# Patient Record
Sex: FEMALE | Race: WHITE | NOT HISPANIC OR LATINO | URBAN - METROPOLITAN AREA
[De-identification: names, ages, dates, MRNs, and addresses within clinical notes are randomized per-mention and may not be internally consistent; named-entity substitution may affect disease eponyms.]

---

## 2017-10-03 ENCOUNTER — OUTPATIENT (OUTPATIENT)
Dept: OUTPATIENT SERVICES | Facility: HOSPITAL | Age: 77
LOS: 1 days | End: 2017-10-03
Payer: COMMERCIAL

## 2017-10-03 DIAGNOSIS — Z01.818 ENCOUNTER FOR OTHER PREPROCEDURAL EXAMINATION: ICD-10-CM

## 2017-10-03 PROCEDURE — A9502: CPT

## 2017-10-03 PROCEDURE — 93017 CV STRESS TEST TRACING ONLY: CPT

## 2017-10-03 PROCEDURE — 78452 HT MUSCLE IMAGE SPECT MULT: CPT

## 2018-04-09 ENCOUNTER — APPOINTMENT (OUTPATIENT)
Dept: ORTHOPEDIC SURGERY | Facility: CLINIC | Age: 78
End: 2018-04-09
Payer: MEDICARE

## 2018-04-09 VITALS
HEART RATE: 82 BPM | WEIGHT: 158 LBS | HEIGHT: 59 IN | DIASTOLIC BLOOD PRESSURE: 82 MMHG | SYSTOLIC BLOOD PRESSURE: 154 MMHG | BODY MASS INDEX: 31.85 KG/M2

## 2018-04-09 DIAGNOSIS — Z86.39 PERSONAL HISTORY OF OTHER ENDOCRINE, NUTRITIONAL AND METABOLIC DISEASE: ICD-10-CM

## 2018-04-09 DIAGNOSIS — Z86.79 PERSONAL HISTORY OF OTHER DISEASES OF THE CIRCULATORY SYSTEM: ICD-10-CM

## 2018-04-09 DIAGNOSIS — Z87.891 PERSONAL HISTORY OF NICOTINE DEPENDENCE: ICD-10-CM

## 2018-04-09 PROCEDURE — 99204 OFFICE O/P NEW MOD 45 MIN: CPT

## 2018-04-09 PROCEDURE — 73564 X-RAY EXAM KNEE 4 OR MORE: CPT | Mod: RT

## 2018-05-29 ENCOUNTER — OUTPATIENT (OUTPATIENT)
Dept: OUTPATIENT SERVICES | Facility: HOSPITAL | Age: 78
LOS: 1 days | End: 2018-05-29
Payer: COMMERCIAL

## 2018-05-29 VITALS
TEMPERATURE: 98 F | OXYGEN SATURATION: 96 % | SYSTOLIC BLOOD PRESSURE: 162 MMHG | HEART RATE: 72 BPM | DIASTOLIC BLOOD PRESSURE: 86 MMHG | RESPIRATION RATE: 18 BRPM | WEIGHT: 158.07 LBS | HEIGHT: 58 IN

## 2018-05-29 DIAGNOSIS — Z98.41 CATARACT EXTRACTION STATUS, RIGHT EYE: Chronic | ICD-10-CM

## 2018-05-29 DIAGNOSIS — Z96.652 PRESENCE OF LEFT ARTIFICIAL KNEE JOINT: Chronic | ICD-10-CM

## 2018-05-29 DIAGNOSIS — Z98.890 OTHER SPECIFIED POSTPROCEDURAL STATES: Chronic | ICD-10-CM

## 2018-05-29 DIAGNOSIS — M17.11 UNILATERAL PRIMARY OSTEOARTHRITIS, RIGHT KNEE: ICD-10-CM

## 2018-05-29 DIAGNOSIS — Z01.818 ENCOUNTER FOR OTHER PREPROCEDURAL EXAMINATION: ICD-10-CM

## 2018-05-29 LAB
ANION GAP SERPL CALC-SCNC: 8 MMOL/L — SIGNIFICANT CHANGE UP (ref 5–17)
APTT BLD: 39.5 SEC — HIGH (ref 27.5–37.4)
BUN SERPL-MCNC: 21 MG/DL — HIGH (ref 7–18)
CALCIUM SERPL-MCNC: 9.3 MG/DL — SIGNIFICANT CHANGE UP (ref 8.4–10.5)
CHLORIDE SERPL-SCNC: 106 MMOL/L — SIGNIFICANT CHANGE UP (ref 96–108)
CO2 SERPL-SCNC: 25 MMOL/L — SIGNIFICANT CHANGE UP (ref 22–31)
CREAT SERPL-MCNC: 1.32 MG/DL — HIGH (ref 0.5–1.3)
GLUCOSE SERPL-MCNC: 96 MG/DL — SIGNIFICANT CHANGE UP (ref 70–99)
HCT VFR BLD CALC: 46.9 % — HIGH (ref 34.5–45)
HGB BLD-MCNC: 15.6 G/DL — HIGH (ref 11.5–15.5)
INR BLD: 0.93 RATIO — SIGNIFICANT CHANGE UP (ref 0.88–1.16)
MCHC RBC-ENTMCNC: 29.3 PG — SIGNIFICANT CHANGE UP (ref 27–34)
MCHC RBC-ENTMCNC: 33.3 GM/DL — SIGNIFICANT CHANGE UP (ref 32–36)
MCV RBC AUTO: 88.1 FL — SIGNIFICANT CHANGE UP (ref 80–100)
PLATELET # BLD AUTO: 204 K/UL — SIGNIFICANT CHANGE UP (ref 150–400)
POTASSIUM SERPL-MCNC: 3.6 MMOL/L — SIGNIFICANT CHANGE UP (ref 3.5–5.3)
POTASSIUM SERPL-SCNC: 3.6 MMOL/L — SIGNIFICANT CHANGE UP (ref 3.5–5.3)
PROTHROM AB SERPL-ACNC: 10.1 SEC — SIGNIFICANT CHANGE UP (ref 9.8–12.7)
RBC # BLD: 5.33 M/UL — HIGH (ref 3.8–5.2)
RBC # FLD: 12.8 % — SIGNIFICANT CHANGE UP (ref 10.3–14.5)
SODIUM SERPL-SCNC: 139 MMOL/L — SIGNIFICANT CHANGE UP (ref 135–145)
WBC # BLD: 6.5 K/UL — SIGNIFICANT CHANGE UP (ref 3.8–10.5)
WBC # FLD AUTO: 6.5 K/UL — SIGNIFICANT CHANGE UP (ref 3.8–10.5)

## 2018-05-29 PROCEDURE — 71046 X-RAY EXAM CHEST 2 VIEWS: CPT | Mod: 26

## 2018-05-29 RX ORDER — SODIUM CHLORIDE 9 MG/ML
3 INJECTION INTRAMUSCULAR; INTRAVENOUS; SUBCUTANEOUS EVERY 8 HOURS
Qty: 0 | Refills: 0 | Status: DISCONTINUED | OUTPATIENT
Start: 2018-06-14 | End: 2018-06-18

## 2018-05-29 RX ORDER — TRAMADOL HYDROCHLORIDE 50 MG/1
50 TABLET ORAL ONCE
Qty: 0 | Refills: 0 | Status: DISCONTINUED | OUTPATIENT
Start: 2018-06-14 | End: 2018-06-14

## 2018-05-29 RX ORDER — GABAPENTIN 400 MG/1
100 CAPSULE ORAL ONCE
Qty: 0 | Refills: 0 | Status: COMPLETED | OUTPATIENT
Start: 2018-06-14 | End: 2018-06-14

## 2018-05-29 NOTE — H&P PST ADULT - NEGATIVE GENERAL GENITOURINARY SYMPTOMS
no flank pain R/no urine discoloration/no hematuria/no flank pain L/no renal colic/no gas in urine/no bladder infections

## 2018-05-29 NOTE — H&P PST ADULT - NSANTHOSAYNRD_GEN_A_CORE
No. SHON screening performed.  STOP BANG Legend: 0-2 = LOW Risk; 3-4 = INTERMEDIATE Risk; 5-8 = HIGH Risk

## 2018-05-29 NOTE — H&P PST ADULT - PMH
Breast lump    Cataract    CKD (chronic kidney disease)    Diabetes mellitus    Gout    Hyperlipidemia    Hypertension    Osteoarthritis    Osteoporosis    Seasonal allergies    Vitamin D deficiency

## 2018-05-29 NOTE — H&P PST ADULT - PSH
H/O bilateral cataract extraction  2012  and 2014  History of breast surgery  Excision of left breast lump - benign  1991  History of knee replacement, total, left  2009

## 2018-05-29 NOTE — H&P PST ADULT - HISTORY OF PRESENT ILLNESS
77year old female with pmhx of gout, hypertension, hypercholesterolemia, T2DM, CKD, Vitamin D deficiency, osteoporosis, seasonal allergies and osteoarthritis presents today with c/o right knee pain x years. Patient is here today for presurgical testing for scheduled Right Total Knee replacement on 6/14/18

## 2018-05-30 LAB
MRSA PCR RESULT.: SIGNIFICANT CHANGE UP
S AUREUS DNA NOSE QL NAA+PROBE: SIGNIFICANT CHANGE UP

## 2018-06-08 ENCOUNTER — APPOINTMENT (OUTPATIENT)
Dept: ORTHOPEDIC SURGERY | Facility: CLINIC | Age: 78
End: 2018-06-08
Payer: MEDICARE

## 2018-06-08 PROCEDURE — 99213 OFFICE O/P EST LOW 20 MIN: CPT

## 2018-06-13 ENCOUNTER — TRANSCRIPTION ENCOUNTER (OUTPATIENT)
Age: 78
End: 2018-06-13

## 2018-06-14 ENCOUNTER — INPATIENT (INPATIENT)
Facility: HOSPITAL | Age: 78
LOS: 3 days | Discharge: NURSING, REHAB OR LONG-TERM CARE | DRG: 470 | End: 2018-06-18
Attending: ORTHOPAEDIC SURGERY | Admitting: ORTHOPAEDIC SURGERY
Payer: COMMERCIAL

## 2018-06-14 ENCOUNTER — APPOINTMENT (OUTPATIENT)
Dept: ORTHOPEDIC SURGERY | Facility: HOSPITAL | Age: 78
End: 2018-06-14

## 2018-06-14 VITALS
RESPIRATION RATE: 16 BRPM | SYSTOLIC BLOOD PRESSURE: 124 MMHG | DIASTOLIC BLOOD PRESSURE: 60 MMHG | OXYGEN SATURATION: 98 % | HEART RATE: 64 BPM | TEMPERATURE: 98 F

## 2018-06-14 DIAGNOSIS — Z96.652 PRESENCE OF LEFT ARTIFICIAL KNEE JOINT: Chronic | ICD-10-CM

## 2018-06-14 DIAGNOSIS — Z98.890 OTHER SPECIFIED POSTPROCEDURAL STATES: Chronic | ICD-10-CM

## 2018-06-14 DIAGNOSIS — M17.11 UNILATERAL PRIMARY OSTEOARTHRITIS, RIGHT KNEE: ICD-10-CM

## 2018-06-14 DIAGNOSIS — Z98.41 CATARACT EXTRACTION STATUS, RIGHT EYE: Chronic | ICD-10-CM

## 2018-06-14 LAB
ANION GAP SERPL CALC-SCNC: 9 MMOL/L — SIGNIFICANT CHANGE UP (ref 5–17)
BUN SERPL-MCNC: 13 MG/DL — SIGNIFICANT CHANGE UP (ref 7–18)
CALCIUM SERPL-MCNC: 7.5 MG/DL — LOW (ref 8.4–10.5)
CHLORIDE SERPL-SCNC: 114 MMOL/L — HIGH (ref 96–108)
CO2 SERPL-SCNC: 20 MMOL/L — LOW (ref 22–31)
CREAT SERPL-MCNC: 1.11 MG/DL — SIGNIFICANT CHANGE UP (ref 0.5–1.3)
GLUCOSE SERPL-MCNC: 82 MG/DL — SIGNIFICANT CHANGE UP (ref 70–99)
HCT VFR BLD CALC: 35.4 % — SIGNIFICANT CHANGE UP (ref 34.5–45)
HGB BLD-MCNC: 11.8 G/DL — SIGNIFICANT CHANGE UP (ref 11.5–15.5)
MCHC RBC-ENTMCNC: 29.8 PG — SIGNIFICANT CHANGE UP (ref 27–34)
MCHC RBC-ENTMCNC: 33.5 GM/DL — SIGNIFICANT CHANGE UP (ref 32–36)
MCV RBC AUTO: 88.9 FL — SIGNIFICANT CHANGE UP (ref 80–100)
PLATELET # BLD AUTO: 170 K/UL — SIGNIFICANT CHANGE UP (ref 150–400)
POTASSIUM SERPL-MCNC: 3.4 MMOL/L — LOW (ref 3.5–5.3)
POTASSIUM SERPL-SCNC: 3.4 MMOL/L — LOW (ref 3.5–5.3)
RBC # BLD: 3.98 M/UL — SIGNIFICANT CHANGE UP (ref 3.8–5.2)
RBC # FLD: 12.7 % — SIGNIFICANT CHANGE UP (ref 10.3–14.5)
SODIUM SERPL-SCNC: 143 MMOL/L — SIGNIFICANT CHANGE UP (ref 135–145)
WBC # BLD: 5.7 K/UL — SIGNIFICANT CHANGE UP (ref 3.8–10.5)
WBC # FLD AUTO: 5.7 K/UL — SIGNIFICANT CHANGE UP (ref 3.8–10.5)

## 2018-06-14 PROCEDURE — 86850 RBC ANTIBODY SCREEN: CPT

## 2018-06-14 PROCEDURE — 93005 ELECTROCARDIOGRAM TRACING: CPT

## 2018-06-14 PROCEDURE — 80048 BASIC METABOLIC PNL TOTAL CA: CPT

## 2018-06-14 PROCEDURE — 71046 X-RAY EXAM CHEST 2 VIEWS: CPT

## 2018-06-14 PROCEDURE — 87640 STAPH A DNA AMP PROBE: CPT

## 2018-06-14 PROCEDURE — 85730 THROMBOPLASTIN TIME PARTIAL: CPT

## 2018-06-14 PROCEDURE — G0463: CPT

## 2018-06-14 PROCEDURE — 85610 PROTHROMBIN TIME: CPT

## 2018-06-14 PROCEDURE — 73562 X-RAY EXAM OF KNEE 3: CPT | Mod: 26,RT

## 2018-06-14 PROCEDURE — 87641 MR-STAPH DNA AMP PROBE: CPT

## 2018-06-14 PROCEDURE — 86900 BLOOD TYPING SEROLOGIC ABO: CPT

## 2018-06-14 PROCEDURE — 82962 GLUCOSE BLOOD TEST: CPT

## 2018-06-14 PROCEDURE — 27447 TOTAL KNEE ARTHROPLASTY: CPT | Mod: RT

## 2018-06-14 PROCEDURE — 86901 BLOOD TYPING SEROLOGIC RH(D): CPT

## 2018-06-14 PROCEDURE — 85027 COMPLETE CBC AUTOMATED: CPT

## 2018-06-14 RX ORDER — FUROSEMIDE 40 MG
20 TABLET ORAL DAILY
Qty: 0 | Refills: 0 | Status: DISCONTINUED | OUTPATIENT
Start: 2018-06-14 | End: 2018-06-18

## 2018-06-14 RX ORDER — FAMOTIDINE 10 MG/ML
20 INJECTION INTRAVENOUS EVERY 12 HOURS
Qty: 0 | Refills: 0 | Status: DISCONTINUED | OUTPATIENT
Start: 2018-06-14 | End: 2018-06-18

## 2018-06-14 RX ORDER — OXYCODONE HYDROCHLORIDE 5 MG/1
5 TABLET ORAL EVERY 4 HOURS
Qty: 0 | Refills: 0 | Status: DISCONTINUED | OUTPATIENT
Start: 2018-06-14 | End: 2018-06-18

## 2018-06-14 RX ORDER — FOLIC ACID 0.8 MG
1 TABLET ORAL DAILY
Qty: 0 | Refills: 0 | Status: DISCONTINUED | OUTPATIENT
Start: 2018-06-14 | End: 2018-06-18

## 2018-06-14 RX ORDER — FERROUS SULFATE 325(65) MG
325 TABLET ORAL
Qty: 0 | Refills: 0 | Status: DISCONTINUED | OUTPATIENT
Start: 2018-06-14 | End: 2018-06-18

## 2018-06-14 RX ORDER — BUPIVACAINE 13.3 MG/ML
INJECTION, SUSPENSION, LIPOSOMAL INFILTRATION
Qty: 0 | Refills: 0 | Status: DISCONTINUED | OUTPATIENT
Start: 2018-06-14 | End: 2018-06-14

## 2018-06-14 RX ORDER — ACETAMINOPHEN 500 MG
1000 TABLET ORAL ONCE
Qty: 0 | Refills: 0 | Status: COMPLETED | OUTPATIENT
Start: 2018-06-14 | End: 2018-06-14

## 2018-06-14 RX ORDER — ACETAMINOPHEN 500 MG
650 TABLET ORAL EVERY 6 HOURS
Qty: 0 | Refills: 0 | Status: DISCONTINUED | OUTPATIENT
Start: 2018-06-14 | End: 2018-06-18

## 2018-06-14 RX ORDER — TRAMADOL HYDROCHLORIDE 50 MG/1
50 TABLET ORAL EVERY 8 HOURS
Qty: 0 | Refills: 0 | Status: DISCONTINUED | OUTPATIENT
Start: 2018-06-14 | End: 2018-06-18

## 2018-06-14 RX ORDER — SODIUM CHLORIDE 9 MG/ML
1000 INJECTION, SOLUTION INTRAVENOUS
Qty: 0 | Refills: 0 | Status: DISCONTINUED | OUTPATIENT
Start: 2018-06-15 | End: 2018-06-18

## 2018-06-14 RX ORDER — ISOSORBIDE MONONITRATE 60 MG/1
30 TABLET, EXTENDED RELEASE ORAL DAILY
Qty: 0 | Refills: 0 | Status: DISCONTINUED | OUTPATIENT
Start: 2018-06-14 | End: 2018-06-18

## 2018-06-14 RX ORDER — ACETAMINOPHEN 500 MG
1000 TABLET ORAL ONCE
Qty: 0 | Refills: 0 | Status: COMPLETED | OUTPATIENT
Start: 2018-06-15 | End: 2018-06-15

## 2018-06-14 RX ORDER — DEXTROSE 50 % IN WATER 50 %
12.5 SYRINGE (ML) INTRAVENOUS ONCE
Qty: 0 | Refills: 0 | Status: DISCONTINUED | OUTPATIENT
Start: 2018-06-15 | End: 2018-06-18

## 2018-06-14 RX ORDER — HYDROMORPHONE HYDROCHLORIDE 2 MG/ML
0.5 INJECTION INTRAMUSCULAR; INTRAVENOUS; SUBCUTANEOUS
Qty: 0 | Refills: 0 | Status: DISCONTINUED | OUTPATIENT
Start: 2018-06-14 | End: 2018-06-14

## 2018-06-14 RX ORDER — ASCORBIC ACID 60 MG
500 TABLET,CHEWABLE ORAL
Qty: 0 | Refills: 0 | Status: DISCONTINUED | OUTPATIENT
Start: 2018-06-14 | End: 2018-06-18

## 2018-06-14 RX ORDER — DEXTROSE 50 % IN WATER 50 %
15 SYRINGE (ML) INTRAVENOUS ONCE
Qty: 0 | Refills: 0 | Status: DISCONTINUED | OUTPATIENT
Start: 2018-06-15 | End: 2018-06-18

## 2018-06-14 RX ORDER — CEFAZOLIN SODIUM 1 G
1000 VIAL (EA) INJECTION EVERY 8 HOURS
Qty: 0 | Refills: 0 | Status: COMPLETED | OUTPATIENT
Start: 2018-06-14 | End: 2018-06-15

## 2018-06-14 RX ORDER — POTASSIUM CHLORIDE 20 MEQ
40 PACKET (EA) ORAL ONCE
Qty: 0 | Refills: 0 | Status: COMPLETED | OUTPATIENT
Start: 2018-06-14 | End: 2018-06-14

## 2018-06-14 RX ORDER — ENOXAPARIN SODIUM 100 MG/ML
30 INJECTION SUBCUTANEOUS EVERY 12 HOURS
Qty: 0 | Refills: 0 | Status: DISCONTINUED | OUTPATIENT
Start: 2018-06-15 | End: 2018-06-18

## 2018-06-14 RX ORDER — ASPIRIN/CALCIUM CARB/MAGNESIUM 324 MG
81 TABLET ORAL DAILY
Qty: 0 | Refills: 0 | Status: DISCONTINUED | OUTPATIENT
Start: 2018-06-15 | End: 2018-06-18

## 2018-06-14 RX ORDER — BUPIVACAINE 13.3 MG/ML
20 INJECTION, SUSPENSION, LIPOSOMAL INFILTRATION ONCE
Qty: 0 | Refills: 0 | Status: DISCONTINUED | OUTPATIENT
Start: 2018-06-14 | End: 2018-06-14

## 2018-06-14 RX ORDER — AMLODIPINE BESYLATE 2.5 MG/1
10 TABLET ORAL DAILY
Qty: 0 | Refills: 0 | Status: DISCONTINUED | OUTPATIENT
Start: 2018-06-14 | End: 2018-06-18

## 2018-06-14 RX ORDER — DOCUSATE SODIUM 100 MG
100 CAPSULE ORAL THREE TIMES A DAY
Qty: 0 | Refills: 0 | Status: DISCONTINUED | OUTPATIENT
Start: 2018-06-14 | End: 2018-06-18

## 2018-06-14 RX ORDER — SIMVASTATIN 20 MG/1
20 TABLET, FILM COATED ORAL AT BEDTIME
Qty: 0 | Refills: 0 | Status: DISCONTINUED | OUTPATIENT
Start: 2018-06-14 | End: 2018-06-18

## 2018-06-14 RX ORDER — HYDRALAZINE HCL 50 MG
100 TABLET ORAL
Qty: 0 | Refills: 0 | Status: DISCONTINUED | OUTPATIENT
Start: 2018-06-14 | End: 2018-06-18

## 2018-06-14 RX ORDER — DEXTROSE 50 % IN WATER 50 %
25 SYRINGE (ML) INTRAVENOUS ONCE
Qty: 0 | Refills: 0 | Status: DISCONTINUED | OUTPATIENT
Start: 2018-06-15 | End: 2018-06-18

## 2018-06-14 RX ORDER — HYDROMORPHONE HYDROCHLORIDE 2 MG/ML
0.5 INJECTION INTRAMUSCULAR; INTRAVENOUS; SUBCUTANEOUS EVERY 4 HOURS
Qty: 0 | Refills: 0 | Status: DISCONTINUED | OUTPATIENT
Start: 2018-06-14 | End: 2018-06-18

## 2018-06-14 RX ORDER — BUPIVACAINE 13.3 MG/ML
10 INJECTION, SUSPENSION, LIPOSOMAL INFILTRATION ONCE
Qty: 0 | Refills: 0 | Status: DISCONTINUED | OUTPATIENT
Start: 2018-06-14 | End: 2018-06-14

## 2018-06-14 RX ORDER — KETOTIFEN FUMARATE 0.34 MG/ML
1 SOLUTION OPHTHALMIC THREE TIMES A DAY
Qty: 0 | Refills: 0 | Status: DISCONTINUED | OUTPATIENT
Start: 2018-06-14 | End: 2018-06-18

## 2018-06-14 RX ORDER — SODIUM CHLORIDE 9 MG/ML
1000 INJECTION INTRAMUSCULAR; INTRAVENOUS; SUBCUTANEOUS
Qty: 0 | Refills: 0 | Status: DISCONTINUED | OUTPATIENT
Start: 2018-06-14 | End: 2018-06-18

## 2018-06-14 RX ORDER — CARVEDILOL PHOSPHATE 80 MG/1
25 CAPSULE, EXTENDED RELEASE ORAL EVERY 12 HOURS
Qty: 0 | Refills: 0 | Status: DISCONTINUED | OUTPATIENT
Start: 2018-06-14 | End: 2018-06-18

## 2018-06-14 RX ORDER — INSULIN LISPRO 100/ML
VIAL (ML) SUBCUTANEOUS
Qty: 0 | Refills: 0 | Status: DISCONTINUED | OUTPATIENT
Start: 2018-06-14 | End: 2018-06-18

## 2018-06-14 RX ORDER — GABAPENTIN 400 MG/1
100 CAPSULE ORAL DAILY
Qty: 0 | Refills: 0 | Status: DISCONTINUED | OUTPATIENT
Start: 2018-06-14 | End: 2018-06-18

## 2018-06-14 RX ORDER — LORATADINE 10 MG/1
10 TABLET ORAL DAILY
Qty: 0 | Refills: 0 | Status: DISCONTINUED | OUTPATIENT
Start: 2018-06-14 | End: 2018-06-18

## 2018-06-14 RX ORDER — GLUCAGON INJECTION, SOLUTION 0.5 MG/.1ML
1 INJECTION, SOLUTION SUBCUTANEOUS ONCE
Qty: 0 | Refills: 0 | Status: DISCONTINUED | OUTPATIENT
Start: 2018-06-15 | End: 2018-06-18

## 2018-06-14 RX ORDER — ONDANSETRON 8 MG/1
4 TABLET, FILM COATED ORAL EVERY 6 HOURS
Qty: 0 | Refills: 0 | Status: DISCONTINUED | OUTPATIENT
Start: 2018-06-14 | End: 2018-06-18

## 2018-06-14 RX ADMIN — KETOTIFEN FUMARATE 1 DROP(S): 0.34 SOLUTION OPHTHALMIC at 21:28

## 2018-06-14 RX ADMIN — Medication 400 MILLIGRAM(S): at 14:05

## 2018-06-14 RX ADMIN — TRAMADOL HYDROCHLORIDE 50 MILLIGRAM(S): 50 TABLET ORAL at 21:28

## 2018-06-14 RX ADMIN — Medication 1000 MILLIGRAM(S): at 14:51

## 2018-06-14 RX ADMIN — SODIUM CHLORIDE 3 MILLILITER(S): 9 INJECTION INTRAMUSCULAR; INTRAVENOUS; SUBCUTANEOUS at 21:20

## 2018-06-14 RX ADMIN — Medication 100 MILLIGRAM(S): at 17:49

## 2018-06-14 RX ADMIN — GABAPENTIN 100 MILLIGRAM(S): 400 CAPSULE ORAL at 08:25

## 2018-06-14 RX ADMIN — TRAMADOL HYDROCHLORIDE 50 MILLIGRAM(S): 50 TABLET ORAL at 08:25

## 2018-06-14 RX ADMIN — Medication 100 MILLIGRAM(S): at 21:28

## 2018-06-14 RX ADMIN — Medication 500 MILLIGRAM(S): at 17:49

## 2018-06-14 RX ADMIN — Medication 100 MILLIGRAM(S): at 17:48

## 2018-06-14 RX ADMIN — TRAMADOL HYDROCHLORIDE 50 MILLIGRAM(S): 50 TABLET ORAL at 22:25

## 2018-06-14 RX ADMIN — FAMOTIDINE 20 MILLIGRAM(S): 10 INJECTION INTRAVENOUS at 18:30

## 2018-06-14 RX ADMIN — CARVEDILOL PHOSPHATE 25 MILLIGRAM(S): 80 CAPSULE, EXTENDED RELEASE ORAL at 17:49

## 2018-06-14 RX ADMIN — SIMVASTATIN 20 MILLIGRAM(S): 20 TABLET, FILM COATED ORAL at 21:28

## 2018-06-14 RX ADMIN — SODIUM CHLORIDE 3 MILLILITER(S): 9 INJECTION INTRAMUSCULAR; INTRAVENOUS; SUBCUTANEOUS at 08:22

## 2018-06-14 RX ADMIN — Medication 20 MILLIGRAM(S): at 17:48

## 2018-06-14 RX ADMIN — Medication 325 MILLIGRAM(S): at 17:48

## 2018-06-14 RX ADMIN — Medication 40 MILLIEQUIVALENT(S): at 16:30

## 2018-06-14 NOTE — PROGRESS NOTE ADULT - SUBJECTIVE AND OBJECTIVE BOX
patient seen postop. standing w RW w family.  no pain. decreased sensation from block    flex/ext toes and ankle on right and left  strong palp DP pulse    cpm ordered discussed that pt has 3 flights of stairs at home and pt will eval for appropriate discharge.

## 2018-06-14 NOTE — CHART NOTE - NSCHARTNOTEFT_GEN_A_CORE
Orthopedics:    CPM Instructions as per Dr Rah Ceja:  - Flexion settings: 0-90 degrees for the first 3 days  - Frequency:  4x/daily. 20min on and 20min off.

## 2018-06-14 NOTE — PHYSICAL THERAPY INITIAL EVALUATION ADULT - DISCHARGE DISPOSITION, PT EVAL
NIKKIE; will update recommendation pending significant improvement in gait and stair negotiation/rehabilitation facility

## 2018-06-14 NOTE — PHYSICAL THERAPY INITIAL EVALUATION ADULT - MANUAL MUSCLE TESTING RESULTS, REHAB EVAL
no strength deficits were identified/MMT on both upper and left lower extremities are grossly graded 5/5; right lower extremity grossly graded 4-/5

## 2018-06-14 NOTE — PHYSICAL THERAPY INITIAL EVALUATION ADULT - LIVES WITH, PROFILE
in a 3rd floor apartment with 2 flights of stairs with 15 steps each and 5 steps to get to first landing; no HHA services/alone

## 2018-06-14 NOTE — PHYSICAL THERAPY INITIAL EVALUATION ADULT - PATIENT/FAMILY/SIGNIFICANT OTHER GOALS STATEMENT, PT EVAL
return home; ambulate, transfer, and perform ADLs independently and pain-free with or without an assistive device

## 2018-06-14 NOTE — PHYSICAL THERAPY INITIAL EVALUATION ADULT - GENERAL OBSERVATIONS, REHAB EVAL
awake, alert, NAD; IV access on right forearm; +wound dressing and ACE wrap on right knee; +indwelling urethral catheter

## 2018-06-15 ENCOUNTER — RESULT REVIEW (OUTPATIENT)
Age: 78
End: 2018-06-15

## 2018-06-15 DIAGNOSIS — M25.561 PAIN IN RIGHT KNEE: ICD-10-CM

## 2018-06-15 DIAGNOSIS — E11.9 TYPE 2 DIABETES MELLITUS WITHOUT COMPLICATIONS: ICD-10-CM

## 2018-06-15 DIAGNOSIS — Z96.651 PRESENCE OF RIGHT ARTIFICIAL KNEE JOINT: ICD-10-CM

## 2018-06-15 LAB
ANION GAP SERPL CALC-SCNC: 11 MMOL/L — SIGNIFICANT CHANGE UP (ref 5–17)
BUN SERPL-MCNC: 14 MG/DL — SIGNIFICANT CHANGE UP (ref 7–18)
CALCIUM SERPL-MCNC: 7.2 MG/DL — LOW (ref 8.4–10.5)
CHLORIDE SERPL-SCNC: 110 MMOL/L — HIGH (ref 96–108)
CO2 SERPL-SCNC: 20 MMOL/L — LOW (ref 22–31)
CREAT SERPL-MCNC: 1.16 MG/DL — SIGNIFICANT CHANGE UP (ref 0.5–1.3)
GLUCOSE SERPL-MCNC: 233 MG/DL — HIGH (ref 70–99)
HCT VFR BLD CALC: 34.1 % — LOW (ref 34.5–45)
HGB BLD-MCNC: 11.2 G/DL — LOW (ref 11.5–15.5)
MCHC RBC-ENTMCNC: 29.6 PG — SIGNIFICANT CHANGE UP (ref 27–34)
MCHC RBC-ENTMCNC: 33 GM/DL — SIGNIFICANT CHANGE UP (ref 32–36)
MCV RBC AUTO: 89.8 FL — SIGNIFICANT CHANGE UP (ref 80–100)
PLATELET # BLD AUTO: 149 K/UL — LOW (ref 150–400)
POTASSIUM SERPL-MCNC: 3.8 MMOL/L — SIGNIFICANT CHANGE UP (ref 3.5–5.3)
POTASSIUM SERPL-SCNC: 3.8 MMOL/L — SIGNIFICANT CHANGE UP (ref 3.5–5.3)
RBC # BLD: 3.79 M/UL — LOW (ref 3.8–5.2)
RBC # FLD: 13 % — SIGNIFICANT CHANGE UP (ref 10.3–14.5)
SODIUM SERPL-SCNC: 141 MMOL/L — SIGNIFICANT CHANGE UP (ref 135–145)
WBC # BLD: 7 K/UL — SIGNIFICANT CHANGE UP (ref 3.8–10.5)
WBC # FLD AUTO: 7 K/UL — SIGNIFICANT CHANGE UP (ref 3.8–10.5)

## 2018-06-15 PROCEDURE — 88305 TISSUE EXAM BY PATHOLOGIST: CPT | Mod: 26

## 2018-06-15 PROCEDURE — 88311 DECALCIFY TISSUE: CPT | Mod: 26

## 2018-06-15 RX ORDER — INSULIN LISPRO 100/ML
12 VIAL (ML) SUBCUTANEOUS
Qty: 0 | Refills: 0 | Status: DISCONTINUED | OUTPATIENT
Start: 2018-06-15 | End: 2018-06-18

## 2018-06-15 RX ORDER — INSULIN GLARGINE 100 [IU]/ML
100 INJECTION, SOLUTION SUBCUTANEOUS AT BEDTIME
Qty: 0 | Refills: 0 | Status: DISCONTINUED | OUTPATIENT
Start: 2018-06-15 | End: 2018-06-15

## 2018-06-15 RX ORDER — INSULIN GLARGINE 100 [IU]/ML
25 INJECTION, SOLUTION SUBCUTANEOUS AT BEDTIME
Qty: 0 | Refills: 0 | Status: DISCONTINUED | OUTPATIENT
Start: 2018-06-15 | End: 2018-06-18

## 2018-06-15 RX ADMIN — GABAPENTIN 100 MILLIGRAM(S): 400 CAPSULE ORAL at 11:44

## 2018-06-15 RX ADMIN — Medication 325 MILLIGRAM(S): at 11:44

## 2018-06-15 RX ADMIN — FAMOTIDINE 20 MILLIGRAM(S): 10 INJECTION INTRAVENOUS at 17:43

## 2018-06-15 RX ADMIN — Medication 325 MILLIGRAM(S): at 07:58

## 2018-06-15 RX ADMIN — ISOSORBIDE MONONITRATE 30 MILLIGRAM(S): 60 TABLET, EXTENDED RELEASE ORAL at 11:44

## 2018-06-15 RX ADMIN — Medication 400 MILLIGRAM(S): at 14:47

## 2018-06-15 RX ADMIN — Medication 500 MILLIGRAM(S): at 17:42

## 2018-06-15 RX ADMIN — Medication 1 MILLIGRAM(S): at 11:44

## 2018-06-15 RX ADMIN — Medication 400 MILLIGRAM(S): at 05:39

## 2018-06-15 RX ADMIN — Medication 12 UNIT(S): at 11:43

## 2018-06-15 RX ADMIN — CARVEDILOL PHOSPHATE 25 MILLIGRAM(S): 80 CAPSULE, EXTENDED RELEASE ORAL at 05:39

## 2018-06-15 RX ADMIN — ENOXAPARIN SODIUM 30 MILLIGRAM(S): 100 INJECTION SUBCUTANEOUS at 14:58

## 2018-06-15 RX ADMIN — LORATADINE 10 MILLIGRAM(S): 10 TABLET ORAL at 11:44

## 2018-06-15 RX ADMIN — Medication 100 MILLIGRAM(S): at 05:39

## 2018-06-15 RX ADMIN — SIMVASTATIN 20 MILLIGRAM(S): 20 TABLET, FILM COATED ORAL at 21:36

## 2018-06-15 RX ADMIN — AMLODIPINE BESYLATE 10 MILLIGRAM(S): 2.5 TABLET ORAL at 05:39

## 2018-06-15 RX ADMIN — TRAMADOL HYDROCHLORIDE 50 MILLIGRAM(S): 50 TABLET ORAL at 14:52

## 2018-06-15 RX ADMIN — SODIUM CHLORIDE 3 MILLILITER(S): 9 INJECTION INTRAMUSCULAR; INTRAVENOUS; SUBCUTANEOUS at 05:58

## 2018-06-15 RX ADMIN — Medication 1 TABLET(S): at 11:44

## 2018-06-15 RX ADMIN — Medication 4: at 07:58

## 2018-06-15 RX ADMIN — OXYCODONE HYDROCHLORIDE 5 MILLIGRAM(S): 5 TABLET ORAL at 07:58

## 2018-06-15 RX ADMIN — KETOTIFEN FUMARATE 1 DROP(S): 0.34 SOLUTION OPHTHALMIC at 05:39

## 2018-06-15 RX ADMIN — Medication 1000 MILLIGRAM(S): at 15:02

## 2018-06-15 RX ADMIN — Medication 100 MILLIGRAM(S): at 02:47

## 2018-06-15 RX ADMIN — Medication 20 MILLIGRAM(S): at 05:39

## 2018-06-15 RX ADMIN — Medication 2: at 16:51

## 2018-06-15 RX ADMIN — KETOTIFEN FUMARATE 1 DROP(S): 0.34 SOLUTION OPHTHALMIC at 14:53

## 2018-06-15 RX ADMIN — SODIUM CHLORIDE 3 MILLILITER(S): 9 INJECTION INTRAMUSCULAR; INTRAVENOUS; SUBCUTANEOUS at 17:16

## 2018-06-15 RX ADMIN — Medication 81 MILLIGRAM(S): at 14:50

## 2018-06-15 RX ADMIN — Medication 325 MILLIGRAM(S): at 16:53

## 2018-06-15 RX ADMIN — TRAMADOL HYDROCHLORIDE 50 MILLIGRAM(S): 50 TABLET ORAL at 15:45

## 2018-06-15 RX ADMIN — Medication 100 MILLIGRAM(S): at 17:43

## 2018-06-15 RX ADMIN — TRAMADOL HYDROCHLORIDE 50 MILLIGRAM(S): 50 TABLET ORAL at 22:22

## 2018-06-15 RX ADMIN — Medication 1000 MILLIGRAM(S): at 05:58

## 2018-06-15 RX ADMIN — SODIUM CHLORIDE 3 MILLILITER(S): 9 INJECTION INTRAMUSCULAR; INTRAVENOUS; SUBCUTANEOUS at 21:30

## 2018-06-15 RX ADMIN — TRAMADOL HYDROCHLORIDE 50 MILLIGRAM(S): 50 TABLET ORAL at 21:35

## 2018-06-15 RX ADMIN — KETOTIFEN FUMARATE 1 DROP(S): 0.34 SOLUTION OPHTHALMIC at 21:35

## 2018-06-15 RX ADMIN — Medication 6: at 11:43

## 2018-06-15 RX ADMIN — OXYCODONE HYDROCHLORIDE 5 MILLIGRAM(S): 5 TABLET ORAL at 08:45

## 2018-06-15 RX ADMIN — Medication 100 MILLIGRAM(S): at 14:49

## 2018-06-15 RX ADMIN — Medication 500 MILLIGRAM(S): at 05:39

## 2018-06-15 RX ADMIN — INSULIN GLARGINE 25 UNIT(S): 100 INJECTION, SOLUTION SUBCUTANEOUS at 21:35

## 2018-06-15 RX ADMIN — Medication 100 MILLIGRAM(S): at 21:39

## 2018-06-15 RX ADMIN — ENOXAPARIN SODIUM 30 MILLIGRAM(S): 100 INJECTION SUBCUTANEOUS at 21:34

## 2018-06-15 RX ADMIN — Medication 12 UNIT(S): at 16:51

## 2018-06-15 RX ADMIN — TRAMADOL HYDROCHLORIDE 50 MILLIGRAM(S): 50 TABLET ORAL at 05:58

## 2018-06-15 RX ADMIN — TRAMADOL HYDROCHLORIDE 50 MILLIGRAM(S): 50 TABLET ORAL at 05:39

## 2018-06-15 RX ADMIN — CARVEDILOL PHOSPHATE 25 MILLIGRAM(S): 80 CAPSULE, EXTENDED RELEASE ORAL at 17:43

## 2018-06-15 RX ADMIN — ENOXAPARIN SODIUM 30 MILLIGRAM(S): 100 INJECTION SUBCUTANEOUS at 02:48

## 2018-06-15 RX ADMIN — FAMOTIDINE 20 MILLIGRAM(S): 10 INJECTION INTRAVENOUS at 05:39

## 2018-06-15 NOTE — CONSULT NOTE ADULT - PROBLEM SELECTOR RECOMMENDATION 9
too tightly controlled based on hx  will add lantus 25 units qhs  cont humalog 12 ac tid  fsg ac and hs  aim fsg 140- <200

## 2018-06-15 NOTE — CONSULT NOTE ADULT - PROBLEM SELECTOR RECOMMENDATION 9
- iv acetaminophen atc  - no nsaids due to allergy  - tramadol 50mg po q 8   - oxy po prn  - stool softeners  - oob

## 2018-06-15 NOTE — PROGRESS NOTE ADULT - SUBJECTIVE AND OBJECTIVE BOX
Ortho Note POD# 1  77yFemale    Diagnosis:  S/p R Total Knee Replacement POD# 1    Patient is seen and evaluated at bedside; offers no acute complaints. Pain is mild; well controlled.  Awaiting PT for ambulation.    Vital Signs Last 24 Hrs  T(C): 37.2 (15 Doug 2018 06:00), Max: 37.2 (15 Doug 2018 06:00)  T(F): 98.9 (15 Doug 2018 06:00), Max: 98.9 (15 Doug 2018 06:00)  HR: 76 (15 Doug 2018 06:00) (64 - 76)  BP: 128/62 (15 Doug 2018 06:00) (108/60 - 131/62)  BP(mean): 79 (14 Jun 2018 18:03) (72 - 85)  RR: 16 (15 Doug 2018 06:00) (14 - 20)  SpO2: 95% (15 Doug 2018 06:00) (95% - 100%)  I&O's Detail    14 Jun 2018 07:01  -  15 Doug 2018 07:00  --------------------------------------------------------  IN:    0.9% NaCl: 2450 mL    Solution: 100 mL  Total IN: 2550 mL    OUT:    Estimated Blood Loss: 150 mL    Indwelling Catheter - Urethral: 1650 mL  Total OUT: 1800 mL    Total NET: 750 mL          Physical Exam:    General: AAOx3, in NAD, resting comfortably in bed.    R knee:  In nl. alignment. Dressing is C/D/I.  Calves are soft, non-tender. 2+pulses. NVI.                          11.2   7.0   )-----------( 149      ( 15 Doug 2018 06:46 )             34.1     06-15    141  |  110<H>  |  14  ----------------------------<  233<H>  3.8   |  20<L>  |  1.16    Ca    7.2<L>      15 Doug 2018 06:46        Impression:  77yFemale S/p R total knee replacement POD# 1  Plan:  -  Continue pain management  -  DVT prophylaxis with Lovenox  -  Daily Physical Therapy:  WBAT on RLE with walker  -  Discharge planning: Home Vs. Rehab pending Physical therapy eval.  -  Continue Antibiotics x 24hrs post-op.  -  Discontinue Reed catheter today  -  Encouraged use of incentive spirometer  -  Case d/w

## 2018-06-15 NOTE — BRIEF OPERATIVE NOTE - PROCEDURE
<<-----Click on this checkbox to enter Procedure TKA (total knee arthroplasty)  06/15/2018  Right knee  Active  SPILLAI

## 2018-06-15 NOTE — PROGRESS NOTE ADULT - ATTENDING COMMENTS
patient seen and examined in am. patient w pain in posterior knee w knee flexion on CPM. otherwise doing well    denies sob cp fever chills calf pain or swelling    LLE  dressing intact  sensation intact distally to light touch  warm toes brisk cap refill palp DP  flex/ext toes ankle    POD1 sp L TKA  WBAT PT OT LLE  CPM  dvt ppx  abx x24 hr  void check  nv check  fu labs  pain control  is 10x/hr

## 2018-06-15 NOTE — CONSULT NOTE ADULT - SUBJECTIVE AND OBJECTIVE BOX
ANA ROSA ECHEVERRIA  77y  Female      Patient is a 77y old  Female who presents with a chief complaint of "Right knee replacement" (14 Jun 2018 07:56).  Pt is s.p right knee replacement, pod#1.  + complaints of right knee on exertion.  No nausea or vomiting.  No chest pain or sob.  Pt ambulating with aid of walker.          PAST MEDICAL/SURGICAL HISTORY  PAST MEDICAL & SURGICAL HISTORY:  Diabetes mellitus  CKD (chronic kidney disease)  Seasonal allergies  Vitamin D deficiency  Osteoarthritis  Osteoporosis  Breast lump  Cataract  Gout  Hyperlipidemia  Hypertension  History of knee replacement, total, left: 2009  History of breast surgery: Excision of left breast lump - benign  1991  H/O bilateral cataract extraction: 2012  and 2014      REVIEW OF SYSTEMS:  CONSTITUTIONAL: No fever, weight loss, or fatigue  EYES: No eye pain, visual disturbances, or discharge  RESPIRATORY: No cough, wheezing, chills or hemoptysis; No shortness of breath  CARDIOVASCULAR: No chest pain, palpitations, dizziness, or leg swelling  GASTROINTESTINAL: No abdominal or epigastric pain. No nausea, vomiting, or hematemesis; No diarrhea or constipation. No melena or hematochezia.  GENITOURINARY: No dysuria, frequency, hematuria, or incontinence  NEUROLOGICAL: No headaches, memory loss, loss of strength, numbness, or tremors  SKIN: No itching, burning, rashes, or lesions   LYMPH NODES: No enlarged glands  ENDOCRINE: No heat or cold intolerance; No hair loss  MUSCULOSKELETAL: + right knee pain      T(C): 37.2 (06-15-18 @ 06:00), Max: 37.2 (06-15-18 @ 06:00)  HR: 76 (06-15-18 @ 06:00) (64 - 76)  BP: 128/62 (06-15-18 @ 06:00) (108/60 - 131/62)  RR: 16 (06-15-18 @ 06:00) (14 - 20)  SpO2: 95% (06-15-18 @ 06:00) (95% - 100%)  Wt(kg): --Vital Signs Last 24 Hrs  T(C): 37.2 (15 Doug 2018 06:00), Max: 37.2 (15 Doug 2018 06:00)  T(F): 98.9 (15 Doug 2018 06:00), Max: 98.9 (15 Doug 2018 06:00)  HR: 76 (15 Doug 2018 06:00) (64 - 76)  BP: 128/62 (15 Doug 2018 06:00) (108/60 - 131/62)  BP(mean): 79 (14 Jun 2018 18:03) (72 - 85)  RR: 16 (15 Doug 2018 06:00) (14 - 20)  SpO2: 95% (15 Doug 2018 06:00) (95% - 100%)    PHYSICAL EXAM:  GENERAL: NAD, well-groomed, well-developed  HEAD:  Atraumatic, Normocephalic  EYES: EOMI, PERRLA, conjunctiva and sclera clear  ENMT: No tonsillar erythema, exudates, or enlargement; Moist mucous membranes, Good dentition, No lesions  NECK: Supple, No JVD, Normal thyroid  NERVOUS SYSTEM:  Alert & Oriented X3, Good concentration; Motor Strength 5/5 B/L upper and lower extremities; DTRs 2+ intact and symmetric  CHEST/LUNG: Clear to percussion bilaterally; No rales, rhonchi, wheezing, or rubs  HEART: Regular rate and rhythm; No murmurs, rubs, or gallops  ABDOMEN: Soft, Nontender, Nondistended; Bowel sounds present  EXTREMITIES:  2+ Peripheral Pulses, No clubbing, cyanosis, or edema  LYMPH: No lymphadenopathy noted  SKIN: No rashes or lesions  MUSCULOSKELETAL: + right knee tenderness, decreased rom      Consultant(s) Notes Reviewed:  [x ] YES  [ ] NO  Care Discussed with Consultants/Other Providers [ x] YES  [ ] NO    LABS:  CBC   06-15-18 @ 06:46  Hematcorit 34.1  Hemoglobin 11.2  Mean Cell Hemoglobin 29.6  Platelet Count-Automated 149  RBC Count 3.79  Red Cell Distrib Width 13.0  Wbc Count 7.0  06-14-18 @ 13:55  Hematcorit 35.4  Hemoglobin 11.8  Mean Cell Hemoglobin 29.8  Platelet Count-Automated 170  RBC Count 3.98  Red Cell Distrib Width 12.7  Wbc Count 5.7      BMP  06-15-18 @ 06:46  Anion Gap. Serum 11  Blood Urea Nitrogen,Serm 14  Calcium, Total Serum 7.2  Carbon Dioxide, Serum 20  Chloride, Serum 110  Creatinine, Serum 1.16  eGFR in  52  eGFR in Non Afican American 45  Gloucose, serum 233  Potassium, Serum 3.8  Sodium, Serum 141              06-14-18 @ 13:55  Anion Gap. Serum 9  Blood Urea Nitrogen,Serm 13  Calcium, Total Serum 7.5  Carbon Dioxide, Serum 20  Chloride, Serum 114  Creatinine, Serum 1.11  eGFR in  55  eGFR in Non Afican American 47  Gloucose, serum 82  Potassium, Serum 3.4  Sodium, Serum 143                  CMP  06-15-18 @ 06:46  Lisa Aminotransferase(ALT/SGPT)--  Albumin, Serum --  Alkaline Phosphatase, Serum --  Anion Gap, Serum 11  Aspartate Aminotransferase (AST/SGOT)--  Bilirubin Total, Serum --  Blood Urea Nitrogen, Serum 14  Calcium,Total Serum 7.2  Carbon Dioxide, Serum 20  Chloride, Serum 110  Creatinine, Serum 1.16  eGFR if  52  eGFR if Non African American 45  Glucose, Serum 233  Potassium, Serum 3.8  Protein Total, Serum --  Sodium, Serum 141                      06-14-18 @ 13:55  Lisa Aminotransferase(ALT/SGPT)--  Albumin, Serum --  Alkaline Phosphatase, Serum --  Anion Gap, Serum 9  Aspartate Aminotransferase (AST/SGOT)--  Bilirubin Total, Serum --  Blood Urea Nitrogen, Serum 13  Calcium,Total Serum 7.5  Carbon Dioxide, Serum 20  Chloride, Serum 114  Creatinine, Serum 1.11  eGFR if  55  eGFR if Non African American 47  Glucose, Serum 82  Potassium, Serum 3.4  Protein Total, Serum --  Sodium, Serum 143                          PT/INR      Amylase/Lipase            RADIOLOGY & ADDITIONAL TESTS:    Imaging Personally Reviewed:  [ ] YES  [ ] NO

## 2018-06-15 NOTE — CONSULT NOTE ADULT - SUBJECTIVE AND OBJECTIVE BOX
Patient is a 77y old  Female who presents with a chief complaint of "Right knee replacement" (14 Jun 2018 07:56)      HPI:  77year old female with pmhx of gout, hypertension, hypercholesterolemia, T2DM, CKD, Vitamin D deficiency, osteoporosis, seasonal allergies and osteoarthritis presents today with c/o right knee pain x years. Patient is here today for presurgical testing for scheduled Right Total Knee replacement on 6/14/18 (29 May 2018 11:46) found to have un cont dm. Pt admits to taking tresiba 100 units qhs with am hypoos      PAST MEDICAL & SURGICAL HISTORY:  Diabetes mellitus  CKD (chronic kidney disease)  Seasonal allergies  Vitamin D deficiency  Osteoarthritis  Osteoporosis  Breast lump  Cataract  Gout  Hyperlipidemia  Hypertension  History of knee replacement, total, left: 2009  History of breast surgery: Excision of left breast lump - benign  1991  H/O bilateral cataract extraction: 2012  and 2014         MEDICATIONS  (STANDING):  acetaminophen  IVPB. 1000 milliGRAM(s) IV Intermittent once  amLODIPine   Tablet 10 milliGRAM(s) Oral daily  ascorbic acid 500 milliGRAM(s) Oral two times a day  aspirin  chewable 81 milliGRAM(s) Oral daily  carvedilol 25 milliGRAM(s) Oral every 12 hours  dextrose 5%. 1000 milliLiter(s) (50 mL/Hr) IV Continuous <Continuous>  dextrose 50% Injectable 12.5 Gram(s) IV Push once  dextrose 50% Injectable 25 Gram(s) IV Push once  dextrose 50% Injectable 25 Gram(s) IV Push once  docusate sodium 100 milliGRAM(s) Oral three times a day  enoxaparin Injectable 30 milliGRAM(s) SubCutaneous every 12 hours  famotidine    Tablet 20 milliGRAM(s) Oral every 12 hours  ferrous    sulfate 325 milliGRAM(s) Oral three times a day with meals  folic acid 1 milliGRAM(s) Oral daily  furosemide    Tablet 20 milliGRAM(s) Oral daily  gabapentin 100 milliGRAM(s) Oral daily  hydrALAZINE 100 milliGRAM(s) Oral two times a day  insulin lispro (HumaLOG) corrective regimen sliding scale   SubCutaneous three times a day before meals  insulin lispro Injectable (HumaLOG) 12 Unit(s) SubCutaneous three times a day before meals  isosorbide   mononitrate ER Tablet (IMDUR) 30 milliGRAM(s) Oral daily  ketotifen 0.025% Ophthalmic Solution 1 Drop(s) Both EYES three times a day  loratadine 10 milliGRAM(s) Oral daily  multivitamin 1 Tablet(s) Oral daily  simvastatin 20 milliGRAM(s) Oral at bedtime  sodium chloride 0.9% lock flush 3 milliLiter(s) IV Push every 8 hours  sodium chloride 0.9%. 1000 milliLiter(s) (80 mL/Hr) IV Continuous <Continuous>  traMADol 50 milliGRAM(s) Oral every 8 hours    MEDICATIONS  (PRN):  acetaminophen   Tablet 650 milliGRAM(s) Oral every 6 hours PRN For Temp over 38.3 C (100.94 F)  aluminum hydroxide/magnesium hydroxide/simethicone Suspension 30 milliLiter(s) Oral four times a day PRN Indigestion  dextrose 40% Gel 15 Gram(s) Oral once PRN Blood Glucose LESS THAN 70 milliGRAM(s)/deciliter  glucagon  Injectable 1 milliGRAM(s) IntraMuscular once PRN Glucose LESS THAN 70 milligrams/deciliter  HYDROmorphone  Injectable 0.5 milliGRAM(s) IV Push every 4 hours PRN Severe Pain  ondansetron Injectable 4 milliGRAM(s) IV Push every 6 hours PRN Nausea and/or Vomiting  oxyCODONE    IR 5 milliGRAM(s) Oral every 4 hours PRN Mild Pain      FAMILY HISTORY:  Family history of cancer (Mother)      SOCIAL HISTORY:      REVIEW OF SYSTEMS:  CONSTITUTIONAL: No fever, weight loss, or fatigue  EYES: No eye pain, visual disturbances, or discharge  ENT:  No difficulty hearing, tinnitus, vertigo; No sinus or throat pain  NECK: No pain or stiffness  RESPIRATORY: No cough, wheezing, chills or hemoptysis; No Shortness of Breath  CARDIOVASCULAR: No chest pain, palpitations, passing out, dizziness, or leg swelling  GASTROINTESTINAL: No abdominal or epigastric pain. No nausea, vomiting, or hematemesis; No diarrhea or constipation. No melena or hematochezia.  GENITOURINARY: No dysuria, frequency, hematuria, or incontinence  NEUROLOGICAL: No headaches, memory loss, loss of strength, numbness, or tremors  SKIN: No itching, burning, rashes, or lesions   LYMPH Nodes: No enlarged glands  ENDOCRINE: No heat or cold intolerance; No hair loss  MUSCULOSKELETAL: No joint pain or swelling; No muscle, back, or extremity pain  PSYCHIATRIC: No depression, anxiety, mood swings, or difficulty sleeping  HEME/LYMPH: No easy bruising, or bleeding gums  ALLERGY AND IMMUNOLOGIC: No hives or eczema	        Vital Signs Last 24 Hrs  T(C): 36.8 (15 Doug 2018 13:55), Max: 37.2 (15 Doug 2018 06:00)  T(F): 98.3 (15 Doug 2018 13:55), Max: 98.9 (15 Doug 2018 06:00)  HR: 75 (15 Doug 2018 17:46) (70 - 76)  BP: 131/62 (15 Doug 2018 17:46) (112/48 - 131/63)  BP(mean): --  RR: 18 (15 Doug 2018 17:46) (16 - 18)  SpO2: 96% (15 Doug 2018 17:46) (95% - 100%)      Constitutional:    HEENT: nad    Neck:  No JVD, bruits or thyromegaly    Respiratory:  Clear without rales or rhonchi    Cardiovascular:  RR without murmur, rub or gallop.    Gastrointestinal: Soft without hepatosplenomegaly.    Extremities: without cyanosis, clubbing or edema.    Neurological:  Oriented   x  3    . No gross sensory or motor defects.        LABS:                        11.2   7.0   )-----------( 149      ( 15 Doug 2018 06:46 )             34.1     06-15    141  |  110<H>  |  14  ----------------------------<  233<H>  3.8   |  20<L>  |  1.16    Ca    7.2<L>      15 Doug 2018 06:46              CAPILLARY BLOOD GLUCOSE      POCT Blood Glucose.: 197 mg/dL (15 Doug 2018 16:34)  POCT Blood Glucose.: 270 mg/dL (15 Doug 2018 11:38)  POCT Blood Glucose.: 232 mg/dL (15 Doug 2018 07:39)  POCT Blood Glucose.: 224 mg/dL (14 Jun 2018 21:30)      RADIOLOGY & ADDITIONAL STUDIES:

## 2018-06-16 DIAGNOSIS — I10 ESSENTIAL (PRIMARY) HYPERTENSION: ICD-10-CM

## 2018-06-16 DIAGNOSIS — R06.00 DYSPNEA, UNSPECIFIED: ICD-10-CM

## 2018-06-16 LAB
ANION GAP SERPL CALC-SCNC: 9 MMOL/L — SIGNIFICANT CHANGE UP (ref 5–17)
BASE EXCESS BLDA CALC-SCNC: -1.3 MMOL/L — SIGNIFICANT CHANGE UP (ref -2–2)
BLOOD GAS COMMENTS ARTERIAL: SIGNIFICANT CHANGE UP
BUN SERPL-MCNC: 17 MG/DL — SIGNIFICANT CHANGE UP (ref 7–18)
CALCIUM SERPL-MCNC: 7.6 MG/DL — LOW (ref 8.4–10.5)
CHLORIDE SERPL-SCNC: 106 MMOL/L — SIGNIFICANT CHANGE UP (ref 96–108)
CO2 SERPL-SCNC: 21 MMOL/L — LOW (ref 22–31)
CREAT SERPL-MCNC: 1.13 MG/DL — SIGNIFICANT CHANGE UP (ref 0.5–1.3)
GLUCOSE SERPL-MCNC: 156 MG/DL — HIGH (ref 70–99)
HBA1C BLD-MCNC: 6.2 % — HIGH (ref 4–5.6)
HCO3 BLDA-SCNC: 22 MMOL/L — LOW (ref 23–27)
HCT VFR BLD CALC: 32.9 % — LOW (ref 34.5–45)
HGB BLD-MCNC: 11.1 G/DL — LOW (ref 11.5–15.5)
HOROWITZ INDEX BLDA+IHG-RTO: 28 — SIGNIFICANT CHANGE UP
MCHC RBC-ENTMCNC: 29.9 PG — SIGNIFICANT CHANGE UP (ref 27–34)
MCHC RBC-ENTMCNC: 33.7 GM/DL — SIGNIFICANT CHANGE UP (ref 32–36)
MCV RBC AUTO: 88.7 FL — SIGNIFICANT CHANGE UP (ref 80–100)
PCO2 BLDA: 32 MMHG — SIGNIFICANT CHANGE UP (ref 32–46)
PH BLDA: 7.45 — SIGNIFICANT CHANGE UP (ref 7.35–7.45)
PLATELET # BLD AUTO: 162 K/UL — SIGNIFICANT CHANGE UP (ref 150–400)
PO2 BLDA: 100 MMHG — SIGNIFICANT CHANGE UP (ref 74–108)
POTASSIUM SERPL-MCNC: 3.5 MMOL/L — SIGNIFICANT CHANGE UP (ref 3.5–5.3)
POTASSIUM SERPL-SCNC: 3.5 MMOL/L — SIGNIFICANT CHANGE UP (ref 3.5–5.3)
RBC # BLD: 3.71 M/UL — LOW (ref 3.8–5.2)
RBC # FLD: 13 % — SIGNIFICANT CHANGE UP (ref 10.3–14.5)
SAO2 % BLDA: 98 % — HIGH (ref 92–96)
SODIUM SERPL-SCNC: 136 MMOL/L — SIGNIFICANT CHANGE UP (ref 135–145)
WBC # BLD: 7.8 K/UL — SIGNIFICANT CHANGE UP (ref 3.8–10.5)
WBC # FLD AUTO: 7.8 K/UL — SIGNIFICANT CHANGE UP (ref 3.8–10.5)

## 2018-06-16 PROCEDURE — 99223 1ST HOSP IP/OBS HIGH 75: CPT

## 2018-06-16 PROCEDURE — 71275 CT ANGIOGRAPHY CHEST: CPT | Mod: 26

## 2018-06-16 RX ORDER — TIOTROPIUM BROMIDE 18 UG/1
1 CAPSULE ORAL; RESPIRATORY (INHALATION) DAILY
Qty: 0 | Refills: 0 | Status: DISCONTINUED | OUTPATIENT
Start: 2018-06-16 | End: 2018-06-18

## 2018-06-16 RX ORDER — ALBUTEROL 90 UG/1
1 AEROSOL, METERED ORAL EVERY 4 HOURS
Qty: 0 | Refills: 0 | Status: DISCONTINUED | OUTPATIENT
Start: 2018-06-16 | End: 2018-06-18

## 2018-06-16 RX ORDER — IPRATROPIUM/ALBUTEROL SULFATE 18-103MCG
3 AEROSOL WITH ADAPTER (GRAM) INHALATION EVERY 6 HOURS
Qty: 0 | Refills: 0 | Status: DISCONTINUED | OUTPATIENT
Start: 2018-06-16 | End: 2018-06-18

## 2018-06-16 RX ADMIN — Medication 20 MILLIGRAM(S): at 06:16

## 2018-06-16 RX ADMIN — LORATADINE 10 MILLIGRAM(S): 10 TABLET ORAL at 11:40

## 2018-06-16 RX ADMIN — Medication 100 MILLIGRAM(S): at 06:16

## 2018-06-16 RX ADMIN — FAMOTIDINE 20 MILLIGRAM(S): 10 INJECTION INTRAVENOUS at 06:16

## 2018-06-16 RX ADMIN — Medication 3 MILLILITER(S): at 15:16

## 2018-06-16 RX ADMIN — Medication 12 UNIT(S): at 17:13

## 2018-06-16 RX ADMIN — SODIUM CHLORIDE 3 MILLILITER(S): 9 INJECTION INTRAMUSCULAR; INTRAVENOUS; SUBCUTANEOUS at 14:06

## 2018-06-16 RX ADMIN — SODIUM CHLORIDE 3 MILLILITER(S): 9 INJECTION INTRAMUSCULAR; INTRAVENOUS; SUBCUTANEOUS at 22:22

## 2018-06-16 RX ADMIN — Medication 1 MILLIGRAM(S): at 11:40

## 2018-06-16 RX ADMIN — Medication 1 TABLET(S): at 11:41

## 2018-06-16 RX ADMIN — KETOTIFEN FUMARATE 1 DROP(S): 0.34 SOLUTION OPHTHALMIC at 06:16

## 2018-06-16 RX ADMIN — TRAMADOL HYDROCHLORIDE 50 MILLIGRAM(S): 50 TABLET ORAL at 06:17

## 2018-06-16 RX ADMIN — AMLODIPINE BESYLATE 10 MILLIGRAM(S): 2.5 TABLET ORAL at 06:16

## 2018-06-16 RX ADMIN — ISOSORBIDE MONONITRATE 30 MILLIGRAM(S): 60 TABLET, EXTENDED RELEASE ORAL at 11:40

## 2018-06-16 RX ADMIN — KETOTIFEN FUMARATE 1 DROP(S): 0.34 SOLUTION OPHTHALMIC at 22:32

## 2018-06-16 RX ADMIN — Medication 2: at 07:50

## 2018-06-16 RX ADMIN — FAMOTIDINE 20 MILLIGRAM(S): 10 INJECTION INTRAVENOUS at 17:15

## 2018-06-16 RX ADMIN — Medication 3 MILLILITER(S): at 20:09

## 2018-06-16 RX ADMIN — Medication 12 UNIT(S): at 11:39

## 2018-06-16 RX ADMIN — Medication 325 MILLIGRAM(S): at 11:40

## 2018-06-16 RX ADMIN — Medication 500 MILLIGRAM(S): at 06:16

## 2018-06-16 RX ADMIN — Medication 12 UNIT(S): at 07:51

## 2018-06-16 RX ADMIN — Medication 500 MILLIGRAM(S): at 17:15

## 2018-06-16 RX ADMIN — TRAMADOL HYDROCHLORIDE 50 MILLIGRAM(S): 50 TABLET ORAL at 22:32

## 2018-06-16 RX ADMIN — Medication 325 MILLIGRAM(S): at 07:51

## 2018-06-16 RX ADMIN — CARVEDILOL PHOSPHATE 25 MILLIGRAM(S): 80 CAPSULE, EXTENDED RELEASE ORAL at 06:16

## 2018-06-16 RX ADMIN — Medication 100 MILLIGRAM(S): at 14:06

## 2018-06-16 RX ADMIN — Medication 100 MILLIGRAM(S): at 17:14

## 2018-06-16 RX ADMIN — Medication 325 MILLIGRAM(S): at 17:14

## 2018-06-16 RX ADMIN — OXYCODONE HYDROCHLORIDE 5 MILLIGRAM(S): 5 TABLET ORAL at 08:45

## 2018-06-16 RX ADMIN — SIMVASTATIN 20 MILLIGRAM(S): 20 TABLET, FILM COATED ORAL at 22:32

## 2018-06-16 RX ADMIN — Medication 100 MILLIGRAM(S): at 22:32

## 2018-06-16 RX ADMIN — TRAMADOL HYDROCHLORIDE 50 MILLIGRAM(S): 50 TABLET ORAL at 06:40

## 2018-06-16 RX ADMIN — OXYCODONE HYDROCHLORIDE 5 MILLIGRAM(S): 5 TABLET ORAL at 07:54

## 2018-06-16 RX ADMIN — CARVEDILOL PHOSPHATE 25 MILLIGRAM(S): 80 CAPSULE, EXTENDED RELEASE ORAL at 17:14

## 2018-06-16 RX ADMIN — GABAPENTIN 100 MILLIGRAM(S): 400 CAPSULE ORAL at 11:40

## 2018-06-16 RX ADMIN — SODIUM CHLORIDE 3 MILLILITER(S): 9 INJECTION INTRAMUSCULAR; INTRAVENOUS; SUBCUTANEOUS at 05:51

## 2018-06-16 RX ADMIN — TRAMADOL HYDROCHLORIDE 50 MILLIGRAM(S): 50 TABLET ORAL at 23:27

## 2018-06-16 RX ADMIN — Medication 81 MILLIGRAM(S): at 11:40

## 2018-06-16 RX ADMIN — ENOXAPARIN SODIUM 30 MILLIGRAM(S): 100 INJECTION SUBCUTANEOUS at 14:06

## 2018-06-16 RX ADMIN — KETOTIFEN FUMARATE 1 DROP(S): 0.34 SOLUTION OPHTHALMIC at 14:06

## 2018-06-16 NOTE — CONSULT NOTE ADULT - SUBJECTIVE AND OBJECTIVE BOX
PULMONARY CONSULT NOTE      ANA ROSA ECHEVERRIA  [id number]    Patient is a 77y old  Female who presents with a chief complaint of "Right knee replacement" (14 Jun 2018 07:56)    History of present Illness:  Reason for Admission: Right knee replacement  History of Present Illness :  77year old female with pmhx of gout, hypertension, hypercholesterolemia, T2DM, CKD, Vitamin D deficiency, osteoporosis, seasonal allergies and osteoarthritis presents today with c/o right knee pain x years. S/P Right Total Knee replacement on 6/14/18.    HISTORY OF PRESENT ILLNESS: As above , awake , alert lying in bed  with SOB    MEDICATIONS  (STANDING):  amLODIPine   Tablet 10 milliGRAM(s) Oral daily  ascorbic acid 500 milliGRAM(s) Oral two times a day  aspirin  chewable 81 milliGRAM(s) Oral daily  carvedilol 25 milliGRAM(s) Oral every 12 hours  dextrose 5%. 1000 milliLiter(s) (50 mL/Hr) IV Continuous <Continuous>  dextrose 50% Injectable 12.5 Gram(s) IV Push once  dextrose 50% Injectable 25 Gram(s) IV Push once  dextrose 50% Injectable 25 Gram(s) IV Push once  docusate sodium 100 milliGRAM(s) Oral three times a day  enoxaparin Injectable 30 milliGRAM(s) SubCutaneous every 12 hours  famotidine    Tablet 20 milliGRAM(s) Oral every 12 hours  ferrous    sulfate 325 milliGRAM(s) Oral three times a day with meals  folic acid 1 milliGRAM(s) Oral daily  furosemide    Tablet 20 milliGRAM(s) Oral daily  gabapentin 100 milliGRAM(s) Oral daily  hydrALAZINE 100 milliGRAM(s) Oral two times a day  insulin glargine Injectable (LANTUS) 25 Unit(s) SubCutaneous at bedtime  insulin lispro (HumaLOG) corrective regimen sliding scale   SubCutaneous three times a day before meals  insulin lispro Injectable (HumaLOG) 12 Unit(s) SubCutaneous three times a day before meals  isosorbide   mononitrate ER Tablet (IMDUR) 30 milliGRAM(s) Oral daily  ketotifen 0.025% Ophthalmic Solution 1 Drop(s) Both EYES three times a day  loratadine 10 milliGRAM(s) Oral daily  multivitamin 1 Tablet(s) Oral daily  simvastatin 20 milliGRAM(s) Oral at bedtime  sodium chloride 0.9% lock flush 3 milliLiter(s) IV Push every 8 hours  sodium chloride 0.9%. 1000 milliLiter(s) (80 mL/Hr) IV Continuous <Continuous>  traMADol 50 milliGRAM(s) Oral every 8 hours      MEDICATIONS  (PRN):  acetaminophen   Tablet 650 milliGRAM(s) Oral every 6 hours PRN For Temp over 38.3 C (100.94 F)  aluminum hydroxide/magnesium hydroxide/simethicone Suspension 30 milliLiter(s) Oral four times a day PRN Indigestion  dextrose 40% Gel 15 Gram(s) Oral once PRN Blood Glucose LESS THAN 70 milliGRAM(s)/deciliter  glucagon  Injectable 1 milliGRAM(s) IntraMuscular once PRN Glucose LESS THAN 70 milligrams/deciliter  HYDROmorphone  Injectable 0.5 milliGRAM(s) IV Push every 4 hours PRN Severe Pain  ondansetron Injectable 4 milliGRAM(s) IV Push every 6 hours PRN Nausea and/or Vomiting  oxyCODONE    IR 5 milliGRAM(s) Oral every 4 hours PRN Mild Pain      Allergies    aspirin (Unknown)  ibuprofen (Unknown)  Mushrooms (Anaphylaxis)  shellfish (Anaphylaxis)    Intolerances        PAST MEDICAL & SURGICAL HISTORY:  Diabetes mellitus  CKD (chronic kidney disease)  Seasonal allergies  Vitamin D deficiency  Osteoarthritis  Osteoporosis  Breast lump  Cataract  Gout  Hyperlipidemia  Hypertension  History of knee replacement, total, left: 2009  History of breast surgery: Excision of left breast lump - benign  1991  H/O bilateral cataract extraction: 2012  and 2014      FAMILY HISTORY:  Family history of cancer (Mother)      SOCIAL HISTORY  Smoking History:     REVIEW OF SYSTEMS:    CONSTITUTIONAL:  No fevers, chills, sweats    HEENT:  Eyes:  No diplopia or blurred vision. ENT:  No earache, sore throat or runny nose.    CARDIOVASCULAR:  No pressure, squeezing, tightness, or heaviness about the chest; no palpitations.    RESPIRATORY:  Per HPI    GASTROINTESTINAL:  No abdominal pain, nausea, vomiting or diarrhea.    GENITOURINARY:  No dysuria, frequency or urgency.    NEUROLOGIC:  No paresthesias, fasciculations, seizures or weakness.    PSYCHIATRIC:  No disorder of thought or mood.    Vital Signs Last 24 Hrs  T(C): 37.1 (16 Jun 2018 10:49), Max: 37.1 (16 Jun 2018 10:49)  T(F): 98.8 (16 Jun 2018 10:49), Max: 98.8 (16 Jun 2018 10:49)  HR: 75 (16 Jun 2018 10:49) (70 - 82)  BP: 123/60 (16 Jun 2018 10:49) (123/60 - 140/63)  BP(mean): --  RR: 20 (16 Jun 2018 10:49) (16 - 20)  SpO2: 98% (16 Jun 2018 10:49) (95% - 98%)  I&O's Detail      PHYSICAL EXAMINATION:    GENERAL: The patient is a well-developed, well-nourished _____in no apparent distress.     HEENT: Head is normocephalic and atraumatic. Extraocular muscles are intact. Mucous membranes are moist.     NECK: Supple.     LUNGS: Clear to auscultation without wheezing, rales, or rhonchi. Respirations unlabored    HEART: Regular rate and rhythm without murmur.    ABDOMEN: Soft, nontender, and nondistended.  No hepatosplenomegaly is noted.    EXTREMITIES: Without any cyanosis, clubbing, rash, lesions or edema.    NEUROLOGIC: Grossly intact.      LABS:                        11.1   7.8   )-----------( 162      ( 16 Jun 2018 06:39 )             32.9     06-16    136  |  106  |  17  ----------------------------<  156<H>  3.5   |  21<L>  |  1.13    Ca    7.6<L>      16 Jun 2018 06:39                          MICROBIOLOGY:    RADIOLOGY & ADDITIONAL STUDIES:    CXR:    Ct scan chest:    ekg;    echo: PULMONARY CONSULT NOTE      ANA ROSA ECHEVERRIA  MRN-[id number]    Patient is a 77y old  Female who presents with a chief complaint of "Right knee replacement" (14 Jun 2018 07:56)      Reason for Admission: Right knee replacement  History of Present Illness :  77year old female with pmhx of gout, hypertension, hypercholesterolemia, T2DM, CKD, Vitamin D deficiency, osteoporosis, seasonal allergies and osteoarthritis presents today with c/o right knee pain x years. S/P Right Total Knee replacement on 6/14/18.    HISTORY OF PRESENT ILLNESS: As above , awake , alert, lying in bed  with SOB after walking with a walker with the help of physical therapist    MEDICATIONS  (STANDING):  amLODIPine   Tablet 10 milliGRAM(s) Oral daily  ascorbic acid 500 milliGRAM(s) Oral two times a day  aspirin  chewable 81 milliGRAM(s) Oral daily  carvedilol 25 milliGRAM(s) Oral every 12 hours  dextrose 5%. 1000 milliLiter(s) (50 mL/Hr) IV Continuous <Continuous>  dextrose 50% Injectable 12.5 Gram(s) IV Push once  dextrose 50% Injectable 25 Gram(s) IV Push once  dextrose 50% Injectable 25 Gram(s) IV Push once  docusate sodium 100 milliGRAM(s) Oral three times a day  enoxaparin Injectable 30 milliGRAM(s) SubCutaneous every 12 hours  famotidine    Tablet 20 milliGRAM(s) Oral every 12 hours  ferrous    sulfate 325 milliGRAM(s) Oral three times a day with meals  folic acid 1 milliGRAM(s) Oral daily  furosemide    Tablet 20 milliGRAM(s) Oral daily  gabapentin 100 milliGRAM(s) Oral daily  hydrALAZINE 100 milliGRAM(s) Oral two times a day  insulin glargine Injectable (LANTUS) 25 Unit(s) SubCutaneous at bedtime  insulin lispro (HumaLOG) corrective regimen sliding scale   SubCutaneous three times a day before meals  insulin lispro Injectable (HumaLOG) 12 Unit(s) SubCutaneous three times a day before meals  isosorbide   mononitrate ER Tablet (IMDUR) 30 milliGRAM(s) Oral daily  ketotifen 0.025% Ophthalmic Solution 1 Drop(s) Both EYES three times a day  loratadine 10 milliGRAM(s) Oral daily  multivitamin 1 Tablet(s) Oral daily  simvastatin 20 milliGRAM(s) Oral at bedtime  sodium chloride 0.9% lock flush 3 milliLiter(s) IV Push every 8 hours  sodium chloride 0.9%. 1000 milliLiter(s) (80 mL/Hr) IV Continuous <Continuous>  traMADol 50 milliGRAM(s) Oral every 8 hours      MEDICATIONS  (PRN):  acetaminophen   Tablet 650 milliGRAM(s) Oral every 6 hours PRN For Temp over 38.3 C (100.94 F)  aluminum hydroxide/magnesium hydroxide/simethicone Suspension 30 milliLiter(s) Oral four times a day PRN Indigestion  dextrose 40% Gel 15 Gram(s) Oral once PRN Blood Glucose LESS THAN 70 milliGRAM(s)/deciliter  glucagon  Injectable 1 milliGRAM(s) IntraMuscular once PRN Glucose LESS THAN 70 milligrams/deciliter  HYDROmorphone  Injectable 0.5 milliGRAM(s) IV Push every 4 hours PRN Severe Pain  ondansetron Injectable 4 milliGRAM(s) IV Push every 6 hours PRN Nausea and/or Vomiting  oxyCODONE    IR 5 milliGRAM(s) Oral every 4 hours PRN Mild Pain      Allergies    aspirin (Unknown)  ibuprofen (Unknown)  Mushrooms (Anaphylaxis)  shellfish (Anaphylaxis)    Intolerances        PAST MEDICAL & SURGICAL HISTORY:  Diabetes mellitus  CKD (chronic kidney disease)  Seasonal allergies  Vitamin D deficiency  Osteoarthritis  Osteoporosis  Breast lump  Cataract  Gout  Hyperlipidemia  Hypertension  History of knee replacement, total, left: 2009  History of breast surgery: Excision of left breast lump - benign  1991  H/O bilateral cataract extraction: 2012  and 2014      FAMILY HISTORY:  Family history of cancer (Mother)      SOCIAL HISTORY  Smoking History:     REVIEW OF SYSTEMS:    CONSTITUTIONAL:  No fevers, chills, sweats    HEENT:  Eyes:  No diplopia or blurred vision. ENT:  No earache, sore throat or runny nose.    CARDIOVASCULAR:  No pressure, squeezing, tightness, or heaviness about the chest; no palpitations.    RESPIRATORY:  Per HPI    GASTROINTESTINAL:  No abdominal pain, nausea, vomiting or diarrhea.    GENITOURINARY:  No dysuria, frequency or urgency.    NEUROLOGIC:  No paresthesias, fasciculations, seizures or weakness.    PSYCHIATRIC:  No disorder of thought or mood.    Vital Signs Last 24 Hrs  T(C): 37.1 (16 Jun 2018 10:49), Max: 37.1 (16 Jun 2018 10:49)  T(F): 98.8 (16 Jun 2018 10:49), Max: 98.8 (16 Jun 2018 10:49)  HR: 75 (16 Jun 2018 10:49) (70 - 82)  BP: 123/60 (16 Jun 2018 10:49) (123/60 - 140/63)  BP(mean): --  RR: 20 (16 Jun 2018 10:49) (16 - 20)  SpO2: 98% (16 Jun 2018 10:49) (95% - 98%)  I&O's Detail      PHYSICAL EXAMINATION:    GENERAL: The patient is a well-developed, well-nourished _____in no apparent distress.     HEENT: Head is normocephalic and atraumatic. Extraocular muscles are intact. Mucous membranes are moist.     NECK: Supple.     LUNGS: Clear to auscultation without wheezing, rales, or rhonchi. Respirations unlabored    HEART: Regular rate and rhythm without murmur.    ABDOMEN: Soft, nontender, and nondistended.  No hepatosplenomegaly is noted.    EXTREMITIES: Right knee dressing clean    NEUROLOGIC: Grossly intact.      LABS:                        11.1   7.8   )-----------( 162      ( 16 Jun 2018 06:39 )             32.9     06-16    136  |  106  |  17  ----------------------------<  156<H>  3.5   |  21<L>  |  1.13    Ca    7.6<L>      16 Jun 2018 06:39                          MICROBIOLOGY:    RADIOLOGY & ADDITIONAL STUDIES:    CXR:    Ct scan chest:    ekg;    echo:

## 2018-06-16 NOTE — CONSULT NOTE ADULT - ASSESSMENT
77year old female with pmhx of gout, hypertension, hypercholesterolemia, T2DM, CKD, Vitamin D deficiency, osteoporosis, seasonal allergies and osteoarthritis presents today with c/o right knee pain x years. S/P Right Total Knee replacement on 6/14/18.
77year old female with pmhx of gout, hypertension, hypercholesterolemia, T2DM, CKD, Vitamin D deficiency, osteoporosis, seasonal allergies and osteoarthritis presents today with c/o right knee pain x years.  Pt admits to taking tresiba 100 units qhs with am hypos and novolg 12 -16 ac tid

## 2018-06-16 NOTE — PROGRESS NOTE ADULT - SUBJECTIVE AND OBJECTIVE BOX
NP Note discussed with  Primary Attending    Patient is a 77y old  Female who presents with a chief complaint of "Right knee replacement" (14 Jun 2018 07:56).  Pt s/p right knee replacement and complaining of right knee pain.  Pain worsens with exertion.  No chest pain or n/v. + complaints of sob.  Pt has no history of chf.  Echo done - nl lv function.        INTERVAL HPI/OVERNIGHT EVENTS: no new complaints    MEDICATIONS  (STANDING):  amLODIPine   Tablet 10 milliGRAM(s) Oral daily  ascorbic acid 500 milliGRAM(s) Oral two times a day  aspirin  chewable 81 milliGRAM(s) Oral daily  carvedilol 25 milliGRAM(s) Oral every 12 hours  dextrose 5%. 1000 milliLiter(s) (50 mL/Hr) IV Continuous <Continuous>  dextrose 50% Injectable 12.5 Gram(s) IV Push once  dextrose 50% Injectable 25 Gram(s) IV Push once  dextrose 50% Injectable 25 Gram(s) IV Push once  docusate sodium 100 milliGRAM(s) Oral three times a day  enoxaparin Injectable 30 milliGRAM(s) SubCutaneous every 12 hours  famotidine    Tablet 20 milliGRAM(s) Oral every 12 hours  ferrous    sulfate 325 milliGRAM(s) Oral three times a day with meals  folic acid 1 milliGRAM(s) Oral daily  furosemide    Tablet 20 milliGRAM(s) Oral daily  gabapentin 100 milliGRAM(s) Oral daily  hydrALAZINE 100 milliGRAM(s) Oral two times a day  insulin glargine Injectable (LANTUS) 25 Unit(s) SubCutaneous at bedtime  insulin lispro (HumaLOG) corrective regimen sliding scale   SubCutaneous three times a day before meals  insulin lispro Injectable (HumaLOG) 12 Unit(s) SubCutaneous three times a day before meals  isosorbide   mononitrate ER Tablet (IMDUR) 30 milliGRAM(s) Oral daily  ketotifen 0.025% Ophthalmic Solution 1 Drop(s) Both EYES three times a day  loratadine 10 milliGRAM(s) Oral daily  multivitamin 1 Tablet(s) Oral daily  simvastatin 20 milliGRAM(s) Oral at bedtime  sodium chloride 0.9% lock flush 3 milliLiter(s) IV Push every 8 hours  sodium chloride 0.9%. 1000 milliLiter(s) (80 mL/Hr) IV Continuous <Continuous>  traMADol 50 milliGRAM(s) Oral every 8 hours    MEDICATIONS  (PRN):  acetaminophen   Tablet 650 milliGRAM(s) Oral every 6 hours PRN For Temp over 38.3 C (100.94 F)  aluminum hydroxide/magnesium hydroxide/simethicone Suspension 30 milliLiter(s) Oral four times a day PRN Indigestion  dextrose 40% Gel 15 Gram(s) Oral once PRN Blood Glucose LESS THAN 70 milliGRAM(s)/deciliter  glucagon  Injectable 1 milliGRAM(s) IntraMuscular once PRN Glucose LESS THAN 70 milligrams/deciliter  HYDROmorphone  Injectable 0.5 milliGRAM(s) IV Push every 4 hours PRN Severe Pain  ondansetron Injectable 4 milliGRAM(s) IV Push every 6 hours PRN Nausea and/or Vomiting  oxyCODONE    IR 5 milliGRAM(s) Oral every 4 hours PRN Mild Pain      __________________________________________________  REVIEW OF SYSTEMS:    CONSTITUTIONAL: No fever,   EYES: no acute visual disturbances  NECK: No pain or stiffness  RESPIRATORY: +sob  CARDIOVASCULAR: No chest pain, no palpitations  GASTROINTESTINAL: No pain. No nausea or vomiting; No diarrhea   NEUROLOGICAL: No headache or numbness, no tremors  MUSCULOSKELETAL: +right knee pain  GENITOURINARY: no dysuria, no frequency, no hesitancy  PSYCHIATRY: no depression , no anxiety  ALL OTHER  ROS negative        Vital Signs Last 24 Hrs  T(C): 36.8 (16 Jun 2018 06:00), Max: 36.8 (15 Doug 2018 13:55)  T(F): 98.2 (16 Jun 2018 06:00), Max: 98.3 (15 Doug 2018 13:55)  HR: 82 (16 Jun 2018 06:00) (70 - 82)  BP: 140/63 (16 Jun 2018 06:00) (130/60 - 140/63)  BP(mean): --  RR: 18 (16 Jun 2018 06:00) (16 - 18)  SpO2: 97% (16 Jun 2018 06:00) (95% - 97%)    ________________________________________________  PHYSICAL EXAM:  GENERAL: NAD  HEENT: Normocephalic;  conjunctivae and sclerae clear; moist mucous membranes;   NECK : supple  CHEST/LUNG: Clear to auscultation bilaterally with good air entry   HEART: S1 S2  regular; no murmurs, gallops or rubs  ABDOMEN: Soft, Nontender, Nondistended; Bowel sounds present  EXTREMITIES: no cyanosis; no edema; no calf tenderness  SKIN: warm and dry; no rash  NERVOUS SYSTEM:  Awake and alert; Oriented  to place, person and time ; no new deficits  MUSCULOSKELETAL: + right knee tenderness, decreased rom  _________________________________________________  LABS:                        11.1   7.8   )-----------( 162      ( 16 Jun 2018 06:39 )             32.9     06-16    136  |  106  |  17  ----------------------------<  156<H>  3.5   |  21<L>  |  1.13    Ca    7.6<L>      16 Jun 2018 06:39          CAPILLARY BLOOD GLUCOSE      POCT Blood Glucose.: 171 mg/dL (16 Jun 2018 07:29)  POCT Blood Glucose.: 167 mg/dL (15 Doug 2018 21:24)  POCT Blood Glucose.: 197 mg/dL (15 Doug 2018 16:34)  POCT Blood Glucose.: 270 mg/dL (15 Doug 2018 11:38)        RADIOLOGY & ADDITIONAL TESTS:    Imaging Personally Reviewed:  YES/NO    Consultant(s) Notes Reviewed:   YES/ No    Care Discussed with Consultants :     Plan of care was discussed with patient and /or primary care giver; all questions and concerns were addressed and care was aligned with patient's wishes. NP Note discussed with  Primary Attending    Patient is a 77y old  Female who presents with a chief complaint of "Right knee replacement" (14 Jun 2018 07:56).  Pt s/p right knee replacement and complaining of right knee pain.  Pain worsens with exertion.  No chest pain or n/v. + complaints of sob.  Pt has no history of chf.  Echo done - nl lv function.        INTERVAL HPI/OVERNIGHT EVENTS: no new complaints    MEDICATIONS  (STANDING):  amLODIPine   Tablet 10 milliGRAM(s) Oral daily  ascorbic acid 500 milliGRAM(s) Oral two times a day  aspirin  chewable 81 milliGRAM(s) Oral daily  carvedilol 25 milliGRAM(s) Oral every 12 hours  dextrose 5%. 1000 milliLiter(s) (50 mL/Hr) IV Continuous <Continuous>  dextrose 50% Injectable 12.5 Gram(s) IV Push once  dextrose 50% Injectable 25 Gram(s) IV Push once  dextrose 50% Injectable 25 Gram(s) IV Push once  docusate sodium 100 milliGRAM(s) Oral three times a day  enoxaparin Injectable 30 milliGRAM(s) SubCutaneous every 12 hours  famotidine    Tablet 20 milliGRAM(s) Oral every 12 hours  ferrous    sulfate 325 milliGRAM(s) Oral three times a day with meals  folic acid 1 milliGRAM(s) Oral daily  furosemide    Tablet 20 milliGRAM(s) Oral daily  gabapentin 100 milliGRAM(s) Oral daily  hydrALAZINE 100 milliGRAM(s) Oral two times a day  insulin glargine Injectable (LANTUS) 25 Unit(s) SubCutaneous at bedtime  insulin lispro (HumaLOG) corrective regimen sliding scale   SubCutaneous three times a day before meals  insulin lispro Injectable (HumaLOG) 12 Unit(s) SubCutaneous three times a day before meals  isosorbide   mononitrate ER Tablet (IMDUR) 30 milliGRAM(s) Oral daily  ketotifen 0.025% Ophthalmic Solution 1 Drop(s) Both EYES three times a day  loratadine 10 milliGRAM(s) Oral daily  multivitamin 1 Tablet(s) Oral daily  simvastatin 20 milliGRAM(s) Oral at bedtime  sodium chloride 0.9% lock flush 3 milliLiter(s) IV Push every 8 hours  sodium chloride 0.9%. 1000 milliLiter(s) (80 mL/Hr) IV Continuous <Continuous>  traMADol 50 milliGRAM(s) Oral every 8 hours    MEDICATIONS  (PRN):  acetaminophen   Tablet 650 milliGRAM(s) Oral every 6 hours PRN For Temp over 38.3 C (100.94 F)  aluminum hydroxide/magnesium hydroxide/simethicone Suspension 30 milliLiter(s) Oral four times a day PRN Indigestion  dextrose 40% Gel 15 Gram(s) Oral once PRN Blood Glucose LESS THAN 70 milliGRAM(s)/deciliter  glucagon  Injectable 1 milliGRAM(s) IntraMuscular once PRN Glucose LESS THAN 70 milligrams/deciliter  HYDROmorphone  Injectable 0.5 milliGRAM(s) IV Push every 4 hours PRN Severe Pain  ondansetron Injectable 4 milliGRAM(s) IV Push every 6 hours PRN Nausea and/or Vomiting  oxyCODONE    IR 5 milliGRAM(s) Oral every 4 hours PRN Mild Pain      __________________________________________________  REVIEW OF SYSTEMS:    CONSTITUTIONAL: No fever,   EYES: no acute visual disturbances  NECK: No pain or stiffness  RESPIRATORY: +sob  CARDIOVASCULAR: No chest pain, no palpitations  GASTROINTESTINAL: No pain. No nausea or vomiting; No diarrhea   NEUROLOGICAL: No headache or numbness, no tremors  MUSCULOSKELETAL: +right knee pain  GENITOURINARY: no dysuria, no frequency, no hesitancy  PSYCHIATRY: no depression , no anxiety  ALL OTHER  ROS negative        Vital Signs Last 24 Hrs  T(C): 36.8 (16 Jun 2018 06:00), Max: 36.8 (15 Doug 2018 13:55)  T(F): 98.2 (16 Jun 2018 06:00), Max: 98.3 (15 Doug 2018 13:55)  HR: 82 (16 Jun 2018 06:00) (70 - 82)  BP: 140/63 (16 Jun 2018 06:00) (130/60 - 140/63)  BP(mean): --  RR: 18 (16 Jun 2018 06:00) (16 - 18)  SpO2: 97% (16 Jun 2018 06:00) (95% - 97%)    ________________________________________________  PHYSICAL EXAM:  GENERAL: NAD  HEENT: Normocephalic;  conjunctivae and sclerae clear; moist mucous membranes;   NECK : supple  CHEST/LUNG: crackles bases   HEART: S1 S2  regular; no murmurs, gallops or rubs  ABDOMEN: Soft, Nontender, Nondistended; Bowel sounds present  EXTREMITIES: no cyanosis; no edema; no calf tenderness  SKIN: warm and dry; no rash  NERVOUS SYSTEM:  Awake and alert; Oriented  to place, person and time ; no new deficits  MUSCULOSKELETAL: + right knee tenderness, decreased rom  _________________________________________________  LABS:                        11.1   7.8   )-----------( 162      ( 16 Jun 2018 06:39 )             32.9     06-16    136  |  106  |  17  ----------------------------<  156<H>  3.5   |  21<L>  |  1.13    Ca    7.6<L>      16 Jun 2018 06:39          CAPILLARY BLOOD GLUCOSE      POCT Blood Glucose.: 171 mg/dL (16 Jun 2018 07:29)  POCT Blood Glucose.: 167 mg/dL (15 Doug 2018 21:24)  POCT Blood Glucose.: 197 mg/dL (15 Doug 2018 16:34)  POCT Blood Glucose.: 270 mg/dL (15 Doug 2018 11:38)        RADIOLOGY & ADDITIONAL TESTS:    Imaging Personally Reviewed:  YES/NO    Consultant(s) Notes Reviewed:   YES/ No    Care Discussed with Consultants :     Plan of care was discussed with patient and /or primary care giver; all questions and concerns were addressed and care was aligned with patient's wishes.

## 2018-06-16 NOTE — PROGRESS NOTE ADULT - SUBJECTIVE AND OBJECTIVE BOX
Interval Events:  pt in nad  eating well    Allergies    aspirin (Unknown)  ibuprofen (Unknown)  Mushrooms (Anaphylaxis)  shellfish (Anaphylaxis)    Intolerances      Endocrine/Metabolic Medications:  dextrose 40% Gel 15 Gram(s) Oral once PRN  dextrose 50% Injectable 12.5 Gram(s) IV Push once  dextrose 50% Injectable 25 Gram(s) IV Push once  dextrose 50% Injectable 25 Gram(s) IV Push once  glucagon  Injectable 1 milliGRAM(s) IntraMuscular once PRN  insulin glargine Injectable (LANTUS) 25 Unit(s) SubCutaneous at bedtime  insulin lispro (HumaLOG) corrective regimen sliding scale   SubCutaneous three times a day before meals  insulin lispro Injectable (HumaLOG) 12 Unit(s) SubCutaneous three times a day before meals  simvastatin 20 milliGRAM(s) Oral at bedtime      Vital Signs Last 24 Hrs  T(C): 37 (16 Jun 2018 13:36), Max: 37.1 (16 Jun 2018 10:49)  T(F): 98.6 (16 Jun 2018 13:36), Max: 98.8 (16 Jun 2018 10:49)  HR: 77 (16 Jun 2018 13:36) (73 - 82)  BP: 125/58 (16 Jun 2018 13:36) (123/60 - 140/63)  BP(mean): --  RR: 18 (16 Jun 2018 13:36) (17 - 20)  SpO2: 97% (16 Jun 2018 13:36) (95% - 98%)      PHYSICAL EXAM  All physical exam findings normal, except those marked:  General:	Alert, active, cooperative, NAD, well hydrated  .		[] Abnormal:  Neck		Normal: supple, no cervical adenopathy, no palpable thyroid  .		[] Abnormal:  Cardiovascular	Normal: regular rate, normal S1, S2, no murmurs  .		[] Abnormal:  Respiratory	Normal: no chest wall deformity, normal respiratory pattern, CTA B/L  .		[] Abnormal:  Abdominal	Normal: soft, ND, NT, bowel sounds present, no masses, no organomegaly  .		[] Abnormal:  		Normal normal genitalia, testes descended, circumcised/uncircumcised  .		Yamilex stage:			Breast yamilex:  .		Menstrual history:  .		[] Abnormal:  Extremities	Normal: FROM x4  .		[] Abnormal:  Skin		Normal: intact and not indurated, no rash, no acanthosis nigricans  .		[] Abnormal:  Neurologic	Normal: grossly intact  .		[] Abnormal:    LABS                        11.1   7.8   )-----------( 162      ( 16 Jun 2018 06:39 )             32.9                               136    |  106    |  17                  Calcium: 7.6   / iCa: x      (06-16 @ 06:39)    ----------------------------<  156       Magnesium: x                                3.5     |  21     |  1.13             Phosphorous: x          CAPILLARY BLOOD GLUCOSE      POCT Blood Glucose.: 138 mg/dL (16 Jun 2018 11:25)  POCT Blood Glucose.: 171 mg/dL (16 Jun 2018 07:29)  POCT Blood Glucose.: 167 mg/dL (15 Doug 2018 21:24)  POCT Blood Glucose.: 197 mg/dL (15 Doug 2018 16:34)        Assesment/plan    Dm- good control  cont lantus 25 and humalog 12 ac tid  fsg ac and hs  a1c 6.2%

## 2018-06-16 NOTE — PROGRESS NOTE ADULT - ATTENDING COMMENTS
co of sob after evening.  seen by medicine and pulmonology  recommended CT PE is pending. DVT/PE vs COPD/bronchospasm    POD 2 sp R TKA at risk for DVT/PE on lovenox DVT ppx and scd's and early ambulation    patient reports some SOB and wheezing since last evening. reports more w ambulation    Vital Signs Last 24 Hrs  T(C): 37 (16 Jun 2018 13:36), Max: 37.1 (16 Jun 2018 10:49)  T(F): 98.6 (16 Jun 2018 13:36), Max: 98.8 (16 Jun 2018 10:49)  HR: 85 (16 Jun 2018 17:19) (73 - 85)  BP: 145/65 (16 Jun 2018 17:19) (123/60 - 145/65)  BP(mean): --  RR: 18 (16 Jun 2018 17:19) (17 - 20)  SpO2: 99% (16 Jun 2018 17:19) (95% - 99%)  o2 95% RA at rest    RLE  dress intact  normal warmth  motor sensory intact palp dp  no calf tenderness bilaterally    AP POD 2 R TKA    ro DVT/PE CT pe protocol pending  DW pulm possible start solumedrol    order blood gas    on o2 now    WBAt RLE  PT/OT  setup for rehab placement co of sob after evening.  seen by medicine and pulmonology  recommended CT PE is pending. DVT/PE vs COPD/bronchospasm    POD 2 sp R TKA at risk for DVT/PE on lovenox DVT ppx and scd's and early ambulation    patient reports some SOB and wheezing since last evening. reports more w ambulation    otherwise doing well pain controlled.    Vital Signs Last 24 Hrs  T(C): 37 (16 Jun 2018 13:36), Max: 37.1 (16 Jun 2018 10:49)  T(F): 98.6 (16 Jun 2018 13:36), Max: 98.8 (16 Jun 2018 10:49)  HR: 85 (16 Jun 2018 17:19) (73 - 85)  BP: 145/65 (16 Jun 2018 17:19) (123/60 - 145/65)  BP(mean): --  RR: 18 (16 Jun 2018 17:19) (17 - 20)  SpO2: 99% (16 Jun 2018 17:19) (95% - 99%)  o2 95% RA at rest    RLE  dress intact  normal warmth  motor sensory intact palp dp  no calf tenderness bilaterally    AP POD 2 R TKA    ro DVT/PE CT pe protocol pending  DW pulm possible start solumedrol    order blood gas    on o2 now    WBAt RLE  PT/OT  setup for rehab placement

## 2018-06-16 NOTE — CONSULT NOTE ADULT - PROBLEM SELECTOR RECOMMENDATION 2
Bronchodilators   Duo Nep  Follow  CXR Bronchodilators   Duo Neb  Follow  CXR  oxygen supp  Ct angio to rule out PE (doubtful but pt is post op with sob)  Pfts as OP ( Has hx of smoking pack and half a day for several years)

## 2018-06-16 NOTE — PROGRESS NOTE ADULT - SUBJECTIVE AND OBJECTIVE BOX
77yFemale    Diagnosis:  S/p R Total Knee Replacement POD#2    Patient was seen and evaluated at bedside.    Pain is well controlled. Pt states episode of SOB while walking with PT. At rest she states she is okay and does not have trouble breathing, only when walking. Patient states she has never had this similar episode before.  Denies CP, paresthesias, N/V/D, palpitations.     Vital Signs Last 24 Hrs  T(C): 36.8 (16 Jun 2018 06:00), Max: 36.8 (15 Doug 2018 13:55)  T(F): 98.2 (16 Jun 2018 06:00), Max: 98.3 (15 Doug 2018 13:55)  HR: 82 (16 Jun 2018 06:00) (70 - 82)  BP: 140/63 (16 Jun 2018 06:00) (130/60 - 140/63)  BP(mean): --  RR: 18 (16 Jun 2018 06:00) (16 - 18)  SpO2: 97% (16 Jun 2018 06:00) (95% - 97%)  I&O's Detail      Physical Exam:    General: AAOx3, NAD, resting comfortably in bed.    R KNEE:  Dressing is C/D/I. Skin is pink and warm. Staples intact. No erythema. SILT.  Wound with no drainage, healing well.   Lower extremity:  No calf tenderness, calves are soft. 2+pulses. NVI. 5/5 Strength of EHL/TA/gastrocnemius B/L.  Good capillary refill.                           11.1   7.8   )-----------( 162      ( 16 Jun 2018 06:39 )             32.9     06-16    136  |  106  |  17  ----------------------------<  156<H>  3.5   |  21<L>  |  1.13    Ca    7.6<L>      16 Jun 2018 06:39        Impression:  77yFemale S/p R Total Knee Replacement POD#2  Plan:  -  Pain management  -  Dvt prophylaxis with Lovenox  -  Daily Physical Theapy:  WBAT  of right lower extremity  -  Continue with heel bump when laying in bed  -  Discharge planning: Home Vs. Rehab pending Physical therapy eval.  -  Dressing changed  -  Medical consult called for SOB 77yFemale    Diagnosis:  S/p R Total Knee Replacement POD#2    Patient was seen and evaluated at bedside.    Pain is well controlled. Pt states episode of SOB while walking with PT. At rest she states she is okay and does not have trouble breathing, only when walking.   Denies CP, paresthesias, N/V/D, palpitations.     Vital Signs Last 24 Hrs  T(C): 36.8 (16 Jun 2018 06:00), Max: 36.8 (15 Doug 2018 13:55)  T(F): 98.2 (16 Jun 2018 06:00), Max: 98.3 (15 Doug 2018 13:55)  HR: 82 (16 Jun 2018 06:00) (70 - 82)  BP: 140/63 (16 Jun 2018 06:00) (130/60 - 140/63)  BP(mean): --  RR: 18 (16 Jun 2018 06:00) (16 - 18)  SpO2: 97% (16 Jun 2018 06:00) (95% - 97%)  I&O's Detail      Physical Exam:    General: AAOx3, NAD, resting comfortably in bed.    R KNEE:  Dressing is C/D/I. Skin is pink and warm. Staples intact. No erythema. SILT.  Wound with no drainage, healing well.   Lower extremity:  No calf tenderness, calves are soft. 2+pulses. NVI. 5/5 Strength of EHL/TA/gastrocnemius B/L.  Good capillary refill.                           11.1   7.8   )-----------( 162      ( 16 Jun 2018 06:39 )             32.9     06-16    136  |  106  |  17  ----------------------------<  156<H>  3.5   |  21<L>  |  1.13    Ca    7.6<L>      16 Jun 2018 06:39        Impression:  77yFemale S/p R Total Knee Replacement POD#2  Plan:  -  Pain management  -  Dvt prophylaxis with Lovenox  -  Daily Physical Theapy:  WBAT  of right lower extremity  -  Continue with heel bump when laying in bed  -  Discharge planning: Home Vs. Rehab pending Physical therapy eval.  -  Dressing changed  -  Medical consult called for SOB

## 2018-06-16 NOTE — CONSULT NOTE ADULT - PROBLEM SELECTOR RECOMMENDATION 9
S/P Right Total Knee replacement  Cont meds.  Pain management S/P Right Total Knee replacement  Cont meds.  Pain management  incentive spirometry  DVT PPX  Ortho follow up

## 2018-06-17 DIAGNOSIS — J98.11 ATELECTASIS: ICD-10-CM

## 2018-06-17 LAB
ANION GAP SERPL CALC-SCNC: 8 MMOL/L — SIGNIFICANT CHANGE UP (ref 5–17)
BUN SERPL-MCNC: 16 MG/DL — SIGNIFICANT CHANGE UP (ref 7–18)
CALCIUM SERPL-MCNC: 8.1 MG/DL — LOW (ref 8.4–10.5)
CHLORIDE SERPL-SCNC: 108 MMOL/L — SIGNIFICANT CHANGE UP (ref 96–108)
CO2 SERPL-SCNC: 24 MMOL/L — SIGNIFICANT CHANGE UP (ref 22–31)
CREAT SERPL-MCNC: 1.05 MG/DL — SIGNIFICANT CHANGE UP (ref 0.5–1.3)
GLUCOSE SERPL-MCNC: 130 MG/DL — HIGH (ref 70–99)
HCT VFR BLD CALC: 30 % — LOW (ref 34.5–45)
HGB BLD-MCNC: 10 G/DL — LOW (ref 11.5–15.5)
MCHC RBC-ENTMCNC: 29.7 PG — SIGNIFICANT CHANGE UP (ref 27–34)
MCHC RBC-ENTMCNC: 33.4 GM/DL — SIGNIFICANT CHANGE UP (ref 32–36)
MCV RBC AUTO: 89 FL — SIGNIFICANT CHANGE UP (ref 80–100)
PLATELET # BLD AUTO: 154 K/UL — SIGNIFICANT CHANGE UP (ref 150–400)
POTASSIUM SERPL-MCNC: 3.7 MMOL/L — SIGNIFICANT CHANGE UP (ref 3.5–5.3)
POTASSIUM SERPL-SCNC: 3.7 MMOL/L — SIGNIFICANT CHANGE UP (ref 3.5–5.3)
RBC # BLD: 3.37 M/UL — LOW (ref 3.8–5.2)
RBC # FLD: 13.1 % — SIGNIFICANT CHANGE UP (ref 10.3–14.5)
SODIUM SERPL-SCNC: 140 MMOL/L — SIGNIFICANT CHANGE UP (ref 135–145)
WBC # BLD: 6.4 K/UL — SIGNIFICANT CHANGE UP (ref 3.8–10.5)
WBC # FLD AUTO: 6.4 K/UL — SIGNIFICANT CHANGE UP (ref 3.8–10.5)

## 2018-06-17 PROCEDURE — 99233 SBSQ HOSP IP/OBS HIGH 50: CPT

## 2018-06-17 RX ADMIN — Medication 81 MILLIGRAM(S): at 11:52

## 2018-06-17 RX ADMIN — Medication 100 MILLIGRAM(S): at 05:35

## 2018-06-17 RX ADMIN — ISOSORBIDE MONONITRATE 30 MILLIGRAM(S): 60 TABLET, EXTENDED RELEASE ORAL at 11:52

## 2018-06-17 RX ADMIN — Medication 325 MILLIGRAM(S): at 17:10

## 2018-06-17 RX ADMIN — TRAMADOL HYDROCHLORIDE 50 MILLIGRAM(S): 50 TABLET ORAL at 13:29

## 2018-06-17 RX ADMIN — TRAMADOL HYDROCHLORIDE 50 MILLIGRAM(S): 50 TABLET ORAL at 06:32

## 2018-06-17 RX ADMIN — SODIUM CHLORIDE 3 MILLILITER(S): 9 INJECTION INTRAMUSCULAR; INTRAVENOUS; SUBCUTANEOUS at 16:58

## 2018-06-17 RX ADMIN — SODIUM CHLORIDE 3 MILLILITER(S): 9 INJECTION INTRAMUSCULAR; INTRAVENOUS; SUBCUTANEOUS at 22:48

## 2018-06-17 RX ADMIN — Medication 3 MILLILITER(S): at 08:29

## 2018-06-17 RX ADMIN — Medication 2: at 11:50

## 2018-06-17 RX ADMIN — Medication 1 TABLET(S): at 11:51

## 2018-06-17 RX ADMIN — Medication 100 MILLIGRAM(S): at 13:29

## 2018-06-17 RX ADMIN — Medication 325 MILLIGRAM(S): at 08:10

## 2018-06-17 RX ADMIN — Medication 12 UNIT(S): at 08:09

## 2018-06-17 RX ADMIN — SIMVASTATIN 20 MILLIGRAM(S): 20 TABLET, FILM COATED ORAL at 22:47

## 2018-06-17 RX ADMIN — FAMOTIDINE 20 MILLIGRAM(S): 10 INJECTION INTRAVENOUS at 17:11

## 2018-06-17 RX ADMIN — Medication 100 MILLIGRAM(S): at 22:47

## 2018-06-17 RX ADMIN — Medication 500 MILLIGRAM(S): at 05:35

## 2018-06-17 RX ADMIN — Medication 12 UNIT(S): at 11:51

## 2018-06-17 RX ADMIN — Medication 325 MILLIGRAM(S): at 11:51

## 2018-06-17 RX ADMIN — TRAMADOL HYDROCHLORIDE 50 MILLIGRAM(S): 50 TABLET ORAL at 23:33

## 2018-06-17 RX ADMIN — Medication 100 MILLIGRAM(S): at 17:11

## 2018-06-17 RX ADMIN — Medication 3 MILLILITER(S): at 14:16

## 2018-06-17 RX ADMIN — ENOXAPARIN SODIUM 30 MILLIGRAM(S): 100 INJECTION SUBCUTANEOUS at 01:07

## 2018-06-17 RX ADMIN — CARVEDILOL PHOSPHATE 25 MILLIGRAM(S): 80 CAPSULE, EXTENDED RELEASE ORAL at 17:11

## 2018-06-17 RX ADMIN — Medication 500 MILLIGRAM(S): at 17:11

## 2018-06-17 RX ADMIN — AMLODIPINE BESYLATE 10 MILLIGRAM(S): 2.5 TABLET ORAL at 05:35

## 2018-06-17 RX ADMIN — KETOTIFEN FUMARATE 1 DROP(S): 0.34 SOLUTION OPHTHALMIC at 22:47

## 2018-06-17 RX ADMIN — ENOXAPARIN SODIUM 30 MILLIGRAM(S): 100 INJECTION SUBCUTANEOUS at 13:30

## 2018-06-17 RX ADMIN — Medication 20 MILLIGRAM(S): at 05:35

## 2018-06-17 RX ADMIN — Medication 12 UNIT(S): at 17:09

## 2018-06-17 RX ADMIN — Medication 1 MILLIGRAM(S): at 11:52

## 2018-06-17 RX ADMIN — Medication 3 MILLILITER(S): at 20:06

## 2018-06-17 RX ADMIN — LORATADINE 10 MILLIGRAM(S): 10 TABLET ORAL at 11:52

## 2018-06-17 RX ADMIN — TRAMADOL HYDROCHLORIDE 50 MILLIGRAM(S): 50 TABLET ORAL at 05:35

## 2018-06-17 RX ADMIN — Medication 2: at 08:08

## 2018-06-17 RX ADMIN — SODIUM CHLORIDE 3 MILLILITER(S): 9 INJECTION INTRAMUSCULAR; INTRAVENOUS; SUBCUTANEOUS at 05:34

## 2018-06-17 RX ADMIN — TRAMADOL HYDROCHLORIDE 50 MILLIGRAM(S): 50 TABLET ORAL at 22:47

## 2018-06-17 RX ADMIN — FAMOTIDINE 20 MILLIGRAM(S): 10 INJECTION INTRAVENOUS at 05:35

## 2018-06-17 RX ADMIN — GABAPENTIN 100 MILLIGRAM(S): 400 CAPSULE ORAL at 11:52

## 2018-06-17 RX ADMIN — CARVEDILOL PHOSPHATE 25 MILLIGRAM(S): 80 CAPSULE, EXTENDED RELEASE ORAL at 05:35

## 2018-06-17 RX ADMIN — KETOTIFEN FUMARATE 1 DROP(S): 0.34 SOLUTION OPHTHALMIC at 05:35

## 2018-06-17 NOTE — PROGRESS NOTE ADULT - PROBLEM SELECTOR PLAN 2
Bronchodilators   Duo Neb  Follow  CXR  oxygen supp  Ct angio to rule out PE (doubtful but pt is post op with sob)  Pfts as OP ( Has hx of smoking pack and half a day for several years). Bronchodilators   Duo Neb  Follow  CXR  oxygen supp  Ct angio showed no PE  Pfts as OP ( Has hx of smoking pack and half a day for several years).

## 2018-06-17 NOTE — PROGRESS NOTE ADULT - ATTENDING COMMENTS
pt seen and examined  improve sob on nasal cannula still  denies calf pain or swelling  seen by pulmonologist. no PE on CT PE protocol.    Vital Signs Last 24 Hrs  T(C): 37.1 (17 Jun 2018 13:41), Max: 37.5 (17 Jun 2018 05:10)  T(F): 98.7 (17 Jun 2018 13:41), Max: 99.5 (17 Jun 2018 05:10)  HR: 79 (17 Jun 2018 13:41) (79 - 85)  BP: 138/60 (17 Jun 2018 13:41) (137/64 - 157/68)  BP(mean): --  RR: 16 (17 Jun 2018 13:41) (16 - 18)  SpO2: 94% (17 Jun 2018 13:41) (94% - 99%)    RLE  dressing clean dry  warm toes brisk cap refill palp DP  flex/ext toes ankle  mild swelling and normal warmth knee    AP  POD 3 R TKA  ice to knee   pt ot  rehab placement dispo planning  pain management  is 10x/hr  dvt ppx  wbat RLE

## 2018-06-17 NOTE — PROGRESS NOTE ADULT - SUBJECTIVE AND OBJECTIVE BOX
Ortho Note POD# 3  77yFemale    Diagnosis:  S/p Right Total Knee Replacement POD# 3    Patient is seen and evaluated at bedside; offers no acute complaints. Pain is mild; well controlled.  Has been OOB with PT; ambulation with walker  SOB is improved.    Vital Signs Last 24 Hrs  T(C): 37.5 (17 Jun 2018 05:10), Max: 37.5 (17 Jun 2018 05:10)  T(F): 99.5 (17 Jun 2018 05:10), Max: 99.5 (17 Jun 2018 05:10)  HR: 85 (17 Jun 2018 05:10) (75 - 85)  BP: 157/68 (17 Jun 2018 05:10) (123/60 - 157/68)  BP(mean): --  RR: 18 (17 Jun 2018 05:10) (18 - 20)  SpO2: 96% (17 Jun 2018 05:10) (96% - 99%)    Physical Exam:    General: AAOx3, in NAD, resting in bed.    Right knee:  In nl. alignment. Wound C/D/I; healing well.  Staples intact. Calves are soft, non-tender. 2+pulses. NVI.                          10.0   6.4   )-----------( 154      ( 17 Jun 2018 06:01 )             30.0     06-17    140  |  108  |  16  ----------------------------<  130<H>  3.7   |  24  |  1.05    Ca    8.1<L>      17 Jun 2018 06:01    CT Angio is negative for PE done on 6/16/18 22:54      Impression:  77yFemale S/p Right total knee replacement POD# 3  PE ruled out  probable Pulmonary Edema most likely due to COPD, pulmonary consult and follow up appreciated.  Plan:  -  Continue pain management  -  DVT prophylaxis with Lovenox  -  Resume Daily Physical Therapy:  WBAT on RLE with walker  -  Discharge planning today  -  Dressing changed  -  Encouraged use of incentive spirometer  -  D/c Lovenox and d/c staples in 11 days  -  Case d/w Dr.Jake Ceja and Dr. Hernandez (pul)

## 2018-06-17 NOTE — PROGRESS NOTE ADULT - SUBJECTIVE AND OBJECTIVE BOX
Patient is a 77y old  Female who presents with a chief complaint of "Right knee replacement" (14 Jun 2018 07:56)       Awake , alert, lying in bed  with SOB after walking with a walker with the help of physical therapist.    INTERVAL HPI/OVERNIGHT EVENTS:      VITAL SIGNS:  T(F): 99.5 (06-17-18 @ 05:10)  HR: 85 (06-17-18 @ 05:10)  BP: 157/68 (06-17-18 @ 05:10)  RR: 18 (06-17-18 @ 05:10)  SpO2: 96% (06-17-18 @ 05:10)  Wt(kg): --  I&O's Detail          REVIEW OF SYSTEMS:    CONSTITUTIONAL:  No fevers, chills, sweats    HEENT:  Eyes:  No diplopia or blurred vision. ENT:  No earache, sore throat or runny nose.    CARDIOVASCULAR:  No pressure, squeezing, tightness, or heaviness about the chest; no palpitations.    RESPIRATORY:  Per HPI    GASTROINTESTINAL:  No abdominal pain, nausea, vomiting or diarrhea.    GENITOURINARY:  No dysuria, frequency or urgency.    NEUROLOGIC:  No paresthesias, fasciculations, seizures or weakness.    PSYCHIATRIC:  No disorder of thought or mood.      PHYSICAL EXAM:    Constitutional: Well developed and nourished  Eyes:Perrla  ENMT: normal  Neck:supple  Respiratory: CTA b/l  Cardiovascular: S1 S2 regular  Gastrointestinal: Soft, Non tender  Extremities: No edema  Vascular:normal  Neurological:Awake, alert,Ox3  Musculoskeletal:Normal      MEDICATIONS  (STANDING):  ALBUTerol    90 MICROgram(s) HFA Inhaler 1 Puff(s) Inhalation every 4 hours  ALBUTerol/ipratropium for Nebulization 3 milliLiter(s) Nebulizer every 6 hours  amLODIPine   Tablet 10 milliGRAM(s) Oral daily  ascorbic acid 500 milliGRAM(s) Oral two times a day  aspirin  chewable 81 milliGRAM(s) Oral daily  carvedilol 25 milliGRAM(s) Oral every 12 hours  dextrose 5%. 1000 milliLiter(s) (50 mL/Hr) IV Continuous <Continuous>  dextrose 50% Injectable 12.5 Gram(s) IV Push once  dextrose 50% Injectable 25 Gram(s) IV Push once  dextrose 50% Injectable 25 Gram(s) IV Push once  docusate sodium 100 milliGRAM(s) Oral three times a day  enoxaparin Injectable 30 milliGRAM(s) SubCutaneous every 12 hours  famotidine    Tablet 20 milliGRAM(s) Oral every 12 hours  ferrous    sulfate 325 milliGRAM(s) Oral three times a day with meals  folic acid 1 milliGRAM(s) Oral daily  furosemide    Tablet 20 milliGRAM(s) Oral daily  gabapentin 100 milliGRAM(s) Oral daily  hydrALAZINE 100 milliGRAM(s) Oral two times a day  insulin glargine Injectable (LANTUS) 25 Unit(s) SubCutaneous at bedtime  insulin lispro (HumaLOG) corrective regimen sliding scale   SubCutaneous three times a day before meals  insulin lispro Injectable (HumaLOG) 12 Unit(s) SubCutaneous three times a day before meals  isosorbide   mononitrate ER Tablet (IMDUR) 30 milliGRAM(s) Oral daily  ketotifen 0.025% Ophthalmic Solution 1 Drop(s) Both EYES three times a day  loratadine 10 milliGRAM(s) Oral daily  multivitamin 1 Tablet(s) Oral daily  simvastatin 20 milliGRAM(s) Oral at bedtime  sodium chloride 0.9% lock flush 3 milliLiter(s) IV Push every 8 hours  sodium chloride 0.9%. 1000 milliLiter(s) (80 mL/Hr) IV Continuous <Continuous>  tiotropium 18 MICROgram(s) Capsule 1 Capsule(s) Inhalation daily  traMADol 50 milliGRAM(s) Oral every 8 hours    MEDICATIONS  (PRN):  acetaminophen   Tablet 650 milliGRAM(s) Oral every 6 hours PRN For Temp over 38.3 C (100.94 F)  aluminum hydroxide/magnesium hydroxide/simethicone Suspension 30 milliLiter(s) Oral four times a day PRN Indigestion  dextrose 40% Gel 15 Gram(s) Oral once PRN Blood Glucose LESS THAN 70 milliGRAM(s)/deciliter  glucagon  Injectable 1 milliGRAM(s) IntraMuscular once PRN Glucose LESS THAN 70 milligrams/deciliter  HYDROmorphone  Injectable 0.5 milliGRAM(s) IV Push every 4 hours PRN Severe Pain  ondansetron Injectable 4 milliGRAM(s) IV Push every 6 hours PRN Nausea and/or Vomiting  oxyCODONE    IR 5 milliGRAM(s) Oral every 4 hours PRN Mild Pain      Allergies    aspirin (Unknown)  ibuprofen (Unknown)  Mushrooms (Anaphylaxis)  shellfish (Anaphylaxis)    Intolerances        LABS:                        10.0   6.4   )-----------( 154      ( 17 Jun 2018 06:01 )             30.0     06-17    140  |  108  |  16  ----------------------------<  130<H>  3.7   |  24  |  1.05    Ca    8.1<L>      17 Jun 2018 06:01          ABG - ( 16 Jun 2018 17:44 )  pH, Arterial: 7.45  pH, Blood: x     /  pCO2: 32    /  pO2: 100   / HCO3: 22    / Base Excess: -1.3  /  SaO2: 98                    CAPILLARY BLOOD GLUCOSE      POCT Blood Glucose.: 167 mg/dL (17 Jun 2018 11:11)  POCT Blood Glucose.: 162 mg/dL (17 Jun 2018 07:54)  POCT Blood Glucose.: 90 mg/dL (16 Jun 2018 22:30)  POCT Blood Glucose.: 102 mg/dL (16 Jun 2018 16:35)        RADIOLOGY & ADDITIONAL TESTS:    CXR:    Ct scan chest:  < from: CT Angio Chest w/ IV Cont (06.16.18 @ 22:54) >  FINDINGS:    CHEST:     LUNGS AND LARGE AIRWAYS: Patent central airways.  Mosaic groundglass   attenuation, nonspecific. Basilar atelectasis.  PLEURA: Trace bilateral pleural effusions.      IMPRESSION:     Negative for pulmonary embolism.    < end of copied text >    ekg;    echo: Patient is a 77y old  Female who presents with a chief complaint of "Right knee replacement" (14 Jun 2018 07:56)       Awake , alert, lying in bed  with Dodd.  Sob improved. Ct chest neg for PE. No cough or wheezing. No chest pain  INTERVAL HPI/OVERNIGHT EVENTS:      VITAL SIGNS:  T(F): 99.5 (06-17-18 @ 05:10)  HR: 85 (06-17-18 @ 05:10)  BP: 157/68 (06-17-18 @ 05:10)  RR: 18 (06-17-18 @ 05:10)  SpO2: 96% (06-17-18 @ 05:10)  Wt(kg): --  I&O's Detail          REVIEW OF SYSTEMS:    CONSTITUTIONAL:  No fevers, chills, sweats    HEENT:  Eyes:  No diplopia or blurred vision. ENT:  No earache, sore throat or runny nose.    CARDIOVASCULAR:  No pressure, squeezing, tightness, or heaviness about the chest; no palpitations.    RESPIRATORY:  Per HPI    GASTROINTESTINAL:  No abdominal pain, nausea, vomiting or diarrhea.    GENITOURINARY:  No dysuria, frequency or urgency.    NEUROLOGIC:  No paresthesias, fasciculations, seizures or weakness.    PSYCHIATRIC:  No disorder of thought or mood.      PHYSICAL EXAM:    Constitutional: Well developed and nourished  Eyes:Perrla  ENMT: normal  Neck:supple  Respiratory: CTA b/l  Cardiovascular: S1 S2 regular  Gastrointestinal: Soft, Non tender  Extremities: No edema  Vascular:normal  Neurological:Awake, alert,Ox3  Musculoskeletal:Normal      MEDICATIONS  (STANDING):  ALBUTerol    90 MICROgram(s) HFA Inhaler 1 Puff(s) Inhalation every 4 hours  ALBUTerol/ipratropium for Nebulization 3 milliLiter(s) Nebulizer every 6 hours  amLODIPine   Tablet 10 milliGRAM(s) Oral daily  ascorbic acid 500 milliGRAM(s) Oral two times a day  aspirin  chewable 81 milliGRAM(s) Oral daily  carvedilol 25 milliGRAM(s) Oral every 12 hours  dextrose 5%. 1000 milliLiter(s) (50 mL/Hr) IV Continuous <Continuous>  dextrose 50% Injectable 12.5 Gram(s) IV Push once  dextrose 50% Injectable 25 Gram(s) IV Push once  dextrose 50% Injectable 25 Gram(s) IV Push once  docusate sodium 100 milliGRAM(s) Oral three times a day  enoxaparin Injectable 30 milliGRAM(s) SubCutaneous every 12 hours  famotidine    Tablet 20 milliGRAM(s) Oral every 12 hours  ferrous    sulfate 325 milliGRAM(s) Oral three times a day with meals  folic acid 1 milliGRAM(s) Oral daily  furosemide    Tablet 20 milliGRAM(s) Oral daily  gabapentin 100 milliGRAM(s) Oral daily  hydrALAZINE 100 milliGRAM(s) Oral two times a day  insulin glargine Injectable (LANTUS) 25 Unit(s) SubCutaneous at bedtime  insulin lispro (HumaLOG) corrective regimen sliding scale   SubCutaneous three times a day before meals  insulin lispro Injectable (HumaLOG) 12 Unit(s) SubCutaneous three times a day before meals  isosorbide   mononitrate ER Tablet (IMDUR) 30 milliGRAM(s) Oral daily  ketotifen 0.025% Ophthalmic Solution 1 Drop(s) Both EYES three times a day  loratadine 10 milliGRAM(s) Oral daily  multivitamin 1 Tablet(s) Oral daily  simvastatin 20 milliGRAM(s) Oral at bedtime  sodium chloride 0.9% lock flush 3 milliLiter(s) IV Push every 8 hours  sodium chloride 0.9%. 1000 milliLiter(s) (80 mL/Hr) IV Continuous <Continuous>  tiotropium 18 MICROgram(s) Capsule 1 Capsule(s) Inhalation daily  traMADol 50 milliGRAM(s) Oral every 8 hours    MEDICATIONS  (PRN):  acetaminophen   Tablet 650 milliGRAM(s) Oral every 6 hours PRN For Temp over 38.3 C (100.94 F)  aluminum hydroxide/magnesium hydroxide/simethicone Suspension 30 milliLiter(s) Oral four times a day PRN Indigestion  dextrose 40% Gel 15 Gram(s) Oral once PRN Blood Glucose LESS THAN 70 milliGRAM(s)/deciliter  glucagon  Injectable 1 milliGRAM(s) IntraMuscular once PRN Glucose LESS THAN 70 milligrams/deciliter  HYDROmorphone  Injectable 0.5 milliGRAM(s) IV Push every 4 hours PRN Severe Pain  ondansetron Injectable 4 milliGRAM(s) IV Push every 6 hours PRN Nausea and/or Vomiting  oxyCODONE    IR 5 milliGRAM(s) Oral every 4 hours PRN Mild Pain      Allergies    aspirin (Unknown)  ibuprofen (Unknown)  Mushrooms (Anaphylaxis)  shellfish (Anaphylaxis)    Intolerances        LABS:                        10.0   6.4   )-----------( 154      ( 17 Jun 2018 06:01 )             30.0     06-17    140  |  108  |  16  ----------------------------<  130<H>  3.7   |  24  |  1.05    Ca    8.1<L>      17 Jun 2018 06:01          ABG - ( 16 Jun 2018 17:44 )  pH, Arterial: 7.45  pH, Blood: x     /  pCO2: 32    /  pO2: 100   / HCO3: 22    / Base Excess: -1.3  /  SaO2: 98                    CAPILLARY BLOOD GLUCOSE      POCT Blood Glucose.: 167 mg/dL (17 Jun 2018 11:11)  POCT Blood Glucose.: 162 mg/dL (17 Jun 2018 07:54)  POCT Blood Glucose.: 90 mg/dL (16 Jun 2018 22:30)  POCT Blood Glucose.: 102 mg/dL (16 Jun 2018 16:35)        RADIOLOGY & ADDITIONAL TESTS:    CXR:    Ct scan chest:  < from: CT Angio Chest w/ IV Cont (06.16.18 @ 22:54) >  FINDINGS:    CHEST:     LUNGS AND LARGE AIRWAYS: Patent central airways.  Mosaic groundglass   attenuation, nonspecific. Basilar atelectasis.  PLEURA: Trace bilateral pleural effusions.      IMPRESSION:     Negative for pulmonary embolism.    < end of copied text >    ekg;    echo:

## 2018-06-18 ENCOUNTER — TRANSCRIPTION ENCOUNTER (OUTPATIENT)
Age: 78
End: 2018-06-18

## 2018-06-18 VITALS
DIASTOLIC BLOOD PRESSURE: 63 MMHG | TEMPERATURE: 99 F | SYSTOLIC BLOOD PRESSURE: 145 MMHG | RESPIRATION RATE: 16 BRPM | HEART RATE: 83 BPM | OXYGEN SATURATION: 97 %

## 2018-06-18 PROCEDURE — 80048 BASIC METABOLIC PNL TOTAL CA: CPT

## 2018-06-18 PROCEDURE — C1776: CPT

## 2018-06-18 PROCEDURE — 94640 AIRWAY INHALATION TREATMENT: CPT

## 2018-06-18 PROCEDURE — 88311 DECALCIFY TISSUE: CPT

## 2018-06-18 PROCEDURE — 71275 CT ANGIOGRAPHY CHEST: CPT

## 2018-06-18 PROCEDURE — 73562 X-RAY EXAM OF KNEE 3: CPT

## 2018-06-18 PROCEDURE — 86901 BLOOD TYPING SEROLOGIC RH(D): CPT

## 2018-06-18 PROCEDURE — 97110 THERAPEUTIC EXERCISES: CPT

## 2018-06-18 PROCEDURE — 97162 PT EVAL MOD COMPLEX 30 MIN: CPT

## 2018-06-18 PROCEDURE — 82803 BLOOD GASES ANY COMBINATION: CPT

## 2018-06-18 PROCEDURE — 85027 COMPLETE CBC AUTOMATED: CPT

## 2018-06-18 PROCEDURE — 88305 TISSUE EXAM BY PATHOLOGIST: CPT

## 2018-06-18 PROCEDURE — 97116 GAIT TRAINING THERAPY: CPT

## 2018-06-18 PROCEDURE — 86900 BLOOD TYPING SEROLOGIC ABO: CPT

## 2018-06-18 PROCEDURE — 82962 GLUCOSE BLOOD TEST: CPT

## 2018-06-18 PROCEDURE — 86850 RBC ANTIBODY SCREEN: CPT

## 2018-06-18 PROCEDURE — 83036 HEMOGLOBIN GLYCOSYLATED A1C: CPT

## 2018-06-18 PROCEDURE — C1713: CPT

## 2018-06-18 PROCEDURE — 97530 THERAPEUTIC ACTIVITIES: CPT

## 2018-06-18 RX ORDER — FOLIC ACID 0.8 MG
1 TABLET ORAL
Qty: 0 | Refills: 0 | DISCHARGE
Start: 2018-06-18

## 2018-06-18 RX ORDER — ASCORBIC ACID 60 MG
1 TABLET,CHEWABLE ORAL
Qty: 0 | Refills: 0 | DISCHARGE
Start: 2018-06-18

## 2018-06-18 RX ORDER — GABAPENTIN 400 MG/1
1 CAPSULE ORAL
Qty: 0 | Refills: 0 | DISCHARGE
Start: 2018-06-18

## 2018-06-18 RX ORDER — ENOXAPARIN SODIUM 100 MG/ML
30 INJECTION SUBCUTANEOUS
Qty: 0 | Refills: 0 | DISCHARGE
Start: 2018-06-18

## 2018-06-18 RX ADMIN — KETOTIFEN FUMARATE 1 DROP(S): 0.34 SOLUTION OPHTHALMIC at 14:41

## 2018-06-18 RX ADMIN — Medication 100 MILLIGRAM(S): at 05:50

## 2018-06-18 RX ADMIN — Medication 12 UNIT(S): at 08:08

## 2018-06-18 RX ADMIN — Medication 1 MILLIGRAM(S): at 11:48

## 2018-06-18 RX ADMIN — Medication 6: at 11:46

## 2018-06-18 RX ADMIN — Medication 20 MILLIGRAM(S): at 05:50

## 2018-06-18 RX ADMIN — AMLODIPINE BESYLATE 10 MILLIGRAM(S): 2.5 TABLET ORAL at 05:51

## 2018-06-18 RX ADMIN — ENOXAPARIN SODIUM 30 MILLIGRAM(S): 100 INJECTION SUBCUTANEOUS at 14:41

## 2018-06-18 RX ADMIN — LORATADINE 10 MILLIGRAM(S): 10 TABLET ORAL at 11:49

## 2018-06-18 RX ADMIN — SODIUM CHLORIDE 3 MILLILITER(S): 9 INJECTION INTRAMUSCULAR; INTRAVENOUS; SUBCUTANEOUS at 05:57

## 2018-06-18 RX ADMIN — Medication 325 MILLIGRAM(S): at 11:49

## 2018-06-18 RX ADMIN — Medication 100 MILLIGRAM(S): at 05:51

## 2018-06-18 RX ADMIN — TRAMADOL HYDROCHLORIDE 50 MILLIGRAM(S): 50 TABLET ORAL at 05:53

## 2018-06-18 RX ADMIN — Medication 100 MILLIGRAM(S): at 14:41

## 2018-06-18 RX ADMIN — FAMOTIDINE 20 MILLIGRAM(S): 10 INJECTION INTRAVENOUS at 05:50

## 2018-06-18 RX ADMIN — Medication 325 MILLIGRAM(S): at 08:09

## 2018-06-18 RX ADMIN — ENOXAPARIN SODIUM 30 MILLIGRAM(S): 100 INJECTION SUBCUTANEOUS at 01:59

## 2018-06-18 RX ADMIN — TRAMADOL HYDROCHLORIDE 50 MILLIGRAM(S): 50 TABLET ORAL at 06:46

## 2018-06-18 RX ADMIN — Medication 1 TABLET(S): at 11:49

## 2018-06-18 RX ADMIN — ISOSORBIDE MONONITRATE 30 MILLIGRAM(S): 60 TABLET, EXTENDED RELEASE ORAL at 11:50

## 2018-06-18 RX ADMIN — Medication 500 MILLIGRAM(S): at 05:50

## 2018-06-18 RX ADMIN — CARVEDILOL PHOSPHATE 25 MILLIGRAM(S): 80 CAPSULE, EXTENDED RELEASE ORAL at 05:51

## 2018-06-18 RX ADMIN — Medication 3 MILLILITER(S): at 14:24

## 2018-06-18 RX ADMIN — Medication 81 MILLIGRAM(S): at 11:48

## 2018-06-18 RX ADMIN — KETOTIFEN FUMARATE 1 DROP(S): 0.34 SOLUTION OPHTHALMIC at 05:51

## 2018-06-18 RX ADMIN — SODIUM CHLORIDE 3 MILLILITER(S): 9 INJECTION INTRAMUSCULAR; INTRAVENOUS; SUBCUTANEOUS at 14:42

## 2018-06-18 RX ADMIN — Medication 3 MILLILITER(S): at 02:57

## 2018-06-18 RX ADMIN — GABAPENTIN 100 MILLIGRAM(S): 400 CAPSULE ORAL at 11:49

## 2018-06-18 RX ADMIN — Medication 12 UNIT(S): at 11:46

## 2018-06-18 RX ADMIN — TRAMADOL HYDROCHLORIDE 50 MILLIGRAM(S): 50 TABLET ORAL at 14:39

## 2018-06-18 RX ADMIN — Medication 3 MILLILITER(S): at 09:16

## 2018-06-18 NOTE — PROGRESS NOTE ADULT - SUBJECTIVE AND OBJECTIVE BOX
77yFemale    Diagnosis:  S/p R Total Knee Replacement POD#4    Patient was seen and evaluated at bedside. Patient with no acute complaints.   Pain is well controlled.  Denies CP,  paresthesias, N/V/D, palpitations. Pt states SOB has improved over the weekend and has got better when she walks with PT.    Vital Signs Last 24 Hrs  T(C): 37.1 (18 Jun 2018 06:15), Max: 37.5 (17 Jun 2018 21:23)  T(F): 98.7 (18 Jun 2018 06:15), Max: 99.5 (17 Jun 2018 21:23)  HR: 78 (18 Jun 2018 06:15) (71 - 86)  BP: 155/69 (18 Jun 2018 06:15) (138/60 - 155/69)  BP(mean): --  RR: 16 (18 Jun 2018 06:15) (16 - 18)  SpO2: 98% (18 Jun 2018 06:15) (94% - 98%)  I&O's Detail      Physical Exam:    General: AAOx3, NAD, resting comfortably in bed.    R KNEE:  Dressing is C/D/I. Skin is pink and warm. Staples intact. No erythema. SILT.  Wound with no drainage, healing well.   Lower extremity:  No calf tenderness, calves are soft. 2+pulses. NVI. 5/5 Strength of EHL/TA/gastrocnemius B/L.  Good capillary refill.                           10.0   6.4   )-----------( 154      ( 17 Jun 2018 06:01 )             30.0     06-17    140  |  108  |  16  ----------------------------<  130<H>  3.7   |  24  |  1.05    Ca    8.1<L>      17 Jun 2018 06:01        Impression:  77yFemale S/p R Total Knee Replacement POD#4  Plan:  -  Pain management  -  Dvt prophylaxis with lovenox  -  Daily Physical Theapy:  WBAT of right lower extremity  -  Continue with heel bump when laying in bed  -  Discharge planning: Home Vs. Rehab pending Physical therapy eval.  -  Dressing changed

## 2018-06-18 NOTE — DISCHARGE NOTE ADULT - MEDICATION SUMMARY - MEDICATIONS TO TAKE
I will START or STAY ON the medications listed below when I get home from the hospital:    Ultram 50 mg oral tablet  -- 1 tab(s) by mouth every 4 hours, As Needed  -- Indication: For Pain    aspirin 81 mg oral tablet  -- 1 tab(s) by mouth once a day  -- Indication: For As before    Imdur 30 mg oral tablet, extended release  -- 1 tab(s) by mouth once a day (in the morning)  -- Indication: For As before    enoxaparin  -- 30 milligram(s) subcutaneous every 12 hours  -- Indication: For Dvt prophylaxis    gabapentin 100 mg oral capsule  -- 1 cap(s) by mouth once a day for 7 days  -- Indication: For Pain    NovoLOG FlexPen 100 units/mL injectable solution  -- 12 unit(s) injectable in AM and 16units injectable in PM   -- Indication: For As before    Tresiba FlexTouch 100 units/mL subcutaneous solution  -- 100 unit(s) subcutaneous once a day  -- Indication: For As before    allopurinol 100 mg oral tablet  -- 1 tab(s) by mouth once a day  -- Indication: For As before    ZyrTEC 10 mg oral tablet  -- 1 tab(s) by mouth once a day  -- Indication: For As before    ezetimibe-simvastatin 10 mg-20 mg oral tablet  -- 1 tab(s) by mouth once a day  -- Indication: For As before    carvedilol 25 mg oral tablet  -- 1 tab(s) by mouth 2 times a day  -- Indication: For As before    Prolia 60 mg/mL subcutaneous solution  -- Every 6months   -- Indication: For As before    amLODIPine 10 mg oral tablet  -- Indication: For As before    furosemide 20 mg oral tablet  -- 1 tab(s) by mouth once a day  -- Indication: For As before    ferrous sulfate 325 mg (65 mg elemental iron) oral tablet  -- 1 tab(s) by mouth 2 times a day  -- Indication: For Anemia    Fish Oil 1200 mg oral capsule  -- 1 cap(s) by mouth 3 times a day  -- Indication: For As before    olopatadine 0.7% ophthalmic solution  -- 1 drop(s) to each affected eye 3 times a day  -- Indication: For As before    hydrALAZINE 100 mg oral tablet  -- 1 tab(s) by mouth 2 times a day  -- Indication: For As before    Nephplex Rx oral tablet  -- 1 tab(s) by mouth once a day  -- Indication: For As before    Vitamin D3 1000 intl units oral capsule  -- 1 cap(s) by mouth 2 times a day  -- Indication: For As before    ascorbic acid 500 mg oral tablet  -- 1 tab(s) by mouth 2 times a day  -- Indication: For Supplement    folic acid 1 mg oral tablet  -- 1 tab(s) by mouth once a day  -- Indication: For Supplement

## 2018-06-18 NOTE — DISCHARGE NOTE ADULT - PLAN OF CARE
Right total knee replacement Pain Management- See Attached Medication Reconciliation    **StretchStrap Stretching exercises for Total knee replacement***  Weight Bearing Status:  WBAT of RLE  Equipment needs: Commode, Walker  Incision Site: REMOVE STAPLES on 6/29/18  REMOVE DRESSING PRIOR TO STAPLE REMOVAL  STAPLES TO BE REMOVED BY NURSE  NURSE TO APPLY STERI-STRIPS TO THE WOUND AFTER STAPLE REMOVAL   Dvt prophylaxis: D/C LOVENOX on 6/29/18  PT/Occupational Therapy are Activities of Daily Living as appropriate  Follow up with Orthopedic Surgeon after STAPLES ARE REMOVED at: [phone] Continue home meds Continue nebulizer Follow up with PMD

## 2018-06-18 NOTE — PROGRESS NOTE ADULT - SUBJECTIVE AND OBJECTIVE BOX
Interval Events:  pt in nad    Allergies    aspirin (Unknown)  ibuprofen (Unknown)  Mushrooms (Anaphylaxis)  shellfish (Anaphylaxis)    Intolerances      Endocrine/Metabolic Medications:  dextrose 40% Gel 15 Gram(s) Oral once PRN  dextrose 50% Injectable 12.5 Gram(s) IV Push once  dextrose 50% Injectable 25 Gram(s) IV Push once  dextrose 50% Injectable 25 Gram(s) IV Push once  glucagon  Injectable 1 milliGRAM(s) IntraMuscular once PRN  insulin glargine Injectable (LANTUS) 25 Unit(s) SubCutaneous at bedtime  insulin lispro (HumaLOG) corrective regimen sliding scale   SubCutaneous three times a day before meals  insulin lispro Injectable (HumaLOG) 12 Unit(s) SubCutaneous three times a day before meals  simvastatin 20 milliGRAM(s) Oral at bedtime      Vital Signs Last 24 Hrs  T(C): 37.1 (18 Jun 2018 06:15), Max: 37.5 (17 Jun 2018 21:23)  T(F): 98.7 (18 Jun 2018 06:15), Max: 99.5 (17 Jun 2018 21:23)  HR: 78 (18 Jun 2018 06:15) (71 - 86)  BP: 155/69 (18 Jun 2018 06:15) (138/60 - 155/69)  BP(mean): --  RR: 16 (18 Jun 2018 06:15) (16 - 18)  SpO2: 98% (18 Jun 2018 06:15) (94% - 98%)      PHYSICAL EXAM  All physical exam findings normal, except those marked:  General:	Alert, active, cooperative, NAD, well hydrated  .		[] Abnormal:  Neck		Normal: supple, no cervical adenopathy, no palpable thyroid  .		[] Abnormal:  Cardiovascular	Normal: regular rate, normal S1, S2, no murmurs  .		[] Abnormal:  Respiratory	Normal: no chest wall deformity, normal respiratory pattern, CTA B/L  .		[] Abnormal:  Abdominal	Normal: soft, ND, NT, bowel sounds present, no masses, no organomegaly  .		[] Abnormal:  		Normal normal genitalia, testes descended, circumcised/uncircumcised  .		Yamilex stage:			Breast yamilex:  .		Menstrual history:  .		[] Abnormal:  Extremities	Normal: FROM x4  .		[] Abnormal:  Skin		Normal: intact and not indurated, no rash, no acanthosis nigricans  .		[] Abnormal:  Neurologic	Normal: grossly intact  .		[] Abnormal:    LABS        CAPILLARY BLOOD GLUCOSE      POCT Blood Glucose.: 150 mg/dL (18 Jun 2018 07:44)  POCT Blood Glucose.: 82 mg/dL (17 Jun 2018 21:36)  POCT Blood Glucose.: 121 mg/dL (17 Jun 2018 16:38)  POCT Blood Glucose.: 167 mg/dL (17 Jun 2018 11:11)        Assesment/plan    dm- good control  cont lantus 25   decrease humalog to 10 ac tid  fsg ac and hs

## 2018-06-18 NOTE — DISCHARGE NOTE ADULT - CARE PLAN
Principal Discharge DX:	Primary osteoarthritis of right knee  Goal:	Right total knee replacement  Assessment and plan of treatment:	Pain Management- See Attached Medication Reconciliation    **StretchStrap Stretching exercises for Total knee replacement***  Weight Bearing Status:  WBAT of RLE  Equipment needs: Commode, Walker  Incision Site: REMOVE STAPLES on 6/29/18  REMOVE DRESSING PRIOR TO STAPLE REMOVAL  STAPLES TO BE REMOVED BY NURSE  NURSE TO APPLY STERI-STRIPS TO THE WOUND AFTER STAPLE REMOVAL   Dvt prophylaxis: D/C LOVENOX on 6/29/18  PT/Occupational Therapy are Activities of Daily Living as appropriate  Follow up with Orthopedic Surgeon after STAPLES ARE REMOVED at: [phone]  Secondary Diagnosis:	Diabetes mellitus  Goal:	Continue home meds  Secondary Diagnosis:	Hypertension  Goal:	Continue home meds  Secondary Diagnosis:	Hyperlipidemia  Goal:	Continue home meds  Secondary Diagnosis:	Dyspnea, unspecified type  Goal:	Continue nebulizer  Assessment and plan of treatment:	Follow up with PMD

## 2018-06-18 NOTE — DISCHARGE NOTE ADULT - PATIENT PORTAL LINK FT
You can access the [facility] Portal, offered by [facility], by registering with the following website: [email or web address]

## 2018-06-18 NOTE — PROGRESS NOTE ADULT - PROBLEM SELECTOR PLAN 1
S/P Right Total Knee replacement  Cont meds.  Pain management  incentive spirometry  DVT PPX  Ortho follow up.
S/P Right Total Knee replacement  Cont meds.  Pain management  incentive spirometry  DVT PPX  Ortho follow up.
- tramadol 50mg po q 8  - no nsaids due to allergy  - iv acetaminophen  - gabapentin 100mg po daily  - stool softeners  - oob  - sob - pt on lasix po.  medical consults called.  vitals wnl.

## 2018-06-18 NOTE — PROGRESS NOTE ADULT - PROBLEM SELECTOR PLAN 2
Bronchodilators   Duo Neb  Follow  CXR  oxygen supp  Ct angio showed no PE  Pfts as OP ( Has hx of smoking pack and half a day for several years).

## 2018-06-18 NOTE — PROGRESS NOTE ADULT - SUBJECTIVE AND OBJECTIVE BOX
Patient is a 77y old  Female who presents with a chief complaint of "Right knee replacement" (18 Jun 2018 08:22)      Awake , alert, lying in bed  with Dodd. Sob improved. Ct chest neg for PE. No cough or wheezing. No chest pain      INTERVAL HPI/OVERNIGHT EVENTS:      VITAL SIGNS:  T(F): 98.7 (06-18-18 @ 06:15)  HR: 78 (06-18-18 @ 06:15)  BP: 155/69 (06-18-18 @ 06:15)  RR: 16 (06-18-18 @ 06:15)  SpO2: 98% (06-18-18 @ 06:15)  Wt(kg): --  I&O's Detail          REVIEW OF SYSTEMS:    CONSTITUTIONAL:  No fevers, chills, sweats    HEENT:  Eyes:  No diplopia or blurred vision. ENT:  No earache, sore throat or runny nose.    CARDIOVASCULAR:  No pressure, squeezing, tightness, or heaviness about the chest; no palpitations.    RESPIRATORY:  Per HPI    GASTROINTESTINAL:  No abdominal pain, nausea, vomiting or diarrhea.    GENITOURINARY:  No dysuria, frequency or urgency.    NEUROLOGIC:  No paresthesias, fasciculations, seizures or weakness.    PSYCHIATRIC:  No disorder of thought or mood.      PHYSICAL EXAM:    Constitutional: Well developed and nourished  Eyes:Perrla  ENMT: normal  Neck:supple  Respiratory: CTA b/l  Cardiovascular: S1 S2 regular  Gastrointestinal: Soft, Non tender  Extremities: No edema  Vascular:normal  Neurological:Awake, alert,Ox3  Musculoskeletal:Normal      MEDICATIONS  (STANDING):  ALBUTerol    90 MICROgram(s) HFA Inhaler 1 Puff(s) Inhalation every 4 hours  ALBUTerol/ipratropium for Nebulization 3 milliLiter(s) Nebulizer every 6 hours  amLODIPine   Tablet 10 milliGRAM(s) Oral daily  ascorbic acid 500 milliGRAM(s) Oral two times a day  aspirin  chewable 81 milliGRAM(s) Oral daily  carvedilol 25 milliGRAM(s) Oral every 12 hours  dextrose 5%. 1000 milliLiter(s) (50 mL/Hr) IV Continuous <Continuous>  dextrose 50% Injectable 12.5 Gram(s) IV Push once  dextrose 50% Injectable 25 Gram(s) IV Push once  dextrose 50% Injectable 25 Gram(s) IV Push once  docusate sodium 100 milliGRAM(s) Oral three times a day  enoxaparin Injectable 30 milliGRAM(s) SubCutaneous every 12 hours  famotidine    Tablet 20 milliGRAM(s) Oral every 12 hours  ferrous    sulfate 325 milliGRAM(s) Oral three times a day with meals  folic acid 1 milliGRAM(s) Oral daily  furosemide    Tablet 20 milliGRAM(s) Oral daily  gabapentin 100 milliGRAM(s) Oral daily  hydrALAZINE 100 milliGRAM(s) Oral two times a day  insulin glargine Injectable (LANTUS) 25 Unit(s) SubCutaneous at bedtime  insulin lispro (HumaLOG) corrective regimen sliding scale   SubCutaneous three times a day before meals  insulin lispro Injectable (HumaLOG) 12 Unit(s) SubCutaneous three times a day before meals  isosorbide   mononitrate ER Tablet (IMDUR) 30 milliGRAM(s) Oral daily  ketotifen 0.025% Ophthalmic Solution 1 Drop(s) Both EYES three times a day  loratadine 10 milliGRAM(s) Oral daily  multivitamin 1 Tablet(s) Oral daily  simvastatin 20 milliGRAM(s) Oral at bedtime  sodium chloride 0.9% lock flush 3 milliLiter(s) IV Push every 8 hours  sodium chloride 0.9%. 1000 milliLiter(s) (80 mL/Hr) IV Continuous <Continuous>  tiotropium 18 MICROgram(s) Capsule 1 Capsule(s) Inhalation daily  traMADol 50 milliGRAM(s) Oral every 8 hours    MEDICATIONS  (PRN):  acetaminophen   Tablet 650 milliGRAM(s) Oral every 6 hours PRN For Temp over 38.3 C (100.94 F)  aluminum hydroxide/magnesium hydroxide/simethicone Suspension 30 milliLiter(s) Oral four times a day PRN Indigestion  dextrose 40% Gel 15 Gram(s) Oral once PRN Blood Glucose LESS THAN 70 milliGRAM(s)/deciliter  glucagon  Injectable 1 milliGRAM(s) IntraMuscular once PRN Glucose LESS THAN 70 milligrams/deciliter  HYDROmorphone  Injectable 0.5 milliGRAM(s) IV Push every 4 hours PRN Severe Pain  ondansetron Injectable 4 milliGRAM(s) IV Push every 6 hours PRN Nausea and/or Vomiting  oxyCODONE    IR 5 milliGRAM(s) Oral every 4 hours PRN Mild Pain      Allergies    aspirin (Unknown)  ibuprofen (Unknown)  Mushrooms (Anaphylaxis)  shellfish (Anaphylaxis)    Intolerances        LABS:                        10.0   6.4   )-----------( 154      ( 17 Jun 2018 06:01 )             30.0     06-17    140  |  108  |  16  ----------------------------<  130<H>  3.7   |  24  |  1.05    Ca    8.1<L>      17 Jun 2018 06:01          ABG - ( 16 Jun 2018 17:44 )  pH, Arterial: 7.45  pH, Blood: x     /  pCO2: 32    /  pO2: 100   / HCO3: 22    / Base Excess: -1.3  /  SaO2: 98                    CAPILLARY BLOOD GLUCOSE      POCT Blood Glucose.: 150 mg/dL (18 Jun 2018 07:44)  POCT Blood Glucose.: 82 mg/dL (17 Jun 2018 21:36)  POCT Blood Glucose.: 121 mg/dL (17 Jun 2018 16:38)  POCT Blood Glucose.: 167 mg/dL (17 Jun 2018 11:11)        RADIOLOGY & ADDITIONAL TESTS:    CXR:    Ct scan chest:  < from: CT Angio Chest w/ IV Cont (06.16.18 @ 22:54) >  FINDINGS:    CHEST:     LUNGS AND LARGE AIRWAYS: Patent central airways.  Mosaic groundglass   attenuation, nonspecific. Basilar atelectasis.  PLEURA: Trace bilateral pleural effusions.  VESSELS: Atherosclerotic changes of thoracic aorta. No thoracic aortic   aneurysm or dissection. Multiple images are degraded by respiratory   motion. Excluding regions with motion artifact, there are no pulmonary   arterial filling defects identified.  HEART: Enlarged. Aortic valve calcifications. No pericardial effusion.  MEDIASTINUM AND ELDA: No lymphadenopathy.  CHEST WALL AND LOWER NECK: Within normal limits.  VISUALIZED UPPER ABDOMEN: Nonspecific perinephric stranding.  BONES: Spinal degenerative changes. Probable chronically dislocated right   clavicular head.    IMPRESSION:     Negative for pulmonary embolism.    < end of copied text >    ekg;    echo: Patient is a 77y old  Female who presents with a chief complaint of "Right knee replacement" (18 Jun 2018 08:22)      Awake , alert, lying in bed  with Dodd. Sob improved. Ct chest neg for PE. No cough or wheezing. No chest pain. Clinically improved.      INTERVAL HPI/OVERNIGHT EVENTS:      VITAL SIGNS:  T(F): 98.7 (06-18-18 @ 06:15)  HR: 78 (06-18-18 @ 06:15)  BP: 155/69 (06-18-18 @ 06:15)  RR: 16 (06-18-18 @ 06:15)  SpO2: 98% (06-18-18 @ 06:15)  Wt(kg): --  I&O's Detail          REVIEW OF SYSTEMS:    CONSTITUTIONAL:  No fevers, chills, sweats    HEENT:  Eyes:  No diplopia or blurred vision. ENT:  No earache, sore throat or runny nose.    CARDIOVASCULAR:  No pressure, squeezing, tightness, or heaviness about the chest; no palpitations.    RESPIRATORY:  Per HPI    GASTROINTESTINAL:  No abdominal pain, nausea, vomiting or diarrhea.    GENITOURINARY:  No dysuria, frequency or urgency.    NEUROLOGIC:  No paresthesias, fasciculations, seizures or weakness.    PSYCHIATRIC:  No disorder of thought or mood.      PHYSICAL EXAM:    Constitutional: Well developed and nourished  Eyes:Perrla  ENMT: normal  Neck:supple  Respiratory: CTA b/l  Cardiovascular: S1 S2 regular  Gastrointestinal: Soft, Non tender  Extremities: No edema  Vascular:normal  Neurological:Awake, alert,Ox3  Musculoskeletal:Normal      MEDICATIONS  (STANDING):  ALBUTerol    90 MICROgram(s) HFA Inhaler 1 Puff(s) Inhalation every 4 hours  ALBUTerol/ipratropium for Nebulization 3 milliLiter(s) Nebulizer every 6 hours  amLODIPine   Tablet 10 milliGRAM(s) Oral daily  ascorbic acid 500 milliGRAM(s) Oral two times a day  aspirin  chewable 81 milliGRAM(s) Oral daily  carvedilol 25 milliGRAM(s) Oral every 12 hours  dextrose 5%. 1000 milliLiter(s) (50 mL/Hr) IV Continuous <Continuous>  dextrose 50% Injectable 12.5 Gram(s) IV Push once  dextrose 50% Injectable 25 Gram(s) IV Push once  dextrose 50% Injectable 25 Gram(s) IV Push once  docusate sodium 100 milliGRAM(s) Oral three times a day  enoxaparin Injectable 30 milliGRAM(s) SubCutaneous every 12 hours  famotidine    Tablet 20 milliGRAM(s) Oral every 12 hours  ferrous    sulfate 325 milliGRAM(s) Oral three times a day with meals  folic acid 1 milliGRAM(s) Oral daily  furosemide    Tablet 20 milliGRAM(s) Oral daily  gabapentin 100 milliGRAM(s) Oral daily  hydrALAZINE 100 milliGRAM(s) Oral two times a day  insulin glargine Injectable (LANTUS) 25 Unit(s) SubCutaneous at bedtime  insulin lispro (HumaLOG) corrective regimen sliding scale   SubCutaneous three times a day before meals  insulin lispro Injectable (HumaLOG) 12 Unit(s) SubCutaneous three times a day before meals  isosorbide   mononitrate ER Tablet (IMDUR) 30 milliGRAM(s) Oral daily  ketotifen 0.025% Ophthalmic Solution 1 Drop(s) Both EYES three times a day  loratadine 10 milliGRAM(s) Oral daily  multivitamin 1 Tablet(s) Oral daily  simvastatin 20 milliGRAM(s) Oral at bedtime  sodium chloride 0.9% lock flush 3 milliLiter(s) IV Push every 8 hours  sodium chloride 0.9%. 1000 milliLiter(s) (80 mL/Hr) IV Continuous <Continuous>  tiotropium 18 MICROgram(s) Capsule 1 Capsule(s) Inhalation daily  traMADol 50 milliGRAM(s) Oral every 8 hours    MEDICATIONS  (PRN):  acetaminophen   Tablet 650 milliGRAM(s) Oral every 6 hours PRN For Temp over 38.3 C (100.94 F)  aluminum hydroxide/magnesium hydroxide/simethicone Suspension 30 milliLiter(s) Oral four times a day PRN Indigestion  dextrose 40% Gel 15 Gram(s) Oral once PRN Blood Glucose LESS THAN 70 milliGRAM(s)/deciliter  glucagon  Injectable 1 milliGRAM(s) IntraMuscular once PRN Glucose LESS THAN 70 milligrams/deciliter  HYDROmorphone  Injectable 0.5 milliGRAM(s) IV Push every 4 hours PRN Severe Pain  ondansetron Injectable 4 milliGRAM(s) IV Push every 6 hours PRN Nausea and/or Vomiting  oxyCODONE    IR 5 milliGRAM(s) Oral every 4 hours PRN Mild Pain      Allergies    aspirin (Unknown)  ibuprofen (Unknown)  Mushrooms (Anaphylaxis)  shellfish (Anaphylaxis)    Intolerances        LABS:                        10.0   6.4   )-----------( 154      ( 17 Jun 2018 06:01 )             30.0     06-17    140  |  108  |  16  ----------------------------<  130<H>  3.7   |  24  |  1.05    Ca    8.1<L>      17 Jun 2018 06:01          ABG - ( 16 Jun 2018 17:44 )  pH, Arterial: 7.45  pH, Blood: x     /  pCO2: 32    /  pO2: 100   / HCO3: 22    / Base Excess: -1.3  /  SaO2: 98                    CAPILLARY BLOOD GLUCOSE      POCT Blood Glucose.: 150 mg/dL (18 Jun 2018 07:44)  POCT Blood Glucose.: 82 mg/dL (17 Jun 2018 21:36)  POCT Blood Glucose.: 121 mg/dL (17 Jun 2018 16:38)  POCT Blood Glucose.: 167 mg/dL (17 Jun 2018 11:11)        RADIOLOGY & ADDITIONAL TESTS:    CXR:    Ct scan chest:  < from: CT Angio Chest w/ IV Cont (06.16.18 @ 22:54) >  FINDINGS:    CHEST:     LUNGS AND LARGE AIRWAYS: Patent central airways.  Mosaic groundglass   attenuation, nonspecific. Basilar atelectasis.  PLEURA: Trace bilateral pleural effusions.  VESSELS: Atherosclerotic changes of thoracic aorta. No thoracic aortic   aneurysm or dissection. Multiple images are degraded by respiratory   motion. Excluding regions with motion artifact, there are no pulmonary   arterial filling defects identified.  HEART: Enlarged. Aortic valve calcifications. No pericardial effusion.  MEDIASTINUM AND ELDA: No lymphadenopathy.  CHEST WALL AND LOWER NECK: Within normal limits.  VISUALIZED UPPER ABDOMEN: Nonspecific perinephric stranding.  BONES: Spinal degenerative changes. Probable chronically dislocated right   clavicular head.    IMPRESSION:     Negative for pulmonary embolism.    < end of copied text >    ekg;    echo:

## 2018-06-18 NOTE — DISCHARGE NOTE ADULT - CARE PROVIDER_API CALL
Rah Ceja), Surgery  Orthopedic  [address] [facility]  [address] [place] [address]  Phone: [phone]  Fax: [phone]

## 2018-06-29 ENCOUNTER — APPOINTMENT (OUTPATIENT)
Dept: ORTHOPEDIC SURGERY | Facility: CLINIC | Age: 78
End: 2018-06-29
Payer: MEDICARE

## 2018-06-29 PROCEDURE — 99024 POSTOP FOLLOW-UP VISIT: CPT

## 2018-07-13 ENCOUNTER — APPOINTMENT (OUTPATIENT)
Dept: ORTHOPEDIC SURGERY | Facility: CLINIC | Age: 78
End: 2018-07-13
Payer: MEDICARE

## 2018-07-13 PROCEDURE — 99024 POSTOP FOLLOW-UP VISIT: CPT

## 2018-07-26 ENCOUNTER — RX RENEWAL (OUTPATIENT)
Age: 78
End: 2018-07-26

## 2018-08-10 ENCOUNTER — APPOINTMENT (OUTPATIENT)
Dept: ORTHOPEDIC SURGERY | Facility: CLINIC | Age: 78
End: 2018-08-10
Payer: MEDICARE

## 2018-08-10 DIAGNOSIS — R21 RASH AND OTHER NONSPECIFIC SKIN ERUPTION: ICD-10-CM

## 2018-08-10 PROBLEM — M10.9 GOUT, UNSPECIFIED: Chronic | Status: ACTIVE | Noted: 2018-05-29

## 2018-08-10 PROBLEM — E55.9 VITAMIN D DEFICIENCY, UNSPECIFIED: Chronic | Status: ACTIVE | Noted: 2018-05-29

## 2018-08-10 PROBLEM — H26.9 UNSPECIFIED CATARACT: Chronic | Status: ACTIVE | Noted: 2018-05-29

## 2018-08-10 PROCEDURE — 99024 POSTOP FOLLOW-UP VISIT: CPT

## 2018-08-14 ENCOUNTER — APPOINTMENT (OUTPATIENT)
Dept: DERMATOLOGY | Facility: CLINIC | Age: 78
End: 2018-08-14
Payer: MEDICARE

## 2018-08-14 VITALS
WEIGHT: 138 LBS | HEIGHT: 59 IN | SYSTOLIC BLOOD PRESSURE: 123 MMHG | DIASTOLIC BLOOD PRESSURE: 73 MMHG | BODY MASS INDEX: 27.82 KG/M2 | HEART RATE: 65 BPM

## 2018-08-14 DIAGNOSIS — Z87.39 PERSONAL HISTORY OF OTHER DISEASES OF THE MUSCULOSKELETAL SYSTEM AND CONNECTIVE TISSUE: ICD-10-CM

## 2018-08-14 DIAGNOSIS — Z87.448 PERSONAL HISTORY OF OTHER DISEASES OF URINARY SYSTEM: ICD-10-CM

## 2018-08-14 DIAGNOSIS — L23.9 ALLERGIC CONTACT DERMATITIS, UNSPECIFIED CAUSE: ICD-10-CM

## 2018-08-14 PROCEDURE — 99203 OFFICE O/P NEW LOW 30 MIN: CPT

## 2018-08-14 RX ORDER — OLOPATADINE HCL 1 MG/ML
0.1 SOLUTION/ DROPS OPHTHALMIC
Qty: 5 | Refills: 0 | Status: ACTIVE | COMMUNITY
Start: 2018-01-08

## 2018-08-14 RX ORDER — PEN NEEDLE, DIABETIC 31 GX3/16"
31G X 5 MM NEEDLE, DISPOSABLE MISCELLANEOUS
Qty: 450 | Refills: 0 | Status: ACTIVE | COMMUNITY
Start: 2018-07-09

## 2018-08-14 RX ORDER — CARVEDILOL 25 MG/1
25 TABLET, FILM COATED ORAL
Qty: 180 | Refills: 0 | Status: ACTIVE | COMMUNITY
Start: 2017-11-08

## 2018-08-14 RX ORDER — ASCORBIC ACID 500 MG
500 TABLET ORAL
Qty: 60 | Refills: 0 | Status: ACTIVE | COMMUNITY
Start: 2018-07-09

## 2018-08-14 RX ORDER — EZETIMIBE AND SIMVASTATIN 10; 20 MG/1; MG/1
10-20 TABLET ORAL
Qty: 90 | Refills: 0 | Status: ACTIVE | COMMUNITY
Start: 2018-07-09

## 2018-08-14 RX ORDER — CHLORHEXIDINE GLUCONATE 4 %
325 (65 FE) LIQUID (ML) TOPICAL
Qty: 60 | Refills: 0 | Status: ACTIVE | COMMUNITY
Start: 2018-07-09

## 2018-08-14 RX ORDER — DENOSUMAB 60 MG/ML
60 INJECTION SUBCUTANEOUS
Qty: 1 | Refills: 0 | Status: ACTIVE | COMMUNITY
Start: 2018-05-02

## 2018-08-14 RX ORDER — ISOSORBIDE MONONITRATE 30 MG/1
30 TABLET, EXTENDED RELEASE ORAL
Qty: 90 | Refills: 0 | Status: ACTIVE | COMMUNITY
Start: 2018-06-04

## 2018-08-14 RX ORDER — PEN NEEDLE, DIABETIC 29 G X1/2"
31G X 8 MM NEEDLE, DISPOSABLE MISCELLANEOUS
Qty: 360 | Refills: 0 | Status: ACTIVE | COMMUNITY
Start: 2017-08-02

## 2018-08-14 RX ORDER — HYDRALAZINE HYDROCHLORIDE 100 MG/1
100 TABLET ORAL
Qty: 180 | Refills: 0 | Status: ACTIVE | COMMUNITY
Start: 2018-07-09

## 2018-08-14 RX ORDER — INSULIN ASPART 100 [IU]/ML
100 INJECTION, SOLUTION INTRAVENOUS; SUBCUTANEOUS
Qty: 15 | Refills: 0 | Status: ACTIVE | COMMUNITY
Start: 2018-07-09

## 2018-08-14 RX ORDER — FOLIC ACID 1 MG/1
1 TABLET ORAL
Qty: 30 | Refills: 0 | Status: ACTIVE | COMMUNITY
Start: 2018-07-09

## 2018-08-14 RX ORDER — OLOPATADINE HYDROCHLORIDE 2 MG/ML
0.2 SOLUTION OPHTHALMIC
Qty: 7 | Refills: 0 | Status: ACTIVE | COMMUNITY
Start: 2018-07-09

## 2018-08-14 RX ORDER — PEN NEEDLE, DIABETIC 32GX 5/32"
70 NEEDLE, DISPOSABLE MISCELLANEOUS
Qty: 450 | Refills: 0 | Status: ACTIVE | COMMUNITY
Start: 2018-07-09

## 2018-08-14 RX ORDER — BETAMETHASONE DIPROPIONATE 0.5 MG/G
0.05 OINTMENT TOPICAL
Qty: 1 | Refills: 1 | Status: ACTIVE | COMMUNITY
Start: 2018-08-14 | End: 1900-01-01

## 2018-08-14 RX ORDER — MULTI VITAMIN/MINERAL SUPPLEMENT WITH ASCORBIC ACID, NIACIN, PYRIDOXINE, PANTOTHENIC ACID, FOLIC ACID, RIBOFLAVIN, THIAMIN, BIOTIN, COBALAMIN AND ZINC. 60; 20; 12.5; 10; 10; 1.7; 1.5; 1; .3; .006 MG/1; MG/1; MG/1; MG/1; MG/1; MG/1; MG/1; MG/1; MG/1; MG/1
TABLET, COATED ORAL
Qty: 90 | Refills: 0 | Status: ACTIVE | COMMUNITY
Start: 2018-04-18

## 2018-09-14 ENCOUNTER — APPOINTMENT (OUTPATIENT)
Dept: ORTHOPEDIC SURGERY | Facility: CLINIC | Age: 78
End: 2018-09-14
Payer: MEDICARE

## 2018-09-14 DIAGNOSIS — Z96.651 PRESENCE OF RIGHT ARTIFICIAL KNEE JOINT: ICD-10-CM

## 2018-09-14 PROCEDURE — 99212 OFFICE O/P EST SF 10 MIN: CPT

## 2019-03-15 ENCOUNTER — APPOINTMENT (OUTPATIENT)
Dept: ORTHOPEDIC SURGERY | Facility: CLINIC | Age: 79
End: 2019-03-15
Payer: MEDICARE

## 2019-03-15 DIAGNOSIS — M17.11 UNILATERAL PRIMARY OSTEOARTHRITIS, RIGHT KNEE: ICD-10-CM

## 2019-03-15 PROCEDURE — 99212 OFFICE O/P EST SF 10 MIN: CPT

## 2019-03-15 NOTE — PHYSICAL EXAM
[de-identified] : Physical Examination\par General: well nourished, in no acute distress, alert and oriented to person, place and time\par Psychiatric: normal mood and affect, no abnormal movements or speech patterns\par Eyes: vision intact without glasses\par Throat: no thyromegaly\par Lymph: no enlarged nodes, no lymphedema in extremity\par Respiratory: no wheezing, no shortness of breath with ambulation\par Cardiac: no cardiac leg swelling, 2+ peripheral pulses\par Neurology: normal gross sensation in extremities to light touch\par Abdomen: soft, non-tender, tympanic, no masses\par \par Musculoskeletal Examination\par Ambulation	no antalgic gait, - assistive devices\par \par Knee			Right			Left\par General\par      Swelling/Deformity	normal			normal	\par      Skin			anterior incision			anterior incision\par      Erythema		-			-\par      Standing Alignment	neutral			neutral\par      Effusion		none			none\par Range of Motion\par      Hip			full painless ROM		full painless ROM\par      Knee Flexion		120			100\par      Knee Extension	0-5			0\par Patella\par      J Sign		-			-\par      Quad Medial/Lateral	1/1 1/1\par      Apprehension		-			-\par      Michele's		+			\par      Grind Sign		+			\par      Crepitus		+			\par Palpation\par      Medial Joint Line	-			-\par      Medial Fem Condyle	-			-\par      Lateral Joint Line	-			-\par      Quad Tendon		-			-\par      Patella Tendon	-			-\par      Medial Patella		-			-\par      Lateral Patella 	-			-\par      Posterior Knee	-			-\par Ligamentous\par      Varus @ 0° / 30°	-/-			-/-\par      Valgus @ 0° / 30°	-/-			-/-	\par Strength Examination/Atrophy\par      Hip Flexors 		5+			5+\par      Quadriceps		5+			5+\par      Hamstring		5+			5+\par      Tibialis Anterior	5+			5+\par      Achilles/Soleus	5+			5+\par Sensation\par      Deep Peroneal	normal			normal\par      Superficial Peroneal 	normal			normal\par      Sural  		normal			normal\par      Posterior Tibial 	normal			normal\par      Saphneous 		normal			normal\par Pulses\par      DP			2+			2+\par  [de-identified] : none\par

## 2019-03-15 NOTE — DISCUSSION/SUMMARY
[de-identified] : 9 months status post right knee total arthroplasty June 14, 2018\par \par Weightbearing as tolerated right lower extremity\par \par activities as tolerated\par \par Followup yearly

## 2019-03-15 NOTE — HISTORY OF PRESENT ILLNESS
[de-identified] : CC right knee\par \par Consult Dr Lisa Jones\par \par HPI 78yF\par \par Status post right total knee arthroplasty June 14, 2018\par \par Patient is doing well. feeling good with minimal pain during activity. improving ability to do activities.\par She is happy with her current range of motion which is ahead of her other knee his range of motion.\par \par Review of Systems is positive for the above musculoskeletal symptoms and is otherwise non-contributory for general, constitutional, psychiatric, neurologic, HEENT, cardiac, respiratory, gastrointestinal, reproductive, and dermatologic complaints.

## 2019-09-21 ENCOUNTER — INPATIENT (INPATIENT)
Facility: HOSPITAL | Age: 79
LOS: 15 days | Discharge: HOME HEALTH | DRG: 356 | End: 2019-10-07
Attending: STUDENT IN AN ORGANIZED HEALTH CARE EDUCATION/TRAINING PROGRAM | Admitting: STUDENT IN AN ORGANIZED HEALTH CARE EDUCATION/TRAINING PROGRAM
Payer: COMMERCIAL

## 2019-09-21 VITALS
RESPIRATION RATE: 20 BRPM | TEMPERATURE: 97 F | OXYGEN SATURATION: 96 % | HEART RATE: 58 BPM | DIASTOLIC BLOOD PRESSURE: 58 MMHG | WEIGHT: 139.99 LBS | HEIGHT: 59 IN | SYSTOLIC BLOOD PRESSURE: 98 MMHG

## 2019-09-21 DIAGNOSIS — K92.2 GASTROINTESTINAL HEMORRHAGE, UNSPECIFIED: ICD-10-CM

## 2019-09-21 DIAGNOSIS — I10 ESSENTIAL (PRIMARY) HYPERTENSION: ICD-10-CM

## 2019-09-21 DIAGNOSIS — Z96.652 PRESENCE OF LEFT ARTIFICIAL KNEE JOINT: Chronic | ICD-10-CM

## 2019-09-21 DIAGNOSIS — Z98.890 OTHER SPECIFIED POSTPROCEDURAL STATES: Chronic | ICD-10-CM

## 2019-09-21 DIAGNOSIS — E11.9 TYPE 2 DIABETES MELLITUS WITHOUT COMPLICATIONS: ICD-10-CM

## 2019-09-21 DIAGNOSIS — N17.9 ACUTE KIDNEY FAILURE, UNSPECIFIED: ICD-10-CM

## 2019-09-21 DIAGNOSIS — R06.02 SHORTNESS OF BREATH: ICD-10-CM

## 2019-09-21 DIAGNOSIS — Z29.9 ENCOUNTER FOR PROPHYLACTIC MEASURES, UNSPECIFIED: ICD-10-CM

## 2019-09-21 DIAGNOSIS — Z98.41 CATARACT EXTRACTION STATUS, RIGHT EYE: Chronic | ICD-10-CM

## 2019-09-21 LAB
ALBUMIN SERPL ELPH-MCNC: 3.6 G/DL — SIGNIFICANT CHANGE UP (ref 3.5–5)
ALP SERPL-CCNC: 70 U/L — SIGNIFICANT CHANGE UP (ref 40–120)
ALT FLD-CCNC: 19 U/L DA — SIGNIFICANT CHANGE UP (ref 10–60)
ANION GAP SERPL CALC-SCNC: 17 MMOL/L — SIGNIFICANT CHANGE UP (ref 5–17)
ANION GAP SERPL CALC-SCNC: 18 MMOL/L — HIGH (ref 5–17)
APPEARANCE UR: ABNORMAL
APTT BLD: 40.2 SEC — HIGH (ref 27.5–36.3)
AST SERPL-CCNC: 13 U/L — SIGNIFICANT CHANGE UP (ref 10–40)
BASE EXCESS BLDV CALC-SCNC: -14.2 MMOL/L — LOW (ref -2–2)
BILIRUB SERPL-MCNC: 0.4 MG/DL — SIGNIFICANT CHANGE UP (ref 0.2–1.2)
BILIRUB UR-MCNC: NEGATIVE — SIGNIFICANT CHANGE UP
BLOOD GAS COMMENTS, VENOUS: SIGNIFICANT CHANGE UP
BUN SERPL-MCNC: 137 MG/DL — HIGH (ref 7–18)
BUN SERPL-MCNC: 143 MG/DL — HIGH (ref 7–18)
CALCIUM SERPL-MCNC: 8.3 MG/DL — LOW (ref 8.4–10.5)
CALCIUM SERPL-MCNC: 8.3 MG/DL — LOW (ref 8.4–10.5)
CHLORIDE SERPL-SCNC: 102 MMOL/L — SIGNIFICANT CHANGE UP (ref 96–108)
CHLORIDE SERPL-SCNC: 103 MMOL/L — SIGNIFICANT CHANGE UP (ref 96–108)
CO2 SERPL-SCNC: 10 MMOL/L — CRITICAL LOW (ref 22–31)
CO2 SERPL-SCNC: 12 MMOL/L — LOW (ref 22–31)
COLOR SPEC: YELLOW — SIGNIFICANT CHANGE UP
CREAT ?TM UR-MCNC: 211 MG/DL — SIGNIFICANT CHANGE UP
CREAT SERPL-MCNC: 11.3 MG/DL — HIGH (ref 0.5–1.3)
CREAT SERPL-MCNC: 11.5 MG/DL — HIGH (ref 0.5–1.3)
DIFF PNL FLD: ABNORMAL
GLUCOSE SERPL-MCNC: 181 MG/DL — HIGH (ref 70–99)
GLUCOSE SERPL-MCNC: 200 MG/DL — HIGH (ref 70–99)
GLUCOSE UR QL: NEGATIVE — SIGNIFICANT CHANGE UP
HCO3 BLDV-SCNC: 11 MMOL/L — LOW (ref 21–29)
HCT VFR BLD CALC: 20.2 % — CRITICAL LOW (ref 34.5–45)
HGB BLD-MCNC: 6.7 G/DL — CRITICAL LOW (ref 11.5–15.5)
HOROWITZ INDEX BLDV+IHG-RTO: 21 — SIGNIFICANT CHANGE UP
INR BLD: 1.03 RATIO — SIGNIFICANT CHANGE UP (ref 0.88–1.16)
KETONES UR-MCNC: NEGATIVE — SIGNIFICANT CHANGE UP
LEUKOCYTE ESTERASE UR-ACNC: ABNORMAL
MCHC RBC-ENTMCNC: 30.3 PG — SIGNIFICANT CHANGE UP (ref 27–34)
MCHC RBC-ENTMCNC: 33.2 GM/DL — SIGNIFICANT CHANGE UP (ref 32–36)
MCV RBC AUTO: 91.4 FL — SIGNIFICANT CHANGE UP (ref 80–100)
NITRITE UR-MCNC: NEGATIVE — SIGNIFICANT CHANGE UP
NRBC # BLD: 0 /100 WBCS — SIGNIFICANT CHANGE UP (ref 0–0)
OB PNL STL: NEGATIVE — SIGNIFICANT CHANGE UP
OSMOLALITY UR: 353 MOS/KG — SIGNIFICANT CHANGE UP (ref 50–1200)
PCO2 BLDV: 25 MMHG — LOW (ref 35–50)
PH BLDV: 7.28 — LOW (ref 7.35–7.45)
PH UR: 5 — SIGNIFICANT CHANGE UP (ref 5–8)
PLATELET # BLD AUTO: 148 K/UL — LOW (ref 150–400)
PO2 BLDV: 62 MMHG — HIGH (ref 25–45)
POTASSIUM SERPL-MCNC: 5.3 MMOL/L — SIGNIFICANT CHANGE UP (ref 3.5–5.3)
POTASSIUM SERPL-MCNC: 5.3 MMOL/L — SIGNIFICANT CHANGE UP (ref 3.5–5.3)
POTASSIUM SERPL-SCNC: 5.3 MMOL/L — SIGNIFICANT CHANGE UP (ref 3.5–5.3)
POTASSIUM SERPL-SCNC: 5.3 MMOL/L — SIGNIFICANT CHANGE UP (ref 3.5–5.3)
PROT ?TM UR-MCNC: 178 MG/DL — HIGH (ref 0–12)
PROT SERPL-MCNC: 6.5 G/DL — SIGNIFICANT CHANGE UP (ref 6–8.3)
PROT UR-MCNC: 100
PROTHROM AB SERPL-ACNC: 11.5 SEC — SIGNIFICANT CHANGE UP (ref 10–12.9)
RBC # BLD: 2.21 M/UL — LOW (ref 3.8–5.2)
RBC # FLD: 16.4 % — HIGH (ref 10.3–14.5)
SAO2 % BLDV: 89 % — HIGH (ref 67–88)
SODIUM SERPL-SCNC: 131 MMOL/L — LOW (ref 135–145)
SODIUM SERPL-SCNC: 131 MMOL/L — LOW (ref 135–145)
SODIUM UR-SCNC: 10 MMOL/L — LOW (ref 40–220)
SP GR SPEC: 1.02 — SIGNIFICANT CHANGE UP (ref 1.01–1.02)
TROPONIN I SERPL-MCNC: <0.015 NG/ML — SIGNIFICANT CHANGE UP (ref 0–0.04)
UROBILINOGEN FLD QL: NEGATIVE — SIGNIFICANT CHANGE UP
WBC # BLD: 5.46 K/UL — SIGNIFICANT CHANGE UP (ref 3.8–10.5)
WBC # FLD AUTO: 5.46 K/UL — SIGNIFICANT CHANGE UP (ref 3.8–10.5)

## 2019-09-21 PROCEDURE — 71045 X-RAY EXAM CHEST 1 VIEW: CPT | Mod: 26

## 2019-09-21 PROCEDURE — 99291 CRITICAL CARE FIRST HOUR: CPT

## 2019-09-21 PROCEDURE — 99285 EMERGENCY DEPT VISIT HI MDM: CPT

## 2019-09-21 RX ORDER — TRAMADOL HYDROCHLORIDE 50 MG/1
1 TABLET ORAL
Qty: 0 | Refills: 0 | DISCHARGE

## 2019-09-21 RX ORDER — DENOSUMAB 60 MG/ML
0 INJECTION SUBCUTANEOUS
Qty: 0 | Refills: 0 | DISCHARGE

## 2019-09-21 RX ORDER — PANTOPRAZOLE SODIUM 20 MG/1
8 TABLET, DELAYED RELEASE ORAL
Qty: 80 | Refills: 0 | Status: DISCONTINUED | OUTPATIENT
Start: 2019-09-21 | End: 2019-09-23

## 2019-09-21 RX ORDER — ISOSORBIDE MONONITRATE 60 MG/1
1 TABLET, EXTENDED RELEASE ORAL
Qty: 0 | Refills: 0 | DISCHARGE

## 2019-09-21 RX ORDER — FUROSEMIDE 40 MG
1 TABLET ORAL
Qty: 0 | Refills: 0 | DISCHARGE

## 2019-09-21 RX ORDER — INSULIN LISPRO 100/ML
VIAL (ML) SUBCUTANEOUS EVERY 6 HOURS
Refills: 0 | Status: DISCONTINUED | OUTPATIENT
Start: 2019-09-21 | End: 2019-09-23

## 2019-09-21 RX ORDER — INSULIN GLARGINE 100 [IU]/ML
8 INJECTION, SOLUTION SUBCUTANEOUS AT BEDTIME
Refills: 0 | Status: DISCONTINUED | OUTPATIENT
Start: 2019-09-21 | End: 2019-09-22

## 2019-09-21 RX ORDER — INSULIN ASPART 100 [IU]/ML
12 INJECTION, SOLUTION SUBCUTANEOUS
Qty: 0 | Refills: 0 | DISCHARGE

## 2019-09-21 RX ORDER — AMLODIPINE BESYLATE 2.5 MG/1
0 TABLET ORAL
Qty: 0 | Refills: 0 | DISCHARGE

## 2019-09-21 RX ORDER — HYDRALAZINE HCL 50 MG
1 TABLET ORAL
Qty: 0 | Refills: 0 | DISCHARGE

## 2019-09-21 RX ORDER — CARVEDILOL PHOSPHATE 80 MG/1
1 CAPSULE, EXTENDED RELEASE ORAL
Qty: 0 | Refills: 0 | DISCHARGE

## 2019-09-21 RX ORDER — PANTOPRAZOLE SODIUM 20 MG/1
40 TABLET, DELAYED RELEASE ORAL EVERY 12 HOURS
Refills: 0 | Status: DISCONTINUED | OUTPATIENT
Start: 2019-09-21 | End: 2019-09-21

## 2019-09-21 RX ORDER — EZETIMIBE AND SIMVASTATIN 10; 80 MG/1; MG/1
1 TABLET, FILM COATED ORAL
Qty: 0 | Refills: 0 | DISCHARGE

## 2019-09-21 RX ADMIN — INSULIN GLARGINE 8 UNIT(S): 100 INJECTION, SOLUTION SUBCUTANEOUS at 22:33

## 2019-09-21 RX ADMIN — Medication 1: at 23:48

## 2019-09-21 RX ADMIN — PANTOPRAZOLE SODIUM 40 MILLIGRAM(S): 20 TABLET, DELAYED RELEASE ORAL at 19:02

## 2019-09-21 NOTE — H&P ADULT - NSHPPHYSICALEXAM_GEN_ALL_CORE
PHYSICAL EXAM:  GENERAL: dyspneic on talking, in mild distress   HEENT: Normocephalic;  conjunctivae and sclerae clear; moist mucous membranes;   NECK : supple  CHEST/LUNG: Clear to auscultation bilaterally with good air entry   HEART: S1 S2  regular; no murmurs, gallops or rubs  ABDOMEN: Soft, mild tenderness on lower abdomen +  EXTREMITIES: no cyanosis; no edema; no calf tenderness  SKIN: warm and dry; no rash  NERVOUS SYSTEM:  Awake and alert; Oriented  to place, person and time ; no new deficits

## 2019-09-21 NOTE — H&P ADULT - PROBLEM SELECTOR PLAN 1
Reported melena, with hb of 6.7 ( hb in 2018 labs is 10)  Likely acute GI bleed   Would transfuse 1 unit PRBC now  Get anemia panel   Start protonix 40 iv bid  keep NPO   GI consult  Hold all Anticoagulation, patient on asa at home, never had any ischemic heart event Reported melena, with hb of 6.7 ( hb in 2018 labs is 10), likely from PUD vs Esophagitis   Never had EGD/ Colonoscopy, not on Nsaids at home   Likely acute GI bleed   Would transfuse 1 unit PRBC now  Get anemia panel   Start protonix 40 iv bid  keep NPO   GI consult  Hold all Anticoagulation, patient on asa at home, never had any ischemic heart event

## 2019-09-21 NOTE — H&P ADULT - ASSESSMENT
77 female with PMH of HTn, HLD, DM, Gout, CKD stage 4( creatinine  year ago, 1.05), comes in with abdominal pain, diarrhea.  Will admit to ICU for Acute Renal Failure, GI bleed. 78 female with PMH of HTn, HLD, DM, Gout, CKD stage 4( creatinine  year ago, 1.05), comes in with abdominal pain, diarrhea.  Will admit to ICU for Acute Renal Failure, GI bleed.

## 2019-09-21 NOTE — ED ADULT NURSE REASSESSMENT NOTE - NS ED NURSE REASSESS COMMENT FT1
1st PRBCs started at 2015 with no adverse reactions noted. All safety precautions in place. Vital signs stable.

## 2019-09-21 NOTE — ED PROVIDER NOTE - CARE PLAN
Principal Discharge DX:	GIB (gastrointestinal bleeding)  Secondary Diagnosis:	Anemia  Secondary Diagnosis:	CONCHIS (acute kidney injury)  Secondary Diagnosis:	Hypotension  Secondary Diagnosis:	CHF (congestive heart failure)  Secondary Diagnosis:	Edema

## 2019-09-21 NOTE — H&P ADULT - ATTENDING COMMENTS
Patient is an 77 y/o female with PMH of HTN, DM and CKD admitted via ED with chief complaint of SOB and abdominal pain. She states that shes had diarrhea stools that are black. The patient's Hct is 20 and her serum creatinine is 11mg/dl. Her creatinine on her last admission to [facility] was 1.0 mg/dl during her total knee arthroplasty. The patient's CXR shows bilateral increased interstitial markings. Given her need for PRBC transfusion and baseline of SOB it was decided to admit her to ICU for monitoring during transfusion of units 2 and possibly 3. Dx- upper GI bleed, prerenal azotemia. Will start PPI infusion and consult GI for upper endoscopy, r/o peptic ulcer disease, gastritis ( may be taking nsaids for arthritis ), esophagitis and dieulafoy's lesion.  Reed's catheter placed with out much urine evacuated. Will scan bladder and send urine electrolytes to potentially calculate fractional excretion of sodium, renal consult. I reviewed all laboratory and roentgenographic data and any previous medical record from [facility] that existed. I also discussed the case with the ED attending or referring physician.  Critical care time 45 minutes.

## 2019-09-21 NOTE — H&P ADULT - NSHPLABSRESULTS_GEN_ALL_CORE
6.7    5.46  )-----------( 148      ( 21 Sep 2019 16:58 )             20.2                             6.7    5.46  )-----------( 148      ( 21 Sep 2019 16:58 )             20.2           131<L>  |  103  |  137<H>  ----------------------------<  200<H>  5.3   |  10<LL>  |  11.50<H>    Ca    8.3<L>      21 Sep 2019 16:58    TPro  6.5  /  Alb  3.6  /  TBili  0.4  /  DBili  x   /  AST  13  /  ALT  19  /  AlkPhos  70                Urinalysis Basic - ( 21 Sep 2019 18:59 )    Color: Yellow / Appearance: Slightly Turbid / S.020 / pH: x  Gluc: x / Ketone: Negative  / Bili: Negative / Urobili: Negative   Blood: x / Protein: 100 / Nitrite: Negative   Leuk Esterase: Trace / RBC: x / WBC x   Sq Epi: x / Non Sq Epi: x / Bacteria: x      Lactate Trend      CARDIAC MARKERS ( 21 Sep 2019 16:58 )  <0.015 ng/mL / x     / x     / x     / x            CAPILLARY BLOOD GLUCOSE

## 2019-09-21 NOTE — CONSULT NOTE ADULT - SUBJECTIVE AND OBJECTIVE BOX
Patient is a 78y old  Female who presents with a chief complaint of abdominal pain, diarrhea (21 Sep 2019 19:07)    78 year old female with PMH of HTN, HLD, DM, Gout, CKD stage 4( creatinine  year ago, 1.05), comes in with abdominal pain, diarrhea. Patient reports having dark stools and diarrhea for 1 week. Patient also reports lower abdominal pain. Denies hematemesis. Reports mild nausea, no vomiting, no fever. Has bilateral leg swelling and exertional sob.     REVIEW OF SYSTEMS  Constitutional:   No fever, no fatigue, no pallor, no night sweats, no weight loss.  HEENT:   No eye pain, no vision changes, no icterus, no mouth ulcers.  Respiratory:   No shortness of breath, no cough, no respiratory distress.   Cardiovascular:   No chest pain, no palpitations.   Gastrointestinal: No abdominal pain, no nausea, no vomiting , no diahrrea, no constipation, no hematochezia,no melena.  Skin:   No rashes, no jaundice, no eczema.   Musculoskeletal:   No joint pain, no swelling, no myalgia.   Neurologic:   No headache, no seizure, no weakness.   Genitourinary:   No dysuria, no decreased urine output.  Psychiatric:  No depression, no anxiety,   Endocrine:   No thyroid disease, no diabetes.  Heme/Lymphatic:   No anemia, no blood transfusions, no lymph node enlargement, no bleeding, no bruising.  ___________________________________________________________________________________________  Allergies    aspirin (Unknown)  ibuprofen (Unknown)  Mushrooms (Anaphylaxis)  shellfish (Anaphylaxis)    Intolerances      MEDICATIONS  (STANDING):  insulin glargine Injectable (LANTUS) 8 Unit(s) SubCutaneous at bedtime  insulin lispro (HumaLOG) corrective regimen sliding scale   SubCutaneous every 6 hours  pantoprazole Infusion 8 mG/Hr (10 mL/Hr) IV Continuous <Continuous>    MEDICATIONS  (PRN):      PAST MEDICAL & SURGICAL HISTORY:  Diabetes mellitus  CKD (chronic kidney disease)  Seasonal allergies  Vitamin D deficiency  Osteoarthritis  Osteoporosis  Breast lump  Cataract  Gout  Hyperlipidemia  Hypertension  History of knee replacement, total, left: 2009  History of breast surgery: Excision of left breast lump - benign  1991  H/O bilateral cataract extraction: 2012  and 2014    FAMILY HISTORY:  Family history of cancer    Social History: No hsitory of : Tobacco use, IVDA, EToH  ______________________________________________________________________________________    PHYSICAL EXAM    Daily Height in cm: 149.86 (21 Sep 2019 15:50)    Daily   BMI: 28.3 (09-21 @ 15:50)  Change in Weight:  Vital Signs Last 24 Hrs  T(C): 35.2 (21 Sep 2019 21:00), Max: 36.4 (21 Sep 2019 19:55)  T(F): 95.4 (21 Sep 2019 21:00), Max: 97.6 (21 Sep 2019 20:30)  HR: 56 (21 Sep 2019 21:45) (55 - 58)  BP: 114/49 (21 Sep 2019 21:30) (98/58 - 114/49)  BP(mean): 66 (21 Sep 2019 21:30) (66 - 88)  RR: 18 (21 Sep 2019 21:45) (17 - 23)  SpO2: 100% (21 Sep 2019 21:45) (96% - 100%)    General:  Well developed, well nourished, alert and active, no pallor, NAD.  HEENT:    Normal appearance of conjunctiva, ears, nose, lips, oropharynx, and oral mucosa, anicteric.  Neck:  No masses, no asymmetry.  Lymph Nodes:  No lymphadenopathy.   Cardiovascular:  RRR normal S1/S2, no murmur.  Respiratory:  CTA B/L, normal respiratory effort.   Abdominal:   soft, no masses or tenderness, normoactive BS, NT/ND, no HSM.  Extremities:   No clubbing or cyanosis, normal capillary refill, no edema.   Skin:   No rash, jaundice, lesions, eczema.   Musculoskeletal:  No joint swelling, erythema or tenderness.   Neuro: No focal deficits.   Other:   _______________________________________________________________________________________________  Lab Results:                          6.7    5.46  )-----------( 148      ( 21 Sep 2019 16:58 )             20.2     09-21    131<L>  |  102  |  143<H>  ----------------------------<  181<H>  5.3   |  12<L>  |  11.30<H>    Ca    8.3<L>      21 Sep 2019 22:33    TPro  6.5  /  Alb  3.6  /  TBili  0.4  /  DBili  x   /  AST  13  /  ALT  19  /  AlkPhos  70  09-21    LIVER FUNCTIONS - ( 21 Sep 2019 16:58 )  Alb: 3.6 g/dL / Pro: 6.5 g/dL / ALK PHOS: 70 U/L / ALT: 19 U/L DA / AST: 13 U/L / GGT: x           PT/INR - ( 21 Sep 2019 22:33 )   PT: 11.5 sec;   INR: 1.03 ratio         PTT - ( 21 Sep 2019 22:33 )  PTT:40.2 sec    CARDIAC MARKERS ( 21 Sep 2019 16:58 )  <0.015 ng/mL / x     / x     / x     / x          Stool Results:          RADIOLOGY RESULTS:  < from: CT Angio Chest w/ IV Cont (06.16.18 @ 22:54) >    EXAM:  CT ANGIO CHEST (W)AW IC                            PROCEDURE DATE:  06/16/2018          INTERPRETATION:  CLINICAL INFORMATION: Shortness of breath    COMPARISON: None    PROCEDURE:   CT Angiography of the Chest.  57 ml of Omnipaque 350 was injected intravenously. 43 ml were discarded.  Sagittal and coronal reformats were performed as well as 3D (MIP)   reconstructions.      FINDINGS:    CHEST:     LUNGS AND LARGE AIRWAYS: Patent central airways.  Mosaic groundglass   attenuation, nonspecific. Basilar atelectasis.  PLEURA: Trace bilateral pleural effusions.  VESSELS: Atherosclerotic changes of thoracic aorta. No thoracic aortic   aneurysm or dissection. Multiple images are degraded by respiratory   motion. Excluding regions with motion artifact, there are no pulmonary   arterial filling defects identified.  HEART: Enlarged. Aortic valve calcifications. No pericardial effusion.  MEDIASTINUM AND ELDA: No lymphadenopathy.  CHEST WALL AND LOWER NECK: Within normal limits.  VISUALIZED UPPER ABDOMEN: Nonspecific perinephric stranding.  BONES: Spinal degenerative changes. Probable chronically dislocated right   clavicular head.    IMPRESSION:     Negative for pulmonary embolism.                ALFREDO MACIEL M.D., ATTENDING RADIOLOGIST  This document has been electronically signed. Jun 17 2018  9:42AM                < end of copied text >    SURGICAL PATHOLOGY:

## 2019-09-21 NOTE — ED PROVIDER NOTE - CRITICAL CARE PROVIDED
consult w/ pt's family directly relating to pts condition/direct patient care (not related to procedure)/documentation/interpretation of diagnostic studies/consultation with other physicians/additional history taking

## 2019-09-21 NOTE — ED PROVIDER NOTE - CHPI ED SYMPTOMS NEG
no vomiting/no syncope/no back pain/no fever/no diaphoresis/no chest pain/no cough/no dizziness/no nausea/no chills

## 2019-09-21 NOTE — ED PROVIDER NOTE - OBJECTIVE STATEMENT
sob for one week with associated lower abd pain and diarrhea sob for one week with associated lower abd pain and diarrhea  stool has been dark (black)  daughter today noted pallor and sob  pt also noted she has edema of both lower ext   has been having nocturnal dyspnea and orthopnea  no hs of CHF

## 2019-09-21 NOTE — H&P ADULT - PROBLEM SELECTOR PLAN 4
sob on exertion, even on talking mildly   CXR slightly congested   Supplemental O2 prn   Will obtain Pro-bnp  Would consider lasix after transfusion if needed   Monitor closely for now

## 2019-09-21 NOTE — ED ADULT NURSE NOTE - NSIMPLEMENTINTERV_GEN_ALL_ED
Implemented All Fall with Harm Risk Interventions:  [place] to call system. Call bell, personal items and telephone within reach. Instruct patient to call for assistance. Room bathroom lighting operational. Non-slip footwear when patient is off stretcher. Physically safe environment: no spills, clutter or unnecessary equipment. Stretcher in lowest position, wheels locked, appropriate side rails in place. Provide visual cue, wrist band, yellow gown, etc. Monitor gait and stability. Monitor for mental status changes and reorient to person, place, and time. Review medications for side effects contributing to fall risk. Reinforce activity limits and safety measures with patient and family. Provide visual clues: red socks.

## 2019-09-21 NOTE — H&P ADULT - PROBLEM SELECTOR PLAN 6
IMPROVE VTE Individual Risk Assessment          RISK                                                          Points  [  ] Previous VTE                                                3  [  ] Thrombophilia                                             2  [  ] Lower limb paralysis                                   2        (unable to hold up >15 seconds)    [  ] Current Cancer                                             2         (within 6 months)  [ x ] Immobilization > 24 hrs                              1  [  x] ICU/CCU stay > 24 hours                             1  [  ] Age > 60                                                         1    IMPROVE VTE Score: 2   Hold ac given gi bleed

## 2019-09-21 NOTE — H&P ADULT - PROBLEM SELECTOR PLAN 2
CONCHIS with elevated bun/creatinine (137/11.5)  Could be pre-renal, dry mucous membranes and GI bleed   Could be post obstructive as well, will insert dickson  Would obtain urine lytes   Blood transfusion should improve the renal function   Not uremic currently   No urgent need for HD  Nephro consult

## 2019-09-21 NOTE — H&P ADULT - NSICDXPASTSURGICALHX_GEN_ALL_CORE_FT
PAST SURGICAL HISTORY:  H/O bilateral cataract extraction 2012  and 2014    History of breast surgery Excision of left breast lump - benign  1991    History of knee replacement, total, left 2009

## 2019-09-21 NOTE — H&P ADULT - PROBLEM/PLAN-1
Verified Results  (1) URINALYSIS (will reflex a microscopy if leukocytes, occult blood, protein or nitrites are not within normal limits) [id number] 12:00PM Breeze     Test Name Result Flag Reference   COLOR Yellow     CLARITY Clear     SPECIFIC GRAVITY UA 1 010  1 003-1 030   PH UA 5 5  4 5-8 0   LEUKOCYTE ESTERASE UA  A Negative   Elevated glucose may cause decreased leukocyte values   See urine microscopic for [facility] result/   NITRITE UA Negative  Negative   PROTEIN UA Negative mg/dl  Negative   GLUCOSE UA >=1000 (1%) mg/dl A Negative   KETONES UA Negative mg/dl  Negative   UROBILINOGEN UA 0 2 E U /dl  0 2, 1 0 E U /dl   BILIRUBIN UA Negative  Negative   BLOOD UA Negative  Negative, Trace-Intact     (1) URINALYSIS (will reflex a microscopy if leukocytes, occult blood, protein or nitrites are not within normal limits) [id number] 12:00PM Breeze     Test Name Result Flag Reference   BACTERIA Occasional /hpf  None Seen, Occasional   EPITHELIAL CELLS Occasional /hpf  None Seen, Occasional   RBC UA None Seen /hpf  None Seen, 0-5   WBC UA 0-1 /hpf A None Seen, 0-5, 5-55, 5-65 DISPLAY PLAN FREE TEXT

## 2019-09-21 NOTE — H&P ADULT - NSICDXPASTMEDICALHX_GEN_ALL_CORE_FT
PAST MEDICAL HISTORY:  Breast lump     Cataract     CKD (chronic kidney disease)     Diabetes mellitus     Gout     Hyperlipidemia     Hypertension     Osteoarthritis     Osteoporosis     Seasonal allergies     Vitamin D deficiency

## 2019-09-21 NOTE — ED PROVIDER NOTE - CLINICAL SUMMARY MEDICAL DECISION MAKING FREE TEXT BOX
pt in CONCHIS, CHF GIB with low H/H with symptoms of SOB and weakness,   pale, mild resp. distress and low bp   concern that if we fluid recuss and give blood pt will  have worsening CHF and resp difficulties   pt will require at least one unit of blood most likely two therefore the safest course is to admit to ICU for critical observation of VS, fluid and respiratory status.  ICU at bedside and accepts pt   will get CT a/p and start blood trx

## 2019-09-21 NOTE — H&P ADULT - HISTORY OF PRESENT ILLNESS
77 female with PMH of HTn, HLD, DM, Gout, CKD stage 4( creatinine  year ago, 1.05), comes in with abdominal pain, diarrhea. Patient reports having dark stools and diarrhea for 1 week. Patient also reports lower abdominal pain. Denies hematemesis. Reports mild nausea, no vomiting, no fever. Has bilateral leg swelling and exertional sob.     In Ed, BP: 98/ 58 mm hg, HR: 58, Temp: 97.2 F  Cbc shows hb of 6.7/20   Bmp shows na of 131, bicarb 10, elevated bun/creatinine 137/11   Troponin 0.015   EKG shows no ischemic changes 78 female with PMH of HTn, HLD, DM, Gout, CKD stage 4( creatinine  year ago, 1.05), comes in with abdominal pain, diarrhea. Patient reports having dark stools and diarrhea for 1 week. Patient also reports lower abdominal pain. Denies hematemesis. Reports mild nausea, no vomiting, no fever. Has bilateral leg swelling and exertional sob.     In Ed, BP: 98/ 58 mm hg, HR: 58, Temp: 97.2 F  Cbc shows hb of 6.7/20   Bmp shows na of 131, bicarb 10, elevated bun/creatinine 137/11   Troponin 0.015   EKG shows no ischemic changes

## 2019-09-22 DIAGNOSIS — D64.9 ANEMIA, UNSPECIFIED: ICD-10-CM

## 2019-09-22 DIAGNOSIS — E87.2 ACIDOSIS: ICD-10-CM

## 2019-09-22 LAB
ALBUMIN SERPL ELPH-MCNC: 3.3 G/DL — LOW (ref 3.5–5)
ALP SERPL-CCNC: 67 U/L — SIGNIFICANT CHANGE UP (ref 40–120)
ALT FLD-CCNC: 16 U/L DA — SIGNIFICANT CHANGE UP (ref 10–60)
ANION GAP SERPL CALC-SCNC: 17 MMOL/L — SIGNIFICANT CHANGE UP (ref 5–17)
ANION GAP SERPL CALC-SCNC: 17 MMOL/L — SIGNIFICANT CHANGE UP (ref 5–17)
AST SERPL-CCNC: 10 U/L — SIGNIFICANT CHANGE UP (ref 10–40)
BASOPHILS # BLD AUTO: 0.01 K/UL — SIGNIFICANT CHANGE UP (ref 0–0.2)
BASOPHILS NFR BLD AUTO: 0.2 % — SIGNIFICANT CHANGE UP (ref 0–2)
BILIRUB SERPL-MCNC: 0.5 MG/DL — SIGNIFICANT CHANGE UP (ref 0.2–1.2)
BUN SERPL-MCNC: 141 MG/DL — HIGH (ref 7–18)
BUN SERPL-MCNC: 141 MG/DL — HIGH (ref 7–18)
CALCIUM SERPL-MCNC: 8 MG/DL — LOW (ref 8.4–10.5)
CALCIUM SERPL-MCNC: 8.1 MG/DL — LOW (ref 8.4–10.5)
CHLORIDE SERPL-SCNC: 104 MMOL/L — SIGNIFICANT CHANGE UP (ref 96–108)
CHLORIDE SERPL-SCNC: 104 MMOL/L — SIGNIFICANT CHANGE UP (ref 96–108)
CHOLEST SERPL-MCNC: 77 MG/DL — SIGNIFICANT CHANGE UP (ref 10–199)
CO2 SERPL-SCNC: 11 MMOL/L — LOW (ref 22–31)
CO2 SERPL-SCNC: 11 MMOL/L — LOW (ref 22–31)
CREAT SERPL-MCNC: 11.7 MG/DL — HIGH (ref 0.5–1.3)
CREAT SERPL-MCNC: 11.7 MG/DL — HIGH (ref 0.5–1.3)
EOSINOPHIL # BLD AUTO: 0.1 K/UL — SIGNIFICANT CHANGE UP (ref 0–0.5)
EOSINOPHIL NFR BLD AUTO: 1.8 % — SIGNIFICANT CHANGE UP (ref 0–6)
GLUCOSE SERPL-MCNC: 116 MG/DL — HIGH (ref 70–99)
GLUCOSE SERPL-MCNC: 126 MG/DL — HIGH (ref 70–99)
HBA1C BLD-MCNC: 5.6 % — SIGNIFICANT CHANGE UP (ref 4–5.6)
HCT VFR BLD CALC: 26.7 % — LOW (ref 34.5–45)
HCT VFR BLD CALC: 26.9 % — LOW (ref 34.5–45)
HDLC SERPL-MCNC: 32 MG/DL — LOW
HGB BLD-MCNC: 9.1 G/DL — LOW (ref 11.5–15.5)
HGB BLD-MCNC: 9.2 G/DL — LOW (ref 11.5–15.5)
IMM GRANULOCYTES NFR BLD AUTO: 1.1 % — SIGNIFICANT CHANGE UP (ref 0–1.5)
LIPID PNL WITH DIRECT LDL SERPL: 20 MG/DL — SIGNIFICANT CHANGE UP
LYMPHOCYTES # BLD AUTO: 0.63 K/UL — LOW (ref 1–3.3)
LYMPHOCYTES # BLD AUTO: 11.3 % — LOW (ref 13–44)
MAGNESIUM SERPL-MCNC: 2.8 MG/DL — HIGH (ref 1.6–2.6)
MCHC RBC-ENTMCNC: 30 PG — SIGNIFICANT CHANGE UP (ref 27–34)
MCHC RBC-ENTMCNC: 30.4 PG — SIGNIFICANT CHANGE UP (ref 27–34)
MCHC RBC-ENTMCNC: 34.1 GM/DL — SIGNIFICANT CHANGE UP (ref 32–36)
MCHC RBC-ENTMCNC: 34.2 GM/DL — SIGNIFICANT CHANGE UP (ref 32–36)
MCV RBC AUTO: 88.1 FL — SIGNIFICANT CHANGE UP (ref 80–100)
MCV RBC AUTO: 88.8 FL — SIGNIFICANT CHANGE UP (ref 80–100)
MONOCYTES # BLD AUTO: 0.34 K/UL — SIGNIFICANT CHANGE UP (ref 0–0.9)
MONOCYTES NFR BLD AUTO: 6.1 % — SIGNIFICANT CHANGE UP (ref 2–14)
NEUTROPHILS # BLD AUTO: 4.42 K/UL — SIGNIFICANT CHANGE UP (ref 1.8–7.4)
NEUTROPHILS NFR BLD AUTO: 79.5 % — HIGH (ref 43–77)
NRBC # BLD: 0 /100 WBCS — SIGNIFICANT CHANGE UP (ref 0–0)
NRBC # BLD: 0 /100 WBCS — SIGNIFICANT CHANGE UP (ref 0–0)
PHOSPHATE SERPL-MCNC: 7.7 MG/DL — HIGH (ref 2.5–4.5)
PLATELET # BLD AUTO: 139 K/UL — LOW (ref 150–400)
PLATELET # BLD AUTO: 155 K/UL — SIGNIFICANT CHANGE UP (ref 150–400)
POTASSIUM SERPL-MCNC: 5.2 MMOL/L — SIGNIFICANT CHANGE UP (ref 3.5–5.3)
POTASSIUM SERPL-MCNC: 5.5 MMOL/L — HIGH (ref 3.5–5.3)
POTASSIUM SERPL-SCNC: 5.2 MMOL/L — SIGNIFICANT CHANGE UP (ref 3.5–5.3)
POTASSIUM SERPL-SCNC: 5.5 MMOL/L — HIGH (ref 3.5–5.3)
PROT SERPL-MCNC: 6.3 G/DL — SIGNIFICANT CHANGE UP (ref 6–8.3)
RBC # BLD: 3.03 M/UL — LOW (ref 3.8–5.2)
RBC # BLD: 3.03 M/UL — LOW (ref 3.8–5.2)
RBC # FLD: 15.1 % — HIGH (ref 10.3–14.5)
RBC # FLD: 15.9 % — HIGH (ref 10.3–14.5)
SODIUM SERPL-SCNC: 132 MMOL/L — LOW (ref 135–145)
SODIUM SERPL-SCNC: 132 MMOL/L — LOW (ref 135–145)
TOTAL CHOLESTEROL/HDL RATIO MEASUREMENT: 2.4 RATIO — LOW (ref 3.3–7.1)
TRIGL SERPL-MCNC: 124 MG/DL — SIGNIFICANT CHANGE UP (ref 10–149)
TSH SERPL-MCNC: 1.75 UU/ML — SIGNIFICANT CHANGE UP (ref 0.34–4.82)
WBC # BLD: 5.56 K/UL — SIGNIFICANT CHANGE UP (ref 3.8–10.5)
WBC # BLD: 5.97 K/UL — SIGNIFICANT CHANGE UP (ref 3.8–10.5)
WBC # FLD AUTO: 5.56 K/UL — SIGNIFICANT CHANGE UP (ref 3.8–10.5)
WBC # FLD AUTO: 5.97 K/UL — SIGNIFICANT CHANGE UP (ref 3.8–10.5)

## 2019-09-22 RX ORDER — SODIUM BICARBONATE 1 MEQ/ML
100 SYRINGE (ML) INTRAVENOUS ONCE
Refills: 0 | Status: COMPLETED | OUTPATIENT
Start: 2019-09-22 | End: 2019-09-22

## 2019-09-22 RX ORDER — IPRATROPIUM/ALBUTEROL SULFATE 18-103MCG
3 AEROSOL WITH ADAPTER (GRAM) INHALATION ONCE
Refills: 0 | Status: COMPLETED | OUTPATIENT
Start: 2019-09-22 | End: 2019-09-22

## 2019-09-22 RX ORDER — INSULIN HUMAN 100 [IU]/ML
5 INJECTION, SOLUTION SUBCUTANEOUS ONCE
Refills: 0 | Status: COMPLETED | OUTPATIENT
Start: 2019-09-22 | End: 2019-09-22

## 2019-09-22 RX ORDER — HEPARIN SODIUM 5000 [USP'U]/ML
10000 INJECTION INTRAVENOUS; SUBCUTANEOUS ONCE
Refills: 0 | Status: COMPLETED | OUTPATIENT
Start: 2019-09-22 | End: 2019-09-22

## 2019-09-22 RX ORDER — CHLORHEXIDINE GLUCONATE 213 G/1000ML
1 SOLUTION TOPICAL DAILY
Refills: 0 | Status: DISCONTINUED | OUTPATIENT
Start: 2019-09-22 | End: 2019-10-07

## 2019-09-22 RX ORDER — SODIUM BICARBONATE 1 MEQ/ML
0.09 SYRINGE (ML) INTRAVENOUS
Qty: 75 | Refills: 0 | Status: DISCONTINUED | OUTPATIENT
Start: 2019-09-22 | End: 2019-09-22

## 2019-09-22 RX ORDER — SODIUM CHLORIDE 9 MG/ML
1000 INJECTION, SOLUTION INTRAVENOUS
Refills: 0 | Status: DISCONTINUED | OUTPATIENT
Start: 2019-09-22 | End: 2019-09-22

## 2019-09-22 RX ORDER — DEXTROSE 50 % IN WATER 50 %
50 SYRINGE (ML) INTRAVENOUS ONCE
Refills: 0 | Status: COMPLETED | OUTPATIENT
Start: 2019-09-22 | End: 2019-09-22

## 2019-09-22 RX ORDER — SODIUM CHLORIDE 9 MG/ML
1000 INJECTION, SOLUTION INTRAVENOUS
Refills: 0 | Status: DISCONTINUED | OUTPATIENT
Start: 2019-09-22 | End: 2019-09-23

## 2019-09-22 RX ORDER — FUROSEMIDE 40 MG
200 TABLET ORAL ONCE
Refills: 0 | Status: COMPLETED | OUTPATIENT
Start: 2019-09-22 | End: 2019-09-22

## 2019-09-22 RX ORDER — INSULIN GLARGINE 100 [IU]/ML
8 INJECTION, SOLUTION SUBCUTANEOUS AT BEDTIME
Refills: 0 | Status: DISCONTINUED | OUTPATIENT
Start: 2019-09-23 | End: 2019-09-23

## 2019-09-22 RX ORDER — CALCIUM GLUCONATE 100 MG/ML
1 VIAL (ML) INTRAVENOUS ONCE
Refills: 0 | Status: COMPLETED | OUTPATIENT
Start: 2019-09-22 | End: 2019-09-22

## 2019-09-22 RX ADMIN — SODIUM CHLORIDE 75 MILLILITER(S): 9 INJECTION, SOLUTION INTRAVENOUS at 16:17

## 2019-09-22 RX ADMIN — CHLORHEXIDINE GLUCONATE 1 APPLICATION(S): 213 SOLUTION TOPICAL at 11:26

## 2019-09-22 RX ADMIN — Medication 140 MILLIGRAM(S): at 19:46

## 2019-09-22 RX ADMIN — Medication 200 GRAM(S): at 18:04

## 2019-09-22 RX ADMIN — HEPARIN SODIUM 10000 UNIT(S): 5000 INJECTION INTRAVENOUS; SUBCUTANEOUS at 19:54

## 2019-09-22 RX ADMIN — Medication 100 MILLIEQUIVALENT(S): at 18:05

## 2019-09-22 RX ADMIN — Medication 1: at 18:04

## 2019-09-22 RX ADMIN — Medication 3 MILLILITER(S): at 01:14

## 2019-09-22 RX ADMIN — PANTOPRAZOLE SODIUM 10 MG/HR: 20 TABLET, DELAYED RELEASE ORAL at 11:26

## 2019-09-22 RX ADMIN — SODIUM CHLORIDE 75 MILLILITER(S): 9 INJECTION, SOLUTION INTRAVENOUS at 10:16

## 2019-09-22 RX ADMIN — PANTOPRAZOLE SODIUM 10 MG/HR: 20 TABLET, DELAYED RELEASE ORAL at 01:15

## 2019-09-22 RX ADMIN — Medication 50 MILLILITER(S): at 18:04

## 2019-09-22 RX ADMIN — Medication 3 MILLILITER(S): at 17:54

## 2019-09-22 RX ADMIN — Medication 75 MEQ/KG/HR: at 11:01

## 2019-09-22 RX ADMIN — INSULIN HUMAN 5 UNIT(S): 100 INJECTION, SOLUTION SUBCUTANEOUS at 19:46

## 2019-09-22 NOTE — PROGRESS NOTE ADULT - ASSESSMENT
78 year old female with complaints of dysphagia, and black stools. Found to be in renal failure.     Plan:  Transfuse to a goal of > 8   IV PPI   clear liquid diet   NPO post Mn  Monitor CBC Q 8 hours   ICU level care   Large bore IV access   Plan for EGD Monday    Renal failure   Workup as per ICU team   Monitor electrolytes   Nephrology consult.   Advanced care planning was discussed with patient and family.  Advanced care planning forms were reviewed and discussed.  Risks, benefits and alternatives of gastroenterologic procedures were discussed in detail and all questions were answered.

## 2019-09-22 NOTE — PROGRESS NOTE ADULT - PROBLEM SELECTOR PLAN 2
CONCHIS with elevated bun/creatinine (137/11.5)  Could be pre-renal, dry mucous membranes and GI bleed   Could be post obstructive as well, will insert dickson  Would obtain urine lytes   Blood transfusion should improve the renal function   Not uremic currently   No urgent need for HD  Nephro Dr Taveras

## 2019-09-22 NOTE — CONSULT NOTE ADULT - SUBJECTIVE AND OBJECTIVE BOX
[facility] Consult Note Nephrology - CONSULTATION NOTE - [phone] - Dr Etienne / Dr Garber / Dr Massey / Dr Burt / Dr Trujillo / Dr Alatorre / Dr Mi / Dr Pandey    Patient is a 78y Female with DM, HTN, CKD a/w diarrhea, anemia, and severe renal failure. Pt was c/o SOB and diarrhea over past week. Poor PO intake. Loose stool every time she ate or drank anything, first brown colored, then noted to be dark. Pts daughter came by to drop off groceries, and was concerned about her mothers dyspnea with minimal exertion and pallor. Hg 6.7, and Cr 11 on admission, with bicarb 10. She was admitted to ICU for further management. She has been transfused 2 units prbc, and bicarb gtt started this morning. Dickson placed. This morning pt reports feeling better overall.   Denies abd pain, fever/chills, or urinary complaints.  Of note, she was followed by nephrologist 2-2.5 years ago for CKD, but lost to followup as his practice was moved. She reports she was at CKD IV at that time. She follows with PMD regularly.     PAST MEDICAL & SURGICAL HISTORY:  Diabetes mellitus  CKD (chronic kidney disease)  Seasonal allergies  Vitamin D deficiency  Osteoarthritis  Osteoporosis  Breast lump  Cataract  Gout  Hyperlipidemia  Hypertension  History of knee replacement, total, left:   History of breast surgery: Excision of left breast lump - benign    H/O bilateral cataract extraction:   and     Allergies:  aspirin (Unknown)  ibuprofen (Unknown)  Mushrooms (Anaphylaxis)  shellfish (Anaphylaxis)    Home Medications Reviewed  Hospital Medications:   MEDICATIONS  (STANDING):  ALBUTerol/ipratropium for Nebulization. 3 milliLiter(s) Nebulizer once  chlorhexidine 2% Cloths 1 Application(s) Topical daily  dextrose 5% 1000 milliLiter(s) (75 mL/Hr) IV Continuous <Continuous>  insulin glargine Injectable (LANTUS) 8 Unit(s) SubCutaneous at bedtime  insulin lispro (HumaLOG) corrective regimen sliding scale   SubCutaneous every 6 hours  pantoprazole Infusion 8 mG/Hr (10 mL/Hr) IV Continuous <Continuous>    SOCIAL HISTORY:  Denies ETOh,Smoking,   FAMILY HISTORY:  Family history of cancer    REVIEW OF SYSTEMS:  CONSTITUTIONAL: +weakness, No fevers or chills  EYES/ENT: No visual changes;  No vertigo or throat pain   NECK: No pain or stiffness  RESPIRATORY: No cough, wheezing, hemoptysis; No shortness of breath  CARDIOVASCULAR: No chest pain or palpitations.  GASTROINTESTINAL: No abdominal or epigastric pain. No nausea, vomiting, or hematemesis; +diarrhea.   GENITOURINARY: No dysuria, frequency, foamy urine, urinary urgency, incontinence or hematuria  NEUROLOGICAL: No numbness or weakness  SKIN: No itching, burning, rashes, or lesions   VASCULAR: No bilateral lower extremity edema.   All other review of systems is negative unless indicated above.    VITALS:  T(F): 96.8 (19 @ 11:00), Max: 97.6 (19 @ 20:30)  HR: 61 (19 @ 12:00)  BP: 108/43 (19 @ 12:00)  RR: 23 (19 @ 12:00)  SpO2: 99% (19 @ 12:00)  Wt(kg): --     @ 07:01  -   @ 07:00  --------------------------------------------------------  IN: 705 mL / OUT: 105 mL / NET: 600 mL     @ 07:01  -   @ 13:25  --------------------------------------------------------  IN: 285 mL / OUT: 45 mL / NET: 240 mL      Height (cm): 149.86 ( @ 15:50)  Weight (kg): 63.5 ( @ 15:50)  BMI (kg/m2): 28.3 ( @ 15:50)  BSA (m2): 1.58 ( @ 15:50)  PHYSICAL EXAM:  Constitutional: NAD  HEENT: anicteric sclera, oropharynx clear, MMM  Neck: No JVD  Respiratory: CTAB, no wheezes, rales or rhonchi  Cardiovascular: S1, S2, RRR  Gastrointestinal: BS+, soft, NT/ND  Extremities: No cyanosis or clubbing. No peripheral edema  Neurological: A/O x 3, no focal deficits  Psychiatric: Normal mood, normal affect  : No CVA tenderness. +dickson.   Skin: No rashes    LABS:      132<L>  |  104  |  141<H>  ----------------------------<  116<H>  5.2   |  11<L>  |  11.70<H>    Ca    8.1<L>      22 Sep 2019 06:31  Phos  7.7       Mg     2.8         TPro  6.3  /  Alb  3.3<L>  /  TBili  0.5  /  DBili      /  AST  10  /  ALT  16  /  AlkPhos  67      Creatinine Trend: 11.70 <--, 11.30 <--, 11.50 <--                        9.1    5.97  )-----------( 139      ( 22 Sep 2019 11:59 )             26.7     Urine Studies:  Urinalysis Basic - ( 21 Sep 2019 18:59 )    Color: Yellow / Appearance: Slightly Turbid / S.020 / pH:   Gluc:  / Ketone: Negative  / Bili: Negative / Urobili: Negative   Blood:  / Protein: 100 / Nitrite: Negative   Leuk Esterase: Trace / RBC: >50 /HPF / WBC 6-10 /HPF   Sq Epi:  / Non Sq Epi: Few /HPF / Bacteria: Few /HPF      Sodium, Random Urine: 10 mmol/L ( @ 18:59)  Osmolality, Random Urine: 353 mos/kg ( @ 18:59)  Creatinine, Random Urine: 211 mg/dL ( @ 18:59)  Chloride, Random Urine: 14 mmol/L ( @ 18:59)

## 2019-09-22 NOTE — PROGRESS NOTE ADULT - SUBJECTIVE AND OBJECTIVE BOX
INTERVAL HPI/OVERNIGHT EVENTS: Pt received 2 units of Blood. Bladder scan was done and it showed 50 ml of urine. Pt has minimal urine output. She has SOB and Duoneb is given.     PRESSORS: [ ] YES [ ] NO  WHICH:    Antimicrobial:    Cardiovascular:    Pulmonary:    Hematalogic:    Other:  insulin glargine Injectable (LANTUS) 8 Unit(s) SubCutaneous at bedtime  insulin lispro (HumaLOG) corrective regimen sliding scale   SubCutaneous every 6 hours  pantoprazole Infusion 8 mG/Hr IV Continuous <Continuous>    insulin glargine Injectable (LANTUS) 8 Unit(s) SubCutaneous at bedtime  insulin lispro (HumaLOG) corrective regimen sliding scale   SubCutaneous every 6 hours  pantoprazole Infusion 8 mG/Hr IV Continuous <Continuous>    Drug Dosing Weight  Height (cm): 149.86 (21 Sep 2019 15:50)  Weight (kg): 63.5 (21 Sep 2019 15:50)  BMI (kg/m2): 28.3 (21 Sep 2019 15:50)  BSA (m2): 1.58 (21 Sep 2019 15:50)    CENTRAL LINE: [ ] YES [ x] NO  LOCATION:   DATE INSERTED:  REMOVE: [ ] YES [ ] NO  EXPLAIN:    MONIQUE: [x ] YES [ ] NO    DATE INSERTED:  REMOVE:  [ ] YES [ ] NO  EXPLAIN:    A-LINE:  [ ] YES [x ] NO  LOCATION:   DATE INSERTED:  REMOVE:  [ ] YES [ ] NO  EXPLAIN:        ICU Vital Signs Last 24 Hrs  T(C): 35.8 (22 Sep 2019 00:00), Max: 36.4 (21 Sep 2019 19:55)  T(F): 96.4 (22 Sep 2019 00:00), Max: 97.6 (21 Sep 2019 20:30)  HR: 58 (22 Sep 2019 00:00) (55 - 58)  BP: 115/50 (22 Sep 2019 00:00) (98/58 - 115/50)  BP(mean): 67 (22 Sep 2019 00:00) (65 - 88)  ABP: --  ABP(mean): --  RR: 21 (22 Sep 2019 00:00) (15 - 25)  SpO2: 99% (22 Sep 2019 00:00) (96% - 100%)    PHYSICAL EXAM:  	GENERAL: dyspneic on talking, in mild distress   	HEENT: Normocephalic;  conjunctivae and sclerae clear; moist mucous membranes;   	NECK : supple  	CHEST/LUNG: Clear to auscultation bilaterally with good air entry   	HEART: S1 S2  regular; no murmurs, gallops or rubs  	ABDOMEN: Soft, mild tenderness on lower abdomen +  	EXTREMITIES: no cyanosis; no edema; no calf tenderness  	SKIN: warm and dry; no rash  NERVOUS SYSTEM:  Awake and alert; Oriented  to place, person and time ; no new deficits      LABS:  CBC Full  -  ( 21 Sep 2019 16:58 )  WBC Count : 5.46 K/uL  RBC Count : 2.21 M/uL  Hemoglobin : 6.7 g/dL  Hematocrit : 20.2 %  Platelet Count - Automated : 148 K/uL  Mean Cell Volume : 91.4 fl  Mean Cell Hemoglobin : 30.3 pg  Mean Cell Hemoglobin Concentration : 33.2 gm/dL  Auto Neutrophil # : x  Auto Lymphocyte # : x  Auto Monocyte # : x  Auto Eosinophil # : x  Auto Basophil # : x  Auto Neutrophil % : x  Auto Lymphocyte % : x  Auto Monocyte % : x  Auto Eosinophil % : x  Auto Basophil % : x        131<L>  |  102  |  143<H>  ----------------------------<  181<H>  5.3   |  12<L>  |  11.30<H>    Ca    8.3<L>      21 Sep 2019 22:33    TPro  6.5  /  Alb  3.6  /  TBili  0.4  /  DBili  x   /  AST  13  /  ALT  19  /  AlkPhos  70  09-    PT/INR - ( 21 Sep 2019 22:33 )   PT: 11.5 sec;   INR: 1.03 ratio         PTT - ( 21 Sep 2019 22:33 )  PTT:40.2 sec  Urinalysis Basic - ( 21 Sep 2019 18:59 )    Color: Yellow / Appearance: Slightly Turbid / S.020 / pH: x  Gluc: x / Ketone: Negative  / Bili: Negative / Urobili: Negative   Blood: x / Protein: 100 / Nitrite: Negative   Leuk Esterase: Trace / RBC: >50 /HPF / WBC 6-10 /HPF   Sq Epi: x / Non Sq Epi: Few /HPF / Bacteria: Few /HPF          RADIOLOGY & ADDITIONAL STUDIES REVIEWED:  ***    [ ]GOALS OF CARE DISCUSSION WITH PATIENT/FAMILY/PROXY:    CRITICAL CARE TIME SPENT: 35 minutes INTERVAL HPI/OVERNIGHT EVENTS: Pt received 2 units of Blood. Bladder scan was done and it showed 50 ml of urine. Pt has minimal urine output. She has SOB and Duoneb is given.     PRESSORS: [ ] YES [ ] NO  WHICH:      Antimicrobial:    Cardiovascular:    Pulmonary:    Hematalogic:    Other:  insulin glargine Injectable (LANTUS) 8 Unit(s) SubCutaneous at bedtime  insulin lispro (HumaLOG) corrective regimen sliding scale   SubCutaneous every 6 hours  pantoprazole Infusion 8 mG/Hr IV Continuous <Continuous>    insulin glargine Injectable (LANTUS) 8 Unit(s) SubCutaneous at bedtime  insulin lispro (HumaLOG) corrective regimen sliding scale   SubCutaneous every 6 hours  pantoprazole Infusion 8 mG/Hr IV Continuous <Continuous>    Drug Dosing Weight  Height (cm): 149.86 (21 Sep 2019 15:50)  Weight (kg): 63.5 (21 Sep 2019 15:50)  BMI (kg/m2): 28.3 (21 Sep 2019 15:50)  BSA (m2): 1.58 (21 Sep 2019 15:50)    CENTRAL LINE: [ ] YES [ x] NO  LOCATION:   DATE INSERTED:  REMOVE: [ ] YES [ ] NO  EXPLAIN:    MONIQUE: [x ] YES [ ] NO    DATE INSERTED:  REMOVE:  [ ] YES [ ] NO  EXPLAIN:    A-LINE:  [ ] YES [x ] NO  LOCATION:   DATE INSERTED:  REMOVE:  [ ] YES [ ] NO  EXPLAIN:        ICU Vital Signs Last 24 Hrs  T(C): 35.8 (22 Sep 2019 00:00), Max: 36.4 (21 Sep 2019 19:55)  T(F): 96.4 (22 Sep 2019 00:00), Max: 97.6 (21 Sep 2019 20:30)  HR: 58 (22 Sep 2019 00:00) (55 - 58)  BP: 115/50 (22 Sep 2019 00:00) (98/58 - 115/50)  BP(mean): 67 (22 Sep 2019 00:00) (65 - 88)  ABP: --  ABP(mean): --  RR: 21 (22 Sep 2019 00:00) (15 - 25)  SpO2: 99% (22 Sep 2019 00:00) (96% - 100%)    PHYSICAL EXAM:  	GENERAL: dyspneic on talking, in mild distress   	HEENT: Normocephalic;  conjunctivae and sclerae clear; moist mucous membranes;   	NECK : supple  	CHEST/LUNG: Clear to auscultation bilaterally with good air entry   	HEART: S1 S2  regular; no murmurs, gallops or rubs  	ABDOMEN: Soft, mild tenderness on lower abdomen +  	EXTREMITIES: no cyanosis; no edema; no calf tenderness  	SKIN: warm and dry; no rash  NERVOUS SYSTEM:  Awake and alert; Oriented  to place, person and time ; no new deficits      LABS:  CBC Full  -  ( 21 Sep 2019 16:58 )  WBC Count : 5.46 K/uL  RBC Count : 2.21 M/uL  Hemoglobin : 6.7 g/dL  Hematocrit : 20.2 %  Platelet Count - Automated : 148 K/uL  Mean Cell Volume : 91.4 fl  Mean Cell Hemoglobin : 30.3 pg  Mean Cell Hemoglobin Concentration : 33.2 gm/dL  Auto Neutrophil # : x  Auto Lymphocyte # : x  Auto Monocyte # : x  Auto Eosinophil # : x  Auto Basophil # : x  Auto Neutrophil % : x  Auto Lymphocyte % : x  Auto Monocyte % : x  Auto Eosinophil % : x  Auto Basophil % : x        131<L>  |  102  |  143<H>  ----------------------------<  181<H>  5.3   |  12<L>  |  11.30<H>    Ca    8.3<L>      21 Sep 2019 22:33    TPro  6.5  /  Alb  3.6  /  TBili  0.4  /  DBili  x   /  AST  13  /  ALT  19  /  AlkPhos  70  09-    PT/INR - ( 21 Sep 2019 22:33 )   PT: 11.5 sec;   INR: 1.03 ratio         PTT - ( 21 Sep 2019 22:33 )  PTT:40.2 sec  Urinalysis Basic - ( 21 Sep 2019 18:59 )    Color: Yellow / Appearance: Slightly Turbid / S.020 / pH: x  Gluc: x / Ketone: Negative  / Bili: Negative / Urobili: Negative   Blood: x / Protein: 100 / Nitrite: Negative   Leuk Esterase: Trace / RBC: >50 /HPF / WBC 6-10 /HPF   Sq Epi: x / Non Sq Epi: Few /HPF / Bacteria: Few /HPF          RADIOLOGY & ADDITIONAL STUDIES REVIEWED:  ***    [ ]GOALS OF CARE DISCUSSION WITH PATIENT/FAMILY/PROXY:    CRITICAL CARE TIME SPENT: 35 minutes

## 2019-09-22 NOTE — PROGRESS NOTE ADULT - ASSESSMENT
78 female with PMH of HTn, HLD, DM, Gout, CKD stage 4( creatinine  year ago, 1.05), comes in with abdominal pain, diarrhea.  Will admit to ICU for Acute Renal Failure, GI bleed.

## 2019-09-22 NOTE — PROGRESS NOTE ADULT - PROBLEM SELECTOR PLAN 1
Reported melena, with hb of 6.7 ( hb in 2018 labs is 10), likely from PUD vs Esophagitis   Never had EGD/ Colonoscopy, not on Nsaids at home   Likely acute GI bleed   Would transfuse 1 unit PRBC now  Get anemia panel   Start protonix 40 iv bid  keep NPO   GI consult Dr Valadez  Hold all Anticoagulation, patient on asa at home, never had any ischemic heart event  f/u EGD on monday

## 2019-09-22 NOTE — PROGRESS NOTE ADULT - PROBLEM SELECTOR PLAN 5
On clonidine, metoprolol   Hold all bp meds currently as bp is borderline   Would restart as BP improves

## 2019-09-23 ENCOUNTER — RESULT REVIEW (OUTPATIENT)
Age: 79
End: 2019-09-23

## 2019-09-23 DIAGNOSIS — N18.6 END STAGE RENAL DISEASE: ICD-10-CM

## 2019-09-23 LAB
ALBUMIN SERPL ELPH-MCNC: 2.9 G/DL — LOW (ref 3.5–5)
ALP SERPL-CCNC: 62 U/L — SIGNIFICANT CHANGE UP (ref 40–120)
ALT FLD-CCNC: 14 U/L DA — SIGNIFICANT CHANGE UP (ref 10–60)
ANION GAP SERPL CALC-SCNC: 13 MMOL/L — SIGNIFICANT CHANGE UP (ref 5–17)
ANION GAP SERPL CALC-SCNC: 13 MMOL/L — SIGNIFICANT CHANGE UP (ref 5–17)
AST SERPL-CCNC: 11 U/L — SIGNIFICANT CHANGE UP (ref 10–40)
BASOPHILS # BLD AUTO: 0 K/UL — SIGNIFICANT CHANGE UP (ref 0–0.2)
BASOPHILS # BLD AUTO: 0.01 K/UL — SIGNIFICANT CHANGE UP (ref 0–0.2)
BASOPHILS NFR BLD AUTO: 0 % — SIGNIFICANT CHANGE UP (ref 0–2)
BASOPHILS NFR BLD AUTO: 0.2 % — SIGNIFICANT CHANGE UP (ref 0–2)
BILIRUB SERPL-MCNC: 0.7 MG/DL — SIGNIFICANT CHANGE UP (ref 0.2–1.2)
BUN SERPL-MCNC: 85 MG/DL — HIGH (ref 7–18)
BUN SERPL-MCNC: 89 MG/DL — HIGH (ref 7–18)
CALCIUM SERPL-MCNC: 7.7 MG/DL — LOW (ref 8.4–10.5)
CALCIUM SERPL-MCNC: 7.9 MG/DL — LOW (ref 8.4–10.5)
CHLORIDE SERPL-SCNC: 97 MMOL/L — SIGNIFICANT CHANGE UP (ref 96–108)
CHLORIDE SERPL-SCNC: 98 MMOL/L — SIGNIFICANT CHANGE UP (ref 96–108)
CO2 SERPL-SCNC: 25 MMOL/L — SIGNIFICANT CHANGE UP (ref 22–31)
CO2 SERPL-SCNC: 25 MMOL/L — SIGNIFICANT CHANGE UP (ref 22–31)
CREAT SERPL-MCNC: 7.42 MG/DL — HIGH (ref 0.5–1.3)
CREAT SERPL-MCNC: 8.04 MG/DL — HIGH (ref 0.5–1.3)
EOSINOPHIL # BLD AUTO: 0.07 K/UL — SIGNIFICANT CHANGE UP (ref 0–0.5)
EOSINOPHIL # BLD AUTO: 0.12 K/UL — SIGNIFICANT CHANGE UP (ref 0–0.5)
EOSINOPHIL NFR BLD AUTO: 1.5 % — SIGNIFICANT CHANGE UP (ref 0–6)
EOSINOPHIL NFR BLD AUTO: 2.1 % — SIGNIFICANT CHANGE UP (ref 0–6)
GLUCOSE SERPL-MCNC: 137 MG/DL — HIGH (ref 70–99)
GLUCOSE SERPL-MCNC: 160 MG/DL — HIGH (ref 70–99)
HAV IGM SER-ACNC: SIGNIFICANT CHANGE UP
HBV CORE IGM SER-ACNC: SIGNIFICANT CHANGE UP
HBV SURFACE AB SER-ACNC: SIGNIFICANT CHANGE UP
HBV SURFACE AG SER-ACNC: SIGNIFICANT CHANGE UP
HBV SURFACE AG SER-ACNC: SIGNIFICANT CHANGE UP
HCT VFR BLD CALC: 22.8 % — LOW (ref 34.5–45)
HCT VFR BLD CALC: 23.2 % — LOW (ref 34.5–45)
HCV AB S/CO SERPL IA: 0.12 S/CO — SIGNIFICANT CHANGE UP (ref 0–0.99)
HCV AB SERPL-IMP: SIGNIFICANT CHANGE UP
HGB BLD-MCNC: 8.1 G/DL — LOW (ref 11.5–15.5)
HGB BLD-MCNC: 8.2 G/DL — LOW (ref 11.5–15.5)
IMM GRANULOCYTES NFR BLD AUTO: 0.9 % — SIGNIFICANT CHANGE UP (ref 0–1.5)
IMM GRANULOCYTES NFR BLD AUTO: 1 % — SIGNIFICANT CHANGE UP (ref 0–1.5)
LYMPHOCYTES # BLD AUTO: 0.45 K/UL — LOW (ref 1–3.3)
LYMPHOCYTES # BLD AUTO: 0.49 K/UL — LOW (ref 1–3.3)
LYMPHOCYTES # BLD AUTO: 8.7 % — LOW (ref 13–44)
LYMPHOCYTES # BLD AUTO: 9.4 % — LOW (ref 13–44)
MAGNESIUM SERPL-MCNC: 2.2 MG/DL — SIGNIFICANT CHANGE UP (ref 1.6–2.6)
MAGNESIUM SERPL-MCNC: 2.2 MG/DL — SIGNIFICANT CHANGE UP (ref 1.6–2.6)
MCHC RBC-ENTMCNC: 30.1 PG — SIGNIFICANT CHANGE UP (ref 27–34)
MCHC RBC-ENTMCNC: 30.4 PG — SIGNIFICANT CHANGE UP (ref 27–34)
MCHC RBC-ENTMCNC: 35.3 GM/DL — SIGNIFICANT CHANGE UP (ref 32–36)
MCHC RBC-ENTMCNC: 35.5 GM/DL — SIGNIFICANT CHANGE UP (ref 32–36)
MCV RBC AUTO: 84.8 FL — SIGNIFICANT CHANGE UP (ref 80–100)
MCV RBC AUTO: 85.9 FL — SIGNIFICANT CHANGE UP (ref 80–100)
MONOCYTES # BLD AUTO: 0.24 K/UL — SIGNIFICANT CHANGE UP (ref 0–0.9)
MONOCYTES # BLD AUTO: 0.3 K/UL — SIGNIFICANT CHANGE UP (ref 0–0.9)
MONOCYTES NFR BLD AUTO: 5 % — SIGNIFICANT CHANGE UP (ref 2–14)
MONOCYTES NFR BLD AUTO: 5.3 % — SIGNIFICANT CHANGE UP (ref 2–14)
MRSA PCR RESULT.: SIGNIFICANT CHANGE UP
NEUTROPHILS # BLD AUTO: 3.99 K/UL — SIGNIFICANT CHANGE UP (ref 1.8–7.4)
NEUTROPHILS # BLD AUTO: 4.67 K/UL — SIGNIFICANT CHANGE UP (ref 1.8–7.4)
NEUTROPHILS NFR BLD AUTO: 82.8 % — HIGH (ref 43–77)
NEUTROPHILS NFR BLD AUTO: 83.1 % — HIGH (ref 43–77)
NRBC # BLD: 0 /100 WBCS — SIGNIFICANT CHANGE UP (ref 0–0)
NRBC # BLD: 0 /100 WBCS — SIGNIFICANT CHANGE UP (ref 0–0)
PHOSPHATE SERPL-MCNC: 5.2 MG/DL — HIGH (ref 2.5–4.5)
PHOSPHATE SERPL-MCNC: 5.7 MG/DL — HIGH (ref 2.5–4.5)
PLATELET # BLD AUTO: 139 K/UL — LOW (ref 150–400)
PLATELET # BLD AUTO: 146 K/UL — LOW (ref 150–400)
POTASSIUM SERPL-MCNC: 3.5 MMOL/L — SIGNIFICANT CHANGE UP (ref 3.5–5.3)
POTASSIUM SERPL-MCNC: 3.7 MMOL/L — SIGNIFICANT CHANGE UP (ref 3.5–5.3)
POTASSIUM SERPL-SCNC: 3.5 MMOL/L — SIGNIFICANT CHANGE UP (ref 3.5–5.3)
POTASSIUM SERPL-SCNC: 3.7 MMOL/L — SIGNIFICANT CHANGE UP (ref 3.5–5.3)
PROT SERPL-MCNC: 5.7 G/DL — LOW (ref 6–8.3)
RBC # BLD: 2.69 M/UL — LOW (ref 3.8–5.2)
RBC # BLD: 2.7 M/UL — LOW (ref 3.8–5.2)
RBC # FLD: 15.4 % — HIGH (ref 10.3–14.5)
RBC # FLD: 15.8 % — HIGH (ref 10.3–14.5)
S AUREUS DNA NOSE QL NAA+PROBE: DETECTED
SODIUM SERPL-SCNC: 135 MMOL/L — SIGNIFICANT CHANGE UP (ref 135–145)
SODIUM SERPL-SCNC: 136 MMOL/L — SIGNIFICANT CHANGE UP (ref 135–145)
WBC # BLD: 4.8 K/UL — SIGNIFICANT CHANGE UP (ref 3.8–10.5)
WBC # BLD: 5.64 K/UL — SIGNIFICANT CHANGE UP (ref 3.8–10.5)
WBC # FLD AUTO: 4.8 K/UL — SIGNIFICANT CHANGE UP (ref 3.8–10.5)
WBC # FLD AUTO: 5.64 K/UL — SIGNIFICANT CHANGE UP (ref 3.8–10.5)

## 2019-09-23 PROCEDURE — 88305 TISSUE EXAM BY PATHOLOGIST: CPT | Mod: 26

## 2019-09-23 PROCEDURE — 88312 SPECIAL STAINS GROUP 1: CPT | Mod: 26

## 2019-09-23 RX ORDER — PANTOPRAZOLE SODIUM 20 MG/1
40 TABLET, DELAYED RELEASE ORAL
Refills: 0 | Status: DISCONTINUED | OUTPATIENT
Start: 2019-09-23 | End: 2019-10-07

## 2019-09-23 RX ORDER — PANTOPRAZOLE SODIUM 20 MG/1
40 TABLET, DELAYED RELEASE ORAL EVERY 12 HOURS
Refills: 0 | Status: DISCONTINUED | OUTPATIENT
Start: 2019-09-23 | End: 2019-09-23

## 2019-09-23 RX ORDER — ONDANSETRON 8 MG/1
4 TABLET, FILM COATED ORAL ONCE
Refills: 0 | Status: COMPLETED | OUTPATIENT
Start: 2019-09-23 | End: 2019-09-23

## 2019-09-23 RX ORDER — INSULIN LISPRO 100/ML
VIAL (ML) SUBCUTANEOUS
Refills: 0 | Status: DISCONTINUED | OUTPATIENT
Start: 2019-09-23 | End: 2019-10-07

## 2019-09-23 RX ADMIN — CHLORHEXIDINE GLUCONATE 1 APPLICATION(S): 213 SOLUTION TOPICAL at 12:15

## 2019-09-23 RX ADMIN — PANTOPRAZOLE SODIUM 40 MILLIGRAM(S): 20 TABLET, DELAYED RELEASE ORAL at 12:15

## 2019-09-23 RX ADMIN — Medication 1: at 01:44

## 2019-09-23 RX ADMIN — ONDANSETRON 4 MILLIGRAM(S): 8 TABLET, FILM COATED ORAL at 16:15

## 2019-09-23 RX ADMIN — PANTOPRAZOLE SODIUM 40 MILLIGRAM(S): 20 TABLET, DELAYED RELEASE ORAL at 17:13

## 2019-09-23 RX ADMIN — Medication 1: at 12:18

## 2019-09-23 RX ADMIN — Medication 1: at 17:12

## 2019-09-23 NOTE — PROGRESS NOTE ADULT - ASSESSMENT
78 female with PMH of HTn, HLD, DM, Gout, CKD stage 4( creatinine  year ago, 1.05), comes in with abdominal pain, diarrhea.  Will admit to ICU for Acute Renal Failure, GI bleed. 78 female with PMH of HTn, HLD, DM, Gout, CKD stage 4( creatinine  year ago, 1.05), comes in with abdominal pain, diarrhea.  Will admit to ICU for Acute Renal Failure, GI bleed.     NEURO;    -No issues; alert and oriented x3      Cardio;    -history of HTN, not currently on home meds     -continue to monitor bp; currently stable      Pulm;    - patient was on oxygen but will take patient off O2     - assess patient off of O2    GI;    -patient is suspected to have GI bleed due to low hemoglobin    -patient is planned to have EGD done today; currently NPO    -f/u results and GI Dr. Wade    - protonix drip changed to protonix 40mg IV BID     NEPHRO;    - patient had urgent dialysis session done yesterday 9/22    - Creatinine level currently 8.04     - f/u nephro    Heme: Hgb noted to be low at 6.7 on admission  -s/p 2 units of PRBCs, hgb went to 8.1  -current Hgb is 8.2  -f/u EGD results   -f/u GI Dr. Wade     ID: no issues    Endo: on sliding scale insulin  - continue to monitor sugars    Skin: no issues      ppx: protonix for GI 78 female with PMH of HTn, HLD, DM, Gout, CKD stage 4( creatinine  year ago, 1.05), comes in with abdominal pain, diarrhea.  Will admit to ICU for Acute Renal Failure, GI bleed.     NEURO;    -No issues; alert and oriented x3      Cardio;    -history of HTN, not currently on home meds     -continue to monitor bp; currently stable      Pulm;    - patient was on oxygen but will take patient off O2     - assess patient off of O2    GI;    -patient is suspected to have GI bleed due to low hemoglobin    -patient is planned to have EGD done today; currently NPO    -f/u results and GI Dr. Wade    - protonix drip changed to protonix 40mg IV BID     NEPHRO;    - patient had urgent dialysis session done yesterday 9/22    - Creatinine level currently 8.04     - sodium level is currently 25, d/c sodium bicarbonate    - f/u nephro    Heme: Hgb noted to be low at 6.7 on admission  -s/p 2 units of PRBCs, hgb went to 8.1  -current Hgb is 8.2  -f/u EGD results   -f/u GI Dr. Wade     ID: no issues    Endo: on sliding scale insulin  -hold lantus while patient is NPO  - continue to monitor sugars    Skin: no issues      ppx: protonix for GI

## 2019-09-23 NOTE — PROGRESS NOTE ADULT - SUBJECTIVE AND OBJECTIVE BOX
[facility] : Progress Note :: [phone], (office [phone]),   Dr Pandey / Dr Mi / Dr Massey / Dr Etienne / Dr Cassandra DA SILVA / Dr Burt / Dr Garber / Dr Naeem lam  _____________________________________________________________________________________________    s/p emergent HD overnight. Feels much better.    aspirin (Unknown)  ibuprofen (Unknown)  Mushrooms (Anaphylaxis)  shellfish (Anaphylaxis)    Hospital Medications:   MEDICATIONS  (STANDING):  chlorhexidine 2% Cloths 1 Application(s) Topical daily  insulin lispro (HumaLOG) corrective regimen sliding scale   SubCutaneous every 6 hours  pantoprazole  Injectable 40 milliGRAM(s) IV Push every 12 hours        VITALS:  T(F): 98.5 (19 @ 12:00), Max: 98.5 (19 @ 12:00)  HR: 71 (19 @ 13:00)  BP: 103/55 (19 @ 13:00)  RR: 24 (19 @ 13:00)  SpO2: 100% (19 @ 13:00)  Wt(kg): --     @ 07:  -   @ 07:00  --------------------------------------------------------  IN: 2265 mL / OUT: 575 mL / NET: 1690 mL     @ 07:01  -   @ 14:37  --------------------------------------------------------  IN: 255 mL / OUT: 175 mL / NET: 80 mL        PHYSICAL EXAM:  Constitutional: JVD+  HEENT: anicteric sclera, oropharynx clear,  Neck: No JVD  Respiratory: CTAB, no wheezes, rales or rhonchi  Cardiovascular: S1, S2, RRR  Gastrointestinal: BS+, soft, NT/ND  Extremities:  peripheral edema++  Neurological: A/O x 3, no focal deficits  Vascular Access: LT [facility]    LABS:      136  |  98  |  85<H>  ----------------------------<  137<H>  3.7   |  25  |  8.04<H>    Ca    7.9<L>      23 Sep 2019 09:10  Phos  5.7       Mg     2.2         TPro  5.7<L>  /  Alb  2.9<L>  /  TBili  0.7  /  DBili      /  AST  11  /  ALT  14  /  AlkPhos  62      Creatinine Trend: 8.04 <--, 7.42 <--, 11.70 <--, 11.70 <--, 11.30 <--, 11.50 <--                        8.2    5.64  )-----------( 146      ( 23 Sep 2019 09:10 )             23.2     Urine Studies:  Urinalysis Basic - ( 21 Sep 2019 18:59 )    Color: Yellow / Appearance: Slightly Turbid / S.020 / pH:   Gluc:  / Ketone: Negative  / Bili: Negative / Urobili: Negative   Blood:  / Protein: 100 / Nitrite: Negative   Leuk Esterase: Trace / RBC: >50 /HPF / WBC 6-10 /HPF   Sq Epi:  / Non Sq Epi: Few /HPF / Bacteria: Few /HPF      Sodium, Random Urine: 10 mmol/L ( @ 18:59)  Osmolality, Random Urine: 353 mos/kg ( @ 18:59)  Creatinine, Random Urine: 211 mg/dL ( @ 18:59)  Chloride, Random Urine: 14 mmol/L ( @ 18:59)    RADIOLOGY & ADDITIONAL STUDIES:

## 2019-09-23 NOTE — PROGRESS NOTE ADULT - SUBJECTIVE AND OBJECTIVE BOX
INTERVAL HPI/OVERNIGHT EVENTS: Patient was seen and examined at bed side. Patient had urgent dialysis session done last night due to creatinine level of     PRESSORS: [ ] YES [ ] NO  WHICH:    ANTIBIOTICS:                  DATE STARTED:  ANTIBIOTICS:                  DATE STARTED:  ANTIBIOTICS:                  DATE STARTED:    Antimicrobial:    Cardiovascular:    Pulmonary:    Hematalogic:    Other:  chlorhexidine 2% Cloths 1 Application(s) Topical daily  insulin lispro (HumaLOG) corrective regimen sliding scale   SubCutaneous every 6 hours  pantoprazole  Injectable 40 milliGRAM(s) IV Push every 12 hours    chlorhexidine 2% Cloths 1 Application(s) Topical daily  insulin lispro (HumaLOG) corrective regimen sliding scale   SubCutaneous every 6 hours  pantoprazole  Injectable 40 milliGRAM(s) IV Push every 12 hours    Drug Dosing Weight  Height (cm): 149.86 (21 Sep 2019 15:50)  Weight (kg): 63.5 (21 Sep 2019 15:50)  BMI (kg/m2): 28.3 (21 Sep 2019 15:50)  BSA (m2): 1.58 (21 Sep 2019 15:50)    CENTRAL LINE: [ ] YES [ ] NO  LOCATION:   DATE INSERTED:  REMOVE: [ ] YES [ ] NO  EXPLAIN:    MONIQUE: [ ] YES [ ] NO    DATE INSERTED:  REMOVE:  [ ] YES [ ] NO  EXPLAIN:    A-LINE:  [ ] YES [ ] NO  LOCATION:   DATE INSERTED:  REMOVE:  [ ] YES [ ] NO  EXPLAIN:        ICU Vital Signs Last 24 Hrs  T(C): 36.9 (23 Sep 2019 12:00), Max: 36.9 (23 Sep 2019 12:00)  T(F): 98.5 (23 Sep 2019 12:00), Max: 98.5 (23 Sep 2019 12:00)  HR: 76 (23 Sep 2019 12:00) (59 - 80)  BP: 130/66 (23 Sep 2019 12:00) (101/51 - 138/60)  BP(mean): 77 (23 Sep 2019 12:00) (55 - 97)  ABP: --  ABP(mean): --  RR: 20 (23 Sep 2019 12:00) (15 - 29)  SpO2: 96% (23 Sep 2019 12:00) (94% - 100%)             @ 07:01  -   @ 07:00  --------------------------------------------------------  IN: 2265 mL / OUT: 575 mL / NET: 1690 mL            PHYSICAL EXAM:    GENERAL:NAD, well-groomed, well-developed  HEAD:  Atraumatic, Normocephalic  EYES: EOMI, PERRLA, conjunctiva and sclera clear  ENMT: No tonsillar erythema, exudates, or enlargement; Moist mucous membranes, Good dentition, No lesions  NECK: Supple, normal appearance, No JVD; Normal thyroid; Trachea midline  NERVOUS SYSTEM: Alert & Oriented X3, Good concentration; Motor Strength 5/5 B/L upper and lower extremities; DTRs 2+ intact and symmetric  CHEST/LUNG: No chest deformity;Normal percussion bilaterally; No rales, rhonchi, wheezing   HEART: Regular rate and rhythm; No murmurs, rubs, or gallops  ABDOMEN: Soft, Nontender, Nondistended; Bowel sounds present  EXTREMITIES: 2+ Peripheral Pulses, No clubbing, cyanosis, or edema  LYMPH: No lymphadenopathy noted  SKIN:No rashes or lesions; Good capillary refill      LABS:  CBC Full  -  ( 23 Sep 2019 09:10 )  WBC Count : 5.64 K/uL  RBC Count : 2.70 M/uL  Hemoglobin : 8.2 g/dL  Hematocrit : 23.2 %  Platelet Count - Automated : 146 K/uL  Mean Cell Volume : 85.9 fl  Mean Cell Hemoglobin : 30.4 pg  Mean Cell Hemoglobin Concentration : 35.3 gm/dL  Auto Neutrophil # : 4.67 K/uL  Auto Lymphocyte # : 0.49 K/uL  Auto Monocyte # : 0.30 K/uL  Auto Eosinophil # : 0.12 K/uL  Auto Basophil # : 0.01 K/uL  Auto Neutrophil % : 82.8 %  Auto Lymphocyte % : 8.7 %  Auto Monocyte % : 5.3 %  Auto Eosinophil % : 2.1 %  Auto Basophil % : 0.2 %        136  |  98  |  85<H>  ----------------------------<  137<H>  3.7   |  25  |  8.04<H>    Ca    7.9<L>      23 Sep 2019 09:10  Phos  5.7       Mg     2.2         TPro  5.7<L>  /  Alb  2.9<L>  /  TBili  0.7  /  DBili  x   /  AST  11  /  ALT  14  /  AlkPhos  62      PT/INR - ( 21 Sep 2019 22:33 )   PT: 11.5 sec;   INR: 1.03 ratio         PTT - ( 21 Sep 2019 22:33 )  PTT:40.2 sec  Urinalysis Basic - ( 21 Sep 2019 18:59 )    Color: Yellow / Appearance: Slightly Turbid / S.020 / pH: x  Gluc: x / Ketone: Negative  / Bili: Negative / Urobili: Negative   Blood: x / Protein: 100 / Nitrite: Negative   Leuk Esterase: Trace / RBC: >50 /HPF / WBC 6-10 /HPF   Sq Epi: x / Non Sq Epi: Few /HPF / Bacteria: Few /HPF          RADIOLOGY & ADDITIONAL STUDIES REVIEWED:  ***    GOALS OF CARE DISCUSSION WITH PATIENT/FAMILY/PROXY: full code/DNR/DNI    CRITICAL CARE TIME SPENT: 35 minutes INTERVAL HPI/OVERNIGHT EVENTS: Patient was seen and examined at bed side. Patient had urgent dialysis session done last night due to creatinine level of     PRESSORS: No    Antimicrobial: None    Cardiovascular: None    Pulmonary: None     Hematalogic: None    Other:  chlorhexidine 2% Cloths 1 Application(s) Topical daily  insulin lispro (HumaLOG) corrective regimen sliding scale   SubCutaneous every 6 hours  pantoprazole  Injectable 40 milliGRAM(s) IV Push every 12 hours    chlorhexidine 2% Cloths 1 Application(s) Topical daily  insulin lispro (HumaLOG) corrective regimen sliding scale   SubCutaneous every 6 hours  pantoprazole  Injectable 40 milliGRAM(s) IV Push every 12 hours    Drug Dosing Weight  Height (cm): 149.86 (21 Sep 2019 15:50)  Weight (kg): 63.5 (21 Sep 2019 15:50)  BMI (kg/m2): 28.3 (21 Sep 2019 15:50)  BSA (m2): 1.58 (21 Sep 2019 15:50)    CENTRAL LINE: Yes  LOCATION: Left femoral SC   DATE INSERTED:   REMOVE: No; for HD sessions    MONIQUE: Yes    DATE INSERTED:   REMOVE: No     A-LINE: No      ICU Vital Signs Last 24 Hrs  T(C): 36.9 (23 Sep 2019 12:00), Max: 36.9 (23 Sep 2019 12:00)  T(F): 98.5 (23 Sep 2019 12:00), Max: 98.5 (23 Sep 2019 12:00)  HR: 76 (23 Sep 2019 12:00) (59 - 80)  BP: 130/66 (23 Sep 2019 12:00) (101/51 - 138/60)  BP(mean): 77 (23 Sep 2019 12:00) (55 - 97)  RR: 20 (23 Sep 2019 12:00) (15 - 29)  SpO2: 96% (23 Sep 2019 12:00) (94% - 100%)         @ 07:01  -   @ 07:00  --------------------------------------------------------  IN: 2265 mL / OUT: 575 mL / NET: 1690 mL        PHYSICAL EXAM:    GENERAL: Patient seen resting in bed and in no apparent distress  HEAD: Atraumatic, Normocephalic  EYES: PERRLA, conjunctiva and sclera clear  ENMT: No tonsillar erythema, exudates, or enlargement  NECK: Supple, normal appearance  NERVOUS SYSTEM: Alert & Oriented X3, Good concentration  CHEST/LUNG: No chest deformity; breath sounds clear to auscultation bilaterally; No wheezing   HEART: Regular rate and rhythm; No murmurs  ABDOMEN: Soft, Nontender, Nondistended; Bowel sounds present  EXTREMITIES: No cyanosis or edema; left femoral SC noted  LYMPH: No lymphadenopathy noted  SKIN: No rashes or lesions      LABS:  CBC Full  -  ( 23 Sep 2019 09:10 )  WBC Count : 5.64 K/uL  RBC Count : 2.70 M/uL  Hemoglobin : 8.2 g/dL  Hematocrit : 23.2 %  Platelet Count - Automated : 146 K/uL  Mean Cell Volume : 85.9 fl  Mean Cell Hemoglobin : 30.4 pg  Mean Cell Hemoglobin Concentration : 35.3 gm/dL  Auto Neutrophil # : 4.67 K/uL  Auto Lymphocyte # : 0.49 K/uL  Auto Monocyte # : 0.30 K/uL  Auto Eosinophil # : 0.12 K/uL  Auto Basophil # : 0.01 K/uL  Auto Neutrophil % : 82.8 %  Auto Lymphocyte % : 8.7 %  Auto Monocyte % : 5.3 %  Auto Eosinophil % : 2.1 %  Auto Basophil % : 0.2 %        136  |  98  |  85<H>  ----------------------------<  137<H>  3.7   |  25  |  8.04<H>    Ca    7.9<L>      23 Sep 2019 09:10  Phos  5.7       Mg     2.2         TPro  5.7<L>  /  Alb  2.9<L>  /  TBili  0.7  /  DBili  x   /  AST  11  /  ALT  14  /  AlkPhos  62      PT/INR - ( 21 Sep 2019 22:33 )   PT: 11.5 sec;   INR: 1.03 ratio         PTT - ( 21 Sep 2019 22:33 )  PTT:40.2 sec  Urinalysis Basic - ( 21 Sep 2019 18:59 )    Color: Yellow / Appearance: Slightly Turbid / S.020 / pH: x  Gluc: x / Ketone: Negative  / Bili: Negative / Urobili: Negative   Blood: x / Protein: 100 / Nitrite: Negative   Leuk Esterase: Trace / RBC: >50 /HPF / WBC 6-10 /HPF   Sq Epi: x / Non Sq Epi: Few /HPF / Bacteria: Few /HPF      RADIOLOGY & ADDITIONAL STUDIES REVIEWED:  No new imaging     GOALS OF CARE DISCUSSION WITH PATIENT/FAMILY/PROXY: full code    CRITICAL CARE TIME SPENT: 35 minutes INTERVAL HPI/OVERNIGHT EVENTS: Patient was seen and examined at bed side. Patient had urgent dialysis session done last night due to creatinine level of 11.5. Creatinine level went down to 7 post dialysis. Patient scheduled to have EGD done today due to suspected GI bleed from low hemoglobin.     PRESSORS: No    Antimicrobial: None    Cardiovascular: None    Pulmonary: None     Hematalogic: None    Other:  chlorhexidine 2% Cloths 1 Application(s) Topical daily  insulin lispro (HumaLOG) corrective regimen sliding scale   SubCutaneous every 6 hours  pantoprazole  Injectable 40 milliGRAM(s) IV Push every 12 hours    chlorhexidine 2% Cloths 1 Application(s) Topical daily   insulin lispro (HumaLOG) corrective regimen sliding scale   SubCutaneous every 6 hours  pantoprazole  Injectable 40 milliGRAM(s) IV Push every 12 hours    Drug Dosing Weight  Height (cm): 149.86 (21 Sep 2019 15:50)  Weight (kg): 63.5 (21 Sep 2019 15:50)  BMI (kg/m2): 28.3 (21 Sep 2019 15:50)  BSA (m2): 1.58 (21 Sep 2019 15:50)    CENTRAL LINE: Yes  LOCATION: Left femoral SC   DATE INSERTED:   REMOVE: No; for HD sessions    MONIQUE: Yes    DATE INSERTED:   REMOVE: No     A-LINE: No      ICU Vital Signs Last 24 Hrs  T(C): 36.9 (23 Sep 2019 12:00), Max: 36.9 (23 Sep 2019 12:00)  T(F): 98.5 (23 Sep 2019 12:00), Max: 98.5 (23 Sep 2019 12:00)  HR: 76 (23 Sep 2019 12:00) (59 - 80)  BP: 130/66 (23 Sep 2019 12:00) (101/51 - 138/60)  BP(mean): 77 (23 Sep 2019 12:00) (55 - 97)  RR: 20 (23 Sep 2019 12:00) (15 - 29)  SpO2: 96% (23 Sep 2019 12:00) (94% - 100%)         @ 07:01  -   @ 07:00  --------------------------------------------------------  IN: 2265 mL / OUT: 575 mL / NET: 1690 mL        PHYSICAL EXAM:    GENERAL: Patient seen resting in bed and in no apparent distress  HEAD: Atraumatic, Normocephalic  EYES: PERRLA, conjunctiva and sclera clear  ENMT: No tonsillar erythema, exudates, or enlargement  NECK: Supple, normal appearance  NERVOUS SYSTEM: Alert & Oriented X3, Good concentration  CHEST/LUNG: No chest deformity; breath sounds clear to auscultation bilaterally; No wheezing   HEART: Regular rate and rhythm; No murmurs  ABDOMEN: Soft, Nontender, Nondistended; Bowel sounds present  EXTREMITIES: No cyanosis or edema; left femoral SC noted  LYMPH: No lymphadenopathy noted  SKIN: No rashes or lesions      LABS:  CBC Full  -  ( 23 Sep 2019 09:10 )  WBC Count : 5.64 K/uL  RBC Count : 2.70 M/uL  Hemoglobin : 8.2 g/dL  Hematocrit : 23.2 %  Platelet Count - Automated : 146 K/uL  Mean Cell Volume : 85.9 fl  Mean Cell Hemoglobin : 30.4 pg  Mean Cell Hemoglobin Concentration : 35.3 gm/dL  Auto Neutrophil # : 4.67 K/uL  Auto Lymphocyte # : 0.49 K/uL  Auto Monocyte # : 0.30 K/uL  Auto Eosinophil # : 0.12 K/uL  Auto Basophil # : 0.01 K/uL  Auto Neutrophil % : 82.8 %  Auto Lymphocyte % : 8.7 %  Auto Monocyte % : 5.3 %  Auto Eosinophil % : 2.1 %  Auto Basophil % : 0.2 %        136  |  98  |  85<H>  ----------------------------<  137<H>  3.7   |  25  |  8.04<H>    Ca    7.9<L>      23 Sep 2019 09:10  Phos  5.7       Mg     2.2         TPro  5.7<L>  /  Alb  2.9<L>  /  TBili  0.7  /  DBili  x   /  AST  11  /  ALT  14  /  AlkPhos  62      PT/INR - ( 21 Sep 2019 22:33 )   PT: 11.5 sec;   INR: 1.03 ratio         PTT - ( 21 Sep 2019 22:33 )  PTT:40.2 sec  Urinalysis Basic - ( 21 Sep 2019 18:59 )    Color: Yellow / Appearance: Slightly Turbid / S.020 / pH: x  Gluc: x / Ketone: Negative  / Bili: Negative / Urobili: Negative   Blood: x / Protein: 100 / Nitrite: Negative   Leuk Esterase: Trace / RBC: >50 /HPF / WBC 6-10 /HPF   Sq Epi: x / Non Sq Epi: Few /HPF / Bacteria: Few /HPF      RADIOLOGY & ADDITIONAL STUDIES REVIEWED:  No new imaging     GOALS OF CARE DISCUSSION WITH PATIENT/FAMILY/PROXY: full code    CRITICAL CARE TIME SPENT: 35 minutes

## 2019-09-23 NOTE — PROGRESS NOTE ADULT - PROBLEM SELECTOR PLAN 1
patient has underlying CKD. baseline SCR was 1.3 about a year ago. Sh had not seen nephrology in two years.  feels much better after emergent HD.  electrolyte dyscarsias better after HD   has ATN from diarrhoea  may recover renal function and get off HD   if remains dialysis dependent, would need permacath and placement to chronic HD at [facility]  please obtain a renal sonogram  cont IV lasix

## 2019-09-23 NOTE — PHYSICAL THERAPY INITIAL EVALUATION ADULT - MODALITIES TREATMENT COMMENTS
+bilateral air entry; mild wheezing noted on left lung during expiration; fine crackles noted on bilateral lung bases

## 2019-09-23 NOTE — PROGRESS NOTE ADULT - ASSESSMENT
78y.o. Female with CONCHIS s/p L jes catheter placement    -HD per renal  -Please advise if pt needs permanent access for HD

## 2019-09-23 NOTE — PROGRESS NOTE ADULT - SUBJECTIVE AND OBJECTIVE BOX
Vascular Surgery    Subjective:  Pt resting comfortably. No acute complaints  Completed HD last night.    T(C): 36.6 (09-23-19 @ 08:00), Max: 36.7 (09-22-19 @ 21:30)  HR: 78 (09-23-19 @ 08:00) (59 - 80)  BP: 126/89 (09-23-19 @ 08:00) (101/51 - 138/60)  RR: 22 (09-23-19 @ 08:00) (15 - 29)  SpO2: 100% (09-23-19 @ 08:00) (95% - 100%)    Physical:  Gen: A&O x3.  Abd: L groin clean & dry. No fluctuance, swelling, or erythema. Shiley site clean.

## 2019-09-23 NOTE — CHART NOTE - NSCHARTNOTEFT_GEN_A_CORE
Esophagogastroduodenoscopy Report  Indication: Gi bleed. Anemia   Referring MD: LO       Instrument:  #[id number]  Anesthesia: MAC  Consent:  Informed consent was obtained from the patient after providing any opportunity for questions  Procedure: The gastroscope was gently passed through the incisoral orifice into the oral cavity and under direct visualization the esophagus was intubated. The endoscope was passed down the esophagus, through the stomach and into proximal jejunum. Color, texture, mucosa and anatomy of the esophagus, stomach, and duodenum were carefully examined with the scope. The patient tolerated the procedure well. After completion of the examination, the patient was transferred to the recovery room.   Preparation: NPO   Findings:   Oropharynx	Normal  Esophagus	Normal.  EG-junction	Normal appearing z-line at 37 cm from the incisors.  Cardia	Normal.  Body	Normal   Antrum	Multiple superficial ulcerations (all clean based). Nodular gastric mucosa. Biopsy taken.   Pylorus	Normal.  Duodenal Bulb	Normal.  2nd portion	Normal   3rd portion	Normal.  Date and time:9/23/2019 1:04:44 PM  EBL:0  Impression: 1- Erosive gastritis 2- No active bleeding     Plan: 1-Reusme diet 2- PPI daily 3- Follow up pathology 4- Avoid NSAIDs                         Procedure Start Time: 12:51pm  Procedure End Time:    12:55 pm am                                  Attending:       Meliton Wade M.D.   Date and Time: 9/23/2019 1:04:44 PM

## 2019-09-24 DIAGNOSIS — R53.81 OTHER MALAISE: ICD-10-CM

## 2019-09-24 DIAGNOSIS — Z51.5 ENCOUNTER FOR PALLIATIVE CARE: ICD-10-CM

## 2019-09-24 LAB
ANION GAP SERPL CALC-SCNC: 12 MMOL/L — SIGNIFICANT CHANGE UP (ref 5–17)
BUN SERPL-MCNC: 92 MG/DL — HIGH (ref 7–18)
CALCIUM SERPL-MCNC: 7.7 MG/DL — LOW (ref 8.4–10.5)
CHLORIDE SERPL-SCNC: 96 MMOL/L — SIGNIFICANT CHANGE UP (ref 96–108)
CO2 SERPL-SCNC: 26 MMOL/L — SIGNIFICANT CHANGE UP (ref 22–31)
CREAT SERPL-MCNC: 8.74 MG/DL — HIGH (ref 0.5–1.3)
GAMMA INTERFERON BACKGROUND BLD IA-ACNC: 0.01 IU/ML — SIGNIFICANT CHANGE UP
GLUCOSE SERPL-MCNC: 103 MG/DL — HIGH (ref 70–99)
HCT VFR BLD CALC: 22 % — LOW (ref 34.5–45)
HGB BLD-MCNC: 7.6 G/DL — LOW (ref 11.5–15.5)
M TB IFN-G BLD-IMP: ABNORMAL
M TB IFN-G CD4+ BCKGRND COR BLD-ACNC: 0 IU/ML — SIGNIFICANT CHANGE UP
M TB IFN-G CD4+CD8+ BCKGRND COR BLD-ACNC: 0 IU/ML — SIGNIFICANT CHANGE UP
MAGNESIUM SERPL-MCNC: 2.1 MG/DL — SIGNIFICANT CHANGE UP (ref 1.6–2.6)
MCHC RBC-ENTMCNC: 30.4 PG — SIGNIFICANT CHANGE UP (ref 27–34)
MCHC RBC-ENTMCNC: 34.5 GM/DL — SIGNIFICANT CHANGE UP (ref 32–36)
MCV RBC AUTO: 88 FL — SIGNIFICANT CHANGE UP (ref 80–100)
NRBC # BLD: 0 /100 WBCS — SIGNIFICANT CHANGE UP (ref 0–0)
PHOSPHATE SERPL-MCNC: 6.7 MG/DL — HIGH (ref 2.5–4.5)
PLATELET # BLD AUTO: 136 K/UL — LOW (ref 150–400)
POTASSIUM SERPL-MCNC: 3.9 MMOL/L — SIGNIFICANT CHANGE UP (ref 3.5–5.3)
POTASSIUM SERPL-SCNC: 3.9 MMOL/L — SIGNIFICANT CHANGE UP (ref 3.5–5.3)
QUANT TB PLUS MITOGEN MINUS NIL: 0.18 IU/ML — SIGNIFICANT CHANGE UP
RBC # BLD: 2.5 M/UL — LOW (ref 3.8–5.2)
RBC # FLD: 15.9 % — HIGH (ref 10.3–14.5)
SODIUM SERPL-SCNC: 134 MMOL/L — LOW (ref 135–145)
WBC # BLD: 5.34 K/UL — SIGNIFICANT CHANGE UP (ref 3.8–10.5)
WBC # FLD AUTO: 5.34 K/UL — SIGNIFICANT CHANGE UP (ref 3.8–10.5)

## 2019-09-24 PROCEDURE — 99223 1ST HOSP IP/OBS HIGH 75: CPT

## 2019-09-24 PROCEDURE — 99497 ADVNCD CARE PLAN 30 MIN: CPT | Mod: 25

## 2019-09-24 RX ORDER — MUPIROCIN 20 MG/G
1 OINTMENT TOPICAL EVERY 12 HOURS
Refills: 0 | Status: COMPLETED | OUTPATIENT
Start: 2019-09-24 | End: 2019-09-29

## 2019-09-24 RX ADMIN — PANTOPRAZOLE SODIUM 40 MILLIGRAM(S): 20 TABLET, DELAYED RELEASE ORAL at 07:03

## 2019-09-24 RX ADMIN — CHLORHEXIDINE GLUCONATE 1 APPLICATION(S): 213 SOLUTION TOPICAL at 11:10

## 2019-09-24 RX ADMIN — MUPIROCIN 1 APPLICATION(S): 20 OINTMENT TOPICAL at 17:10

## 2019-09-24 RX ADMIN — Medication 1: at 11:10

## 2019-09-24 NOTE — PROGRESS NOTE ADULT - SUBJECTIVE AND OBJECTIVE BOX
INTERVAL HPI/OVERNIGHT EVENTS: Patient was seen and examined at bed side. Patient states she is feeling well today. Patient to undergo dialysis session today. Patient's Hgb noted to be 7.6 today down from 8.2. PRBCs ordered and to be given during dialysis. Patient is medically and clinically stable to be downgraded to medical floor.      PRESSORS: No    Antimicrobial: None    Cardiovascular: None    Pulmonary: None    Hematalogic: None    Other:  chlorhexidine 2% Cloths 1 Application(s) Topical daily  insulin lispro (HumaLOG) corrective regimen sliding scale   SubCutaneous three times a day before meals  mupirocin 2% Nasal 1 Application(s) Both Nostrils every 12 hours  pantoprazole    Tablet 40 milliGRAM(s) Oral before breakfast    chlorhexidine 2% Cloths 1 Application(s) Topical daily  insulin lispro (HumaLOG) corrective regimen sliding scale   SubCutaneous three times a day before meals  mupirocin 2% Nasal 1 Application(s) Both Nostrils every 12 hours  pantoprazole    Tablet 40 milliGRAM(s) Oral before breakfast    Drug Dosing Weight  Height (cm): 149.86 (21 Sep 2019 15:50)  Weight (kg): 63.5 (21 Sep 2019 15:50)  BMI (kg/m2): 28.3 (21 Sep 2019 15:50)  BSA (m2): 1.58 (21 Sep 2019 15:50)    CENTRAL LINE: Yes LOCATION: Left femoral SC  DATE INSERTED: 9/22  REMOVE: No  EXPLAIN: For HD    MONIQUE: Yes     DATE INSERTED: 9/21  REMOVE: Yes     A-LINE: No      ICU Vital Signs Last 24 Hrs  T(C): 35.9 (24 Sep 2019 07:30), Max: 36.9 (23 Sep 2019 12:00)  T(F): 96.7 (24 Sep 2019 07:30), Max: 98.5 (23 Sep 2019 12:00)  HR: 70 (24 Sep 2019 10:00) (64 - 76)  BP: 113/53 (24 Sep 2019 10:00) (100/75 - 138/73)  BP(mean): 66 (24 Sep 2019 10:00) (53 - 91)  RR: 16 (24 Sep 2019 10:00) (10 - 26)  SpO2: 95% (24 Sep 2019 10:00) (92% - 100%)        09-23 @ 07:01  -  09-24 @ 07:00  --------------------------------------------------------  IN: 255 mL / OUT: 615 mL / NET: -360 mL        PHYSICAL EXAM:    GENERAL: Patient seen resting in bed and is in no acute distress  HEAD: Atraumatic, Normocephalic  EYES: PERRLA, conjunctiva and sclera clear  ENMT: No tonsillar erythema, exudates, or enlargement  NECK: Supple, normal appearance  NERVOUS SYSTEM: Alert & Oriented X3, Good concentration  CHEST/LUNG: No chest deformity; Clear lungs sounds auscultated bilaterally; No wheezing   HEART: Regular rate and rhythm; No murmurs  ABDOMEN: Soft, Nontender, Nondistended; Bowel sounds present; no signs of melena or bleeding  EXTREMITIES: No cyanosis, or edema  LYMPH: No lymphadenopathy noted  SKIN: No rashes or lesions      LABS:  CBC Full  -  ( 24 Sep 2019 05:49 )  WBC Count : 5.34 K/uL  RBC Count : 2.50 M/uL  Hemoglobin : 7.6 g/dL  Hematocrit : 22.0 %  Platelet Count - Automated : 136 K/uL  Mean Cell Volume : 88.0 fl  Mean Cell Hemoglobin : 30.4 pg  Mean Cell Hemoglobin Concentration : 34.5 gm/dL  Auto Neutrophil # : x  Auto Lymphocyte # : x  Auto Monocyte # : x  Auto Eosinophil # : x  Auto Basophil # : x  Auto Neutrophil % : x  Auto Lymphocyte % : x  Auto Monocyte % : x  Auto Eosinophil % : x  Auto Basophil % : x    09-24    134<L>  |  96  |  92<H>  ----------------------------<  103<H>  3.9   |  26  |  8.74<H>    Ca    7.7<L>      24 Sep 2019 05:49  Phos  6.7     09-24  Mg     2.1     09-24    TPro  5.7<L>  /  Alb  2.9<L>  /  TBili  0.7  /  DBili  x   /  AST  11  /  ALT  14  /  AlkPhos  62  09-23      RADIOLOGY & ADDITIONAL STUDIES REVIEWED: No new imaging     GOALS OF CARE DISCUSSION WITH PATIENT/FAMILY/PROXY: full code    CRITICAL CARE TIME SPENT: 35 minutes

## 2019-09-24 NOTE — PROGRESS NOTE ADULT - PROBLEM SELECTOR PLAN 1
patient has underlying CKD. baseline SCR was 1.3 about a year ago. She had not seen nephrology in two years.  feels much better after emergent HD.  has ATN from diarrhoea  HD today with UF as tolerated.  may recover renal function and get off HD   if remains dialysis dependent, would need permacath and placement to chronic HD  please obtain a renal sonogram  no objection to transfuse PRBC on HD

## 2019-09-24 NOTE — PROGRESS NOTE ADULT - ASSESSMENT
77 y/o female with CONCHIS s/p L jes catheter placement    - HD as per renal  - continue care as per ICU

## 2019-09-24 NOTE — CHART NOTE - NSCHARTNOTEFT_GEN_A_CORE
Upon Nutritional Assessment by the Registered Dietitian your patient was determined to meet criteria / has evidence of the following diagnosis/diagnoses:          [x ]  Mild Protein Calorie Malnutrition        [ ]  Moderate Protein Calorie Malnutrition        [ ] Severe Protein Calorie Malnutrition        [ ] Unspecified Protein Calorie Malnutrition        [ ] Underweight / BMI <19        [ ] Morbid Obesity / BMI > 40      Findings as based on:  •  Comprehensive nutrition assessment and consultation  •  Calorie counts (nutrient intake analysis)  •  Food acceptance and intake status from observations by staff  •  Follow up  •  Patient education  •  Intervention secondary to interdisciplinary rounds  •   concerns      Treatment:    The following diet has been recommended:  Pt's diet advanced/ adjusted for texture.    PROVIDER Section:     By signing this assessment you are acknowledging and agree with the diagnosis/diagnoses assigned by the Registered Dietitian    Comments:

## 2019-09-24 NOTE — PROGRESS NOTE ADULT - SUBJECTIVE AND OBJECTIVE BOX
INTERVAL HPI/OVERNIGHT EVENTS:  Pt resting comfortably with no complaints.    MEDICATIONS  (STANDING):  chlorhexidine 2% Cloths 1 Application(s) Topical daily  insulin lispro (HumaLOG) corrective regimen sliding scale   SubCutaneous three times a day before meals  pantoprazole    Tablet 40 milliGRAM(s) Oral before breakfast      Vital Signs Last 24 Hrs  T(C): 35.9 (24 Sep 2019 07:30), Max: 36.9 (23 Sep 2019 12:00)  T(F): 96.7 (24 Sep 2019 07:30), Max: 98.5 (23 Sep 2019 12:00)  HR: 68 (24 Sep 2019 08:00) (66 - 78)  BP: 119/51 (24 Sep 2019 08:00) (103/55 - 138/73)  BP(mean): 56 (24 Sep 2019 08:00) (53 - 91)  RR: 10 (24 Sep 2019 08:00) (10 - 27)  SpO2: 100% (24 Sep 2019 08:00) (92% - 100%)    Physical:  General: A&Ox3. NAD.  Skin: left shiley in place, no signs of infection, no erythema or induration, R groin area clean, dressed with 4x4 gauze    I&O's Detail    23 Sep 2019 07:01  -  24 Sep 2019 07:00  --------------------------------------------------------  IN:    dextrose 5%: 225 mL    pantoprazole Infusion: 30 mL  Total IN: 255 mL    OUT:    Indwelling Catheter - Urethral: 615 mL  Total OUT: 615 mL    Total NET: -360 mL      LABS:                        7.6    5.34  )-----------( 136      ( 24 Sep 2019 05:49 )             22.0             09-24    134<L>  |  96  |  92<H>  ----------------------------<  103<H>  3.9   |  26  |  8.74<H>    Ca    7.7<L>      24 Sep 2019 05:49  Phos  6.7     09-24  Mg     2.1     09-24    TPro  5.7<L>  /  Alb  2.9<L>  /  TBili  0.7  /  DBili  x   /  AST  11  /  ALT  14  /  AlkPhos  62  09-23

## 2019-09-24 NOTE — CONSULT NOTE ADULT - PROBLEM SELECTOR RECOMMENDATION 2
Seems to be mixed gap and non gap met acidosis from combined clinical scenario of renal failure with diarrhea.   Check ABG to assess serum pH, and serum lactate.   Would change to bicarb gtt. D5W with 150 meq bicarb, at 75 cc/hr.  Trend BMP q8 hours.
GI following, EGD noted, superficial antral ulcerations, gastritis, no active bleeding. on ppi.

## 2019-09-24 NOTE — PROGRESS NOTE ADULT - SUBJECTIVE AND OBJECTIVE BOX
[facility] : Progress Note :: [phone], (office [phone]),   Dr Pandey / Dr Mi / Dr Massey / Dr Etienne / Dr Cassandra DA SILVA / Dr Burt / Dr Garber / Dr Naeem lam  _____________________________________________________________________________________________   no events overnight    aspirin (Unknown)  ibuprofen (Unknown)  Mushrooms (Anaphylaxis)  shellfish (Anaphylaxis)    Hospital Medications:   MEDICATIONS  (STANDING):  chlorhexidine 2% Cloths 1 Application(s) Topical daily  insulin lispro (HumaLOG) corrective regimen sliding scale   SubCutaneous three times a day before meals  mupirocin 2% Nasal 1 Application(s) Both Nostrils every 12 hours  pantoprazole    Tablet 40 milliGRAM(s) Oral before breakfast        VITALS:  T(F): 96.7 (19 @ 07:30), Max: 98.5 (19 @ 12:00)  HR: 70 (19 @ 10:00)  BP: 113/53 (19 @ 10:00)  RR: 16 (19 @ 10:00)  SpO2: 95% (19 @ 10:00)  Wt(kg): --     @ 07:  -   @ 07:00  --------------------------------------------------------  IN: 255 mL / OUT: 615 mL / NET: -360 mL     @ 07:01  -   @ 11:47  --------------------------------------------------------  IN: 120 mL / OUT: 35 mL / NET: 85 mL        PHYSICAL EXAM:  Constitutional: NAD  HEENT: anicteric sclera, oropharynx clear.  Neck: No JVD  Respiratory: CTAB, no wheezes, rales or rhonchi  Cardiovascular: S1, S2, RRR  Gastrointestinal: BS+, soft, NT/ND  Extremities:  No peripheral edema  Neurological: A/O x 3, no focal deficits  [facility]    LABS:      134<L>  |  96  |  92<H>  ----------------------------<  103<H>  3.9   |  26  |  8.74<H>    Ca    7.7<L>      24 Sep 2019 05:49  Phos  6.7       Mg     2.1         TPro  5.7<L>  /  Alb  2.9<L>  /  TBili  0.7  /  DBili      /  AST  11  /  ALT  14  /  AlkPhos  62      Creatinine Trend: 8.74 <--, 8.04 <--, 7.42 <--, 11.70 <--, 11.70 <--, 11.30 <--, 11.50 <--                        7.6    5.34  )-----------( 136      ( 24 Sep 2019 05:49 )             22.0     Urine Studies:  Urinalysis Basic - ( 21 Sep 2019 18:59 )    Color: Yellow / Appearance: Slightly Turbid / S.020 / pH:   Gluc:  / Ketone: Negative  / Bili: Negative / Urobili: Negative   Blood:  / Protein: 100 / Nitrite: Negative   Leuk Esterase: Trace / RBC: >50 /HPF / WBC 6-10 /HPF   Sq Epi:  / Non Sq Epi: Few /HPF / Bacteria: Few /HPF      Sodium, Random Urine: 10 mmol/L ( @ 18:59)  Osmolality, Random Urine: 353 mos/kg ( @ 18:59)  Creatinine, Random Urine: 211 mg/dL ( @ 18:59)  Chloride, Random Urine: 14 mmol/L ( @ 18:59)    RADIOLOGY & ADDITIONAL STUDIES:

## 2019-09-24 NOTE — DIETITIAN INITIAL EVALUATION ADULT. - SIGNS/SYMPTOMS
Reported poor po intake PTA, and was NPO x2 days in-house Reported poor po intake x1.5 wks PTA, and was NPO x2 days in-house

## 2019-09-24 NOTE — DIETITIAN INITIAL EVALUATION ADULT. - PERTINENT MEDS FT
MEDICATIONS  (STANDING):  chlorhexidine 2% Cloths 1 Application(s) Topical daily  insulin lispro (HumaLOG) corrective regimen sliding scale   SubCutaneous three times a day before meals  mupirocin 2% Nasal 1 Application(s) Both Nostrils every 12 hours  pantoprazole    Tablet 40 milliGRAM(s) Oral before breakfast    MEDICATIONS  (PRN):

## 2019-09-24 NOTE — CONSULT NOTE ADULT - PROBLEM SELECTOR RECOMMENDATION 9
Markedly elevated cr, in setting of known CKD.   Unclear baseline renal function.   CONCHIS may be prerenal v ATN v CKD progression.   Agree with IVF resuscitation with bicarb gtt.   UA reviewed. Check renal US.   Agree with dickson placement, strict I/O.  K acceptable, and hemodynamically stable.  Not overtly volume overloaded, and not in respiratory distress.   Mentating as well.   Will likely need dialysis if no significant improvement in acidosis and serum cr level.  Discussed with pt and her daughter, and theyre in agreement with HD initiation, when the time comes.  Avoid contrast, ace/arb, nsaid.
acute on CKD, 2/2 poor po intake, diarrhea, req emergent HD due to pulm edema, acidosis, renal following. May require longterm HD.

## 2019-09-24 NOTE — CHART NOTE - NSCHARTNOTEFT_GEN_A_CORE
Patient is an 78 year old female, with PMH of HTN, HLD, DM, Gout, CKD stage 4 (creatinine a year ago, 1.05), who comes in with abdominal pain, diarrhea. Patient reports having dark stools and diarrhea for 1 week. Patient also reports lower abdominal pain. Denies hematemesis. Reports mild nausea, no vomiting, no fever. Has bilateral leg swelling and exertional sob.     In ED: BP: 98/58 mm Hg, HR: 58, Temp: 97.2 F  CBC shows H/H of 6.7/20   BMP shows Na of 131, bicarb 10, elevated bun/creatinine 137/11   Troponin 0.015   EKG shows no ischemic changes    Admitted to ICU for acute renal failure and suspected GI bleed.     ICU course:  Patient underwent urgent dialysis on 9/22. Creatinine went down to 7. Patient had EGD done on 9/23. EGD showed erosive gastritis with superficial ulcers. Patient was started on clear diet and advanced to mechanical soft. Patient's Hgb on 9/24 noted to be 7.6 down from 8.2. Patient underwent dialysis on 9/24 and received another unit of PRBCs during dialysis.     Patient is medically and clinically stable to be downgraded to medicine floor.     Things to follow: Patient is an 78 year old female, with PMH of HTN, HLD, DM, Gout, CKD stage 4 (creatinine a year ago, 1.05), who comes in with abdominal pain, diarrhea. Patient reports having dark stools and diarrhea for 1 week. Patient also reports lower abdominal pain. Denies hematemesis. Reports mild nausea, no vomiting, no fever. Has bilateral leg swelling and exertional sob.     In ED: BP: 98/58 mm Hg, HR: 58, Temp: 97.2 F  CBC shows H/H of 6.7/20   BMP shows Na of 131, bicarb 10, elevated bun/creatinine 137/11   Troponin 0.015   EKG shows no ischemic changes    Admitted to ICU for acute renal failure and suspected GI bleed.     ICU course:  Patient underwent urgent dialysis on 9/22. Creatinine went down to 7. Patient had EGD done on 9/23. EGD showed erosive gastritis with superficial ulcers. Patient was started on clear diet and advanced to mechanical soft. Patient's Hgb on 9/24 noted to be 7.6 down from 8.2. Patient underwent dialysis on 9/24 and received another unit of PRBCs during dialysis.     Patient is medically and clinically stable to be downgraded to medicine floor.     Things to follow:  -Monitor CBC     Resident------informed. Patient is an 78 year old female, with PMH of HTN, HLD, DM, Gout, CKD stage 4 (creatinine a year ago, 1.05), who comes in with abdominal pain, diarrhea. Patient reports having dark stools and diarrhea for 1 week. Patient also reports lower abdominal pain. Denies hematemesis. Reports mild nausea, no vomiting, no fever. Has bilateral leg swelling and exertional sob.     In ED: BP: 98/58 mm Hg, HR: 58, Temp: 97.2 F  CBC shows H/H of 6.7/20   BMP shows Na of 131, bicarb 10, elevated bun/creatinine 137/11   Troponin 0.015   EKG shows no ischemic changes    Admitted to ICU for acute renal failure and suspected GI bleed.     ICU course:  Patient underwent urgent dialysis on 9/22. Creatinine went down to 7. Patient had EGD done on 9/23. EGD showed erosive gastritis with superficial ulcers. Patient was started on clear diet and advanced to mechanical soft. Patient's Hgb on 9/24 noted to be 7.6 down from 8.2. Patient underwent dialysis on 9/24 and received another unit of PRBCs during dialysis.     Patient is medically and clinically stable to be downgraded to medicine floor.     Things to follow:  -Monitor CBC     Resident ----------- on the floor was notified. Attending ------was informed. Patient is medically and clinically stable to be downgraded to the floor as per ICU attending. Patient is an 78 year old female, with PMH of HTN, HLD, DM, Gout, CKD stage 4 (creatinine a year ago, 1.05), who comes in with abdominal pain, diarrhea. Patient reports having dark stools and diarrhea for 1 week. Patient also reports lower abdominal pain. Denies hematemesis. Reports mild nausea, no vomiting, no fever. Has bilateral leg swelling and exertional sob.     In ED: BP: 98/58 mm Hg, HR: 58, Temp: 97.2 F  CBC shows H/H of 6.7/20   BMP shows Na of 131, bicarb 10, elevated bun/creatinine 137/11   Troponin 0.015   EKG shows no ischemic changes    Admitted to ICU for acute renal failure and suspected GI bleed.     ICU course:  Patient underwent urgent dialysis on 9/22. Creatinine went down to 7. Patient had EGD done on 9/23. EGD showed erosive gastritis with superficial ulcers. Patient was started on clear diet and advanced to mechanical soft. Patient's Hgb on 9/24 noted to be 7.6 down from 8.2. Patient underwent dialysis on 9/24 and received another unit of PRBCs during dialysis.     Patient is medically and clinically stable to be downgraded to medicine floor.     Things to follow:  -Monitor CBC   -Follow up nephrology regarding HD sessions    Resident Dr. Alex on call resident informed about downgrade. Attending ------was informed. Patient is medically and clinically stable to be downgraded to the floor as per ICU attending. Patient is an 78 year old female, with PMH of HTN, HLD, DM, Gout, CKD stage 4 (creatinine a year ago, 1.05), who comes in with abdominal pain, diarrhea. Patient reports having dark stools and diarrhea for 1 week. Patient also reports lower abdominal pain. Denies hematemesis. Reports mild nausea, no vomiting, no fever. Has bilateral leg swelling and exertional sob.     In ED: BP: 98/58 mm Hg, HR: 58, Temp: 97.2 F  CBC shows H/H of 6.7/20   BMP shows Na of 131, bicarb 10, elevated bun/creatinine 137/11   Troponin 0.015   EKG shows no ischemic changes    Admitted to ICU for acute renal failure and suspected GI bleed.     ICU course:  Patient underwent urgent dialysis on 9/22. Creatinine went down to 7. Patient had EGD done on 9/23. EGD showed erosive gastritis with superficial ulcers. Patient was started on clear diet and advanced to mechanical soft. Patient's Hgb on 9/24 noted to be 7.6 down from 8.2. Patient underwent dialysis on 9/24 and received another unit of PRBCs during dialysis.     Patient is medically and clinically stable to be downgraded to medicine floor.     Things to follow:  -Monitor CBC   -Follow up nephrology regarding HD sessions    Resident Dr. Alex on call resident informed about downgrade. Patient is medically and clinically stable to be downgraded to the floor as per ICU attending.

## 2019-09-24 NOTE — CONSULT NOTE ADULT - PROBLEM SELECTOR RECOMMENDATION 3
ambulates independently at baseline, currently feels more debilitated. PT félix noted, recomm for home PT. Assist w ADls prn. She acknowledges she will likely need more help at home as she lives alone.
Severe anemia, neg FOBT.  Maybe CKD related anemia.   Check iron stores, with ferritin. Can consider adding CULLEN if iron stores adequate.   s/p 2 units prbc already. GI consult appreciated.

## 2019-09-24 NOTE — CONSULT NOTE ADULT - SUBJECTIVE AND OBJECTIVE BOX
HPI:  78 female with PMH of HTn, HLD, DM, Gout, CKD stage 4( creatinine  year ago, 1.05), comes in with abdominal pain, diarrhea. Patient reports having dark stools and diarrhea for 1 week. Patient also reports lower abdominal pain. Denies hematemesis. Reports mild nausea, no vomiting, no fever. Has bilateral leg swelling and exertional sob.     In Ed, BP: 98/ 58 mm hg, HR: 58, Temp: 97.2 F  Cbc shows hb of 6.7/20   Bmp shows na of 131, bicarb 10, elevated bun/creatinine 137/11   Troponin 0.015   EKG shows no ischemic changes (21 Sep 2019 19:07)    Interval history: patient seen and evaluated in ICU. reports appetite improving. Denies pain. Full code on file. Daughter at bedside.       PAST MEDICAL & SURGICAL HISTORY:  Diabetes mellitus  CKD (chronic kidney disease)  Seasonal allergies  Vitamin D deficiency  Osteoarthritis  Osteoporosis  Breast lump  Cataract  Gout  Hyperlipidemia  Hypertension  History of knee replacement, total, left: 2009  History of breast surgery: Excision of left breast lump - benign  1991  H/O bilateral cataract extraction: 2012  and 2014      SOCIAL HISTORY:  has 4 children, estranged from 1 daughter  Admitted from:  home          Surrogate/HCP/Guardian:   Janet Bear         Phone#: [phone]    FAMILY HISTORY:  Family history of cancer    Baseline ADLs (prior to admission): alert, ambulatory    Allergies    aspirin (Unknown)  ibuprofen (Unknown)  Mushrooms (Anaphylaxis)  shellfish (Anaphylaxis)    Intolerances      Present Symptoms:   Dyspnea: denies  Nausea/Vomiting: denies  Anxiety: denies  Depressed denies  Fatigue: +  Loss of appetite: improving  Pain:     denies      Review of Systems:  All others negative      MEDICATIONS  (STANDING):  chlorhexidine 2% Cloths 1 Application(s) Topical daily  insulin lispro (HumaLOG) corrective regimen sliding scale   SubCutaneous three times a day before meals  mupirocin 2% Nasal 1 Application(s) Both Nostrils every 12 hours  pantoprazole    Tablet 40 milliGRAM(s) Oral before breakfast    MEDICATIONS  (PRN):      PHYSICAL EXAM:    Vital Signs Last 24 Hrs  T(C): 37 (24 Sep 2019 15:00), Max: 37 (24 Sep 2019 15:00)  T(F): 98.6 (24 Sep 2019 15:00), Max: 98.6 (24 Sep 2019 15:00)  HR: 72 (24 Sep 2019 15:00) (64 - 75)  BP: 120/60 (24 Sep 2019 15:00) (70/35 - 138/73)  BP(mean): 74 (24 Sep 2019 15:00) (37 - 91)  RR: 17 (24 Sep 2019 15:00) (10 - 26)  SpO2: 96% (24 Sep 2019 15:00) (92% - 100%)    Karnofsky Performance Score/Palliative Performance Status Version2:  60   %     GENERAL: alert, frail, NAD  HEENT: Atraumatic, oropharynx clear, neck supple  CHEST/LUNG: unlabored  HEART: Regular rate and rhythm    ABDOMEN: Soft, Nontender, Nondistended   MUSCULOSKELETAL:  No  edema   NERVOUS SYSTEM:  Alert & Oriented X3,  follows commands  SKIN: No rashes or lesions noted  Oral intake: fair       LABS:                        7.6    5.34  )-----------( 136      ( 24 Sep 2019 05:49 )             22.0     09-24    134<L>  |  96  |  92<H>  ----------------------------<  103<H>  3.9   |  26  |  8.74<H>    Ca    7.7<L>      24 Sep 2019 05:49  Phos  6.7     09-24  Mg     2.1     09-24    TPro  5.7<L>  /  Alb  2.9<L>  /  TBili  0.7  /  DBili  x   /  AST  11  /  ALT  14  /  AlkPhos  62  09-23        RADIOLOGY & ADDITIONAL STUDIES:    ADVANCE DIRECTIVES:

## 2019-09-24 NOTE — DIETITIAN INITIAL EVALUATION ADULT. - DIET TYPE
Pt followed regular diet at home PTA renal replacement pts:no protein restr,no conc K & phos, low sodium/mechanical soft/DASH/TLC (sodium and cholesterol restricted diet)/consistent carbohydrate (evening snack)

## 2019-09-24 NOTE — PROGRESS NOTE ADULT - ASSESSMENT
78 female with PMH of HTn, HLD, DM, Gout, CKD stage 4( creatinine  year ago, 1.05), comes in with abdominal pain, diarrhea.  Will admit to ICU for Acute Renal Failure, GI bleed.     Patient is medically and clinically stable to be downgraded to medicine floor    NEURO;    -No issues; alert and oriented x3      Cardio;    -history of HTN, not currently on home meds     -continue to monitor bp; currently stable      Pulm;    - no issues     GI;    -patient had EGD done yesterday showing erosive gastritis with superficial ulcers    -tolerated clear diet overnight; advancing diet to mechanical soft     - protonix 40mg PO daily      NEPHRO;    - patient had urgent dialysis session done 9/22    - Creatinine level currently 8.74    -next session of HD today 9/24     - f/u nephro    -d/c yessi, f/u TOV    -f/u  renal as per nephro    Heme: Hgb noted to be low at 6.7 on admission  -s/p 2 units of PRBCs, hgb went to 8.1  -Hgb on 9/23 was 8.2; currently 7.6 on 9/24  -no signs of melena or bleeding  -1 unit PRBC to be given during HD session today  -monitor CBC     ID: no issues    Endo: on sliding scale insulin  -hold lantus 8 units while sugars are still low and patient being started on diet   - continue to monitor sugars and resume lantus when needed    Skin: no issues      ppx: protonix for GI

## 2019-09-24 NOTE — DIETITIAN INITIAL EVALUATION ADULT. - FACTORS AFF FOOD INTAKE
Rastafari/ethnic/cultural/personal food preferences/difficulty swallowing/Diarrhea and abdominal pain x1 week PTA, SOB, dyspnea, GI bleeding/other (specify) difficulty chewing/Oriental orthodox/ethnic/cultural/personal food preferences/other (specify)/Diarrhea and abdominal pain x1 week PTA, SOB, dyspnea, GI bleeding

## 2019-09-24 NOTE — DIETITIAN INITIAL EVALUATION ADULT. - PERTINENT LABORATORY DATA
BUN = 92  Creatinine, serum = 8.74  Glucose, serum = 103  Calcium, total serum = 7.7  eGFR = 4  Phosphorus = 6.7  HbA1C = 5.6

## 2019-09-24 NOTE — PROGRESS NOTE ADULT - ASSESSMENT
Doing well   Advance diet as tolerated   PPI daily   Follow up path results   On HD as per medical team   Advanced care planning was discussed with patient and family.  Advanced care planning forms were reviewed and discussed.  Risks, benefits and alternatives of gastroenterologic procedures were discussed in detail and all questions were answered.

## 2019-09-24 NOTE — DIETITIAN INITIAL EVALUATION ADULT. - NS FNS WEIGHT USED FOR CALC
dosing/Ht = 4'11"   Dosing wt = 140 lbs   UBW (per pt) = 144 lbs Ht = 4'11"   Dosing wt = 140 lbs   IBW = 98 lbs + 10% = 107.8 lbs   UBW (per pt) = 144 lbs/ideal

## 2019-09-24 NOTE — PROGRESS NOTE ADULT - ASSESSMENT
78y Female with DM, HTN, CKD a/w diarrhea, anemia, and severe renal failure. required emergent HD overnight for severe metabolic acidosis and pulmonary edema. She is non-oliguric

## 2019-09-24 NOTE — DIETITIAN INITIAL EVALUATION ADULT. - OTHER INFO
Visited pt, awake, alert, oriented; lives at home alone. Reports po intake at 50% (mostly soup) for 1.5 weeks PTA; did not follow a diabetic diet but rarely ate processed foods and cooked most of her own meals; UBW per pt 144 lbs, wt on 9/21/19 was 139.9 lbs, possible unintentional wt loss PTA? Complains that certain foods stick in her throat/esophagus. Food preferences obtained and sent to dietary. Newly placed on HD; 2nd time pending later today. Visited pt, awake, alert, oriented; lives at home alone. Reports po intake at 50% (mostly soup) for 1.5 weeks PTA; did not follow a diabetic diet but rarely ate processed foods and cooked most of her own meals; UBW per pt 144 lbs, wt on 9/21/19 was 139.9 lbs, possible unintentional wt loss PTA? Complains that certain foods stick in her throat/esophagus when chewing solid food (chicken); reports not wanting to use her dentures; food preferences obtained and sent to dietary. Newly placed on HD; 2nd time pending later today.

## 2019-09-24 NOTE — PROGRESS NOTE ADULT - SUBJECTIVE AND OBJECTIVE BOX
Subjective:   Doing well     Objective:    MEDICATIONS  (STANDING):  chlorhexidine 2% Cloths 1 Application(s) Topical daily  insulin lispro (HumaLOG) corrective regimen sliding scale   SubCutaneous three times a day before meals  mupirocin 2% Nasal 1 Application(s) Both Nostrils every 12 hours  pantoprazole    Tablet 40 milliGRAM(s) Oral before breakfast    MEDICATIONS  (PRN):              Vital Signs Last 24 Hrs  T(C): 36.7 (24 Sep 2019 19:29), Max: 37 (24 Sep 2019 15:00)  T(F): 98.1 (24 Sep 2019 19:29), Max: 98.6 (24 Sep 2019 15:00)  HR: 78 (24 Sep 2019 19:29) (64 - 78)  BP: 125/64 (24 Sep 2019 19:29) (70/35 - 141/61)  BP(mean): 83 (24 Sep 2019 18:00) (37 - 91)  RR: 17 (24 Sep 2019 19:29) (10 - 26)  SpO2: 99% (24 Sep 2019 19:29) (92% - 100%)      General:  Well developed, well nourished, alert and active, no pallor, NAD.  HEENT:    Normal appearance of conjunctiva, ears, nose, lips, oropharynx, and oral mucosa, anicteric.  Neck:  No masses, no asymmetry.  Lymph Nodes:  No lymphadenopathy.   Cardiovascular:  RRR normal S1/S2, no murmur.  Respiratory:  CTA B/L, normal respiratory effort.   Abdominal:   soft, no masses or tenderness, normoactive BS, NT/ND, no HSM.  Extremities:   No clubbing or cyanosis, normal capillary refill, no edema.   Skin:   No rash, jaundice, lesions, eczema.   Musculoskeletal:  No joint swelling, erythema or tenderness.   Neuro: No focal deficits.   Other:       LABS:                        7.6    5.34  )-----------( 136      ( 24 Sep 2019 05:49 )             22.0     09-24    134<L>  |  96  |  92<H>  ----------------------------<  103<H>  3.9   |  26  |  8.74<H>    Ca    7.7<L>      24 Sep 2019 05:49  Phos  6.7     09-24  Mg     2.1     09-24    TPro  5.7<L>  /  Alb  2.9<L>  /  TBili  0.7  /  DBili  x   /  AST  11  /  ALT  14  /  AlkPhos  62  09-23          RADIOLOGY & ADDITIONAL TESTS:

## 2019-09-24 NOTE — CONSULT NOTE ADULT - PROBLEM SELECTOR RECOMMENDATION 4
Met with patient and daughter Janet at bedside regarding current condition, overall goals of care. She expressed that she is very independent however has been worried about her daughter's health (recently dx'd with uterine CA) and therefore was not eating well and subsequently developed diarrhea. she acknowledges that now that she is likely going to need longterm dialysis she will need more help at home. She has good support from her family. She wishes to appoint her daughter Janet as HCP as she is the most involved in her care and lives locally. Form filled and placed in chart. Regarding code status, she expressed that she does not want to be kept alive on any machines and prefers to allow for natural death, does not want CPR or intubation. ANA drafted per pt wishes for DNR/DNI. Support provided.     Total advance care planning discussion time: 25 min

## 2019-09-25 DIAGNOSIS — N17.0 ACUTE KIDNEY FAILURE WITH TUBULAR NECROSIS: ICD-10-CM

## 2019-09-25 DIAGNOSIS — K29.60 OTHER GASTRITIS WITHOUT BLEEDING: ICD-10-CM

## 2019-09-25 DIAGNOSIS — Z29.9 ENCOUNTER FOR PROPHYLACTIC MEASURES, UNSPECIFIED: ICD-10-CM

## 2019-09-25 LAB
ANION GAP SERPL CALC-SCNC: 10 MMOL/L — SIGNIFICANT CHANGE UP (ref 5–17)
BASOPHILS # BLD AUTO: 0.02 K/UL — SIGNIFICANT CHANGE UP (ref 0–0.2)
BASOPHILS NFR BLD AUTO: 0.4 % — SIGNIFICANT CHANGE UP (ref 0–2)
BUN SERPL-MCNC: 43 MG/DL — HIGH (ref 7–18)
CALCIUM SERPL-MCNC: 8.2 MG/DL — LOW (ref 8.4–10.5)
CHLORIDE SERPL-SCNC: 98 MMOL/L — SIGNIFICANT CHANGE UP (ref 96–108)
CO2 SERPL-SCNC: 29 MMOL/L — SIGNIFICANT CHANGE UP (ref 22–31)
CREAT SERPL-MCNC: 5.36 MG/DL — HIGH (ref 0.5–1.3)
EOSINOPHIL # BLD AUTO: 0.22 K/UL — SIGNIFICANT CHANGE UP (ref 0–0.5)
EOSINOPHIL NFR BLD AUTO: 4.4 % — SIGNIFICANT CHANGE UP (ref 0–6)
GLUCOSE BLDC GLUCOMTR-MCNC: 114 MG/DL — HIGH (ref 70–99)
GLUCOSE BLDC GLUCOMTR-MCNC: 115 MG/DL — HIGH (ref 70–99)
GLUCOSE BLDC GLUCOMTR-MCNC: 125 MG/DL — HIGH (ref 70–99)
GLUCOSE SERPL-MCNC: 98 MG/DL — SIGNIFICANT CHANGE UP (ref 70–99)
HCT VFR BLD CALC: 25.5 % — LOW (ref 34.5–45)
HGB BLD-MCNC: 8.8 G/DL — LOW (ref 11.5–15.5)
IMM GRANULOCYTES NFR BLD AUTO: 1 % — SIGNIFICANT CHANGE UP (ref 0–1.5)
LYMPHOCYTES # BLD AUTO: 0.63 K/UL — LOW (ref 1–3.3)
LYMPHOCYTES # BLD AUTO: 12.6 % — LOW (ref 13–44)
MAGNESIUM SERPL-MCNC: 2.1 MG/DL — SIGNIFICANT CHANGE UP (ref 1.6–2.6)
MCHC RBC-ENTMCNC: 30.2 PG — SIGNIFICANT CHANGE UP (ref 27–34)
MCHC RBC-ENTMCNC: 34.5 GM/DL — SIGNIFICANT CHANGE UP (ref 32–36)
MCV RBC AUTO: 87.6 FL — SIGNIFICANT CHANGE UP (ref 80–100)
MONOCYTES # BLD AUTO: 0.41 K/UL — SIGNIFICANT CHANGE UP (ref 0–0.9)
MONOCYTES NFR BLD AUTO: 8.2 % — SIGNIFICANT CHANGE UP (ref 2–14)
NEUTROPHILS # BLD AUTO: 3.66 K/UL — SIGNIFICANT CHANGE UP (ref 1.8–7.4)
NEUTROPHILS NFR BLD AUTO: 73.4 % — SIGNIFICANT CHANGE UP (ref 43–77)
NRBC # BLD: 0 /100 WBCS — SIGNIFICANT CHANGE UP (ref 0–0)
PHOSPHATE SERPL-MCNC: 4.2 MG/DL — SIGNIFICANT CHANGE UP (ref 2.5–4.5)
PLATELET # BLD AUTO: 131 K/UL — LOW (ref 150–400)
POTASSIUM SERPL-MCNC: 3.5 MMOL/L — SIGNIFICANT CHANGE UP (ref 3.5–5.3)
POTASSIUM SERPL-SCNC: 3.5 MMOL/L — SIGNIFICANT CHANGE UP (ref 3.5–5.3)
RBC # BLD: 2.91 M/UL — LOW (ref 3.8–5.2)
RBC # FLD: 16.1 % — HIGH (ref 10.3–14.5)
SODIUM SERPL-SCNC: 137 MMOL/L — SIGNIFICANT CHANGE UP (ref 135–145)
WBC # BLD: 4.99 K/UL — SIGNIFICANT CHANGE UP (ref 3.8–10.5)
WBC # FLD AUTO: 4.99 K/UL — SIGNIFICANT CHANGE UP (ref 3.8–10.5)

## 2019-09-25 PROCEDURE — 99233 SBSQ HOSP IP/OBS HIGH 50: CPT | Mod: GC

## 2019-09-25 PROCEDURE — 76775 US EXAM ABDO BACK WALL LIM: CPT | Mod: 26

## 2019-09-25 RX ORDER — TUBERCULIN PURIFIED PROTEIN DERIVATIVE 5 [IU]/.1ML
5 INJECTION, SOLUTION INTRADERMAL ONCE
Refills: 0 | Status: DISCONTINUED | OUTPATIENT
Start: 2019-09-25 | End: 2019-09-27

## 2019-09-25 RX ADMIN — CHLORHEXIDINE GLUCONATE 1 APPLICATION(S): 213 SOLUTION TOPICAL at 11:58

## 2019-09-25 RX ADMIN — PANTOPRAZOLE SODIUM 40 MILLIGRAM(S): 20 TABLET, DELAYED RELEASE ORAL at 06:08

## 2019-09-25 RX ADMIN — MUPIROCIN 1 APPLICATION(S): 20 OINTMENT TOPICAL at 18:29

## 2019-09-25 RX ADMIN — MUPIROCIN 1 APPLICATION(S): 20 OINTMENT TOPICAL at 06:08

## 2019-09-25 NOTE — PROGRESS NOTE ADULT - PROBLEM SELECTOR PLAN 2
Pt presented with diarrhea, dark stools and abdominal pain  -EGD 9/23 showed erosive gastritis with superficial ulcers  -c/w mechanical soft diet, pt tolerating well    - protonix 40mg PO daily   -f/u pathology results from EGD

## 2019-09-25 NOTE — PROGRESS NOTE ADULT - ASSESSMENT
78 female with PMH of HTn, HLD, DM, Gout, CKD stage 4( creatinine  year ago, 1.05), comes in with abdominal pain, diarrhea.  Admitted for acute Renal Failure, GI bleed, downgraded from ICU  after urgent HD on 9/22 and 9/24 for acute pulmonary edema and severe metabolic acidosis.

## 2019-09-25 NOTE — PROGRESS NOTE ADULT - SUBJECTIVE AND OBJECTIVE BOX
PGY 1 Note discussed with supervising resident and primary attending    Patient is a 78y old  Female who presents with a chief complaint of abdominal pain, diarrhea (25 Sep 2019 15:34)      INTERVAL HPI/OVERNIGHT EVENTS: Patient seen and examined at the bedside.     MEDICATIONS  (STANDING):  chlorhexidine 2% Cloths 1 Application(s) Topical daily  insulin lispro (HumaLOG) corrective regimen sliding scale   SubCutaneous three times a day before meals  mupirocin 2% Nasal 1 Application(s) Both Nostrils every 12 hours  pantoprazole    Tablet 40 milliGRAM(s) Oral before breakfast  PPD  5 Tuberculin Unit(s) Injectable 5 Unit(s) IntraDermal once    MEDICATIONS  (PRN):      __________________________________________________  REVIEW OF SYSTEMS:    CONSTITUTIONAL: No fever,   EYES: no acute visual disturbances  NECK: No pain or stiffness  RESPIRATORY: No cough; No shortness of breath  CARDIOVASCULAR: No chest pain, no palpitations  GASTROINTESTINAL: No pain. No nausea or vomiting; No diarrhea   NEUROLOGICAL: No headache or numbness, no tremors  MUSCULOSKELETAL: No joint pain, no muscle pain  GENITOURINARY: no dysuria, no frequency, no hesitancy  PSYCHIATRY: no depression , no anxiety  ALL OTHER  ROS negative        Vital Signs Last 24 Hrs  T(C): 37.6 (25 Sep 2019 08:20), Max: 37.8 (24 Sep 2019 23:53)  T(F): 99.7 (25 Sep 2019 08:20), Max: 100 (24 Sep 2019 23:53)  HR: 70 (25 Sep 2019 15:35) (70 - 78)  BP: 138/49 (25 Sep 2019 08:20) (125/64 - 141/61)  BP(mean): 83 (24 Sep 2019 18:00) (80 - 83)  RR: 18 (25 Sep 2019 08:20) (13 - 23)  SpO2: 98% (25 Sep 2019 15:35) (97% - 100%)    ________________________________________________  PHYSICAL EXAM:  GENERAL: NAD  HEENT: Normocephalic;  conjunctivae and sclerae clear; moist mucous membranes;   NECK : supple  CHEST/LUNG: Clear to auscultation bilaterally with good air entry   HEART: S1 S2  regular; no murmurs, gallops or rubs  ABDOMEN: Soft, Nontender, Nondistended; Bowel sounds present  EXTREMITIES: no cyanosis; no edema; no calf tenderness  SKIN: warm and dry; no rash  NERVOUS SYSTEM:  Awake and alert; Oriented  to place, person and time ; no new deficits    _________________________________________________  LABS:                        8.8    4.99  )-----------( 131      ( 25 Sep 2019 08:38 )             25.5     09-25    137  |  98  |  43<H>  ----------------------------<  98  3.5   |  29  |  5.36<H>    Ca    8.2<L>      25 Sep 2019 09:04  Phos  4.2     09-25  Mg     2.1     09-25    TPro  5.5<L>  /  Alb  2.8<L>  /  TBili  0.9  /  DBili  0.3<H>  /  AST  22  /  ALT  15  /  AlkPhos  60  09-25        CAPILLARY BLOOD GLUCOSE      POCT Blood Glucose.: 114 mg/dL (25 Sep 2019 11:48)  POCT Blood Glucose.: 105 mg/dL (25 Sep 2019 08:34)  POCT Blood Glucose.: 137 mg/dL (24 Sep 2019 21:20)  POCT Blood Glucose.: 93 mg/dL (24 Sep 2019 16:48)        RADIOLOGY & ADDITIONAL TESTS:    Imaging Personally Reviewed:  YES/NO    Consultant(s) Notes Reviewed:   YES/ No    Care Discussed with Consultants :     Plan of care was discussed with patient and /or primary care giver; all questions and concerns were addressed and care was aligned with patient's wishes.

## 2019-09-25 NOTE — PROGRESS NOTE ADULT - SUBJECTIVE AND OBJECTIVE BOX
[facility] : Progress Note :: [phone], (office [phone]),   Dr Pandey / Dr Mi / Dr Massey / Dr Etienne / Dr Cassandra DA SILVA / Dr Burt / Dr Garber / Dr Naeem lam  _____________________________________________________________________________________________    brisk urine output. denies SOB.    aspirin (Unknown)  ibuprofen (Unknown)  Mushrooms (Anaphylaxis)  shellfish (Anaphylaxis)    Hospital Medications:   MEDICATIONS  (STANDING):  chlorhexidine 2% Cloths 1 Application(s) Topical daily  insulin lispro (HumaLOG) corrective regimen sliding scale   SubCutaneous three times a day before meals  mupirocin 2% Nasal 1 Application(s) Both Nostrils every 12 hours  pantoprazole    Tablet 40 milliGRAM(s) Oral before breakfast  PPD  5 Tuberculin Unit(s) Injectable 5 Unit(s) IntraDermal once        VITALS:  T(F): 98.7 (19 @ 16:23), Max: 100 (19 @ 23:53)  HR: 78 (19 @ 16:23)  BP: 152/61 (19 @ 16:23)  RR: 18 (19 @ 16:23)  SpO2: 95% (19 @ 16:23)  Wt(kg): --     @ 07:01  -   @ 07:00  --------------------------------------------------------  IN: 120 mL / OUT: 126 mL / NET: -6 mL        PHYSICAL EXAM:  Constitutional: NAD  HEENT: anicteric sclera, oropharynx clear, MMM  Neck: No JVD  Respiratory: CTAB, no wheezes, rales or rhonchi  Cardiovascular: S1, S2, RRR  Gastrointestinal: BS+, soft, NT/ND  Extremities: peripheral edema+  Neurological: A/O x 3, no focal deficits  : No CVA tenderness. No dickson.   Vascular Access: [facility]    LABS:      137  |  98  |  43<H>  ----------------------------<  98  3.5   |  29  |  5.36<H>    Ca    8.2<L>      25 Sep 2019 09:04  Phos  4.2       Mg     2.1         TPro  5.5<L>  /  Alb  2.8<L>  /  TBili  0.9  /  DBili  0.3<H>  /  AST  22  /  ALT  15  /  AlkPhos  60      Creatinine Trend: 5.36 <--, 8.74 <--, 8.04 <--, 7.42 <--, 11.70 <--, 11.70 <--, 11.30 <--, 11.50 <--                        8.8    4.99  )-----------( 131      ( 25 Sep 2019 08:38 )             25.5     Urine Studies:  Urinalysis Basic - ( 21 Sep 2019 18:59 )    Color: Yellow / Appearance: Slightly Turbid / S.020 / pH:   Gluc:  / Ketone: Negative  / Bili: Negative / Urobili: Negative   Blood:  / Protein: 100 / Nitrite: Negative   Leuk Esterase: Trace / RBC: >50 /HPF / WBC 6-10 /HPF   Sq Epi:  / Non Sq Epi: Few /HPF / Bacteria: Few /HPF      Sodium, Random Urine: 10 mmol/L ( @ 18:59)  Osmolality, Random Urine: 353 mos/kg ( @ 18:59)  Creatinine, Random Urine: 211 mg/dL ( @ 18:59)  Chloride, Random Urine: 14 mmol/L ( @ 18:59)    RADIOLOGY & ADDITIONAL STUDIES:

## 2019-09-25 NOTE — PROGRESS NOTE ADULT - ASSESSMENT
78 year old female with complaints of dysphagia, and black stools. Found to be in renal failure.     Plan:  Doing well.   hemoglobin remains stable advance diet       Renal failure   Workup as per ICU team   Monitor electrolytes   Nephrology consult.   Advanced care planning was discussed with patient and family.  Advanced care planning forms were reviewed and discussed.  Risks, benefits and alternatives of gastroenterologic procedures were discussed in detail and all questions were answered.

## 2019-09-25 NOTE — PROGRESS NOTE ADULT - SUBJECTIVE AND OBJECTIVE BOX
PGY 1 Note discussed with supervising resident and primary attending    Patient is a 78y old  Female who presents with a chief complaint of abdominal pain, diarrhea (25 Sep 2019 17:01)      INTERVAL HPI/OVERNIGHT EVENTS: Patient seen and examined at the bedside. Pt denies any bleeding or diarrhea now. No SOB or pedal swelling at this time.     MEDICATIONS  (STANDING):  chlorhexidine 2% Cloths 1 Application(s) Topical daily  insulin lispro (HumaLOG) corrective regimen sliding scale   SubCutaneous three times a day before meals  mupirocin 2% Nasal 1 Application(s) Both Nostrils every 12 hours  pantoprazole    Tablet 40 milliGRAM(s) Oral before breakfast  PPD  5 Tuberculin Unit(s) Injectable 5 Unit(s) IntraDermal once    MEDICATIONS  (PRN):      __________________________________________________  REVIEW OF SYSTEMS:    CONSTITUTIONAL: No fever,   EYES: no acute visual disturbances  NECK: No pain or stiffness  RESPIRATORY: No cough; No shortness of breath  CARDIOVASCULAR: No chest pain, no palpitations  GASTROINTESTINAL: abdominal pain resolved. No nausea or vomiting; no blood in stools now  NEUROLOGICAL: No headache or numbness, no tremors  MUSCULOSKELETAL: No joint pain, no muscle pain  GENITOURINARY: no dysuria, no frequency, no hesitancy  PSYCHIATRY: no depression , no anxiety  ALL OTHER  ROS negative        Vital Signs Last 24 Hrs  T(C): 37.1 (25 Sep 2019 16:23), Max: 37.8 (24 Sep 2019 23:53)  T(F): 98.7 (25 Sep 2019 16:23), Max: 100 (24 Sep 2019 23:53)  HR: 78 (25 Sep 2019 16:23) (70 - 78)  BP: 152/61 (25 Sep 2019 16:23) (125/64 - 152/61)  BP(mean): 83 (24 Sep 2019 18:00) (83 - 83)  RR: 18 (25 Sep 2019 16:23) (13 - 18)  SpO2: 95% (25 Sep 2019 16:23) (95% - 100%)    ________________________________________________  PHYSICAL EXAM:  GENERAL: NAD, Shiley catheter in groin (placed in ED as per pt)  HEENT: Normocephalic;  conjunctivae and sclerae clear; moist mucous membranes;   NECK : supple  CHEST/LUNG: Clear to auscultation bilaterally with good air entry   HEART: S1 S2  regular; no murmurs, gallops or rubs  ABDOMEN: Soft, Nontender, Nondistended; Bowel sounds present  EXTREMITIES: no cyanosis; no edema; no calf tenderness  SKIN: warm and dry; no rash  NERVOUS SYSTEM:  Awake and alert; Oriented  to place, person and time ; no new deficits    _________________________________________________  LABS:                        8.8    4.99  )-----------( 131      ( 25 Sep 2019 08:38 )             25.5     09-25    137  |  98  |  43<H>  ----------------------------<  98  3.5   |  29  |  5.36<H>    Ca    8.2<L>      25 Sep 2019 09:04  Phos  4.2     09-25  Mg     2.1     09-25    TPro  5.5<L>  /  Alb  2.8<L>  /  TBili  0.9  /  DBili  0.3<H>  /  AST  22  /  ALT  15  /  AlkPhos  60  09-25        CAPILLARY BLOOD GLUCOSE      POCT Blood Glucose.: 125 mg/dL (25 Sep 2019 16:48)  POCT Blood Glucose.: 114 mg/dL (25 Sep 2019 11:48)  POCT Blood Glucose.: 105 mg/dL (25 Sep 2019 08:34)  POCT Blood Glucose.: 137 mg/dL (24 Sep 2019 21:20)        RADIOLOGY & ADDITIONAL TESTS:    < from: US Renal (09.25.19 @ 16:22) >    INTERPRETATION:  Renal ultrasound    Indication: Elevated creatinine.    Renal ultrasound is performed without a previous examination for   comparison. The right kidney measures 10.7 cm in length and the left   kidney measures 10.6 cm in length. The renal sizes are within normal   limits bilaterally. No evidence for hydronephrosis, cyst, solid mass or   calculus in either kidney. Both kidneys are echogenic for which clinical   correlation with intrinsic medical renal disease is recommended.    The IVC and aorta appear grossly unremarkable.    Impression: No hydronephrosis.    Both kidneys are hyperechoic for which clinical correlation with   intrinsic medical renal disease is recommended.      < end of copied text >      Imaging Personally Reviewed:  YES/NO    Consultant(s) Notes Reviewed:   YES/ No    Care Discussed with Consultants :     Plan of care was discussed with patient and /or primary care giver; all questions and concerns were addressed and care was aligned with patient's wishes.

## 2019-09-25 NOTE — PROGRESS NOTE ADULT - PROBLEM SELECTOR PLAN 2
from [facility] and diarrhoea  improved after 2 sessions of  HD.  downgraded to the floors. improved UOP.  re-evaluated renal function in AM and decide if will require ongoing HD

## 2019-09-25 NOTE — PROGRESS NOTE ADULT - ASSESSMENT
78 female with PMH of HTn, HLD, DM, Gout, CKD stage 4( creatinine  year ago, 1.05), comes in with abdominal pain, diarrhea.

## 2019-09-25 NOTE — PROGRESS NOTE ADULT - PROBLEM SELECTOR PLAN 1
baseline SCR 1.3  Pt was admitted to ICU with HD dependent [facility] from [facility] for Diarrhoea and vomiting  patient had 2 sessions of urgent dialysis 9/22 and 9/24    - Creatinine level currently 5.36    -next session of HD tomorrow    -According to nephro Dr Pandey, will f/u BMP in am and decide according to pt's clinical condition if she needs lifetime dialysis or not    -US renal shows no hydronephrosis    -If pt needs HD long term, she will need IR guided permacath     -Spoke to IR today, plan to do it on Fri and NPO after midnight tomorrow

## 2019-09-25 NOTE — PROGRESS NOTE ADULT - PROBLEM SELECTOR PLAN 5
n sliding scale insulin  -hold lantus 8 units while sugars are still low and patient being started on diet   - continue to monitor sugars and resume lantus when needed  -c/w HSS  -Diabetic diet  Hba1c 5.6

## 2019-09-25 NOTE — CHART NOTE - NSCHARTNOTEFT_GEN_A_CORE
KRISTA Ty went to the patient's bedside to introduce herself and her role.  Patient was receptive, however her grandchildren and dinner had just arrived.  Patient appeared eager to spend time with her family.  KRISTA Ty and patient agreed this  will return tomorrow.

## 2019-09-26 ENCOUNTER — TRANSCRIPTION ENCOUNTER (OUTPATIENT)
Age: 79
End: 2019-09-26

## 2019-09-26 LAB
ALBUMIN SERPL ELPH-MCNC: 2.9 G/DL — LOW (ref 3.5–5)
ALP SERPL-CCNC: 60 U/L — SIGNIFICANT CHANGE UP (ref 40–120)
ALT FLD-CCNC: 17 U/L DA — SIGNIFICANT CHANGE UP (ref 10–60)
ANION GAP SERPL CALC-SCNC: 9 MMOL/L — SIGNIFICANT CHANGE UP (ref 5–17)
AST SERPL-CCNC: 24 U/L — SIGNIFICANT CHANGE UP (ref 10–40)
BILIRUB SERPL-MCNC: 0.9 MG/DL — SIGNIFICANT CHANGE UP (ref 0.2–1.2)
BUN SERPL-MCNC: 59 MG/DL — HIGH (ref 7–18)
CALCIUM SERPL-MCNC: 8 MG/DL — LOW (ref 8.4–10.5)
CHLORIDE SERPL-SCNC: 97 MMOL/L — SIGNIFICANT CHANGE UP (ref 96–108)
CO2 SERPL-SCNC: 29 MMOL/L — SIGNIFICANT CHANGE UP (ref 22–31)
CREAT SERPL-MCNC: 6.81 MG/DL — HIGH (ref 0.5–1.3)
GLUCOSE BLDC GLUCOMTR-MCNC: 101 MG/DL — HIGH (ref 70–99)
GLUCOSE BLDC GLUCOMTR-MCNC: 101 MG/DL — HIGH (ref 70–99)
GLUCOSE BLDC GLUCOMTR-MCNC: 137 MG/DL — HIGH (ref 70–99)
GLUCOSE BLDC GLUCOMTR-MCNC: 174 MG/DL — HIGH (ref 70–99)
GLUCOSE SERPL-MCNC: 98 MG/DL — SIGNIFICANT CHANGE UP (ref 70–99)
HCT VFR BLD CALC: 25.5 % — LOW (ref 34.5–45)
HGB BLD-MCNC: 8.6 G/DL — LOW (ref 11.5–15.5)
MAGNESIUM SERPL-MCNC: 2.3 MG/DL — SIGNIFICANT CHANGE UP (ref 1.6–2.6)
MCHC RBC-ENTMCNC: 29.8 PG — SIGNIFICANT CHANGE UP (ref 27–34)
MCHC RBC-ENTMCNC: 33.7 GM/DL — SIGNIFICANT CHANGE UP (ref 32–36)
MCV RBC AUTO: 88.2 FL — SIGNIFICANT CHANGE UP (ref 80–100)
NRBC # BLD: 0 /100 WBCS — SIGNIFICANT CHANGE UP (ref 0–0)
PHOSPHATE SERPL-MCNC: 4.7 MG/DL — HIGH (ref 2.5–4.5)
PLATELET # BLD AUTO: 132 K/UL — LOW (ref 150–400)
POTASSIUM SERPL-MCNC: 3.5 MMOL/L — SIGNIFICANT CHANGE UP (ref 3.5–5.3)
POTASSIUM SERPL-SCNC: 3.5 MMOL/L — SIGNIFICANT CHANGE UP (ref 3.5–5.3)
PROT SERPL-MCNC: 5.6 G/DL — LOW (ref 6–8.3)
RBC # BLD: 2.89 M/UL — LOW (ref 3.8–5.2)
RBC # FLD: 16.5 % — HIGH (ref 10.3–14.5)
SODIUM SERPL-SCNC: 135 MMOL/L — SIGNIFICANT CHANGE UP (ref 135–145)
WBC # BLD: 5.85 K/UL — SIGNIFICANT CHANGE UP (ref 3.8–10.5)
WBC # FLD AUTO: 5.85 K/UL — SIGNIFICANT CHANGE UP (ref 3.8–10.5)

## 2019-09-26 PROCEDURE — 99233 SBSQ HOSP IP/OBS HIGH 50: CPT | Mod: GC

## 2019-09-26 RX ORDER — POLYETHYLENE GLYCOL 3350 17 G/17G
17 POWDER, FOR SOLUTION ORAL DAILY
Refills: 0 | Status: DISCONTINUED | OUTPATIENT
Start: 2019-09-26 | End: 2019-09-27

## 2019-09-26 RX ADMIN — Medication 1: at 12:17

## 2019-09-26 RX ADMIN — PANTOPRAZOLE SODIUM 40 MILLIGRAM(S): 20 TABLET, DELAYED RELEASE ORAL at 06:24

## 2019-09-26 RX ADMIN — POLYETHYLENE GLYCOL 3350 17 GRAM(S): 17 POWDER, FOR SOLUTION ORAL at 12:18

## 2019-09-26 RX ADMIN — MUPIROCIN 1 APPLICATION(S): 20 OINTMENT TOPICAL at 06:24

## 2019-09-26 RX ADMIN — CHLORHEXIDINE GLUCONATE 1 APPLICATION(S): 213 SOLUTION TOPICAL at 12:18

## 2019-09-26 RX ADMIN — MUPIROCIN 1 APPLICATION(S): 20 OINTMENT TOPICAL at 18:49

## 2019-09-26 NOTE — PROGRESS NOTE ADULT - PROBLEM SELECTOR PLAN 2
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 Negative for h-pylori

## 2019-09-26 NOTE — PROGRESS NOTE ADULT - PROBLEM SELECTOR PLAN 5
on sliding scale insulin  -hold lantus 8 units while sugars are still low and patient being started on diet   - continue to monitor sugars and resume lantus when needed  -c/w HSS  -Diabetic diet  Hba1c 5.6

## 2019-09-26 NOTE — PROGRESS NOTE ADULT - SUBJECTIVE AND OBJECTIVE BOX
[facility] : Progress Note :: [phone], (office [phone]),   Dr Pandey / Dr Mi / Dr Massey / Dr Etienne / Dr Cassandra DA SILVA / Dr Burt / Dr Garber / Dr Naeem lam  _____________________________________________________________________________________________    seen on HD    aspirin (Unknown)  ibuprofen (Unknown)  Mushrooms (Anaphylaxis)  shellfish (Anaphylaxis)    Hospital Medications:   MEDICATIONS  (STANDING):  chlorhexidine 2% Cloths 1 Application(s) Topical daily  insulin lispro (HumaLOG) corrective regimen sliding scale   SubCutaneous three times a day before meals  mupirocin 2% Nasal 1 Application(s) Both Nostrils every 12 hours  pantoprazole    Tablet 40 milliGRAM(s) Oral before breakfast  polyethylene glycol 3350 17 Gram(s) Oral daily  PPD  5 Tuberculin Unit(s) Injectable 5 Unit(s) IntraDermal once        VITALS:  T(F): 98.3 (19 @ 13:15), Max: 99 (19 @ 00:00)  HR: 75 (19 @ 13:15)  BP: 149/75 (19 @ 13:15)  RR: 17 (19 @ 13:15)  SpO2: 94% (19 @ 09:09)  Wt(kg): --      PHYSICAL EXAM:  Constitutional: NAD  HEENT: anicteric sclera, oropharynx clear.  Neck: No JVD  Respiratory: CTAB, no wheezes, rales or rhonchi  Cardiovascular: S1, S2, RRR  Gastrointestinal: BS+, soft, NT/ND  Extremities:  No peripheral edema  Neurological: A/O x 3, no focal deficits  : No CVA tenderness. No dickson.   Vascular Access: [facility]    LABS:      135  |  97  |  59<H>  ----------------------------<  98  3.5   |  29  |  6.81<H>    Ca    8.0<L>      26 Sep 2019 07:26  Phos  4.7       Mg     2.3         TPro  5.6<L>  /  Alb  2.9<L>  /  TBili  0.9  /  DBili      /  AST  24  /  ALT  17  /  AlkPhos  60      Creatinine Trend: 6.81 <--, 5.36 <--, 8.74 <--, 8.04 <--, 7.42 <--, 11.70 <--, 11.70 <--, 11.30 <--, 11.50 <--                        8.6    5.85  )-----------( 132      ( 26 Sep 2019 07:26 )             25.5     Urine Studies:  Urinalysis Basic - ( 21 Sep 2019 18:59 )    Color: Yellow / Appearance: Slightly Turbid / S.020 / pH:   Gluc:  / Ketone: Negative  / Bili: Negative / Urobili: Negative   Blood:  / Protein: 100 / Nitrite: Negative   Leuk Esterase: Trace / RBC: >50 /HPF / WBC 6-10 /HPF   Sq Epi:  / Non Sq Epi: Few /HPF / Bacteria: Few /HPF      Sodium, Random Urine: 10 mmol/L ( @ 18:59)  Osmolality, Random Urine: 353 mos/kg ( @ 18:59)  Creatinine, Random Urine: 211 mg/dL ( @ 18:59)  Chloride, Random Urine: 14 mmol/L ( @ 18:59)    RADIOLOGY & ADDITIONAL STUDIES:

## 2019-09-26 NOTE — DISCHARGE NOTE PROVIDER - NSDCCPCAREPLAN_GEN_ALL_CORE_FT
PRINCIPAL DISCHARGE DIAGNOSIS  Diagnosis: GIB (gastrointestinal bleeding)  Assessment and Plan of Treatment: You presented with diarrhea, dark stools and abdominal pain. You had an endoscopy on 23 that showed erosive gastritis with superficial ulcers. You were kept on mechanical soft diet and your diet advanced. You were given  protonix 40mg PO daily. Biopsy from EGD showed chronic inactive gastritis and was negative for h-pylori. Please continue with your protonix as instructed and follow up with your primary care doctor within a week. Please also follow with a GI doctor.      SECONDARY DISCHARGE DIAGNOSES  Diagnosis: End-stage renal disease (ESRD)  Assessment and Plan of Treatment: HD dependent CONCHIS from [facility] for Diarrhea and vomiting  patient had HD on 9/22, 9/24 and 9/26     - Creatinine level currently 6.31>4.19    - Pt needs long term dialysis as per nephro, Dr Mi on board    - IR contacted for perm cath placement today  - Scheduled for HD Tuesday  - will remove shiley once Perma cath placed       Diagnosis: Anemia  Assessment and Plan of Treatment:     Diagnosis: CHF (congestive heart failure)  Assessment and Plan of Treatment:     Diagnosis: Hypotension  Assessment and Plan of Treatment:     Diagnosis: CONCHIS (acute kidney injury)  Assessment and Plan of Treatment: PRINCIPAL DISCHARGE DIAGNOSIS  Diagnosis: GIB (gastrointestinal bleeding)  Assessment and Plan of Treatment: You presented with diarrhea, dark stools and abdominal pain. You had an endoscopy on 23 that showed erosive gastritis with superficial ulcers. You were kept on mechanical soft diet and your diet advanced. You were given  protonix 40mg PO daily. Biopsy from EGD showed chronic inactive gastritis and was negative for h-pylori. Please continue with your protonix as instructed and follow up with your primary care doctor within a week. Please also follow with a GI doctor.      SECONDARY DISCHARGE DIAGNOSES  Diagnosis: End-stage renal disease (ESRD)  Assessment and Plan of Treatment: You presented with hemodialysis dependent acute kidney injury with diarrhea and vomiting. You were monitored in Intensive care unit and given two run of dialysis. On floor a Perma catheter was placed and you were started on dialysis after your creatinine stayed elevated. Arrangements have been made for you to get outpatient hemodialysis.   Continue your dialysis as intructed by your Nephrologist. Dialysis will be reinstated upon your discharge.  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 You were given 4 units of PRBCs along with epogen and venofer. Please follow with your PCP calvin a week for followup labs.    Diagnosis: Sacral ulcer  Assessment and Plan of Treatment: You had incision and drainage of your sacral abscess by surgery. Please continue with your antibiotics Flagyl and Doxycycline. PRINCIPAL DISCHARGE DIAGNOSIS  Diagnosis: GIB (gastrointestinal bleeding)  Assessment and Plan of Treatment: You presented with diarrhea, dark stools and abdominal pain. You had an endoscopy on 23 that showed erosive gastritis with superficial ulcers. You were kept on mechanical soft diet and your diet advanced. You were given  protonix 40mg PO daily. Biopsy from EGD showed chronic inactive gastritis and was negative for h-pylori. Please continue with your protonix as instructed and follow up with your primary care doctor within a week. Please also follow with a GI doctor.      SECONDARY DISCHARGE DIAGNOSES  Diagnosis: End-stage renal disease (ESRD)  Assessment and Plan of Treatment: You presented with hemodialysis dependent acute kidney injury with diarrhea and vomiting. You were monitored in Intensive care unit and given two run of dialysis. On floor a Perma catheter was placed and you were started on dialysis after your creatinine stayed elevated. Arrangements have been made for you to get outpatient hemodialysis.   Continue your dialysis as intructed by your Nephrologist. Dialysis will be reinstated upon your discharge.  Please mantain a diet adequate for you condition  Continue dialysis on : ___ ___ and ___       Diagnosis: Sacral ulcer  Assessment and Plan of Treatment: You had incision and drainage of your sacral abscess by surgery. Please continue with your antibiotics Flagyl and ciprofloxacin for ----.   Please follow up with your primary care doctor within a week.    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 You were given multiple units of PRBCs along with epogen and venofer. Please follow with your PCP within a week for followup labs.    Diagnosis: HTN (hypertension)  Assessment and Plan of Treatment: Continue with blood pressure medication. Maintain a healthy diet that consist of low sugar, low fat, low sodium diet. Exercise frequently if possible.  Follow up with primary care physician in one week after discharge. PRINCIPAL DISCHARGE DIAGNOSIS  Diagnosis: GIB (gastrointestinal bleeding)  Assessment and Plan of Treatment: You presented with diarrhea, dark stools and abdominal pain. You had an endoscopy on 23 that showed erosive gastritis with superficial ulcers. You were kept on mechanical soft diet and your diet advanced. You were given  protonix 40mg PO daily. Biopsy from EGD showed chronic inactive gastritis and was negative for h-pylori. Please continue with your protonix as instructed and follow up with your primary care doctor within a week. Please also follow with a GI doctor.      SECONDARY DISCHARGE DIAGNOSES  Diagnosis: HTN (hypertension)  Assessment and Plan of Treatment: Continue with blood pressure medication. Maintain a healthy diet that consist of low sugar, low fat, low sodium diet. Exercise frequently if possible.  Follow up with primary care physician in one week after discharge.    Diagnosis: Sacral ulcer  Assessment and Plan of Treatment: You had incision and drainage of your sacral abscess by surgery. Please continue with your antibiotics Flagyl and ciprofloxacin for 3 more days.   Please follow up with your primary care doctor within a week.    Diagnosis: End-stage renal disease (ESRD)  Assessment and Plan of Treatment: You presented with hemodialysis dependent acute kidney injury with diarrhea and vomiting. You were monitored in Intensive care unit and given two run of dialysis. On floor a Perma catheter was placed and you were started on dialysis after your creatinine stayed elevated. Arrangements have been made for you to get outpatient hemodialysis.   Continue your dialysis as intructed by your Nephrologist. Dialysis will be reinstated upon your discharge.  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 You were given multiple units of PRBCs along with epogen and venofer. Please follow with your PCP within a week for followup labs. PRINCIPAL DISCHARGE DIAGNOSIS  Diagnosis: GIB (gastrointestinal bleeding)  Assessment and Plan of Treatment: You presented with diarrhea, dark stools and abdominal pain. You had an endoscopy on 23 that showed erosive gastritis with superficial ulcers. You were kept on mechanical soft diet and your diet advanced. You were given  protonix 40mg PO daily. Biopsy from EGD showed chronic inactive gastritis and was negative for h-pylori. Please continue with your protonix as instructed and follow up with your primary care doctor within a week. Please also follow with a GI doctor.      SECONDARY DISCHARGE DIAGNOSES  Diagnosis: HTN (hypertension)  Assessment and Plan of Treatment: Continue with blood pressure medication. Maintain a healthy diet that consist of low sugar, low fat, low sodium diet. Exercise frequently if possible.  Follow up with primary care physician in one week after discharge.    Diagnosis: Sacral ulcer  Assessment and Plan of Treatment: You had incision and drainage of your sacral abscess by surgery. Please continue with your antibiotics Flagyl and ciprofloxacin for 3 more days.   Please follow up with your primary care doctor within a week.    Diagnosis: End-stage renal disease (ESRD)  Assessment and Plan of Treatment: You presented with hemodialysis dependent acute kidney injury with diarrhea and vomiting. You were monitored in Intensive care unit and given two run of dialysis. On floor a Perma catheter was placed and you were started on dialysis after your creatinine stayed elevated. Arrangements have been made for you to get outpatient hemodialysis.   Continue your dialysis as intructed by your Nephrologist. Dialysis will be reinstated upon your discharge.  Please mantain a diet adequate for you condition  Continue dialysis on Tuesday , Thursday and Saturday       Diagnosis: Diabetes  Assessment and Plan of Treatment: HbA1C was found to be 5.6 on admission.  You may not need insulin at this point . Please check your finger stick and if it is above 200 start your insulin. You must maintain a healthy diet that consist of low sugar, low fat, low sodium diet. Exercise frequently if possible. Consider repeating your Hemoglobin A1c within 3 months after discharge to monitor your average blood glucose control. Follow up with primary care physician in one week after discharge for medication adjustment.       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 You were given multiple units of PRBCs along with epogen and venofer. Please follow with your PCP within a week for followup labs.

## 2019-09-26 NOTE — PROGRESS NOTE ADULT - PROBLEM SELECTOR PLAN 7
Anticoagulation held for GI bleed  will resume once Hb stable and clear from GI c/w SCDs  Anticoagulation held for GI bleed

## 2019-09-26 NOTE — PROGRESS NOTE ADULT - PROBLEM SELECTOR PLAN 1
baseline SCR 1.3. Admitted to ICU with HD dependent CONCHIS from [facility] for Diarrhoea and vomitting  SCR increased yesterday but slope of increase plateauing  dialysed today  RPT BMP daily to determine further HD needs  avoid NSAID's and IV contrast media

## 2019-09-26 NOTE — GOALS OF CARE CONVERSATION - ADVANCED CARE PLANNING - WHAT MATTERS MOST
Patient expressed hope to return to her prior level of functioning in order to assist her daughter who was recently diagnosed with cancer.

## 2019-09-26 NOTE — PROGRESS NOTE ADULT - SUBJECTIVE AND OBJECTIVE BOX
Subjective:   No complaints     Objective:    MEDICATIONS  (STANDING):  chlorhexidine 2% Cloths 1 Application(s) Topical daily  insulin lispro (HumaLOG) corrective regimen sliding scale   SubCutaneous three times a day before meals  mupirocin 2% Nasal 1 Application(s) Both Nostrils every 12 hours  pantoprazole    Tablet 40 milliGRAM(s) Oral before breakfast  polyethylene glycol 3350 17 Gram(s) Oral daily  PPD  5 Tuberculin Unit(s) Injectable 5 Unit(s) IntraDermal once    MEDICATIONS  (PRN):              Vital Signs Last 24 Hrs  T(C): 36.8 (26 Sep 2019 13:15), Max: 37.2 (26 Sep 2019 00:00)  T(F): 98.3 (26 Sep 2019 13:15), Max: 99 (26 Sep 2019 00:00)  HR: 75 (26 Sep 2019 13:15) (70 - 78)  BP: 149/75 (26 Sep 2019 13:15) (149/75 - 152/61)  BP(mean): --  RR: 17 (26 Sep 2019 13:15) (15 - 18)  SpO2: 94% (26 Sep 2019 09:09) (94% - 100%)      General:  Well developed, well nourished, alert and active, no pallor, NAD.  HEENT:    Normal appearance of conjunctiva, ears, nose, lips, oropharynx, and oral mucosa, anicteric.  Neck:  No masses, no asymmetry.  Lymph Nodes:  No lymphadenopathy.   Cardiovascular:  RRR normal S1/S2, no murmur.  Respiratory:  CTA B/L, normal respiratory effort.   Abdominal:   soft, no masses or tenderness, normoactive BS, NT/ND, no HSM.  Extremities:   No clubbing or cyanosis, normal capillary refill, no edema.   Skin:   No rash, jaundice, lesions, eczema.   Musculoskeletal:  No joint swelling, erythema or tenderness.   Neuro: No focal deficits.   Other:       LABS:                        8.6    5.85  )-----------( 132      ( 26 Sep 2019 07:26 )             25.5     09-26    135  |  97  |  59<H>  ----------------------------<  98  3.5   |  29  |  6.81<H>    Ca    8.0<L>      26 Sep 2019 07:26  Phos  4.7     09-26  Mg     2.3     09-26    TPro  5.6<L>  /  Alb  2.9<L>  /  TBili  0.9  /  DBili  x   /  AST  24  /  ALT  17  /  AlkPhos  60  09-26          RADIOLOGY & ADDITIONAL TESTS:

## 2019-09-26 NOTE — DISCHARGE NOTE PROVIDER - HOSPITAL COURSE
78 female with PMH of HTn, HLD, DM, Gout, CKD stage 4( creatinine  year ago, 1.05), comes in with abdominal pain, diarrhea. Patient reports having dark stools and diarrhea for 1 week. Patient also reports lower abdominal pain. Denies hematemesis. Reports mild nausea, no vomiting, no fever. Has bilateral leg swelling and exertional sob.         In Ed, BP: 98/ 58 mm hg, HR: 58, Temp: 97.2 F    Cbc shows hb of 6.7/20     Bmp shows na of 131, bicarb 10, elevated bun/creatinine 137/11     Troponin 0.015     EKG shows no ischemic changes         dmitted to ICU for acute renal failure and suspected GI bleed.         ICU course:    Patient underwent urgent dialysis on 9/22. Creatinine went down to 7. Patient had EGD done on 9/23. EGD showed erosive gastritis with superficial ulcers. Patient was started on clear diet and advanced to mechanical soft. Patient's Hgb on 9/24 noted to be 7.6 down from 8.2. Patient underwent dialysis on 9/24 and received another unit of PRBCs during dialysis.         Patient is medically and clinically stable to be downgraded to medicine floor. 78 female with PMH of HTn, HLD, DM, Gout, CKD stage 4( creatinine  year ago, 1.05), comes in with abdominal pain, diarrhea. Patient reports having dark stools and diarrhea for 1 week. Patient also reports lower abdominal pain. Denies hematemesis. Reports mild nausea, no vomiting, no fever. Has bilateral leg swelling and exertional sob.         In Ed, BP: 98/ 58 mm hg, HR: 58, Temp: 97.2 F    Cbc shows hb of 6.7/20     Bmp shows na of 131, bicarb 10, elevated bun/creatinine 137/11     Troponin 0.015     EKG shows no ischemic changes         Admitted to ICU for acute renal failure and suspected GI bleed.         ICU course:    Patient underwent urgent dialysis on 9/22. Creatinine went down to 7. Patient had EGD done on 9/23. EGD showed erosive gastritis with superficial ulcers. Patient was started on clear diet and advanced to mechanical soft. Patient's Hgb on 9/24 noted to be 7.6 down from 8.2. Patient underwent dialysis on 9/24 and received another unit of PRBCs during dialysis.         Patient is medically and clinically stable to be downgraded to medicine floor.     On floor, patient's creatinine function was monitored for long term dialysis and she was deemed to be ESRD. She got dialysis on 9/22, 9/24 and 9/26, Pt will need long term dialysis placement. She had a sacral abscess for which she got I&D with surgery. MRI spine was done which was negative for osteomyelitis.        Patient is stable for discharge per attending and is advised to follow up with PCP as outpatient    Please refer to patient's complete medical chart with documents for a full hospital course, for this is only a brief summary. 78 female with PMH of HTn, HLD, DM, Gout, CKD stage 4( creatinine  year ago, 1.05), comes in with abdominal pain, diarrhea. Patient reports having dark stools and diarrhea for 1 week. Patient also reports lower abdominal pain. Denies hematemesis. Reports mild nausea, no vomiting, no fever. Has bilateral leg swelling and exertional sob.         In Ed, BP: 98/ 58 mm hg, HR: 58, Temp: 97.2 F    Cbc shows hb of 6.7/20     Bmp shows na of 131, bicarb 10, elevated bun/creatinine 137/11     Troponin 0.015     EKG shows no ischemic changes         Admitted to ICU for acute renal failure and suspected GI bleed.         ICU course:    Patient underwent urgent dialysis on 9/22. Creatinine went down to 7. Patient had EGD done on 9/23. EGD showed erosive gastritis with superficial ulcers. Patient was started on clear diet and advanced to mechanical soft. Patient's Hgb on 9/24 noted to be 7.6 down from 8.2. Patient underwent dialysis on 9/24 and received another unit of PRBCs during dialysis.         Patient was medically and clinically stable to be downgraded to medicine floor.     On floor, patient's creatinine function was monitored for long term dialysis and she was deemed to be ESRD. She got dialysis on 9/22, 9/24 and 9/26, Pt will need long term dialysis placement. She had a sacral abscess for which she got I&D with surgery. MRI spine was done which was negative for osteomyelitis.        Patient is stable for discharge per attending and is advised to follow up with PCP as outpatient    Please refer to patient's complete medical chart with documents for a full hospital course, for this is only a brief summary.

## 2019-09-26 NOTE — PROGRESS NOTE ADULT - PROBLEM SELECTOR PLAN 1
baseline SCR 1.3  Pt was admitted to ICU with HD dependent CONCHIS from [facility] for Diarrhoea and vomiting  patient had 2 sessions of urgent dialysis 9/22 and 9/24    - Creatinine level currently 6.81    -HD today    -According to nephro Dr Pandey, will f/u today and reassess based on clinical condition, likely will not need long-term dialysis    -US renal shows no hydronephrosis    -Permacath held for now, will f/u with nephro, IR informed

## 2019-09-26 NOTE — GOALS OF CARE CONVERSATION - ADVANCED CARE PLANNING - CONVERSATION DETAILS
Pc Sw went to the patient's bedside to introduce herself and her role as well as provide emotional support.  Patient was easily engaged and expressed hope to not need HD, return home and return to her prior level of functioning.  Patient reported she resides in an apt. independently in [facility].  The patient's HCP/daughter is her support as well as her grandchildren.  Patient has four children, however stated she is closest with two of the four.  Patient's daughter/HCP Janet resides in [place] as well.  Patient verbalized concern regarding her daughters' health as her daughter received a call from her physician stating she needs to undergo radiation therapy for newly diagnosed uterine cancer. PC Sw validated the patient's feelings and recognized her concern for her daughters' well being.  Patient referenced needing to be more active to care for her daughter and recover to the extent that her daughter will not be worrying about the patient.  PC  suggested the patient make realistic goals dependent upon what her body can tolerate.  Patient agreed and stated she values the support she receives from her children and grandchildren and relies upon her [employer] farzana as a coping mechanism.  Patient denied concrete needs at this time and expressed appreciation of this sw's support.  Patient is aware of  the ongoing availability of social work during this admission.    MOLST was completed by Palliative attending .

## 2019-09-26 NOTE — PROGRESS NOTE ADULT - SUBJECTIVE AND OBJECTIVE BOX
PGY 1 Note discussed with supervising resident and primary attending    Patient is a 78y old  Female who presents with a chief complaint of abdominal pain, diarrhea (25 Sep 2019 17:20)      INTERVAL HPI/OVERNIGHT EVENTS: Patient seen and examined at the bedside.     MEDICATIONS  (STANDING):  chlorhexidine 2% Cloths 1 Application(s) Topical daily  insulin lispro (HumaLOG) corrective regimen sliding scale   SubCutaneous three times a day before meals  mupirocin 2% Nasal 1 Application(s) Both Nostrils every 12 hours  pantoprazole    Tablet 40 milliGRAM(s) Oral before breakfast  PPD  5 Tuberculin Unit(s) Injectable 5 Unit(s) IntraDermal once    MEDICATIONS  (PRN):      __________________________________________________  REVIEW OF SYSTEMS:    CONSTITUTIONAL: No fever,   EYES: no acute visual disturbances  NECK: No pain or stiffness  RESPIRATORY: No cough; No shortness of breath  CARDIOVASCULAR: No chest pain, no palpitations  GASTROINTESTINAL: No pain. No nausea or vomiting; No diarrhea   NEUROLOGICAL: No headache or numbness, no tremors  MUSCULOSKELETAL: No joint pain, no muscle pain  GENITOURINARY: no dysuria, no frequency, no hesitancy  PSYCHIATRY: no depression , no anxiety  ALL OTHER  ROS negative        Vital Signs Last 24 Hrs  T(C): 36.8 (26 Sep 2019 09:09), Max: 37.2 (26 Sep 2019 00:00)  T(F): 98.2 (26 Sep 2019 09:09), Max: 99 (26 Sep 2019 00:00)  HR: 72 (26 Sep 2019 09:09) (70 - 78)  BP: 150/53 (26 Sep 2019 09:09) (150/53 - 152/61)  BP(mean): --  RR: 17 (26 Sep 2019 09:09) (15 - 18)  SpO2: 94% (26 Sep 2019 09:09) (94% - 100%)    ________________________________________________  PHYSICAL EXAM:  GENERAL: NAD  HEENT: Normocephalic;  conjunctivae and sclerae clear; moist mucous membranes;   NECK : supple  CHEST/LUNG: Clear to auscultation bilaterally with good air entry   HEART: S1 S2  regular; no murmurs, gallops or rubs  ABDOMEN: Soft, Nontender, Nondistended; Bowel sounds present  EXTREMITIES: no cyanosis; no edema; no calf tenderness  SKIN: warm and dry; no rash  NERVOUS SYSTEM:  Awake and alert; Oriented  to place, person and time ; no new deficits    _________________________________________________  LABS:                        8.6    5.85  )-----------( 132      ( 26 Sep 2019 07:26 )             25.5     09-26    135  |  97  |  59<H>  ----------------------------<  98  3.5   |  29  |  6.81<H>    Ca    8.0<L>      26 Sep 2019 07:26  Phos  4.7     09-26  Mg     2.3     09-26    TPro  5.6<L>  /  Alb  2.9<L>  /  TBili  0.9  /  DBili  x   /  AST  24  /  ALT  17  /  AlkPhos  60  09-26        CAPILLARY BLOOD GLUCOSE      POCT Blood Glucose.: 101 mg/dL (26 Sep 2019 08:34)  POCT Blood Glucose.: 115 mg/dL (25 Sep 2019 21:39)  POCT Blood Glucose.: 125 mg/dL (25 Sep 2019 16:48)  POCT Blood Glucose.: 114 mg/dL (25 Sep 2019 11:48)        RADIOLOGY & ADDITIONAL TESTS:    Imaging Personally Reviewed:  YES/NO    Consultant(s) Notes Reviewed:   YES/ No    Care Discussed with Consultants :     Plan of care was discussed with patient and /or primary care giver; all questions and concerns were addressed and care was aligned with patient's wishes. PGY 1 Note discussed with supervising resident and primary attending    Patient is a 78y old  Female who presents with a chief complaint of abdominal pain, diarrhea (25 Sep 2019 17:20)      INTERVAL HPI/OVERNIGHT EVENTS:   Patient seen and examined at the bedside. Pt says she has not had a BM since Monday. Reports no pain or swelling.    MEDICATIONS  (STANDING):  chlorhexidine 2% Cloths 1 Application(s) Topical daily  insulin lispro (HumaLOG) corrective regimen sliding scale   SubCutaneous three times a day before meals  mupirocin 2% Nasal 1 Application(s) Both Nostrils every 12 hours  pantoprazole    Tablet 40 milliGRAM(s) Oral before breakfast  PPD  5 Tuberculin Unit(s) Injectable 5 Unit(s) IntraDermal once    MEDICATIONS  (PRN):      __________________________________________________  REVIEW OF SYSTEMS:    CONSTITUTIONAL: No fever,   EYES: no acute visual disturbances  NECK: No pain or stiffness  RESPIRATORY: No cough; No shortness of breath  CARDIOVASCULAR: No chest pain, no palpitations  GASTROINTESTINAL: abdominal pain resolved. No nausea or vomiting; no blood in stools now  NEUROLOGICAL: No headache or numbness, no tremors  MUSCULOSKELETAL: No joint pain, no muscle pain  GENITOURINARY: no dysuria, no frequency, no hesitancy  PSYCHIATRY: no depression , no anxiety  ALL OTHER  ROS negative        Vital Signs Last 24 Hrs  T(C): 36.8 (26 Sep 2019 09:09), Max: 37.2 (26 Sep 2019 00:00)  T(F): 98.2 (26 Sep 2019 09:09), Max: 99 (26 Sep 2019 00:00)  HR: 72 (26 Sep 2019 09:09) (70 - 78)  BP: 150/53 (26 Sep 2019 09:09) (150/53 - 152/61)  BP(mean): --  RR: 17 (26 Sep 2019 09:09) (15 - 18)  SpO2: 94% (26 Sep 2019 09:09) (94% - 100%)    ________________________________________________  PHYSICAL EXAM:  GENERAL: NAD, femoral catheter (placed in ED as per pt)  HEENT: Normocephalic;  conjunctivae and sclerae clear; moist mucous membranes;   NECK : supple  CHEST/LUNG: Clear to auscultation bilaterally with good air entry   HEART: S1 S2  regular; no murmurs, gallops or rubs  ABDOMEN: Soft, Nontender, Nondistended; Bowel sounds present  EXTREMITIES: no cyanosis; no edema; no calf tenderness  SKIN: warm and dry; no rash  NERVOUS SYSTEM:  Awake and alert; Oriented  to place, person and time ; no new deficits    _________________________________________________  LABS:                        8.6    5.85  )-----------( 132      ( 26 Sep 2019 07:26 )             25.5     09-26    135  |  97  |  59<H>  ----------------------------<  98  3.5   |  29  |  6.81<H>    Ca    8.0<L>      26 Sep 2019 07:26  Phos  4.7     09-26  Mg     2.3     09-26    TPro  5.6<L>  /  Alb  2.9<L>  /  TBili  0.9  /  DBili  x   /  AST  24  /  ALT  17  /  AlkPhos  60  09-26        CAPILLARY BLOOD GLUCOSE      POCT Blood Glucose.: 101 mg/dL (26 Sep 2019 08:34)  POCT Blood Glucose.: 115 mg/dL (25 Sep 2019 21:39)  POCT Blood Glucose.: 125 mg/dL (25 Sep 2019 16:48)  POCT Blood Glucose.: 114 mg/dL (25 Sep 2019 11:48)        RADIOLOGY & ADDITIONAL TESTS:    Imaging Personally Reviewed:  YES/NO    Consultant(s) Notes Reviewed:   YES/ No    Care Discussed with Consultants :     Plan of care was discussed with patient and /or primary care giver; all questions and concerns were addressed and care was aligned with patient's wishes.

## 2019-09-26 NOTE — GOALS OF CARE CONVERSATION - ADVANCED CARE PLANNING - TREATMENT GUIDELINE COMMENT
Patient's MOLST reflects DNR/DNI, however patient is awaiting a decision regarding the need for HD.  Patient stated she would pursue HD, but is hoping it will not be necessary.

## 2019-09-27 DIAGNOSIS — R91.8 OTHER NONSPECIFIC ABNORMAL FINDING OF LUNG FIELD: ICD-10-CM

## 2019-09-27 LAB
ALBUMIN SERPL ELPH-MCNC: 2.7 G/DL — LOW (ref 3.5–5)
ALP SERPL-CCNC: 67 U/L — SIGNIFICANT CHANGE UP (ref 40–120)
ALT FLD-CCNC: 20 U/L DA — SIGNIFICANT CHANGE UP (ref 10–60)
ANION GAP SERPL CALC-SCNC: 7 MMOL/L — SIGNIFICANT CHANGE UP (ref 5–17)
AST SERPL-CCNC: 33 U/L — SIGNIFICANT CHANGE UP (ref 10–40)
BILIRUB SERPL-MCNC: 1 MG/DL — SIGNIFICANT CHANGE UP (ref 0.2–1.2)
BUN SERPL-MCNC: 27 MG/DL — HIGH (ref 7–18)
CALCIUM SERPL-MCNC: 8.3 MG/DL — LOW (ref 8.4–10.5)
CHLORIDE SERPL-SCNC: 101 MMOL/L — SIGNIFICANT CHANGE UP (ref 96–108)
CO2 SERPL-SCNC: 30 MMOL/L — SIGNIFICANT CHANGE UP (ref 22–31)
CREAT SERPL-MCNC: 4.16 MG/DL — HIGH (ref 0.5–1.3)
GLUCOSE BLDC GLUCOMTR-MCNC: 124 MG/DL — HIGH (ref 70–99)
GLUCOSE BLDC GLUCOMTR-MCNC: 126 MG/DL — HIGH (ref 70–99)
GLUCOSE BLDC GLUCOMTR-MCNC: 134 MG/DL — HIGH (ref 70–99)
GLUCOSE BLDC GLUCOMTR-MCNC: 147 MG/DL — HIGH (ref 70–99)
GLUCOSE SERPL-MCNC: 123 MG/DL — HIGH (ref 70–99)
HCT VFR BLD CALC: 25.2 % — LOW (ref 34.5–45)
HGB BLD-MCNC: 8.3 G/DL — LOW (ref 11.5–15.5)
MAGNESIUM SERPL-MCNC: 2.1 MG/DL — SIGNIFICANT CHANGE UP (ref 1.6–2.6)
MCHC RBC-ENTMCNC: 29.4 PG — SIGNIFICANT CHANGE UP (ref 27–34)
MCHC RBC-ENTMCNC: 32.9 GM/DL — SIGNIFICANT CHANGE UP (ref 32–36)
MCV RBC AUTO: 89.4 FL — SIGNIFICANT CHANGE UP (ref 80–100)
NRBC # BLD: 0 /100 WBCS — SIGNIFICANT CHANGE UP (ref 0–0)
PHOSPHATE SERPL-MCNC: 2.4 MG/DL — LOW (ref 2.5–4.5)
PLATELET # BLD AUTO: 112 K/UL — LOW (ref 150–400)
POTASSIUM SERPL-MCNC: 3.8 MMOL/L — SIGNIFICANT CHANGE UP (ref 3.5–5.3)
POTASSIUM SERPL-SCNC: 3.8 MMOL/L — SIGNIFICANT CHANGE UP (ref 3.5–5.3)
PROT SERPL-MCNC: 5.5 G/DL — LOW (ref 6–8.3)
RBC # BLD: 2.82 M/UL — LOW (ref 3.8–5.2)
RBC # FLD: 16.5 % — HIGH (ref 10.3–14.5)
SODIUM SERPL-SCNC: 138 MMOL/L — SIGNIFICANT CHANGE UP (ref 135–145)
WBC # BLD: 5.77 K/UL — SIGNIFICANT CHANGE UP (ref 3.8–10.5)
WBC # FLD AUTO: 5.77 K/UL — SIGNIFICANT CHANGE UP (ref 3.8–10.5)

## 2019-09-27 PROCEDURE — 71045 X-RAY EXAM CHEST 1 VIEW: CPT | Mod: 26

## 2019-09-27 PROCEDURE — 99233 SBSQ HOSP IP/OBS HIGH 50: CPT | Mod: GC

## 2019-09-27 RX ORDER — POTASSIUM PHOSPHATE, MONOBASIC POTASSIUM PHOSPHATE, DIBASIC 236; 224 MG/ML; MG/ML
15 INJECTION, SOLUTION INTRAVENOUS ONCE
Refills: 0 | Status: DISCONTINUED | OUTPATIENT
Start: 2019-09-27 | End: 2019-09-27

## 2019-09-27 RX ORDER — IPRATROPIUM/ALBUTEROL SULFATE 18-103MCG
3 AEROSOL WITH ADAPTER (GRAM) INHALATION EVERY 6 HOURS
Refills: 0 | Status: DISCONTINUED | OUTPATIENT
Start: 2019-09-27 | End: 2019-10-07

## 2019-09-27 RX ADMIN — PANTOPRAZOLE SODIUM 40 MILLIGRAM(S): 20 TABLET, DELAYED RELEASE ORAL at 06:04

## 2019-09-27 RX ADMIN — MUPIROCIN 1 APPLICATION(S): 20 OINTMENT TOPICAL at 06:01

## 2019-09-27 RX ADMIN — MUPIROCIN 1 APPLICATION(S): 20 OINTMENT TOPICAL at 17:11

## 2019-09-27 NOTE — PROGRESS NOTE ADULT - SUBJECTIVE AND OBJECTIVE BOX
PGY 1 Note discussed with supervising resident and primary attending    Patient is a 78y old  Female who presents with a chief complaint of abdominal pain, diarrhea (27 Sep 2019 12:51)      INTERVAL HPI/OVERNIGHT EVENTS: Patient seen and examined at the bedside.     MEDICATIONS  (STANDING):  chlorhexidine 2% Cloths 1 Application(s) Topical daily  insulin lispro (HumaLOG) corrective regimen sliding scale   SubCutaneous three times a day before meals  mupirocin 2% Nasal 1 Application(s) Both Nostrils every 12 hours  pantoprazole    Tablet 40 milliGRAM(s) Oral before breakfast  polyethylene glycol 3350 17 Gram(s) Oral daily  potassium phosphate IVPB 15 milliMole(s) IV Intermittent once  PPD  5 Tuberculin Unit(s) Injectable 5 Unit(s) IntraDermal once    MEDICATIONS  (PRN):      __________________________________________________  REVIEW OF SYSTEMS:    CONSTITUTIONAL: No fever,   EYES: no acute visual disturbances  NECK: No pain or stiffness  RESPIRATORY: No cough; No shortness of breath  CARDIOVASCULAR: No chest pain, no palpitations  GASTROINTESTINAL: No pain. No nausea or vomiting; No diarrhea   NEUROLOGICAL: No headache or numbness, no tremors  MUSCULOSKELETAL: No joint pain, no muscle pain  GENITOURINARY: no dysuria, no frequency, no hesitancy  PSYCHIATRY: no depression , no anxiety  ALL OTHER  ROS negative        Vital Signs Last 24 Hrs  T(C): 37.1 (27 Sep 2019 07:14), Max: 37.6 (27 Sep 2019 00:02)  T(F): 98.8 (27 Sep 2019 07:14), Max: 99.6 (27 Sep 2019 00:02)  HR: 74 (27 Sep 2019 07:14) (74 - 78)  BP: 143/66 (27 Sep 2019 07:14) (143/66 - 174/85)  BP(mean): --  RR: 17 (27 Sep 2019 07:14) (16 - 18)  SpO2: 95% (27 Sep 2019 07:14) (88% - 95%)    ________________________________________________  PHYSICAL EXAM:  GENERAL: NAD  HEENT: Normocephalic;  conjunctivae and sclerae clear; moist mucous membranes;   NECK : supple  CHEST/LUNG: Clear to auscultation bilaterally with good air entry   HEART: S1 S2  regular; no murmurs, gallops or rubs  ABDOMEN: Soft, Nontender, Nondistended; Bowel sounds present  EXTREMITIES: no cyanosis; no edema; no calf tenderness  SKIN: warm and dry; no rash  NERVOUS SYSTEM:  Awake and alert; Oriented  to place, person and time ; no new deficits    _________________________________________________  LABS:                        8.3    5.77  )-----------( 112      ( 27 Sep 2019 05:50 )             25.2     09-27    138  |  101  |  27<H>  ----------------------------<  123<H>  3.8   |  30  |  4.16<H>    Ca    8.3<L>      27 Sep 2019 05:50  Phos  2.4     09-27  Mg     2.1     09-27    TPro  5.5<L>  /  Alb  2.7<L>  /  TBili  1.0  /  DBili  x   /  AST  33  /  ALT  20  /  AlkPhos  67  09-27        CAPILLARY BLOOD GLUCOSE      POCT Blood Glucose.: 147 mg/dL (27 Sep 2019 11:28)  POCT Blood Glucose.: 126 mg/dL (27 Sep 2019 07:58)  POCT Blood Glucose.: 137 mg/dL (26 Sep 2019 21:45)  POCT Blood Glucose.: 101 mg/dL (26 Sep 2019 16:29)        RADIOLOGY & ADDITIONAL TESTS:    Imaging Personally Reviewed:  YES/NO    Consultant(s) Notes Reviewed:   YES/ No    Care Discussed with Consultants :     Plan of care was discussed with patient and /or primary care giver; all questions and concerns were addressed and care was aligned with patient's wishes. PGY 1 Note discussed with supervising resident and primary attending    Patient is a 78y old  Female who presents with a chief complaint of abdominal pain, diarrhea (27 Sep 2019 12:51)      INTERVAL HPI/OVERNIGHT EVENTS: Patient seen and examined at the bedside. Pt says she has wheeze in morning. No other complaints.    MEDICATIONS  (STANDING):  chlorhexidine 2% Cloths 1 Application(s) Topical daily  insulin lispro (HumaLOG) corrective regimen sliding scale   SubCutaneous three times a day before meals  mupirocin 2% Nasal 1 Application(s) Both Nostrils every 12 hours  pantoprazole    Tablet 40 milliGRAM(s) Oral before breakfast  polyethylene glycol 3350 17 Gram(s) Oral daily  potassium phosphate IVPB 15 milliMole(s) IV Intermittent once  PPD  5 Tuberculin Unit(s) Injectable 5 Unit(s) IntraDermal once    MEDICATIONS  (PRN):      __________________________________________________  REVIEW OF SYSTEMS:  CONSTITUTIONAL: No fever,   EYES: no acute visual disturbances  NECK: No pain or stiffness  RESPIRATORY: No cough; No shortness of breath  CARDIOVASCULAR: No chest pain, no palpitations  GASTROINTESTINAL: abdominal pain resolved. No nausea or vomiting; no blood in stools now  NEUROLOGICAL: No headache or numbness, no tremors  MUSCULOSKELETAL: No joint pain, no muscle pain  GENITOURINARY: no dysuria, no frequency, no hesitancy  PSYCHIATRY: no depression , no anxiety      Vital Signs Last 24 Hrs  T(C): 37.1 (27 Sep 2019 07:14), Max: 37.6 (27 Sep 2019 00:02)  T(F): 98.8 (27 Sep 2019 07:14), Max: 99.6 (27 Sep 2019 00:02)  HR: 74 (27 Sep 2019 07:14) (74 - 78)  BP: 143/66 (27 Sep 2019 07:14) (143/66 - 174/85)  BP(mean): --  RR: 17 (27 Sep 2019 07:14) (16 - 18)  SpO2: 95% (27 Sep 2019 07:14) (88% - 95%)    ________________________________________________  PHYSICAL EXAM:  GENERAL: NAD, femoral catheter (placed in ED as per pt)  HEENT: Normocephalic;  conjunctivae and sclerae clear; moist mucous membranes;   NECK : supple  CHEST/LUNG: Clear to auscultation bilaterally with good air entry. crackles b/l at bases  HEART: S1 S2  regular; no murmurs, gallops or rubs  ABDOMEN: Soft, Nontender, Nondistended; Bowel sounds present  EXTREMITIES: no cyanosis; no edema; no calf tenderness  SKIN: warm and dry; no rash  NERVOUS SYSTEM:  Awake and alert; Oriented  to place, person and time ; no new deficits    _________________________________________________  LABS:                        8.3    5.77  )-----------( 112      ( 27 Sep 2019 05:50 )             25.2     09-27    138  |  101  |  27<H>  ----------------------------<  123<H>  3.8   |  30  |  4.16<H>    Ca    8.3<L>      27 Sep 2019 05:50  Phos  2.4     09-27  Mg     2.1     09-27    TPro  5.5<L>  /  Alb  2.7<L>  /  TBili  1.0  /  DBili  x   /  AST  33  /  ALT  20  /  AlkPhos  67  09-27        CAPILLARY BLOOD GLUCOSE      POCT Blood Glucose.: 147 mg/dL (27 Sep 2019 11:28)  POCT Blood Glucose.: 126 mg/dL (27 Sep 2019 07:58)  POCT Blood Glucose.: 137 mg/dL (26 Sep 2019 21:45)  POCT Blood Glucose.: 101 mg/dL (26 Sep 2019 16:29)        RADIOLOGY & ADDITIONAL TESTS:    < from: Xray Chest 1 View- PORTABLE-Routine (09.27.19 @ 14:21) >  EXAM:  XR CHEST PORTABLE ROUTINE 1V                            PROCEDURE DATE:  09/27/2019          INTERPRETATION:  CHEST AP PORTABLE:    History: f/u wheezing.     Date and time of exam: 9/27/2019 1:34 PM.    Technique: A single AP view of the chest was obtained.    Comparison exam: 9/21/2019.    Findings:  Persistent cardiomegaly. No hilar or mediastinal abnormality. There is   now an infiltrate in the left upper lobe which is a new finding since the   prior study. No definite evidence of pleural effusion. Pulmonary vascular   congestion, unchanged..    Impression:  Development of left upper lobe pulmonary infiltrate since 9/21/2019,   otherwise stable.      < end of copied text >      Imaging Personally Reviewed:  YES/NO    Consultant(s) Notes Reviewed:   YES/ No    Care Discussed with Consultants :     Plan of care was discussed with patient and /or primary care giver; all questions and concerns were addressed and care was aligned with patient's wishes.

## 2019-09-27 NOTE — PROGRESS NOTE ADULT - PROBLEM SELECTOR PLAN 2
pt c/o wheezing in morning  Repeat CXR on 9/27 shows KHOA infiltrate  Started on duoneb for now  will monitor

## 2019-09-27 NOTE — PROGRESS NOTE ADULT - ASSESSMENT
78 female with PMH of HTn, HLD, DM, Gout, CKD stage 4( creatinine  year ago, 1.05), comes in with abdominal pain, diarrhea.  Admitted for acute Renal Failure, GI bleed, downgraded from ICU  after urgent HD on 9/22, 9/24 and 9/26 for acute pulmonary edema and severe metabolic acidosis.

## 2019-09-27 NOTE — PROGRESS NOTE ADULT - SUBJECTIVE AND OBJECTIVE BOX
Patient is a 78y old  Female who presents with a chief complaint of abdominal pain, diarrhea (21 Sep 2019 19:07)    Subjective:  Doing well no complaints     Objective:      aspirin (Unknown)  ibuprofen (Unknown)  Mushrooms (Anaphylaxis)  shellfish (Anaphylaxis)    Intolerances      MEDICATIONS  (STANDING):  insulin glargine Injectable (LANTUS) 8 Unit(s) SubCutaneous at bedtime  insulin lispro (HumaLOG) corrective regimen sliding scale   SubCutaneous every 6 hours  pantoprazole Infusion 8 mG/Hr (10 mL/Hr) IV Continuous <Continuous>    MEDICATIONS  (PRN):      PAST MEDICAL & SURGICAL HISTORY:  Diabetes mellitus  CKD (chronic kidney disease)  Seasonal allergies  Vitamin D deficiency  Osteoarthritis  Osteoporosis  Breast lump  Cataract  Gout  Hyperlipidemia  Hypertension  History of knee replacement, total, left: 2009  History of breast surgery: Excision of left breast lump - benign  1991  H/O bilateral cataract extraction: 2012  and 2014    FAMILY HISTORY:  Family history of cancer    Social History: No hsitory of : Tobacco use, IVDA, EToH  ______________________________________________________________________________________    PHYSICAL EXAM    Daily Height in cm: 149.86 (21 Sep 2019 15:50)    Daily   BMI: 28.3 (09-21 @ 15:50)  Change in Weight:  Vital Signs Last 24 Hrs  T(C): 35.2 (21 Sep 2019 21:00), Max: 36.4 (21 Sep 2019 19:55)  T(F): 95.4 (21 Sep 2019 21:00), Max: 97.6 (21 Sep 2019 20:30)  HR: 56 (21 Sep 2019 21:45) (55 - 58)  BP: 114/49 (21 Sep 2019 21:30) (98/58 - 114/49)  BP(mean): 66 (21 Sep 2019 21:30) (66 - 88)  RR: 18 (21 Sep 2019 21:45) (17 - 23)  SpO2: 100% (21 Sep 2019 21:45) (96% - 100%)    General:  Well developed, well nourished, alert and active, no pallor, NAD.  HEENT:    Normal appearance of conjunctiva, ears, nose, lips, oropharynx, and oral mucosa, anicteric.  Neck:  No masses, no asymmetry.  Lymph Nodes:  No lymphadenopathy.   Cardiovascular:  RRR normal S1/S2, no murmur.  Respiratory:  CTA B/L, normal respiratory effort.   Abdominal:   soft, no masses or tenderness, normoactive BS, NT/ND, no HSM.  Extremities:   No clubbing or cyanosis, normal capillary refill, no edema.   Skin:   No rash, jaundice, lesions, eczema.   Musculoskeletal:  No joint swelling, erythema or tenderness.   Neuro: No focal deficits.   Other:   _______________________________________________________________________________________________  Lab Results:                          6.7    5.46  )-----------( 148      ( 21 Sep 2019 16:58 )             20.2     09-21    131<L>  |  102  |  143<H>  ----------------------------<  181<H>  5.3   |  12<L>  |  11.30<H>    Ca    8.3<L>      21 Sep 2019 22:33    TPro  6.5  /  Alb  3.6  /  TBili  0.4  /  DBili  x   /  AST  13  /  ALT  19  /  AlkPhos  70  09-21    LIVER FUNCTIONS - ( 21 Sep 2019 16:58 )  Alb: 3.6 g/dL / Pro: 6.5 g/dL / ALK PHOS: 70 U/L / ALT: 19 U/L DA / AST: 13 U/L / GGT: x           PT/INR - ( 21 Sep 2019 22:33 )   PT: 11.5 sec;   INR: 1.03 ratio         PTT - ( 21 Sep 2019 22:33 )  PTT:40.2 sec    CARDIAC MARKERS ( 21 Sep 2019 16:58 )  <0.015 ng/mL / x     / x     / x     / x          Stool Results:          RADIOLOGY RESULTS:  < from: CT Angio Chest w/ IV Cont (06.16.18 @ 22:54) >    EXAM:  CT ANGIO CHEST (W)AW IC                            PROCEDURE DATE:  06/16/2018          INTERPRETATION:  CLINICAL INFORMATION: Shortness of breath    COMPARISON: None    PROCEDURE:   CT Angiography of the Chest.  57 ml of Omnipaque 350 was injected intravenously. 43 ml were discarded.  Sagittal and coronal reformats were performed as well as 3D (MIP)   reconstructions.      FINDINGS:    CHEST:     LUNGS AND LARGE AIRWAYS: Patent central airways.  Mosaic groundglass   attenuation, nonspecific. Basilar atelectasis.  PLEURA: Trace bilateral pleural effusions.  VESSELS: Atherosclerotic changes of thoracic aorta. No thoracic aortic   aneurysm or dissection. Multiple images are degraded by respiratory   motion. Excluding regions with motion artifact, there are no pulmonary   arterial filling defects identified.  HEART: Enlarged. Aortic valve calcifications. No pericardial effusion.  MEDIASTINUM AND ELDA: No lymphadenopathy.  CHEST WALL AND LOWER NECK: Within normal limits.  VISUALIZED UPPER ABDOMEN: Nonspecific perinephric stranding.  BONES: Spinal degenerative changes. Probable chronically dislocated right   clavicular head.    IMPRESSION:     Negative for pulmonary embolism.                ALFREDO MACIEL M.D., ATTENDING RADIOLOGIST  This document has been electronically signed. Jun 17 2018  9:42AM                < end of copied text >    SURGICAL PATHOLOGY:

## 2019-09-27 NOTE — PROGRESS NOTE ADULT - PROBLEM SELECTOR PLAN 1
baseline SCR 1.3  Pt was admitted to ICU with HD dependent [facility] from [facility] for Diarrhoea and vomiting  patient had HD on 9/22, 9/24 and 9/26     - Creatinine level currently 4.6 (improving)     -monitor until monday with daily BMP    -According to nephro Dr Pandey, will f/u today and reassess based on clinical condition, likely will not need long-term dialysis    -US renal shows no hydronephrosis    -Permacath held for now, will f/u with nephro

## 2019-09-27 NOTE — PROGRESS NOTE ADULT - ASSESSMENT
78 year old female with complaints of dysphagia, and black stools. Found to be in renal failure.     Plan:  Doing well.   hemoglobin remains stable   Discharge planning       Renal failure   Workup as per ICU team   Monitor electrolytes   Advanced care planning was discussed with patient and family.  Advanced care planning forms were reviewed and discussed.  Risks, benefits and alternatives of gastroenterologic procedures were discussed in detail and all questions were answered.

## 2019-09-28 LAB
ALBUMIN SERPL ELPH-MCNC: 2.8 G/DL — LOW (ref 3.5–5)
ALP SERPL-CCNC: 65 U/L — SIGNIFICANT CHANGE UP (ref 40–120)
ALT FLD-CCNC: 18 U/L DA — SIGNIFICANT CHANGE UP (ref 10–60)
ANION GAP SERPL CALC-SCNC: 7 MMOL/L — SIGNIFICANT CHANGE UP (ref 5–17)
AST SERPL-CCNC: 26 U/L — SIGNIFICANT CHANGE UP (ref 10–40)
BILIRUB SERPL-MCNC: 1.2 MG/DL — SIGNIFICANT CHANGE UP (ref 0.2–1.2)
BUN SERPL-MCNC: 43 MG/DL — HIGH (ref 7–18)
CALCIUM SERPL-MCNC: 8.3 MG/DL — LOW (ref 8.4–10.5)
CHLORIDE SERPL-SCNC: 102 MMOL/L — SIGNIFICANT CHANGE UP (ref 96–108)
CO2 SERPL-SCNC: 30 MMOL/L — SIGNIFICANT CHANGE UP (ref 22–31)
CREAT SERPL-MCNC: 5.81 MG/DL — HIGH (ref 0.5–1.3)
GLUCOSE BLDC GLUCOMTR-MCNC: 101 MG/DL — HIGH (ref 70–99)
GLUCOSE BLDC GLUCOMTR-MCNC: 119 MG/DL — HIGH (ref 70–99)
GLUCOSE BLDC GLUCOMTR-MCNC: 137 MG/DL — HIGH (ref 70–99)
GLUCOSE BLDC GLUCOMTR-MCNC: 143 MG/DL — HIGH (ref 70–99)
GLUCOSE SERPL-MCNC: 118 MG/DL — HIGH (ref 70–99)
HCT VFR BLD CALC: 23.7 % — LOW (ref 34.5–45)
HGB BLD-MCNC: 8 G/DL — LOW (ref 11.5–15.5)
MAGNESIUM SERPL-MCNC: 2.2 MG/DL — SIGNIFICANT CHANGE UP (ref 1.6–2.6)
MCHC RBC-ENTMCNC: 30.5 PG — SIGNIFICANT CHANGE UP (ref 27–34)
MCHC RBC-ENTMCNC: 33.8 GM/DL — SIGNIFICANT CHANGE UP (ref 32–36)
MCV RBC AUTO: 90.5 FL — SIGNIFICANT CHANGE UP (ref 80–100)
NRBC # BLD: 0 /100 WBCS — SIGNIFICANT CHANGE UP (ref 0–0)
PHOSPHATE SERPL-MCNC: 3.1 MG/DL — SIGNIFICANT CHANGE UP (ref 2.5–4.5)
PLATELET # BLD AUTO: 110 K/UL — LOW (ref 150–400)
POTASSIUM SERPL-MCNC: 3.7 MMOL/L — SIGNIFICANT CHANGE UP (ref 3.5–5.3)
POTASSIUM SERPL-SCNC: 3.7 MMOL/L — SIGNIFICANT CHANGE UP (ref 3.5–5.3)
PROT SERPL-MCNC: 5.3 G/DL — LOW (ref 6–8.3)
RBC # BLD: 2.62 M/UL — LOW (ref 3.8–5.2)
RBC # FLD: 16.4 % — HIGH (ref 10.3–14.5)
SODIUM SERPL-SCNC: 139 MMOL/L — SIGNIFICANT CHANGE UP (ref 135–145)
WBC # BLD: 5.6 K/UL — SIGNIFICANT CHANGE UP (ref 3.8–10.5)
WBC # FLD AUTO: 5.6 K/UL — SIGNIFICANT CHANGE UP (ref 3.8–10.5)

## 2019-09-28 PROCEDURE — 99233 SBSQ HOSP IP/OBS HIGH 50: CPT | Mod: GC

## 2019-09-28 RX ORDER — ERYTHROPOIETIN 10000 [IU]/ML
10000 INJECTION, SOLUTION INTRAVENOUS; SUBCUTANEOUS
Refills: 0 | Status: DISCONTINUED | OUTPATIENT
Start: 2019-09-28 | End: 2019-10-07

## 2019-09-28 RX ADMIN — ERYTHROPOIETIN 10000 UNIT(S): 10000 INJECTION, SOLUTION INTRAVENOUS; SUBCUTANEOUS at 17:42

## 2019-09-28 RX ADMIN — CHLORHEXIDINE GLUCONATE 1 APPLICATION(S): 213 SOLUTION TOPICAL at 11:30

## 2019-09-28 RX ADMIN — Medication 3 MILLILITER(S): at 11:34

## 2019-09-28 RX ADMIN — MUPIROCIN 1 APPLICATION(S): 20 OINTMENT TOPICAL at 18:17

## 2019-09-28 RX ADMIN — MUPIROCIN 1 APPLICATION(S): 20 OINTMENT TOPICAL at 06:28

## 2019-09-28 RX ADMIN — PANTOPRAZOLE SODIUM 40 MILLIGRAM(S): 20 TABLET, DELAYED RELEASE ORAL at 06:28

## 2019-09-28 NOTE — PROGRESS NOTE ADULT - SUBJECTIVE AND OBJECTIVE BOX
PGY 1 Note discussed with supervising resident and primary attending    Patient is a 78y old  Female who presents with a chief complaint of abdominal pain, diarrhea (27 Sep 2019 13:26)      INTERVAL HPI/OVERNIGHT EVENTS: Patient seen and examined at the bedside.     MEDICATIONS  (STANDING):  chlorhexidine 2% Cloths 1 Application(s) Topical daily  insulin lispro (HumaLOG) corrective regimen sliding scale   SubCutaneous three times a day before meals  mupirocin 2% Nasal 1 Application(s) Both Nostrils every 12 hours  pantoprazole    Tablet 40 milliGRAM(s) Oral before breakfast    MEDICATIONS  (PRN):  ALBUTerol/ipratropium for Nebulization 3 milliLiter(s) Nebulizer every 6 hours PRN Wheezing      __________________________________________________  REVIEW OF SYSTEMS:    CONSTITUTIONAL: No fever,   EYES: no acute visual disturbances  NECK: No pain or stiffness  RESPIRATORY: No cough; No shortness of breath  CARDIOVASCULAR: No chest pain, no palpitations  GASTROINTESTINAL: No pain. No nausea or vomiting; No diarrhea   NEUROLOGICAL: No headache or numbness, no tremors  MUSCULOSKELETAL: No joint pain, no muscle pain  GENITOURINARY: no dysuria, no frequency, no hesitancy  PSYCHIATRY: no depression , no anxiety  ALL OTHER  ROS negative        Vital Signs Last 24 Hrs  T(C): 37.1 (28 Sep 2019 07:53), Max: 37.2 (27 Sep 2019 23:54)  T(F): 98.8 (28 Sep 2019 07:53), Max: 98.9 (27 Sep 2019 23:54)  HR: 76 (28 Sep 2019 07:53) (76 - 80)  BP: 171/69 (28 Sep 2019 07:53) (151/64 - 171/69)  BP(mean): --  RR: 18 (28 Sep 2019 07:53) (18 - 18)  SpO2: 100% (28 Sep 2019 07:53) (92% - 100%)    ________________________________________________  PHYSICAL EXAM:  GENERAL: NAD  HEENT: Normocephalic;  conjunctivae and sclerae clear; moist mucous membranes;   NECK : supple  CHEST/LUNG: Clear to auscultation bilaterally with good air entry   HEART: S1 S2  regular; no murmurs, gallops or rubs  ABDOMEN: Soft, Nontender, Nondistended; Bowel sounds present  EXTREMITIES: no cyanosis; no edema; no calf tenderness  SKIN: warm and dry; no rash  NERVOUS SYSTEM:  Awake and alert; Oriented  to place, person and time ; no new deficits    _________________________________________________  LABS:                        8.0    5.60  )-----------( 110      ( 28 Sep 2019 06:09 )             23.7     09-28    139  |  102  |  43<H>  ----------------------------<  118<H>  3.7   |  30  |  5.81<H>    Ca    8.3<L>      28 Sep 2019 06:09  Phos  3.1     09-28  Mg     2.2     09-28    TPro  5.3<L>  /  Alb  2.8<L>  /  TBili  1.2  /  DBili  x   /  AST  26  /  ALT  18  /  AlkPhos  65  09-28        CAPILLARY BLOOD GLUCOSE      POCT Blood Glucose.: 119 mg/dL (28 Sep 2019 08:15)  POCT Blood Glucose.: 134 mg/dL (27 Sep 2019 21:16)  POCT Blood Glucose.: 124 mg/dL (27 Sep 2019 17:02)  POCT Blood Glucose.: 147 mg/dL (27 Sep 2019 11:28)        RADIOLOGY & ADDITIONAL TESTS:    Imaging Personally Reviewed:  YES/NO    Consultant(s) Notes Reviewed:   YES/ No    Care Discussed with Consultants :     Plan of care was discussed with patient and /or primary care giver; all questions and concerns were addressed and care was aligned with patient's wishes. PGY 1 Note discussed with supervising resident and primary attending    Patient is a 78y old  Female who presents with a chief complaint of abdominal pain, diarrhea (27 Sep 2019 13:26)      INTERVAL HPI/OVERNIGHT EVENTS: Patient seen and examined at the bedside. Pt denies any wheezing at this time, has not found the need to use Duoneb so far.     MEDICATIONS  (STANDING):  chlorhexidine 2% Cloths 1 Application(s) Topical daily  insulin lispro (HumaLOG) corrective regimen sliding scale   SubCutaneous three times a day before meals  mupirocin 2% Nasal 1 Application(s) Both Nostrils every 12 hours  pantoprazole    Tablet 40 milliGRAM(s) Oral before breakfast    MEDICATIONS  (PRN):  ALBUTerol/ipratropium for Nebulization 3 milliLiter(s) Nebulizer every 6 hours PRN Wheezing      __________________________________________________  REVIEW OF SYSTEMS:    CONSTITUTIONAL: No fever,   EYES: no acute visual disturbances  NECK: No pain or stiffness  RESPIRATORY: No cough; No shortness of breath  CARDIOVASCULAR: No chest pain, no palpitations  GASTROINTESTINAL: abdominal pain resolved. No nausea or vomiting; no blood in stools now  NEUROLOGICAL: No headache or numbness, no tremors  MUSCULOSKELETAL: No joint pain, no muscle pain  GENITOURINARY: no dysuria, no frequency, no hesitancy  PSYCHIATRY: no depression , no anxiety      Vital Signs Last 24 Hrs  T(C): 37.1 (28 Sep 2019 07:53), Max: 37.2 (27 Sep 2019 23:54)  T(F): 98.8 (28 Sep 2019 07:53), Max: 98.9 (27 Sep 2019 23:54)  HR: 76 (28 Sep 2019 07:53) (76 - 80)  BP: 171/69 (28 Sep 2019 07:53) (151/64 - 171/69)  BP(mean): --  RR: 18 (28 Sep 2019 07:53) (18 - 18)  SpO2: 100% (28 Sep 2019 07:53) (92% - 100%)    ________________________________________________  PHYSICAL EXAM:  GENERAL: NAD, femoral catheter (placed in ED as per pt)  HEENT: Normocephalic;  conjunctivae and sclerae clear; moist mucous membranes;   NECK : supple  CHEST/LUNG: Clear to auscultation bilaterally with good air entry. crackles b/l at bases  HEART: S1 S2  regular; no murmurs, gallops or rubs  ABDOMEN: Soft, Nontender, Nondistended; Bowel sounds present  EXTREMITIES: no cyanosis; no edema; no calf tenderness  SKIN: warm and dry; no rash  NERVOUS SYSTEM:  Awake and alert; Oriented  to place, person and time ; no new deficits    _________________________________________________  LABS:                        8.0    5.60  )-----------( 110      ( 28 Sep 2019 06:09 )             23.7     09-28    139  |  102  |  43<H>  ----------------------------<  118<H>  3.7   |  30  |  5.81<H>    Ca    8.3<L>      28 Sep 2019 06:09  Phos  3.1     09-28  Mg     2.2     09-28    TPro  5.3<L>  /  Alb  2.8<L>  /  TBili  1.2  /  DBili  x   /  AST  26  /  ALT  18  /  AlkPhos  65  09-28        CAPILLARY BLOOD GLUCOSE      POCT Blood Glucose.: 119 mg/dL (28 Sep 2019 08:15)  POCT Blood Glucose.: 134 mg/dL (27 Sep 2019 21:16)  POCT Blood Glucose.: 124 mg/dL (27 Sep 2019 17:02)  POCT Blood Glucose.: 147 mg/dL (27 Sep 2019 11:28)        RADIOLOGY & ADDITIONAL TESTS:    Imaging Personally Reviewed:  YES/NO    Consultant(s) Notes Reviewed:   YES/ No    Care Discussed with Consultants :     Plan of care was discussed with patient and /or primary care giver; all questions and concerns were addressed and care was aligned with patient's wishes.

## 2019-09-28 NOTE — PROGRESS NOTE ADULT - ASSESSMENT
A/P  PT  WITH  ACUTE ON  CHR  RENAL  FAILURE  SHE  USED  TO  FOLLOW  UP  WITH  ME IN  OFF  UP UNTIL 2017,  HER  CR  WAS 2.2 IN  2017  NOW  IS  ESRD  HAS VOL  EXCESS,   HAS  LITTLE URINE OUTPUT  WILL NEED  LONG  TERM HD  EXPLAINED TO  THE PT  AND HER  DAUGHTER  WITH HER  BOTH  OF  THEM  UNDERSTAND   WILL NEED PC  PLACED EARLY  NEXT WEEK AND  AVF  CREATION  AS  WELL    HER  HB  IS  LOW, ,  START EPOGEN  CHECK  FE  STORES,  WILL  PROBABLY  NEED IV  FE  AS  WELL    CHECK  PTH  LEVELS  PO4  IS BETTER    BP  IS  HIGH TODAY, IS NOT ON  ANY  BP  MEDS  FOLLOW BP  AS  HER  VOL  STATUS IMPROVES

## 2019-09-28 NOTE — PROGRESS NOTE ADULT - PROBLEM SELECTOR PLAN 1
baseline SCR 1.3  Pt was admitted to ICU with HD dependent CONCHIS from [facility] for Diarrhea and vomiting  patient had HD on 9/22, 9/24 and 9/26     - Creatinine level currently 5.8    -monitor until monday with daily BMP    -According to nephro Dr Pandey, will f/u today and reassess based on clinical condition, likely will not need long-term dialysis    -US renal shows no hydronephrosis    -Permacath held for now, will f/u with nephro baseline SCR 1.3  Pt was admitted to ICU with HD dependent CONCHIS from [facility] for Diarrhea and vomiting  patient had HD on 9/22, 9/24 and 9/26     - Creatinine level currently 5.8    -monitor until monday with daily BMP    -According to nephro Dr Pandey, will f/u today and reassess based on clinical condition, likely will  need long-term dialysis    -US renal shows no hydronephrosis

## 2019-09-28 NOTE — PROGRESS NOTE ADULT - SUBJECTIVE AND OBJECTIVE BOX
Patient is a 78y Female with  ACUTE  ON  CHR  RENAL  FAILURE, REQUIRING  HD  HAS  BEEN  DIALYZED  3 TIMES  SO  FAR  HAS  LITTLE URINE OUTPUT  HER  SOB  HAS IMPROVED  FEELS OK AT T HE  TIME OF  EXAM  IS TALKING  WITH HER  DAUGHTER      aspirin (Unknown)  ibuprofen (Unknown)  Mushrooms (Anaphylaxis)  shellfish (Anaphylaxis)    Hospital Medications:   MEDICATIONS  (STANDING):  chlorhexidine 2% Cloths 1 Application(s) Topical daily  insulin lispro (HumaLOG) corrective regimen sliding scale   SubCutaneous three times a day before meals  mupirocin 2% Nasal 1 Application(s) Both Nostrils every 12 hours  pantoprazole    Tablet 40 milliGRAM(s) Oral before breakfast    REVIEW OF SYSTEMS:  HAS NO   FEVER  CHILLS  SOB  IS BETTER  AND  CAN  WALK  LONGER  NO  COUGH     NO CH  PAIN  / PALPITATIONS   APPETITE IS   GOOD, NO  N/V  OR  ABD  PAIN  VOIDING   LITTLE  HAS LEG  SWELLING      VITALS:  T(F): 98.8 (19 @ 07:53), Max: 98.9 (19 @ 23:54)  HR: 76 (19 @ 07:53)  BP: 171/69 (19 @ 07:53)  RR: 18 (19 @ 07:53)  SpO2: 100% (19 @ 07:53)  Wt(kg): --      PHYSICAL EXAM:  Constitutional: NAD  HEENT: CONJ  PALE  Neck: No JVD  Respiratory: HAS  DECREASED  BR  SOUNDS AT  ABSES  POST   Cardiovascular: S1, S2, RRR  Gastrointestinal: BS+, soft, NT/ND  Extremities:  peripheral edema 2+  Neurological: A/O x 3,   No dickson.     Vascular Access: L  IJ  FEMORAL  SHILEY    LABS:      139  |  102  |  43<H>  ----------------------------<  118<H>  3.7   |  30  |  5.81<H>    Ca    8.3<L>      28 Sep 2019 06:09  Phos  3.1       Mg     2.2         TPro  5.3<L>  /  Alb  2.8<L>  /  TBili  1.2  /  DBili      /  AST  26  /  ALT  18  /  AlkPhos  65      Creatinine Trend: 5.81 <--, 4.16 <--, 6.81 <--, 5.36 <--, 8.74 <--, 8.04 <--, 7.42 <--, 11.70 <--, 11.70 <--, 11.30 <--, 11.50 <--                        8.0    5.60  )-----------( 110      ( 28 Sep 2019 06:09 )             23.7     Urine Studies:  Urinalysis Basic - ( 21 Sep 2019 18:59 )    Color: Yellow / Appearance: Slightly Turbid / S.020 / pH:   Gluc:  / Ketone: Negative  / Bili: Negative / Urobili: Negative   Blood:  / Protein: 100 / Nitrite: Negative   Leuk Esterase: Trace / RBC: >50 /HPF / WBC 6-10 /HPF   Sq Epi:  / Non Sq Epi: Few /HPF / Bacteria: Few /HPF      Sodium, Random Urine: 10 mmol/L ( @ 18:59)  Osmolality, Random Urine: 353 mos/kg ( @ 18:59)  Creatinine, Random Urine: 211 mg/dL ( @ 18:59)  Chloride, Random Urine: 14 mmol/L ( @ 18:59)    RADIOLOGY & ADDITIONAL STUDIES:

## 2019-09-28 NOTE — PROGRESS NOTE ADULT - PROBLEM SELECTOR PLAN 2
pt c/o wheezing yesterday  Repeat CXR on 9/27 shows KHOA infiltrate  Started on duoneb for now  pt not wheezing right now, becomes more SOB on walking to bathroom  will monitor

## 2019-09-29 LAB
ALBUMIN SERPL ELPH-MCNC: 2.6 G/DL — LOW (ref 3.5–5)
ALP SERPL-CCNC: 68 U/L — SIGNIFICANT CHANGE UP (ref 40–120)
ALT FLD-CCNC: 19 U/L DA — SIGNIFICANT CHANGE UP (ref 10–60)
ANION GAP SERPL CALC-SCNC: 10 MMOL/L — SIGNIFICANT CHANGE UP (ref 5–17)
AST SERPL-CCNC: 22 U/L — SIGNIFICANT CHANGE UP (ref 10–40)
BILIRUB SERPL-MCNC: 1.3 MG/DL — HIGH (ref 0.2–1.2)
BUN SERPL-MCNC: 29 MG/DL — HIGH (ref 7–18)
CALCIUM SERPL-MCNC: 8.5 MG/DL — SIGNIFICANT CHANGE UP (ref 8.4–10.5)
CHLORIDE SERPL-SCNC: 99 MMOL/L — SIGNIFICANT CHANGE UP (ref 96–108)
CO2 SERPL-SCNC: 29 MMOL/L — SIGNIFICANT CHANGE UP (ref 22–31)
CREAT SERPL-MCNC: 4.19 MG/DL — HIGH (ref 0.5–1.3)
GLUCOSE BLDC GLUCOMTR-MCNC: 126 MG/DL — HIGH (ref 70–99)
GLUCOSE BLDC GLUCOMTR-MCNC: 134 MG/DL — HIGH (ref 70–99)
GLUCOSE BLDC GLUCOMTR-MCNC: 140 MG/DL — HIGH (ref 70–99)
GLUCOSE BLDC GLUCOMTR-MCNC: 145 MG/DL — HIGH (ref 70–99)
GLUCOSE SERPL-MCNC: 116 MG/DL — HIGH (ref 70–99)
HCT VFR BLD CALC: 24.2 % — LOW (ref 34.5–45)
HGB BLD-MCNC: 8.1 G/DL — LOW (ref 11.5–15.5)
MAGNESIUM SERPL-MCNC: 2 MG/DL — SIGNIFICANT CHANGE UP (ref 1.6–2.6)
MCHC RBC-ENTMCNC: 30.1 PG — SIGNIFICANT CHANGE UP (ref 27–34)
MCHC RBC-ENTMCNC: 33.5 GM/DL — SIGNIFICANT CHANGE UP (ref 32–36)
MCV RBC AUTO: 90 FL — SIGNIFICANT CHANGE UP (ref 80–100)
NRBC # BLD: 0 /100 WBCS — SIGNIFICANT CHANGE UP (ref 0–0)
PHOSPHATE SERPL-MCNC: 2.6 MG/DL — SIGNIFICANT CHANGE UP (ref 2.5–4.5)
PLATELET # BLD AUTO: 103 K/UL — LOW (ref 150–400)
POTASSIUM SERPL-MCNC: 3.2 MMOL/L — LOW (ref 3.5–5.3)
POTASSIUM SERPL-SCNC: 3.2 MMOL/L — LOW (ref 3.5–5.3)
PROT SERPL-MCNC: 5.3 G/DL — LOW (ref 6–8.3)
RBC # BLD: 2.69 M/UL — LOW (ref 3.8–5.2)
RBC # FLD: 16 % — HIGH (ref 10.3–14.5)
SODIUM SERPL-SCNC: 138 MMOL/L — SIGNIFICANT CHANGE UP (ref 135–145)
WBC # BLD: 5.66 K/UL — SIGNIFICANT CHANGE UP (ref 3.8–10.5)
WBC # FLD AUTO: 5.66 K/UL — SIGNIFICANT CHANGE UP (ref 3.8–10.5)

## 2019-09-29 PROCEDURE — 99233 SBSQ HOSP IP/OBS HIGH 50: CPT | Mod: GC

## 2019-09-29 RX ORDER — BACITRACIN ZINC 500 UNIT/G
1 OINTMENT IN PACKET (EA) TOPICAL THREE TIMES A DAY
Refills: 0 | Status: DISCONTINUED | OUTPATIENT
Start: 2019-09-29 | End: 2019-10-04

## 2019-09-29 RX ADMIN — MUPIROCIN 1 APPLICATION(S): 20 OINTMENT TOPICAL at 06:06

## 2019-09-29 RX ADMIN — MUPIROCIN 1 APPLICATION(S): 20 OINTMENT TOPICAL at 17:45

## 2019-09-29 RX ADMIN — Medication 1 APPLICATION(S): at 23:00

## 2019-09-29 RX ADMIN — PANTOPRAZOLE SODIUM 40 MILLIGRAM(S): 20 TABLET, DELAYED RELEASE ORAL at 06:06

## 2019-09-29 RX ADMIN — CHLORHEXIDINE GLUCONATE 1 APPLICATION(S): 213 SOLUTION TOPICAL at 11:49

## 2019-09-29 NOTE — PROGRESS NOTE ADULT - PROBLEM SELECTOR PLAN 1
baseline SCR 1.3  Pt was admitted to ICU with HD dependent CONCHIS from [facility] for Diarrhea and vomiting  patient had HD on 9/22, 9/24 and 9/26     - Creatinine level currently 5.8>4.19    -monitor until monday with daily BMP    - Dr Mi on board  - Pending Perma cath placement   - Scheduled for HD Tuesday  - Please remove shiyaakov once Perma cath placed   - BP controlled today, will start Amlodipine 5mg if needed  - Iron profile ordered

## 2019-09-29 NOTE — PROGRESS NOTE ADULT - ASSESSMENT
A/P  PT  WITH  CKD  NOW  ESRD,  DIALYZED  YESTERDAY ,  FOR  REGULAR  HD  ON  TUES  WILL NEED  PC  PLACED   HAS L  FEMORAL  SHILEY  ,  SHOULD  BE  REMOVED  BY  MON  OR  TUES   ON  EPOGEN WITH  HD  BP  IS  BETTER  AS HER  VOL  STATUS IMPROVES  IF  BP  GOES  ABOVE 160   SYS,  ADD AMLODIPINE 5MG PO QD  CHECK  FE STORES  WILL NEED CREATION  OF  AVF  AS  WELL  OUTPATIENT  HD  ARRANGEMENTS -  [place]  HD  EXPLAINED TO THE PT

## 2019-09-29 NOTE — PROGRESS NOTE ADULT - SUBJECTIVE AND OBJECTIVE BOX
Patient is a 78y old  Female who presents with a chief complaint of abdominal pain, diarrhea (29 Sep 2019 13:18)    Today:  Patient was seen and examined at bedside   Complains of pain in buttock area  Exam shows skin break at gluteal cleft, no exudates, mild surrounding erythema     Vitals stable     REVIEW OF SYSTEMS: denies fever, chills, SOB, palpitations, chest pain, abdominal pain, nausea, vomiting diarrhea, constipation, dizziness    PHYSICAL EXAM:  GENERAL: NAD, well-groomed, well-developed  NERVOUS SYSTEM:  Alert & Oriented X3, Good concentration;   CHEST/LUNG: Clear to percussion bilaterally;   HEART: Regular rate and rhythm; No murmurs, rubs, or gallops  ABDOMEN: Soft, Nontender, Nondistended; Bowel sounds present  Extremities: 1+ peripheral edema   Left femoral Shiley     LABS:                        8.1    5.66  )-----------( 103      ( 29 Sep 2019 07:30 )             24.2     09-29    138  |  99  |  29<H>  ----------------------------<  116<H>  3.2<L>   |  29  |  4.19<H>    Ca    8.5      29 Sep 2019 07:30  Phos  2.6     09-29  Mg     2.0     09-29    TPro  5.3<L>  /  Alb  2.6<L>  /  TBili  1.3<H>  /  DBili  x   /  AST  22  /  ALT  19  /  AlkPhos  68  09-29

## 2019-09-29 NOTE — PROGRESS NOTE ADULT - SUBJECTIVE AND OBJECTIVE BOX
Patient is a 78y Female with  CKD  NOW  ESRD  HAD HER  HD  YESTERDAY  FEELS OK  TODAY   HER  BREATHING  IS MUCH  BETTER  AND  LEG  SWELLING IMPROVED  APPETITE  HAS  IMPROVED    aspirin (Unknown)  ibuprofen (Unknown)  Mushrooms (Anaphylaxis)  shellfish (Anaphylaxis)    Hospital Medications:   MEDICATIONS  (STANDING):  chlorhexidine 2% Cloths 1 Application(s) Topical daily  epoetin lara Injectable 32155 Unit(s) IV Push <User Schedule>  insulin lispro (HumaLOG) corrective regimen sliding scale   SubCutaneous three times a day before meals  mupirocin 2% Nasal 1 Application(s) Both Nostrils every 12 hours  pantoprazole    Tablet 40 milliGRAM(s) Oral before breakfast    REVIEW OF SYSTEMS:  FEELS  BETTER  HAS NO   FEVER  CHILLS   NO   SOB  OR  COUGH   NO CH  PAIN  / PALPITATIONS   APPETITE I S BETTER, NO  N/V  OR  ABD  PAIN  VOIDING  LITTLE   LEG  SWELLING I S STILL THERE  THOUGH  BETTER      VITALS:  T(F): 99 (09-29-19 @ 07:47), Max: 99.2 (09-29-19 @ 00:21)  HR: 80 (09-29-19 @ 07:47)  BP: 157/63 (09-29-19 @ 07:47)  RR: 18 (09-29-19 @ 07:47)  SpO2: 98% (09-29-19 @ 07:47)  Wt(kg): --    09-28 @ 07:01  -  09-29 @ 07:00  --------------------------------------------------------  IN: 0 mL / OUT: 2 mL / NET: -2 mL        PHYSICAL EXAM:  Constitutional: NAD  HEENT:CONJ  PALE  Neck: No JVD  Respiratory: CTAB, no wheezes, rales or rhonchi  Cardiovascular: S1, S2, RRR  Gastrointestinal: BS+, soft, NT/ND  Extremities:  peripheral edema 1 +  Neurological: A/O x 3,   :  No dickson.   Vascular Access: L  FEMORAL  EVELYN  IN PLACE    LABS:  09-29    138  |  99  |  29<H>  ----------------------------<  116<H>  3.2<L>   |  29  |  4.19<H>    Ca    8.5      29 Sep 2019 07:30  Phos  2.6     09-29  Mg     2.0     09-29    TPro  5.3<L>  /  Alb  2.6<L>  /  TBili  1.3<H>  /  DBili      /  AST  22  /  ALT  19  /  AlkPhos  68  09-29    Creatinine Trend: 4.19 <--, 5.81 <--, 4.16 <--, 6.81 <--, 5.36 <--, 8.74 <--, 8.04 <--, 7.42 <--, 11.70 <--                        8.1    5.66  )-----------( 103      ( 29 Sep 2019 07:30 )             24.2     Urine Studies:      RADIOLOGY & ADDITIONAL STUDIES:

## 2019-09-29 NOTE — PROGRESS NOTE ADULT - PROBLEM SELECTOR PLAN 5
history of HTN, not currently on home meds  -continue to monitor bp; currently stable  _ start Amlodipine if needed SBP>160 mm Hg

## 2019-09-30 DIAGNOSIS — L98.429 NON-PRESSURE CHRONIC ULCER OF BACK WITH UNSPECIFIED SEVERITY: ICD-10-CM

## 2019-09-30 LAB
ALBUMIN SERPL ELPH-MCNC: 2.7 G/DL — LOW (ref 3.5–5)
ALP SERPL-CCNC: 73 U/L — SIGNIFICANT CHANGE UP (ref 40–120)
ALT FLD-CCNC: 21 U/L DA — SIGNIFICANT CHANGE UP (ref 10–60)
ANION GAP SERPL CALC-SCNC: 10 MMOL/L — SIGNIFICANT CHANGE UP (ref 5–17)
AST SERPL-CCNC: 26 U/L — SIGNIFICANT CHANGE UP (ref 10–40)
BILIRUB SERPL-MCNC: 1.2 MG/DL — SIGNIFICANT CHANGE UP (ref 0.2–1.2)
BUN SERPL-MCNC: 49 MG/DL — HIGH (ref 7–18)
CALCIUM SERPL-MCNC: 8.5 MG/DL — SIGNIFICANT CHANGE UP (ref 8.4–10.5)
CHLORIDE SERPL-SCNC: 97 MMOL/L — SIGNIFICANT CHANGE UP (ref 96–108)
CO2 SERPL-SCNC: 29 MMOL/L — SIGNIFICANT CHANGE UP (ref 22–31)
CREAT SERPL-MCNC: 6.31 MG/DL — HIGH (ref 0.5–1.3)
FERRITIN SERPL-MCNC: 681 NG/ML — HIGH (ref 15–150)
GLUCOSE BLDC GLUCOMTR-MCNC: 117 MG/DL — HIGH (ref 70–99)
GLUCOSE BLDC GLUCOMTR-MCNC: 148 MG/DL — HIGH (ref 70–99)
GLUCOSE BLDC GLUCOMTR-MCNC: 169 MG/DL — HIGH (ref 70–99)
GLUCOSE SERPL-MCNC: 126 MG/DL — HIGH (ref 70–99)
HCT VFR BLD CALC: 23.7 % — LOW (ref 34.5–45)
HGB BLD-MCNC: 7.9 G/DL — LOW (ref 11.5–15.5)
IRON SATN MFR SERPL: 16 % — SIGNIFICANT CHANGE UP (ref 15–50)
IRON SATN MFR SERPL: 24 UG/DL — LOW (ref 40–150)
MAGNESIUM SERPL-MCNC: 2 MG/DL — SIGNIFICANT CHANGE UP (ref 1.6–2.6)
MCHC RBC-ENTMCNC: 29.8 PG — SIGNIFICANT CHANGE UP (ref 27–34)
MCHC RBC-ENTMCNC: 33.3 GM/DL — SIGNIFICANT CHANGE UP (ref 32–36)
MCV RBC AUTO: 89.4 FL — SIGNIFICANT CHANGE UP (ref 80–100)
NRBC # BLD: 0 /100 WBCS — SIGNIFICANT CHANGE UP (ref 0–0)
PHOSPHATE SERPL-MCNC: 2.9 MG/DL — SIGNIFICANT CHANGE UP (ref 2.5–4.5)
PLATELET # BLD AUTO: 132 K/UL — LOW (ref 150–400)
POTASSIUM SERPL-MCNC: 3.5 MMOL/L — SIGNIFICANT CHANGE UP (ref 3.5–5.3)
POTASSIUM SERPL-SCNC: 3.5 MMOL/L — SIGNIFICANT CHANGE UP (ref 3.5–5.3)
PROT SERPL-MCNC: 5.5 G/DL — LOW (ref 6–8.3)
RBC # BLD: 2.65 M/UL — LOW (ref 3.8–5.2)
RBC # FLD: 15.2 % — HIGH (ref 10.3–14.5)
SODIUM SERPL-SCNC: 136 MMOL/L — SIGNIFICANT CHANGE UP (ref 135–145)
TIBC SERPL-MCNC: 147 UG/DL — LOW (ref 250–450)
UIBC SERPL-MCNC: 123 UG/DL — SIGNIFICANT CHANGE UP (ref 110–370)
WBC # BLD: 6.68 K/UL — SIGNIFICANT CHANGE UP (ref 3.8–10.5)
WBC # FLD AUTO: 6.68 K/UL — SIGNIFICANT CHANGE UP (ref 3.8–10.5)

## 2019-09-30 PROCEDURE — 76000 FLUOROSCOPY <1 HR PHYS/QHP: CPT | Mod: 26,59

## 2019-09-30 PROCEDURE — 76937 US GUIDE VASCULAR ACCESS: CPT | Mod: 26

## 2019-09-30 PROCEDURE — 36558 INSERT TUNNELED CV CATH: CPT | Mod: RT

## 2019-09-30 PROCEDURE — 99233 SBSQ HOSP IP/OBS HIGH 50: CPT | Mod: GC

## 2019-09-30 PROCEDURE — 77001 FLUOROGUIDE FOR VEIN DEVICE: CPT | Mod: 26

## 2019-09-30 RX ORDER — AMLODIPINE BESYLATE 2.5 MG/1
5 TABLET ORAL DAILY
Refills: 0 | Status: DISCONTINUED | OUTPATIENT
Start: 2019-09-30 | End: 2019-10-06

## 2019-09-30 RX ORDER — CHLORHEXIDINE GLUCONATE 213 G/1000ML
1 SOLUTION TOPICAL DAILY
Refills: 0 | Status: DISCONTINUED | OUTPATIENT
Start: 2019-10-01 | End: 2019-10-07

## 2019-09-30 RX ORDER — NYSTATIN CREAM 100000 [USP'U]/G
1 CREAM TOPICAL
Refills: 0 | Status: DISCONTINUED | OUTPATIENT
Start: 2019-09-30 | End: 2019-10-07

## 2019-09-30 RX ORDER — OXYCODONE AND ACETAMINOPHEN 5; 325 MG/1; MG/1
1 TABLET ORAL ONCE
Refills: 0 | Status: DISCONTINUED | OUTPATIENT
Start: 2019-09-30 | End: 2019-09-30

## 2019-09-30 RX ORDER — IRON SUCROSE 20 MG/ML
200 INJECTION, SOLUTION INTRAVENOUS EVERY 24 HOURS
Refills: 0 | Status: COMPLETED | OUTPATIENT
Start: 2019-09-30 | End: 2019-10-02

## 2019-09-30 RX ORDER — ONDANSETRON 8 MG/1
4 TABLET, FILM COATED ORAL EVERY 4 HOURS
Refills: 0 | Status: DISCONTINUED | OUTPATIENT
Start: 2019-09-30 | End: 2019-10-06

## 2019-09-30 RX ADMIN — AMLODIPINE BESYLATE 5 MILLIGRAM(S): 2.5 TABLET ORAL at 17:32

## 2019-09-30 RX ADMIN — OXYCODONE AND ACETAMINOPHEN 1 TABLET(S): 5; 325 TABLET ORAL at 05:57

## 2019-09-30 RX ADMIN — Medication 1 APPLICATION(S): at 05:42

## 2019-09-30 RX ADMIN — NYSTATIN CREAM 1 APPLICATION(S): 100000 CREAM TOPICAL at 17:37

## 2019-09-30 RX ADMIN — Medication 1 APPLICATION(S): at 21:31

## 2019-09-30 RX ADMIN — PANTOPRAZOLE SODIUM 40 MILLIGRAM(S): 20 TABLET, DELAYED RELEASE ORAL at 05:41

## 2019-09-30 RX ADMIN — IRON SUCROSE 110 MILLIGRAM(S): 20 INJECTION, SOLUTION INTRAVENOUS at 17:32

## 2019-09-30 RX ADMIN — ONDANSETRON 4 MILLIGRAM(S): 8 TABLET, FILM COATED ORAL at 09:12

## 2019-09-30 RX ADMIN — NYSTATIN CREAM 1 APPLICATION(S): 100000 CREAM TOPICAL at 05:41

## 2019-09-30 RX ADMIN — CHLORHEXIDINE GLUCONATE 1 APPLICATION(S): 213 SOLUTION TOPICAL at 14:00

## 2019-09-30 RX ADMIN — Medication 1: at 17:32

## 2019-09-30 NOTE — ADVANCED PRACTICE NURSE CONSULT - RECOMMEDATIONS
-Clean the Gluteal Fold wound with normal saline and apply skin prep to surrounding skin  -Apply Collagenase ointment to the slough areas of the wound bed, pack with saline moistened gauze and cover with a Foam dressing Daily PRN  -Encourage the patient to reposition Q 2hrs using wedges or pillows

## 2019-09-30 NOTE — PROGRESS NOTE ADULT - ASSESSMENT
78 female with PMH of HTn, HLD, DM, Gout, CKD stage 4( creatinine  year ago, 1.05), comes in with abdominal pain, diarrhea.  Admitted for acute Renal Failure, GI bleed, downgraded from ICU  after urgent HD on 9/22, 9/24 and 9/26 for acute pulmonary edema and severe metabolic acidosis.      Perm cath placement today, Hemodialysis tomorrow.

## 2019-09-30 NOTE — CHART NOTE - NSCHARTNOTEFT_GEN_A_CORE
Pt for planned PC placement today by IR, for continuation of hemodialysis.  Can remove groin shiley catheter once PC is placed and successfully used for HD.  Please inform vascular surgery team with any changes to the treatment plan.

## 2019-09-30 NOTE — PROGRESS NOTE ADULT - SUBJECTIVE AND OBJECTIVE BOX
PGY 1 Note discussed with supervising resident and primary attending    Patient is a 78y old  Female who presents with a chief complaint of abdominal pain, diarrhea (29 Sep 2019 14:53)      INTERVAL HPI/OVERNIGHT EVENTS: Patient seen and examined at the bedside. Pt had one episode of vomiting this morning with no blood or food. Pt says she felt very nauseous this morning    MEDICATIONS  (STANDING):  BACItracin   Ointment 1 Application(s) Topical three times a day  chlorhexidine 2% Cloths 1 Application(s) Topical daily  epoetin lara Injectable 25942 Unit(s) IV Push <User Schedule>  insulin lispro (HumaLOG) corrective regimen sliding scale   SubCutaneous three times a day before meals  nystatin Cream 1 Application(s) Topical two times a day  pantoprazole    Tablet 40 milliGRAM(s) Oral before breakfast    MEDICATIONS  (PRN):  ALBUTerol/ipratropium for Nebulization 3 milliLiter(s) Nebulizer every 6 hours PRN Wheezing  ondansetron Injectable 4 milliGRAM(s) IV Push every 4 hours PRN Nausea and/or Vomiting      __________________________________________________  REVIEW OF SYSTEMS:    CONSTITUTIONAL: No fever,   EYES: no acute visual disturbances  NECK: No pain or stiffness  RESPIRATORY: No cough; No shortness of breath  CARDIOVASCULAR: No chest pain, no palpitations  GASTROINTESTINAL: No pain. No nausea or vomiting; No diarrhea   NEUROLOGICAL: No headache or numbness, no tremors  MUSCULOSKELETAL: No joint pain, no muscle pain  GENITOURINARY: no dysuria, no frequency, no hesitancy  PSYCHIATRY: no depression , no anxiety  ALL OTHER  ROS negative        Vital Signs Last 24 Hrs  T(C): 37.1 (30 Sep 2019 08:02), Max: 37.5 (30 Sep 2019 00:12)  T(F): 98.8 (30 Sep 2019 08:02), Max: 99.5 (30 Sep 2019 00:12)  HR: 73 (30 Sep 2019 08:02) (73 - 79)  BP: 148/61 (30 Sep 2019 08:02) (148/61 - 150/57)  BP(mean): --  RR: 18 (30 Sep 2019 08:02) (18 - 18)  SpO2: 96% (30 Sep 2019 08:02) (95% - 98%)    ________________________________________________  PHYSICAL EXAM:  GENERAL: NAD  HEENT: Normocephalic;  conjunctivae and sclerae clear; moist mucous membranes;   NECK : supple  CHEST/LUNG: Clear to auscultation bilaterally with good air entry   HEART: S1 S2  regular; no murmurs, gallops or rubs  ABDOMEN: Soft, Nontender, Nondistended; Bowel sounds present  EXTREMITIES: no cyanosis; no edema; no calf tenderness  SKIN: warm and dry; no rash  NERVOUS SYSTEM:  Awake and alert; Oriented  to place, person and time ; no new deficits    _________________________________________________  LABS:                        7.9    6.68  )-----------( 132      ( 30 Sep 2019 07:15 )             23.7     09-30    136  |  97  |  49<H>  ----------------------------<  126<H>  3.5   |  29  |  6.31<H>    Ca    8.5      30 Sep 2019 07:15  Phos  2.9     09-30  Mg     2.0     09-30    TPro  5.5<L>  /  Alb  2.7<L>  /  TBili  1.2  /  DBili  x   /  AST  26  /  ALT  21  /  AlkPhos  73  09-30        CAPILLARY BLOOD GLUCOSE      POCT Blood Glucose.: 148 mg/dL (30 Sep 2019 08:19)  POCT Blood Glucose.: 145 mg/dL (29 Sep 2019 21:36)  POCT Blood Glucose.: 140 mg/dL (29 Sep 2019 16:31)  POCT Blood Glucose.: 134 mg/dL (29 Sep 2019 11:17)        RADIOLOGY & ADDITIONAL TESTS:    Imaging Personally Reviewed:  YES/NO    Consultant(s) Notes Reviewed:   YES/ No    Care Discussed with Consultants :     Plan of care was discussed with patient and /or primary care giver; all questions and concerns were addressed and care was aligned with patient's wishes. PGY 1 Note discussed with supervising resident and primary attending    Patient is a 78y old  Female who presents with a chief complaint of abdominal pain, diarrhea (29 Sep 2019 14:53)      INTERVAL HPI/OVERNIGHT EVENTS:   Patient seen and examined at the bedside.   -Pt had one episode of vomiting this morning with no blood or food. Pt says she felt very nauseous this morning.  -Says her wheezing is better.  -Complains of swelling of feet.    MEDICATIONS  (STANDING):  BACItracin   Ointment 1 Application(s) Topical three times a day  chlorhexidine 2% Cloths 1 Application(s) Topical daily  epoetin lara Injectable 71732 Unit(s) IV Push <User Schedule>  insulin lispro (HumaLOG) corrective regimen sliding scale   SubCutaneous three times a day before meals  nystatin Cream 1 Application(s) Topical two times a day  pantoprazole    Tablet 40 milliGRAM(s) Oral before breakfast    MEDICATIONS  (PRN):  ALBUTerol/ipratropium for Nebulization 3 milliLiter(s) Nebulizer every 6 hours PRN Wheezing  ondansetron Injectable 4 milliGRAM(s) IV Push every 4 hours PRN Nausea and/or Vomiting      __________________________________________________  REVIEW OF SYSTEMS:    CONSTITUTIONAL: No fever,   EYES: no acute visual disturbances  NECK: No pain or stiffness  RESPIRATORY: No cough; No shortness of breath  CARDIOVASCULAR: No chest pain, no palpitations  GASTROINTESTINAL: No pain. Nausea and vomiting; No diarrhea   NEUROLOGICAL: No headache or numbness, no tremors  MUSCULOSKELETAL: No joint pain, no muscle pain  GENITOURINARY: no dysuria, no frequency, no hesitancy  PSYCHIATRY: no depression , no anxiety  ALL OTHER  ROS negative        Vital Signs Last 24 Hrs  T(C): 37.1 (30 Sep 2019 08:02), Max: 37.5 (30 Sep 2019 00:12)  T(F): 98.8 (30 Sep 2019 08:02), Max: 99.5 (30 Sep 2019 00:12)  HR: 73 (30 Sep 2019 08:02) (73 - 79)  BP: 148/61 (30 Sep 2019 08:02) (148/61 - 150/57)  BP(mean): --  RR: 18 (30 Sep 2019 08:02) (18 - 18)  SpO2: 96% (30 Sep 2019 08:02) (95% - 98%)    ________________________________________________  PHYSICAL EXAM:  GENERAL: NAD, femoral catheter (placed in ED as per pt)  HEENT: Normocephalic;  conjunctivae and sclerae clear; moist mucous membranes;   NECK : supple  CHEST/LUNG: Clear to auscultation bilaterally with good air entry. crackles b/l at bases  HEART: S1 S2  regular; no murmurs, gallops or rubs  ABDOMEN: Soft, Nontender, Nondistended; Bowel sounds present  EXTREMITIES: no cyanosis; no edema; no calf tenderness  SKIN: warm and dry; no rash  NERVOUS SYSTEM:  Awake and alert; Oriented  to place, person and time ; no new deficits    _________________________________________________  LABS:                        7.9    6.68  )-----------( 132      ( 30 Sep 2019 07:15 )             23.7     09-30    136  |  97  |  49<H>  ----------------------------<  126<H>  3.5   |  29  |  6.31<H>    Ca    8.5      30 Sep 2019 07:15  Phos  2.9     09-30  Mg     2.0     09-30    TPro  5.5<L>  /  Alb  2.7<L>  /  TBili  1.2  /  DBili  x   /  AST  26  /  ALT  21  /  AlkPhos  73  09-30        CAPILLARY BLOOD GLUCOSE      POCT Blood Glucose.: 148 mg/dL (30 Sep 2019 08:19)  POCT Blood Glucose.: 145 mg/dL (29 Sep 2019 21:36)  POCT Blood Glucose.: 140 mg/dL (29 Sep 2019 16:31)  POCT Blood Glucose.: 134 mg/dL (29 Sep 2019 11:17)        RADIOLOGY & ADDITIONAL TESTS:    < from: Xray Chest 1 View- PORTABLE-Routine (09.27.19 @ 14:21) >  EXAM:  XR CHEST PORTABLE ROUTINE 1V                            PROCEDURE DATE:  09/27/2019          INTERPRETATION:  CHEST AP PORTABLE:    History: f/u wheezing.     Date and time of exam: 9/27/2019 1:34 PM.    Technique: A single AP view of the chest was obtained.    Comparison exam: 9/21/2019.    Findings:  Persistent cardiomegaly. No hilar or mediastinal abnormality. There is   now an infiltrate in the left upper lobe which is a new finding since the   prior study. No definite evidence of pleural effusion. Pulmonary vascular   congestion, unchanged..    Impression:  Development of left upper lobe pulmonary infiltrate since 9/21/2019,   otherwise stable.            < end of copied text >    Imaging Personally Reviewed:  YES/NO    Consultant(s) Notes Reviewed:   YES/ No    Care Discussed with Consultants :     Plan of care was discussed with patient and /or primary care giver; all questions and concerns were addressed and care was aligned with patient's wishes.

## 2019-09-30 NOTE — ADVANCED PRACTICE NURSE CONSULT - ASSESSMENT
This is a 78yr old female patient admitted for GI Hemorrhage, presenting with a Gluteal Fold Abscess (3cm x 2cm x 0.1cm) with slough (100%), scant drainage, periwound erythema & induration

## 2019-09-30 NOTE — PROGRESS NOTE ADULT - SUBJECTIVE AND OBJECTIVE BOX
pt seen and examined; Perma-Cath placed today for dialysis. Dialysis scheduled tomorrow. Appears to have a sacral ulcer which requires debridement. It is tender, but no pus expressible. Procedure explained to patient and her daughter, risks, benefits alternatives discussed, all questions answered, agrees to proceed. DNR/DNI order to be suspended for the duraiton of the debridement procedure.

## 2019-09-30 NOTE — PROGRESS NOTE ADULT - ASSESSMENT
78y Female with DM, HTN, CKD a/w diarrhea, anemia, and severe renal failure. required emergent HD. remains HD	 dependent with no evidence of renal recovery.  deemed ESRD

## 2019-09-30 NOTE — CONSULT NOTE ADULT - SUBJECTIVE AND OBJECTIVE BOX
This is a 78y Female with a PMHx of         Allergies: aspirin (Unknown)  ibuprofen (Unknown)  Mushrooms (Anaphylaxis)  shellfish (Anaphylaxis)      Vitals: T(F): 97.2 (09-30-19 @ 16:45), Max: 99.5 (09-30-19 @ 00:12)  HR: 71 (09-30-19 @ 16:45) (71 - 79)  BP: 134/69 (09-30-19 @ 16:45) (134/69 - 150/57)  RR: 17 (09-30-19 @ 16:45) (17 - 18)  SpO2: 100% (09-30-19 @ 16:45) (95% - 100%)      Labs:                         7.9    6.68  )-----------( 132      ( 30 Sep 2019 07:15 )             23.7   09-30    136  |  97  |  49<H>  ----------------------------<  126<H>  3.5   |  29  |  6.31<H>    Ca    8.5      30 Sep 2019 07:15  Phos  2.9     09-30  Mg     2.0     09-30    TPro  5.5<L>  /  Alb  2.7<L>  /  TBili  1.2  /  DBili  x   /  AST  26  /  ALT  21  /  AlkPhos  73  09-30      Physical Exam:  General: AAO x 3, No acute distress  Skin: [id number] sacral decubitus at sacral area on right side of midline with dark slough, no necrotic tissue, no purulent drainage but + surrounding erythema with induration and tenderness to palpation.   Resp: Nonlabored respirations, good inspiratory effort on room air HPI:  78 female with PMH of HTn, HLD, DM, Gout, CKD stage 4( creatinine  year ago, 1.05), comes in with abdominal pain, diarrhea. Patient reports having dark stools and diarrhea for 1 week. Patient also reports lower abdominal pain. Denies hematemesis. Reports mild nausea, no vomiting, no fever. Has bilateral leg swelling and exertional sob.     General surgery consulted for sacral ulcer. Pt states shes never had this before but states she developed pain in the sacral area last Tuesday and was told she has this wound. No f/c, n/v, chest pain, sob at this time.       Allergies: aspirin (Unknown)  ibuprofen (Unknown)  Mushrooms (Anaphylaxis)  shellfish (Anaphylaxis)      Vitals: T(F): 97.2 (09-30-19 @ 16:45), Max: 99.5 (09-30-19 @ 00:12)  HR: 71 (09-30-19 @ 16:45) (71 - 79)  BP: 134/69 (09-30-19 @ 16:45) (134/69 - 150/57)  RR: 17 (09-30-19 @ 16:45) (17 - 18)  SpO2: 100% (09-30-19 @ 16:45) (95% - 100%)      Labs:                         7.9    6.68  )-----------( 132      ( 30 Sep 2019 07:15 )             23.7   09-30    136  |  97  |  49<H>  ----------------------------<  126<H>  3.5   |  29  |  6.31<H>    Ca    8.5      30 Sep 2019 07:15  Phos  2.9     09-30  Mg     2.0     09-30    TPro  5.5<L>  /  Alb  2.7<L>  /  TBili  1.2  /  DBili  x   /  AST  26  /  ALT  21  /  AlkPhos  73  09-30      Physical Exam:  General: AAO x 3, No acute distress  Skin: [id number] sacral decubitus at sacral area on right side of midline with dark slough, no necrotic tissue, no purulent drainage but + surrounding erythema with induration and tenderness to palpation.   Resp: Nonlabored respirations, good inspiratory effort on room air

## 2019-09-30 NOTE — PROGRESS NOTE ADULT - PROBLEM SELECTOR PLAN 1
deemed ESRD. s/p permacath. can D/C deangelo andre. HD today.  will need AV access placement   out[place] HD [place] at [place] [facility] [place]

## 2019-09-30 NOTE — PROGRESS NOTE ADULT - PROBLEM SELECTOR PLAN 8
c/w SCDs  Anticoagulation held for GI bleed Pt is DNR/DNI  Daughter daughter Janet is HCP  Palliative on board

## 2019-09-30 NOTE — PROGRESS NOTE ADULT - PROBLEM SELECTOR PLAN 7
Pt is DNR/DNI  Daughter daughter Janet is HCP  Palliative on board Pt has a sacral ulcer, concern for abscess  Wound care  Surgery consulted

## 2019-09-30 NOTE — PROGRESS NOTE ADULT - SUBJECTIVE AND OBJECTIVE BOX
[facility] : Progress Note :: [phone], (office [phone]),   Dr Pandey / Dr Mi / Dr Massey / Dr Etienne / Dr Cassandra DA SILVA / Dr Burt / Dr Garber / Dr Naeem lam  _____________________________________________________________________________________________  Deemed with ESRD  s/p RT IJ permacath    aspirin (Unknown)  ibuprofen (Unknown)  Mushrooms (Anaphylaxis)  shellfish (Anaphylaxis)    Hospital Medications:   MEDICATIONS  (STANDING):  amLODIPine   Tablet 5 milliGRAM(s) Oral daily  BACItracin   Ointment 1 Application(s) Topical three times a day  chlorhexidine 2% Cloths 1 Application(s) Topical daily  epoetin lara Injectable 42122 Unit(s) IV Push <User Schedule>  insulin lispro (HumaLOG) corrective regimen sliding scale   SubCutaneous three times a day before meals  iron sucrose IVPB 200 milliGRAM(s) IV Intermittent every 24 hours  nystatin Cream 1 Application(s) Topical two times a day  pantoprazole    Tablet 40 milliGRAM(s) Oral before breakfast      VITALS:  T(F): 97.2 (09-30-19 @ 16:45), Max: 99.5 (09-30-19 @ 00:12)  HR: 71 (09-30-19 @ 16:45)  BP: 134/69 (09-30-19 @ 16:45)  RR: 17 (09-30-19 @ 16:45)  SpO2: 100% (09-30-19 @ 16:45)  Wt(kg): --    09-29 @ 07:01  -  09-30 @ 07:00  --------------------------------------------------------  IN: 0 mL / OUT: 3 mL / NET: -3 mL      Height (cm): 152.4 (09-30 @ 10:54)  Weight (kg): 63.5 (09-30 @ 10:54)  BMI (kg/m2): 27.3 (09-30 @ 10:54)  BSA (m2): 1.6 (09-30 @ 10:54)    PHYSICAL EXAM:  Constitutional: NAD  HEENT: anicteric sclera, oropharynx clear.  Neck: No JVD  Respiratory: CTAB, no wheezes, rales or rhonchi  Cardiovascular: S1, S2, RRR  Gastrointestinal: BS+, soft, NT/ND  Extremities:  No peripheral edema  Neurological: A/O x 3, no focal deficits  Psychiatric: Normal mood, normal affect  : No CVA tenderness. No dickson.   Skin: No rashes  Vascular Access: RT IJ permacath. left groin shiley    LABS:  09-30    136  |  97  |  49<H>  ----------------------------<  126<H>  3.5   |  29  |  6.31<H>    Ca    8.5      30 Sep 2019 07:15  Phos  2.9     09-30  Mg     2.0     09-30    TPro  5.5<L>  /  Alb  2.7<L>  /  TBili  1.2  /  DBili      /  AST  26  /  ALT  21  /  AlkPhos  73  09-30    Creatinine Trend: 6.31 <--, 4.19 <--, 5.81 <--, 4.16 <--, 6.81 <--, 5.36 <--, 8.74 <--                        7.9    6.68  )-----------( 132      ( 30 Sep 2019 07:15 )             23.7     Urine Studies:      RADIOLOGY & ADDITIONAL STUDIES:

## 2019-09-30 NOTE — CHART NOTE - NSCHARTNOTEFT_GEN_A_CORE
EVENT: Perineal rash  - Pt has perineal rash    OBJECTIVE:  Vital Signs Last 24 Hrs  T(C): 37.5 (30 Sep 2019 00:12), Max: 37.5 (30 Sep 2019 00:12)  T(F): 99.5 (30 Sep 2019 00:12), Max: 99.5 (30 Sep 2019 00:12)  HR: 79 (30 Sep 2019 00:12) (79 - 80)  BP: 150/57 (30 Sep 2019 00:12) (149/70 - 157/63)  BP(mean): --  RR: 18 (30 Sep 2019 00:12) (18 - 18)  SpO2: 95% (30 Sep 2019 00:12) (95% - 98%)    FOCUSED PHYSICAL EXAM:  Neuro: awake, alert, oriented x 3. No neuro deficit  Cardiovascular: Pulses +2 B/L in lower and upper extremities, HR regular, BP stable, No edema.  Respiratory: Respirations regular, unlabored, breath sounds clear B/L.   GI: Abdomen soft, non-tender, positive bowel sounds.  : no bladder distention noted. No complaints at this time.  Skin: Dry, intact, no bruising, no diaphoresis.    LABS:                        8.1    5.66  )-----------( 103      ( 29 Sep 2019 07:30 )             24.2     09-29    138  |  99  |  29<H>  ----------------------------<  116<H>  3.2<L>   |  29  |  4.19<H>    Ca    8.5      29 Sep 2019 07:30  Phos  2.6     09-29  Mg     2.0     09-29    TPro  5.3<L>  /  Alb  2.6<L>  /  TBili  1.3<H>  /  DBili  x   /  AST  22  /  ALT  19  /  AlkPhos  68  09-29      EKG:   IMGAGING:    ASSESSMENT:  HPI:  78 female with PMH of HTn, HLD, DM, Gout, CKD stage 4( creatinine  year ago, 1.05), comes in with abdominal pain, diarrhea. Patient reports having dark stools and diarrhea for 1 week. Patient also reports lower abdominal pain. Denies hematemesis. Reports mild nausea, no vomiting, no fever. Has bilateral leg swelling and exertional sob.     In Ed, BP: 98/ 58 mm hg, HR: 58, Temp: 97.2 F  Cbc shows hb of 6.7/20   Bmp shows na of 131, bicarb 10, elevated bun/creatinine 137/11   Troponin 0.015   EKG shows no ischemic changes (21 Sep 2019 19:07)      PLAN: Nystatin ointment ordered

## 2019-09-30 NOTE — PROGRESS NOTE ADULT - PROBLEM SELECTOR PLAN 1
baseline SCR 1.3  Pt was admitted to ICU with HD dependent CONCHIS from [facility] for Diarrhea and vomiting  patient had HD on 9/22, 9/24 and 9/26     - Creatinine level currently 6.31>4.19    - Pt needs long term dialysis as per nephro, Dr Mi on board    - IR contacted for perm cath placement today  - Scheduled for HD Tuesday  - will remove shiley once Perma cath placed   - BP controlled today, will start Amlodipine 5mg if needed

## 2019-10-01 DIAGNOSIS — N18.6 END STAGE RENAL DISEASE: ICD-10-CM

## 2019-10-01 LAB
ALBUMIN SERPL ELPH-MCNC: 2.9 G/DL — LOW (ref 3.5–5)
ALP SERPL-CCNC: 80 U/L — SIGNIFICANT CHANGE UP (ref 40–120)
ALT FLD-CCNC: 22 U/L DA — SIGNIFICANT CHANGE UP (ref 10–60)
ANION GAP SERPL CALC-SCNC: 9 MMOL/L — SIGNIFICANT CHANGE UP (ref 5–17)
APTT BLD: 44.1 SEC — HIGH (ref 27.5–36.3)
AST SERPL-CCNC: 27 U/L — SIGNIFICANT CHANGE UP (ref 10–40)
BASOPHILS # BLD AUTO: 0.02 K/UL — SIGNIFICANT CHANGE UP (ref 0–0.2)
BASOPHILS NFR BLD AUTO: 0.3 % — SIGNIFICANT CHANGE UP (ref 0–2)
BILIRUB SERPL-MCNC: 1.1 MG/DL — SIGNIFICANT CHANGE UP (ref 0.2–1.2)
BLD GP AB SCN SERPL QL: SIGNIFICANT CHANGE UP
BUN SERPL-MCNC: 72 MG/DL — HIGH (ref 7–18)
CALCIUM SERPL-MCNC: 8.8 MG/DL — SIGNIFICANT CHANGE UP (ref 8.4–10.5)
CHLORIDE SERPL-SCNC: 97 MMOL/L — SIGNIFICANT CHANGE UP (ref 96–108)
CO2 SERPL-SCNC: 28 MMOL/L — SIGNIFICANT CHANGE UP (ref 22–31)
CREAT SERPL-MCNC: 7.84 MG/DL — HIGH (ref 0.5–1.3)
EOSINOPHIL # BLD AUTO: 0.29 K/UL — SIGNIFICANT CHANGE UP (ref 0–0.5)
EOSINOPHIL NFR BLD AUTO: 4.2 % — SIGNIFICANT CHANGE UP (ref 0–6)
GLUCOSE BLDC GLUCOMTR-MCNC: 143 MG/DL — HIGH (ref 70–99)
GLUCOSE BLDC GLUCOMTR-MCNC: 190 MG/DL — HIGH (ref 70–99)
GLUCOSE BLDC GLUCOMTR-MCNC: 275 MG/DL — HIGH (ref 70–99)
GLUCOSE SERPL-MCNC: 115 MG/DL — HIGH (ref 70–99)
HCT VFR BLD CALC: 23 % — LOW (ref 34.5–45)
HGB BLD-MCNC: 7.6 G/DL — LOW (ref 11.5–15.5)
IMM GRANULOCYTES NFR BLD AUTO: 0.7 % — SIGNIFICANT CHANGE UP (ref 0–1.5)
INR BLD: 1.04 RATIO — SIGNIFICANT CHANGE UP (ref 0.88–1.16)
LYMPHOCYTES # BLD AUTO: 0.7 K/UL — LOW (ref 1–3.3)
LYMPHOCYTES # BLD AUTO: 10.1 % — LOW (ref 13–44)
MAGNESIUM SERPL-MCNC: 2.1 MG/DL — SIGNIFICANT CHANGE UP (ref 1.6–2.6)
MCHC RBC-ENTMCNC: 29.3 PG — SIGNIFICANT CHANGE UP (ref 27–34)
MCHC RBC-ENTMCNC: 33 GM/DL — SIGNIFICANT CHANGE UP (ref 32–36)
MCV RBC AUTO: 88.8 FL — SIGNIFICANT CHANGE UP (ref 80–100)
MONOCYTES # BLD AUTO: 0.6 K/UL — SIGNIFICANT CHANGE UP (ref 0–0.9)
MONOCYTES NFR BLD AUTO: 8.7 % — SIGNIFICANT CHANGE UP (ref 2–14)
MRSA PCR RESULT.: SIGNIFICANT CHANGE UP
NEUTROPHILS # BLD AUTO: 5.24 K/UL — SIGNIFICANT CHANGE UP (ref 1.8–7.4)
NEUTROPHILS NFR BLD AUTO: 76 % — SIGNIFICANT CHANGE UP (ref 43–77)
NRBC # BLD: 0 /100 WBCS — SIGNIFICANT CHANGE UP (ref 0–0)
PHOSPHATE SERPL-MCNC: 3.2 MG/DL — SIGNIFICANT CHANGE UP (ref 2.5–4.5)
PLATELET # BLD AUTO: 176 K/UL — SIGNIFICANT CHANGE UP (ref 150–400)
POTASSIUM SERPL-MCNC: 3.7 MMOL/L — SIGNIFICANT CHANGE UP (ref 3.5–5.3)
POTASSIUM SERPL-SCNC: 3.7 MMOL/L — SIGNIFICANT CHANGE UP (ref 3.5–5.3)
PROT SERPL-MCNC: 5.9 G/DL — LOW (ref 6–8.3)
PROTHROM AB SERPL-ACNC: 11.6 SEC — SIGNIFICANT CHANGE UP (ref 10–12.9)
RBC # BLD: 2.59 M/UL — LOW (ref 3.8–5.2)
RBC # FLD: 15.3 % — HIGH (ref 10.3–14.5)
S AUREUS DNA NOSE QL NAA+PROBE: DETECTED
SODIUM SERPL-SCNC: 134 MMOL/L — LOW (ref 135–145)
WBC # BLD: 6.9 K/UL — SIGNIFICANT CHANGE UP (ref 3.8–10.5)
WBC # FLD AUTO: 6.9 K/UL — SIGNIFICANT CHANGE UP (ref 3.8–10.5)

## 2019-10-01 PROCEDURE — 99233 SBSQ HOSP IP/OBS HIGH 50: CPT | Mod: GC

## 2019-10-01 RX ORDER — SENNA PLUS 8.6 MG/1
2 TABLET ORAL AT BEDTIME
Refills: 0 | Status: DISCONTINUED | OUTPATIENT
Start: 2019-10-01 | End: 2019-10-07

## 2019-10-01 RX ORDER — DOCUSATE SODIUM 100 MG
100 CAPSULE ORAL DAILY
Refills: 0 | Status: DISCONTINUED | OUTPATIENT
Start: 2019-10-01 | End: 2019-10-07

## 2019-10-01 RX ORDER — POLYETHYLENE GLYCOL 3350 17 G/17G
17 POWDER, FOR SOLUTION ORAL
Refills: 0 | Status: DISCONTINUED | OUTPATIENT
Start: 2019-10-01 | End: 2019-10-07

## 2019-10-01 RX ADMIN — IRON SUCROSE 110 MILLIGRAM(S): 20 INJECTION, SOLUTION INTRAVENOUS at 18:58

## 2019-10-01 RX ADMIN — Medication 1 APPLICATION(S): at 21:50

## 2019-10-01 RX ADMIN — Medication 1 APPLICATION(S): at 05:48

## 2019-10-01 RX ADMIN — Medication 3: at 12:09

## 2019-10-01 RX ADMIN — NYSTATIN CREAM 1 APPLICATION(S): 100000 CREAM TOPICAL at 05:47

## 2019-10-01 RX ADMIN — NYSTATIN CREAM 1 APPLICATION(S): 100000 CREAM TOPICAL at 18:58

## 2019-10-01 RX ADMIN — POLYETHYLENE GLYCOL 3350 17 GRAM(S): 17 POWDER, FOR SOLUTION ORAL at 18:58

## 2019-10-01 RX ADMIN — PANTOPRAZOLE SODIUM 40 MILLIGRAM(S): 20 TABLET, DELAYED RELEASE ORAL at 05:47

## 2019-10-01 RX ADMIN — AMLODIPINE BESYLATE 5 MILLIGRAM(S): 2.5 TABLET ORAL at 05:47

## 2019-10-01 RX ADMIN — ERYTHROPOIETIN 10000 UNIT(S): 10000 INJECTION, SOLUTION INTRAVENOUS; SUBCUTANEOUS at 15:43

## 2019-10-01 RX ADMIN — SENNA PLUS 2 TABLET(S): 8.6 TABLET ORAL at 21:50

## 2019-10-01 RX ADMIN — Medication 1 APPLICATION(S): at 13:49

## 2019-10-01 RX ADMIN — CHLORHEXIDINE GLUCONATE 1 APPLICATION(S): 213 SOLUTION TOPICAL at 12:09

## 2019-10-01 NOTE — PROGRESS NOTE ADULT - PROBLEM SELECTOR PLAN 1
baseline SCR 1.3  Pt was admitted to ICU with HD dependent CONCHIS from [facility] for Diarrhea and vomiting  patient had HD on 9/22, 9/24 and 9/26     - Creatinine level yesterday 6.31>4.19    - Pt needs long term dialysis as per nephDr Shmuel robert on board    - s/p perm cath yesterday  - HEMODIALYSIS TODAY  - will remove shiley after today's dialysis to make sure catheter functioning okay  - outpt HD placement at [place] [facility].

## 2019-10-01 NOTE — PROGRESS NOTE ADULT - PROBLEM SELECTOR PLAN 2
Pt has a sacral ulcer, concern for abscess  Surgery consulted  plan for debridement on Thursday now  Wound care

## 2019-10-01 NOTE — PROGRESS NOTE ADULT - SUBJECTIVE AND OBJECTIVE BOX
[facility] : Progress Note :: [phone], (office [phone]),   Dr Pandey / Dr iM / Dr Massey / Dr Etienne / Dr Cassandra DA SILVA / Dr Burt / Dr Garber / Dr Naeem lam  _____________________________________________________________________________________________    s/p PC placement yesterday    aspirin (Unknown)  ibuprofen (Unknown)  Mushrooms (Anaphylaxis)  shellfish (Anaphylaxis)    Hospital Medications:   MEDICATIONS  (STANDING):  amLODIPine   Tablet 5 milliGRAM(s) Oral daily  BACItracin   Ointment 1 Application(s) Topical three times a day  chlorhexidine 2% Cloths 1 Application(s) Topical daily  chlorhexidine 2% Cloths 1 Application(s) Topical daily  epoetin lara Injectable 15373 Unit(s) IV Push <User Schedule>  insulin lispro (HumaLOG) corrective regimen sliding scale   SubCutaneous three times a day before meals  iron sucrose IVPB 200 milliGRAM(s) IV Intermittent every 24 hours  nystatin Cream 1 Application(s) Topical two times a day  pantoprazole    Tablet 40 milliGRAM(s) Oral before breakfast        VITALS:  T(F): 98.4 (10-01-19 @ 07:20), Max: 98.4 (10-01-19 @ 07:20)  HR: 79 (10-01-19 @ 07:20)  BP: 147/53 (10-01-19 @ 07:20)  RR: 17 (10-01-19 @ 07:20)  SpO2: 98% (10-01-19 @ 07:20)  Wt(kg): --      PHYSICAL EXAM:  Constitutional: NAD  HEENT: anicteric sclera, oropharynx clear.  Neck: No JVD  Respiratory: CTAB, no wheezes, rales or rhonchi  Cardiovascular: S1, S2, RRR  Gastrointestinal: BS+, soft, NT/ND  Extremities: No peripheral edema  Neurological: A/O x 3, no focal deficits  : No CVA tenderness. No dickson.     LABS:  09-30    136  |  97  |  49<H>  ----------------------------<  126<H>  3.5   |  29  |  6.31<H>    Ca    8.5      30 Sep 2019 07:15  Phos  2.9     09-30  Mg     2.0     09-30    TPro  5.5<L>  /  Alb  2.7<L>  /  TBili  1.2  /  DBili      /  AST  26  /  ALT  21  /  AlkPhos  73  09-30    Creatinine Trend: 6.31 <--, 4.19 <--, 5.81 <--, 4.16 <--, 6.81 <--, 5.36 <--                        7.9    6.68  )-----------( 132      ( 30 Sep 2019 07:15 )             23.7     Urine Studies:      RADIOLOGY & ADDITIONAL STUDIES:

## 2019-10-01 NOTE — PROGRESS NOTE ADULT - PROBLEM SELECTOR PLAN 7
on sliding scale insulin  - continue to monitor sugars and resume lantus when needed  -c/w HSS  -Diabetic diet  Hba1c 5.6

## 2019-10-01 NOTE — PROGRESS NOTE ADULT - PROBLEM SELECTOR PLAN 1
deemed ESRD. s/p permacath. can D/C deangelo andre after HD   plans for sacral decubitus ulcer.  will need AV access placement   outpt HD placement at [place] [facility] - [place], [place]

## 2019-10-01 NOTE — PROGRESS NOTE ADULT - ASSESSMENT
78 female with PMH of HTn, HLD, DM, Gout, CKD stage 4( creatinine  year ago, 1.05), comes in with abdominal pain, diarrhea.  Admitted for acute Renal Failure, GI bleed, downgraded from ICU  after urgent HD on 9/22, 9/24 and 9/26 for acute pulmonary edema and severe metabolic acidosis.      s/p Perm cath placement., Hemodialysis today, debridement of sacral abscess on Thursday.

## 2019-10-01 NOTE — PROGRESS NOTE ADULT - SUBJECTIVE AND OBJECTIVE BOX
PGY 1 Note discussed with supervising resident and primary attending    Patient is a 78y old  Female who presents with a chief complaint of abdominal pain, diarrhea (30 Sep 2019 18:36)      INTERVAL HPI/OVERNIGHT EVENTS:  -Patient seen and examined at the bedside.   -Pt complaining of pain at her ulcer site and that she has not had a bowel movement since Sunday.  -Pt got her perm catheter yesterday.  -Pt was supposed to go for debridement of sacral abscess today but it has been postponed to Thursday by Surgery as it was clashing with her dialysis today.    MEDICATIONS  (STANDING):  amLODIPine   Tablet 5 milliGRAM(s) Oral daily  BACItracin   Ointment 1 Application(s) Topical three times a day  chlorhexidine 2% Cloths 1 Application(s) Topical daily  chlorhexidine 2% Cloths 1 Application(s) Topical daily  epoetin lara Injectable 18287 Unit(s) IV Push <User Schedule>  insulin lispro (HumaLOG) corrective regimen sliding scale   SubCutaneous three times a day before meals  iron sucrose IVPB 200 milliGRAM(s) IV Intermittent every 24 hours  nystatin Cream 1 Application(s) Topical two times a day  pantoprazole    Tablet 40 milliGRAM(s) Oral before breakfast    MEDICATIONS  (PRN):  ALBUTerol/ipratropium for Nebulization 3 milliLiter(s) Nebulizer every 6 hours PRN Wheezing  ondansetron Injectable 4 milliGRAM(s) IV Push every 4 hours PRN Nausea and/or Vomiting      __________________________________________________  REVIEW OF SYSTEMS:    CONSTITUTIONAL: No fever,   EYES: no acute visual disturbances  NECK: No pain or stiffness  RESPIRATORY: No cough; No shortness of breath  CARDIOVASCULAR: No chest pain, no palpitations  GASTROINTESTINAL: No pain. No nausea or vomiting; No diarrhea   NEUROLOGICAL: No headache or numbness, no tremors  MUSCULOSKELETAL: No joint pain, no muscle pain  GENITOURINARY: no dysuria, no frequency, no hesitancy  PSYCHIATRY: no depression , no anxiety  ALL OTHER  ROS negative        Vital Signs Last 24 Hrs  T(C): 36.9 (01 Oct 2019 07:20), Max: 36.9 (01 Oct 2019 07:20)  T(F): 98.4 (01 Oct 2019 07:20), Max: 98.4 (01 Oct 2019 07:20)  HR: 79 (01 Oct 2019 07:20) (71 - 79)  BP: 147/53 (01 Oct 2019 07:20) (115/60 - 147/53)  BP(mean): --  RR: 17 (01 Oct 2019 07:20) (17 - 17)  SpO2: 98% (01 Oct 2019 07:20) (98% - 100%)    ________________________________________________  PHYSICAL EXAM:  PHYSICAL EXAM:  GENERAL: NAD, femoral catheter (placed in ED as per pt)  HEENT: Normocephalic;  conjunctivae and sclerae clear; moist mucous membranes, perma cath on right side of neck  NECK : supple  CHEST/LUNG: Clear to auscultation bilaterally with good air entry. crackles b/l at bases  HEART: S1 S2  regular; no murmurs, gallops or rubs  ABDOMEN: Soft, Nontender, Nondistended; Bowel sounds present  EXTREMITIES: no cyanosis; no edema; no calf tenderness  SKIN: warm and dry; sacral ulcer bandaged right now  NERVOUS SYSTEM:  Awake and alert; Oriented  to place, person and time ; no new deficits    _________________________________________________  LABS:                        7.9    6.68  )-----------( 132      ( 30 Sep 2019 07:15 )             23.7     09-30    136  |  97  |  49<H>  ----------------------------<  126<H>  3.5   |  29  |  6.31<H>    Ca    8.5      30 Sep 2019 07:15  Phos  2.9     09-30  Mg     2.0     09-30    TPro  5.5<L>  /  Alb  2.7<L>  /  TBili  1.2  /  DBili  x   /  AST  26  /  ALT  21  /  AlkPhos  73  09-30        CAPILLARY BLOOD GLUCOSE      POCT Blood Glucose.: 143 mg/dL (01 Oct 2019 08:11)  POCT Blood Glucose.: 117 mg/dL (30 Sep 2019 21:31)  POCT Blood Glucose.: 169 mg/dL (30 Sep 2019 17:25)        RADIOLOGY & ADDITIONAL TESTS:    Imaging Personally Reviewed:  YES/NO    Consultant(s) Notes Reviewed:   YES/ No    Care Discussed with Consultants :     Plan of care was discussed with patient and /or primary care giver; all questions and concerns were addressed and care was aligned with patient's wishes.

## 2019-10-01 NOTE — PROGRESS NOTE ADULT - PROBLEM SELECTOR PLAN 5
Hb 7.9 yesterday  -normocytic likely anemia due to renal disease  -low serum iron and TIBC  -s/p 3 units of PRBCs total  -continue with epogen  -continue with venofer  -continue to monitor CBC

## 2019-10-02 ENCOUNTER — TRANSCRIPTION ENCOUNTER (OUTPATIENT)
Age: 79
End: 2019-10-02

## 2019-10-02 LAB
ALBUMIN SERPL ELPH-MCNC: 2.6 G/DL — LOW (ref 3.5–5)
ALP SERPL-CCNC: 75 U/L — SIGNIFICANT CHANGE UP (ref 40–120)
ALT FLD-CCNC: 23 U/L DA — SIGNIFICANT CHANGE UP (ref 10–60)
ANION GAP SERPL CALC-SCNC: 7 MMOL/L — SIGNIFICANT CHANGE UP (ref 5–17)
AST SERPL-CCNC: 23 U/L — SIGNIFICANT CHANGE UP (ref 10–40)
BILIRUB SERPL-MCNC: 1.2 MG/DL — SIGNIFICANT CHANGE UP (ref 0.2–1.2)
BUN SERPL-MCNC: 32 MG/DL — HIGH (ref 7–18)
CALCIUM SERPL-MCNC: 8.4 MG/DL — SIGNIFICANT CHANGE UP (ref 8.4–10.5)
CHLORIDE SERPL-SCNC: 99 MMOL/L — SIGNIFICANT CHANGE UP (ref 96–108)
CO2 SERPL-SCNC: 30 MMOL/L — SIGNIFICANT CHANGE UP (ref 22–31)
CREAT SERPL-MCNC: 4.69 MG/DL — HIGH (ref 0.5–1.3)
GLUCOSE BLDC GLUCOMTR-MCNC: 135 MG/DL — HIGH (ref 70–99)
GLUCOSE BLDC GLUCOMTR-MCNC: 153 MG/DL — HIGH (ref 70–99)
GLUCOSE BLDC GLUCOMTR-MCNC: 153 MG/DL — HIGH (ref 70–99)
GLUCOSE BLDC GLUCOMTR-MCNC: 154 MG/DL — HIGH (ref 70–99)
GLUCOSE SERPL-MCNC: 135 MG/DL — HIGH (ref 70–99)
HCT VFR BLD CALC: 22.8 % — LOW (ref 34.5–45)
HGB BLD-MCNC: 7.4 G/DL — LOW (ref 11.5–15.5)
MAGNESIUM SERPL-MCNC: 1.8 MG/DL — SIGNIFICANT CHANGE UP (ref 1.6–2.6)
MCHC RBC-ENTMCNC: 29.4 PG — SIGNIFICANT CHANGE UP (ref 27–34)
MCHC RBC-ENTMCNC: 32.5 GM/DL — SIGNIFICANT CHANGE UP (ref 32–36)
MCV RBC AUTO: 90.5 FL — SIGNIFICANT CHANGE UP (ref 80–100)
NRBC # BLD: 0 /100 WBCS — SIGNIFICANT CHANGE UP (ref 0–0)
PHOSPHATE SERPL-MCNC: 2.3 MG/DL — LOW (ref 2.5–4.5)
PLATELET # BLD AUTO: 151 K/UL — SIGNIFICANT CHANGE UP (ref 150–400)
POTASSIUM SERPL-MCNC: 3.5 MMOL/L — SIGNIFICANT CHANGE UP (ref 3.5–5.3)
POTASSIUM SERPL-SCNC: 3.5 MMOL/L — SIGNIFICANT CHANGE UP (ref 3.5–5.3)
PROT SERPL-MCNC: 5.4 G/DL — LOW (ref 6–8.3)
RBC # BLD: 2.52 M/UL — LOW (ref 3.8–5.2)
RBC # FLD: 15.7 % — HIGH (ref 10.3–14.5)
SODIUM SERPL-SCNC: 136 MMOL/L — SIGNIFICANT CHANGE UP (ref 135–145)
WBC # BLD: 5.1 K/UL — SIGNIFICANT CHANGE UP (ref 3.8–10.5)
WBC # FLD AUTO: 5.1 K/UL — SIGNIFICANT CHANGE UP (ref 3.8–10.5)

## 2019-10-02 PROCEDURE — 99233 SBSQ HOSP IP/OBS HIGH 50: CPT | Mod: GC

## 2019-10-02 RX ORDER — MULTIVIT WITH MIN/MFOLATE/K2 340-15/3 G
1 POWDER (GRAM) ORAL ONCE
Refills: 0 | Status: COMPLETED | OUTPATIENT
Start: 2019-10-02 | End: 2019-10-02

## 2019-10-02 RX ORDER — ACETAMINOPHEN 500 MG
650 TABLET ORAL EVERY 6 HOURS
Refills: 0 | Status: DISCONTINUED | OUTPATIENT
Start: 2019-10-02 | End: 2019-10-07

## 2019-10-02 RX ADMIN — Medication 650 MILLIGRAM(S): at 10:46

## 2019-10-02 RX ADMIN — Medication 1: at 08:46

## 2019-10-02 RX ADMIN — Medication 1: at 17:37

## 2019-10-02 RX ADMIN — Medication 100 MILLIGRAM(S): at 12:42

## 2019-10-02 RX ADMIN — Medication 1 APPLICATION(S): at 21:12

## 2019-10-02 RX ADMIN — Medication 1 APPLICATION(S): at 05:18

## 2019-10-02 RX ADMIN — CHLORHEXIDINE GLUCONATE 1 APPLICATION(S): 213 SOLUTION TOPICAL at 12:42

## 2019-10-02 RX ADMIN — PANTOPRAZOLE SODIUM 40 MILLIGRAM(S): 20 TABLET, DELAYED RELEASE ORAL at 05:19

## 2019-10-02 RX ADMIN — AMLODIPINE BESYLATE 5 MILLIGRAM(S): 2.5 TABLET ORAL at 05:19

## 2019-10-02 RX ADMIN — Medication 1 BOTTLE: at 14:05

## 2019-10-02 RX ADMIN — ONDANSETRON 4 MILLIGRAM(S): 8 TABLET, FILM COATED ORAL at 16:06

## 2019-10-02 RX ADMIN — IRON SUCROSE 110 MILLIGRAM(S): 20 INJECTION, SOLUTION INTRAVENOUS at 17:37

## 2019-10-02 RX ADMIN — NYSTATIN CREAM 1 APPLICATION(S): 100000 CREAM TOPICAL at 05:19

## 2019-10-02 RX ADMIN — Medication 650 MILLIGRAM(S): at 12:39

## 2019-10-02 RX ADMIN — NYSTATIN CREAM 1 APPLICATION(S): 100000 CREAM TOPICAL at 17:38

## 2019-10-02 RX ADMIN — Medication 1 APPLICATION(S): at 14:05

## 2019-10-02 RX ADMIN — ONDANSETRON 4 MILLIGRAM(S): 8 TABLET, FILM COATED ORAL at 02:31

## 2019-10-02 NOTE — PROGRESS NOTE ADULT - SUBJECTIVE AND OBJECTIVE BOX
[facility] : Progress Note :: [phone], (office [phone]),   Dr Pandey / Dr Mi / Dr Massey / Dr Etienne / Dr Cassandra DA SILVA / Dr Burt / Dr Garber / Dr Naeem lam  _____________________________________________________________________________________________  plans for I&D  s/p removal of shiley    aspirin (Unknown)  ibuprofen (Unknown)  Mushrooms (Anaphylaxis)  shellfish (Anaphylaxis)    Hospital Medications:   MEDICATIONS  (STANDING):  amLODIPine   Tablet 5 milliGRAM(s) Oral daily  BACItracin   Ointment 1 Application(s) Topical three times a day  chlorhexidine 2% Cloths 1 Application(s) Topical daily  chlorhexidine 2% Cloths 1 Application(s) Topical daily  docusate sodium 100 milliGRAM(s) Oral daily  epoetin lara Injectable 04122 Unit(s) IV Push <User Schedule>  insulin lispro (HumaLOG) corrective regimen sliding scale   SubCutaneous three times a day before meals  iron sucrose IVPB 200 milliGRAM(s) IV Intermittent every 24 hours  magnesium citrate Oral Solution 1 Bottle Oral once  nystatin Cream 1 Application(s) Topical two times a day  pantoprazole    Tablet 40 milliGRAM(s) Oral before breakfast  polyethylene glycol 3350 17 Gram(s) Oral two times a day  senna 2 Tablet(s) Oral at bedtime      VITALS:  T(F): 99.2 (10-02-19 @ 07:50), Max: 100.1 (10-01-19 @ 23:54)  HR: 80 (10-02-19 @ 07:50)  BP: 141/55 (10-02-19 @ 07:50)  RR: 17 (10-02-19 @ 07:50)  SpO2: 97% (10-02-19 @ 07:50)  Wt(kg): --    10-01 @ 07:01  -  10-02 @ 07:00  --------------------------------------------------------  IN: 0 mL / OUT: 1 mL / NET: -1 mL        PHYSICAL EXAM:  Constitutional: NAD  HEENT: anicteric sclera, oropharynx clear.  Neck: No JVD  Respiratory: CTAB, no wheezes, rales or rhonchi  Cardiovascular: S1, S2, RRR  Gastrointestinal: BS+, soft, NT/ND  Extremities:  No peripheral edema  Neurological: A/O x 3, no focal deficits  Vascular Access: RT IJ  permacath     LABS:  10-02    136  |  99  |  32<H>  ----------------------------<  135<H>  3.5   |  30  |  4.69<H>    Ca    8.4      02 Oct 2019 07:30  Phos  2.3     10-02  Mg     1.8     10-02    TPro  5.4<L>  /  Alb  2.6<L>  /  TBili  1.2  /  DBili      /  AST  23  /  ALT  23  /  AlkPhos  75  10-02    Creatinine Trend: 4.69 <--, 7.84 <--, 6.31 <--, 4.19 <--, 5.81 <--, 4.16 <--, 6.81 <--                        7.4    5.10  )-----------( 151      ( 02 Oct 2019 07:30 )             22.8     Urine Studies:      RADIOLOGY & ADDITIONAL STUDIES:

## 2019-10-02 NOTE — PROGRESS NOTE ADULT - ASSESSMENT
78 year old female with complaints of dysphagia, and black stools. Found to be in renal failure.     Plan:  Doing well.   hemoglobin remains stable   S.p  I and D     Advanced care planning was discussed with patient and family.  Advanced care planning forms were reviewed and discussed.  Risks, benefits and alternatives of gastroenterologic procedures were discussed in detail and all questions were answered.

## 2019-10-02 NOTE — PROGRESS NOTE ADULT - SUBJECTIVE AND OBJECTIVE BOX
PGY 1 Note discussed with supervising resident and primary attending    Patient is a 78y old  Female who presents with a chief complaint of abdominal pain, diarrhea (02 Oct 2019 12:17)      INTERVAL HPI/OVERNIGHT EVENTS: Patient seen and examined at the bedside.     MEDICATIONS  (STANDING):  amLODIPine   Tablet 5 milliGRAM(s) Oral daily  BACItracin   Ointment 1 Application(s) Topical three times a day  chlorhexidine 2% Cloths 1 Application(s) Topical daily  chlorhexidine 2% Cloths 1 Application(s) Topical daily  docusate sodium 100 milliGRAM(s) Oral daily  epoetin lara Injectable 40115 Unit(s) IV Push <User Schedule>  insulin lispro (HumaLOG) corrective regimen sliding scale   SubCutaneous three times a day before meals  iron sucrose IVPB 200 milliGRAM(s) IV Intermittent every 24 hours  magnesium citrate Oral Solution 1 Bottle Oral once  nystatin Cream 1 Application(s) Topical two times a day  pantoprazole    Tablet 40 milliGRAM(s) Oral before breakfast  polyethylene glycol 3350 17 Gram(s) Oral two times a day  senna 2 Tablet(s) Oral at bedtime    MEDICATIONS  (PRN):  acetaminophen   Tablet .. 650 milliGRAM(s) Oral every 6 hours PRN Moderate Pain (4 - 6), Severe Pain (7 - 10)  ALBUTerol/ipratropium for Nebulization 3 milliLiter(s) Nebulizer every 6 hours PRN Wheezing  ondansetron Injectable 4 milliGRAM(s) IV Push every 4 hours PRN Nausea and/or Vomiting      __________________________________________________  REVIEW OF SYSTEMS:    CONSTITUTIONAL: No fever,   EYES: no acute visual disturbances  NECK: No pain or stiffness  RESPIRATORY: No cough; No shortness of breath  CARDIOVASCULAR: No chest pain, no palpitations  GASTROINTESTINAL: No pain. No nausea or vomiting; No diarrhea   NEUROLOGICAL: No headache or numbness, no tremors  MUSCULOSKELETAL: No joint pain, no muscle pain  GENITOURINARY: no dysuria, no frequency, no hesitancy  PSYCHIATRY: no depression , no anxiety  ALL OTHER  ROS negative        Vital Signs Last 24 Hrs  T(C): 37.3 (02 Oct 2019 07:50), Max: 37.8 (01 Oct 2019 23:54)  T(F): 99.2 (02 Oct 2019 07:50), Max: 100.1 (01 Oct 2019 23:54)  HR: 80 (02 Oct 2019 07:50) (77 - 87)  BP: 141/55 (02 Oct 2019 07:50) (139/65 - 168/74)  BP(mean): --  RR: 17 (02 Oct 2019 07:50) (16 - 18)  SpO2: 97% (02 Oct 2019 07:50) (96% - 100%)    ________________________________________________  PHYSICAL EXAM:  GENERAL: NAD  HEENT: Normocephalic;  conjunctivae and sclerae clear; moist mucous membranes;   NECK : supple  CHEST/LUNG: Clear to auscultation bilaterally with good air entry   HEART: S1 S2  regular; no murmurs, gallops or rubs  ABDOMEN: Soft, Nontender, Nondistended; Bowel sounds present  EXTREMITIES: no cyanosis; no edema; no calf tenderness  SKIN: warm and dry; no rash  NERVOUS SYSTEM:  Awake and alert; Oriented  to place, person and time ; no new deficits    _________________________________________________  LABS:                        7.4    5.10  )-----------( 151      ( 02 Oct 2019 07:30 )             22.8     10-02    136  |  99  |  32<H>  ----------------------------<  135<H>  3.5   |  30  |  4.69<H>    Ca    8.4      02 Oct 2019 07:30  Phos  2.3     10-02  Mg     1.8     10-02    TPro  5.4<L>  /  Alb  2.6<L>  /  TBili  1.2  /  DBili  x   /  AST  23  /  ALT  23  /  AlkPhos  75  10-02    PT/INR - ( 01 Oct 2019 14:37 )   PT: 11.6 sec;   INR: 1.04 ratio         PTT - ( 01 Oct 2019 14:37 )  PTT:44.1 sec    CAPILLARY BLOOD GLUCOSE      POCT Blood Glucose.: 153 mg/dL (02 Oct 2019 11:34)  POCT Blood Glucose.: 154 mg/dL (02 Oct 2019 08:35)  POCT Blood Glucose.: 190 mg/dL (01 Oct 2019 21:29)        RADIOLOGY & ADDITIONAL TESTS:    Imaging Personally Reviewed:  YES/NO    Consultant(s) Notes Reviewed:   YES/ No    Care Discussed with Consultants :     Plan of care was discussed with patient and /or primary care giver; all questions and concerns were addressed and care was aligned with patient's wishes. PGY 1 Note discussed with supervising resident and primary attending    Patient is a 78y old  Female who presents with a chief complaint of abdominal pain, diarrhea (02 Oct 2019 12:17)      INTERVAL HPI/OVERNIGHT EVENTS: Patient seen and examined at the bedside. Pt still complaining of constipation and abdominal discomfort. She also reports being nauseous again.    MEDICATIONS  (STANDING):  amLODIPine   Tablet 5 milliGRAM(s) Oral daily  BACItracin   Ointment 1 Application(s) Topical three times a day  chlorhexidine 2% Cloths 1 Application(s) Topical daily  chlorhexidine 2% Cloths 1 Application(s) Topical daily  docusate sodium 100 milliGRAM(s) Oral daily  epoetin lara Injectable 68710 Unit(s) IV Push <User Schedule>  insulin lispro (HumaLOG) corrective regimen sliding scale   SubCutaneous three times a day before meals  iron sucrose IVPB 200 milliGRAM(s) IV Intermittent every 24 hours  magnesium citrate Oral Solution 1 Bottle Oral once  nystatin Cream 1 Application(s) Topical two times a day  pantoprazole    Tablet 40 milliGRAM(s) Oral before breakfast  polyethylene glycol 3350 17 Gram(s) Oral two times a day  senna 2 Tablet(s) Oral at bedtime    MEDICATIONS  (PRN):  acetaminophen   Tablet .. 650 milliGRAM(s) Oral every 6 hours PRN Moderate Pain (4 - 6), Severe Pain (7 - 10)  ALBUTerol/ipratropium for Nebulization 3 milliLiter(s) Nebulizer every 6 hours PRN Wheezing  ondansetron Injectable 4 milliGRAM(s) IV Push every 4 hours PRN Nausea and/or Vomiting      __________________________________________________  REVIEW OF SYSTEMS:    CONSTITUTIONAL: No fever,   EYES: no acute visual disturbances  NECK: No pain or stiffness  RESPIRATORY: No cough; No shortness of breath  CARDIOVASCULAR: No chest pain, no palpitations  GASTROINTESTINAL: No pain. Nausea ; No diarrhea, constipation   NEUROLOGICAL: No headache or numbness, no tremors  MUSCULOSKELETAL: No joint pain, no muscle pain  GENITOURINARY: no dysuria, no frequency, no hesitancy  PSYCHIATRY: no depression , no anxiety  ALL OTHER  ROS negative        Vital Signs Last 24 Hrs  T(C): 37.3 (02 Oct 2019 07:50), Max: 37.8 (01 Oct 2019 23:54)  T(F): 99.2 (02 Oct 2019 07:50), Max: 100.1 (01 Oct 2019 23:54)  HR: 80 (02 Oct 2019 07:50) (77 - 87)  BP: 141/55 (02 Oct 2019 07:50) (139/65 - 168/74)  BP(mean): --  RR: 17 (02 Oct 2019 07:50) (16 - 18)  SpO2: 97% (02 Oct 2019 07:50) (96% - 100%)    ________________________________________________  PHYSICAL EXAM:    GENERAL: NAD  HEENT: Normocephalic;  conjunctivae and sclerae clear; moist mucous membranes, perma cath on right side of neck  NECK : supple  CHEST/LUNG: Clear to auscultation bilaterally with good air entry. crackles b/l at bases  HEART: S1 S2  regular; no murmurs, gallops or rubs  ABDOMEN: Soft, Nontender, Nondistended; Bowel sounds present  EXTREMITIES: no cyanosis; no edema; no calf tenderness  SKIN: warm and dry; sacral ulcer bandaged right now  NERVOUS SYSTEM:  Awake and alert; Oriented  to place, person and time ; no new deficits      _________________________________________________  LABS:                        7.4    5.10  )-----------( 151      ( 02 Oct 2019 07:30 )             22.8     10-02    136  |  99  |  32<H>  ----------------------------<  135<H>  3.5   |  30  |  4.69<H>    Ca    8.4      02 Oct 2019 07:30  Phos  2.3     10-02  Mg     1.8     10-02    TPro  5.4<L>  /  Alb  2.6<L>  /  TBili  1.2  /  DBili  x   /  AST  23  /  ALT  23  /  AlkPhos  75  10-02    PT/INR - ( 01 Oct 2019 14:37 )   PT: 11.6 sec;   INR: 1.04 ratio         PTT - ( 01 Oct 2019 14:37 )  PTT:44.1 sec    CAPILLARY BLOOD GLUCOSE      POCT Blood Glucose.: 153 mg/dL (02 Oct 2019 11:34)  POCT Blood Glucose.: 154 mg/dL (02 Oct 2019 08:35)  POCT Blood Glucose.: 190 mg/dL (01 Oct 2019 21:29)        RADIOLOGY & ADDITIONAL TESTS:    Imaging Personally Reviewed:  YES/NO    Consultant(s) Notes Reviewed:   YES/ No    Care Discussed with Consultants :     Plan of care was discussed with patient and /or primary care giver; all questions and concerns were addressed and care was aligned with patient's wishes. PGY 1 Note discussed with supervising resident and primary attending    Patient is a 78y old  Female who presents with a chief complaint of abdominal pain, diarrhea (02 Oct 2019 12:17)      INTERVAL HPI/OVERNIGHT EVENTS: Patient seen and examined at the bedside. Pt still complaining of constipation and abdominal discomfort. She also reports being nauseous again. Pt had a temp of 100.1 overnight.    MEDICATIONS  (STANDING):  amLODIPine   Tablet 5 milliGRAM(s) Oral daily  BACItracin   Ointment 1 Application(s) Topical three times a day  chlorhexidine 2% Cloths 1 Application(s) Topical daily  chlorhexidine 2% Cloths 1 Application(s) Topical daily  docusate sodium 100 milliGRAM(s) Oral daily  epoetin lara Injectable 75845 Unit(s) IV Push <User Schedule>  insulin lispro (HumaLOG) corrective regimen sliding scale   SubCutaneous three times a day before meals  iron sucrose IVPB 200 milliGRAM(s) IV Intermittent every 24 hours  magnesium citrate Oral Solution 1 Bottle Oral once  nystatin Cream 1 Application(s) Topical two times a day  pantoprazole    Tablet 40 milliGRAM(s) Oral before breakfast  polyethylene glycol 3350 17 Gram(s) Oral two times a day  senna 2 Tablet(s) Oral at bedtime    MEDICATIONS  (PRN):  acetaminophen   Tablet .. 650 milliGRAM(s) Oral every 6 hours PRN Moderate Pain (4 - 6), Severe Pain (7 - 10)  ALBUTerol/ipratropium for Nebulization 3 milliLiter(s) Nebulizer every 6 hours PRN Wheezing  ondansetron Injectable 4 milliGRAM(s) IV Push every 4 hours PRN Nausea and/or Vomiting      __________________________________________________  REVIEW OF SYSTEMS:    CONSTITUTIONAL: No fever,   EYES: no acute visual disturbances  NECK: No pain or stiffness  RESPIRATORY: No cough; No shortness of breath  CARDIOVASCULAR: No chest pain, no palpitations  GASTROINTESTINAL: No pain. Nausea ; No diarrhea, constipation   NEUROLOGICAL: No headache or numbness, no tremors  MUSCULOSKELETAL: No joint pain, no muscle pain  GENITOURINARY: no dysuria, no frequency, no hesitancy  PSYCHIATRY: no depression , no anxiety  ALL OTHER  ROS negative        Vital Signs Last 24 Hrs  T(C): 37.3 (02 Oct 2019 07:50), Max: 37.8 (01 Oct 2019 23:54)  T(F): 99.2 (02 Oct 2019 07:50), Max: 100.1 (01 Oct 2019 23:54)  HR: 80 (02 Oct 2019 07:50) (77 - 87)  BP: 141/55 (02 Oct 2019 07:50) (139/65 - 168/74)  BP(mean): --  RR: 17 (02 Oct 2019 07:50) (16 - 18)  SpO2: 97% (02 Oct 2019 07:50) (96% - 100%)    ________________________________________________  PHYSICAL EXAM:    GENERAL: NAD  HEENT: Normocephalic;  conjunctivae and sclerae clear; moist mucous membranes, perma cath on right side of neck  NECK : supple  CHEST/LUNG: Clear to auscultation bilaterally with good air entry. crackles b/l at bases  HEART: S1 S2  regular; no murmurs, gallops or rubs  ABDOMEN: Soft, Nontender, Nondistended; Bowel sounds present  EXTREMITIES: no cyanosis; no edema; no calf tenderness  SKIN: warm and dry; sacral ulcer bandaged right now  NERVOUS SYSTEM:  Awake and alert; Oriented  to place, person and time ; no new deficits      _________________________________________________  LABS:                        7.4    5.10  )-----------( 151      ( 02 Oct 2019 07:30 )             22.8     10-02    136  |  99  |  32<H>  ----------------------------<  135<H>  3.5   |  30  |  4.69<H>    Ca    8.4      02 Oct 2019 07:30  Phos  2.3     10-02  Mg     1.8     10-02    TPro  5.4<L>  /  Alb  2.6<L>  /  TBili  1.2  /  DBili  x   /  AST  23  /  ALT  23  /  AlkPhos  75  10-02    PT/INR - ( 01 Oct 2019 14:37 )   PT: 11.6 sec;   INR: 1.04 ratio         PTT - ( 01 Oct 2019 14:37 )  PTT:44.1 sec    CAPILLARY BLOOD GLUCOSE      POCT Blood Glucose.: 153 mg/dL (02 Oct 2019 11:34)  POCT Blood Glucose.: 154 mg/dL (02 Oct 2019 08:35)  POCT Blood Glucose.: 190 mg/dL (01 Oct 2019 21:29)        RADIOLOGY & ADDITIONAL TESTS:    Imaging Personally Reviewed:  YES/NO    Consultant(s) Notes Reviewed:   YES/ No    Care Discussed with Consultants :     Plan of care was discussed with patient and /or primary care giver; all questions and concerns were addressed and care was aligned with patient's wishes.

## 2019-10-02 NOTE — PROGRESS NOTE ADULT - SUBJECTIVE AND OBJECTIVE BOX
Pt s- new complaints  ICU Vital Signs Last 24 Hrs  T(C): 37.3 (02 Oct 2019 05:16), Max: 37.8 (01 Oct 2019 23:54)  T(F): 99.2 (02 Oct 2019 05:16), Max: 100.1 (01 Oct 2019 23:54)  HR: 85 (02 Oct 2019 05:16) (77 - 87)  BP: 147/65 (02 Oct 2019 05:16) (139/65 - 168/74)  BP(mean): --  ABP: --  ABP(mean): --  RR: 17 (02 Oct 2019 05:16) (16 - 18)  SpO2: 98% (02 Oct 2019 05:16) (96% - 100%)  Chest wall: permacath in place, no bleed.  groin shiley s- bleed or erythema or discharge    PT/INR - ( 01 Oct 2019 14:37 )   PT: 11.6 sec;   INR: 1.04 ratio         PTT - ( 01 Oct 2019 14:37 )  PTT:44.1 sec      plan hd via p.c.   plan remove shiley after successful hd via permacath

## 2019-10-02 NOTE — PROGRESS NOTE ADULT - ASSESSMENT
78 female with PMH of HTn, HLD, DM, Gout, CKD stage 4( creatinine  year ago, 1.05), comes in with abdominal pain, diarrhea.  Admitted for acute Renal Failure, GI bleed, downgraded from ICU  after urgent HD on 9/22, 9/24 and 9/26 for acute pulmonary edema and severe metabolic acidosis.      s/p Perm cath placement, shiley catheter removal.    Debridement of sacral abscess and hemodialysis tomorrow.

## 2019-10-02 NOTE — PROGRESS NOTE ADULT - SUBJECTIVE AND OBJECTIVE BOX
Surgery Pre-op Note    Preoperative Diagnosis: Sacral decubitus    Planned Procedure: Debridement of sacral decubitus      Labs:                        7.4    5.10  )-----------( 151      ( 02 Oct 2019 07:30 )             22.8       10-02    136  |  99  |  32<H>  ----------------------------<  135<H>  3.5   |  30  |  4.69<H>    Ca    8.4      02 Oct 2019 07:30  Phos  2.3     10-02  Mg     1.8     10-02    TPro  5.4<L>  /  Alb  2.6<L>  /  TBili  1.2  /  DBili  x   /  AST  23  /  ALT  23  /  AlkPhos  75  10-02      LIVER FUNCTIONS - ( 02 Oct 2019 07:30 )  Alb: 2.6 g/dL / Pro: 5.4 g/dL / ALK PHOS: 75 U/L / ALT: 23 U/L DA / AST: 23 U/L / GGT: x             PT/INR - ( 01 Oct 2019 14:37 )   PT: 11.6 sec;   INR: 1.04 ratio         PTT - ( 01 Oct 2019 14:37 )  PTT:44.1 sec    Type & Screen:  Type + Screen (10.01.19 @ 14:57)    ABO RH Interpretation: A POS      EKG:  < from: 12 Lead ECG (09.22.19 @ 18:15) >  Diagnosis Line *** Poor data quality, interpretation may be adversely affected  Normal sinus rhythm  Normal ECG      < end of copied text >      Echo:  < from: Transthoracic Echocardiogram (09.23.19 @ 08:04) >  CONCLUSIONS:  1. Normal mitral valve. Mild mitral regurgitation.  2. Calcified trileaflet aortic valve with decreased  opening. Peak transaortic valve gradient equals 21.2 mm Hg,  estimated aortic valve area equals 1.6 sqcm (by continuity  equation), consistent with mild aortic stenosis.  3. Aortic Root: 3.8 cm.    4. Moderately dilated left atrium.  LA volume index = 45  cc/m2.  5. Normal left ventricular internal dimensions and wall  thicknesses.  6. Normal Left Ventricular Systolic Function,  (EF = 55 to  60%)  7. Grade II diastolic dysfunction.  8. Normal right atrium.  9. Normal right ventricular size and systolic function  (TAPSE  2.5cm).  10. RA Pressure is 8mm Hg.  11. RV systolic pressure is moderately increased at  49 mm  Hg.  12. There is mild tricuspid regurgitation.  13. There is mild pulmonic regurgitation.  14. Normal pericardium with no pericardial effusion.    < end of copied text >    Nuclear stress Test:    < from: Nuclear Stress Test-Pharmacologic (10.03.17 @ 09:00) >  IMPRESSIONS:  * Non-diagnostic ECG with Regadenoson stress.  * The left ventricle was normal in size.  * Tracer uptake was homogeneous throughout the left  ventricle.  * Normal study; no evidence for myocardial infarction or  ischemia.  * Gated wall motion analysis was performed, and shows  normal wall motion.      < end of copied text >    CXR:  < from: Xray Chest 1 View- PORTABLE-Routine (09.27.19 @ 14:21) >  Impression:  Development of left upper lobe pulmonary infiltrate since 9/21/2019,   otherwise stable.    < end of copied text >

## 2019-10-02 NOTE — PROGRESS NOTE ADULT - PROBLEM SELECTOR PLAN 5
Hb 7.4  -normocytic likely anemia due to renal disease  -low serum iron and TIBC  -s/p 3 units of PRBCs total  -continue with epogen  -continue with venofer  -pt to get one unit of pRBC tomorrow during dialysis  -continue to monitor CBC

## 2019-10-02 NOTE — CHART NOTE - NSCHARTNOTEFT_GEN_A_CORE
Assessment:   Patient reports [ ] nausea  [ ] vomiting [ ] diarrhea [ ] constipation  [ ]chewing problems [ ] swallowing issues  [ ] other:   Pt visited. OOB to chair. Reports Good appetite. Food choices updated. Pt is On dialysis.    PO intake:   Source for PO intake [x ] Patient/family [ ] chart [ ] staff     Current Weight: Weight (kg): 63.5 (09-30 @ 10:54)  % Weight Change    Pertinent Medications: MEDICATIONS  (STANDING):  amLODIPine   Tablet 5 milliGRAM(s) Oral daily  BACItracin   Ointment 1 Application(s) Topical three times a day  chlorhexidine 2% Cloths 1 Application(s) Topical daily  chlorhexidine 2% Cloths 1 Application(s) Topical daily  docusate sodium 100 milliGRAM(s) Oral daily  epoetin lara Injectable 44133 Unit(s) IV Push <User Schedule>  insulin lispro (HumaLOG) corrective regimen sliding scale   SubCutaneous three times a day before meals  iron sucrose IVPB 200 milliGRAM(s) IV Intermittent every 24 hours  nystatin Cream 1 Application(s) Topical two times a day  pantoprazole    Tablet 40 milliGRAM(s) Oral before breakfast  polyethylene glycol 3350 17 Gram(s) Oral two times a day  senna 2 Tablet(s) Oral at bedtime    MEDICATIONS  (PRN):  acetaminophen   Tablet .. 650 milliGRAM(s) Oral every 6 hours PRN Moderate Pain (4 - 6), Severe Pain (7 - 10)  ALBUTerol/ipratropium for Nebulization 3 milliLiter(s) Nebulizer every 6 hours PRN Wheezing  ondansetron Injectable 4 milliGRAM(s) IV Push every 4 hours PRN Nausea and/or Vomiting    Pertinent Labs:  10-02 Na136 mmol/L Glu 135 mg/dL<H> K+ 3.5 mmol/L Cr  4.69 mg/dL<H> BUN 32 mg/dL<H> 10-02 Phos 2.3 mg/dL<L> 10-02 Alb 2.6 g/dL<L> 09-22 [id number] 5.6 % 09-22 Chol 77 mg/dL LDL 20 mg/dL HDL 32 mg/dL<L> Trig 124 mg/dL      Skin:     Estimated Needs:   [ ] no change since previous assessment  [ ] recalculated:       Previous Nutrition Diagnosis:   [ ] Inadequate Energy Intake [ ]Inadequate Oral Intake [ ] Excessive Energy Intake   [ ] Underweight [ ] Increased Nutrient Needs [ ] Overweight/Obesity   [ ] Altered GI Function [ ] Unintended Weight Loss [ ] Food & Nutrition Related Knowledge Deficit [x ] Malnutrition  mILD    Nutrition Diagnosis is X[ ] ongoing  [ ] resolved [ ] not applicable     New Nutrition Diagnosis: [ ] not applicable  [ ] Inadequate Energy Intake [ ]Inadequate Oral Intake [ ] Excessive Energy Intake   [ ] Underweight [ ] Increased Nutrient Needs [ ] Overweight/Obesity   [ ] Altered GI Function [ ] Unintended Weight Loss [ ] Food & Nutrition Related Knowledge Deficit [ ] Malnutrition     Related to:     As evidenced by:     Interventions:   Recommend  [ ] Change Diet To:  [ ] Nutrition Supplement  [ ] Nutrition Support  [X ] Other: CONTINUE WITH CURRENT DIET     Monitoring and Evaluation:   [ ] PO intake [ ] Tolerance to diet prescription [ ] weights [X ] follow up per protocol  [ ] other:

## 2019-10-02 NOTE — PROGRESS NOTE ADULT - PROBLEM SELECTOR PLAN 1
baseline SCR 1.3  Pt was admitted to ICU with HD dependent CONCHIS from [facility] for Diarrhea and vomiting  patient had HD on 9/22, 9/24 and 9/26     - Creatinine level 4.69    - Pt needs long term dialysis as per nephro    - s/p perm cath     - Shiley catheter removed by vascular today  - HEMODIALYSIS TOMORROW in am  - outpt HD placement at [place] dialysis unit.

## 2019-10-02 NOTE — CHART NOTE - NSCHARTNOTEFT_GEN_A_CORE
Permacath was placed yesterday for permanent HD access, successful hemodialysis yesterday. Vascular called to remove a Shiley catheter.     Patient informed of the process of Shiley catheter removal and agrees to proceed. Dressing site appears clean, dry and intact, no obvious hematoma or signs of infection. R medial thigh noted to have an old hematoma from previous attempt for shiley placement, stable, no pain or skin changes.     Patient is positioned appropriately to expose the field, adhesive removed in a cautious manner. Catheter isolated and bilateral anchoring sutures removed completely. While holding pressure on femoral vein from proximal and distal ends, catheter pulled out gently yet rapidly. Pressure held for 5 minutes. No further bleeding noted from removal site, dressing applied. Advised patient to lay flat for 2 hours, verbalizes understanding. Notified RN and communicated instructions. Wastes discarded appropriately.

## 2019-10-02 NOTE — PROGRESS NOTE ADULT - PROBLEM SELECTOR PLAN 2
Pt has a sacral abscess  Surgery consulted  plan for debridement tomorrow  Previously there was a clash btw dialysis and surgery, attending has spoken to dialysis people, pt will get dialysis tomorrow morning and will go fo I&D in pm  Continue with wound care

## 2019-10-02 NOTE — PROGRESS NOTE ADULT - PROBLEM SELECTOR PLAN 1
deemed ESRD. s/p permacath.  deangelo andre removed   plans for sacral decubitus ulcer.  outpt HD placement at [facility] [facility] - [place], [place]

## 2019-10-02 NOTE — PROGRESS NOTE ADULT - ASSESSMENT
Sacral decubitus, unstageable  1. Plan for OR in AM  2. NPO after midnight  3. AM labs including coags  4. Consent to be obtained   5. Medical clearance

## 2019-10-03 ENCOUNTER — RESULT REVIEW (OUTPATIENT)
Age: 79
End: 2019-10-03

## 2019-10-03 LAB
ALBUMIN SERPL ELPH-MCNC: 2.8 G/DL — LOW (ref 3.5–5)
ALP SERPL-CCNC: 86 U/L — SIGNIFICANT CHANGE UP (ref 40–120)
ALT FLD-CCNC: 22 U/L DA — SIGNIFICANT CHANGE UP (ref 10–60)
ANION GAP SERPL CALC-SCNC: 10 MMOL/L — SIGNIFICANT CHANGE UP (ref 5–17)
AST SERPL-CCNC: 24 U/L — SIGNIFICANT CHANGE UP (ref 10–40)
BILIRUB SERPL-MCNC: 1.1 MG/DL — SIGNIFICANT CHANGE UP (ref 0.2–1.2)
BUN SERPL-MCNC: 46 MG/DL — HIGH (ref 7–18)
CALCIUM SERPL-MCNC: 8.8 MG/DL — SIGNIFICANT CHANGE UP (ref 8.4–10.5)
CHLORIDE SERPL-SCNC: 95 MMOL/L — LOW (ref 96–108)
CO2 SERPL-SCNC: 29 MMOL/L — SIGNIFICANT CHANGE UP (ref 22–31)
CREAT SERPL-MCNC: 6.21 MG/DL — HIGH (ref 0.5–1.3)
GLUCOSE BLDC GLUCOMTR-MCNC: 106 MG/DL — HIGH (ref 70–99)
GLUCOSE BLDC GLUCOMTR-MCNC: 119 MG/DL — HIGH (ref 70–99)
GLUCOSE BLDC GLUCOMTR-MCNC: 135 MG/DL — HIGH (ref 70–99)
GLUCOSE BLDC GLUCOMTR-MCNC: 146 MG/DL — HIGH (ref 70–99)
GLUCOSE SERPL-MCNC: 135 MG/DL — HIGH (ref 70–99)
HCT VFR BLD CALC: 23.1 % — LOW (ref 34.5–45)
HGB BLD-MCNC: 7.6 G/DL — LOW (ref 11.5–15.5)
INR BLD: 1.08 RATIO — SIGNIFICANT CHANGE UP (ref 0.88–1.16)
MAGNESIUM SERPL-MCNC: 2.3 MG/DL — SIGNIFICANT CHANGE UP (ref 1.6–2.6)
MCHC RBC-ENTMCNC: 29.8 PG — SIGNIFICANT CHANGE UP (ref 27–34)
MCHC RBC-ENTMCNC: 32.9 GM/DL — SIGNIFICANT CHANGE UP (ref 32–36)
MCV RBC AUTO: 90.6 FL — SIGNIFICANT CHANGE UP (ref 80–100)
NRBC # BLD: 0 /100 WBCS — SIGNIFICANT CHANGE UP (ref 0–0)
PHOSPHATE SERPL-MCNC: 2.7 MG/DL — SIGNIFICANT CHANGE UP (ref 2.5–4.5)
PLATELET # BLD AUTO: 190 K/UL — SIGNIFICANT CHANGE UP (ref 150–400)
POTASSIUM SERPL-MCNC: 3.7 MMOL/L — SIGNIFICANT CHANGE UP (ref 3.5–5.3)
POTASSIUM SERPL-SCNC: 3.7 MMOL/L — SIGNIFICANT CHANGE UP (ref 3.5–5.3)
PROT SERPL-MCNC: 5.8 G/DL — LOW (ref 6–8.3)
PROTHROM AB SERPL-ACNC: 12 SEC — SIGNIFICANT CHANGE UP (ref 10–12.9)
RBC # BLD: 2.55 M/UL — LOW (ref 3.8–5.2)
RBC # FLD: 15.4 % — HIGH (ref 10.3–14.5)
SODIUM SERPL-SCNC: 134 MMOL/L — LOW (ref 135–145)
WBC # BLD: 5.33 K/UL — SIGNIFICANT CHANGE UP (ref 3.8–10.5)
WBC # FLD AUTO: 5.33 K/UL — SIGNIFICANT CHANGE UP (ref 3.8–10.5)

## 2019-10-03 PROCEDURE — 99233 SBSQ HOSP IP/OBS HIGH 50: CPT | Mod: GC

## 2019-10-03 PROCEDURE — 88304 TISSUE EXAM BY PATHOLOGIST: CPT | Mod: 26

## 2019-10-03 PROCEDURE — ZZZZZ: CPT

## 2019-10-03 PROCEDURE — 99222 1ST HOSP IP/OBS MODERATE 55: CPT

## 2019-10-03 PROCEDURE — 11043 DBRDMT MUSC&/FSCA 1ST 20/<: CPT

## 2019-10-03 PROCEDURE — 88305 TISSUE EXAM BY PATHOLOGIST: CPT | Mod: 26

## 2019-10-03 RX ORDER — METRONIDAZOLE 500 MG
500 TABLET ORAL EVERY 8 HOURS
Refills: 0 | Status: DISCONTINUED | OUTPATIENT
Start: 2019-10-03 | End: 2019-10-07

## 2019-10-03 RX ORDER — MUPIROCIN 20 MG/G
1 OINTMENT TOPICAL
Refills: 0 | Status: DISCONTINUED | OUTPATIENT
Start: 2019-10-03 | End: 2019-10-07

## 2019-10-03 RX ORDER — MORPHINE SULFATE 50 MG/1
2 CAPSULE, EXTENDED RELEASE ORAL
Refills: 0 | Status: DISCONTINUED | OUTPATIENT
Start: 2019-10-03 | End: 2019-10-03

## 2019-10-03 RX ORDER — SODIUM CHLORIDE 9 MG/ML
1000 INJECTION, SOLUTION INTRAVENOUS
Refills: 0 | Status: DISCONTINUED | OUTPATIENT
Start: 2019-10-03 | End: 2019-10-03

## 2019-10-03 RX ADMIN — ONDANSETRON 4 MILLIGRAM(S): 8 TABLET, FILM COATED ORAL at 02:25

## 2019-10-03 RX ADMIN — CHLORHEXIDINE GLUCONATE 1 APPLICATION(S): 213 SOLUTION TOPICAL at 11:57

## 2019-10-03 RX ADMIN — Medication 500 MILLIGRAM(S): at 21:17

## 2019-10-03 RX ADMIN — Medication 100 MILLIGRAM(S): at 21:15

## 2019-10-03 RX ADMIN — Medication 1 APPLICATION(S): at 06:35

## 2019-10-03 RX ADMIN — PANTOPRAZOLE SODIUM 40 MILLIGRAM(S): 20 TABLET, DELAYED RELEASE ORAL at 06:32

## 2019-10-03 RX ADMIN — MUPIROCIN 1 APPLICATION(S): 20 OINTMENT TOPICAL at 19:19

## 2019-10-03 RX ADMIN — ERYTHROPOIETIN 10000 UNIT(S): 10000 INJECTION, SOLUTION INTRAVENOUS; SUBCUTANEOUS at 09:47

## 2019-10-03 RX ADMIN — ONDANSETRON 4 MILLIGRAM(S): 8 TABLET, FILM COATED ORAL at 07:49

## 2019-10-03 RX ADMIN — NYSTATIN CREAM 1 APPLICATION(S): 100000 CREAM TOPICAL at 06:36

## 2019-10-03 RX ADMIN — NYSTATIN CREAM 1 APPLICATION(S): 100000 CREAM TOPICAL at 19:20

## 2019-10-03 RX ADMIN — Medication 1 APPLICATION(S): at 13:15

## 2019-10-03 RX ADMIN — ONDANSETRON 4 MILLIGRAM(S): 8 TABLET, FILM COATED ORAL at 11:57

## 2019-10-03 RX ADMIN — Medication 1 APPLICATION(S): at 21:18

## 2019-10-03 RX ADMIN — AMLODIPINE BESYLATE 5 MILLIGRAM(S): 2.5 TABLET ORAL at 06:32

## 2019-10-03 NOTE — CONSULT NOTE ADULT - ASSESSMENT
78y Female with DM, HTN, CKD a/w diarrhea, anemia, and severe renal failure.
78 year old female with complaints of dysphagia, and black stools. Found to be in renal failure.     Plan:  Transfuse to a goal of > 8   IV PPI   NPO for now   Monitor CBC Q 8 hours   ICU level care   Large bore IV access   Plan for EGD Monday tentatively     Renal failure   Workup as per ICU team   Monitor electrolytes   Nephrology consult.
78F PMHx of HTN, HLD, DM, CKD stage 4 admitted for renal failure, requiring  urgent HD and GI bleed, also found to have sacral decubitus ulcer    - Added on for OR tomorrow 10/1 for debridement of sacral ulcer  - NPO @ [facility]  - Please document medical clearance  - Preop labs including T&S and coags  - Continue pressure ulcer prevention protocol  - HD as per renal via permacath    Discussed with Dr. Cha
Patient with h/o DM, gout, HTN, HLD, CKD stage 4 admitted 9/21/19 with Dx of GI bleed and renal failure requiring HD. Pt developed sacral ulcer with foul-smelling exudate. In view of skin-soft tissue infection (SSTI) in a pt with DM and ESRD, would suggest that patient be treated with p.o. doxycycline 100mg bid and metronidazole 500mg q8h for 3-5d following surgical debridement to provide coverage for organisms that would be causing this patient's SSTI (ie, anaerobes, Staph)    Please call again if needed.

## 2019-10-03 NOTE — BRIEF OPERATIVE NOTE - NSICDXBRIEFPROCEDURE_GEN_ALL_CORE_FT
PROCEDURES:  Incision and drainage with debridement of decubitus ulcer of skin 03-Oct-2019 17:20:18  Yasmine Blackmon

## 2019-10-03 NOTE — PROGRESS NOTE ADULT - SUBJECTIVE AND OBJECTIVE BOX
PGY 1 Note discussed with supervising resident and primary attending    Patient is a 78y old  Female who presents with a chief complaint of abdominal pain, diarrhea (03 Oct 2019 16:11)      INTERVAL HPI/OVERNIGHT EVENTS: Patient seen and examined at the bedside. Complains of pain at site of abscess    MEDICATIONS  (STANDING):  amLODIPine   Tablet 5 milliGRAM(s) Oral daily  BACItracin   Ointment 1 Application(s) Topical three times a day  chlorhexidine 2% Cloths 1 Application(s) Topical daily  chlorhexidine 2% Cloths 1 Application(s) Topical daily  docusate sodium 100 milliGRAM(s) Oral daily  doxycycline hyclate Capsule 100 milliGRAM(s) Oral every 12 hours  epoetin lara Injectable 30904 Unit(s) IV Push <User Schedule>  insulin lispro (HumaLOG) corrective regimen sliding scale   SubCutaneous three times a day before meals  lactated ringers. 1000 milliLiter(s) (100 mL/Hr) IV Continuous <Continuous>  metroNIDAZOLE    Tablet 500 milliGRAM(s) Oral every 8 hours  mupirocin 2% Ointment 1 Application(s) Both Nostrils two times a day  nystatin Cream 1 Application(s) Topical two times a day  pantoprazole    Tablet 40 milliGRAM(s) Oral before breakfast  polyethylene glycol 3350 17 Gram(s) Oral two times a day  senna 2 Tablet(s) Oral at bedtime    MEDICATIONS  (PRN):  acetaminophen   Tablet .. 650 milliGRAM(s) Oral every 6 hours PRN Moderate Pain (4 - 6), Severe Pain (7 - 10)  ALBUTerol/ipratropium for Nebulization 3 milliLiter(s) Nebulizer every 6 hours PRN Wheezing  guaiFENesin    Syrup 100 milliGRAM(s) Oral every 6 hours PRN Cough  morphine  - Injectable 2 milliGRAM(s) IV Push every 10 minutes PRN Severe Pain (7 - 10)  ondansetron Injectable 4 milliGRAM(s) IV Push every 4 hours PRN Nausea and/or Vomiting      __________________________________________________  REVIEW OF SYSTEMS:    CONSTITUTIONAL: No fever,   EYES: no acute visual disturbances  NECK: No pain or stiffness  RESPIRATORY: No cough; No shortness of breath  CARDIOVASCULAR: No chest pain, no palpitations  GASTROINTESTINAL: No pain. No nausea or vomiting; No diarrhea   NEUROLOGICAL: No headache or numbness, no tremors  MUSCULOSKELETAL: No joint pain, no muscle pain  GENITOURINARY: no dysuria, no frequency, no hesitancy  PSYCHIATRY: no depression , no anxiety  ALL OTHER  ROS negative        Vital Signs Last 24 Hrs  T(C): 36.8 (03 Oct 2019 16:12), Max: 36.9 (02 Oct 2019 23:37)  T(F): 98.2 (03 Oct 2019 16:12), Max: 98.5 (02 Oct 2019 23:37)  HR: 84 (03 Oct 2019 16:12) (76 - 84)  BP: 150/56 (03 Oct 2019 16:12) (133/62 - 161/77)  BP(mean): --  RR: 18 (03 Oct 2019 16:12) (16 - 18)  SpO2: 98% (03 Oct 2019 16:12) (95% - 100%)    ________________________________________________  GENERAL: NAD  HEENT: Normocephalic;  conjunctivae and sclerae clear; moist mucous membranes, perma cath on right side of neck  NECK : supple  CHEST/LUNG: Clear to auscultation bilaterally with good air entry. crackles b/l at bases  HEART: S1 S2  regular; no murmurs, gallops or rubs  ABDOMEN: Soft, Nontender, Nondistended; Bowel sounds present  EXTREMITIES: no cyanosis; no edema; no calf tenderness  SKIN: warm and dry; sacral abscess with brownish discharge  NERVOUS SYSTEM:  Awake and alert; Oriented  to place, person and time ; no new deficits    _________________________________________________  LABS:                        7.6    5.33  )-----------( 190      ( 03 Oct 2019 08:28 )             23.1     10-03    134<L>  |  95<L>  |  46<H>  ----------------------------<  135<H>  3.7   |  29  |  6.21<H>    Ca    8.8      03 Oct 2019 08:28  Phos  2.7     10-03  Mg     2.3     10-03    TPro  5.8<L>  /  Alb  2.8<L>  /  TBili  1.1  /  DBili  x   /  AST  24  /  ALT  22  /  AlkPhos  86  10-03    PT/INR - ( 03 Oct 2019 11:32 )   PT: 12.0 sec;   INR: 1.08 ratio             CAPILLARY BLOOD GLUCOSE      POCT Blood Glucose.: 119 mg/dL (03 Oct 2019 17:22)  POCT Blood Glucose.: 106 mg/dL (03 Oct 2019 11:03)  POCT Blood Glucose.: 135 mg/dL (02 Oct 2019 21:03)        RADIOLOGY & ADDITIONAL TESTS:    Imaging Personally Reviewed:  YES/NO    Consultant(s) Notes Reviewed:   YES/ No    Care Discussed with Consultants :     Plan of care was discussed with patient and /or primary care giver; all questions and concerns were addressed and care was aligned with patient's wishes.

## 2019-10-03 NOTE — CONSULT NOTE ADULT - ATTENDING COMMENTS
Chart reviewed and patient evaluated at the bedside
Dr Wil Etienne  Nephrology  Office [phone]  Ans Serv [phone]  [facility] [phone]

## 2019-10-03 NOTE — PROGRESS NOTE ADULT - PROBLEM SELECTOR PLAN 5
Hb 7.6  -normocytic likely anemia due to renal disease  -low serum iron and TIBC  -s/p 3 units of PRBCs total  -continue with epogen  -continue with venofer  -Pt to get one unit of blood TODAY DURING DIALYSIS  -continue to monitor CBC

## 2019-10-03 NOTE — PROGRESS NOTE ADULT - SUBJECTIVE AND OBJECTIVE BOX
Surgery    Subjective:  Pt resting comfortably. No acute complaints. Sleeping well.    T(C): 36.4 (10-03-19 @ 18:47), Max: 36.9 (10-03-19 @ 17:15)  HR: 82 (10-03-19 @ 18:47) (75 - 84)  BP: 150/65 (10-03-19 @ 18:47) (139/65 - 161/77)  RR: 18 (10-03-19 @ 18:47) (16 - 20)  SpO2: 95% (10-03-19 @ 18:47) (95% - 100%)    Physical:  Gen: NAD  Back: Dressing C/D/I

## 2019-10-03 NOTE — CONSULT NOTE ADULT - SUBJECTIVE AND OBJECTIVE BOX
HPI/Course in Hospital:  78 female with PMH of HTn, HLD, DM, Gout, CKD stage 4( creatinine  year ago, 1.05) admitted 17 with lower abdominal pain, diarrhea, nausea; denied hematemesis. Patient reported having dark stools and diarrhea for 1 week. Also with bilateral leg swelling and SOB PTA.     In ED, BP: 98/ 58 mm hg, HR: 58, Temp: 97.2 F; hb of 6.7/20; Na++ of 131, bicarb 10, elevated bun/creatinine 137/11; Troponin 0.015. Patient transferred to the ICU for management of GI bleed, pulmonary edema, and renal failure. Has been on HD since .    Patient says she developed pain in area of sacrum approx 1 wk ago. with associated ulcer and foul smell that have progressed. Patient scheduled for I+D of sacral decub. Denies fever, chills. Still c/o intermittent nausea, one episode of vomiting last pm. Denies SOB, CP, abd pain or urinary tract complaints. + constipation in hospital.      PAST MEDICAL & SURGICAL HISTORY:  Diabetes mellitus  CKD (chronic kidney disease)  Seasonal allergies  Vitamin D deficiency  Osteoarthritis  Osteoporosis  Gout  Hyperlipidemia  Hypertension  History of bilat knee replacements  History of breast surgery with excision of left breast lump - benign    H/O bilateral cataract extraction:   and     ALL:  aspirin--itching  ibuprofen (Unknown)  Mushrooms (Anaphylaxis)  shellfish (Anaphylaxis)      Meds:  acetaminophen   Tablet .. 650 milliGRAM(s) Oral every 6 hours PRN  ALBUTerol/ipratropium for Nebulization 3 milliLiter(s) Nebulizer every 6 hours PRN  amLODIPine   Tablet 5 milliGRAM(s) Oral daily  BACItracin   Ointment 1 Application(s) Topical three times a day  chlorhexidine 2% Cloths 1 Application(s) Topical daily  chlorhexidine 2% Cloths 1 Application(s) Topical daily  docusate sodium 100 milliGRAM(s) Oral daily  epoetin lara Injectable 84383 Unit(s) IV Push <User Schedule>  guaiFENesin    Syrup 100 milliGRAM(s) Oral every 6 hours PRN  insulin lispro (HumaLOG) corrective regimen sliding scale   SubCutaneous three times a day before meals  mupirocin 2% Ointment 1 Application(s) Both Nostrils two times a day  nystatin Cream 1 Application(s) Topical two times a day  ondansetron Injectable 4 milliGRAM(s) IV Push every 4 hours PRN  pantoprazole    Tablet 40 milliGRAM(s) Oral before breakfast  polyethylene glycol 3350 17 Gram(s) Oral two times a day  senna 2 Tablet(s) Oral at bedtime      SOCIAL HISTORY:  Smoker:  not for 35y  ETOH use:  occasional  Occupation: retired   Lives alone but has family locally    FAMILY HISTORY:  Family history of cancer; sister  in CHF      REVIEW OF SYSTEMS    Gen: 	    Skin/Breast:  	  Ophthalmologic:  	  ENMT:	    Respiratory and Thorax:  	  Cardiovascular:	    Gastrointestinal:	    Genitourinary:	    Musculoskeletal:	    Neurological:	    Psychiatric:	    Hematology/Lymphatics:	    Endocrine:	    Allergic/Immunologic:	    VITALS:  Vital Signs Last 24 Hrs  T(C): 36.8 (03 Oct 2019 11:32), Max: 36.9 (02 Oct 2019 23:37)  T(F): 98.3 (03 Oct 2019 11:32), Max: 98.5 (02 Oct 2019 23:37)  HR: 83 (03 Oct 2019 11:32) (76 - 83)  BP: 161/77 (03 Oct 2019 11:32) (133/62 - 161/77)  BP(mean): --  RR: 18 (03 Oct 2019 11:32) (16 - 18)  SpO2: 98% (03 Oct 2019 07:31) (95% - 100%)    PHYSICAL EXAM:      Constitutional:    Eyes:    ENMT:    Neck:    Breasts:    Back:    Respiratory:    Cardiovascular:    Gastrointestinal:    Genitourinary:    Rectal:    Extremities:    Vascular:    Neurological:    Skin:    Lymph Nodes:    Musculoskeletal:    Psychiatric:        LABS/DIAGNOSTIC TESTS:                          7.6    5.33  )-----------( 190      ( 03 Oct 2019 08:28 )             23.1     WBC Count: 5.33 K/uL (10-03 @ 08:28)  WBC Count: 5.10 K/uL (10-02 @ 07:30)  WBC Count: 6.90 K/uL (10-01 @ 14:37)      10-03    134<L>  |  95<L>  |  46<H>  ----------------------------<  135<H>  3.7   |  29  |  6.21<H>    Ca    8.8      03 Oct 2019 08:28  Phos  2.7     10-  Mg     2.3     10-    TPro  5.8<L>  /  Alb  2.8<L>  /  TBili  1.1  /  DBili  x   /  AST  24  /  ALT  22  /  AlkPhos  86  10-          LIVER FUNCTIONS - ( 03 Oct 2019 08:28 )  Alb: 2.8 g/dL / Pro: 5.8 g/dL / ALK PHOS: 86 U/L / ALT: 22 U/L DA / AST: 24 U/L / GGT: x             PT/INR - ( 03 Oct 2019 11:32 )   PT: 12.0 sec;   INR: 1.08 ratio             LACTATE:    ABG -     CULTURES:       RADIOLOGY HPI/Course in Hospital:  78 female with PMH of HTN, HLD, DM, Gout, CKD stage 4( creatinine  year ago, 1.05) admitted 19 with lower abdominal pain, diarrhea, nausea; denied hematemesis. Patient reported having dark stools and diarrhea for 1 week. Also with bilateral leg swelling and SOB PTA. In ED, BP: 98/ 58 mm hg, HR: 58, Temp: 97.2 F; hb of 6.7/20; Na++ of 131, bicarb 10, elevated bun/creatinine 137/11; Troponin 0.015. Patient transferred to the ICU for management of GI bleed, pulmonary edema, and renal failure. Has been on HD since .    Patient says she developed pain in area of sacrum approx 1 wk ago with associated ulcer and foul smell that have progressed. Patient scheduled for I+D of sacral decub. Denies fever, chills. Still c/o intermittent nausea, one episode of vomiting last pm. Denies SOB, CP, abd pain or urinary tract complaints. + constipation in hospital.      PAST MEDICAL & SURGICAL HISTORY:  Diabetes mellitus  CKD (chronic kidney disease)  Seasonal allergies  Vitamin D deficiency  Osteoarthritis  Osteoporosis  Gout  Hyperlipidemia  Hypertension  History of bilat knee replacements  History of breast surgery with excision of left breast lump - benign    H/O bilateral cataract extraction:   and     ALL:  aspirin--itching  ibuprofen (Unknown)  Mushrooms (Anaphylaxis)  shellfish (Anaphylaxis)      Meds:  acetaminophen   Tablet .. 650 milliGRAM(s) Oral every 6 hours PRN  ALBUTerol/ipratropium for Nebulization 3 milliLiter(s) Nebulizer every 6 hours PRN  amLODIPine   Tablet 5 milliGRAM(s) Oral daily  BACItracin   Ointment 1 Application(s) Topical three times a day  chlorhexidine 2% Cloths 1 Application(s) Topical daily  chlorhexidine 2% Cloths 1 Application(s) Topical daily  docusate sodium 100 milliGRAM(s) Oral daily  epoetin lara Injectable 34703 Unit(s) IV Push <User Schedule>  guaiFENesin    Syrup 100 milliGRAM(s) Oral every 6 hours PRN  insulin lispro (HumaLOG) corrective regimen sliding scale   SubCutaneous three times a day before meals  mupirocin 2% Ointment 1 Application(s) Both Nostrils two times a day  nystatin Cream 1 Application(s) Topical two times a day  ondansetron Injectable 4 milliGRAM(s) IV Push every 4 hours PRN  pantoprazole    Tablet 40 milliGRAM(s) Oral before breakfast  polyethylene glycol 3350 17 Gram(s) Oral two times a day  senna 2 Tablet(s) Oral at bedtime      SOCIAL HISTORY:  Smoker:  not for 35y  ETOH use:  occasional  Occupation: retired   Lives alone but has family locally    FAMILY HISTORY:  Family history of cancer; sister  in CHF      REVIEW OF SYSTEMS    Gen: no fever, chills    Skin/Breast: h/o breast bx in past  	  Ophthalmologic: bilat cataract surgery  	  ENMT: has dentures that she does not wear	    Respiratory and Thorax: no SOB, cough  	  Cardiovascular:	no CP    Gastrointestinal:	constipation at this time    Genitourinary:	no complaints    Musculoskeletal:	 osteoarthritis    Neurological:	no Hx	    Endocrine:DM  	    VITALS:  Vital Signs Last 24 Hrs  T(C): 36.8 (03 Oct 2019 11:32), Max: 36.9 (02 Oct 2019 23:37)  T(F): 98.3 (03 Oct 2019 11:32), Max: 98.5 (02 Oct 2019 23:37)  HR: 83 (03 Oct 2019 11:32) (76 - 83)  BP: 161/77 (03 Oct 2019 11:32) (133/62 - 161/77)  BP(mean): --  RR: 18 (03 Oct 2019 11:32) (16 - 18)  SpO2: 98% (03 Oct 2019 07:31) (95% - 100%)    PHYSICAL EXAM:    GEN: WDWN w female in NAD    HEENT: NCAT; conj clear, EOMI; edentulous    Neck: healing incision site R side of neck    Breasts: scar present superior part of L breast    Back: no CVAT    CHEST: Permacath R upper chest without erythema or tenderness    Respiratory: bibasiliar rales    Cardiovascular: S1S2 reg with II/VI SEB best heard in LLSB    Gastrointestinal: soft and non-tender with active BS    Rectal: no perirectal lesions notes    Extremities: 2+ edema bilat LE from foot to patella; bilateral vertical scars over knees    Neurological: A+O x3; Cr n intact    Skin: 2cmx0.5 cm ulcer in lower sacral area with foul-smelling exudate present; + tenderness surrounding the ulcer; no surrounding fluctuance    Lymph Nodes: no ant. cervical, post. cervical or supraclav LNs appreciated      LABS/DIAGNOSTIC TESTS:                        7.6    5.33  )-----------( 190      ( 03 Oct 2019 08:28 )             23.1     WBC Count: 5.33 K/uL (10-03 @ 08:28)  WBC Count: 5.10 K/uL (10-02 @ 07:30)  WBC Count: 6.90 K/uL (10-01 @ 14:37)      10-03    134<L>  |  95<L>  |  46<H>  ----------------------------<  135<H>  3.7   |  29  |  6.21<H>    Ca    8.8      03 Oct 2019 08:28  Phos  2.7     10-03  Mg     2.3     10-03    TPro  5.8<L>  /  Alb  2.8<L>  /  TBili  1.1  /  DBili  x   /  AST  24  /  ALT  22  /  AlkPhos  86  10-03      LIVER FUNCTIONS - ( 03 Oct 2019 08:28 )  Alb: 2.8 g/dL / Pro: 5.8 g/dL / ALK PHOS: 86 U/L / ALT: 22 U/L DA / AST: 24 U/L / GGT: x            PT/INR - ( 03 Oct 2019 11:32 )   PT: 12.0 sec;   INR: 1.08 ratio

## 2019-10-03 NOTE — CONSULT NOTE ADULT - CONSULT REASON
antibiotic recommendation for SSTI
ESRD, debility, goals of care
Gi bleed
Sacral decubitus ulcer
CONCHIS, Acidosis

## 2019-10-03 NOTE — PROGRESS NOTE ADULT - SUBJECTIVE AND OBJECTIVE BOX
pt seen and examined; for OR to debride sacral ulcer; risks, benefits alternatives discussed all questions answered agrees to proceed.

## 2019-10-03 NOTE — CONSULT NOTE ADULT - REASON FOR ADMISSION
abdominal pain, diarrhea

## 2019-10-03 NOTE — PROGRESS NOTE ADULT - ASSESSMENT
78y.o. Female s/p debridement of sacral decub ulcer    -Abx  -Dressing change in AM  -f/u cx  -Pain control prn  -DVT ppx  -Diet as tolerated

## 2019-10-03 NOTE — PROGRESS NOTE ADULT - PROBLEM SELECTOR PLAN 1
baseline SCR 1.3  Pt was admitted to ICU with HD dependent CONCHIS from [facility] for Diarrhea and vomiting  patient had HD TODAY    - Pt needs long term dialysis as per nephro    - s/p perm cath     - Shiley catheter removed by vascular today  - outpt HD placement at [place] dialysis unit.

## 2019-10-03 NOTE — PROGRESS NOTE ADULT - ASSESSMENT
78 female with PMH of HTn, HLD, DM, Gout, CKD stage 4( creatinine  year ago, 1.05), comes in with abdominal pain, diarrhea.  Admitted for acute Renal Failure, GI bleed, downgraded from ICU  after urgent HD on 9/22, 9/24 and 9/26 for acute pulmonary edema and severe metabolic acidosis.      s/p Perm cath placement, shiley catheter removal.    Debridement of sacral abscess and hemodialysis today

## 2019-10-03 NOTE — PROGRESS NOTE ADULT - PROBLEM SELECTOR PLAN 2
Pt has a sacral abscess  Surgery consulted  pt has I&D today  p.o. doxycycline 100mg bid and metronidazole 500mg q8h for 3-5d following surgical debridement  Continue with wound care

## 2019-10-03 NOTE — PROGRESS NOTE ADULT - ASSESSMENT
78 year old female with complaints of dysphagia, and black stools. Found to be in renal failure.     Plan:  Doing well.   hemoglobin remains stable   Debridement tomorrow     Advanced care planning was discussed with patient and family.  Advanced care planning forms were reviewed and discussed.  Risks, benefits and alternatives of gastroenterologic procedures were discussed in detail and all questions were answered.    I was physically present for the key portions of the evaluation and management (E/M) service provided.  The patient was personally seen and examined at bedside.    Thank you for your consultation and allowing  me to participate in the care of your patients. If you have further questions please contact me at [phone].     Meliton Santos M.D.       _________________________________________________________________________________________________  [facility]  [address] [place] [id number]  Office: [phone]    Dylan Lyman PA-C  ___________________________________________________________________________________________________ 78 year old female with complaints of dysphagia, and black stools. Found to be in renal failure.     Plan:  Doing well.   hemoglobin remains stable   Debridement today    Advanced care planning was discussed with patient and family.  Advanced care planning forms were reviewed and discussed.  Risks, benefits and alternatives of gastroenterologic procedures were discussed in detail and all questions were answered.    I was physically present for the key portions of the evaluation and management (E/M) service provided.  The patient was personally seen and examined at bedside.    Thank you for your consultation and allowing  me to participate in the care of your patients. If you have further questions please contact me at [phone].     Meliton Santos M.D.       _________________________________________________________________________________________________  [facility]  [address] [place] [id number]  Office: [phone]    Dylan Lyman PA-C  ___________________________________________________________________________________________________

## 2019-10-04 LAB
ALBUMIN SERPL ELPH-MCNC: 2.7 G/DL — LOW (ref 3.5–5)
ALP SERPL-CCNC: 85 U/L — SIGNIFICANT CHANGE UP (ref 40–120)
ALT FLD-CCNC: 21 U/L DA — SIGNIFICANT CHANGE UP (ref 10–60)
ANION GAP SERPL CALC-SCNC: 8 MMOL/L — SIGNIFICANT CHANGE UP (ref 5–17)
AST SERPL-CCNC: 23 U/L — SIGNIFICANT CHANGE UP (ref 10–40)
BILIRUB SERPL-MCNC: 1.1 MG/DL — SIGNIFICANT CHANGE UP (ref 0.2–1.2)
BUN SERPL-MCNC: 29 MG/DL — HIGH (ref 7–18)
CALCIUM SERPL-MCNC: 8.9 MG/DL — SIGNIFICANT CHANGE UP (ref 8.4–10.5)
CHLORIDE SERPL-SCNC: 97 MMOL/L — SIGNIFICANT CHANGE UP (ref 96–108)
CO2 SERPL-SCNC: 28 MMOL/L — SIGNIFICANT CHANGE UP (ref 22–31)
CREAT SERPL-MCNC: 4.62 MG/DL — HIGH (ref 0.5–1.3)
GLUCOSE BLDC GLUCOMTR-MCNC: 119 MG/DL — HIGH (ref 70–99)
GLUCOSE BLDC GLUCOMTR-MCNC: 130 MG/DL — HIGH (ref 70–99)
GLUCOSE BLDC GLUCOMTR-MCNC: 130 MG/DL — HIGH (ref 70–99)
GLUCOSE BLDC GLUCOMTR-MCNC: 185 MG/DL — HIGH (ref 70–99)
GLUCOSE SERPL-MCNC: 116 MG/DL — HIGH (ref 70–99)
HCT VFR BLD CALC: 24.8 % — LOW (ref 34.5–45)
HGB BLD-MCNC: 8.1 G/DL — LOW (ref 11.5–15.5)
MAGNESIUM SERPL-MCNC: 2.5 MG/DL — SIGNIFICANT CHANGE UP (ref 1.6–2.6)
MCHC RBC-ENTMCNC: 30 PG — SIGNIFICANT CHANGE UP (ref 27–34)
MCHC RBC-ENTMCNC: 32.7 GM/DL — SIGNIFICANT CHANGE UP (ref 32–36)
MCV RBC AUTO: 91.9 FL — SIGNIFICANT CHANGE UP (ref 80–100)
NRBC # BLD: 0 /100 WBCS — SIGNIFICANT CHANGE UP (ref 0–0)
PHOSPHATE SERPL-MCNC: 2.3 MG/DL — LOW (ref 2.5–4.5)
PLATELET # BLD AUTO: 206 K/UL — SIGNIFICANT CHANGE UP (ref 150–400)
POTASSIUM SERPL-MCNC: 3.8 MMOL/L — SIGNIFICANT CHANGE UP (ref 3.5–5.3)
POTASSIUM SERPL-SCNC: 3.8 MMOL/L — SIGNIFICANT CHANGE UP (ref 3.5–5.3)
PROT SERPL-MCNC: 5.7 G/DL — LOW (ref 6–8.3)
RBC # BLD: 2.7 M/UL — LOW (ref 3.8–5.2)
RBC # FLD: 15.8 % — HIGH (ref 10.3–14.5)
SODIUM SERPL-SCNC: 133 MMOL/L — LOW (ref 135–145)
WBC # BLD: 5.34 K/UL — SIGNIFICANT CHANGE UP (ref 3.8–10.5)
WBC # FLD AUTO: 5.34 K/UL — SIGNIFICANT CHANGE UP (ref 3.8–10.5)

## 2019-10-04 PROCEDURE — 99233 SBSQ HOSP IP/OBS HIGH 50: CPT | Mod: GC

## 2019-10-04 PROCEDURE — 72148 MRI LUMBAR SPINE W/O DYE: CPT | Mod: 26

## 2019-10-04 PROCEDURE — 99232 SBSQ HOSP IP/OBS MODERATE 35: CPT

## 2019-10-04 RX ORDER — HEPARIN SODIUM 5000 [USP'U]/ML
5000 INJECTION INTRAVENOUS; SUBCUTANEOUS EVERY 12 HOURS
Refills: 0 | Status: DISCONTINUED | OUTPATIENT
Start: 2019-10-04 | End: 2019-10-07

## 2019-10-04 RX ADMIN — Medication 650 MILLIGRAM(S): at 15:40

## 2019-10-04 RX ADMIN — AMLODIPINE BESYLATE 5 MILLIGRAM(S): 2.5 TABLET ORAL at 06:33

## 2019-10-04 RX ADMIN — MUPIROCIN 1 APPLICATION(S): 20 OINTMENT TOPICAL at 17:17

## 2019-10-04 RX ADMIN — Medication 500 MILLIGRAM(S): at 14:43

## 2019-10-04 RX ADMIN — Medication 100 MILLIGRAM(S): at 06:32

## 2019-10-04 RX ADMIN — Medication 500 MILLIGRAM(S): at 21:51

## 2019-10-04 RX ADMIN — Medication 650 MILLIGRAM(S): at 21:52

## 2019-10-04 RX ADMIN — HEPARIN SODIUM 5000 UNIT(S): 5000 INJECTION INTRAVENOUS; SUBCUTANEOUS at 17:16

## 2019-10-04 RX ADMIN — PANTOPRAZOLE SODIUM 40 MILLIGRAM(S): 20 TABLET, DELAYED RELEASE ORAL at 06:32

## 2019-10-04 RX ADMIN — Medication 650 MILLIGRAM(S): at 14:43

## 2019-10-04 RX ADMIN — SENNA PLUS 2 TABLET(S): 8.6 TABLET ORAL at 21:51

## 2019-10-04 RX ADMIN — Medication 650 MILLIGRAM(S): at 22:40

## 2019-10-04 RX ADMIN — CHLORHEXIDINE GLUCONATE 1 APPLICATION(S): 213 SOLUTION TOPICAL at 11:54

## 2019-10-04 RX ADMIN — MUPIROCIN 1 APPLICATION(S): 20 OINTMENT TOPICAL at 06:32

## 2019-10-04 RX ADMIN — NYSTATIN CREAM 1 APPLICATION(S): 100000 CREAM TOPICAL at 06:34

## 2019-10-04 RX ADMIN — Medication 100 MILLIGRAM(S): at 17:17

## 2019-10-04 RX ADMIN — Medication 1: at 11:52

## 2019-10-04 RX ADMIN — NYSTATIN CREAM 1 APPLICATION(S): 100000 CREAM TOPICAL at 17:17

## 2019-10-04 RX ADMIN — Medication 500 MILLIGRAM(S): at 06:32

## 2019-10-04 RX ADMIN — ONDANSETRON 4 MILLIGRAM(S): 8 TABLET, FILM COATED ORAL at 19:01

## 2019-10-04 NOTE — PROGRESS NOTE ADULT - PROBLEM SELECTOR PLAN 5
Hb 7.6  -normocytic likely anemia due to renal disease  -low serum iron and TIBC  -s/p 3 units of PRBCs total  -continue with epogen  -continue with venofer  -Pt to get one unit of blood TODAY DURING DIALYSIS  -continue to monitor CBC Hb stable  -normocytic likely anemia due to renal disease  -low serum iron and TIBC  -s/p 3 units of PRBCs total  -continue with epogen  -continue with venofer  -continue to monitor CBC

## 2019-10-04 NOTE — PROGRESS NOTE ADULT - ASSESSMENT
78 year old female with anemia.     Problem list :  ·	Anemia   ·	Constipation   ·	Decubitus ulcer   ·	Abdominal pain   ·	    Plan:  Doing well.   hemoglobin remains stable   Debridement today  Miralax Daily + colace PRN     Advanced care planning was discussed with patient and family.  Advanced care planning forms were reviewed and discussed.  Risks, benefits and alternatives of gastroenterologic procedures were discussed in detail and all questions were answered.    I was physically present for the key portions of the evaluation and management (E/M) service provided.  The patient was personally seen and examined at bedside.    Thank you for your consultation and allowing  me to participate in the care of your patients. If you have further questions please contact me at [phone].     Meliton Santos M.D.       _________________________________________________________________________________________________  [facility]  [address] [place] [address]  Office: [phone]    Dylan Lyman PA-C  ___________________________________________________________________________________________________

## 2019-10-04 NOTE — PROGRESS NOTE ADULT - PROBLEM SELECTOR PLAN 1
baseline SCR 1.3  Pt was admitted to ICU with HD dependent [facility] from [facility] for Diarrhea and vomiting  patient had HD TODAY    - Pt needs long term dialysis as per nephro    - s/p perm cath     - Shiley catheter removed by vascular today  - outpt HD placement at [facility]. baseline SCR 1.3  Pt was admitted to ICU with HD dependent [facility] from [facility] for Diarrhea and vomiting  patient had HD yesterday    - Pt needs long term dialysis as per nephro    - s/p perm cath   - outpt HD placement at [facility].  -DC PLANNING

## 2019-10-04 NOTE — PROGRESS NOTE ADULT - PROBLEM SELECTOR PLAN 2
Pt has a sacral abscess  Surgery consulted  pt has I&D today  p.o. doxycycline 100mg bid and metronidazole 500mg q8h for 3-5d  Continue with wound care Pt had I&D for sacral abscess on 10/4  Surgery following   c/w p.o. doxycycline 100mg bid and metronidazole 500mg q8h for 5d  Continue with wound care  Pt had MRI today to rule out osteomyelitis spine

## 2019-10-04 NOTE — PROGRESS NOTE ADULT - SUBJECTIVE AND OBJECTIVE BOX
PGY 1 Note discussed with supervising resident and primary attending    Patient is a 78y old  Female who presents with a chief complaint of abdominal pain, diarrhea (04 Oct 2019 16:56)      INTERVAL HPI/OVERNIGHT EVENTS: Patient seen and examined at the bedside. Pt states no pain after surgery, only a slight twinge at times.     MEDICATIONS  (STANDING):  amLODIPine   Tablet 5 milliGRAM(s) Oral daily  chlorhexidine 2% Cloths 1 Application(s) Topical daily  chlorhexidine 2% Cloths 1 Application(s) Topical daily  docusate sodium 100 milliGRAM(s) Oral daily  doxycycline hyclate Capsule 100 milliGRAM(s) Oral every 12 hours  epoetin lara Injectable 10463 Unit(s) IV Push <User Schedule>  heparin  Injectable 5000 Unit(s) SubCutaneous every 12 hours  insulin lispro (HumaLOG) corrective regimen sliding scale   SubCutaneous three times a day before meals  metroNIDAZOLE    Tablet 500 milliGRAM(s) Oral every 8 hours  mupirocin 2% Ointment 1 Application(s) Both Nostrils two times a day  nystatin Cream 1 Application(s) Topical two times a day  pantoprazole    Tablet 40 milliGRAM(s) Oral before breakfast  polyethylene glycol 3350 17 Gram(s) Oral two times a day  senna 2 Tablet(s) Oral at bedtime    MEDICATIONS  (PRN):  acetaminophen   Tablet .. 650 milliGRAM(s) Oral every 6 hours PRN Moderate Pain (4 - 6), Severe Pain (7 - 10)  ALBUTerol/ipratropium for Nebulization 3 milliLiter(s) Nebulizer every 6 hours PRN Wheezing  guaiFENesin    Syrup 100 milliGRAM(s) Oral every 6 hours PRN Cough  ondansetron Injectable 4 milliGRAM(s) IV Push every 4 hours PRN Nausea and/or Vomiting      __________________________________________________  REVIEW OF SYSTEMS:    CONSTITUTIONAL: No fever,   EYES: no acute visual disturbances  NECK: No pain or stiffness  RESPIRATORY: No cough; No shortness of breath  CARDIOVASCULAR: No chest pain, no palpitations  GASTROINTESTINAL: No pain. No nausea or vomiting; No diarrhea   NEUROLOGICAL: No headache or numbness, no tremors  MUSCULOSKELETAL: No joint pain, no muscle pain  GENITOURINARY: no dysuria, no frequency, no hesitancy  PSYCHIATRY: no depression , no anxiety  ALL OTHER  ROS negative        Vital Signs Last 24 Hrs  T(C): 36.7 (04 Oct 2019 16:12), Max: 36.8 (03 Oct 2019 23:47)  T(F): 98 (04 Oct 2019 16:12), Max: 98.2 (03 Oct 2019 23:47)  HR: 76 (04 Oct 2019 16:12) (71 - 92)  BP: 152/56 (04 Oct 2019 16:12) (138/56 - 157/67)  BP(mean): --  RR: 18 (04 Oct 2019 16:12) (18 - 18)  SpO2: 99% (04 Oct 2019 16:12) (95% - 99%)    ________________________________________________  PHYSICAL EXAM:  GENERAL: NAD, perma cath on right side of neck  HEENT: Normocephalic;  conjunctivae and sclerae clear; moist mucous membranes;   NECK : supple  CHEST/LUNG: Clear to auscultation bilaterally with good air entry   HEART: S1 S2  regular; no murmurs, gallops or rubs  ABDOMEN: Soft, Nontender, Nondistended; Bowel sounds present  EXTREMITIES: no cyanosis; no edema; no calf tenderness  SKIN: warm and dry; no rash  NERVOUS SYSTEM:  Awake and alert; Oriented  to place, person and time ; no new deficits    _________________________________________________  LABS:                        8.1    5.34  )-----------( 206      ( 04 Oct 2019 07:12 )             24.8     10-04    133<L>  |  97  |  29<H>  ----------------------------<  116<H>  3.8   |  28  |  4.62<H>    Ca    8.9      04 Oct 2019 07:12  Phos  2.3     10-04  Mg     2.5     10-04    TPro  5.7<L>  /  Alb  2.7<L>  /  TBili  1.1  /  DBili  x   /  AST  23  /  ALT  21  /  AlkPhos  85  10-04    PT/INR - ( 03 Oct 2019 11:32 )   PT: 12.0 sec;   INR: 1.08 ratio             CAPILLARY BLOOD GLUCOSE      POCT Blood Glucose.: 119 mg/dL (04 Oct 2019 16:58)  POCT Blood Glucose.: 185 mg/dL (04 Oct 2019 11:32)  POCT Blood Glucose.: 130 mg/dL (04 Oct 2019 08:29)  POCT Blood Glucose.: 146 mg/dL (03 Oct 2019 20:59)        RADIOLOGY & ADDITIONAL TESTS:    < from: MR Lumbar Spine No Cont (10.04.19 @ 15:47) >  INTERPRETATION:  CLINICAL INDICATION: Evaluate for osteomyelitis.    TECHNIQUE: Multiplanar multisequence MRI of the lumbar spine was   performed without the administration of intravenous contrast, according   to standard protocol.    COMPARISON: None available.    FINDINGS:    ALIGNMENT: Straightening of the expected lumbar lordosis. Grade 1   anterolisthesis of L4 and L5, favored to be degenerative in nature. No   evidence of spondylolysis.    VERTEBRAE: The vertebral bodies are normal in height. There is no acute   fracture or aggressive osseous lesion. Specifically, there is no MRI   evidence to suggest osteomyelitis/discitis.    DISCS: There is multilevel mild degenerative loss of intervertebral disc   space height.    CONUS MEDULLARIS AND CAUDA EQUINA: The conus medullaris terminates at L1.   There is normal appearance of the conus medullaris.    PARAVERTEBRAL SOFT TISSUES AND VISUALIZED RETROPERITONEUM: The visualized   paravertebral musculature appear within normal limits.  There is nonspecific inflammatory stranding of the lower lumbosacral   subcutaneous soft tissues (series 6, image 26 and series 3, image 4).   Correlate clinically.    There are small renal cysts bilaterally, incompletely evaluated.    EVALUATION OF INDIVIDUAL LEVELS:   L1-2: No disc herniation, spinal canal or neuroforaminal stenosis.    L2-3: There is minimal disc bulge and degenerative changes of the   bilateral facet joints resulting in mild neuroforaminal narrowing   bilaterally. No canal stenosis.    L3-4: There is disc bulge and degenerative changes of the bilateral facet   joints resulting in moderate canal stenosis and mild-to-moderate   neuroforaminal narrowing bilaterally.    L4-5: Grade 1 anterolisthesis of L4 on L5. Disc bulge and degenerative   changes of the bilateral facet and uncovertebral joints resulting in   moderate to severe canal stenosis, likely with compression of the cauda   equina nerve roots at this level (series 5, image 22). Mild to moderate   neuroforaminal narrowing bilaterally.    L5-S1: Change changes of the bilateral facet joints resulting in mild   neuroforaminal narrowing on the right. No canal stenosis.    LIMITED EVALUATION OF UPPER SACRUM AND SACROILIAC JOINTS: Mild   degenerative changes of the sacroiliac joints.      IMPRESSION:     No noncontrast MRI evidence to suggest acute osteomyelitis/discitis in   the lumbar spine.    Degenerative changes of the lumbarspine most pronounced at L4-L5,   resulting in moderate to severe canal stenosis at this level, likely with   associated compression upon the cauda equina nerve roots.  Correlation with symptoms and clinical exam findings is advised.        Imaging Personally Reviewed:  YES/NO    Consultant(s) Notes Reviewed:   YES/ No    Care Discussed with Consultants :     Plan of care was discussed with patient and /or primary care giver; all questions and concerns were addressed and care was aligned with patient's wishes.

## 2019-10-04 NOTE — PROGRESS NOTE ADULT - SUBJECTIVE AND OBJECTIVE BOX
Subjective: Patient feeling better. Had surgical debridement of infected sacral decub 10/3/19.     Objective:    doxycycline hyclate Capsule 100 milliGRAM(s) Oral every 12 hours  metroNIDAZOLE    Tablet 500 milliGRAM(s) Oral every 8 hours      PHYSICAL EXAM:    Vital Signs Last 24 Hrs  T(C): 36.5 (04 Oct 2019 07:28), Max: 36.9 (03 Oct 2019 17:15)  T(F): 97.7 (04 Oct 2019 07:28), Max: 98.4 (03 Oct 2019 17:15)  HR: 92 (04 Oct 2019 07:28) (71 - 92)  BP: 157/67 (04 Oct 2019 07:28) (138/56 - 157/67)  BP(mean): --  RR: 18 (04 Oct 2019 07:28) (18 - 20)  SpO2: 98% (04 Oct 2019 07:28) (95% - 98%)    GEN: WDWN w female in NAD    CHEST/Respiratory: clear to auscultation; R upper chest: permacath with no erythema or tenderness    Cardiovascular: S1S2 regular with II/VI SEB best heard in LLSB    Gastrointestinal: soft and non-tender with active BS    Neurological: A+O x3    Skin: sacral area debrided with "wick" present        LABS/DIAGNOSTIC TESTS                            8.1    5.34  )-----------( 206      ( 04 Oct 2019 07:12 )             24.8       WBC Count: 5.34 K/uL (10-04 @ 07:12)  WBC Count: 5.33 K/uL (10-03 @ 08:28)  WBC Count: 5.10 K/uL (10-02 @ 07:30)  WBC Count: 6.90 K/uL (10-01 @ 14:37)      10-04    133<L>  |  97  |  29<H>  ----------------------------<  116<H>  3.8   |  28  |  4.62<H>    Ca    8.9      04 Oct 2019 07:12  Phos  2.3     10-04  Mg     2.5     10-04    TPro  5.7<L>  /  Alb  2.7<L>  /  TBili  1.1  /  DBili  x   /  AST  23  /  ALT  21  /  AlkPhos  85  10-04          PT/INR - ( 03 Oct 2019 11:32 )   PT: 12.0 sec;   INR: 1.08 ratio             LACTATE:      CULTURES          RADIOLOGY    CXR:    CT HEAD:    CT CHEST:    CT ABDOMEN:    MRI:    OTHER: Subjective: Patient feeling better. Had surgical debridement of infected sacral decub 10/3/19.     Objective:    doxycycline hyclate Capsule 100 milliGRAM(s) Oral every 12 hours  metroNIDAZOLE    Tablet 500 milliGRAM(s) Oral every 8 hours      PHYSICAL EXAM:    Vital Signs Last 24 Hrs  T(C): 36.5 (04 Oct 2019 07:28), Max: 36.9 (03 Oct 2019 17:15)  T(F): 97.7 (04 Oct 2019 07:28), Max: 98.4 (03 Oct 2019 17:15)  HR: 92 (04 Oct 2019 07:28) (71 - 92)  BP: 157/67 (04 Oct 2019 07:28) (138/56 - 157/67)  BP(mean): --  RR: 18 (04 Oct 2019 07:28) (18 - 20)  SpO2: 98% (04 Oct 2019 07:28) (95% - 98%)    GEN: WDWN w female in NAD    CHEST/Respiratory: clear to auscultation; R upper chest: permacath with no erythema or tenderness    Cardiovascular: S1S2 regular with II/VI SEB best heard in LLSB    Gastrointestinal: soft and non-tender with active BS    Neurological: A+O x3    Skin: sacral area debrided with "wick" present      LABS/DIAGNOSTIC TESTS                        8.1    5.34  )-----------( 206      ( 04 Oct 2019 07:12 )             24.8       WBC Count: 5.34 K/uL (10-04 @ 07:12)  WBC Count: 5.33 K/uL (10-03 @ 08:28)  WBC Count: 5.10 K/uL (10-02 @ 07:30)  WBC Count: 6.90 K/uL (10-01 @ 14:37)      10-04    133<L>  |  97  |  29<H>  ----------------------------<  116<H>  3.8   |  28  |  4.62<H>    Ca    8.9      04 Oct 2019 07:12  Phos  2.3     10-04  Mg     2.5     10-04    TPro  5.7<L>  /  Alb  2.7<L>  /  TBili  1.1  /  DBili  x   /  AST  23  /  ALT  21  /  AlkPhos  85  10-04          PT/INR - ( 03 Oct 2019 11:32 )   PT: 12.0 sec;   INR: 1.08 ratio

## 2019-10-04 NOTE — PROGRESS NOTE ADULT - ASSESSMENT
s/p Debridement sacral decubitus ulcer  local wound care wet to dry daily  frequent position changes

## 2019-10-04 NOTE — PROGRESS NOTE ADULT - ASSESSMENT
78 female with PMH of HTn, HLD, DM, Gout, CKD stage 4( creatinine  year ago, 1.05), comes in with abdominal pain, diarrhea.  Admitted for acute Renal Failure, GI bleed, downgraded from ICU  after urgent HD on 9/22, 9/24 and 9/26 for acute pulmonary edema and severe metabolic acidosis.      s/p Perm cath placement, shiley catheter removal.    Debridement of sacral abscess and hemodialysis yesterday  MRI spine today to r/o osteomyelitis

## 2019-10-04 NOTE — PROGRESS NOTE ADULT - SUBJECTIVE AND OBJECTIVE BOX
Pt s- complaints	  ICU Vital Signs Last 24 Hrs  T(C): 36.5 (04 Oct 2019 07:28), Max: 36.9 (03 Oct 2019 17:15)  T(F): 97.7 (04 Oct 2019 07:28), Max: 98.4 (03 Oct 2019 17:15)  HR: 92 (04 Oct 2019 07:28) (71 - 92)  BP: 157/67 (04 Oct 2019 07:28) (138/56 - 161/77)  BP(mean): --  ABP: --  ABP(mean): --  RR: 18 (04 Oct 2019 07:28) (18 - 20)  SpO2: 98% (04 Oct 2019 07:28) (95% - 98%)    Sacral wound: no purulence, no bleed   no fluctuance nt  changed packing with sterile wet to dry gauze

## 2019-10-04 NOTE — PROGRESS NOTE ADULT - ASSESSMENT
Patient with h/o DM, gout, HTN, HLD, CKD stage 4 admitted 9/21/19 with Dx of GI bleed and renal failure requiring HD. Pt developed sacral ulcer with foul-smelling exudate. In view of skin-soft tissue infection (SSTI) in a pt with DM and ESRD, patient placed on p.o. doxycycline 100mg bid and metronidazole 500mg q8h yesterday to provide coverage for organisms that would be causing this patient's SSTI (ie, anaerobes, Staph).     Asked to re-evaluate patient following conversation with surgeon (Dr. Cha) who performed the debridement. As per discussion, the infected area extended to the bone. I would recommend that patient have an MRI to evaluate for osteomyelitis, as treatment recommendations may change (bone was not debrided yesterday). Cont. above antibiotics for now.

## 2019-10-04 NOTE — PROGRESS NOTE ADULT - PROBLEM SELECTOR PLAN 1
deemed ESRD. s/p permacath.    s/p sacral decubitus ulcer debridement. awaits MRI to R/O OM  outpt HD placement

## 2019-10-05 LAB
-  AMIKACIN: SIGNIFICANT CHANGE UP
-  AMOXICILLIN/CLAVULANIC ACID: SIGNIFICANT CHANGE UP
-  AMPICILLIN/SULBACTAM: SIGNIFICANT CHANGE UP
-  AMPICILLIN: SIGNIFICANT CHANGE UP
-  AZTREONAM: SIGNIFICANT CHANGE UP
-  CEFAZOLIN: SIGNIFICANT CHANGE UP
-  CEFEPIME: SIGNIFICANT CHANGE UP
-  CEFOXITIN: SIGNIFICANT CHANGE UP
-  CEFTRIAXONE: SIGNIFICANT CHANGE UP
-  CIPROFLOXACIN: SIGNIFICANT CHANGE UP
-  ERTAPENEM: SIGNIFICANT CHANGE UP
-  GENTAMICIN: SIGNIFICANT CHANGE UP
-  LEVOFLOXACIN: SIGNIFICANT CHANGE UP
-  MEROPENEM: SIGNIFICANT CHANGE UP
-  PIPERACILLIN/TAZOBACTAM: SIGNIFICANT CHANGE UP
-  TOBRAMYCIN: SIGNIFICANT CHANGE UP
-  TRIMETHOPRIM/SULFAMETHOXAZOLE: SIGNIFICANT CHANGE UP
ALBUMIN SERPL ELPH-MCNC: 2.7 G/DL — LOW (ref 3.5–5)
ALP SERPL-CCNC: 90 U/L — SIGNIFICANT CHANGE UP (ref 40–120)
ALT FLD-CCNC: 19 U/L DA — SIGNIFICANT CHANGE UP (ref 10–60)
ANION GAP SERPL CALC-SCNC: 8 MMOL/L — SIGNIFICANT CHANGE UP (ref 5–17)
AST SERPL-CCNC: 23 U/L — SIGNIFICANT CHANGE UP (ref 10–40)
BILIRUB SERPL-MCNC: 0.9 MG/DL — SIGNIFICANT CHANGE UP (ref 0.2–1.2)
BUN SERPL-MCNC: 48 MG/DL — HIGH (ref 7–18)
CALCIUM SERPL-MCNC: 8.5 MG/DL — SIGNIFICANT CHANGE UP (ref 8.4–10.5)
CHLORIDE SERPL-SCNC: 96 MMOL/L — SIGNIFICANT CHANGE UP (ref 96–108)
CO2 SERPL-SCNC: 28 MMOL/L — SIGNIFICANT CHANGE UP (ref 22–31)
CREAT SERPL-MCNC: 6.65 MG/DL — HIGH (ref 0.5–1.3)
CULTURE RESULTS: SIGNIFICANT CHANGE UP
GLUCOSE BLDC GLUCOMTR-MCNC: 104 MG/DL — HIGH (ref 70–99)
GLUCOSE BLDC GLUCOMTR-MCNC: 119 MG/DL — HIGH (ref 70–99)
GLUCOSE BLDC GLUCOMTR-MCNC: 123 MG/DL — HIGH (ref 70–99)
GLUCOSE BLDC GLUCOMTR-MCNC: 133 MG/DL — HIGH (ref 70–99)
GLUCOSE BLDC GLUCOMTR-MCNC: 177 MG/DL — HIGH (ref 70–99)
GLUCOSE SERPL-MCNC: 168 MG/DL — HIGH (ref 70–99)
HCT VFR BLD CALC: 23 % — LOW (ref 34.5–45)
HGB BLD-MCNC: 7.6 G/DL — LOW (ref 11.5–15.5)
MAGNESIUM SERPL-MCNC: 2.1 MG/DL — SIGNIFICANT CHANGE UP (ref 1.6–2.6)
MCHC RBC-ENTMCNC: 29.8 PG — SIGNIFICANT CHANGE UP (ref 27–34)
MCHC RBC-ENTMCNC: 33 GM/DL — SIGNIFICANT CHANGE UP (ref 32–36)
MCV RBC AUTO: 90.2 FL — SIGNIFICANT CHANGE UP (ref 80–100)
METHOD TYPE: SIGNIFICANT CHANGE UP
NRBC # BLD: 0 /100 WBCS — SIGNIFICANT CHANGE UP (ref 0–0)
ORGANISM # SPEC MICROSCOPIC CNT: SIGNIFICANT CHANGE UP
ORGANISM # SPEC MICROSCOPIC CNT: SIGNIFICANT CHANGE UP
PHOSPHATE SERPL-MCNC: 2.1 MG/DL — LOW (ref 2.5–4.5)
PLATELET # BLD AUTO: 219 K/UL — SIGNIFICANT CHANGE UP (ref 150–400)
POTASSIUM SERPL-MCNC: 3.8 MMOL/L — SIGNIFICANT CHANGE UP (ref 3.5–5.3)
POTASSIUM SERPL-SCNC: 3.8 MMOL/L — SIGNIFICANT CHANGE UP (ref 3.5–5.3)
PROT SERPL-MCNC: 5.4 G/DL — LOW (ref 6–8.3)
RBC # BLD: 2.55 M/UL — LOW (ref 3.8–5.2)
RBC # FLD: 15.8 % — HIGH (ref 10.3–14.5)
SODIUM SERPL-SCNC: 132 MMOL/L — LOW (ref 135–145)
SPECIMEN SOURCE: SIGNIFICANT CHANGE UP
WBC # BLD: 4.86 K/UL — SIGNIFICANT CHANGE UP (ref 3.8–10.5)
WBC # FLD AUTO: 4.86 K/UL — SIGNIFICANT CHANGE UP (ref 3.8–10.5)

## 2019-10-05 PROCEDURE — 99232 SBSQ HOSP IP/OBS MODERATE 35: CPT

## 2019-10-05 PROCEDURE — 99233 SBSQ HOSP IP/OBS HIGH 50: CPT | Mod: GC

## 2019-10-05 RX ORDER — CIPROFLOXACIN LACTATE 400MG/40ML
250 VIAL (ML) INTRAVENOUS DAILY
Refills: 0 | Status: DISCONTINUED | OUTPATIENT
Start: 2019-10-05 | End: 2019-10-07

## 2019-10-05 RX ADMIN — Medication 650 MILLIGRAM(S): at 11:48

## 2019-10-05 RX ADMIN — Medication 100 MILLIGRAM(S): at 17:29

## 2019-10-05 RX ADMIN — NYSTATIN CREAM 1 APPLICATION(S): 100000 CREAM TOPICAL at 05:53

## 2019-10-05 RX ADMIN — POLYETHYLENE GLYCOL 3350 17 GRAM(S): 17 POWDER, FOR SOLUTION ORAL at 05:53

## 2019-10-05 RX ADMIN — AMLODIPINE BESYLATE 5 MILLIGRAM(S): 2.5 TABLET ORAL at 05:52

## 2019-10-05 RX ADMIN — Medication 100 MILLIGRAM(S): at 05:51

## 2019-10-05 RX ADMIN — ERYTHROPOIETIN 10000 UNIT(S): 10000 INJECTION, SOLUTION INTRAVENOUS; SUBCUTANEOUS at 14:59

## 2019-10-05 RX ADMIN — Medication 1: at 12:18

## 2019-10-05 RX ADMIN — Medication 100 MILLIGRAM(S): at 11:48

## 2019-10-05 RX ADMIN — Medication 650 MILLIGRAM(S): at 20:17

## 2019-10-05 RX ADMIN — Medication 500 MILLIGRAM(S): at 05:51

## 2019-10-05 RX ADMIN — Medication 650 MILLIGRAM(S): at 12:18

## 2019-10-05 RX ADMIN — HEPARIN SODIUM 5000 UNIT(S): 5000 INJECTION INTRAVENOUS; SUBCUTANEOUS at 17:29

## 2019-10-05 RX ADMIN — Medication 650 MILLIGRAM(S): at 05:00

## 2019-10-05 RX ADMIN — ONDANSETRON 4 MILLIGRAM(S): 8 TABLET, FILM COATED ORAL at 22:47

## 2019-10-05 RX ADMIN — MUPIROCIN 1 APPLICATION(S): 20 OINTMENT TOPICAL at 17:28

## 2019-10-05 RX ADMIN — Medication 500 MILLIGRAM(S): at 22:48

## 2019-10-05 RX ADMIN — CHLORHEXIDINE GLUCONATE 1 APPLICATION(S): 213 SOLUTION TOPICAL at 11:48

## 2019-10-05 RX ADMIN — HEPARIN SODIUM 5000 UNIT(S): 5000 INJECTION INTRAVENOUS; SUBCUTANEOUS at 05:52

## 2019-10-05 RX ADMIN — Medication 650 MILLIGRAM(S): at 21:33

## 2019-10-05 RX ADMIN — NYSTATIN CREAM 1 APPLICATION(S): 100000 CREAM TOPICAL at 17:32

## 2019-10-05 RX ADMIN — Medication 650 MILLIGRAM(S): at 04:12

## 2019-10-05 RX ADMIN — MUPIROCIN 1 APPLICATION(S): 20 OINTMENT TOPICAL at 05:52

## 2019-10-05 RX ADMIN — Medication 250 MILLIGRAM(S): at 21:34

## 2019-10-05 RX ADMIN — PANTOPRAZOLE SODIUM 40 MILLIGRAM(S): 20 TABLET, DELAYED RELEASE ORAL at 05:53

## 2019-10-05 NOTE — PROGRESS NOTE ADULT - SUBJECTIVE AND OBJECTIVE BOX
[facility] : Progress Note :: [phone], (office [phone]),   Dr Pandey / Dr Mi / Dr Massey / Dr Etienne / Dr Cassandra DA SILVA / Dr Burt / Dr [facility] / Dr Naeem lam  _____________________________________________________________________________________________   MRI results noted. No OM noted    aspirin (Unknown)  ibuprofen (Unknown)  Mushrooms (Anaphylaxis)  shellfish (Anaphylaxis)    Hospital Medications:   MEDICATIONS  (STANDING):  amLODIPine   Tablet 5 milliGRAM(s) Oral daily  chlorhexidine 2% Cloths 1 Application(s) Topical daily  chlorhexidine 2% Cloths 1 Application(s) Topical daily  docusate sodium 100 milliGRAM(s) Oral daily  doxycycline hyclate Capsule 100 milliGRAM(s) Oral every 12 hours  epoetin lara Injectable 38787 Unit(s) IV Push <User Schedule>  heparin  Injectable 5000 Unit(s) SubCutaneous every 12 hours  insulin lispro (HumaLOG) corrective regimen sliding scale   SubCutaneous three times a day before meals  metroNIDAZOLE    Tablet 500 milliGRAM(s) Oral every 8 hours  mupirocin 2% Ointment 1 Application(s) Both Nostrils two times a day  nystatin Cream 1 Application(s) Topical two times a day  pantoprazole    Tablet 40 milliGRAM(s) Oral before breakfast  polyethylene glycol 3350 17 Gram(s) Oral two times a day  senna 2 Tablet(s) Oral at bedtime      VITALS:  T(F): 97.4 (10-05-19 @ 13:30), Max: 98.7 (10-04-19 @ 23:52)  HR: 81 (10-05-19 @ 13:30)  BP: 152/73 (10-05-19 @ 13:30)  RR: 18 (10-05-19 @ 13:30)  SpO2: 96% (10-05-19 @ 08:22)  Wt(kg): --      PHYSICAL EXAM:  Constitutional: NAD  HEENT: anicteric sclera, oropharynx clear.  Neck: No JVD  Respiratory: CTAB, no wheezes, rales or rhonchi  Cardiovascular: S1, S2, RRR  Gastrointestinal: BS+, soft, NT/ND  Extremities: No cyanosis or clubbing. No peripheral edema  Neurological: A/O x 3, no focal deficits  Psychiatric: Normal mood, normal affect  : No CVA tenderness. No dickson.   Skin: No rashes  Vascular Access:    LABS:  10-05    132<L>  |  96  |  48<H>  ----------------------------<  168<H>  3.8   |  28  |  6.65<H>    Ca    8.5      05 Oct 2019 13:31  Phos  2.1     10-05  Mg     2.1     10-05    TPro  5.4<L>  /  Alb  2.7<L>  /  TBili  0.9  /  DBili      /  AST  23  /  ALT  19  /  AlkPhos  90  10-05    Creatinine Trend: 6.65 <--, 4.62 <--, 6.21 <--, 4.69 <--, 7.84 <--, 6.31 <--, 4.19 <--                        7.6    4.86  )-----------( 219      ( 05 Oct 2019 13:31 )             23.0     Urine Studies:      RADIOLOGY & ADDITIONAL STUDIES:

## 2019-10-05 NOTE — PROGRESS NOTE ADULT - SUBJECTIVE AND OBJECTIVE BOX
Subjective:     Objective:    doxycycline hyclate Capsule 100 milliGRAM(s) Oral every 12 hours  metroNIDAZOLE    Tablet 500 milliGRAM(s) Oral every 8 hours        PHYSICAL EXAM:    Vital Signs Last 24 Hrs  T(C): 36.6 (05 Oct 2019 08:22), Max: 37.1 (04 Oct 2019 23:52)  T(F): 97.8 (05 Oct 2019 08:22), Max: 98.7 (04 Oct 2019 23:52)  HR: 80 (05 Oct 2019 08:22) (76 - 82)  BP: 148/59 (05 Oct 2019 08:22) (140/56 - 152/56)  BP(mean): --  RR: 17 (05 Oct 2019 08:22) (15 - 18)  SpO2: 96% (05 Oct 2019 08:22) (96% - 100%)    GEN:    CHEST/Respiratory:    Cardiovascular:    Gastrointestinal:    Genitourinary:    Extremities:     Neurological:    Skin:    Lymph Nodes:        LABS/DIAGNOSTIC TESTS                            8.1    5.34  )-----------( 206      ( 04 Oct 2019 07:12 )             24.8       WBC Count: 5.34 K/uL (10-04 @ 07:12)  WBC Count: 5.33 K/uL (10-03 @ 08:28)      10-04    133<L>  |  97  |  29<H>  ----------------------------<  116<H>  3.8   |  28  |  4.62<H>    Ca    8.9      04 Oct 2019 07:12  Phos  2.3     10-04  Mg     2.5     10-04    TPro  5.7<L>  /  Alb  2.7<L>  /  TBili  1.1  /  DBili  x   /  AST  23  /  ALT  21  /  AlkPhos  85  10-04        CULTURES    Culture - Other (collected 10-04-19 @ 02:09)  Source: Skin (surgical specimen)  Preliminary Report (10-04-19 @ 23:22):    Numerous Proteus mirabilis          RADIOLOGY    MRI:  < from: MR Lumbar Spine No Cont (10.04.19 @ 15:47) >  IMPRESSION:     No noncontrast MRI evidence to suggest acute osteomyelitis/discitis in   the lumbar spine.    Degenerative changes of the lumbarspine most pronounced at L4-L5,   resulting in moderate to severe canal stenosis at this level, likely with   associated compression upon the cauda equina nerve roots.  Correlation with symptoms and clinical exam findings is advised.      LORETO TAI M.D., ATTENDING RADIOLOGIST  This document has been electronically signed. Oct  4 2019  4:45PM Subjective: patient c/o pain at the site of I+D. Otherwise, no complaints    Objective:    doxycycline hyclate Capsule 100 milliGRAM(s) Oral every 12 hours  metroNIDAZOLE    Tablet 500 milliGRAM(s) Oral every 8 hours    PHYSICAL EXAM:    Vital Signs Last 24 Hrs  T(C): 36.6 (05 Oct 2019 08:22), Max: 37.1 (04 Oct 2019 23:52)  T(F): 97.8 (05 Oct 2019 08:22), Max: 98.7 (04 Oct 2019 23:52)  HR: 80 (05 Oct 2019 08:22) (76 - 82)  BP: 148/59 (05 Oct 2019 08:22) (140/56 - 152/56)  BP(mean): --  RR: 17 (05 Oct 2019 08:22) (15 - 18)  SpO2: 96% (05 Oct 2019 08:22) (96% - 100%)    GEN: WDWN w female in NAD    CHEST/Respiratory: clear to auscultation    Cardiovascular: S1S2 reg with II/VI SEB best heard at LLSB; no gallops/rubs    Gastrointestinal: BS active; Soft and non-tender to palpation    Extremities: ++ edema LE bilat    Neurological: A+Ox3 with no focal findings    Skin: sacral area: mild tenderness around incision site with packing in place      LABS/DIAGNOSTIC TESTS                      8.1    5.34  )-----------( 206      ( 04 Oct 2019 07:12 )             24.8       WBC Count: 5.34 K/uL (10-04 @ 07:12)  WBC Count: 5.33 K/uL (10-03 @ 08:28)      10-04    133<L>  |  97  |  29<H>  ----------------------------<  116<H>  3.8   |  28  |  4.62<H>    Ca    8.9      04 Oct 2019 07:12  Phos  2.3     10-04  Mg     2.5     10-04    TPro  5.7<L>  /  Alb  2.7<L>  /  TBili  1.1  /  DBili  x   /  AST  23  /  ALT  21  /  AlkPhos  85  10-04        CULTURES    Culture - Other (collected 10-04-19 @ 02:09)  Source: Skin (surgical specimen)  Preliminary Report (10-04-19 @ 23:22):    Numerous Proteus mirabilis      RADIOLOGY    MRI:  < from: MR Lumbar Spine No Cont (10.04.19 @ 15:47) >  IMPRESSION:     No noncontrast MRI evidence to suggest acute osteomyelitis/discitis in   the lumbar spine.    Degenerative changes of the lumbarspine most pronounced at L4-L5,   resulting in moderate to severe canal stenosis at this level, likely with   associated compression upon the cauda equina nerve roots.  Correlation with symptoms and clinical exam findings is advised.      LORETO TAI M.D., ATTENDING RADIOLOGIST  This document has been electronically signed. Oct  4 2019  4:45PM

## 2019-10-05 NOTE — PROGRESS NOTE ADULT - PROBLEM SELECTOR PLAN 1
Surgical cultures growing proteus merabelus   Discussed with Dr. Villagomez - ID, agree with her to d/c doxycycline and   c/w metronidazole 500mg three times a day along with starting ciprofloxacin 250 mg po 250 mg po daily (renally adjusted)

## 2019-10-05 NOTE — PROGRESS NOTE ADULT - PROBLEM SELECTOR PLAN 5
BP borderline elevated prior to HD   c/w amlodipine 5mg po daily   Can go up to 10 mg if SBP consistently above 150

## 2019-10-05 NOTE — PROGRESS NOTE ADULT - PROBLEM SELECTOR PLAN 1
deemed ESRD. s/p permacath.    s/p sacral decubitus ulcer debridement.  MRI neg for   OM  outpt HD placement

## 2019-10-05 NOTE — PROGRESS NOTE ADULT - SUBJECTIVE AND OBJECTIVE BOX
Patient is a 78y old  Female who presents with a chief complaint of abdominal pain, diarrhea (05 Oct 2019 15:50)      INTERVAL HPI/OVERNIGHT EVENTS: seen and examined. no new complains.     MEDICATIONS  (STANDING):  amLODIPine   Tablet 5 milliGRAM(s) Oral daily  chlorhexidine 2% Cloths 1 Application(s) Topical daily  chlorhexidine 2% Cloths 1 Application(s) Topical daily  docusate sodium 100 milliGRAM(s) Oral daily  doxycycline hyclate Capsule 100 milliGRAM(s) Oral every 12 hours  epoetin lara Injectable 92208 Unit(s) IV Push <User Schedule>  heparin  Injectable 5000 Unit(s) SubCutaneous every 12 hours  insulin lispro (HumaLOG) corrective regimen sliding scale   SubCutaneous three times a day before meals  metroNIDAZOLE    Tablet 500 milliGRAM(s) Oral every 8 hours  mupirocin 2% Ointment 1 Application(s) Both Nostrils two times a day  nystatin Cream 1 Application(s) Topical two times a day  pantoprazole    Tablet 40 milliGRAM(s) Oral before breakfast  polyethylene glycol 3350 17 Gram(s) Oral two times a day  senna 2 Tablet(s) Oral at bedtime    MEDICATIONS  (PRN):  acetaminophen   Tablet .. 650 milliGRAM(s) Oral every 6 hours PRN Moderate Pain (4 - 6), Severe Pain (7 - 10)  ALBUTerol/ipratropium for Nebulization 3 milliLiter(s) Nebulizer every 6 hours PRN Wheezing  guaiFENesin    Syrup 100 milliGRAM(s) Oral every 6 hours PRN Cough  ondansetron Injectable 4 milliGRAM(s) IV Push every 4 hours PRN Nausea and/or Vomiting      Allergies    aspirin (Unknown)  ibuprofen (Unknown)  Mushrooms (Anaphylaxis)  shellfish (Anaphylaxis)    Intolerances        REVIEW OF SYSTEMS:  CONSTITUTIONAL: No fever, weight loss, or fatigue  RESPIRATORY: No cough, wheezing, chills or hemoptysis; No shortness of breath  CARDIOVASCULAR: No chest pain, palpitations, dizziness, or leg swelling  GASTROINTESTINAL: No abdominal or epigastric pain. No nausea, vomiting, or hematemesis; No diarrhea or constipation. No melena or hematochezia.  NEUROLOGICAL: No headaches, memory loss, loss of strength, numbness, or tremors  MUSCULOSKELETAL: No joint pain or swelling; No muscle, back, or extremity pain      Vital Signs Last 24 Hrs  T(C): 36.7 (05 Oct 2019 17:04), Max: 37.1 (04 Oct 2019 23:52)  T(F): 98.1 (05 Oct 2019 17:04), Max: 98.7 (04 Oct 2019 23:52)  HR: 91 (05 Oct 2019 17:04) (79 - 91)  BP: 179/73 (05 Oct 2019 17:04) (140/56 - 179/73)  BP(mean): --  RR: 16 (05 Oct 2019 17:04) (15 - 18)  SpO2: 99% (05 Oct 2019 17:04) (96% - 100%)    PHYSICAL EXAM:  GENERAL: NAD, well-groomed, well-developed  HEAD:  Atraumatic, Normocephalic  EYES: EOMI, PERRLA, conjunctiva and sclera clear  NECK: Supple, No JVD, Normal thyroid  NERVOUS SYSTEM:  Alert & Oriented X3, No gross focal deficits  CHEST/LUNG: Clear to percussion bilaterally; No rales, rhonchi, wheezing, or rubs  HEART: Regular rate and rhythm; No murmurs, rubs, or gallops  ABDOMEN: Soft, Nontender, Nondistended; Bowel sounds present  EXTREMITIES:  No clubbing, cyanosis, or edema  SKIN: No rashes or lesions    LABS:                        7.6    4.86  )-----------( 219      ( 05 Oct 2019 13:31 )             23.0     10-05    132<L>  |  96  |  48<H>  ----------------------------<  168<H>  3.8   |  28  |  6.65<H>    Ca    8.5      05 Oct 2019 13:31  Phos  2.1     10-05  Mg     2.1     10-05    TPro  5.4<L>  /  Alb  2.7<L>  /  TBili  0.9  /  DBili  x   /  AST  23  /  ALT  19  /  AlkPhos  90  10-05        CAPILLARY BLOOD GLUCOSE      POCT Blood Glucose.: 104 mg/dL (05 Oct 2019 17:01)  POCT Blood Glucose.: 177 mg/dL (05 Oct 2019 11:49)  POCT Blood Glucose.: 133 mg/dL (05 Oct 2019 08:11)  POCT Blood Glucose.: 130 mg/dL (04 Oct 2019 21:14)      RADIOLOGY & ADDITIONAL TESTS:    Imaging Personally Reviewed:  [ ] YES  [ ] NO    Consultant(s) Notes Reviewed:  [ ] YES  [ ] NO    Care Discussed with Consultants/Other Providers [ ] YES  [ ] NO    Plan of Care discussed with Housestaff [ ]YES [ ] NO

## 2019-10-05 NOTE — PROGRESS NOTE ADULT - ASSESSMENT
Patient with h/o DM, gout, HTN, HLD, CKD stage 4 admitted 9/21/19 with Dx of GI bleed and renal failure requiring HD. Pt developed sacral ulcer with foul-smelling exudate. In view of skin-soft tissue infection (SSTI) in a pt with DM and ESRD, patient placed on p.o. doxycycline 100mg bid and metronidazole 500mg q8h 10/3 to provide coverage for organisms that would be causing this patient's SSTI (ie, anaerobes, Staph). Operative specimen is growing Proteus mirabilis., which may not be susceptible to doxycycline. In view of organism resistance in the hospital environment, would suggest changing doxy to ciprofloxacin 250 mg p.o. q24h (adjusted for renal dysfn) and continuing metronidazole p.o. for an additional 3 days to treatment the skin/soft tissue infection. There is no evidence of osteomyelitis on the MRI. Patient with h/o DM, gout, HTN, HLD, CKD stage 4 admitted 9/21/19 with Dx of GI bleed and renal failure requiring HD. Pt developed sacral ulcer with foul-smelling exudate. In view of skin-soft tissue infection (SSTI) in a pt with DM and ESRD, patient placed on p.o. doxycycline 100mg bid and metronidazole 500mg q8h 10/3 to provide coverage for organisms that could be causing this patient's SSTI (ie, anaerobes, Staph). Operative specimen is growing Proteus mirabilis., which may not be susceptible to doxycycline. In view of organism resistance in the hospital environment, would suggest discontinuing doxy, adding ciprofloxacin 250 mg p.o. q24h (adjusted for renal dysfn) and continuing metronidazole p.o. for an additional 3 days to treatment the skin/soft tissue infection. There is no evidence of osteomyelitis on the MRI.    Please call again if needed.

## 2019-10-06 LAB
ALBUMIN SERPL ELPH-MCNC: 2.9 G/DL — LOW (ref 3.5–5)
ALP SERPL-CCNC: 93 U/L — SIGNIFICANT CHANGE UP (ref 40–120)
ALT FLD-CCNC: 19 U/L DA — SIGNIFICANT CHANGE UP (ref 10–60)
ANION GAP SERPL CALC-SCNC: 9 MMOL/L — SIGNIFICANT CHANGE UP (ref 5–17)
AST SERPL-CCNC: 28 U/L — SIGNIFICANT CHANGE UP (ref 10–40)
BILIRUB SERPL-MCNC: 1 MG/DL — SIGNIFICANT CHANGE UP (ref 0.2–1.2)
BUN SERPL-MCNC: 19 MG/DL — HIGH (ref 7–18)
CALCIUM SERPL-MCNC: 8.9 MG/DL — SIGNIFICANT CHANGE UP (ref 8.4–10.5)
CHLORIDE SERPL-SCNC: 100 MMOL/L — SIGNIFICANT CHANGE UP (ref 96–108)
CO2 SERPL-SCNC: 27 MMOL/L — SIGNIFICANT CHANGE UP (ref 22–31)
CREAT SERPL-MCNC: 3.94 MG/DL — HIGH (ref 0.5–1.3)
GLUCOSE BLDC GLUCOMTR-MCNC: 118 MG/DL — HIGH (ref 70–99)
GLUCOSE BLDC GLUCOMTR-MCNC: 122 MG/DL — HIGH (ref 70–99)
GLUCOSE BLDC GLUCOMTR-MCNC: 130 MG/DL — HIGH (ref 70–99)
GLUCOSE BLDC GLUCOMTR-MCNC: 148 MG/DL — HIGH (ref 70–99)
GLUCOSE SERPL-MCNC: 110 MG/DL — HIGH (ref 70–99)
HCT VFR BLD CALC: 27 % — LOW (ref 34.5–45)
HGB BLD-MCNC: 8.7 G/DL — LOW (ref 11.5–15.5)
MAGNESIUM SERPL-MCNC: 2.1 MG/DL — SIGNIFICANT CHANGE UP (ref 1.6–2.6)
MCHC RBC-ENTMCNC: 29.5 PG — SIGNIFICANT CHANGE UP (ref 27–34)
MCHC RBC-ENTMCNC: 32.2 GM/DL — SIGNIFICANT CHANGE UP (ref 32–36)
MCV RBC AUTO: 91.5 FL — SIGNIFICANT CHANGE UP (ref 80–100)
NRBC # BLD: 0 /100 WBCS — SIGNIFICANT CHANGE UP (ref 0–0)
PHOSPHATE SERPL-MCNC: 2 MG/DL — LOW (ref 2.5–4.5)
PLATELET # BLD AUTO: 269 K/UL — SIGNIFICANT CHANGE UP (ref 150–400)
POTASSIUM SERPL-MCNC: 3.9 MMOL/L — SIGNIFICANT CHANGE UP (ref 3.5–5.3)
POTASSIUM SERPL-SCNC: 3.9 MMOL/L — SIGNIFICANT CHANGE UP (ref 3.5–5.3)
PROT SERPL-MCNC: 5.9 G/DL — LOW (ref 6–8.3)
RBC # BLD: 2.95 M/UL — LOW (ref 3.8–5.2)
RBC # FLD: 16.3 % — HIGH (ref 10.3–14.5)
SODIUM SERPL-SCNC: 136 MMOL/L — SIGNIFICANT CHANGE UP (ref 135–145)
WBC # BLD: 6.03 K/UL — SIGNIFICANT CHANGE UP (ref 3.8–10.5)
WBC # FLD AUTO: 6.03 K/UL — SIGNIFICANT CHANGE UP (ref 3.8–10.5)

## 2019-10-06 PROCEDURE — 99233 SBSQ HOSP IP/OBS HIGH 50: CPT | Mod: GC

## 2019-10-06 RX ORDER — ONDANSETRON 8 MG/1
4 TABLET, FILM COATED ORAL EVERY 6 HOURS
Refills: 0 | Status: DISCONTINUED | OUTPATIENT
Start: 2019-10-06 | End: 2019-10-07

## 2019-10-06 RX ORDER — AMLODIPINE BESYLATE 2.5 MG/1
10 TABLET ORAL DAILY
Refills: 0 | Status: DISCONTINUED | OUTPATIENT
Start: 2019-10-06 | End: 2019-10-07

## 2019-10-06 RX ADMIN — AMLODIPINE BESYLATE 10 MILLIGRAM(S): 2.5 TABLET ORAL at 12:37

## 2019-10-06 RX ADMIN — AMLODIPINE BESYLATE 5 MILLIGRAM(S): 2.5 TABLET ORAL at 06:57

## 2019-10-06 RX ADMIN — MUPIROCIN 1 APPLICATION(S): 20 OINTMENT TOPICAL at 17:13

## 2019-10-06 RX ADMIN — Medication 500 MILLIGRAM(S): at 08:41

## 2019-10-06 RX ADMIN — Medication 650 MILLIGRAM(S): at 21:43

## 2019-10-06 RX ADMIN — SENNA PLUS 2 TABLET(S): 8.6 TABLET ORAL at 21:44

## 2019-10-06 RX ADMIN — Medication 250 MILLIGRAM(S): at 12:34

## 2019-10-06 RX ADMIN — CHLORHEXIDINE GLUCONATE 1 APPLICATION(S): 213 SOLUTION TOPICAL at 12:36

## 2019-10-06 RX ADMIN — ONDANSETRON 4 MILLIGRAM(S): 8 TABLET, FILM COATED ORAL at 14:36

## 2019-10-06 RX ADMIN — Medication 650 MILLIGRAM(S): at 10:54

## 2019-10-06 RX ADMIN — MUPIROCIN 1 APPLICATION(S): 20 OINTMENT TOPICAL at 05:52

## 2019-10-06 RX ADMIN — PANTOPRAZOLE SODIUM 40 MILLIGRAM(S): 20 TABLET, DELAYED RELEASE ORAL at 05:52

## 2019-10-06 RX ADMIN — Medication 100 MILLIGRAM(S): at 12:35

## 2019-10-06 RX ADMIN — Medication 500 MILLIGRAM(S): at 14:36

## 2019-10-06 RX ADMIN — Medication 650 MILLIGRAM(S): at 22:30

## 2019-10-06 RX ADMIN — NYSTATIN CREAM 1 APPLICATION(S): 100000 CREAM TOPICAL at 17:14

## 2019-10-06 RX ADMIN — Medication 650 MILLIGRAM(S): at 11:24

## 2019-10-06 RX ADMIN — Medication 500 MILLIGRAM(S): at 21:43

## 2019-10-06 RX ADMIN — HEPARIN SODIUM 5000 UNIT(S): 5000 INJECTION INTRAVENOUS; SUBCUTANEOUS at 17:13

## 2019-10-06 RX ADMIN — NYSTATIN CREAM 1 APPLICATION(S): 100000 CREAM TOPICAL at 05:53

## 2019-10-06 RX ADMIN — HEPARIN SODIUM 5000 UNIT(S): 5000 INJECTION INTRAVENOUS; SUBCUTANEOUS at 05:48

## 2019-10-06 NOTE — PROGRESS NOTE ADULT - PROBLEM SELECTOR PLAN 1
baseline SCR 1.3  Pt was admitted to ICU with HD dependent [facility] from [facility] for Diarrhea and vomiting  pt had dialysis yesterday  pending dialysis placement

## 2019-10-06 NOTE — PROGRESS NOTE ADULT - PROBLEM SELECTOR PLAN 2
Pt had I&D for sacral abscess on 10/4  Cultures from surgical site growing Proteus  MRI negative for osteomyelitis  c/w metronidazole 500mg q8h for 3 more days  doxycycline stopped   ciprofloxacin 250 mg p.o. q24h (adjusted for renal dysfn)   Continue with wound care  Dr Villagomez consulted

## 2019-10-06 NOTE — PROGRESS NOTE ADULT - SUBJECTIVE AND OBJECTIVE BOX
PGY 1 Note discussed with supervising resident and primary attending    Patient is a 78y old  Female who presents with a chief complaint of abdominal pain, diarrhea (05 Oct 2019 18:09)      INTERVAL HPI/OVERNIGHT EVENTS: Patient seen and examined at the bedside. Pt states she had mild pain at site if I&D. Acknowledges she is waiting for dialysis placement. Denies other complaints.    MEDICATIONS  (STANDING):  amLODIPine   Tablet 10 milliGRAM(s) Oral daily  chlorhexidine 2% Cloths 1 Application(s) Topical daily  chlorhexidine 2% Cloths 1 Application(s) Topical daily  ciprofloxacin     Tablet 250 milliGRAM(s) Oral daily  docusate sodium 100 milliGRAM(s) Oral daily  epoetin lara Injectable 82555 Unit(s) IV Push <User Schedule>  heparin  Injectable 5000 Unit(s) SubCutaneous every 12 hours  insulin lispro (HumaLOG) corrective regimen sliding scale   SubCutaneous three times a day before meals  metroNIDAZOLE    Tablet 500 milliGRAM(s) Oral every 8 hours  mupirocin 2% Ointment 1 Application(s) Both Nostrils two times a day  nystatin Cream 1 Application(s) Topical two times a day  pantoprazole    Tablet 40 milliGRAM(s) Oral before breakfast  polyethylene glycol 3350 17 Gram(s) Oral two times a day  senna 2 Tablet(s) Oral at bedtime    MEDICATIONS  (PRN):  acetaminophen   Tablet .. 650 milliGRAM(s) Oral every 6 hours PRN Moderate Pain (4 - 6), Severe Pain (7 - 10)  ALBUTerol/ipratropium for Nebulization 3 milliLiter(s) Nebulizer every 6 hours PRN Wheezing  guaiFENesin    Syrup 100 milliGRAM(s) Oral every 6 hours PRN Cough  ondansetron Injectable 4 milliGRAM(s) IV Push every 4 hours PRN Nausea and/or Vomiting      __________________________________________________  REVIEW OF SYSTEMS:    CONSTITUTIONAL: No fever,   EYES: no acute visual disturbances  NECK: No pain or stiffness  RESPIRATORY: No cough; No shortness of breath  CARDIOVASCULAR: No chest pain, no palpitations  GASTROINTESTINAL: No pain. No nausea or vomiting; No diarrhea   NEUROLOGICAL: No headache or numbness, no tremors  MUSCULOSKELETAL: No joint pain, no muscle pain  GENITOURINARY: no dysuria, no frequency, no hesitancy  PSYCHIATRY: no depression , no anxiety  ALL OTHER  ROS negative        Vital Signs Last 24 Hrs  T(C): 37.1 (06 Oct 2019 08:03), Max: 37.2 (05 Oct 2019 23:41)  T(F): 98.8 (06 Oct 2019 08:03), Max: 98.9 (05 Oct 2019 23:41)  HR: 82 (06 Oct 2019 08:03) (81 - 91)  BP: 148/60 (06 Oct 2019 08:03) (148/60 - 179/73)  BP(mean): --  RR: 19 (06 Oct 2019 08:03) (15 - 19)  SpO2: 97% (06 Oct 2019 08:03) (97% - 99%)    ________________________________________________  PHYSICAL EXAM:  GENERAL: NAD  HEENT: Normocephalic;  conjunctivae and sclerae clear; moist mucous membranes;   NECK : supple  CHEST/LUNG: Clear to auscultation bilaterally with good air entry   HEART: S1 S2  regular; no murmurs, gallops or rubs  ABDOMEN: Soft, Nontender, Nondistended; Bowel sounds present  EXTREMITIES: no cyanosis; no edema; no calf tenderness  SKIN: warm and dry; no rash  NERVOUS SYSTEM:  Awake and alert; Oriented  to place, person and time ; no new deficits    _________________________________________________  LABS:                        8.7    6.03  )-----------( 269      ( 06 Oct 2019 07:00 )             27.0     10-06    136  |  100  |  19<H>  ----------------------------<  110<H>  3.9   |  27  |  3.94<H>    Ca    8.9      06 Oct 2019 07:00  Phos  2.0     10-06  Mg     2.1     10-06    TPro  5.9<L>  /  Alb  2.9<L>  /  TBili  1.0  /  DBili  x   /  AST  28  /  ALT  19  /  AlkPhos  93  10-06        CAPILLARY BLOOD GLUCOSE      POCT Blood Glucose.: 118 mg/dL (06 Oct 2019 08:40)  POCT Blood Glucose.: 119 mg/dL (05 Oct 2019 22:43)  POCT Blood Glucose.: 123 mg/dL (05 Oct 2019 21:20)  POCT Blood Glucose.: 104 mg/dL (05 Oct 2019 17:01)  POCT Blood Glucose.: 177 mg/dL (05 Oct 2019 11:49)        RADIOLOGY & ADDITIONAL TESTS:    < from: MR Lumbar Spine No Cont (10.04.19 @ 15:47) >  EXAM:  MR SPINE LUMBAR                            PROCEDURE DATE:  10/04/2019          INTERPRETATION:  CLINICAL INDICATION: Evaluate for osteomyelitis.    TECHNIQUE: Multiplanar multisequence MRI of the lumbar spine was   performed without the administration of intravenous contrast, according   to standard protocol.    COMPARISON: None available.    FINDINGS:    ALIGNMENT: Straightening of the expected lumbar lordosis. Grade 1   anterolisthesis of L4 and L5, favored to be degenerative in nature. No   evidence of spondylolysis.    VERTEBRAE: The vertebral bodies are normal in height. There is no acute   fracture or aggressive osseous lesion. Specifically, there is no MRI   evidence to suggest osteomyelitis/discitis.    DISCS: There is multilevel mild degenerative loss of intervertebral disc   space height.    CONUS MEDULLARIS AND CAUDA EQUINA: The conus medullaris terminates at L1.   There is normal appearance of the conus medullaris.    PARAVERTEBRAL SOFT TISSUES AND VISUALIZED RETROPERITONEUM: The visualized   paravertebral musculature appear within normal limits.  There is nonspecific inflammatory stranding of the lower lumbosacral   subcutaneous soft tissues (series 6, image 26 and series 3, image 4).   Correlate clinically.    There are small renal cysts bilaterally, incompletely evaluated.    EVALUATION OF INDIVIDUAL LEVELS:   L1-2: No disc herniation, spinal canal or neuroforaminal stenosis.    L2-3: There is minimal disc bulge and degenerative changes of the   bilateral facet joints resulting in mild neuroforaminal narrowing   bilaterally. No canal stenosis.    L3-4: There is disc bulge and degenerative changes of the bilateral facet   joints resulting in moderate canal stenosis and mild-to-moderate   neuroforaminal narrowing bilaterally.    L4-5: Grade 1 anterolisthesis of L4 on L5. Disc bulge and degenerative   changes of the bilateral facet and uncovertebral joints resulting in   moderate to severe canal stenosis, likely with compression of the cauda   equina nerve roots at this level (series 5, image 22). Mild to moderate   neuroforaminal narrowing bilaterally.    L5-S1: Change changes of the bilateral facet joints resulting in mild   neuroforaminal narrowing on the right. No canal stenosis.    LIMITED EVALUATION OF UPPER SACRUM AND SACROILIAC JOINTS: Mild   degenerative changes of the sacroiliac joints.      IMPRESSION:     No noncontrast MRI evidence to suggest acute osteomyelitis/discitis in   the lumbar spine.    Degenerative changes of the lumbarspine most pronounced at L4-L5,   resulting in moderate to severe canal stenosis at this level, likely with   associated compression upon the cauda equina nerve roots.  Correlation with symptoms and clinical exam findings is advised.    < end of copied text >      Imaging Personally Reviewed:  YES/NO    Consultant(s) Notes Reviewed:   YES/ No    Care Discussed with Consultants :     Plan of care was discussed with patient and /or primary care giver; all questions and concerns were addressed and care was aligned with patient's wishes.

## 2019-10-06 NOTE — PROGRESS NOTE ADULT - NSHPATTENDINGPLANDISCUSS_GEN_ALL_CORE
signed out to stefanie RUBIO covering
signed out to covering surgical PA
Dr. Diaz
Dr. Parmar PGY1
Dr. Diaz
Dr. Parmar PGY1
Dr. Parmar PGY1
Dr. Valencia PGY1
Dr. Jaimes PGY1
Dr. Parmar PGY1

## 2019-10-06 NOTE — PROGRESS NOTE ADULT - ASSESSMENT
78 female with PMH of HTn, HLD, DM, Gout, CKD stage 4( creatinine  year ago, 1.05), comes in with abdominal pain, diarrhea.  Admitted for acute Renal Failure, GI bleed, downgraded from ICU  after urgent HD on 9/22, 9/24 and 9/26 for acute pulmonary edema and severe metabolic acidosis.      s/p Perm cath placement, shiley catheter removal.    Debridement of sacral abscess on 10/03  MRI spine showed NO osteomyelitis    PT IS PENDING HD PLACEMENT.

## 2019-10-06 NOTE — PROGRESS NOTE ADULT - PROBLEM SELECTOR PLAN 5
Hb stable  -normocytic likely anemia due to renal disease  -low serum iron and TIBC  -s/p 3 units of PRBCs total  -continue with epogen  -continue with venofer  -continue to monitor CBC

## 2019-10-07 ENCOUNTER — TRANSCRIPTION ENCOUNTER (OUTPATIENT)
Age: 79
End: 2019-10-07

## 2019-10-07 VITALS
HEART RATE: 87 BPM | TEMPERATURE: 98 F | OXYGEN SATURATION: 100 % | SYSTOLIC BLOOD PRESSURE: 153 MMHG | RESPIRATION RATE: 16 BRPM | DIASTOLIC BLOOD PRESSURE: 73 MMHG

## 2019-10-07 LAB
ALBUMIN SERPL ELPH-MCNC: 2.9 G/DL — LOW (ref 3.5–5)
ALP SERPL-CCNC: 96 U/L — SIGNIFICANT CHANGE UP (ref 40–120)
ALT FLD-CCNC: 17 U/L DA — SIGNIFICANT CHANGE UP (ref 10–60)
ANION GAP SERPL CALC-SCNC: 9 MMOL/L — SIGNIFICANT CHANGE UP (ref 5–17)
AST SERPL-CCNC: 26 U/L — SIGNIFICANT CHANGE UP (ref 10–40)
BILIRUB SERPL-MCNC: 0.9 MG/DL — SIGNIFICANT CHANGE UP (ref 0.2–1.2)
BUN SERPL-MCNC: 30 MG/DL — HIGH (ref 7–18)
CALCIUM SERPL-MCNC: 9 MG/DL — SIGNIFICANT CHANGE UP (ref 8.4–10.5)
CHLORIDE SERPL-SCNC: 100 MMOL/L — SIGNIFICANT CHANGE UP (ref 96–108)
CO2 SERPL-SCNC: 26 MMOL/L — SIGNIFICANT CHANGE UP (ref 22–31)
CREAT SERPL-MCNC: 5.52 MG/DL — HIGH (ref 0.5–1.3)
GLUCOSE BLDC GLUCOMTR-MCNC: 166 MG/DL — HIGH (ref 70–99)
GLUCOSE BLDC GLUCOMTR-MCNC: 214 MG/DL — HIGH (ref 70–99)
GLUCOSE SERPL-MCNC: 155 MG/DL — HIGH (ref 70–99)
HCT VFR BLD CALC: 27.5 % — LOW (ref 34.5–45)
HGB BLD-MCNC: 8.7 G/DL — LOW (ref 11.5–15.5)
MAGNESIUM SERPL-MCNC: 2.1 MG/DL — SIGNIFICANT CHANGE UP (ref 1.6–2.6)
MCHC RBC-ENTMCNC: 29 PG — SIGNIFICANT CHANGE UP (ref 27–34)
MCHC RBC-ENTMCNC: 31.6 GM/DL — LOW (ref 32–36)
MCV RBC AUTO: 91.7 FL — SIGNIFICANT CHANGE UP (ref 80–100)
NRBC # BLD: 0 /100 WBCS — SIGNIFICANT CHANGE UP (ref 0–0)
PHOSPHATE SERPL-MCNC: 2.2 MG/DL — LOW (ref 2.5–4.5)
PLATELET # BLD AUTO: 259 K/UL — SIGNIFICANT CHANGE UP (ref 150–400)
POTASSIUM SERPL-MCNC: 3.9 MMOL/L — SIGNIFICANT CHANGE UP (ref 3.5–5.3)
POTASSIUM SERPL-SCNC: 3.9 MMOL/L — SIGNIFICANT CHANGE UP (ref 3.5–5.3)
PROT SERPL-MCNC: 5.8 G/DL — LOW (ref 6–8.3)
RBC # BLD: 3 M/UL — LOW (ref 3.8–5.2)
RBC # FLD: 16.7 % — HIGH (ref 10.3–14.5)
SODIUM SERPL-SCNC: 135 MMOL/L — SIGNIFICANT CHANGE UP (ref 135–145)
WBC # BLD: 5.64 K/UL — SIGNIFICANT CHANGE UP (ref 3.8–10.5)
WBC # FLD AUTO: 5.64 K/UL — SIGNIFICANT CHANGE UP (ref 3.8–10.5)

## 2019-10-07 PROCEDURE — 99239 HOSP IP/OBS DSCHRG MGMT >30: CPT | Mod: GC

## 2019-10-07 RX ORDER — CIPROFLOXACIN LACTATE 400MG/40ML
1 VIAL (ML) INTRAVENOUS
Qty: 3 | Refills: 0
Start: 2019-10-07 | End: 2019-10-09

## 2019-10-07 RX ORDER — PANTOPRAZOLE SODIUM 20 MG/1
1 TABLET, DELAYED RELEASE ORAL
Qty: 30 | Refills: 0
Start: 2019-10-07 | End: 2019-11-05

## 2019-10-07 RX ORDER — INSULIN ASPART 100 [IU]/ML
12 INJECTION, SOLUTION SUBCUTANEOUS
Qty: 0 | Refills: 0 | DISCHARGE

## 2019-10-07 RX ORDER — INSULIN DEGLUDEC 100 U/ML
100 INJECTION, SOLUTION SUBCUTANEOUS
Qty: 0 | Refills: 0 | DISCHARGE

## 2019-10-07 RX ORDER — INSULIN ASPART 100 [IU]/ML
12 INJECTION, SOLUTION SUBCUTANEOUS
Qty: 1 | Refills: 0
Start: 2019-10-07 | End: 2019-11-05

## 2019-10-07 RX ORDER — METRONIDAZOLE 500 MG
1 TABLET ORAL
Qty: 9 | Refills: 0
Start: 2019-10-07 | End: 2019-10-09

## 2019-10-07 RX ORDER — FUROSEMIDE 40 MG
1 TABLET ORAL
Qty: 0 | Refills: 0 | DISCHARGE

## 2019-10-07 RX ORDER — CIPROFLOXACIN LACTATE 400MG/40ML
1 VIAL (ML) INTRAVENOUS
Qty: 30 | Refills: 0
Start: 2019-10-07 | End: 2019-11-05

## 2019-10-07 RX ORDER — HYDRALAZINE HCL 50 MG
1.5 TABLET ORAL
Qty: 0 | Refills: 0 | DISCHARGE

## 2019-10-07 RX ORDER — AMLODIPINE BESYLATE 2.5 MG/1
1 TABLET ORAL
Qty: 30 | Refills: 0
Start: 2019-10-07 | End: 2019-11-05

## 2019-10-07 RX ORDER — EZETIMIBE AND SIMVASTATIN 10; 80 MG/1; MG/1
1 TABLET, FILM COATED ORAL
Qty: 0 | Refills: 0 | DISCHARGE

## 2019-10-07 RX ORDER — LABETALOL HCL 100 MG
1 TABLET ORAL
Qty: 0 | Refills: 0 | DISCHARGE

## 2019-10-07 RX ORDER — ALLOPURINOL 300 MG
1 TABLET ORAL
Qty: 0 | Refills: 0 | DISCHARGE

## 2019-10-07 RX ORDER — METRONIDAZOLE 500 MG
1 TABLET ORAL
Qty: 90 | Refills: 0
Start: 2019-10-07 | End: 2019-11-05

## 2019-10-07 RX ADMIN — HEPARIN SODIUM 5000 UNIT(S): 5000 INJECTION INTRAVENOUS; SUBCUTANEOUS at 05:56

## 2019-10-07 RX ADMIN — MUPIROCIN 1 APPLICATION(S): 20 OINTMENT TOPICAL at 05:56

## 2019-10-07 RX ADMIN — Medication 650 MILLIGRAM(S): at 06:40

## 2019-10-07 RX ADMIN — ONDANSETRON 4 MILLIGRAM(S): 8 TABLET, FILM COATED ORAL at 08:49

## 2019-10-07 RX ADMIN — Medication 1: at 08:47

## 2019-10-07 RX ADMIN — Medication 500 MILLIGRAM(S): at 08:47

## 2019-10-07 RX ADMIN — Medication 250 MILLIGRAM(S): at 11:18

## 2019-10-07 RX ADMIN — PANTOPRAZOLE SODIUM 40 MILLIGRAM(S): 20 TABLET, DELAYED RELEASE ORAL at 05:58

## 2019-10-07 RX ADMIN — NYSTATIN CREAM 1 APPLICATION(S): 100000 CREAM TOPICAL at 05:57

## 2019-10-07 RX ADMIN — CHLORHEXIDINE GLUCONATE 1 APPLICATION(S): 213 SOLUTION TOPICAL at 11:18

## 2019-10-07 RX ADMIN — Medication 650 MILLIGRAM(S): at 05:55

## 2019-10-07 RX ADMIN — Medication 500 MILLIGRAM(S): at 13:44

## 2019-10-07 RX ADMIN — Medication 650 MILLIGRAM(S): at 15:41

## 2019-10-07 RX ADMIN — Medication 2: at 12:22

## 2019-10-07 RX ADMIN — AMLODIPINE BESYLATE 10 MILLIGRAM(S): 2.5 TABLET ORAL at 05:55

## 2019-10-07 NOTE — PROGRESS NOTE ADULT - PROBLEM SELECTOR PROBLEM 4
HTN (hypertension)
Anemia
Erosive gastritis
HTN (hypertension)
SOB (shortness of breath)
HTN (hypertension)
Anemia
Erosive gastritis
SOB (shortness of breath)
Anemia

## 2019-10-07 NOTE — PROGRESS NOTE ADULT - PROBLEM SELECTOR PLAN 6
IMPROVE VTE Individual Risk Assessment          RISK                                                          Points  [  ] Previous VTE                                                3  [  ] Thrombophilia                                             2  [  ] Lower limb paralysis                                   2        (unable to hold up >15 seconds)    [  ] Current Cancer                                             2         (within 6 months)  [ x ] Immobilization > 24 hrs                              1  [  x] ICU/CCU stay > 24 hours                             1  [  ] Age > 60                                                         1    IMPROVE VTE Score: 2   Hold ac given gi bleed
on sliding scale insulin  -hold lantus 8 units while sugars are still low and patient being started on diet   - continue to monitor sugars and resume lantus when needed  -c/w HSS  -Diabetic diet  Hba1c 5.6
Pt is DNR/DNI  Daughter daughter Janet is HCP  Palliative on board
on sliding scale insulin  - continue to monitor sugars and resume lantus when needed  -c/w HSS  -Diabetic diet  Hba1c 5.6
-amlodipine increased to 10mg  -continue to monitor bp; currently stable
-started on amlodipine 5mg  -continue to monitor bp; currently stable
IMPROVE VTE Individual Risk Assessment          RISK                                                          Points  [  ] Previous VTE                                                3  [  ] Thrombophilia                                             2  [  ] Lower limb paralysis                                   2        (unable to hold up >15 seconds)    [  ] Current Cancer                                             2         (within 6 months)  [ x ] Immobilization > 24 hrs                              1  [  x] ICU/CCU stay > 24 hours                             1  [  ] Age > 60                                                         1    IMPROVE VTE Score: 2   Hold ac given gi bleed
on sliding scale insulin  - continue to monitor sugars and resume lantus when needed    -Diabetic diet  Hba1c 5.6
on sliding scale insulin  - continue to monitor sugars and resume lantus when needed  -c/w HSS  -Diabetic diet  Hba1c 5.6
on sliding scale insulin  - continue to monitor sugars and resume lantus when needed  -c/w HSS  -Diabetic diet  Hba1c 5.6
Pt is DNR/DNI  Daughter daughter Janet is HCP  Palliative on board
on sliding scale insulin  -hold lantus 8 units while sugars are still low and patient being started on diet   - continue to monitor sugars and resume lantus when needed  -c/w HSS  -Diabetic diet  Hba1c 5.6
-started on amlodipine 5mg  -continue to monitor bp; currently stable

## 2019-10-07 NOTE — PROGRESS NOTE ADULT - ATTENDING COMMENTS
Seen and examined. No new complains.   Vital signs and labs reviewed   I&D planned for tomorrow. She also has HD tomorrow.   Spoke to the nurse at HD department. She is scheduled for HD in the morning and I&D in the afternoon. Also, spoke to Dr. Yanez who is agree with the plan     Vital Signs Last 24 Hrs  T(C): 37.3 (02 Oct 2019 07:50), Max: 37.8 (01 Oct 2019 23:54)  T(F): 99.2 (02 Oct 2019 07:50), Max: 100.1 (01 Oct 2019 23:54)  HR: 80 (02 Oct 2019 07:50) (78 - 87)  BP: 141/55 (02 Oct 2019 07:50) (139/65 - 168/74)  BP(mean): --  RR: 17 (02 Oct 2019 07:50) (16 - 18)  SpO2: 97% (02 Oct 2019 07:50) (96% - 100%)    1. Sacral Ulcer/abscess: I&D in AM   Keep NPO after midnight     2. CONCHIS on CKD with new HD; HD in AM   3. Normocytic anemia: On Epogen and Venofer, H/H is still low. Give 1 unit of PRBC tomorrow during HD   4. DM  5. HTN: BP controlled.   6. DVT prophylaxis.
patient seen and examined. No new complains   Had HD today.   Serum Cr. trending down     Vital signs and labs reviewed     Vital Signs Last 24 Hrs  T(C): 37.1 (27 Sep 2019 07:14), Max: 37.6 (27 Sep 2019 00:02)  T(F): 98.8 (27 Sep 2019 07:14), Max: 99.6 (27 Sep 2019 00:02)  HR: 74 (27 Sep 2019 07:14) (74 - 78)  BP: 143/66 (27 Sep 2019 07:14) (143/66 - 174/85)  BP(mean): --  RR: 17 (27 Sep 2019 07:14) (16 - 18)  SpO2: 95% (27 Sep 2019 07:14) (88% - 95%)    1. CONCHIS secondary to ATN. improving. Will monitor until Monday   2. ESRD on HD   3. Normocytic anemia secondary to combination of GI bleed and renal failure - stable, c/w Pantoprazole   4. Type 2 DM: BG controlled  5. DVT prophylaxis
Patient seen and examined at bedside. Her daughter was present during examination.   She is downgraded from ICU, was in ICU for CONCHIS with requirement for new HD.   During my examination, pt had no complains, kidney function improving s/p HD, pt also making good amount of urine.   Viral signs and labs reviewed     ROS: negative   PE: general: pleasant elderly lady, sitting on the bed and eating. NAD   Cardio: regular rate and rhythm   Pulm: clear breath sounds   GI: abdomen is soft, no tender   Extr: no edema   neuro: AOx3, no focal weakness       Vital Signs Last 24 Hrs  T(C): 37.1 (25 Sep 2019 16:23), Max: 37.8 (24 Sep 2019 23:53)  T(F): 98.7 (25 Sep 2019 16:23), Max: 100 (24 Sep 2019 23:53)  HR: 78 (25 Sep 2019 16:23) (70 - 78)  BP: 152/61 (25 Sep 2019 16:23) (125/64 - 152/61)  BP(mean): 83 (24 Sep 2019 18:00) (80 - 83)  RR: 18 (25 Sep 2019 16:23) (13 - 23)  SpO2: 95% (25 Sep 2019 16:23) (95% - 100%)    1. CONCHIS likely ATN requiring HD; kidney function improving   Making good amount of urine, not in fluid overload   Monitor urine output   Plan for HD tomorrow. Will hold off on permanent access line for now . I think, kidney function may recover     2. Normocytic anemia likely combination of chronic disease and kidney failure. Currently stable   3. Type 2 DM. BG controlled. HBA1C 5.6 likely inaccurate in the setting of anemia   4. DVT prophylaxis. compression boots
Patient seen and examined with resident, Addendum to above.    79 y/o female with PMH of HTN, DM and CKD admitted to the ICU for worsening renal function requiring urgent HD. Noted with anemia as well with history endorsing of melena. Patient was given blood transfusion with response in hemoglobin. EGD yesterday with erosive gastritis.     Assessment:  1. ESRD   2. Acute blood loss anemia  3. Gastrointestinal bleeding  4. Hypertension   5. Diabetes mellitus   6. High anion gap Metabolic acidosis     - Anion gap acidosis resolved s/p HD  - Clear liquid diet  - Will transfuse 1 unit PRBC during HD  - Monitor for signs of bleeding  - HD as per nephrology   - Monitor fingerstick glucose while NPO   - Hold BP meds as normotensive currently  - Vascular surgery service appreciated for HD catheter placement  - Transfer out of ICU today
Patient fully re-evaluated.
Chart reviewed and patient examined at the bedside.
Please refer to my note from today.
Stable for discharge
Agree with all the above   patient seen and examined. NO new complains except for constipation. Serum Cr. slightly up; 6.8.   Going for HD today.   No urine output recorded.     Vital signs and labs reviewed   Vital Signs Last 24 Hrs  T(C): 36.8 (26 Sep 2019 09:09), Max: 37.2 (26 Sep 2019 00:00)  T(F): 98.2 (26 Sep 2019 09:09), Max: 99 (26 Sep 2019 00:00)  HR: 72 (26 Sep 2019 09:09) (70 - 78)  BP: 150/53 (26 Sep 2019 09:09) (150/53 - 152/61)  BP(mean): --  RR: 17 (26 Sep 2019 09:09) (15 - 18)  SpO2: 94% (26 Sep 2019 09:09) (94% - 100%)    1. CONCHIS secondary to ATN, requiring HD   Going for HD today   Patient makes urine, monitor urine output     2. ESRD on HD     3. Normocytic anemia. H/H is stable   4. Type DM; BG controlled   5. Constipation: start Miralax   6. DVT prophylaxis; compression stockings
Agree with above   Patient seen and examined. No new complains   Perma cath placed by IR.   Vital signs and labs reviewed     Vital Signs Last 24 Hrs  T(C): 37.1 (30 Sep 2019 08:02), Max: 37.5 (30 Sep 2019 00:12)  T(F): 98.8 (30 Sep 2019 08:02), Max: 99.5 (30 Sep 2019 00:12)  HR: 73 (30 Sep 2019 08:02) (73 - 79)  BP: 148/61 (30 Sep 2019 08:02) (148/61 - 150/57)  BP(mean): --  RR: 18 (30 Sep 2019 08:02) (18 - 18)  SpO2: 96% (30 Sep 2019 08:02) (95% - 98%)    1. CONCHIS on CKD with ESRD on new HD   Perma cath placed, needs arrangements for outpatient HD     2. Concern for sacral abscess: surgery consult called, she may need I&D   3. Normocytic anemia: combination of chronic disease and kidney disease: c/w Epogen during HD along Venofer while in hospital   4. HTN: Start amlodipine 5 mg po daily   6. Erosive gastritis on Pantoprazole  7. DVT prophylaxis: compression boots
Agree with above   Patient seen and examined in HD room. She reports feeing weak. Serum Cr. today trended up.   Vital signs and labs reviewed     Vital Signs Last 24 Hrs  T(C): 36.9 (28 Sep 2019 14:45), Max: 37.2 (27 Sep 2019 23:54)  T(F): 98.5 (28 Sep 2019 14:45), Max: 98.9 (27 Sep 2019 23:54)  HR: 70 (28 Sep 2019 14:45) (70 - 80)  BP: 161/78 (28 Sep 2019 14:45) (151/64 - 171/69)  BP(mean): --  RR: 18 (28 Sep 2019 07:53) (18 - 18)  SpO2: 100% (28 Sep 2019 07:53) (92% - 100%)    1. ESRD with newly started HD. Tolerating well   Spoke to Dr. Jordan -nephrologist, who follows pt as outpatient, agree with him that pt would need long term HD   Plan for permacath on Monday    2. Normocytic anemia of kidney failure and chronic disease: started Epogen during HD and will give Venofer while in hospital    3. Uncontrolled HTN: monitor BP, if no improvement after HD, can start amlodipine 5 mg po daily   4. Type 2 DM BG controlled   5. Thrombocytopenia; HD induced   6. DVT prophylaxis ; on compression boots     Plan discussed with patient and with Dr. Jordan
Agree with above   Patient seen and examined. Daughter was present during examination   No complains, except for constipation.   Vital signs and labs reviewed     Vital Signs Last 24 Hrs  T(C): 36.9 (01 Oct 2019 07:20), Max: 36.9 (01 Oct 2019 07:20)  T(F): 98.4 (01 Oct 2019 07:20), Max: 98.4 (01 Oct 2019 07:20)  HR: 79 (01 Oct 2019 07:20) (71 - 79)  BP: 147/53 (01 Oct 2019 07:20) (115/60 - 147/53)  BP(mean): --  RR: 17 (01 Oct 2019 07:20) (17 - 17)  SpO2: 98% (01 Oct 2019 07:20) (98% - 100%)    1. CONCHIS on CKD   2. ESRD on new HD: s/p HD.   3. Sacral ulcer : I&D rescheduled for Thursday now  4. HTN: BP controlled    5. Constipation: start Milaralax   6. DVT prophylaxis
Agree with above   patient seen and examined. feeling well, sitting on the chair and reading. c/o of slight nausea. Last HD was yesterday.   vital signs and labs reviewed   Vital Signs Last 24 Hrs  T(C): 37.1 (06 Oct 2019 08:03), Max: 37.2 (05 Oct 2019 23:41)  T(F): 98.8 (06 Oct 2019 08:03), Max: 98.9 (05 Oct 2019 23:41)  HR: 82 (06 Oct 2019 08:03) (81 - 91)  BP: 148/60 (06 Oct 2019 08:03) (148/60 - 179/73)  BP(mean): --  RR: 19 (06 Oct 2019 08:03) (15 - 19)  SpO2: 97% (06 Oct 2019 08:03) (97% - 99%)    1. Infected sacral ulcer; c/w Metronidazole and Ciprofloxacin  daily wound care   2. ESRD on HD   3. Normocytic anemia - multifactorial; chronic disease and kidney disease. on Epogen during HD   4. Uncontrolled HTN: increase Amlodipine to 10 mg po daily   5. Erosive gastritis   6. Nausea likely secondary the side-effect of metronidazole. give Zofran 4 mg po q4 PRN     Medically stable for discharge.
Agree with above.  Patient was seen and examined. c/o pain at the site of sacral ulcer and large hematoma on the right LE.    Had I&D today.   Vital signs and labs reviewed   Vital Signs Last 24 Hrs  T(C): 36.4 (03 Oct 2019 18:47), Max: 36.9 (02 Oct 2019 23:37)  T(F): 97.6 (03 Oct 2019 18:47), Max: 98.5 (02 Oct 2019 23:37)  HR: 82 (03 Oct 2019 18:47) (75 - 84)  BP: 150/65 (03 Oct 2019 18:47) (133/62 - 161/77)  BP(mean): --  RR: 18 (03 Oct 2019 18:47) (16 - 20)  SpO2: 95% (03 Oct 2019 18:47) (95% - 100%)    1. Sacral ulcer : s/p I&D.   Start Metronidazole 500 mg po three times a day and Doxycycline 100 mg PO twice daily for total of 5 days     2.ESRD on HD   3. CONCHIS on CKD   4. HTN : BP controlled   5. Type 2 DM   6. Gastritis:  Discharge planning
Late chart note  Patient was seen and examined. Plan of care was discussed with patient and Dr. Parmar PGY1  Also, spoke to SW to discuss the issues with outpatient HD setup
Patient seen and examined with resident, Addendum to above.    79 y/o female with PMH of HTN, DM and CKD admitted to the ICU for worsening renal function requiring urgent HD. Noted with anemia as well with history endorsing of melena. Patient was given blood transfusion with response in hemoglobin.     Assessment:  1. ESRD   2. Acute blood loss anemia  3. Gastrointestinal bleeding  4. Hypertension   5. Diabetes mellitus   6. High anion gap Metabolic acidosis     - Anion gap acidosis resolved s/p HD  - D/c sodium bicarbonate infusion   - NPO for now for EGD today  - Trend HGB   - Monitor for signs of bleeding  - HD as per nephrology   - Monitor fingerstick glucose while NPO   - Hold BP meds as normotensive currently  - Vascular surgery service appreciated for HD catheter placement

## 2019-10-07 NOTE — PROGRESS NOTE ADULT - SUBJECTIVE AND OBJECTIVE BOX
Patient is a 78y old  Female who presents with a chief complaint of abdominal pain, diarrhea (07 Oct 2019 07:25)      INTERVAL HPI/OVERNIGHT EVENTS: seen and examined. No new complains, except for discomfort at the site of sacral ulcer.       MEDICATIONS  (STANDING):  amLODIPine   Tablet 10 milliGRAM(s) Oral daily  chlorhexidine 2% Cloths 1 Application(s) Topical daily  chlorhexidine 2% Cloths 1 Application(s) Topical daily  ciprofloxacin     Tablet 250 milliGRAM(s) Oral daily  epoetin lara Injectable 33539 Unit(s) IV Push <User Schedule>  heparin  Injectable 5000 Unit(s) SubCutaneous every 12 hours  insulin lispro (HumaLOG) corrective regimen sliding scale   SubCutaneous three times a day before meals  metroNIDAZOLE    Tablet 500 milliGRAM(s) Oral every 8 hours  mupirocin 2% Ointment 1 Application(s) Both Nostrils two times a day  nystatin Cream 1 Application(s) Topical two times a day  pantoprazole    Tablet 40 milliGRAM(s) Oral before breakfast    MEDICATIONS  (PRN):  acetaminophen   Tablet .. 650 milliGRAM(s) Oral every 6 hours PRN Moderate Pain (4 - 6), Severe Pain (7 - 10)  ALBUTerol/ipratropium for Nebulization 3 milliLiter(s) Nebulizer every 6 hours PRN Wheezing  guaiFENesin    Syrup 100 milliGRAM(s) Oral every 6 hours PRN Cough  ondansetron    Tablet 4 milliGRAM(s) Oral every 6 hours PRN Nausea and/or Vomiting      Allergies    aspirin (Unknown)  ibuprofen (Unknown)  Mushrooms (Anaphylaxis)  shellfish (Anaphylaxis)    Intolerances        REVIEW OF SYSTEMS:  CONSTITUTIONAL: No fever, weight loss, or fatigue  RESPIRATORY: No cough, wheezing, chills or hemoptysis; No shortness of breath  CARDIOVASCULAR: No chest pain, palpitations, dizziness, or leg swelling  GASTROINTESTINAL: No abdominal or epigastric pain. No nausea, vomiting, or hematemesis; No diarrhea or constipation. No melena or hematochezia.  NEUROLOGICAL: No headaches, memory loss, loss of strength, numbness, or tremors  MUSCULOSKELETAL: No joint pain or swelling; No muscle, back, or extremity pain      Vital Signs Last 24 Hrs  T(C): 36.9 (07 Oct 2019 07:48), Max: 37.2 (07 Oct 2019 00:00)  T(F): 98.4 (07 Oct 2019 07:48), Max: 99 (07 Oct 2019 00:00)  HR: 83 (07 Oct 2019 07:48) (67 - 87)  BP: 150/64 (07 Oct 2019 07:48) (140/57 - 167/61)  BP(mean): --  RR: 17 (07 Oct 2019 07:48) (15 - 17)  SpO2: 96% (07 Oct 2019 07:48) (96% - 98%)    PHYSICAL EXAM:  GENERAL: NAD, well-groomed, well-developed  HEAD:  Atraumatic, Normocephalic  EYES: EOMI, PERRLA, conjunctiva and sclera clear  NECK: Supple, No JVD, Normal thyroid  NERVOUS SYSTEM:  Alert & Oriented X3, No gross focal deficits  CHEST/LUNG: Clear to percussion bilaterally; No rales, rhonchi, wheezing, or rubs  HEART: Regular rate and rhythm; No murmurs, rubs, or gallops  ABDOMEN: Soft, Nontender, Nondistended; Bowel sounds present  EXTREMITIES:  No clubbing, cyanosis, or edema  SKIN: No rashes or lesions    LABS:                        8.7    5.64  )-----------( 259      ( 07 Oct 2019 06:28 )             27.5     10-07    135  |  100  |  30<H>  ----------------------------<  155<H>  3.9   |  26  |  5.52<H>    Ca    9.0      07 Oct 2019 06:28  Phos  2.2     10-07  Mg     2.1     10-07    TPro  5.8<L>  /  Alb  2.9<L>  /  TBili  0.9  /  DBili  x   /  AST  26  /  ALT  17  /  AlkPhos  96  10-07        CAPILLARY BLOOD GLUCOSE      POCT Blood Glucose.: 214 mg/dL (07 Oct 2019 11:31)  POCT Blood Glucose.: 166 mg/dL (07 Oct 2019 08:12)  POCT Blood Glucose.: 122 mg/dL (06 Oct 2019 21:21)  POCT Blood Glucose.: 130 mg/dL (06 Oct 2019 16:46)      RADIOLOGY & ADDITIONAL TESTS:    Imaging Personally Reviewed:  [ ] YES  [ ] NO    Consultant(s) Notes Reviewed:  [x ] YES  [ ] NO    Care Discussed with Consultants/Other Providers [ ] YES  [ ] NO    Plan of Care discussed with Housestaff [ ]YES [ ] NO

## 2019-10-07 NOTE — PROGRESS NOTE ADULT - PROBLEM SELECTOR PLAN 1
Surgical cultures growing proteus merabelus   c/w Metronidazole and Cipro for 3 more days   Daily dressing change.

## 2019-10-07 NOTE — PROGRESS NOTE ADULT - ASSESSMENT
78 year old female with anemia.     Problem list :  ·	Anemia   ·	Constipation   ·	Decubitus ulcer   ·	Abdominal pain   ·	    Plan:  Doing well.   hemoglobin remains stable   Pain is controlled   Miralax Daily + colace PRN   Discharge planning     Advanced care planning was discussed with patient and family.  Advanced care planning forms were reviewed and discussed.  Risks, benefits and alternatives of gastroenterologic procedures were discussed in detail and all questions were answered.    I was physically present for the key portions of the evaluation and management (E/M) service provided.  The patient was personally seen and examined at bedside.    Thank you for your consultation and allowing  me to participate in the care of your patients. If you have further questions please contact me at [phone].     Meliton Santos M.D.       _________________________________________________________________________________________________  [facility]  [address] [place] [address]  Office: [phone]    Dylan Lymna PA-C  ___________________________________________________________________________________________________

## 2019-10-07 NOTE — DISCHARGE NOTE NURSING/CASE MANAGEMENT/SOCIAL WORK - PATIENT PORTAL LINK FT
You can access the FollowMyHealth Patient Portal offered by [facility] by registering at the following website: [email or web address]. By joining [facility]’s FollowMyHealth portal, you will also be able to view your health information using other applications (apps) compatible with our system.

## 2019-10-07 NOTE — PROGRESS NOTE ADULT - PROBLEM SELECTOR PLAN 3
Hgb noted to be low at 6.7 on admission  -s/p 3 units of PRBCs total  -no signs of melena or bleeding now  -hemoglobin is 8.6  -continue to monitor CBC
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 Negative for h-pylori
No wheezing now  Repeat CXR on 9/27 shows KHOA infiltrate  continue with duoneb prn
On novolog, trsiba at home   would start on hss and lantus   fs q6   a1c
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 Negative for h-pylori
S/P PRBC transfusions  HGB stable.  cont epogen
S/P PRBC transfusions  HGB stable.  cont epogen
S/P PRBC transfusions  HGB stable.  cont epogen  getting venofer
S/P PRBC transfusions  HGB stable.  no indication of Procrit in CONCHIS
S/P PRBC transfusions  HGB stable.  no indication of Procrit in CONCHIS
S/P PRBC transfusions  procrit with next HD
S/P PRBC transfusions  will have one more unit transfused today   no indication of Procrit in CONCHIS
c/w pantoprazole
c/w pantoprazole
Hgb noted to be low at 6.7 on admission  -s/p 3 units of PRBCs total  -no signs of melena or bleeding now  -hemoglobin is 8.8  -continue to monitor CBC
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 Negative for h-pylori
No wheezing now  Repeat CXR on 9/27 shows KHOA infiltrate  continue with duoneb prn
On novolog, trsiba at home   would start on hss and lantus   fs q6   a1c
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 Negative for h-pylori

## 2019-10-07 NOTE — PROGRESS NOTE ADULT - REASON FOR ADMISSION
abdominal pain, diarrhea

## 2019-10-07 NOTE — PROGRESS NOTE ADULT - PROBLEM SELECTOR PROBLEM 6
Diabetes
Prophylactic measure
Palliative care encounter
Diabetes
HTN (hypertension)
Prophylactic measure
Palliative care encounter
Diabetes
HTN (hypertension)

## 2019-10-07 NOTE — PROGRESS NOTE ADULT - PROVIDER SPECIALTY LIST ADULT
Critical Care
Gastroenterology
Hospitalist
Hospitalist
Infectious Disease
Infectious Disease
Internal Medicine
Nephrology
Surgery
Vascular Surgery
Critical Care
Nephrology
Internal Medicine

## 2019-10-07 NOTE — PROGRESS NOTE ADULT - PROBLEM SELECTOR PROBLEM 3
Anemia
Erosive gastritis
Lung infiltrate
Anemia
Diabetes
Erosive gastritis
Lung infiltrate
Anemia
Erosive gastritis
Lung infiltrate
Diabetes
Erosive gastritis

## 2019-10-07 NOTE — PROGRESS NOTE ADULT - SUBJECTIVE AND OBJECTIVE BOX
Summary:   78y  Female      Subjective:       Objective:    MEDICATIONS  (STANDING):  amLODIPine   Tablet 10 milliGRAM(s) Oral daily  chlorhexidine 2% Cloths 1 Application(s) Topical daily  chlorhexidine 2% Cloths 1 Application(s) Topical daily  ciprofloxacin     Tablet 250 milliGRAM(s) Oral daily  docusate sodium 100 milliGRAM(s) Oral daily  epoetin lara Injectable 05348 Unit(s) IV Push <User Schedule>  heparin  Injectable 5000 Unit(s) SubCutaneous every 12 hours  insulin lispro (HumaLOG) corrective regimen sliding scale   SubCutaneous three times a day before meals  metroNIDAZOLE    Tablet 500 milliGRAM(s) Oral every 8 hours  mupirocin 2% Ointment 1 Application(s) Both Nostrils two times a day  nystatin Cream 1 Application(s) Topical two times a day  pantoprazole    Tablet 40 milliGRAM(s) Oral before breakfast  polyethylene glycol 3350 17 Gram(s) Oral two times a day  senna 2 Tablet(s) Oral at bedtime    MEDICATIONS  (PRN):  acetaminophen   Tablet .. 650 milliGRAM(s) Oral every 6 hours PRN Moderate Pain (4 - 6), Severe Pain (7 - 10)  ALBUTerol/ipratropium for Nebulization 3 milliLiter(s) Nebulizer every 6 hours PRN Wheezing  guaiFENesin    Syrup 100 milliGRAM(s) Oral every 6 hours PRN Cough  ondansetron    Tablet 4 milliGRAM(s) Oral every 6 hours PRN Nausea and/or Vomiting              Vital Signs Last 24 Hrs  T(C): 37.2 (07 Oct 2019 00:00), Max: 37.2 (07 Oct 2019 00:00)  T(F): 99 (07 Oct 2019 00:00), Max: 99 (07 Oct 2019 00:00)  HR: 85 (07 Oct 2019 05:00) (67 - 87)  BP: 167/61 (07 Oct 2019 05:00) (140/57 - 167/61)  BP(mean): --  RR: 15 (07 Oct 2019 00:00) (15 - 19)  SpO2: 97% (07 Oct 2019 00:00) (97% - 98%)      General:  Well developed, well nourished, alert and active, no pallor, NAD.  HEENT:    Normal appearance of conjunctiva, ears, nose, lips, oropharynx, and oral mucosa, anicteric.  Neck:  No masses, no asymmetry.  Lymph Nodes:  No lymphadenopathy.   Cardiovascular:  RRR normal S1/S2, no murmur.  Respiratory:  CTA B/L, normal respiratory effort.   Abdominal:   soft, no masses or tenderness, normoactive BS, NT/ND, no HSM.  Extremities:   No clubbing or cyanosis, normal capillary refill, no edema.   Skin:   No rash, jaundice, lesions, eczema.   Musculoskeletal:  No joint swelling, erythema or tenderness.   Neuro: No focal deficits.   Other:       LABS:                        8.7    5.64  )-----------( 259      ( 07 Oct 2019 06:28 )             27.5     10-07    135  |  100  |  30<H>  ----------------------------<  155<H>  3.9   |  26  |  5.52<H>    Ca    9.0      07 Oct 2019 06:28  Phos  2.2     10-07  Mg     2.1     10-07    TPro  5.8<L>  /  Alb  2.9<L>  /  TBili  0.9  /  DBili  x   /  AST  26  /  ALT  17  /  AlkPhos  96  10-07          RADIOLOGY & ADDITIONAL TESTS: Summary:   78y  Female      Subjective:   Resting comfortable     Objective:    MEDICATIONS  (STANDING):  amLODIPine   Tablet 10 milliGRAM(s) Oral daily  chlorhexidine 2% Cloths 1 Application(s) Topical daily  chlorhexidine 2% Cloths 1 Application(s) Topical daily  ciprofloxacin     Tablet 250 milliGRAM(s) Oral daily  docusate sodium 100 milliGRAM(s) Oral daily  epoetin lara Injectable 88655 Unit(s) IV Push <User Schedule>  heparin  Injectable 5000 Unit(s) SubCutaneous every 12 hours  insulin lispro (HumaLOG) corrective regimen sliding scale   SubCutaneous three times a day before meals  metroNIDAZOLE    Tablet 500 milliGRAM(s) Oral every 8 hours  mupirocin 2% Ointment 1 Application(s) Both Nostrils two times a day  nystatin Cream 1 Application(s) Topical two times a day  pantoprazole    Tablet 40 milliGRAM(s) Oral before breakfast  polyethylene glycol 3350 17 Gram(s) Oral two times a day  senna 2 Tablet(s) Oral at bedtime    MEDICATIONS  (PRN):  acetaminophen   Tablet .. 650 milliGRAM(s) Oral every 6 hours PRN Moderate Pain (4 - 6), Severe Pain (7 - 10)  ALBUTerol/ipratropium for Nebulization 3 milliLiter(s) Nebulizer every 6 hours PRN Wheezing  guaiFENesin    Syrup 100 milliGRAM(s) Oral every 6 hours PRN Cough  ondansetron    Tablet 4 milliGRAM(s) Oral every 6 hours PRN Nausea and/or Vomiting              Vital Signs Last 24 Hrs  T(C): 37.2 (07 Oct 2019 00:00), Max: 37.2 (07 Oct 2019 00:00)  T(F): 99 (07 Oct 2019 00:00), Max: 99 (07 Oct 2019 00:00)  HR: 85 (07 Oct 2019 05:00) (67 - 87)  BP: 167/61 (07 Oct 2019 05:00) (140/57 - 167/61)  BP(mean): --  RR: 15 (07 Oct 2019 00:00) (15 - 19)  SpO2: 97% (07 Oct 2019 00:00) (97% - 98%)      General:  Well developed, well nourished, alert and active, no pallor, NAD.  HEENT:    Normal appearance of conjunctiva, ears, nose, lips, oropharynx, and oral mucosa, anicteric.  Neck:  No masses, no asymmetry.  Lymph Nodes:  No lymphadenopathy.   Cardiovascular:  RRR normal S1/S2, no murmur.  Respiratory:  CTA B/L, normal respiratory effort.   Abdominal:   soft, no masses or tenderness, normoactive BS, NT/ND, no HSM.  Extremities:   No clubbing or cyanosis, normal capillary refill, no edema.   Skin:   No rash, jaundice, lesions, eczema.   Musculoskeletal:  No joint swelling, erythema or tenderness.   Neuro: No focal deficits.   Other:       LABS:                        8.7    5.64  )-----------( 259      ( 07 Oct 2019 06:28 )             27.5     10-07    135  |  100  |  30<H>  ----------------------------<  155<H>  3.9   |  26  |  5.52<H>    Ca    9.0      07 Oct 2019 06:28  Phos  2.2     10-07  Mg     2.1     10-07    TPro  5.8<L>  /  Alb  2.9<L>  /  TBili  0.9  /  DBili  x   /  AST  26  /  ALT  17  /  AlkPhos  96  10-07          RADIOLOGY & ADDITIONAL TESTS:

## 2019-10-07 NOTE — PROGRESS NOTE ADULT - PROBLEM SELECTOR PLAN 4
Hgb noted to be low at 6.7 on admission  -normocytic likely anemia due to renal disease  -s/p 3 units of PRBCs total  -no signs of melena or bleeding now  -hemoglobin is 8.0  -continue to monitor CBC
sob on exertion, even on talking mildly   CXR slightly congested   Supplemental O2 prn   Will obtain Pro-bnp  Would consider lasix after transfusion if needed   Monitor closely for now
history of HTN, not currently on home meds  -continue to monitor bp; currently stable
Hb trending down- 7.9 TODAY  -normocytic likely anemia due to renal disease  -low serum iron and TIBC  -s/p 3 units of PRBCs total  -no signs of melena or bleeding now  -continue to monitor CBC
Hgb noted to be low at 6.7 on admission  -normocytic likely anemia due to renal disease  -s/p 3 units of PRBCs total  -no signs of melena or bleeding now  -hemoglobin is 8.0  -continue to monitor CBC  - Iron profile ordered
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 Negative for h-pylori
controlled
multifactorial; chronic disease and kidney disease   H/H is on the lower side 7.6/23  On epogen during HD   Can give one unit of PRBC during next HD
multifactorial; chronic disease and kidney disease   H/H is on the lower side 7.6/23  On epogen during HD   Can give one unit of PRBC during next HD
sob on exertion, even on talking mildly   CXR slightly congested   Supplemental O2 prn   Will obtain Pro-bnp  Would consider lasix after transfusion if needed   Monitor closely for now
history of HTN, not currently on home meds  -continue to monitor bp; currently stable
Hgb noted to be low at 6.7 on admission  -normocytic likely anemia due to renal disease  -s/p 3 units of PRBCs total  -no signs of melena or bleeding now  -hemoglobin is 8.3  -continue to monitor CBC
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 Negative for h-pylori

## 2019-10-07 NOTE — PROGRESS NOTE ADULT - PROBLEM SELECTOR PLAN 1
deemed ESRD. s/p permacath.  On HD TTS  pending outpatient HD  placement   s/p sacral decubitus ulcer debridement.    on Epogen

## 2019-10-07 NOTE — PROGRESS NOTE ADULT - SUBJECTIVE AND OBJECTIVE BOX
[facility] : Progress Note :: [phone], (office [phone]),   Dr Pandey / Dr Mi / Dr Massey / Dr Etienne / Dr Cassandra DA SILVA / Dr Burt / Dr Garber / Dr Naeem lam  _____________________________________________________________________________________________    no events overnight    aspirin (Unknown)  ibuprofen (Unknown)  Mushrooms (Anaphylaxis)  shellfish (Anaphylaxis)    Hospital Medications:   MEDICATIONS  (STANDING):  amLODIPine   Tablet 10 milliGRAM(s) Oral daily  chlorhexidine 2% Cloths 1 Application(s) Topical daily  chlorhexidine 2% Cloths 1 Application(s) Topical daily  ciprofloxacin     Tablet 250 milliGRAM(s) Oral daily  epoetin lara Injectable 56437 Unit(s) IV Push <User Schedule>  heparin  Injectable 5000 Unit(s) SubCutaneous every 12 hours  insulin lispro (HumaLOG) corrective regimen sliding scale   SubCutaneous three times a day before meals  metroNIDAZOLE    Tablet 500 milliGRAM(s) Oral every 8 hours  mupirocin 2% Ointment 1 Application(s) Both Nostrils two times a day  nystatin Cream 1 Application(s) Topical two times a day  pantoprazole    Tablet 40 milliGRAM(s) Oral before breakfast        VITALS:  T(F): 98.4 (10-07-19 @ 07:48), Max: 99 (10-07-19 @ 00:00)  HR: 83 (10-07-19 @ 07:48)  BP: 150/64 (10-07-19 @ 07:48)  RR: 17 (10-07-19 @ 07:48)  SpO2: 96% (10-07-19 @ 07:48)  Wt(kg): --      PHYSICAL EXAM:  Constitutional: NAD  HEENT: anicteric sclera, oropharynx clear.  Neck: No JVD  Respiratory: CTAB, no wheezes, rales or rhonchi  Cardiovascular: S1, S2, RRR  Gastrointestinal: BS+, soft, NT/ND  Extremities:  No peripheral edema  Neurological: A/O x 3, no focal deficits  Vascular Access: RT IJ permacath    LABS:  10-07    135  |  100  |  30<H>  ----------------------------<  155<H>  3.9   |  26  |  5.52<H>    Ca    9.0      07 Oct 2019 06:28  Phos  2.2     10-07  Mg     2.1     10-07    TPro  5.8<L>  /  Alb  2.9<L>  /  TBili  0.9  /  DBili      /  AST  26  /  ALT  17  /  AlkPhos  96  10-07    Creatinine Trend: 5.52 <--, 3.94 <--, 6.65 <--, 4.62 <--, 6.21 <--, 4.69 <--, 7.84 <--                        8.7    5.64  )-----------( 259      ( 07 Oct 2019 06:28 )             27.5     Urine Studies:      RADIOLOGY & ADDITIONAL STUDIES:

## 2019-10-07 NOTE — PROGRESS NOTE ADULT - PROBLEM SELECTOR PROBLEM 5
HTN (hypertension)
HTN (hypertension)
Diabetes
HTN (hypertension)
Anemia
HTN (hypertension)
Diabetes
HTN (hypertension)
Anemia

## 2019-10-07 NOTE — PROGRESS NOTE ADULT - PROBLEM SELECTOR PROBLEM 1
CONCHIS (acute kidney injury)
End stage renal disease
Acute tubular necrosis
CONCHIS (acute kidney injury)
ESRD (end stage renal disease)
ESRD (end stage renal disease)
End stage renal disease
GI bleed
Sacral ulcer
Sacral ulcer
Acute tubular necrosis
Acute tubular necrosis
End stage renal disease
GI bleed
Acute tubular necrosis

## 2019-10-11 NOTE — BRIEF OPERATIVE NOTE - ESTIMATED BLOOD LOSS
Patient Name: Fátima Salgado  Caller Name: Fátima Salgdao  Name of Facility: n/a  Callback Number: [phone] (M)  Best Availability: After 2:15pm.  Can A Detailed Message Be left? yes  Fax Number: n/a  Additional Info: Patient states that she was to be contacted with test results from 10/01 and prescribed medication, but has yet to be contacted. Patient is requesting a return call, and requires a .   Did you confirm the message with the caller?: yes    Thank you,  Quirino Herron     100

## 2019-10-13 PROCEDURE — 83935 ASSAY OF URINE OSMOLALITY: CPT

## 2019-10-13 PROCEDURE — 36558 INSERT TUNNELED CV CATH: CPT

## 2019-10-13 PROCEDURE — 36415 COLL VENOUS BLD VENIPUNCTURE: CPT

## 2019-10-13 PROCEDURE — 84300 ASSAY OF URINE SODIUM: CPT

## 2019-10-13 PROCEDURE — 80061 LIPID PANEL: CPT

## 2019-10-13 PROCEDURE — 93306 TTE W/DOPPLER COMPLETE: CPT

## 2019-10-13 PROCEDURE — 93005 ELECTROCARDIOGRAM TRACING: CPT

## 2019-10-13 PROCEDURE — 71045 X-RAY EXAM CHEST 1 VIEW: CPT

## 2019-10-13 PROCEDURE — 88304 TISSUE EXAM BY PATHOLOGIST: CPT

## 2019-10-13 PROCEDURE — 72148 MRI LUMBAR SPINE W/O DYE: CPT

## 2019-10-13 PROCEDURE — 86850 RBC ANTIBODY SCREEN: CPT

## 2019-10-13 PROCEDURE — 85027 COMPLETE CBC AUTOMATED: CPT

## 2019-10-13 PROCEDURE — 86901 BLOOD TYPING SEROLOGIC RH(D): CPT

## 2019-10-13 PROCEDURE — 82728 ASSAY OF FERRITIN: CPT

## 2019-10-13 PROCEDURE — 77001 FLUOROGUIDE FOR VEIN DEVICE: CPT

## 2019-10-13 PROCEDURE — C1769: CPT

## 2019-10-13 PROCEDURE — 83735 ASSAY OF MAGNESIUM: CPT

## 2019-10-13 PROCEDURE — 87070 CULTURE OTHR SPECIMN AEROBIC: CPT

## 2019-10-13 PROCEDURE — 80074 ACUTE HEPATITIS PANEL: CPT

## 2019-10-13 PROCEDURE — 82436 ASSAY OF URINE CHLORIDE: CPT

## 2019-10-13 PROCEDURE — 86923 COMPATIBILITY TEST ELECTRIC: CPT

## 2019-10-13 PROCEDURE — 83550 IRON BINDING TEST: CPT

## 2019-10-13 PROCEDURE — 84156 ASSAY OF PROTEIN URINE: CPT

## 2019-10-13 PROCEDURE — 36430 TRANSFUSION BLD/BLD COMPNT: CPT

## 2019-10-13 PROCEDURE — 88312 SPECIAL STAINS GROUP 1: CPT

## 2019-10-13 PROCEDURE — 76775 US EXAM ABDO BACK WALL LIM: CPT

## 2019-10-13 PROCEDURE — 76937 US GUIDE VASCULAR ACCESS: CPT

## 2019-10-13 PROCEDURE — 83036 HEMOGLOBIN GLYCOSYLATED A1C: CPT

## 2019-10-13 PROCEDURE — 86706 HEP B SURFACE ANTIBODY: CPT

## 2019-10-13 PROCEDURE — 99261: CPT

## 2019-10-13 PROCEDURE — 80048 BASIC METABOLIC PNL TOTAL CA: CPT

## 2019-10-13 PROCEDURE — 87340 HEPATITIS B SURFACE AG IA: CPT

## 2019-10-13 PROCEDURE — 94640 AIRWAY INHALATION TREATMENT: CPT

## 2019-10-13 PROCEDURE — 85730 THROMBOPLASTIN TIME PARTIAL: CPT

## 2019-10-13 PROCEDURE — 86480 TB TEST CELL IMMUN MEASURE: CPT

## 2019-10-13 PROCEDURE — P9040: CPT

## 2019-10-13 PROCEDURE — 82570 ASSAY OF URINE CREATININE: CPT

## 2019-10-13 PROCEDURE — 80053 COMPREHEN METABOLIC PANEL: CPT

## 2019-10-13 PROCEDURE — 99285 EMERGENCY DEPT VISIT HI MDM: CPT | Mod: 25

## 2019-10-13 PROCEDURE — 80076 HEPATIC FUNCTION PANEL: CPT

## 2019-10-13 PROCEDURE — 82803 BLOOD GASES ANY COMBINATION: CPT

## 2019-10-13 PROCEDURE — 87640 STAPH A DNA AMP PROBE: CPT

## 2019-10-13 PROCEDURE — 81001 URINALYSIS AUTO W/SCOPE: CPT

## 2019-10-13 PROCEDURE — 84484 ASSAY OF TROPONIN QUANT: CPT

## 2019-10-13 PROCEDURE — C1750: CPT

## 2019-10-13 PROCEDURE — 83540 ASSAY OF IRON: CPT

## 2019-10-13 PROCEDURE — 82272 OCCULT BLD FECES 1-3 TESTS: CPT

## 2019-10-13 PROCEDURE — 87186 SC STD MICRODIL/AGAR DIL: CPT

## 2019-10-13 PROCEDURE — 86900 BLOOD TYPING SEROLOGIC ABO: CPT

## 2019-10-13 PROCEDURE — 85610 PROTHROMBIN TIME: CPT

## 2019-10-13 PROCEDURE — 84443 ASSAY THYROID STIM HORMONE: CPT

## 2019-10-13 PROCEDURE — 82962 GLUCOSE BLOOD TEST: CPT

## 2019-10-13 PROCEDURE — 88305 TISSUE EXAM BY PATHOLOGIST: CPT

## 2019-10-13 PROCEDURE — 76000 FLUOROSCOPY <1 HR PHYS/QHP: CPT

## 2019-10-13 PROCEDURE — 84100 ASSAY OF PHOSPHORUS: CPT

## 2020-03-16 ENCOUNTER — APPOINTMENT (OUTPATIENT)
Dept: VASCULAR SURGERY | Facility: CLINIC | Age: 80
End: 2020-03-16
Payer: MEDICARE

## 2020-03-16 VITALS
BODY MASS INDEX: 27.82 KG/M2 | HEART RATE: 78 BPM | SYSTOLIC BLOOD PRESSURE: 156 MMHG | WEIGHT: 138 LBS | DIASTOLIC BLOOD PRESSURE: 85 MMHG | HEIGHT: 59 IN | TEMPERATURE: 98.5 F

## 2020-03-16 DIAGNOSIS — Z86.2 PERSONAL HISTORY OF DISEASES OF THE BLOOD AND BLOOD-FORMING ORGANS AND CERTAIN DISORDERS INVOLVING THE IMMUNE MECHANISM: ICD-10-CM

## 2020-03-16 DIAGNOSIS — Z87.448 PERSONAL HISTORY OF OTHER DISEASES OF URINARY SYSTEM: ICD-10-CM

## 2020-03-16 DIAGNOSIS — E11.22 TYPE 2 DIABETES MELLITUS WITH DIABETIC CHRONIC KIDNEY DISEASE: ICD-10-CM

## 2020-03-16 DIAGNOSIS — Z87.39 PERSONAL HISTORY OF OTHER DISEASES OF THE MUSCULOSKELETAL SYSTEM AND CONNECTIVE TISSUE: ICD-10-CM

## 2020-03-16 DIAGNOSIS — N18.6 TYPE 2 DIABETES MELLITUS WITH DIABETIC CHRONIC KIDNEY DISEASE: ICD-10-CM

## 2020-03-16 DIAGNOSIS — Z87.09 PERSONAL HISTORY OF OTHER DISEASES OF THE RESPIRATORY SYSTEM: ICD-10-CM

## 2020-03-16 DIAGNOSIS — Z86.39 PERSONAL HISTORY OF OTHER ENDOCRINE, NUTRITIONAL AND METABOLIC DISEASE: ICD-10-CM

## 2020-03-16 DIAGNOSIS — Z86.69 PERSONAL HISTORY OF OTHER DISEASES OF THE NERVOUS SYSTEM AND SENSE ORGANS: ICD-10-CM

## 2020-03-16 DIAGNOSIS — Z99.2 TYPE 2 DIABETES MELLITUS WITH DIABETIC CHRONIC KIDNEY DISEASE: ICD-10-CM

## 2020-03-16 PROCEDURE — 99203 OFFICE O/P NEW LOW 30 MIN: CPT

## 2020-03-16 PROCEDURE — 93986 DUP-SCAN HEMO COMPL UNI STD: CPT

## 2020-03-16 NOTE — PHYSICAL EXAM
[2+] : left 2+ [Normal] : normoactive bowel sounds, soft and nontender, no hepatosplenomegaly or masses appreciated

## 2020-03-16 NOTE — HISTORY OF PRESENT ILLNESS
[FreeTextEntry1] : 79 year old with DM, on HD for 6 months\par needs permanent access\par right handed [] : right internal jugular tunneled catheter

## 2020-05-15 ENCOUNTER — TRANSCRIPTION ENCOUNTER (OUTPATIENT)
Age: 80
End: 2020-05-15

## 2020-05-19 ENCOUNTER — APPOINTMENT (OUTPATIENT)
Dept: ENDOVASCULAR SURGERY | Facility: CLINIC | Age: 80
End: 2020-05-19

## 2020-05-20 ENCOUNTER — APPOINTMENT (OUTPATIENT)
Dept: ENDOVASCULAR SURGERY | Facility: CLINIC | Age: 80
End: 2020-05-20
Payer: MEDICARE

## 2020-05-20 VITALS
RESPIRATION RATE: 20 BRPM | WEIGHT: 134 LBS | HEIGHT: 59 IN | TEMPERATURE: 97.7 F | BODY MASS INDEX: 27.01 KG/M2 | OXYGEN SATURATION: 99 % | SYSTOLIC BLOOD PRESSURE: 177 MMHG | HEART RATE: 71 BPM | DIASTOLIC BLOOD PRESSURE: 83 MMHG

## 2020-05-20 PROCEDURE — 36820 AV FUSION/FOREARM VEIN: CPT | Mod: LT

## 2020-05-20 PROCEDURE — 35321 RECHANNELING OF ARTERY: CPT | Mod: LT

## 2020-05-20 PROCEDURE — 36820 AV FUSION/FOREARM VEIN: CPT | Mod: 82,59,LT

## 2020-05-20 PROCEDURE — 35321 RECHANNELING OF ARTERY: CPT | Mod: 82,LT

## 2020-05-20 RX ORDER — FUROSEMIDE 20 MG/1
20 TABLET ORAL
Qty: 90 | Refills: 0 | Status: DISCONTINUED | COMMUNITY
Start: 2018-07-09 | End: 2020-05-20

## 2020-05-20 NOTE — PROCEDURE
[D/C IV on discharge] : D/C IV on discharge [FreeTextEntry3] : Preoperative diagnosis: Renal failure.\par \par Postoperative diagnosis: The same.\par \par Procedure: Creation of left radiocephalic AV fistula, left radial artery endarterectomy.\par \par Surgeon: Dr. Graf.\par \par Anesthesia was local with sedation.\par \par First assistant: Dr. Ramon.\par \par Date of procedure was on 5/20 2020.\par \par Estimated blood loss was minimal.\par \par Patient is an 79-year-old female with past medical history significant for renal failure requiring permanent hemodialysis access.  Patient scheduled for creation of an AV fistula.\par \par Informed consent was obtained from the patient.  Timeout was done.  Anesthesia care plan was discussed.\par \par Patient was brought into the operating room.  Left upper extremity was prepped and draped in usual sterile fashion.  Local anesthesia was given.  Skin incision was made along the course of the left cephalic vein close to the wrist.  Vein was isolated from the surrounding tissues.  Vein was noted to be adequate caliber and quality.  All the branches were ligated and transected.  The vein was transected distally.  The vein was dilated.\par \par Through the same incision left radial artery was dissected.  Artery was noted to be calcified.  Proximal distal clamps were applied after the patient was heparinized.  Arteriotomy was made.  Endarterectomy was completed.  Good endpoint was achieved.  End-to-side anastomosis was created between the cephalic vein and left radial artery using a 6-0 Prolene running suture.  After completion of the suture line the clamps were removed.  Good thrill was achieved.  Adequate hemostasis was achieved.\par \par Patient had the wound irrigated.  Closure was done using Vicryl sutures to approximate subcutaneous tissue.  Skin was closed using interrupted nylons.\par \par Patient had a good thrill over the body of the fistula.  Sterile dressing was applied.  Patient was transferred to recovery in stable condition. [FreeTextEntry1] : site check for bleeding/thrill; vital signs on admission and q15 x 2

## 2020-05-20 NOTE — HISTORY OF PRESENT ILLNESS
[FreeTextEntry5] : 6:30 yesterday [FreeTextEntry4] : today [FreeTextEntry1] : accompanied by daughter\par ambulates with a cane\par  \par dialyzes via right IJ tunneled catheter\par feels ok\par no meds today [FreeTextEntry6] : Dr Ballard

## 2020-05-20 NOTE — PAST MEDICAL HISTORY
[FreeTextEntry1] : Malignant Hyperthermia Screening Tool and Risk of Bleeding Assessment\par \par Ms. ANA ROSA ECHEVERRIA denies family history of unexpected death following Anesthesia or Exercise.\par Denies Family history of Malignant Hyperthermia, Muscle or Neuromuscular disorder and High Temperature following exercise.\par \par Ms. ANA ROSA ECHEVERRIA denies history of Muscle Spasm, Dark or Chocolate - Colored urine and Unanticipated fever immediately following anesthesia or serious exercise. \par Ms. ECHEVERRIA also denies bleeding tendencies/ Risks of Bleeding.\par

## 2020-05-21 ENCOUNTER — APPOINTMENT (OUTPATIENT)
Dept: VASCULAR SURGERY | Facility: HOSPITAL | Age: 80
End: 2020-05-21

## 2020-06-08 ENCOUNTER — APPOINTMENT (OUTPATIENT)
Dept: VASCULAR SURGERY | Facility: CLINIC | Age: 80
End: 2020-06-08
Payer: MEDICARE

## 2020-06-08 VITALS — TEMPERATURE: 98 F

## 2020-06-08 PROCEDURE — 93990 DOPPLER FLOW TESTING: CPT

## 2020-06-08 PROCEDURE — 99024 POSTOP FOLLOW-UP VISIT: CPT

## 2020-06-08 RX ORDER — CALCIUM ACETATE 667 MG/1
667 CAPSULE ORAL
Qty: 810 | Refills: 0 | Status: ACTIVE | COMMUNITY
Start: 2020-05-22

## 2020-06-08 NOTE — REASON FOR VISIT
[de-identified] : Status post left radiocephalic AV fistula.  Incision is clean.  There is a thrill.  Patient complains of numbness and cramping of the left hand especially after dialysis.  Sutures were removed.  Plan for fistulogram and maturation.  Patient may need radial artery embolization in the near future.

## 2020-06-22 DIAGNOSIS — Z01.818 ENCOUNTER FOR OTHER PREPROCEDURAL EXAMINATION: ICD-10-CM

## 2020-06-23 ENCOUNTER — APPOINTMENT (OUTPATIENT)
Dept: DISASTER EMERGENCY | Facility: CLINIC | Age: 80
End: 2020-06-23

## 2020-06-24 LAB — SARS-COV-2 N GENE NPH QL NAA+PROBE: NOT DETECTED

## 2020-06-25 ENCOUNTER — FORM ENCOUNTER (OUTPATIENT)
Age: 80
End: 2020-06-25

## 2020-06-25 NOTE — REASON FOR VISIT
[Other ___] : a [unfilled] visit for [Non-Maturing Fistula] : non-maturing fistula [Inadequate Flow within AVF] : inadequate flow within AVF

## 2020-06-26 ENCOUNTER — APPOINTMENT (OUTPATIENT)
Dept: ENDOVASCULAR SURGERY | Facility: CLINIC | Age: 80
End: 2020-06-26
Payer: MEDICARE

## 2020-06-26 VITALS
TEMPERATURE: 98.3 F | RESPIRATION RATE: 15 BRPM | OXYGEN SATURATION: 95 % | DIASTOLIC BLOOD PRESSURE: 85 MMHG | SYSTOLIC BLOOD PRESSURE: 143 MMHG | HEIGHT: 59 IN | BODY MASS INDEX: 27.01 KG/M2 | WEIGHT: 134 LBS | HEART RATE: 80 BPM

## 2020-06-26 PROCEDURE — 36902Z: CUSTOM

## 2020-06-26 RX ORDER — INSULIN DEGLUDEC INJECTION 200 U/ML
200 INJECTION, SOLUTION SUBCUTANEOUS
Qty: 27 | Refills: 0 | Status: DISCONTINUED | COMMUNITY
Start: 2018-07-20 | End: 2020-06-26

## 2020-06-26 RX ORDER — ALLOPURINOL 100 MG/1
100 TABLET ORAL
Qty: 90 | Refills: 0 | Status: DISCONTINUED | COMMUNITY
Start: 2018-07-09 | End: 2020-06-26

## 2020-06-26 RX ORDER — ASPIRIN ENTERIC COATED TABLETS 81 MG 81 MG/1
81 TABLET, DELAYED RELEASE ORAL
Qty: 90 | Refills: 0 | Status: DISCONTINUED | COMMUNITY
Start: 2018-07-09 | End: 2020-06-26

## 2020-06-26 RX ORDER — AMLODIPINE BESYLATE 10 MG/1
10 TABLET ORAL
Qty: 90 | Refills: 0 | Status: DISCONTINUED | COMMUNITY
Start: 2018-01-17 | End: 2020-06-26

## 2020-06-26 NOTE — PHYSICAL EXAM
[Thrill] : thrill
Normal vision: sees adequately in most situations; can see medication labels, newsprint

## 2020-07-14 ENCOUNTER — APPOINTMENT (OUTPATIENT)
Dept: DISASTER EMERGENCY | Facility: CLINIC | Age: 80
End: 2020-07-14

## 2020-07-15 LAB — SARS-COV-2 N GENE NPH QL NAA+PROBE: NOT DETECTED

## 2020-07-16 ENCOUNTER — FORM ENCOUNTER (OUTPATIENT)
Age: 80
End: 2020-07-16

## 2020-07-16 PROBLEM — N18.6 ESRD (END STAGE RENAL DISEASE) ON DIALYSIS: Status: ACTIVE | Noted: 2020-05-19

## 2020-07-16 PROBLEM — T82.898A INADEQUATE FLOW OF DIALYSIS ARTERIOVENOUS FISTULA: Status: ACTIVE | Noted: 2020-06-25

## 2020-07-16 NOTE — HISTORY OF PRESENT ILLNESS
[] : right internal jugular tunneled catheter [FreeTextEntry1] : Dr Graf 5/20/2020\par  [FreeTextEntry4] : today [FreeTextEntry5] : 6:30 yesterday [FreeTextEntry6] : Dr Morales

## 2020-07-16 NOTE — PROCEDURE
[D/C IV on discharge] : D/C IV on discharge [Resume diet] : resume diet [Site check for bleeding/hematoma/thrill/bruit] : Site check for bleeding/hematoma/thrill/bruit [Vital signs on admission the q 15 mins x2] : Vital signs on admission the q 15 mins x2 [FreeTextEntry3] : \par  [FreeTextEntry1] : site check for bleeding/thrill; vital signs on admission and q15 x 2

## 2020-07-16 NOTE — PAST MEDICAL HISTORY
[Increasing age ( >40 years old)] : Increasing age ( >40 years old) [No therapy indicated for cases scheduled for less than one hour] : No therapy indicated for cases scheduled for less than one hour. [FreeTextEntry1] : Malignant Hyperthermia Screening Tool and Risk of Bleeding Assessment\par Ms. ANA ROSA ECHEVERRIA denies family history of unexpected death following Anesthesia or Exercise.\par Denies Family history of Malignant Hyperthermia, Muscle or Neuromuscular disorder and High Temperature following exercise.\par \par Ms. ANA ROSA ECHEVERRIA denies history of Muscle Spasm, Dark or Chocolate - Colored urine and Unanticipated fever immediately following anesthesia or serious exercise. \par Ms. ECHEVERRIA also denies bleeding tendencies/ Risks of Bleeding.\par

## 2020-07-16 NOTE — REASON FOR VISIT
[Inadequate Flow within AVF] : inadequate flow within AVF [Other ___] : a [unfilled] visit for [Non-Maturing Fistula] : non-maturing fistula

## 2020-07-17 ENCOUNTER — APPOINTMENT (OUTPATIENT)
Dept: ENDOVASCULAR SURGERY | Facility: CLINIC | Age: 80
End: 2020-07-17
Payer: MEDICARE

## 2020-07-17 VITALS
OXYGEN SATURATION: 98 % | WEIGHT: 134 LBS | TEMPERATURE: 98.2 F | SYSTOLIC BLOOD PRESSURE: 158 MMHG | RESPIRATION RATE: 15 BRPM | HEART RATE: 83 BPM | BODY MASS INDEX: 27.01 KG/M2 | DIASTOLIC BLOOD PRESSURE: 87 MMHG | HEIGHT: 59 IN

## 2020-07-17 DIAGNOSIS — Z99.2 END STAGE RENAL DISEASE: ICD-10-CM

## 2020-07-17 DIAGNOSIS — N18.6 END STAGE RENAL DISEASE: ICD-10-CM

## 2020-07-17 DIAGNOSIS — T82.898A OTHER SPECIFIED COMPLICATION OF VASCULAR PROSTHETIC DEVICES, IMPLANTS AND GRAFTS, INITIAL ENCOUNTER: ICD-10-CM

## 2020-07-17 PROCEDURE — 36215Z: CUSTOM | Mod: 59

## 2020-07-17 PROCEDURE — 36902Z: CUSTOM

## 2020-07-17 NOTE — HISTORY OF PRESENT ILLNESS
[] : left radiocephalic fistula [FreeTextEntry1] : accompanied by daughter\par ambulates with a cane\par reported  \par dialyzes via right IJ tunneled catheter\par feels ok\par no meds today [FreeTextEntry6] : Dr Morales  [FreeTextEntry5] : 6:30 yesterday [FreeTextEntry4] : today

## 2020-07-17 NOTE — PROCEDURE
[Resume diet] : resume diet [D/C IV on discharge] : D/C IV on discharge [Site check for bleeding/hematoma/thrill/bruit] : Site check for bleeding/hematoma/thrill/bruit [Vital signs on admission the q 15 mins x2] : Vital signs on admission the q 15 mins x2 [FreeTextEntry3] : \par  [FreeTextEntry1] : site check for bleeding/thrill; vital signs on admission and q15 x 2

## 2020-09-18 ENCOUNTER — APPOINTMENT (OUTPATIENT)
Dept: ENDOVASCULAR SURGERY | Facility: CLINIC | Age: 80
End: 2020-09-18

## 2022-05-26 NOTE — PATIENT PROFILE ADULT - HOW PATIENT ADDRESSED, PROFILE
Problem: POSTPARTUM  Goal: Long Term Goal:Experiences normal postpartum course  Description: INTERVENTIONS:  - Assess and monitor vital signs and lab values. - Assess fundus and lochia. - Provide ice/sitz baths for perineum discomfort. - Monitor healing of incision/episiotomy/laceration, and assess for signs and symptoms of infection and hematoma. - Assess bladder function and monitor for bladder distention.  - Provide/instruct/assist with pericare as needed. - Provide VTE prophylaxis as needed. - Monitor bowel function.  - Encourage ambulation and provide assistance as needed. - Assess and monitor emotional status and provide social service/psych resources as needed. - Utilize standard precautions and use personal protective equipment as indicated. Ensure aseptic care of all intravenous lines and invasive tubes/drains.  - Obtain immunization and exposure to communicable diseases history. Outcome: Completed  Goal: Optimize infant feeding at the breast  Description: INTERVENTIONS:  - Initiate breast feeding within first hour after birth. - Monitor effectiveness of current breast feeding efforts. - Assess support systems available to mother/family.  - Identify cultural beliefs/practices regarding lactation, letdown techniques, maternal food preferences. - Assess mother's knowledge and previous experience with breast feeding.  - Provide information as needed about early infant feeding cues (e.g., rooting, lip smacking, sucking fingers/hand) versus late cue of crying.  - Discuss/demonstrate breast feeding aids (e.g., infant sling, nursing footstool/pillows, and breast pumps). - Encourage mother/other family members to express feelings/concerns, and actively listen. - Educate father/SO about benefits of breast feeding and how to manage common lactation challenges.   - Recommend avoidance of specific medications or substances incompatible with breast feeding.  - Assess and monitor for signs of nipple pain/trauma. - Instruct and provide assistance with proper latch. - Review techniques for milk expression (breast pumping) and storage of breast milk. Provide pumping equipment/supplies, instructions and assistance, as needed. - Encourage rooming-in and breast feeding on demand.  - Encourage skin-to-skin contact. - Provide LC support as needed. - Assess for and manage engorgement. - Provide breast feeding education handouts and information on community breast feeding support. Outcome: Completed  Goal: Establishment of adequate milk supply with medication/procedure interruptions  Description: INTERVENTIONS:  - Review techniques for milk expression (breast pumping). - Provide pumping equipment/supplies, instructions, and assistance until it is safe to breastfeed infant. Outcome: Completed  Goal: Appropriate maternal -  bonding  Description: INTERVENTIONS:  - Assess caregiver- interactions. - Assess caregiver's emotional status and coping mechanisms. - Encourage caregiver to participate in  daily care. - Assess support systems available to mother/family.  - Provide /case management support as needed. Outcome: Completed         Sat with patient and updated patient and family on plan of care. Pain medication discussed and answered all questions. Vitals are WNL. Breastfeeding on demand and breastfeeding successfully. Lochia WNL and fundus is firm. Will call RN with any questions. mrs augustin

## 2022-09-10 ENCOUNTER — EMERGENCY (EMERGENCY)
Facility: HOSPITAL | Age: 82
LOS: 1 days | Discharge: HOME | End: 2022-09-10
Attending: EMERGENCY MEDICINE
Payer: COMMERCIAL

## 2022-09-10 VITALS
TEMPERATURE: 99 F | WEIGHT: 130.07 LBS | RESPIRATION RATE: 18 BRPM | DIASTOLIC BLOOD PRESSURE: 77 MMHG | SYSTOLIC BLOOD PRESSURE: 119 MMHG | HEART RATE: 91 BPM | OXYGEN SATURATION: 97 % | HEIGHT: 60 IN

## 2022-09-10 DIAGNOSIS — Z98.41 CATARACT EXTRACTION STATUS, RIGHT EYE: Chronic | ICD-10-CM

## 2022-09-10 DIAGNOSIS — Z96.652 PRESENCE OF LEFT ARTIFICIAL KNEE JOINT: Chronic | ICD-10-CM

## 2022-09-10 DIAGNOSIS — Z98.890 OTHER SPECIFIED POSTPROCEDURAL STATES: Chronic | ICD-10-CM

## 2022-09-10 LAB
ALBUMIN SERPL ELPH-MCNC: 3.8 G/DL — SIGNIFICANT CHANGE UP (ref 3.5–5)
ALP SERPL-CCNC: 119 U/L — SIGNIFICANT CHANGE UP (ref 40–120)
ALT FLD-CCNC: 13 U/L DA — SIGNIFICANT CHANGE UP (ref 10–60)
ANION GAP SERPL CALC-SCNC: 13 MMOL/L — SIGNIFICANT CHANGE UP (ref 5–17)
APPEARANCE UR: ABNORMAL
AST SERPL-CCNC: 21 U/L — SIGNIFICANT CHANGE UP (ref 10–40)
BASOPHILS # BLD AUTO: 0.03 K/UL — SIGNIFICANT CHANGE UP (ref 0–0.2)
BASOPHILS NFR BLD AUTO: 0.5 % — SIGNIFICANT CHANGE UP (ref 0–2)
BILIRUB SERPL-MCNC: 0.6 MG/DL — SIGNIFICANT CHANGE UP (ref 0.2–1.2)
BILIRUB UR-MCNC: NEGATIVE — SIGNIFICANT CHANGE UP
BUN SERPL-MCNC: 23 MG/DL — HIGH (ref 7–18)
CALCIUM SERPL-MCNC: 9.1 MG/DL — SIGNIFICANT CHANGE UP (ref 8.4–10.5)
CHLORIDE SERPL-SCNC: 95 MMOL/L — LOW (ref 96–108)
CO2 SERPL-SCNC: 24 MMOL/L — SIGNIFICANT CHANGE UP (ref 22–31)
COLOR SPEC: ABNORMAL
CREAT SERPL-MCNC: 6.38 MG/DL — HIGH (ref 0.5–1.3)
DIFF PNL FLD: ABNORMAL
EGFR: 6 ML/MIN/1.73M2 — LOW
EOSINOPHIL # BLD AUTO: 0.25 K/UL — SIGNIFICANT CHANGE UP (ref 0–0.5)
EOSINOPHIL NFR BLD AUTO: 3.9 % — SIGNIFICANT CHANGE UP (ref 0–6)
GLUCOSE SERPL-MCNC: 160 MG/DL — HIGH (ref 70–99)
GLUCOSE UR QL: NEGATIVE — SIGNIFICANT CHANGE UP
HCT VFR BLD CALC: 32.4 % — LOW (ref 34.5–45)
HGB BLD-MCNC: 10.8 G/DL — LOW (ref 11.5–15.5)
IMM GRANULOCYTES NFR BLD AUTO: 0.3 % — SIGNIFICANT CHANGE UP (ref 0–1.5)
KETONES UR-MCNC: NEGATIVE — SIGNIFICANT CHANGE UP
LEUKOCYTE ESTERASE UR-ACNC: ABNORMAL
LYMPHOCYTES # BLD AUTO: 1.27 K/UL — SIGNIFICANT CHANGE UP (ref 1–3.3)
LYMPHOCYTES # BLD AUTO: 19.7 % — SIGNIFICANT CHANGE UP (ref 13–44)
MCHC RBC-ENTMCNC: 32.5 PG — SIGNIFICANT CHANGE UP (ref 27–34)
MCHC RBC-ENTMCNC: 33.3 GM/DL — SIGNIFICANT CHANGE UP (ref 32–36)
MCV RBC AUTO: 97.6 FL — SIGNIFICANT CHANGE UP (ref 80–100)
MONOCYTES # BLD AUTO: 0.59 K/UL — SIGNIFICANT CHANGE UP (ref 0–0.9)
MONOCYTES NFR BLD AUTO: 9.1 % — SIGNIFICANT CHANGE UP (ref 2–14)
NEUTROPHILS # BLD AUTO: 4.3 K/UL — SIGNIFICANT CHANGE UP (ref 1.8–7.4)
NEUTROPHILS NFR BLD AUTO: 66.5 % — SIGNIFICANT CHANGE UP (ref 43–77)
NITRITE UR-MCNC: NEGATIVE — SIGNIFICANT CHANGE UP
NRBC # BLD: 0 /100 WBCS — SIGNIFICANT CHANGE UP (ref 0–0)
PH UR: 8 — SIGNIFICANT CHANGE UP (ref 5–8)
PLATELET # BLD AUTO: 174 K/UL — SIGNIFICANT CHANGE UP (ref 150–400)
POTASSIUM SERPL-MCNC: 3.5 MMOL/L — SIGNIFICANT CHANGE UP (ref 3.5–5.3)
POTASSIUM SERPL-SCNC: 3.5 MMOL/L — SIGNIFICANT CHANGE UP (ref 3.5–5.3)
PROT SERPL-MCNC: 7.2 G/DL — SIGNIFICANT CHANGE UP (ref 6–8.3)
PROT UR-MCNC: 500 MG/DL
RBC # BLD: 3.32 M/UL — LOW (ref 3.8–5.2)
RBC # FLD: 14.8 % — HIGH (ref 10.3–14.5)
SODIUM SERPL-SCNC: 132 MMOL/L — LOW (ref 135–145)
SP GR SPEC: 1.01 — SIGNIFICANT CHANGE UP (ref 1.01–1.02)
UROBILINOGEN FLD QL: NEGATIVE — SIGNIFICANT CHANGE UP
WBC # BLD: 6.46 K/UL — SIGNIFICANT CHANGE UP (ref 3.8–10.5)
WBC # FLD AUTO: 6.46 K/UL — SIGNIFICANT CHANGE UP (ref 3.8–10.5)

## 2022-09-10 PROCEDURE — 74176 CT ABD & PELVIS W/O CONTRAST: CPT | Mod: 26,MA

## 2022-09-10 PROCEDURE — 99285 EMERGENCY DEPT VISIT HI MDM: CPT

## 2022-09-10 NOTE — ED PROVIDER NOTE - OBJECTIVE STATEMENT
80 y/o female with a history of diabetes, hypertension, CKD on dialysis Mon, Wed and Fri (most recently had dialysis yesterday) comes in complaining of right flank pain and hematuria x1-2 days. Patient denies any nausea, vomiting, fever and dysuria. Patient does note slightly decreased amount of urine production. Patient denies any history of kidney stones. Patient is allergic to aspirin and ibuprofen.

## 2022-09-10 NOTE — ED ADULT TRIAGE NOTE - CHIEF COMPLAINT QUOTE
c/o Rt flank pain last night , blood in the urine this morning , denies pain on urination  post dialysis yesterday

## 2022-09-10 NOTE — ED ADULT NURSE NOTE - OBJECTIVE STATEMENT
As per pt she has c/o Right flank pain last night , blood in the urine this morning , denies pain on urination. Pt denies any other medical complaint at this time.

## 2022-09-10 NOTE — ED PROVIDER NOTE - NSFOLLOWUPINSTRUCTIONS_ED_ALL_ED_FT
IMPORTANT INSTRUCTIONS FROM Dr. SAMPSON:    Please follow up with your personal medical doctor in 24-48 hours.   Bring results from today to your visit.    Antibiotic after dialysis.     If you were advised to take any medications - be sure to review the package insert.    If your symptoms change, get worse or if you have any new symptoms, come to the ER right away. We do not expect any fevers or chills for example - that should make you rush to the ER.  If you have any questions, call the ER at the phone number on this page.

## 2022-09-10 NOTE — ED PROVIDER NOTE - PATIENT PORTAL LINK FT
You can access the FollowMyHealth Patient Portal offered by [facility] by registering at the following website: [email or web address]. By joining [employer]’s FollowMyHealth portal, you will also be able to view your health information using other applications (apps) compatible with our system.

## 2022-09-10 NOTE — ED PROVIDER NOTE - PROGRESS NOTE DETAILS
feeling better and wants to go home, reviewed case and results w pt, questions answered and pt understands, advised return precautions and care plan. The abnomal lab/radiology findings were expressed to the patient. A copy of all the results from today's visit was provided to the patient to assist in the continued workup by the patient's outpatient provider. The patient is advised to follow up with the outpatient provider with these results in hand at the time advised on the discharge document.

## 2022-09-11 VITALS
TEMPERATURE: 99 F | DIASTOLIC BLOOD PRESSURE: 72 MMHG | RESPIRATION RATE: 18 BRPM | SYSTOLIC BLOOD PRESSURE: 154 MMHG | OXYGEN SATURATION: 97 % | HEART RATE: 86 BPM

## 2022-09-11 PROCEDURE — 80053 COMPREHEN METABOLIC PANEL: CPT

## 2022-09-11 PROCEDURE — 87086 URINE CULTURE/COLONY COUNT: CPT

## 2022-09-11 PROCEDURE — 81001 URINALYSIS AUTO W/SCOPE: CPT

## 2022-09-11 PROCEDURE — 96374 THER/PROPH/DIAG INJ IV PUSH: CPT

## 2022-09-11 PROCEDURE — 74176 CT ABD & PELVIS W/O CONTRAST: CPT | Mod: MA

## 2022-09-11 PROCEDURE — 36415 COLL VENOUS BLD VENIPUNCTURE: CPT

## 2022-09-11 PROCEDURE — 99284 EMERGENCY DEPT VISIT MOD MDM: CPT | Mod: 25

## 2022-09-11 PROCEDURE — 85025 COMPLETE CBC W/AUTO DIFF WBC: CPT

## 2022-09-11 RX ORDER — CEFTRIAXONE 500 MG/1
1000 INJECTION, POWDER, FOR SOLUTION INTRAMUSCULAR; INTRAVENOUS ONCE
Refills: 0 | Status: COMPLETED | OUTPATIENT
Start: 2022-09-11 | End: 2022-09-11

## 2022-09-11 RX ORDER — CEFDINIR 250 MG/5ML
1 POWDER, FOR SUSPENSION ORAL
Qty: 4 | Refills: 0
Start: 2022-09-11 | End: 2022-09-17

## 2022-09-11 RX ADMIN — CEFTRIAXONE 100 MILLIGRAM(S): 500 INJECTION, POWDER, FOR SOLUTION INTRAMUSCULAR; INTRAVENOUS at 00:40

## 2022-09-12 LAB
CULTURE RESULTS: SIGNIFICANT CHANGE UP
SPECIMEN SOURCE: SIGNIFICANT CHANGE UP

## 2022-10-27 NOTE — PHYSICAL THERAPY INITIAL EVALUATION ADULT - BALANCE DISTURBANCE, IDENTIFIED IMPAIRMENT CONTRIBUTE, REHAB EVAL
Purse String (Intermediate) Text: Given the location of the defect and the characteristics of the surrounding skin a pursestring intermediate closure was deemed most appropriate.  Undermining was performed circumfirentially around the surgical defect.  A purstring suture was then placed and tightened. Estimated Blood Loss (Cc): minimal W Plasty Text: The lesion was extirpated to the level of the fat with a #15 scalpel blade.  Given the location of the defect, shape of the defect and the proximity to free margins a W-plasty was deemed most appropriate for repair.  Using a sterile surgical marker, the appropriate transposition arms of the W-plasty were drawn incorporating the defect and placing the expected incisions within the relaxed skin tension lines where possible.    The area thus outlined was incised deep to adipose tissue with a #15 scalpel blade.  The skin margins were undermined to an appropriate distance in all directions utilizing iris scissors.  The opposing transposition arms were then transposed into place in opposite direction and anchored with interrupted buried subcutaneous sutures. Peng Advancement Flap Text: The defect edges were debeveled with a #15 scalpel blade.  Given the location of the defect, shape of the defect and the proximity to free margins a Peng advancement flap was deemed most appropriate.  Using a sterile surgical marker, an appropriate advancement flap was drawn incorporating the defect and placing the expected incisions within the relaxed skin tension lines where possible. The area thus outlined was incised deep to adipose tissue with a #15 scalpel blade.  The skin margins were undermined to an appropriate distance in all directions utilizing iris scissors. Complex Repair And A-T Advancement Flap Text: The defect edges were debeveled with a #15 scalpel blade.  The primary defect was closed partially with a complex linear closure.  Given the location of the remaining defect, shape of the defect and the proximity to free margins an A-T advancement flap was deemed most appropriate for complete closure of the defect.  Using a sterile surgical marker, an appropriate advancement flap was drawn incorporating the defect and placing the expected incisions within the relaxed skin tension lines where possible.    The area thus outlined was incised deep to adipose tissue with a #15 scalpel blade.  The skin margins were undermined to an appropriate distance in all directions utilizing iris scissors. Island Pedicle Flap-Requiring Vessel Identification Text: The defect edges were debeveled with a #15 scalpel blade.  Given the location of the defect, shape of the defect and the proximity to free margins an island pedicle advancement flap was deemed most appropriate.  Using a sterile surgical marker, an appropriate advancement flap was drawn, based on the axial vessel mentioned above, incorporating the defect, outlining the appropriate donor tissue and placing the expected incisions within the relaxed skin tension lines where possible.    The area thus outlined was incised deep to adipose tissue with a #15 scalpel blade.  The skin margins were undermined to an appropriate distance in all directions around the primary defect and laterally outward around the island pedicle utilizing iris scissors.  There was minimal undermining beneath the pedicle flap. O-Z Flap Text: The defect edges were debeveled with a #15 scalpel blade.  Given the location of the defect, shape of the defect and the proximity to free margins an O-Z flap was deemed most appropriate.  Using a sterile surgical marker, an appropriate transposition flap was drawn incorporating the defect and placing the expected incisions within the relaxed skin tension lines where possible. The area thus outlined was incised deep to adipose tissue with a #15 scalpel blade.  The skin margins were undermined to an appropriate distance in all directions utilizing iris scissors. Post-Care Instructions: I reviewed with the patient in detail post-care instructions. Patient is not to engage in any heavy lifting, exercise, or swimming for the next 14 days. Should the patient develop any fevers, chills, bleeding, severe pain patient will contact the office immediately. Did You Provide Opioid Counseling: No Show Asc Variables: Yes Suturegard Retention Suture: 2-0 Nylon Complex Repair And Transposition Flap Text: The defect edges were debeveled with a #15 scalpel blade.  The primary defect was closed partially with a complex linear closure.  Given the location of the remaining defect, shape of the defect and the proximity to free margins a transposition flap was deemed most appropriate for complete closure of the defect.  Using a sterile surgical marker, an appropriate advancement flap was drawn incorporating the defect and placing the expected incisions within the relaxed skin tension lines where possible.    The area thus outlined was incised deep to adipose tissue with a #15 scalpel blade.  The skin margins were undermined to an appropriate distance in all directions utilizing iris scissors. Mastoid Interpolation Flap Text: A decision was made to reconstruct the defect utilizing an interpolation axial flap and a staged reconstruction.  A telfa template was made of the defect.  This telfa template was then used to outline the mastoid interpolation flap.  The donor area for the pedicle flap was then injected with anesthesia.  The flap was excised through the skin and subcutaneous tissue down to the layer of the underlying musculature.  The pedicle flap was carefully excised within this deep plane to maintain its blood supply.  The edges of the donor site were undermined.   The donor site was closed in a primary fashion.  The pedicle was then rotated into position and sutured.  Once the tube was sutured into place, adequate blood supply was confirmed with blanching and refill.  The pedicle was then wrapped with xeroform gauze and dressed appropriately with a telfa and gauze bandage to ensure continued blood supply and protect the attached pedicle. Muscle Hinge Flap Text: The defect edges were debeveled with a #15 scalpel blade.  Given the size, depth and location of the defect and the proximity to free margins a muscle hinge flap was deemed most appropriate.  Using a sterile surgical marker, an appropriate hinge flap was drawn incorporating the defect. The area thus outlined was incised with a #15 scalpel blade.  The skin margins were undermined to an appropriate distance in all directions utilizing iris scissors. Intermediate Repair Preamble Text (Leave Blank If You Do Not Want): Undermining was performed with blunt dissection. Surgeon (Optional): Priya Anesthesia Volume In Cc: 6 Undermining Type: Entire Wound Billing Type: Third-Party Bill Excisional Biopsy Additional Text (Leave Blank If You Do Not Want): The margin was drawn around the clinically apparent lesion. An elliptical shape was then drawn on the skin incorporating the lesion and margins.  Incisions were then made along these lines to the appropriate tissue plane and the lesion was extirpated. Information: Selecting Yes will display possible errors in your note based on the variables you have selected. This validation is only offered as a suggestion for you. PLEASE NOTE THAT THE VALIDATION TEXT WILL BE REMOVED WHEN YOU FINALIZE YOUR NOTE. IF YOU WANT TO FAX A PRELIMINARY NOTE YOU WILL NEED TO TOGGLE THIS TO 'NO' IF YOU DO NOT WANT IT IN YOUR FAXED NOTE. Bilobed Transposition Flap Text: The defect edges were debeveled with a #15 scalpel blade.  Given the location of the defect and the proximity to free margins a bilobed transposition flap was deemed most appropriate.  Using a sterile surgical marker, an appropriate bilobe flap drawn around the defect.    The area thus outlined was incised deep to adipose tissue with a #15 scalpel blade.  The skin margins were undermined to an appropriate distance in all directions utilizing iris scissors. Number Of Hemigard Strips Per Side: 1 Excision Depth: adipose tissue Abbe Flap (Lower To Upper Lip) Text: The defect of the upper lip was assessed and measured.  Given the location and size of the defect, an Abbe flap was deemed most appropriate.  Using a sterile surgical marker, an appropriate Abbe flap was measured and drawn on the lower lip. Local anesthesia was then infiltrated. A scalpel was then used to incise the upper lip through and through the skin, vermilion, muscle and mucosa, leaving the flap pedicled on the opposite side.  The flap was then rotated and transferred to the lower lip defect.  The flap was then sutured into place with a three layer technique, closing the orbicularis oris muscle layer with subcutaneous buried sutures, followed by a mucosal layer and an epidermal layer. Advancement Flap (Double) Text: The defect edges were debeveled with a #15 scalpel blade.  Given the location of the defect and the proximity to free margins a double advancement flap was deemed most appropriate.  Using a sterile surgical marker, the appropriate advancement flaps were drawn incorporating the defect and placing the expected incisions within the relaxed skin tension lines where possible.    The area thus outlined was incised deep to adipose tissue with a #15 scalpel blade.  The skin margins were undermined to an appropriate distance in all directions utilizing iris scissors. Size Of Lesion In Cm: 0.6 Vermilion Border Text: The closure involved the vermilion border. impaired postural control/decreased strength Rhomboid Transposition Flap Text: The defect edges were debeveled with a #15 scalpel blade.  Given the location of the defect and the proximity to free margins a rhomboid transposition flap was deemed most appropriate.  Using a sterile surgical marker, an appropriate rhomboid flap was drawn incorporating the defect.    The area thus outlined was incised deep to adipose tissue with a #15 scalpel blade.  The skin margins were undermined to an appropriate distance in all directions utilizing iris scissors. Cartilage Graft Text: The defect edges were debeveled with a #15 scalpel blade.  Given the location of the defect, shape of the defect, the fact the defect involved a full thickness cartilage defect a cartilage graft was deemed most appropriate.  An appropriate donor site was identified, cleansed, and anesthetized. The cartilage graft was then harvested and transferred to the recipient site, oriented appropriately and then sutured into place.  The secondary defect was then repaired using a primary closure. Complex Repair And Modified Advancement Flap Text: The defect edges were debeveled with a #15 scalpel blade.  The primary defect was closed partially with a complex linear closure.  Given the location of the remaining defect, shape of the defect and the proximity to free margins a modified advancement flap was deemed most appropriate for complete closure of the defect.  Using a sterile surgical marker, an appropriate advancement flap was drawn incorporating the defect and placing the expected incisions within the relaxed skin tension lines where possible.    The area thus outlined was incised deep to adipose tissue with a #15 scalpel blade.  The skin margins were undermined to an appropriate distance in all directions utilizing iris scissors. Complex Repair And Rhombic Flap Text: The defect edges were debeveled with a #15 scalpel blade.  The primary defect was closed partially with a complex linear closure.  Given the location of the remaining defect, shape of the defect and the proximity to free margins a rhombic flap was deemed most appropriate for complete closure of the defect.  Using a sterile surgical marker, an appropriate advancement flap was drawn incorporating the defect and placing the expected incisions within the relaxed skin tension lines where possible.    The area thus outlined was incised deep to adipose tissue with a #15 scalpel blade.  The skin margins were undermined to an appropriate distance in all directions utilizing iris scissors. Melolabial Interpolation Flap Text: A decision was made to reconstruct the defect utilizing an interpolation axial flap and a staged reconstruction.  A telfa template was made of the defect.  This telfa template was then used to outline the melolabial interpolation flap.  The donor area for the pedicle flap was then injected with anesthesia.  The flap was excised through the skin and subcutaneous tissue down to the layer of the underlying musculature.  The pedicle flap was carefully excised within this deep plane to maintain its blood supply.  The edges of the donor site were undermined.   The donor site was closed in a primary fashion.  The pedicle was then rotated into position and sutured.  Once the tube was sutured into place, adequate blood supply was confirmed with blanching and refill.  The pedicle was then wrapped with xeroform gauze and dressed appropriately with a telfa and gauze bandage to ensure continued blood supply and protect the attached pedicle. H Plasty Text: Given the location of the defect, shape of the defect and the proximity to free margins a H-plasty was deemed most appropriate for repair.  Using a sterile surgical marker, the appropriate advancement arms of the H-plasty were drawn incorporating the defect and placing the expected incisions within the relaxed skin tension lines where possible. The area thus outlined was incised deep to adipose tissue with a #15 scalpel blade. The skin margins were undermined to an appropriate distance in all directions utilizing iris scissors.  The opposing advancement arms were then advanced into place in opposite direction and anchored with interrupted buried subcutaneous sutures. Mucosal Advancement Flap Text: Given the location of the defect, shape of the defect and the proximity to free margins a mucosal advancement flap was deemed most appropriate. Incisions were made with a 15 blade scalpel in the appropriate fashion along the cutaneous vermillion border and the mucosal lip. The remaining actinically damaged mucosal tissue was excised.  The mucosal advancement flap was then elevated to the gingival sulcus with care taken to preserve the neurovascular structures and advanced into the primary defect. Care was taken to ensure that precise realignment of the vermillion border was achieved. Skin Substitute Units (Will Override Primary Defect Units If Greater Than 0): 0 Complex Repair And Xenograft Text: The defect edges were debeveled with a #15 scalpel blade.  The primary defect was closed partially with a complex linear closure.  Given the location of the defect, shape of the defect and the proximity to free margins an tissue cultured epidermal autograft was deemed most appropriate to repair the remaining defect.  The graft was trimmed to fit the size of the remaining defect.  The graft was then placed in the primary defect, oriented appropriately, and sutured into place. Complex Repair And Dorsal Nasal Flap Text: The defect edges were debeveled with a #15 scalpel blade.  The primary defect was closed partially with a complex linear closure.  Given the location of the remaining defect, shape of the defect and the proximity to free margins a dorsal nasal flap was deemed most appropriate for complete closure of the defect.  Using a sterile surgical marker, an appropriate flap was drawn incorporating the defect and placing the expected incisions within the relaxed skin tension lines where possible.    The area thus outlined was incised deep to adipose tissue with a #15 scalpel blade.  The skin margins were undermined to an appropriate distance in all directions utilizing iris scissors. Wound Care: Mupirocin Intermediate / Complex Repair - Final Wound Length In Cm: 3.2 Slit Excision Additional Text (Leave Blank If You Do Not Want): A linear line was drawn on the skin overlying the lesion. An incision was made slowly until the lesion was visualized.  Once visualized, the lesion was removed with blunt dissection. Rhombic Flap Text: The defect edges were debeveled with a #15 scalpel blade.  Given the location of the defect and the proximity to free margins a rhombic flap was deemed most appropriate.  Using a sterile surgical marker, an appropriate rhombic flap was drawn incorporating the defect.    The area thus outlined was incised deep to adipose tissue with a #15 scalpel blade.  The skin margins were undermined to an appropriate distance in all directions utilizing iris scissors. Double Island Pedicle Flap Text: The defect edges were debeveled with a #15 scalpel blade.  Given the location of the defect, shape of the defect and the proximity to free margins a double island pedicle advancement flap was deemed most appropriate.  Using a sterile surgical marker, an appropriate advancement flap was drawn incorporating the defect, outlining the appropriate donor tissue and placing the expected incisions within the relaxed skin tension lines where possible.    The area thus outlined was incised deep to adipose tissue with a #15 scalpel blade.  The skin margins were undermined to an appropriate distance in all directions around the primary defect and laterally outward around the island pedicle utilizing iris scissors.  There was minimal undermining beneath the pedicle flap. Advancement Flap (Single) Text: The defect edges were debeveled with a #15 scalpel blade.  Given the location of the defect and the proximity to free margins a single advancement flap was deemed most appropriate.  Using a sterile surgical marker, an appropriate advancement flap was drawn incorporating the defect and placing the expected incisions within the relaxed skin tension lines where possible.    The area thus outlined was incised deep to adipose tissue with a #15 scalpel blade.  The skin margins were undermined to an appropriate distance in all directions utilizing iris scissors. O-L Flap Text: The defect edges were debeveled with a #15 scalpel blade.  Given the location of the defect, shape of the defect and the proximity to free margins an O-L flap was deemed most appropriate.  Using a sterile surgical marker, an appropriate advancement flap was drawn incorporating the defect and placing the expected incisions within the relaxed skin tension lines where possible.    The area thus outlined was incised deep to adipose tissue with a #15 scalpel blade.  The skin margins were undermined to an appropriate distance in all directions utilizing iris scissors. Burow's Graft Text: The defect edges were debeveled with a #15 scalpel blade.  Given the location of the defect, shape of the defect, the proximity to free margins and the presence of a standing cone deformity a Burow's skin graft was deemed most appropriate. The standing cone was removed and this tissue was then trimmed to the shape of the primary defect. The adipose tissue was also removed until only dermis and epidermis were left.  The skin margins of the secondary defect were undermined to an appropriate distance in all directions utilizing iris scissors.  The secondary defect was closed with interrupted buried subcutaneous sutures.  The skin edges were then re-apposed with running  sutures.  The skin graft was then placed in the primary defect and oriented appropriately. Bilobed Flap Text: The defect edges were debeveled with a #15 scalpel blade.  Given the location of the defect and the proximity to free margins a bilobe flap was deemed most appropriate.  Using a sterile surgical marker, an appropriate bilobe flap drawn around the defect.    The area thus outlined was incised deep to adipose tissue with a #15 scalpel blade.  The skin margins were undermined to an appropriate distance in all directions utilizing iris scissors. Retention Suture Bite Size: 3 mm Xenograft Text: The defect edges were debeveled with a #15 scalpel blade.  Given the location of the defect, shape of the defect and the proximity to free margins a xenograft was deemed most appropriate.  The graft was then trimmed to fit the size of the defect.  The graft was then placed in the primary defect and oriented appropriately. Suturegard Intro: Intraoperative tissue expansion was performed, utilizing the SUTUREGARD device, in order to reduce wound tension. Complex Repair And Z Plasty Text: The defect edges were debeveled with a #15 scalpel blade.  The primary defect was closed partially with a complex linear closure.  Given the location of the remaining defect, shape of the defect and the proximity to free margins a Z plasty was deemed most appropriate for complete closure of the defect.  Using a sterile surgical marker, an appropriate advancement flap was drawn incorporating the defect and placing the expected incisions within the relaxed skin tension lines where possible.    The area thus outlined was incised deep to adipose tissue with a #15 scalpel blade.  The skin margins were undermined to an appropriate distance in all directions utilizing iris scissors. Complex Repair And Rotation Flap Text: The defect edges were debeveled with a #15 scalpel blade.  The primary defect was closed partially with a complex linear closure.  Given the location of the remaining defect, shape of the defect and the proximity to free margins a rotation flap was deemed most appropriate for complete closure of the defect.  Using a sterile surgical marker, an appropriate advancement flap was drawn incorporating the defect and placing the expected incisions within the relaxed skin tension lines where possible.    The area thus outlined was incised deep to adipose tissue with a #15 scalpel blade.  The skin margins were undermined to an appropriate distance in all directions utilizing iris scissors. Interpolation Flap Text: A decision was made to reconstruct the defect utilizing an interpolation axial flap and a staged reconstruction.  A telfa template was made of the defect.  This telfa template was then used to outline the interpolation flap.  The donor area for the pedicle flap was then injected with anesthesia.  The flap was excised through the skin and subcutaneous tissue down to the layer of the underlying musculature.  The interpolation flap was carefully excised within this deep plane to maintain its blood supply.  The edges of the donor site were undermined.   The donor site was closed in a primary fashion.  The pedicle was then rotated into position and sutured.  Once the tube was sutured into place, adequate blood supply was confirmed with blanching and refill.  The pedicle was then wrapped with xeroform gauze and dressed appropriately with a telfa and gauze bandage to ensure continued blood supply and protect the attached pedicle. Path Notes (To The Dermatopathologist): Please check margins. Transposition Flap Text: The defect edges were debeveled with a #15 scalpel blade.  Given the location of the defect and the proximity to free margins a transposition flap was deemed most appropriate.  Using a sterile surgical marker, an appropriate transposition flap was drawn incorporating the defect.    The area thus outlined was incised deep to adipose tissue with a #15 scalpel blade.  The skin margins were undermined to an appropriate distance in all directions utilizing iris scissors. Deep Sutures: 4-0 Vicryl Complex Repair Preamble Text (Leave Blank If You Do Not Want): Extensive wide undermining was performed. Split-Thickness Skin Graft Text: The defect edges were debeveled with a #15 scalpel blade.  Given the location of the defect, shape of the defect and the proximity to free margins a split thickness skin graft was deemed most appropriate.  Using a sterile surgical marker, the primary defect shape was transferred to the donor site. The split thickness graft was then harvested.  The skin graft was then placed in the primary defect and oriented appropriately. Graft Donor Site Bandage (Optional-Leave Blank If You Don't Want In Note): Steri-strips and a pressure bandage were applied to the donor site. Saucerization Excision Additional Text (Leave Blank If You Do Not Want): The margin was drawn around the clinically apparent lesion.  Incisions were then made along these lines, in a tangential fashion, to the appropriate tissue plane and the lesion was extirpated. Tissue Cultured Epidermal Autograft Text: The defect edges were debeveled with a #15 scalpel blade.  Given the location of the defect, shape of the defect and the proximity to free margins a tissue cultured epidermal autograft was deemed most appropriate.  The graft was then trimmed to fit the size of the defect.  The graft was then placed in the primary defect and oriented appropriately. Adjacent Tissue Transfer Text: The defect edges were debeveled with a #15 scalpel blade.  Given the location of the defect and the proximity to free margins an adjacent tissue transfer was deemed most appropriate.  Using a sterile surgical marker, an appropriate flap was drawn incorporating the defect and placing the expected incisions within the relaxed skin tension lines where possible.    The area thus outlined was incised deep to adipose tissue with a #15 scalpel blade.  The skin margins were undermined to an appropriate distance in all directions utilizing iris scissors. Repair Type: Intermediate Banner Transposition Flap Text: The defect edges were debeveled with a #15 scalpel blade.  Given the location of the defect and the proximity to free margins a Banner transposition flap was deemed most appropriate.  Using a sterile surgical marker, an appropriate flap drawn around the defect. The area thus outlined was incised deep to adipose tissue with a #15 scalpel blade.  The skin margins were undermined to an appropriate distance in all directions utilizing iris scissors. V-Y Plasty Text: The defect edges were debeveled with a #15 scalpel blade.  Given the location of the defect, shape of the defect and the proximity to free margins an V-Y advancement flap was deemed most appropriate.  Using a sterile surgical marker, an appropriate advancement flap was drawn incorporating the defect and placing the expected incisions within the relaxed skin tension lines where possible.    The area thus outlined was incised deep to adipose tissue with a #15 scalpel blade.  The skin margins were undermined to an appropriate distance in all directions utilizing iris scissors. Modified Advancement Flap Text: The defect edges were debeveled with a #15 scalpel blade.  Given the location of the defect, shape of the defect and the proximity to free margins a modified advancement flap was deemed most appropriate.  Using a sterile surgical marker, an appropriate advancement flap was drawn incorporating the defect and placing the expected incisions within the relaxed skin tension lines where possible.    The area thus outlined was incised deep to adipose tissue with a #15 scalpel blade.  The skin margins were undermined to an appropriate distance in all directions utilizing iris scissors. Alar Island Pedicle Flap Text: The defect edges were debeveled with a #15 scalpel blade.  Given the location of the defect, shape of the defect and the proximity to the alar rim an island pedicle advancement flap was deemed most appropriate.  Using a sterile surgical marker, an appropriate advancement flap was drawn incorporating the defect, outlining the appropriate donor tissue and placing the expected incisions within the nasal ala running parallel to the alar rim. The area thus outlined was incised with a #15 scalpel blade.  The skin margins were undermined minimally to an appropriate distance in all directions around the primary defect and laterally outward around the island pedicle utilizing iris scissors.  There was minimal undermining beneath the pedicle flap. O-T Advancement Flap Text: The defect edges were debeveled with a #15 scalpel blade.  Given the location of the defect, shape of the defect and the proximity to free margins an O-T advancement flap was deemed most appropriate.  Using a sterile surgical marker, an appropriate advancement flap was drawn incorporating the defect and placing the expected incisions within the relaxed skin tension lines where possible.    The area thus outlined was incised deep to adipose tissue with a #15 scalpel blade.  The skin margins were undermined to an appropriate distance in all directions utilizing iris scissors. Complex Repair And Double Advancement Flap Text: The defect edges were debeveled with a #15 scalpel blade.  The primary defect was closed partially with a complex linear closure.  Given the location of the remaining defect, shape of the defect and the proximity to free margins a double advancement flap was deemed most appropriate for complete closure of the defect.  Using a sterile surgical marker, an appropriate advancement flap was drawn incorporating the defect and placing the expected incisions within the relaxed skin tension lines where possible.    The area thus outlined was incised deep to adipose tissue with a #15 scalpel blade.  The skin margins were undermined to an appropriate distance in all directions utilizing iris scissors. Hemigard Postcare Instructions: The HEMIGARD strips are to remain completely dry for at least 5-7 days. Complex Repair And Dermal Autograft Text: The defect edges were debeveled with a #15 scalpel blade.  The primary defect was closed partially with a complex linear closure.  Given the location of the defect, shape of the defect and the proximity to free margins an dermal autograft was deemed most appropriate to repair the remaining defect.  The graft was trimmed to fit the size of the remaining defect.  The graft was then placed in the primary defect, oriented appropriately, and sutured into place. Pre-Excision Curettage Text (Leave Blank If You Do Not Want): Prior to drawing the surgical margin the visible lesion was removed with electrodesiccation and curettage to clearly define the lesion size. Complex Repair And W Plasty Text: The defect edges were debeveled with a #15 scalpel blade.  The primary defect was closed partially with a complex linear closure.  Given the location of the remaining defect, shape of the defect and the proximity to free margins a W plasty was deemed most appropriate for complete closure of the defect.  Using a sterile surgical marker, an appropriate advancement flap was drawn incorporating the defect and placing the expected incisions within the relaxed skin tension lines where possible.    The area thus outlined was incised deep to adipose tissue with a #15 scalpel blade.  The skin margins were undermined to an appropriate distance in all directions utilizing iris scissors. Scalpel Size: 15 blade Helical Rim Text: The closure involved the helical rim. Melolabial Transposition Flap Text: The defect edges were debeveled with a #15 scalpel blade.  Given the location of the defect and the proximity to free margins a melolabial flap was deemed most appropriate.  Using a sterile surgical marker, an appropriate melolabial transposition flap was drawn incorporating the defect.    The area thus outlined was incised deep to adipose tissue with a #15 scalpel blade.  The skin margins were undermined to an appropriate distance in all directions utilizing iris scissors. Length To Time In Minutes Device Was In Place: 10 Star Wedge Flap Text: The defect edges were debeveled with a #15 scalpel blade.  Given the location of the defect, shape of the defect and the proximity to free margins a star wedge flap was deemed most appropriate.  Using a sterile surgical marker, an appropriate rotation flap was drawn incorporating the defect and placing the expected incisions within the relaxed skin tension lines where possible. The area thus outlined was incised deep to adipose tissue with a #15 scalpel blade.  The skin margins were undermined to an appropriate distance in all directions utilizing iris scissors. Skin Substitute Text: The defect edges were debeveled with a #15 scalpel blade.  Given the location of the defect, shape of the defect and the proximity to free margins a skin substitute graft was deemed most appropriate.  The graft material was trimmed to fit the size of the defect. The graft was then placed in the primary defect and oriented appropriately. No Repair - Repaired With Adjacent Surgical Defect Text (Leave Blank If You Do Not Want): After the excision the defect was repaired concurrently with another surgical defect which was in close approximation. Double O-Z Plasty Text: The defect edges were debeveled with a #15 scalpel blade.  Given the location of the defect, shape of the defect and the proximity to free margins a Double O-Z plasty (double transposition flap) was deemed most appropriate.  Using a sterile surgical marker, the appropriate transposition flaps were drawn incorporating the defect and placing the expected incisions within the relaxed skin tension lines where possible. The area thus outlined was incised deep to adipose tissue with a #15 scalpel blade.  The skin margins were undermined to an appropriate distance in all directions utilizing iris scissors.  Hemostasis was achieved with electrocautery.  The flaps were then transposed into place, one clockwise and the other counterclockwise, and anchored with interrupted buried subcutaneous sutures. Complex Repair And Single Advancement Flap Text: The defect edges were debeveled with a #15 scalpel blade.  The primary defect was closed partially with a complex linear closure.  Given the location of the remaining defect, shape of the defect and the proximity to free margins a single advancement flap was deemed most appropriate for complete closure of the defect.  Using a sterile surgical marker, an appropriate advancement flap was drawn incorporating the defect and placing the expected incisions within the relaxed skin tension lines where possible.    The area thus outlined was incised deep to adipose tissue with a #15 scalpel blade.  The skin margins were undermined to an appropriate distance in all directions utilizing iris scissors. Island Pedicle Flap With Canthal Suspension Text: The defect edges were debeveled with a #15 scalpel blade.  Given the location of the defect, shape of the defect and the proximity to free margins an island pedicle advancement flap was deemed most appropriate.  Using a sterile surgical marker, an appropriate advancement flap was drawn incorporating the defect, outlining the appropriate donor tissue and placing the expected incisions within the relaxed skin tension lines where possible. The area thus outlined was incised deep to adipose tissue with a #15 scalpel blade.  The skin margins were undermined to an appropriate distance in all directions around the primary defect and laterally outward around the island pedicle utilizing iris scissors.  There was minimal undermining beneath the pedicle flap. A suspension suture was placed in the canthal tendon to prevent tension and prevent ectropion. Debridement Text: The wound edges were debrided prior to proceeding with the closure to facilitate wound healing. A-T Advancement Flap Text: The defect edges were debeveled with a #15 scalpel blade.  Given the location of the defect, shape of the defect and the proximity to free margins an A-T advancement flap was deemed most appropriate.  Using a sterile surgical marker, an appropriate advancement flap was drawn incorporating the defect and placing the expected incisions within the relaxed skin tension lines where possible.    The area thus outlined was incised deep to adipose tissue with a #15 scalpel blade.  The skin margins were undermined to an appropriate distance in all directions utilizing iris scissors. Cheek-To-Nose Interpolation Flap Text: A decision was made to reconstruct the defect utilizing an interpolation axial flap and a staged reconstruction.  A telfa template was made of the defect.  This telfa template was then used to outline the Cheek-To-Nose Interpolation flap.  The donor area for the pedicle flap was then injected with anesthesia.  The flap was excised through the skin and subcutaneous tissue down to the layer of the underlying musculature.  The interpolation flap was carefully excised within this deep plane to maintain its blood supply.  The edges of the donor site were undermined.   The donor site was closed in a primary fashion.  The pedicle was then rotated into position and sutured.  Once the tube was sutured into place, adequate blood supply was confirmed with blanching and refill.  The pedicle was then wrapped with xeroform gauze and dressed appropriately with a telfa and gauze bandage to ensure continued blood supply and protect the attached pedicle. Mercedes Flap Text: The defect edges were debeveled with a #15 scalpel blade.  Given the location of the defect, shape of the defect and the proximity to free margins a Mercedes flap was deemed most appropriate.  Using a sterile surgical marker, an appropriate advancement flap was drawn incorporating the defect and placing the expected incisions within the relaxed skin tension lines where possible. The area thus outlined was incised deep to adipose tissue with a #15 scalpel blade.  The skin margins were undermined to an appropriate distance in all directions utilizing iris scissors. Complex Repair And Melolabial Flap Text: The defect edges were debeveled with a #15 scalpel blade.  The primary defect was closed partially with a complex linear closure.  Given the location of the remaining defect, shape of the defect and the proximity to free margins a melolabial flap was deemed most appropriate for complete closure of the defect.  Using a sterile surgical marker, an appropriate advancement flap was drawn incorporating the defect and placing the expected incisions within the relaxed skin tension lines where possible.    The area thus outlined was incised deep to adipose tissue with a #15 scalpel blade.  The skin margins were undermined to an appropriate distance in all directions utilizing iris scissors. Staged Advancement Flap Text: The defect edges were debeveled with a #15 scalpel blade.  Given the location of the defect, shape of the defect and the proximity to free margins a staged advancement flap was deemed most appropriate.  Using a sterile surgical marker, an appropriate advancement flap was drawn incorporating the defect and placing the expected incisions within the relaxed skin tension lines where possible. The area thus outlined was incised deep to adipose tissue with a #15 scalpel blade.  The skin margins were undermined to an appropriate distance in all directions utilizing iris scissors. Positioning (Leave Blank If You Do Not Want): The patient was placed in a comfortable position exposing the surgical site. Complex Repair And Epidermal Autograft Text: The defect edges were debeveled with a #15 scalpel blade.  The primary defect was closed partially with a complex linear closure.  Given the location of the defect, shape of the defect and the proximity to free margins an epidermal autograft was deemed most appropriate to repair the remaining defect.  The graft was trimmed to fit the size of the remaining defect.  The graft was then placed in the primary defect, oriented appropriately, and sutured into place. Dressing: pressure dressing with telfa Elliptical Excision Additional Text (Leave Blank If You Do Not Want): The margin was drawn around the clinically apparent lesion.  An elliptical shape was then drawn on the skin incorporating the lesion and margins.  Incisions were then made along these lines to the appropriate tissue plane and the lesion was extirpated. Excision Method: Elliptical Ear Star Wedge Flap Text: The defect edges were debeveled with a #15 blade scalpel.  Given the location of the defect and the proximity to free margins (helical rim) an ear star wedge flap was deemed most appropriate.  Using a sterile surgical marker, the appropriate flap was drawn incorporating the defect and placing the expected incisions between the helical rim and antihelix where possible.  The area thus outlined was incised through and through with a #15 scalpel blade. Orbicularis Oris Muscle Flap Text: The defect edges were debeveled with a #15 scalpel blade.  Given that the defect affected the competency of the oral sphincter an orbicularis oris muscle flap was deemed most appropriate to restore this competency and normal muscle function.  Using a sterile surgical marker, an appropriate flap was drawn incorporating the defect. The area thus outlined was incised with a #15 scalpel blade. Ftsg Text: The defect edges were debeveled with a #15 scalpel blade.  Given the location of the defect, shape of the defect and the proximity to free margins a full thickness skin graft was deemed most appropriate.  Using a sterile surgical marker, the primary defect shape was transferred to the donor site. The area thus outlined was incised deep to adipose tissue with a #15 scalpel blade.  The harvested graft was then trimmed of adipose tissue until only dermis and epidermis was left.  The skin margins of the secondary defect were undermined to an appropriate distance in all directions utilizing iris scissors.  The secondary defect was closed with interrupted buried subcutaneous sutures.  The skin edges were then re-apposed with running  sutures.  The skin graft was then placed in the primary defect and oriented appropriately. Partial Purse String (Simple) Text: Given the location of the defect and the characteristics of the surrounding skin a simple purse string closure was deemed most appropriate.  Undermining was performed circumferentially around the surgical defect.  A purse string suture was then placed and tightened. Wound tension of the circular defect prevented complete closure of the wound. Cheek Interpolation Flap Text: A decision was made to reconstruct the defect utilizing an interpolation axial flap and a staged reconstruction.  A telfa template was made of the defect.  This telfa template was then used to outline the Cheek Interpolation flap.  The donor area for the pedicle flap was then injected with anesthesia.  The flap was excised through the skin and subcutaneous tissue down to the layer of the underlying musculature.  The interpolation flap was carefully excised within this deep plane to maintain its blood supply.  The edges of the donor site were undermined.   The donor site was closed in a primary fashion.  The pedicle was then rotated into position and sutured.  Once the tube was sutured into place, adequate blood supply was confirmed with blanching and refill.  The pedicle was then wrapped with xeroform gauze and dressed appropriately with a telfa and gauze bandage to ensure continued blood supply and protect the attached pedicle. Hemigard Intro: Due to skin fragility and wound tension, it was decided to use HEMIGARD adhesive retention suture devices to permit a linear closure. The skin was cleaned and dried for a 6cm distance away from the wound. Excessive hair, if present, was removed to allow for adhesion. Complex Repair And Bilobe Flap Text: The defect edges were debeveled with a #15 scalpel blade.  The primary defect was closed partially with a complex linear closure.  Given the location of the remaining defect, shape of the defect and the proximity to free margins a bilobe flap was deemed most appropriate for complete closure of the defect.  Using a sterile surgical marker, an appropriate advancement flap was drawn incorporating the defect and placing the expected incisions within the relaxed skin tension lines where possible.    The area thus outlined was incised deep to adipose tissue with a #15 scalpel blade.  The skin margins were undermined to an appropriate distance in all directions utilizing iris scissors. O-Z Plasty Text: The defect edges were debeveled with a #15 scalpel blade.  Given the location of the defect, shape of the defect and the proximity to free margins an O-Z plasty (double transposition flap) was deemed most appropriate.  Using a sterile surgical marker, the appropriate transposition flaps were drawn incorporating the defect and placing the expected incisions within the relaxed skin tension lines where possible.    The area thus outlined was incised deep to adipose tissue with a #15 scalpel blade.  The skin margins were undermined to an appropriate distance in all directions utilizing iris scissors.  Hemostasis was achieved with electrocautery.  The flaps were then transposed into place, one clockwise and the other counterclockwise, and anchored with interrupted buried subcutaneous sutures. Advancement-Rotation Flap Text: The defect edges were debeveled with a #15 scalpel blade.  Given the location of the defect, shape of the defect and the proximity to free margins an advancement-rotation flap was deemed most appropriate.  Using a sterile surgical marker, an appropriate flap was drawn incorporating the defect and placing the expected incisions within the relaxed skin tension lines where possible. The area thus outlined was incised deep to adipose tissue with a #15 scalpel blade.  The skin margins were undermined to an appropriate distance in all directions utilizing iris scissors. Epidermal Closure: simple interrupted Where Do You Want The Question To Include Opioid Counseling Located?: Case Summary Tab Epidermal Sutures: 4-0 Nylon Complex Repair And Double M Plasty Text: The defect edges were debeveled with a #15 scalpel blade.  The primary defect was closed partially with a complex linear closure.  Given the location of the remaining defect, shape of the defect and the proximity to free margins a double M plasty was deemed most appropriate for complete closure of the defect.  Using a sterile surgical marker, an appropriate advancement flap was drawn incorporating the defect and placing the expected incisions within the relaxed skin tension lines where possible.    The area thus outlined was incised deep to adipose tissue with a #15 scalpel blade.  The skin margins were undermined to an appropriate distance in all directions utilizing iris scissors. Abbe Flap (Upper To Lower Lip) Text: The defect of the lower lip was assessed and measured.  Given the location and size of the defect, an Abbe flap was deemed most appropriate.  Using a sterile surgical marker, an appropriate Abbe flap was measured and drawn on the upper lip. Local anesthesia was then infiltrated.  A scalpel was then used to incise the upper lip through and through the skin, vermilion, muscle and mucosa, leaving the flap pedicled on the opposite side.  The flap was then rotated and transferred to the lower lip defect.  The flap was then sutured into place with a three layer technique, closing the orbicularis oris muscle layer with subcutaneous buried sutures, followed by a mucosal layer and an epidermal layer. Nasalis-Muscle-Based Myocutaneous Island Pedicle Flap Text: Using a #15 blade, an incision was made around the donor flap to the level of the nasalis muscle. Wide lateral undermining was then performed in both the subcutaneous plane above the nasalis muscle, and in a submuscular plane just above periosteum. This allowed the formation of a free nasalis muscle axial pedicle (based on the angular artery) which was still attached to the actual cutaneous flap, increasing its mobility and vascular viability. Hemostasis was obtained with pinpoint electrocoagulation. The flap was mobilized into position and the pivotal anchor points positioned and stabilized with buried interrupted sutures. Subcutaneous and dermal tissues were closed in a multilayered fashion with sutures. Tissue redundancies were excised, and the epidermal edges were apposed without significant tension and sutured with sutures. Fusiform Excision Additional Text (Leave Blank If You Do Not Want): The margin was drawn around the clinically apparent lesion.  A fusiform shape was then drawn on the skin incorporating the lesion and margins.  Incisions were then made along these lines to the appropriate tissue plane and the lesion was extirpated. Repair Performed By Another Provider Text (Leave Blank If You Do Not Want): After the tissue was excised the defect was repaired by another provider. Island Pedicle Flap Text: The defect edges were debeveled with a #15 scalpel blade.  Given the location of the defect, shape of the defect and the proximity to free margins an island pedicle advancement flap was deemed most appropriate.  Using a sterile surgical marker, an appropriate advancement flap was drawn incorporating the defect, outlining the appropriate donor tissue and placing the expected incisions within the relaxed skin tension lines where possible.    The area thus outlined was incised deep to adipose tissue with a #15 scalpel blade.  The skin margins were undermined to an appropriate distance in all directions around the primary defect and laterally outward around the island pedicle utilizing iris scissors.  There was minimal undermining beneath the pedicle flap. Crescentic Advancement Flap Text: The defect edges were debeveled with a #15 scalpel blade.  Given the location of the defect and the proximity to free margins a crescentic advancement flap was deemed most appropriate.  Using a sterile surgical marker, the appropriate advancement flap was drawn incorporating the defect and placing the expected incisions within the relaxed skin tension lines where possible.    The area thus outlined was incised deep to adipose tissue with a #15 scalpel blade.  The skin margins were undermined to an appropriate distance in all directions utilizing iris scissors. Lip Wedge Excision Repair Text: Given the location of the defect and the proximity to free margins a full thickness wedge repair was deemed most appropriate.  Using a sterile surgical marker, the appropriate repair was drawn incorporating the defect and placing the expected incisions perpendicular to the vermillion border.  The vermillion border was also meticulously outlined to ensure appropriate reapproximation during the repair.  The area thus outlined was incised through and through with a #15 scalpel blade.  The muscularis and dermis were reaproximated with deep sutures following hemostasis. Care was taken to realign the vermillion border before proceeding with the superficial closure.  Once the vermillion was realigned the superfical and mucosal closure was finished. Anesthesia Volume In Cc: 5 Bilateral Helical Rim Advancement Flap Text: The defect edges were debeveled with a #15 blade scalpel.  Given the location of the defect and the proximity to free margins (helical rim) a bilateral helical rim advancement flap was deemed most appropriate.  Using a sterile surgical marker, the appropriate advancement flaps were drawn incorporating the defect and placing the expected incisions between the helical rim and antihelix where possible.  The area thus outlined was incised through and through with a #15 scalpel blade.  With a skin hook and iris scissors, the flaps were gently and sharply undermined and freed up. Suture Removal: 14 days Dermal Autograft Text: The defect edges were debeveled with a #15 scalpel blade.  Given the location of the defect, shape of the defect and the proximity to free margins a dermal autograft was deemed most appropriate.  Using a sterile surgical marker, the primary defect shape was transferred to the donor site. The area thus outlined was incised deep to adipose tissue with a #15 scalpel blade.  The harvested graft was then trimmed of adipose and epidermal tissue until only dermis was left.  The skin graft was then placed in the primary defect and oriented appropriately. Complex Repair And M Plasty Text: The defect edges were debeveled with a #15 scalpel blade.  The primary defect was closed partially with a complex linear closure.  Given the location of the remaining defect, shape of the defect and the proximity to free margins an M plasty was deemed most appropriate for complete closure of the defect.  Using a sterile surgical marker, an appropriate advancement flap was drawn incorporating the defect and placing the expected incisions within the relaxed skin tension lines where possible.    The area thus outlined was incised deep to adipose tissue with a #15 scalpel blade.  The skin margins were undermined to an appropriate distance in all directions utilizing iris scissors. Complex Repair And O-L Flap Text: The defect edges were debeveled with a #15 scalpel blade.  The primary defect was closed partially with a complex linear closure.  Given the location of the remaining defect, shape of the defect and the proximity to free margins an O-L flap was deemed most appropriate for complete closure of the defect.  Using a sterile surgical marker, an appropriate flap was drawn incorporating the defect and placing the expected incisions within the relaxed skin tension lines where possible.    The area thus outlined was incised deep to adipose tissue with a #15 scalpel blade.  The skin margins were undermined to an appropriate distance in all directions utilizing iris scissors. Paramedian Forehead Flap Text: A decision was made to reconstruct the defect utilizing an interpolation axial flap and a staged reconstruction.  A telfa template was made of the defect.  This telfa template was then used to outline the paramedian forehead pedicle flap.  The donor area for the pedicle flap was then injected with anesthesia.  The flap was excised through the skin and subcutaneous tissue down to the layer of the underlying musculature.  The pedicle flap was carefully excised within this deep plane to maintain its blood supply.  The edges of the donor site were undermined.   The donor site was closed in a primary fashion.  The pedicle was then rotated into position and sutured.  Once the tube was sutured into place, adequate blood supply was confirmed with blanching and refill.  The pedicle was then wrapped with xeroform gauze and dressed appropriately with a telfa and gauze bandage to ensure continued blood supply and protect the attached pedicle. Zygomaticofacial Flap Text: Given the location of the defect, shape of the defect and the proximity to free margins a zygomaticofacial flap was deemed most appropriate for repair.  Using a sterile surgical marker, the appropriate flap was drawn incorporating the defect and placing the expected incisions within the relaxed skin tension lines where possible. The area thus outlined was incised deep to adipose tissue with a #15 scalpel blade with preservation of a vascular pedicle.  The skin margins were undermined to an appropriate distance in all directions utilizing iris scissors.  The flap was then placed into the defect and anchored with interrupted buried subcutaneous sutures. Rotation Flap Text: The defect edges were debeveled with a #15 scalpel blade.  Given the location of the defect, shape of the defect and the proximity to free margins a rotation flap was deemed most appropriate.  Using a sterile surgical marker, an appropriate rotation flap was drawn incorporating the defect and placing the expected incisions within the relaxed skin tension lines where possible.    The area thus outlined was incised deep to adipose tissue with a #15 scalpel blade.  The skin margins were undermined to an appropriate distance in all directions utilizing iris scissors. Date Of Previous Biopsy (Optional): 09/08/22 Retention Suture Text: Retention sutures were placed to support the closure and prevent dehiscence. Detail Level: Detailed Spiral Flap Text: The defect edges were debeveled with a #15 scalpel blade.  Given the location of the defect, shape of the defect and the proximity to free margins a spiral flap was deemed most appropriate.  Using a sterile surgical marker, an appropriate rotation flap was drawn incorporating the defect and placing the expected incisions within the relaxed skin tension lines where possible. The area thus outlined was incised deep to adipose tissue with a #15 scalpel blade.  The skin margins were undermined to an appropriate distance in all directions utilizing iris scissors. Complex Repair And Split-Thickness Skin Graft Text: The defect edges were debeveled with a #15 scalpel blade.  The primary defect was closed partially with a complex linear closure.  Given the location of the defect, shape of the defect and the proximity to free margins a split thickness skin graft was deemed most appropriate to repair the remaining defect.  The graft was trimmed to fit the size of the remaining defect.  The graft was then placed in the primary defect, oriented appropriately, and sutured into place. Hemostasis: Manual Pressure Estlander Flap (Lower To Upper Lip) Text: The defect of the lower lip was assessed and measured.  Given the location and size of the defect, an Estlander flap was deemed most appropriate.  Using a sterile surgical marker, an appropriate Estlander flap was measured and drawn on the upper lip. Local anesthesia was then infiltrated. A scalpel was then used to incise the lateral aspect of the flap, through skin, muscle and mucosa, leaving the flap pedicled medially.  The flap was then rotated and positioned to fill the lower lip defect.  The flap was then sutured into place with a three layer technique, closing the orbicularis oris muscle layer with subcutaneous buried sutures, followed by a mucosal layer and an epidermal layer. Curvilinear Excision Additional Text (Leave Blank If You Do Not Want): The margin was drawn around the clinically apparent lesion.  A curvilinear shape was then drawn on the skin incorporating the lesion and margins.  Incisions were then made along these lines to the appropriate tissue plane and the lesion was extirpated. Eye Clamp Note Details: An eye clamp was used during the procedure. Helical Rim Advancement Flap Text: The defect edges were debeveled with a #15 blade scalpel.  Given the location of the defect and the proximity to free margins (helical rim) a double helical rim advancement flap was deemed most appropriate.  Using a sterile surgical marker, the appropriate advancement flaps were drawn incorporating the defect and placing the expected incisions between the helical rim and antihelix where possible.  The area thus outlined was incised through and through with a #15 scalpel blade.  With a skin hook and iris scissors, the flaps were gently and sharply undermined and freed up. Nostril Rim Text: The closure involved the nostril rim. Anesthesia Type: 1% lidocaine with epinephrine O-T Plasty Text: The defect edges were debeveled with a #15 scalpel blade.  Given the location of the defect, shape of the defect and the proximity to free margins an O-T plasty was deemed most appropriate.  Using a sterile surgical marker, an appropriate O-T plasty was drawn incorporating the defect and placing the expected incisions within the relaxed skin tension lines where possible.    The area thus outlined was incised deep to adipose tissue with a #15 scalpel blade.  The skin margins were undermined to an appropriate distance in all directions utilizing iris scissors. Chonodrocutaneous Helical Advancement Flap Text: The defect edges were debeveled with a #15 scalpel blade.  Given the location of the defect and the proximity to free margins a chondrocutaneous helical advancement flap was deemed most appropriate.  Using a sterile surgical marker, the appropriate advancement flap was drawn incorporating the defect and placing the expected incisions within the relaxed skin tension lines where possible.    The area thus outlined was incised deep to adipose tissue with a #15 scalpel blade.  The skin margins were undermined to an appropriate distance in all directions utilizing iris scissors. Size Of Margin In Cm: 0.3 V-Y Flap Text: The defect edges were debeveled with a #15 scalpel blade.  Given the location of the defect, shape of the defect and the proximity to free margins a V-Y flap was deemed most appropriate.  Using a sterile surgical marker, an appropriate advancement flap was drawn incorporating the defect and placing the expected incisions within the relaxed skin tension lines where possible.    The area thus outlined was incised deep to adipose tissue with a #15 scalpel blade.  The skin margins were undermined to an appropriate distance in all directions utilizing iris scissors. Epidermal Autograft Text: The defect edges were debeveled with a #15 scalpel blade.  Given the location of the defect, shape of the defect and the proximity to free margins an epidermal autograft was deemed most appropriate.  Using a sterile surgical marker, the primary defect shape was transferred to the donor site. The epidermal graft was then harvested.  The skin graft was then placed in the primary defect and oriented appropriately. Dorsal Nasal Flap Text: The defect edges were debeveled with a #15 scalpel blade.  Given the location of the defect and the proximity to free margins a dorsal nasal flap was deemed most appropriate.  Using a sterile surgical marker, an appropriate dorsal nasal flap was drawn around the defect.    The area thus outlined was incised deep to adipose tissue with a #15 scalpel blade.  The skin margins were undermined to an appropriate distance in all directions utilizing iris scissors. Partial Purse String (Intermediate) Text: Given the location of the defect and the characteristics of the surrounding skin an intermediate purse string closure was deemed most appropriate.  Undermining was performed circumferentially around the surgical defect.  A purse string suture was then placed and tightened. Wound tension of the circular defect prevented complete closure of the wound. Complex Repair And Burow's Graft Text: The defect edges were debeveled with a #15 scalpel blade.  The primary defect was closed partially with a complex linear closure.  Given the location of the defect, shape of the defect, the proximity to free margins and the presence of a standing cone deformity a Burow's graft was deemed most appropriate to repair the remaining defect.  The graft was trimmed to fit the size of the remaining defect.  The graft was then placed in the primary defect, oriented appropriately, and sutured into place. Saucerization Depth: dermis and superficial adipose tissue Complex Repair And V-Y Plasty Text: The defect edges were debeveled with a #15 scalpel blade.  The primary defect was closed partially with a complex linear closure.  Given the location of the remaining defect, shape of the defect and the proximity to free margins a V-Y plasty was deemed most appropriate for complete closure of the defect.  Using a sterile surgical marker, an appropriate advancement flap was drawn incorporating the defect and placing the expected incisions within the relaxed skin tension lines where possible.    The area thus outlined was incised deep to adipose tissue with a #15 scalpel blade.  The skin margins were undermined to an appropriate distance in all directions utilizing iris scissors. Posterior Auricular Interpolation Flap Text: A decision was made to reconstruct the defect utilizing an interpolation axial flap and a staged reconstruction.  A telfa template was made of the defect.  This telfa template was then used to outline the posterior auricular interpolation flap.  The donor area for the pedicle flap was then injected with anesthesia.  The flap was excised through the skin and subcutaneous tissue down to the layer of the underlying musculature.  The pedicle flap was carefully excised within this deep plane to maintain its blood supply.  The edges of the donor site were undermined.   The donor site was closed in a primary fashion.  The pedicle was then rotated into position and sutured.  Once the tube was sutured into place, adequate blood supply was confirmed with blanching and refill.  The pedicle was then wrapped with xeroform gauze and dressed appropriately with a telfa and gauze bandage to ensure continued blood supply and protect the attached pedicle. Home Suture Removal Text: Patient was provided a home suture removal kit and will remove their sutures at home.  If they have any questions or difficulties they will call the office. Consent was obtained from the patient. The risks and benefits to therapy were discussed in detail. Specifically, the risks of infection, scarring, bleeding, prolonged wound healing, incomplete removal, allergy to anesthesia, nerve injury and recurrence were addressed. Prior to the procedure, the treatment site was clearly identified and confirmed by the patient. All components of Universal Protocol/PAUSE Rule completed. Nasal Turnover Hinge Flap Text: The defect edges were debeveled with a #15 scalpel blade.  Given the size, depth, location of the defect and the defect being full thickness a nasal turnover hinge flap was deemed most appropriate.  Using a sterile surgical marker, an appropriate hinge flap was drawn incorporating the defect. The area thus outlined was incised with a #15 scalpel blade. The flap was designed to recreate the nasal mucosal lining and the alar rim. The skin margins were undermined to an appropriate distance in all directions utilizing iris scissors. Composite Graft Text: The defect edges were debeveled with a #15 scalpel blade.  Given the location of the defect, shape of the defect, the proximity to free margins and the fact the defect was full thickness a composite graft was deemed most appropriate.  The defect was outline and then transferred to the donor site.  A full thickness graft was then excised from the donor site. The graft was then placed in the primary defect, oriented appropriately and then sutured into place.  The secondary defect was then repaired using a primary closure. Suturegard Body: The suture ends were repeatedly re-tightened and re-clamped to achieve the desired tissue expansion. Perilesional Excision Additional Text (Leave Blank If You Do Not Want): The margin was drawn around the clinically apparent lesion. Incisions were then made along these lines to the appropriate tissue plane and the lesion was extirpated. Purse String (Simple) Text: Given the location of the defect and the characteristics of the surrounding skin a purse string simple closure was deemed most appropriate.  Undermining was performed circumferentially around the surgical defect.  A purse string suture was then placed and tightened. Trilobed Flap Text: The defect edges were debeveled with a #15 scalpel blade.  Given the location of the defect and the proximity to free margins a trilobed flap was deemed most appropriate.  Using a sterile surgical marker, an appropriate trilobed flap drawn around the defect.    The area thus outlined was incised deep to adipose tissue with a #15 scalpel blade.  The skin margins were undermined to an appropriate distance in all directions utilizing iris scissors. Burow's Advancement Flap Text: The defect edges were debeveled with a #15 scalpel blade.  Given the location of the defect and the proximity to free margins a Burow's advancement flap was deemed most appropriate.  Using a sterile surgical marker, the appropriate advancement flap was drawn incorporating the defect and placing the expected incisions within the relaxed skin tension lines where possible.    The area thus outlined was incised deep to adipose tissue with a #15 scalpel blade.  The skin margins were undermined to an appropriate distance in all directions utilizing iris scissors. Z Plasty Text: The lesion was extirpated to the level of the fat with a #15 scalpel blade.  Given the location of the defect, shape of the defect and the proximity to free margins a Z-plasty was deemed most appropriate for repair.  Using a sterile surgical marker, the appropriate transposition arms of the Z-plasty were drawn incorporating the defect and placing the expected incisions within the relaxed skin tension lines where possible.    The area thus outlined was incised deep to adipose tissue with a #15 scalpel blade.  The skin margins were undermined to an appropriate distance in all directions utilizing iris scissors.  The opposing transposition arms were then transposed into place in opposite direction and anchored with interrupted buried subcutaneous sutures. Hatchet Flap Text: The defect edges were debeveled with a #15 scalpel blade.  Given the location of the defect, shape of the defect and the proximity to free margins a hatchet flap was deemed most appropriate.  Using a sterile surgical marker, an appropriate hatchet flap was drawn incorporating the defect and placing the expected incisions within the relaxed skin tension lines where possible.    The area thus outlined was incised deep to adipose tissue with a #15 scalpel blade.  The skin margins were undermined to an appropriate distance in all directions utilizing iris scissors. Keystone Flap Text: The defect edges were debeveled with a #15 scalpel blade.  Given the location of the defect, shape of the defect a keystone flap was deemed most appropriate.  Using a sterile surgical marker, an appropriate keystone flap was drawn incorporating the defect, outlining the appropriate donor tissue and placing the expected incisions within the relaxed skin tension lines where possible. The area thus outlined was incised deep to adipose tissue with a #15 scalpel blade.  The skin margins were undermined to an appropriate distance in all directions around the primary defect and laterally outward around the flap utilizing iris scissors. Double O-Z Flap Text: The defect edges were debeveled with a #15 scalpel blade.  Given the location of the defect, shape of the defect and the proximity to free margins a Double O-Z flap was deemed most appropriate.  Using a sterile surgical marker, an appropriate transposition flap was drawn incorporating the defect and placing the expected incisions within the relaxed skin tension lines where possible. The area thus outlined was incised deep to adipose tissue with a #15 scalpel blade.  The skin margins were undermined to an appropriate distance in all directions utilizing iris scissors. Complex Repair And O-T Advancement Flap Text: The defect edges were debeveled with a #15 scalpel blade.  The primary defect was closed partially with a complex linear closure.  Given the location of the remaining defect, shape of the defect and the proximity to free margins an O-T advancement flap was deemed most appropriate for complete closure of the defect.  Using a sterile surgical marker, an appropriate advancement flap was drawn incorporating the defect and placing the expected incisions within the relaxed skin tension lines where possible.    The area thus outlined was incised deep to adipose tissue with a #15 scalpel blade.  The skin margins were undermined to an appropriate distance in all directions utilizing iris scissors. Complex Repair And Skin Substitute Graft Text: The defect edges were debeveled with a #15 scalpel blade.  The primary defect was closed partially with a complex linear closure.  Given the location of the remaining defect, shape of the defect and the proximity to free margins a skin substitute graft was deemed most appropriate to repair the remaining defect.  The graft was trimmed to fit the size of the remaining defect.  The graft was then placed in the primary defect, oriented appropriately, and sutured into place. Complex Repair And Ftsg Text: The defect edges were debeveled with a #15 scalpel blade.  The primary defect was closed partially with a complex linear closure.  Given the location of the defect, shape of the defect and the proximity to free margins a full thickness skin graft was deemed most appropriate to repair the remaining defect.  The graft was trimmed to fit the size of the remaining defect.  The graft was then placed in the primary defect, oriented appropriately, and sutured into place. Previous Accession (Optional): [id number] Bi-Rhombic Flap Text: The defect edges were debeveled with a #15 scalpel blade.  Given the location of the defect and the proximity to free margins a bi-rhombic flap was deemed most appropriate.  Using a sterile surgical marker, an appropriate rhombic flap was drawn incorporating the defect. The area thus outlined was incised deep to adipose tissue with a #15 scalpel blade.  The skin margins were undermined to an appropriate distance in all directions utilizing iris scissors. Mustarde Flap Text: The defect edges were debeveled with a #15 scalpel blade.  Given the size, depth and location of the defect and the proximity to free margins a Mustarde flap was deemed most appropriate.  Using a sterile surgical marker, an appropriate flap was drawn incorporating the defect. The area thus outlined was incised with a #15 scalpel blade.  The skin margins were undermined to an appropriate distance in all directions utilizing iris scissors.

## 2022-11-04 NOTE — PATIENT PROFILE ADULT. - PSH
H/O bilateral cataract extraction  2012  and 2014  History of breast surgery  Excision of left breast lump - benign  1991  History of knee replacement, total, left  2009 Complex Repair And V-Y Plasty Text: The defect edges were debeveled with a #15 scalpel blade.  The primary defect was closed partially with a complex linear closure.  Given the location of the remaining defect, shape of the defect and the proximity to free margins a V-Y plasty was deemed most appropriate for complete closure of the defect.  Using a sterile surgical marker, an appropriate advancement flap was drawn incorporating the defect and placing the expected incisions within the relaxed skin tension lines where possible.    The area thus outlined was incised deep to adipose tissue with a #15 scalpel blade.  The skin margins were undermined to an appropriate distance in all directions utilizing iris scissors.

## 2022-12-05 NOTE — DIETITIAN INITIAL EVALUATION ADULT. - ADD RECOMMEND
none known
Mild PCM; nutrition education deferred to outpatient HD/Hx of diet noncompliance/advanced age

## 2024-03-12 ENCOUNTER — APPOINTMENT (OUTPATIENT)
Dept: GASTROENTEROLOGY | Facility: CLINIC | Age: 84
End: 2024-03-12
Payer: MEDICARE

## 2024-03-12 VITALS
HEART RATE: 98 BPM | DIASTOLIC BLOOD PRESSURE: 83 MMHG | HEIGHT: 59 IN | OXYGEN SATURATION: 99 % | WEIGHT: 107 LBS | SYSTOLIC BLOOD PRESSURE: 162 MMHG | TEMPERATURE: 98 F | BODY MASS INDEX: 21.57 KG/M2

## 2024-03-12 DIAGNOSIS — R13.10 DYSPHAGIA, UNSPECIFIED: ICD-10-CM

## 2024-03-12 DIAGNOSIS — R19.5 OTHER FECAL ABNORMALITIES: ICD-10-CM

## 2024-03-12 PROCEDURE — 99204 OFFICE O/P NEW MOD 45 MIN: CPT

## 2024-03-12 RX ORDER — PANTOPRAZOLE 40 MG/1
40 TABLET, DELAYED RELEASE ORAL
Refills: 0 | Status: ACTIVE | COMMUNITY

## 2024-03-12 RX ORDER — SODIUM SULFATE, POTASSIUM SULFATE AND MAGNESIUM SULFATE 1.6; 3.13; 17.5 G/177ML; G/177ML; G/177ML
17.5-3.13-1.6 SOLUTION ORAL
Qty: 2 | Refills: 0 | Status: ACTIVE | COMMUNITY
Start: 2024-03-12 | End: 1900-01-01

## 2024-03-12 RX ORDER — REPAGLINIDE 1 MG/1
1 TABLET ORAL
Refills: 0 | Status: ACTIVE | COMMUNITY

## 2024-03-12 RX ORDER — EZETIMIBE AND SIMVASTATIN 10; 20 MG/1; MG/1
10-20 TABLET ORAL
Refills: 0 | Status: ACTIVE | COMMUNITY

## 2024-03-12 NOTE — PHYSICAL EXAM
[Alert] : alert [Normal Voice/Communication] : normal voice/communication [Healthy Appearing] : healthy appearing [No Acute Distress] : no acute distress [Sclera] : the sclera and conjunctiva were normal [Hearing Threshold Finger Rub Not Cross] : hearing was normal [Normal Lips/Gums] : the lips and gums were normal [Oropharynx] : the oropharynx was normal [Normal Appearance] : the appearance of the neck was normal [No Neck Mass] : no neck mass was observed [No Respiratory Distress] : no respiratory distress [Respiration, Rhythm And Depth] : normal respiratory rhythm and effort [No Acc Muscle Use] : no accessory muscle use [Auscultation Breath Sounds / Voice Sounds] : lungs were clear to auscultation bilaterally [Heart Rate And Rhythm] : heart rate was normal and rhythm regular [Normal S1, S2] : normal S1 and S2 [Murmurs] : no murmurs [Abdomen Tenderness] : non-tender [Bowel Sounds] : normal bowel sounds [No Masses] : no abdominal mass palpated [Abdomen Soft] : soft [] : no hepatosplenomegaly [Oriented To Time, Place, And Person] : oriented to person, place, and time

## 2024-03-12 NOTE — ASSESSMENT
[FreeTextEntry1] : 83F with pmhx of ESRD on HD (MWF), DM2, HLD, HTN presenting for evaluation for EGD/colonoscopy. Pt referred by Dr. Landeros. Pt states has had intermittent dysphagia symptoms solids>liquids and abdominal pain after eating. Also had positive FIT test. Denies any other complaints, no n/v/d/c, melena, hematochezia, fever/chills, jaundice, dark urine, or other issues. Also notes recently found to have elevated iron, seeing Dr. Shelby today for evaluation.  - Schedule EGD/colonoscopy to evaluate symptoms and FIT. - Bowel prep instructions given. - Instructed to hold repaglinide day of and half insulin dose night prior.

## 2024-03-12 NOTE — HISTORY OF PRESENT ILLNESS
[FreeTextEntry1] : 83F with pmhx of ESRD on HD (MWF), DM2, HLD, HTN presenting for evaluation for EGD/colonoscopy. Pt referred by Dr. Landeros. Pt states has had intermittent dysphagia symptoms solids>liquids and abdominal pain after eating. Also had positive FIT test. Denies any other complaints, no n/v/d/c, melena, hematochezia, fever/chills, jaundice, dark urine, or other issues. Also notes recently found to have elevated iron, seeing Dr. Shelby today for evaluation.   PMHx: Above.  Medications: See chart. Allergies: See chart.  Surgical Hx: Knee replacements, Cataracts, Breast cyst removal.  SH: Former tobacco use, quit 20 yrs ago. Denies etoh or drug use.  FH: Mother unknown type of cancer.

## 2024-04-01 NOTE — ASU PATIENT PROFILE, ADULT - NSICDXPASTSURGICALHX_GEN_ALL_CORE_FT
PAST SURGICAL HISTORY:  H/O bilateral cataract extraction 2012  and 2014    History of breast surgery Excision of left breast lump - benign  1991    History of knee replacement, total, left 2009     PAST SURGICAL HISTORY:  Arteriovenous fistula left side limb alert    H/O bilateral cataract extraction 2012  and 2014    History of breast surgery Excision of left breast lump - benign  1991    History of knee replacement, total, left 2009

## 2024-04-01 NOTE — ASU PATIENT PROFILE, ADULT - NSICDXPASTMEDICALHX_GEN_ALL_CORE_FT
PAST MEDICAL HISTORY:  Breast lump     Cataract     CKD (chronic kidney disease)     Diabetes mellitus     Gout     Hyperlipidemia     Hypertension     Osteoarthritis     Osteoporosis     Seasonal allergies     Vitamin D deficiency      PAST MEDICAL HISTORY:  Breast lump     Cataract     CKD (chronic kidney disease)     Diabetes mellitus     ESRD on dialysis     Gout     Hyperlipidemia     Hypertension     Osteoarthritis     Osteoporosis     Seasonal allergies     Vitamin D deficiency

## 2024-04-01 NOTE — ASU PATIENT PROFILE, ADULT - FALL HARM RISK - HARM RISK INTERVENTIONS

## 2024-04-02 ENCOUNTER — TRANSCRIPTION ENCOUNTER (OUTPATIENT)
Age: 84
End: 2024-04-02

## 2024-04-02 ENCOUNTER — RESULT REVIEW (OUTPATIENT)
Age: 84
End: 2024-04-02

## 2024-04-02 ENCOUNTER — APPOINTMENT (OUTPATIENT)
Dept: GASTROENTEROLOGY | Facility: HOSPITAL | Age: 84
End: 2024-04-02

## 2024-04-02 ENCOUNTER — OUTPATIENT (OUTPATIENT)
Dept: OUTPATIENT SERVICES | Facility: HOSPITAL | Age: 84
LOS: 1 days | End: 2024-04-02
Payer: COMMERCIAL

## 2024-04-02 VITALS
HEART RATE: 87 BPM | SYSTOLIC BLOOD PRESSURE: 139 MMHG | DIASTOLIC BLOOD PRESSURE: 80 MMHG | HEIGHT: 59 IN | WEIGHT: 112.44 LBS | TEMPERATURE: 99 F | OXYGEN SATURATION: 97 % | RESPIRATION RATE: 16 BRPM

## 2024-04-02 VITALS
DIASTOLIC BLOOD PRESSURE: 80 MMHG | OXYGEN SATURATION: 100 % | HEART RATE: 73 BPM | RESPIRATION RATE: 14 BRPM | SYSTOLIC BLOOD PRESSURE: 169 MMHG

## 2024-04-02 DIAGNOSIS — I77.0 ARTERIOVENOUS FISTULA, ACQUIRED: Chronic | ICD-10-CM

## 2024-04-02 DIAGNOSIS — R19.5 OTHER FECAL ABNORMALITIES: ICD-10-CM

## 2024-04-02 DIAGNOSIS — Z98.890 OTHER SPECIFIED POSTPROCEDURAL STATES: Chronic | ICD-10-CM

## 2024-04-02 DIAGNOSIS — Z96.652 PRESENCE OF LEFT ARTIFICIAL KNEE JOINT: Chronic | ICD-10-CM

## 2024-04-02 DIAGNOSIS — Z98.41 CATARACT EXTRACTION STATUS, RIGHT EYE: Chronic | ICD-10-CM

## 2024-04-02 LAB
ANION GAP SERPL CALC-SCNC: 11 MMOL/L — SIGNIFICANT CHANGE UP (ref 5–17)
BUN SERPL-MCNC: 14 MG/DL — SIGNIFICANT CHANGE UP (ref 7–18)
CALCIUM SERPL-MCNC: 8.3 MG/DL — LOW (ref 8.4–10.5)
CHLORIDE SERPL-SCNC: 93 MMOL/L — LOW (ref 96–108)
CO2 SERPL-SCNC: 29 MMOL/L — SIGNIFICANT CHANGE UP (ref 22–31)
CREAT SERPL-MCNC: 4.3 MG/DL — HIGH (ref 0.5–1.3)
EGFR: 10 ML/MIN/1.73M2 — LOW
GLUCOSE SERPL-MCNC: 112 MG/DL — HIGH (ref 70–99)
POTASSIUM SERPL-MCNC: 3.5 MMOL/L — SIGNIFICANT CHANGE UP (ref 3.5–5.3)
POTASSIUM SERPL-SCNC: 3.5 MMOL/L — SIGNIFICANT CHANGE UP (ref 3.5–5.3)
SODIUM SERPL-SCNC: 133 MMOL/L — LOW (ref 135–145)

## 2024-04-02 PROCEDURE — 43239 EGD BIOPSY SINGLE/MULTIPLE: CPT

## 2024-04-02 PROCEDURE — 88307 TISSUE EXAM BY PATHOLOGIST: CPT | Mod: 26

## 2024-04-02 PROCEDURE — 45390 COLONOSCOPY W/RESECTION: CPT | Mod: PT

## 2024-04-02 PROCEDURE — 45390 COLONOSCOPY W/RESECTION: CPT | Mod: 59

## 2024-04-02 PROCEDURE — 88305 TISSUE EXAM BY PATHOLOGIST: CPT

## 2024-04-02 PROCEDURE — 88312 SPECIAL STAINS GROUP 1: CPT | Mod: 26

## 2024-04-02 PROCEDURE — 45385 COLONOSCOPY W/LESION REMOVAL: CPT

## 2024-04-02 PROCEDURE — 88307 TISSUE EXAM BY PATHOLOGIST: CPT

## 2024-04-02 PROCEDURE — C1889: CPT

## 2024-04-02 PROCEDURE — 88305 TISSUE EXAM BY PATHOLOGIST: CPT | Mod: 26

## 2024-04-02 PROCEDURE — 36415 COLL VENOUS BLD VENIPUNCTURE: CPT

## 2024-04-02 PROCEDURE — 88312 SPECIAL STAINS GROUP 1: CPT

## 2024-04-02 PROCEDURE — 80048 BASIC METABOLIC PNL TOTAL CA: CPT

## 2024-04-02 PROCEDURE — 45385 COLONOSCOPY W/LESION REMOVAL: CPT | Mod: PT,XS

## 2024-04-02 DEVICE — CLIP RESOLUTION 360 235CM: Type: IMPLANTABLE DEVICE | Status: FUNCTIONAL

## 2024-04-02 NOTE — ASU PREOP CHECKLIST - AS BP NONINV METHOD
-- DO NOT REPLY / DO NOT REPLY ALL --  -- Message is from [facility] [employer] ([facility]) --    General Patient Message: Patient's medication couldn't be filled because he was told that he has to have blood work. Patient says he is scheduled for 03/13/2024 already for the labs. Patient will like to know if he could get a partial refill until the 3/13/2024. Please call patient to advise.  Patient will be leaving town for 5 days on Saturday morning.    Caller Information         Type Contact Phone/Fax    02/29/2024 03:38 PM CST Phone (Incoming) Bennett Enamorado (Self) [phone] (M)     Patient's medication couldn't be filled because he was told that he has to have blood work.  Patient says he is scheduled for 03/13/2024 already for the labs.  Patient will like to know if he could get a partial refill until the 3/13/2024. Please call pa          Alternative phone number: No    Can a detailed message be left? Yes    Message Turnaround:                electronic

## 2024-04-04 LAB — SURGICAL PATHOLOGY STUDY: SIGNIFICANT CHANGE UP

## 2024-05-06 ENCOUNTER — INPATIENT (INPATIENT)
Facility: HOSPITAL | Age: 84
LOS: 9 days | Discharge: NURSING, REHAB OR LONG-TERM CARE | DRG: 293 | End: 2024-05-16
Attending: INTERNAL MEDICINE | Admitting: INTERNAL MEDICINE
Payer: COMMERCIAL

## 2024-05-06 VITALS
SYSTOLIC BLOOD PRESSURE: 153 MMHG | HEIGHT: 59 IN | HEART RATE: 110 BPM | WEIGHT: 108.69 LBS | DIASTOLIC BLOOD PRESSURE: 81 MMHG | TEMPERATURE: 98 F | RESPIRATION RATE: 26 BRPM | OXYGEN SATURATION: 92 %

## 2024-05-06 DIAGNOSIS — I77.0 ARTERIOVENOUS FISTULA, ACQUIRED: Chronic | ICD-10-CM

## 2024-05-06 DIAGNOSIS — J96.01 ACUTE RESPIRATORY FAILURE WITH HYPOXIA: ICD-10-CM

## 2024-05-06 DIAGNOSIS — E83.119 HEMOCHROMATOSIS, UNSPECIFIED: ICD-10-CM

## 2024-05-06 DIAGNOSIS — N18.6 END STAGE RENAL DISEASE: ICD-10-CM

## 2024-05-06 DIAGNOSIS — Z29.9 ENCOUNTER FOR PROPHYLACTIC MEASURES, UNSPECIFIED: ICD-10-CM

## 2024-05-06 DIAGNOSIS — Z98.41 CATARACT EXTRACTION STATUS, RIGHT EYE: Chronic | ICD-10-CM

## 2024-05-06 DIAGNOSIS — Z96.652 PRESENCE OF LEFT ARTIFICIAL KNEE JOINT: Chronic | ICD-10-CM

## 2024-05-06 DIAGNOSIS — E87.1 HYPO-OSMOLALITY AND HYPONATREMIA: ICD-10-CM

## 2024-05-06 DIAGNOSIS — Z98.890 OTHER SPECIFIED POSTPROCEDURAL STATES: Chronic | ICD-10-CM

## 2024-05-06 DIAGNOSIS — E11.9 TYPE 2 DIABETES MELLITUS WITHOUT COMPLICATIONS: ICD-10-CM

## 2024-05-06 DIAGNOSIS — I50.9 HEART FAILURE, UNSPECIFIED: ICD-10-CM

## 2024-05-06 DIAGNOSIS — J18.9 PNEUMONIA, UNSPECIFIED ORGANISM: ICD-10-CM

## 2024-05-06 LAB
ALBUMIN SERPL ELPH-MCNC: 2 G/DL — LOW (ref 3.5–5)
ALP SERPL-CCNC: 268 U/L — HIGH (ref 40–120)
ALT FLD-CCNC: 34 U/L DA — SIGNIFICANT CHANGE UP (ref 10–60)
ANION GAP SERPL CALC-SCNC: 15 MMOL/L — SIGNIFICANT CHANGE UP (ref 5–17)
AST SERPL-CCNC: 83 U/L — HIGH (ref 10–40)
BASE EXCESS BLDA CALC-SCNC: -3.5 MMOL/L — LOW (ref -2–3)
BASE EXCESS BLDV CALC-SCNC: -0.6 MMOL/L — SIGNIFICANT CHANGE UP
BASOPHILS # BLD AUTO: 0 K/UL — SIGNIFICANT CHANGE UP (ref 0–0.2)
BASOPHILS NFR BLD AUTO: 0 % — SIGNIFICANT CHANGE UP (ref 0–2)
BILIRUB DIRECT SERPL-MCNC: 0.8 MG/DL — HIGH (ref 0–0.3)
BILIRUB INDIRECT FLD-MCNC: 0.4 MG/DL — SIGNIFICANT CHANGE UP (ref 0.2–1)
BILIRUB SERPL-MCNC: 1.2 MG/DL — SIGNIFICANT CHANGE UP (ref 0.2–1.2)
BLOOD GAS COMMENTS ARTERIAL: SIGNIFICANT CHANGE UP
BUN SERPL-MCNC: 56 MG/DL — HIGH (ref 7–18)
BURR CELLS BLD QL SMEAR: PRESENT — SIGNIFICANT CHANGE UP
CALCIUM SERPL-MCNC: 7.8 MG/DL — LOW (ref 8.4–10.5)
CHLORIDE SERPL-SCNC: 87 MMOL/L — LOW (ref 96–108)
CO2 SERPL-SCNC: 24 MMOL/L — SIGNIFICANT CHANGE UP (ref 22–31)
CREAT SERPL-MCNC: 6.14 MG/DL — HIGH (ref 0.5–1.3)
EGFR: 6 ML/MIN/1.73M2 — LOW
EOSINOPHIL # BLD AUTO: 0 K/UL — SIGNIFICANT CHANGE UP (ref 0–0.5)
EOSINOPHIL NFR BLD AUTO: 0 % — SIGNIFICANT CHANGE UP (ref 0–6)
FLUAV AG NPH QL: SIGNIFICANT CHANGE UP
FLUBV AG NPH QL: SIGNIFICANT CHANGE UP
GLUCOSE BLDC GLUCOMTR-MCNC: 118 MG/DL — HIGH (ref 70–99)
GLUCOSE BLDC GLUCOMTR-MCNC: 173 MG/DL — HIGH (ref 70–99)
GLUCOSE BLDC GLUCOMTR-MCNC: 190 MG/DL — HIGH (ref 70–99)
GLUCOSE SERPL-MCNC: 46 MG/DL — CRITICAL LOW (ref 70–99)
HCO3 BLDA-SCNC: 19 MMOL/L — LOW (ref 21–28)
HCO3 BLDV-SCNC: 23 MMOL/L — SIGNIFICANT CHANGE UP (ref 22–29)
HCT VFR BLD CALC: 34.4 % — LOW (ref 34.5–45)
HGB BLD-MCNC: 11.3 G/DL — LOW (ref 11.5–15.5)
HOROWITZ INDEX BLDA+IHG-RTO: 36 — SIGNIFICANT CHANGE UP
LACTATE SERPL-SCNC: 3.5 MMOL/L — HIGH (ref 0.7–2)
LG PLATELETS BLD QL AUTO: SLIGHT — SIGNIFICANT CHANGE UP
LYMPHOCYTES # BLD AUTO: 0.16 K/UL — LOW (ref 1–3.3)
LYMPHOCYTES # BLD AUTO: 6 % — LOW (ref 13–44)
MANUAL SMEAR VERIFICATION: SIGNIFICANT CHANGE UP
MCHC RBC-ENTMCNC: 29 PG — SIGNIFICANT CHANGE UP (ref 27–34)
MCHC RBC-ENTMCNC: 32.8 GM/DL — SIGNIFICANT CHANGE UP (ref 32–36)
MCV RBC AUTO: 88.2 FL — SIGNIFICANT CHANGE UP (ref 80–100)
MONOCYTES # BLD AUTO: 0.21 K/UL — SIGNIFICANT CHANGE UP (ref 0–0.9)
MONOCYTES NFR BLD AUTO: 8 % — SIGNIFICANT CHANGE UP (ref 2–14)
NEUTROPHILS # BLD AUTO: 2.27 K/UL — SIGNIFICANT CHANGE UP (ref 1.8–7.4)
NEUTROPHILS NFR BLD AUTO: 86 % — HIGH (ref 43–77)
NRBC # BLD: 0 /100 WBCS — SIGNIFICANT CHANGE UP (ref 0–0)
NT-PROBNP SERPL-SCNC: HIGH PG/ML (ref 0–450)
PCO2 BLDA: 27 MMHG — LOW (ref 32–35)
PCO2 BLDV: 35 MMHG — LOW (ref 39–42)
PH BLDA: 7.46 — HIGH (ref 7.35–7.45)
PH BLDV: 7.43 — SIGNIFICANT CHANGE UP (ref 7.32–7.43)
PLAT MORPH BLD: NORMAL — SIGNIFICANT CHANGE UP
PLATELET # BLD AUTO: 131 K/UL — LOW (ref 150–400)
PLATELET COUNT - ESTIMATE: ABNORMAL
PO2 BLDA: 78 MMHG — LOW (ref 83–108)
PO2 BLDV: 36 MMHG — SIGNIFICANT CHANGE UP
POLYCHROMASIA BLD QL SMEAR: SLIGHT — SIGNIFICANT CHANGE UP
POTASSIUM SERPL-MCNC: 4.2 MMOL/L — SIGNIFICANT CHANGE UP (ref 3.5–5.3)
POTASSIUM SERPL-SCNC: 4.2 MMOL/L — SIGNIFICANT CHANGE UP (ref 3.5–5.3)
PROT SERPL-MCNC: 6.1 G/DL — SIGNIFICANT CHANGE UP (ref 6–8.3)
RBC # BLD: 3.9 M/UL — SIGNIFICANT CHANGE UP (ref 3.8–5.2)
RBC # FLD: 14.2 % — SIGNIFICANT CHANGE UP (ref 10.3–14.5)
RBC BLD AUTO: ABNORMAL
SAO2 % BLDA: 96 % — SIGNIFICANT CHANGE UP
SAO2 % BLDV: 58.1 % — SIGNIFICANT CHANGE UP
SARS-COV-2 RNA SPEC QL NAA+PROBE: SIGNIFICANT CHANGE UP
SODIUM SERPL-SCNC: 126 MMOL/L — LOW (ref 135–145)
TROPONIN I, HIGH SENSITIVITY RESULT: 50 NG/L — SIGNIFICANT CHANGE UP
TROPONIN I, HIGH SENSITIVITY RESULT: 75.7 NG/L — HIGH
WBC # BLD: 2.64 K/UL — LOW (ref 3.8–10.5)
WBC # FLD AUTO: 2.64 K/UL — LOW (ref 3.8–10.5)

## 2024-05-06 PROCEDURE — 99291 CRITICAL CARE FIRST HOUR: CPT

## 2024-05-06 PROCEDURE — 71045 X-RAY EXAM CHEST 1 VIEW: CPT | Mod: 26

## 2024-05-06 PROCEDURE — 99291 CRITICAL CARE FIRST HOUR: CPT | Mod: GC

## 2024-05-06 PROCEDURE — 93010 ELECTROCARDIOGRAM REPORT: CPT

## 2024-05-06 PROCEDURE — 71275 CT ANGIOGRAPHY CHEST: CPT | Mod: 26,MC

## 2024-05-06 RX ORDER — DEXTROSE 50 % IN WATER 50 %
50 SYRINGE (ML) INTRAVENOUS ONCE
Refills: 0 | Status: COMPLETED | OUTPATIENT
Start: 2024-05-06 | End: 2024-05-06

## 2024-05-06 RX ORDER — PIPERACILLIN AND TAZOBACTAM 4; .5 G/20ML; G/20ML
3.38 INJECTION, POWDER, LYOPHILIZED, FOR SOLUTION INTRAVENOUS ONCE
Refills: 0 | Status: COMPLETED | OUTPATIENT
Start: 2024-05-06 | End: 2024-05-06

## 2024-05-06 RX ORDER — ASPIRIN/CALCIUM CARB/MAGNESIUM 324 MG
1 TABLET ORAL
Qty: 0 | Refills: 0 | DISCHARGE

## 2024-05-06 RX ORDER — LANOLIN ALCOHOL/MO/W.PET/CERES
3 CREAM (GRAM) TOPICAL AT BEDTIME
Refills: 0 | Status: DISCONTINUED | OUTPATIENT
Start: 2024-05-06 | End: 2024-05-16

## 2024-05-06 RX ORDER — OMEGA-3 ACID ETHYL ESTERS 1 G
1 CAPSULE ORAL
Qty: 0 | Refills: 0 | DISCHARGE

## 2024-05-06 RX ORDER — LIPASE/PROTEASE/AMYLASE 16-48-48K
2 CAPSULE,DELAYED RELEASE (ENTERIC COATED) ORAL
Refills: 0 | Status: DISCONTINUED | OUTPATIENT
Start: 2024-05-06 | End: 2024-05-06

## 2024-05-06 RX ORDER — ALBUTEROL 90 UG/1
2.5 AEROSOL, METERED ORAL ONCE
Refills: 0 | Status: COMPLETED | OUTPATIENT
Start: 2024-05-06 | End: 2024-05-06

## 2024-05-06 RX ORDER — SODIUM CHLORIDE 9 MG/ML
1000 INJECTION INTRAMUSCULAR; INTRAVENOUS; SUBCUTANEOUS ONCE
Refills: 0 | Status: COMPLETED | OUTPATIENT
Start: 2024-05-06 | End: 2024-05-06

## 2024-05-06 RX ORDER — FUROSEMIDE 40 MG
40 TABLET ORAL ONCE
Refills: 0 | Status: COMPLETED | OUTPATIENT
Start: 2024-05-06 | End: 2024-05-06

## 2024-05-06 RX ORDER — CEFEPIME 1 G/1
1000 INJECTION, POWDER, FOR SOLUTION INTRAMUSCULAR; INTRAVENOUS ONCE
Refills: 0 | Status: COMPLETED | OUTPATIENT
Start: 2024-05-06 | End: 2024-05-06

## 2024-05-06 RX ORDER — AZITHROMYCIN 500 MG/1
500 TABLET, FILM COATED ORAL EVERY 24 HOURS
Refills: 0 | Status: DISCONTINUED | OUTPATIENT
Start: 2024-05-06 | End: 2024-05-11

## 2024-05-06 RX ORDER — CETIRIZINE HYDROCHLORIDE 10 MG/1
1 TABLET ORAL
Qty: 0 | Refills: 0 | DISCHARGE

## 2024-05-06 RX ORDER — PANTOPRAZOLE SODIUM 20 MG/1
40 TABLET, DELAYED RELEASE ORAL
Refills: 0 | Status: DISCONTINUED | OUTPATIENT
Start: 2024-05-06 | End: 2024-05-16

## 2024-05-06 RX ORDER — SIMVASTATIN 20 MG/1
20 TABLET, FILM COATED ORAL AT BEDTIME
Refills: 0 | Status: DISCONTINUED | OUTPATIENT
Start: 2024-05-06 | End: 2024-05-16

## 2024-05-06 RX ORDER — ONDANSETRON 8 MG/1
4 TABLET, FILM COATED ORAL EVERY 8 HOURS
Refills: 0 | Status: DISCONTINUED | OUTPATIENT
Start: 2024-05-06 | End: 2024-05-16

## 2024-05-06 RX ORDER — OLOPATADINE HYDROCHLORIDE 1 MG/ML
1 SOLUTION/ DROPS OPHTHALMIC
Qty: 0 | Refills: 0 | DISCHARGE

## 2024-05-06 RX ORDER — HEPARIN SODIUM 5000 [USP'U]/ML
5000 INJECTION INTRAVENOUS; SUBCUTANEOUS EVERY 12 HOURS
Refills: 0 | Status: DISCONTINUED | OUTPATIENT
Start: 2024-05-06 | End: 2024-05-16

## 2024-05-06 RX ORDER — PIPERACILLIN AND TAZOBACTAM 4; .5 G/20ML; G/20ML
3.38 INJECTION, POWDER, LYOPHILIZED, FOR SOLUTION INTRAVENOUS ONCE
Refills: 0 | Status: COMPLETED | OUTPATIENT
Start: 2024-05-07 | End: 2024-05-07

## 2024-05-06 RX ORDER — INSULIN LISPRO 100/ML
VIAL (ML) SUBCUTANEOUS AT BEDTIME
Refills: 0 | Status: DISCONTINUED | OUTPATIENT
Start: 2024-05-06 | End: 2024-05-16

## 2024-05-06 RX ORDER — FERROUS SULFATE 325(65) MG
1 TABLET ORAL
Qty: 0 | Refills: 0 | DISCHARGE

## 2024-05-06 RX ORDER — ACETAMINOPHEN 500 MG
650 TABLET ORAL EVERY 6 HOURS
Refills: 0 | Status: DISCONTINUED | OUTPATIENT
Start: 2024-05-06 | End: 2024-05-16

## 2024-05-06 RX ORDER — LIPASE/PROTEASE/AMYLASE 16-48-48K
1 CAPSULE,DELAYED RELEASE (ENTERIC COATED) ORAL
Refills: 0 | Status: DISCONTINUED | OUTPATIENT
Start: 2024-05-06 | End: 2024-05-16

## 2024-05-06 RX ORDER — CHOLECALCIFEROL (VITAMIN D3) 125 MCG
1 CAPSULE ORAL
Qty: 0 | Refills: 0 | DISCHARGE

## 2024-05-06 RX ORDER — VANCOMYCIN HCL 1 G
750 VIAL (EA) INTRAVENOUS ONCE
Refills: 0 | Status: COMPLETED | OUTPATIENT
Start: 2024-05-06 | End: 2024-05-06

## 2024-05-06 RX ORDER — INSULIN LISPRO 100/ML
VIAL (ML) SUBCUTANEOUS
Refills: 0 | Status: DISCONTINUED | OUTPATIENT
Start: 2024-05-06 | End: 2024-05-16

## 2024-05-06 RX ORDER — PIPERACILLIN AND TAZOBACTAM 4; .5 G/20ML; G/20ML
3.38 INJECTION, POWDER, LYOPHILIZED, FOR SOLUTION INTRAVENOUS EVERY 12 HOURS
Refills: 0 | Status: DISCONTINUED | OUTPATIENT
Start: 2024-05-07 | End: 2024-05-07

## 2024-05-06 RX ADMIN — Medication 125 MILLIGRAM(S): at 10:02

## 2024-05-06 RX ADMIN — Medication 50 MILLILITER(S): at 11:14

## 2024-05-06 RX ADMIN — Medication 250 MILLIGRAM(S): at 22:28

## 2024-05-06 RX ADMIN — AZITHROMYCIN 255 MILLIGRAM(S): 500 TABLET, FILM COATED ORAL at 16:04

## 2024-05-06 RX ADMIN — PIPERACILLIN AND TAZOBACTAM 200 GRAM(S): 4; .5 INJECTION, POWDER, LYOPHILIZED, FOR SOLUTION INTRAVENOUS at 22:28

## 2024-05-06 RX ADMIN — ALBUTEROL 2.5 MILLIGRAM(S): 90 AEROSOL, METERED ORAL at 10:24

## 2024-05-06 RX ADMIN — SODIUM CHLORIDE 1000 MILLILITER(S): 9 INJECTION INTRAMUSCULAR; INTRAVENOUS; SUBCUTANEOUS at 10:27

## 2024-05-06 RX ADMIN — Medication 40 MILLIGRAM(S): at 13:12

## 2024-05-06 RX ADMIN — SODIUM CHLORIDE 1000 MILLILITER(S): 9 INJECTION INTRAMUSCULAR; INTRAVENOUS; SUBCUTANEOUS at 11:27

## 2024-05-06 RX ADMIN — CEFEPIME 1000 MILLIGRAM(S): 1 INJECTION, POWDER, FOR SOLUTION INTRAMUSCULAR; INTRAVENOUS at 13:42

## 2024-05-06 RX ADMIN — CEFEPIME 100 MILLIGRAM(S): 1 INJECTION, POWDER, FOR SOLUTION INTRAMUSCULAR; INTRAVENOUS at 13:12

## 2024-05-06 NOTE — CONSULT NOTE ADULT - ATTENDING COMMENTS
82yo woman w/ PMH hemachromatosis and ESRD (M/W/F) presented with 3 days of progressive dyspnea; found with multifocal PNA and fluid overload in ED with severe sepsis; ICU asked to evaluate for NIPPV.     #AHRF  #Severe sepsis  #PNA    Seen and examined earlier in the afternoon after notification by ED attending ~1530hrs. Mild tachypnea and increased WOB noted however conversive in full sentences with stable BP and oxygenation on 2L NC. Per medicine team planned for scheduled HD imminently. Pt was assessed on both BiPAP and 2L NC during ICU evaluation and did not appear significantly different on either ventilatory modality. Can therefore trial off of BiPAP pending HD and re-assess respiratory status after fluid removal and clearance with HD. Otherwise, continue management of sepsis with broad spec ABx coverage, peripheral ID workup/cultures, trend lytes and CBC closely. Pt makes only small amount of urine at baseline but still would monitor I/O. Trend lactate to clearance after HD. Serial neurologic monitoring. Low threshold to recall ICU if change in hemodynamic or deterioration of resp status, otherwise cont to monitor on medical tele. Discussed w/ ED and medical admitting resident and CCM team. 82yo woman w/ PMH hemachromatosis and ESRD (M/W/F) presented with 3 days of progressive dyspnea; found with multifocal PNA and fluid overload in ED with severe sepsis; ICU asked to evaluate for NIPPV.     #AHRF  #Severe sepsis  #PNA    Seen and examined earlier in the afternoon after notification by ED attending ~1530hrs. Reviewed CT chest and interval ED labs and workup. MDM of high complexity.     Mild tachypnea and increased WOB noted however conversive in full sentences with stable BP and oxygenation on 2L NC. Per medicine team planned for scheduled HD imminently. Pt was assessed on both BiPAP and 2L NC during ICU evaluation and did not appear significantly different on either ventilatory modality. Can therefore trial off of BiPAP pending HD and re-assess respiratory status after fluid removal and clearance with HD. Otherwise, continue management of sepsis with broad spec ABx coverage, peripheral ID workup/cultures, trend lytes and CBC closely. Pt makes only small amount of urine at baseline but still would monitor I/O. Trend lactate to clearance after HD. Serial neurologic monitoring. Low threshold to recall ICU if change in hemodynamic or deterioration of resp status, otherwise cont to monitor on medical tele. Discussed w/ ED and medical admitting resident and CCM team.

## 2024-05-06 NOTE — ED PROVIDER NOTE - CRITICAL CARE ATTENDING CONTRIBUTION TO CARE
Upon my evaluation, this patient had a high probability of imminent or life-threatening deterioration due to acute heart failure and acute bilateral pneumonia, which required my direct attention, intervention, and personal management.    I have personally provided 60 minutes of critical care time exclusive of time spent on separately billable procedures. Time includes review of laboratory data, radiology results, discussion with consultants, and monitoring for potential decompensation. Interventions were performed as documented above.

## 2024-05-06 NOTE — H&P ADULT - HISTORY OF PRESENT ILLNESS
This is an 84 y/o F, from home (lives alone), ambulates independently, with PMHx of DM, HTN, HLD, ESRD on HD (MWF, L-AVF), and previous smoker who presents with shortness of breath. Pt states she was feelign short of breath since last week. Pt mentions having a cough that started last week as well. Pt states she has been going to all her dialysis sessions, but today felt more out of breath prompting her to come to the ED. Per family at bedside, pt has been eating less this past week and has been having diarrhea on and off. Pt  This is an 84 y/o F, from home (lives alone), ambulates independently, with PMHx of DM, HTN, HLD, ESRD on HD (MWF, L-AVF), and previous smoker who presents with shortness of breath. Pt states she was feelign short of breath since last week. Pt mentions having a cough that started last week as well. Pt states she has been going to all her dialysis sessions, but today felt more out of breath prompting her to come to the ED. Per family at bedside, pt has been eating less this past week and has been having diarrhea on and off. Pt denies sick contacts, fever, chills, N/V, constipation, numbness or tingling.

## 2024-05-06 NOTE — H&P ADULT - PROBLEM SELECTOR PLAN 1
Pt presents with SOB and cough, on 2-3LNC sat 95%  CTA: Bilateral consolidative and nodular ground glass opacities are likely infectious in etiology.  Likely AHRF 2/2 PNA  - C/w Supplemental O2, wean as tolerated  - Monitor for WOB, can place BiPAP again and re-consult ICU if needed  - C/w Hemodialysis per nephro  - C/w Azithromycin and Ceftriaxone for CAP coverage  - F/u Legionella, mycoplasma, strep PNA, Sputum Cx

## 2024-05-06 NOTE — CONSULT NOTE ADULT - SUBJECTIVE AND OBJECTIVE BOX
[facility] : Progress Note :: [phone], (office [phone]),   Dr Pandey / Dr Mi / Dr Massey / Dr Etienne / Dr Cassandra DA SILVA / Dr Burt / Dr Garber / Dr Naeem lam  _____________________________________________________________________________________________  Patient is a 83y Female whom presented to the hospital with shortness of breath and cough for a week. she has H/O HTN, DM2, previous smoker. HAs ESRD on HD MWF at [facility]. in ED XRAY and CT scan revealing bilateral ifiltrates. admitted with bilateral PNA and AHRF with increasing oxygen requirements. . Renal consulted for HD .    PAST MEDICAL & SURGICAL HISTORY:  Hypertension      Hyperlipidemia      Gout      Cataract      Breast lump      Osteoporosis      Osteoarthritis      Vitamin D deficiency      Seasonal allergies      CKD (chronic kidney disease)      Diabetes mellitus      ESRD on dialysis      H/O bilateral cataract extraction  2012  and 2014      History of breast surgery  Excision of left breast lump - benign  1991      History of knee replacement, total, left  2009      Arteriovenous fistula  left side limb alert        shellfish (Anaphylaxis)  aspirin (Unknown)  ibuprofen (Unknown)  Mushrooms (Anaphylaxis)    Home Medications Reviewed  Hospital Medications:   MEDICATIONS  (STANDING):  azithromycin  IVPB 500 milliGRAM(s) IV Intermittent every 24 hours  heparin   Injectable 5000 Unit(s) SubCutaneous every 12 hours  insulin lispro (ADMELOG) corrective regimen sliding scale   SubCutaneous three times a day before meals  insulin lispro (ADMELOG) corrective regimen sliding scale   SubCutaneous at bedtime  pancrelipase  (CREON 36,000 Lipase Units) 1 Capsule(s) Oral four times a day with meals  pantoprazole    Tablet 40 milliGRAM(s) Oral before breakfast  simvastatin 20 milliGRAM(s) Oral at bedtime    SOCIAL HISTORY:  Denies ETOh,Smoking,   FAMILY HISTORY:  Family history of cancer        VITALS:  T(F): 98 (05-06-24 @ 18:00), Max: 99.1 (05-06-24 @ 11:25)  HR: 109 (05-06-24 @ 18:00)  BP: 133/78 (05-06-24 @ 18:00)  RR: 18 (05-06-24 @ 18:00)  SpO2: 92% (05-06-24 @ 18:00)  Wt(kg): --    Height (cm): 149.9 (05-06 @ 09:00)  Weight (kg): 49.3 (05-06 @ 09:00)  BMI (kg/m2): 21.9 (05-06 @ 09:00)  BSA (m2): 1.42 (05-06 @ 09:00)  PHYSICAL EXAM:  Constitutional: NAD  HEENT: anicteric sclera, oropharynx clear.  Neck: No JVD  Respiratory: CTAB, bilateral  rales+  and  rhonchi  Cardiovascular: S1, S2, RRR  Gastrointestinal: BS+, soft, NT/ND  Extremities: No peripheral edema  Neurological: A/O x 3, no focal deficits  : No CVA tenderness. No dickson.   Skin: No rashes  Vascular Access:    LABS:  05-06    126<L>  |  87<L>  |  56<H>  ----------------------------<  46<LL>  4.2   |  24  |  6.14<H>    Ca    7.8<L>      06 May 2024 10:18    TPro  6.1  /  Alb  2.0<L>  /  TBili  1.2  /  DBili  0.8<H>  /  AST  83<H>  /  ALT  34  /  AlkPhos  268<H>  05-06    Creatinine Trend: 6.14 <--                        11.3   2.64  )-----------( 131      ( 06 May 2024 10:18 )             34.4     Urine Studies:  Urinalysis Basic - ( 06 May 2024 10:18 )    Color:  / Appearance:  / SG:  / pH:   Gluc: 46 mg/dL / Ketone:   / Bili:  / Urobili:    Blood:  / Protein:  / Nitrite:    Leuk Esterase:  / RBC:  / WBC    Sq Epi:  / Non Sq Epi:  / Bacteria:         RADIOLOGY & ADDITIONAL STUDIES:                
83-year-old female AAOx3 independent on ADLs, PMH of gout, HTN, HLD, hemochromatosis, ESRD on HD (MWF) and [place] presents to the ED for shortness of breath.      Patient reports 3 days of worsening difficulty breathing.  Had been at her phlebotomy last week, where she got home and was slightly confused about the time, and her HD day.  Following this confusion and fatigue feeling for 2 days, she went to routine HD on Friday and over the weekend became progressively more SOB.    She endorses sick contacts (a baby in the family on Abx). She also has non productive cough, water stools and subjective fevers at night. She denies n/v, chills, chest pain, palpitations, changes in urination  ( she is almost anuric)    In the ED HD stable, afebrile  and hypertensive BP 150s systolic    Saturating 92% on room air and 96% on bipap  Exam: unremarkable, AAOx3 and no edema  labs: leukopenic 2.64, hyponatremia, hypochloremia, ESRD consistent uremia, lactic acidosis and BNP 50k      PAST MEDICAL & SURGICAL HISTORY:  Hypertension      Hyperlipidemia      Gout      Cataract      Breast lump      Osteoporosis      Osteoarthritis      Vitamin D deficiency      Seasonal allergies      CKD (chronic kidney disease)      Diabetes mellitus      ESRD on dialysis      H/O bilateral cataract extraction  2012  and 2014      History of breast surgery  Excision of left breast lump - benign  1991      History of knee replacement, total, left  2009      Arteriovenous fistula  left side limb alert            FAMILY HISTORY:  Family history of cancer    :       ROS:  See HPI     Allergies    shellfish (Anaphylaxis)  aspirin (Unknown)  ibuprofen (Unknown)  Mushrooms (Anaphylaxis)    Intolerances          PHYSICAL EXAM    ICU Vital Signs Last 24 Hrs  T(C): 37.3 (06 May 2024 11:25), Max: 37.3 (06 May 2024 11:25)  T(F): 99.1 (06 May 2024 11:25), Max: 99.1 (06 May 2024 11:25)  HR: 113 (06 May 2024 11:25) (110 - 113)  BP: 147/76 (06 May 2024 11:25) (147/76 - 153/81)  BP(mean): --  ABP: --  ABP(mean): --  RR: 28 (06 May 2024 11:25) (26 - 28)  SpO2: 93% (06 May 2024 11:25) (92% - 93%)    O2 Parameters below as of 06 May 2024 11:25  Patient On (Oxygen Delivery Method): nasal cannula  O2 Flow (L/min): 2          General: Not in distress, slight tachypnea   HEENT:  ERIKA              Lymphatic system: No LN  Lungs: Bilateral BS  Cardiovascular: Regular  Gastrointestinal: Soft, Positive BS  Musculoskeletal: No clubbing.  Moves all extremities.    Skin: Warm.  Intact  Neurological: No motor or sensory deficit         LABS:                          11.3   2.64  )-----------( 131      ( 06 May 2024 10:18 )             34.4                                               05-06    126<L>  |  87<L>  |  56<H>  ----------------------------<  46<LL>  4.2   |  24  |  6.14<H>    Ca    7.8<L>      06 May 2024 10:18    TPro  6.1  /  Alb  2.0<L>  /  TBili  1.2  /  DBili  0.8<H>  /  AST  83<H>  /  ALT  34  /  AlkPhos  268<H>  05-06                                             Urinalysis Basic - ( 06 May 2024 10:18 )    Color: x / Appearance: x / SG: x / pH: x  Gluc: 46 mg/dL / Ketone: x  / Bili: x / Urobili: x   Blood: x / Protein: x / Nitrite: x   Leuk Esterase: x / RBC: x / WBC x   Sq Epi: x / Non Sq Epi: x / Bacteria: x                                                  LIVER FUNCTIONS - ( 06 May 2024 10:18 )  Alb: 2.0 g/dL / Pro: 6.1 g/dL / ALK PHOS: 268 U/L / ALT: 34 U/L DA / AST: 83 U/L / GGT: x                                                                                                                                       CXR and CT chest angio 5/6/2024  Patchy ground glass nodules bilaterally - infectious         MEDICATIONS  (STANDING):  azithromycin  IVPB 500 milliGRAM(s) IV Intermittent every 24 hours    MEDICATIONS  (PRN):

## 2024-05-06 NOTE — ED PROVIDER NOTE - CADM POA CENTRAL LINE
DATE OF CONSULTATION:      REFERRING Physician:  Yojana Sidhu DO    REASON FOR CONSULTATION:  COVID-19.    HISTORY OF PRESENT ILLNESS:  This is a 61-year-old  male with history of diabetes, CKD, admitted through the ER on 06/05/2020, due to coughing and increasing shortness of breath.  The patient is non-English speaking, and history is obtained through  and per my discussion with the nursing staff as well as Dr. Sidhu.  The patient reports his symptoms started 3 days prior to admission.  He denies any abdominal pain.  He denies  any diarrhea.  He was noted to be hypoxic.  Chest x-ray showed bilateral infiltrates.  He was also found to be in acute renal failure.  He is testing positive for COVID-19.  He is currently on BiPAP.  Dialysis catheter has been placed after consultation with Nephrology.    PAST MEDICAL HISTORY:    1. Diabetes.  2. 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  3. History of MRSA.  4. Hypertension.    ALLERGIES:  NONE.    CURRENT MEDICATIONS:  Reviewed.    REVIEW OF SYSTEMS:  A 12-point systems reviewed with the patient and positive findings are per HPI.  Rest is unremarkable.    PHYSICAL EXAMINATION:    GENERAL:  No acute distress.  Alert and oriented x3.     VITAL SIGNS:  Afebrile.  Pulse is 95, respiratory rate of 34, blood pressure is 138/74.     SHEENT:  Not remarkable.     CHEST:  Crackles bilaterally.     HEART:  Regular rate and rhythm.     ABDOMEN:  Soft, nontender.  Bowel sounds are positive.     EXTREMITIES:  Left transmetatarsal amputation stump looks okay.  Right leg without any edema or erythema.    LABORATORY DATA:  BUN is 80, creatinine is 8.17, sodium is 138, potassium is 5.6, AST is 25, ALT is 18.  BNP is 1000.  LDH is 360.  CRP is 213.  Ferritin is 2253.  Procalcitonin is 2.50.  WBC 6.3, hemoglobin is 7.9, hematocrit is 25, and platelets are 197.    IMPRESSION:    1. COVID-19 pneumonitis,  presenting with fevers, cough, and increasing shortness of breath.  Chest x-ray with extensive bilateral infiltrates.  Inflammatory markers elevated, however, the patient reports symptoms started a few days ago.  Concern for cytokine storm, although acute renal failure can also result in elevated ferritin.  2. Elevated procalcitonin level, therefore concern for superimposed bacterial infection.  3. Acute kidney injury/acute tubular necrosis with possible fluid overload.  BNP is more than 1000.  4. D-dimer is 0.88.  5. Diabetes with hyperglycemia.  6. Acute hypoxic respiratory failure, currently on BiPAP, however, using accessory muscles, and at risk for intubation.    RECOMMENDATIONS:    1. Continue azithromycin and ceftriaxone.  2. Add vancomycin per pharmacy dosing as the patient has history of MRSA.  3. Not a candidate for remdesivir due to acute renal failure.  4. Type and cross.  Consent obtained for convalescent plasma through .  The patient verbalizes understanding.  5. Repeat inflammatory markers and D-dimers in a.m.  6. The patient to get dialyzed today.  7. Discussed with Dr. Sidhu.  8. Start Actemra/steroids for clinical status worsening.   Time spent more than 35 minutes.     Thank you for asking us to participate in the care of this patient.        ______________________________  Gurwinder Griffith MD  [id number]      D:  06/05/2020 18:20:11  T:  06/05/2020 19:25:13   TERESA/modl   Job:  [id number]   No

## 2024-05-06 NOTE — ED ADULT NURSE NOTE - NSFALLUNIVINTERV_ED_ALL_ED
Bed/Stretcher in lowest position, wheels locked, appropriate side rails in place/Call bell, personal items and telephone in reach/Instruct patient to call for assistance before getting out of bed/chair/stretcher/Non-slip footwear applied when patient is off stretcher/[place] to call system/Physically safe environment - no spills, clutter or unnecessary equipment/Purposeful proactive rounding/Room/bathroom lighting operational, light cord in reach

## 2024-05-06 NOTE — ED PROVIDER NOTE - CARE PLAN
Principal Discharge DX:	Acute congestive heart failure  Secondary Diagnosis:	Bilateral pneumonia   1

## 2024-05-06 NOTE — H&P ADULT - WHAT MATTERS MOST
Patient continues to have pain left breast area from fall last week. Appt given today for evaluation of sx. Ike Redding, 03/13/20, 10:25 AM    Future appt:     Your appointments     Date & Time Appointment Department [facility])    Mar 13, 2020  2:30 PM CD Survival of patient and comfort

## 2024-05-06 NOTE — ED ADULT NURSE NOTE - OBJECTIVE STATEMENT
Pt c/o cough/shortness of breath starting this morning on her way to dialysis. Dialysis schedule MWF. Left AV fistula. Pt c/o ear pain x1.Pt on 2L NC SaO2 99%/ RR 27. Pt denies chest pain/nausea/vomiting.

## 2024-05-06 NOTE — H&P ADULT - NSHPREVIEWOFSYSTEMS_GEN_ALL_CORE
CONSTITUTIONAL: No fever, weight loss, or fatigue  RESPIRATORY: + cough. No wheezing, chills or hemoptysis; + shortness of breath  CARDIOVASCULAR: No chest pain, palpitations, dizziness, or leg swelling  GASTROINTESTINAL: No abdominal pain. No nausea, vomiting, or hematemesis; +diarrhea. No melena or hematochezia.  GENITOURINARY: No dysuria or hematuria, urinary frequency  NEUROLOGICAL: No headaches, memory loss, loss of strength, numbness, or tremors  ENDOCRINE: No polyuria, polydipsia, or heat/cold intolerance  MUSCULOSKELETAL: No muscle aches, joint pains  HEME: no easy bruisability, no tender or enlarged lymph nodes  SKIN: No itching, burning, rashes, or lesions .

## 2024-05-06 NOTE — CHART NOTE - NSCHARTNOTEFT_GEN_A_CORE
Patient was trialed off BiPAP, however unsuccessfully even after s/p HD session with fluid removal. Continues to require BiPAP support for increased work of breathing in the setting of AHRF 2/2 multifocal PNA. Pt states discomfort from BiPAP and mild abdominal pain otherwise denies complaints. Discussed with ICU attending Dr. Schwab, will admit to ICU for above.  Start broad Abx coverage with zosyn, azithromycin, vancomycin x 1 dose  Obtain MRSA swab, rule out atypical source of PNA (Strep, legionella)  Aspiration Precautions  C/w BiPAP, wean as tolerated  rest of care per CCM consult note Patient was trialed off BiPAP, however unsuccessfully even after s/p HD session with fluid removal. Continues to require BiPAP support for increased work of breathing in the setting of AHRF 2/2 multifocal PNA. Pt states discomfort from BiPAP and mild abdominal pain otherwise denies complaints. Discussed with ICU attending Dr. Schwab, will admit to ICU for above.  Start broad Abx coverage with zosyn, azithromycin, vancomycin x 1 dose  Obtain MRSA swab, rule out atypical source of PNA (Strep, legionella)  Aspiration Precautions  C/w BiPAP, wean as tolerated; c/w abx with zosyn/zithromax           Attending Attestation:  ICU recalled for respiratory distress during iHD; Pt seen, evaluated at bedside and chart reviewed in full. Pt noted to be in moderate distress, after already having 2 L fluid removed during HD session. Chest imaging notable for multifocal pneumonia. Pt replaced on BiPAP with clinical stability achieved.   Pt to be admitted to ICU for acute respiratory failure due to multifocal PNA necessitating NIV    acute respiratory failure with hypoxia and increased WOB- c/w NIV; reassess for wean in AM   multifocal pneumonia- IV zosyn/zithromax; MRSA swab, f/u cultures  hyponatremia- correct 6-8 meQ/24 hours  ESRD on HD- s/p 2L fluid removal on 5/7, nephro following    dispo- ICU

## 2024-05-06 NOTE — H&P ADULT - PROBLEM SELECTOR PLAN 5
Pt has a history of recent diagnosis of hemochromatosis s/p blood letting last week  - No acute intervention indicated

## 2024-05-06 NOTE — H&P ADULT - PROBLEM SELECTOR PLAN 4
Pt has a history of ESRD on HD, MWF, last session 5/3  pt follows with Dr. Michael Valdes at [facility], [place], [address]  - C/w HD     Nephrology Dr. Pandey Consulted

## 2024-05-06 NOTE — ED PROVIDER NOTE - OBJECTIVE STATEMENT
83-year-old female presents to the ED for shortness of breath.  Patient reports 3 days of worsening difficulty breathing.  Family and patient report intermittent episodes of confusion over the last 3 days.  Patient reports she recently had a bloodletting procedure due to recent diagnosis of hematochromatosis.  Patient reports that she has history of diabetes hypertension CKD/ESRD and is currently on hemodialysis.  Reports she is compliant with dialysis and has not missed any sessions recently.  Reports sensation of associated dry mouth.  Denies fevers headache nausea vomiting diarrhea dysuria.    GENERAL APPEARANCE:  AAOx4, generally well-appearing, no acute distress.  HEENT:  NCAT. Moist mucous membranes. EOMI, clear conjunctiva, oropharynx clear.  NECK:  Supple without lymphadenopathy. No JVD.  No stiffness or restricted ROM.  HEART:  Normal rate and regular rhythm, normal S1/S1, no m/r/g  LUNGS: Tachypnea 30/min. Diminished breath sounds diffusely. No wheezing or rhonchi.   ABDOMEN:  Soft, nontender, non-distended. Negative [place]. Negative McBurney. No rebound or guarding.  BACK: No CVAT, no obvious deformity.  EXTREMITIES:  Without cyanosis, clubbing or edema. Pulse intact x 4. Compartments soft.  NEUROLOGICAL:  Grossly nonfocal. Alert and oriented, moving all 4 extremities. CN II-XII grossly intact. Observed to ambulate with normal gait.  SKIN:  Warm and dry without any rash.  PSYCH: Calm, cooperative. Demonstrates proper insight and judgement. 83-year-old female presents to the ED for shortness of breath.  Patient reports 3 days of worsening difficulty breathing.  Family and patient report intermittent episodes of confusion over the last 3 days.  Patient reports she recently had a bloodletting procedure due to recent diagnosis of hematochromatosis.  Patient reports that she has history of diabetes hypertension CKD/ESRD and is currently on hemodialysis.  Reports she is compliant with dialysis and has not missed any sessions recently.  Reports sensation of associated dry mouth.  Denies fevers headache nausea vomiting diarrhea dysuria.    GENERAL APPEARANCE:  AAOx3, Appears frail,   HEENT:  NCAT. DRY mucous membranes. EOMI, clear conjunctiva, oropharynx clear.  NECK:  Supple without lymphadenopathy. No JVD.  No stiffness or restricted ROM.  HEART:  Normal rate and regular rhythm, normal S1/S1, no m/r/g  LUNGS: Tachypnea 30/min. Diminished breath sounds diffusely. No wheezing or rhonchi.   ABDOMEN:  Soft, nontender, non-distended. Negative [place]. Negative McBurney. No rebound or guarding.  BACK: No CVAT, no obvious deformity.  EXTREMITIES:  Without cyanosis, clubbing or edema. Pulse intact x 4. Compartments soft.  NEUROLOGICAL:  Grossly nonfocal. Alert and oriented, moving all 4 extremities. CN II-XII grossly intact.   SKIN:  Warm and dry without any rash.  PSYCH: Calm, cooperative. Demonstrates proper insight and judgement.

## 2024-05-06 NOTE — ED ADULT TRIAGE NOTE - AS TEMP SITE
Pt sts lower abd pain/cramping, urinary frequency began yesterday. Denies n/v/d, fever. Last BM- today, formed.   
oral

## 2024-05-06 NOTE — H&P ADULT - ASSESSMENT
84 y/o F, from home (lives alone), ambulates independently, with PMHx of DM, HTN, HLD, ESRD on HD (MWF, L-AVF), and previous smoker who presents with shortness of breath. Increased WOB in ED, placed on BiPAP and then taken off. ICU evaluated and stated to re-consult if continued work of breathing after dialysis. Admitted for AHRF 2/2 PNA.

## 2024-05-06 NOTE — H&P ADULT - PROBLEM SELECTOR PLAN 6
Pt has a history of DM, takes Toujeo 9u (Sun, tue, thu) and 11u (Mon Wed Fri Sat) and glimepiride 1mg TID at home  hypoglycemic in ED to 46, s/p D50 and glu increased to 200s  - C/w low ISS  FS ACHS

## 2024-05-06 NOTE — H&P ADULT - NSHPPHYSICALEXAM_GEN_ALL_CORE
GENERAL: Mild distress, elderly female, laying down  HEAD: Atraumatic, Normocephalic  EYES: EOMI, PERRLA, conjunctiva and sclera clear  NECK: Supple  CHEST/LUNG: b/l coarse rales at bases of lungs  HEART: Tachycardic rate and regular rhythm; No murmurs, rubs, or gallops  ABDOMEN: Soft, Nontender, Nondistended; Bowel sounds present  EXTREMITIES:  2+ Peripheral Pulses, No edema  PSYCH: AAOx3  NEUROLOGY: non-focal  SKIN: No rashes or lesions

## 2024-05-06 NOTE — ED PROVIDER NOTE - NSICDXPASTMEDICALHX_GEN_ALL_CORE_FT
PAST MEDICAL HISTORY:  Breast lump     Cataract     CKD (chronic kidney disease)     Diabetes mellitus     ESRD on dialysis     Gout     Hyperlipidemia     Hypertension     Osteoarthritis     Osteoporosis     Seasonal allergies     Vitamin D deficiency

## 2024-05-06 NOTE — ED PROVIDER NOTE - CLINICAL SUMMARY MEDICAL DECISION MAKING FREE TEXT BOX
After introducing myself to the patient and/or family I have performed a medical history, review of systems, and physical examination. I have formulated a differential diagnosis and plan of care for this visit and discussed this with the patient and/or family. I have also addressed the risks and benefits of diagnostic and treatment modalities planned for this visit.  Vital Signs: Reviewed the patient's vital signs  Nursing Notes: Reviewed and utilized the nursing notes  Old Medical Records: The patient's available past medical records and past encounters were reviewed  Laboratory Studies: Ordered and independently interpreted laboratory test, see above  Imaging Studies: Imaging studies ordered. Radiologist interpretation reviewed. I have independently reviewed imaging.    BNP resulted at 50,000.  CT PE shows no embolism but bilateral pneumonias.  Patient had episode of hypoglycemia which resolved with dextrose and oral intake.  Daughter at the bedside states the last 3 to 4 days she has been eating less due to feeling weak.  Patient to be admitted for congestive heart failure and bilateral pneumonias. After introducing myself to the patient and/or family I have performed a medical history, review of systems, and physical examination. I have formulated a differential diagnosis and plan of care for this visit and discussed this with the patient and/or family. I have also addressed the risks and benefits of diagnostic and treatment modalities planned for this visit.  Vital Signs: Reviewed the patient's vital signs  Nursing Notes: Reviewed and utilized the nursing notes  Old Medical Records: The patient's available past medical records and past encounters were reviewed  Laboratory Studies: Ordered and independently interpreted laboratory test, see above  Imaging Studies: Imaging studies ordered. Radiologist interpretation reviewed. I have independently reviewed imaging.    BNP resulted at 50,000.  CT PE shows no embolism but bilateral pneumonias.  Patient had episode of hypoglycemia which resolved with dextrose and oral intake, likley due to recent illness and poor PO intake.  Daughter at the bedside states the last 3 to 4 days she has been eating less due to feeling weak.  Patient to be admitted for congestive heart failure and bilateral pneumonia. Lasix provided in ED. Medical team accepted patient for admission to telemetry and will arrange for emergent dialysis. ICU contacted but stated patient should continue NC therapy and if breathing worsens patient to be upgraded by medical team to intensive care.

## 2024-05-06 NOTE — ED PROVIDER NOTE - NSICDXPASTSURGICALHX_GEN_ALL_CORE_FT
PAST SURGICAL HISTORY:  Arteriovenous fistula left side limb alert    H/O bilateral cataract extraction 2012  and 2014    History of breast surgery Excision of left breast lump - benign  1991    History of knee replacement, total, left 2009

## 2024-05-06 NOTE — ED PROVIDER NOTE - PROGRESS NOTE DETAILS
Patient has bilateral pneumonia on CT, no pulmonary embolism. BNP 50K. Patient compliant with dialysis, likely acute CHF secondary to pneumonia. Medicine contacted for admission. Patient while in ED had increase in work of breathing and oxygen titrated up to 4L. ICU contacted but declined and patient will be closely monitored on medical floor.

## 2024-05-06 NOTE — H&P ADULT - PROBLEM SELECTOR PLAN 3
Na 126. pt euvolemic on exam  - Likely hyponatremia 2/2 SIADH 2/2 PNA vs hypovolemic hyponatremia (decreased PO intake) vs hypervolemic hyponatremia (hypoxic, and dialysis pt)  - F/u Urine lytes  - C/w Fluid restriction 1.5LNS    Nephrology Dr. Pandey Consulted

## 2024-05-06 NOTE — CONSULT NOTE ADULT - ASSESSMENT
83-year-old female AAOx3 independent on ADLs, PMH of gout, HTN, HLD, hemochromatosis, ESRD on HD (MWF) and Foresenius presents to the ED for shortness of breath ICU consulted for BIPAP.    DX:  1. Respiratory failure   2. PNA  3. ESRD  4. Hyponatremia   5. Elevated BNP  6. lactic acidosis      =================== Neuro============================  Alert and oriented x 3 at base line   mentating at baseline   intermittent confusion likely in the setting of infection   ABx as below      ================= Cardiovascular==========================  HD stable   Hypertensive: volume overload, req HD  Tachy: please get EKG., no recent echo, consider in the setting of ESRD and BNP elevation     ================- Pulm=================================  Respiratory failure: Volume overload req HD vs PNA  stable on BIPAP, and saturating well on room air despite mild tachypnea  -would benefit from HD and Abx  -If condition does not improve, transfer to ICu for BIPAP    ==================ID===================================  lactate, PNA on CT, leukopenia, tachypnea, tachycardia, sick contact   -consider  CAP coverage  -consider legionella work up as pt has diarrhea and Hyponatremia  -send RVP     ================= Nephro================================    ESRD scheduled     =================GI====================================  As per home diet  Asp precaution     ================ Heme==================================  Leukopenia, infectious   Anemia of chronic dz or anemia of renal dz      =================Endocrine===============================    On prolia,    DM on Tujeo, repaglinide  holding home meds  Insulin sliding scale    ================= Skin/Catheters============================    no issues   =================Prophylaxis =============================  heparin sc     ==================GOC==================================  FULL CODE   previously   DNR DNI in 2021, please f/u GOC , CPR after cardiac arrest will not benefit her   
Patient is a 83y Female whom presented to the hospital with shortness of breath and cough for a week. she has H/O HTN, DM2, previous smoker. HAs ESRD on HD MWF at [place] dialysis unit. in ED XRAY and CT scan revealing bilateral ifiltrates. admitted with bilateral PNA and AHRF with increasing oxygen requirements. . Renal consulted for HD .    # ESRD admitted with SOB, hypoxia PNA vs AHRF .Emergent  HD today with ultrafiltration as tolerated. will consider  for UF in AM as well.  cont ABX  # hyponatremia- in light of fluid overload. UF on HD. restrict fluids to 1.2L?DAY   # anemia of CKD. stable, no indication  for CULLEN.  # HTN stable UF with HD     thanks  for the consult

## 2024-05-07 LAB
A1C WITH ESTIMATED AVERAGE GLUCOSE RESULT: 5.4 % — SIGNIFICANT CHANGE UP (ref 4–5.6)
ALBUMIN SERPL ELPH-MCNC: 1.9 G/DL — LOW (ref 3.5–5)
ALP SERPL-CCNC: 245 U/L — HIGH (ref 40–120)
ALT FLD-CCNC: 29 U/L DA — SIGNIFICANT CHANGE UP (ref 10–60)
ANION GAP SERPL CALC-SCNC: 14 MMOL/L — SIGNIFICANT CHANGE UP (ref 5–17)
ANISOCYTOSIS BLD QL: SLIGHT — SIGNIFICANT CHANGE UP
AST SERPL-CCNC: 62 U/L — HIGH (ref 10–40)
BASE EXCESS BLDA CALC-SCNC: 4.3 MMOL/L — HIGH (ref -2–3)
BASOPHILS # BLD AUTO: 0 K/UL — SIGNIFICANT CHANGE UP (ref 0–0.2)
BASOPHILS NFR BLD AUTO: 0 % — SIGNIFICANT CHANGE UP (ref 0–2)
BILIRUB SERPL-MCNC: 1.2 MG/DL — SIGNIFICANT CHANGE UP (ref 0.2–1.2)
BLOOD GAS COMMENTS ARTERIAL: SIGNIFICANT CHANGE UP
BUN SERPL-MCNC: 26 MG/DL — HIGH (ref 7–18)
CALCIUM SERPL-MCNC: 8.4 MG/DL — SIGNIFICANT CHANGE UP (ref 8.4–10.5)
CHLORIDE SERPL-SCNC: 95 MMOL/L — LOW (ref 96–108)
CO2 SERPL-SCNC: 26 MMOL/L — SIGNIFICANT CHANGE UP (ref 22–31)
CREAT SERPL-MCNC: 3.16 MG/DL — HIGH (ref 0.5–1.3)
EGFR: 14 ML/MIN/1.73M2 — LOW
ELLIPTOCYTES BLD QL SMEAR: SLIGHT — SIGNIFICANT CHANGE UP
EOSINOPHIL # BLD AUTO: 0 K/UL — SIGNIFICANT CHANGE UP (ref 0–0.5)
EOSINOPHIL NFR BLD AUTO: 0 % — SIGNIFICANT CHANGE UP (ref 0–6)
ESTIMATED AVERAGE GLUCOSE: 108 MG/DL — SIGNIFICANT CHANGE UP (ref 68–114)
GLUCOSE BLDC GLUCOMTR-MCNC: 179 MG/DL — HIGH (ref 70–99)
GLUCOSE BLDC GLUCOMTR-MCNC: 194 MG/DL — HIGH (ref 70–99)
GLUCOSE BLDC GLUCOMTR-MCNC: 231 MG/DL — HIGH (ref 70–99)
GLUCOSE BLDC GLUCOMTR-MCNC: 263 MG/DL — HIGH (ref 70–99)
GLUCOSE SERPL-MCNC: 169 MG/DL — HIGH (ref 70–99)
HCO3 BLDA-SCNC: 27 MMOL/L — SIGNIFICANT CHANGE UP (ref 21–28)
HCT VFR BLD CALC: 29.5 % — LOW (ref 34.5–45)
HGB BLD-MCNC: 9.8 G/DL — LOW (ref 11.5–15.5)
HOROWITZ INDEX BLDA+IHG-RTO: 100 — SIGNIFICANT CHANGE UP
LYMPHOCYTES # BLD AUTO: 0.15 K/UL — LOW (ref 1–3.3)
LYMPHOCYTES # BLD AUTO: 2 % — LOW (ref 13–44)
MAGNESIUM SERPL-MCNC: 2.3 MG/DL — SIGNIFICANT CHANGE UP (ref 1.6–2.6)
MANUAL SMEAR VERIFICATION: SIGNIFICANT CHANGE UP
MCHC RBC-ENTMCNC: 29.2 PG — SIGNIFICANT CHANGE UP (ref 27–34)
MCHC RBC-ENTMCNC: 33.2 GM/DL — SIGNIFICANT CHANGE UP (ref 32–36)
MCV RBC AUTO: 87.8 FL — SIGNIFICANT CHANGE UP (ref 80–100)
MONOCYTES # BLD AUTO: 0.08 K/UL — SIGNIFICANT CHANGE UP (ref 0–0.9)
MONOCYTES NFR BLD AUTO: 1 % — LOW (ref 2–14)
MRSA PCR RESULT.: SIGNIFICANT CHANGE UP
NEUTROPHILS # BLD AUTO: 7.46 K/UL — HIGH (ref 1.8–7.4)
NEUTROPHILS NFR BLD AUTO: 97 % — HIGH (ref 43–77)
NRBC # BLD: 0 /100 WBCS — SIGNIFICANT CHANGE UP (ref 0–0)
OVALOCYTES BLD QL SMEAR: SLIGHT — SIGNIFICANT CHANGE UP
PCO2 BLDA: 34 MMHG — SIGNIFICANT CHANGE UP (ref 32–35)
PH BLDA: 7.51 — HIGH (ref 7.35–7.45)
PHOSPHATE SERPL-MCNC: 3.1 MG/DL — SIGNIFICANT CHANGE UP (ref 2.5–4.5)
PLAT MORPH BLD: NORMAL — SIGNIFICANT CHANGE UP
PLATELET # BLD AUTO: 153 K/UL — SIGNIFICANT CHANGE UP (ref 150–400)
PLATELET COUNT - ESTIMATE: ABNORMAL
PO2 BLDA: 197 MMHG — HIGH (ref 83–108)
POLYCHROMASIA BLD QL SMEAR: SLIGHT — SIGNIFICANT CHANGE UP
POTASSIUM SERPL-MCNC: 3.3 MMOL/L — LOW (ref 3.5–5.3)
POTASSIUM SERPL-SCNC: 3.3 MMOL/L — LOW (ref 3.5–5.3)
PROT SERPL-MCNC: 5.6 G/DL — LOW (ref 6–8.3)
RBC # BLD: 3.36 M/UL — LOW (ref 3.8–5.2)
RBC # FLD: 14.2 % — SIGNIFICANT CHANGE UP (ref 10.3–14.5)
RBC BLD AUTO: ABNORMAL
S AUREUS DNA NOSE QL NAA+PROBE: SIGNIFICANT CHANGE UP
SAO2 % BLDA: 100 % — SIGNIFICANT CHANGE UP
SODIUM SERPL-SCNC: 135 MMOL/L — SIGNIFICANT CHANGE UP (ref 135–145)
SPHEROCYTES BLD QL SMEAR: SLIGHT — SIGNIFICANT CHANGE UP
TROPONIN I, HIGH SENSITIVITY RESULT: 123.5 NG/L — HIGH
TROPONIN I, HIGH SENSITIVITY RESULT: 127.4 NG/L — HIGH
WBC # BLD: 7.69 K/UL — SIGNIFICANT CHANGE UP (ref 3.8–10.5)
WBC # FLD AUTO: 7.69 K/UL — SIGNIFICANT CHANGE UP (ref 3.8–10.5)

## 2024-05-07 PROCEDURE — 99233 SBSQ HOSP IP/OBS HIGH 50: CPT | Mod: GC

## 2024-05-07 RX ORDER — CEFTRIAXONE 500 MG/1
2000 INJECTION, POWDER, FOR SOLUTION INTRAMUSCULAR; INTRAVENOUS EVERY 24 HOURS
Refills: 0 | Status: DISCONTINUED | OUTPATIENT
Start: 2024-05-07 | End: 2024-05-11

## 2024-05-07 RX ORDER — INSULIN GLARGINE 100 [IU]/ML
3 INJECTION, SOLUTION SUBCUTANEOUS AT BEDTIME
Refills: 0 | Status: DISCONTINUED | OUTPATIENT
Start: 2024-05-07 | End: 2024-05-16

## 2024-05-07 RX ORDER — SODIUM CHLORIDE 9 MG/ML
4 INJECTION INTRAMUSCULAR; INTRAVENOUS; SUBCUTANEOUS EVERY 12 HOURS
Refills: 0 | Status: DISCONTINUED | OUTPATIENT
Start: 2024-05-07 | End: 2024-05-16

## 2024-05-07 RX ORDER — IPRATROPIUM/ALBUTEROL SULFATE 18-103MCG
3 AEROSOL WITH ADAPTER (GRAM) INHALATION EVERY 6 HOURS
Refills: 0 | Status: DISCONTINUED | OUTPATIENT
Start: 2024-05-07 | End: 2024-05-16

## 2024-05-07 RX ADMIN — CEFTRIAXONE 100 MILLIGRAM(S): 500 INJECTION, POWDER, FOR SOLUTION INTRAMUSCULAR; INTRAVENOUS at 15:57

## 2024-05-07 RX ADMIN — Medication 1 CAPSULE(S): at 22:19

## 2024-05-07 RX ADMIN — Medication 1 CAPSULE(S): at 08:57

## 2024-05-07 RX ADMIN — Medication 3 MILLILITER(S): at 14:32

## 2024-05-07 RX ADMIN — Medication 3 MILLILITER(S): at 09:10

## 2024-05-07 RX ADMIN — Medication 1 CAPSULE(S): at 16:47

## 2024-05-07 RX ADMIN — Medication 2: at 11:12

## 2024-05-07 RX ADMIN — SODIUM CHLORIDE 4 MILLILITER(S): 9 INJECTION INTRAMUSCULAR; INTRAVENOUS; SUBCUTANEOUS at 20:34

## 2024-05-07 RX ADMIN — HEPARIN SODIUM 5000 UNIT(S): 5000 INJECTION INTRAVENOUS; SUBCUTANEOUS at 17:15

## 2024-05-07 RX ADMIN — INSULIN GLARGINE 3 UNIT(S): 100 INJECTION, SOLUTION SUBCUTANEOUS at 22:03

## 2024-05-07 RX ADMIN — AZITHROMYCIN 255 MILLIGRAM(S): 500 TABLET, FILM COATED ORAL at 15:59

## 2024-05-07 RX ADMIN — Medication 1: at 16:19

## 2024-05-07 RX ADMIN — Medication 3 MILLILITER(S): at 20:31

## 2024-05-07 RX ADMIN — PIPERACILLIN AND TAZOBACTAM 25 GRAM(S): 4; .5 INJECTION, POWDER, LYOPHILIZED, FOR SOLUTION INTRAVENOUS at 05:18

## 2024-05-07 RX ADMIN — Medication 1: at 07:15

## 2024-05-07 RX ADMIN — SIMVASTATIN 20 MILLIGRAM(S): 20 TABLET, FILM COATED ORAL at 22:20

## 2024-05-07 RX ADMIN — PANTOPRAZOLE SODIUM 40 MILLIGRAM(S): 20 TABLET, DELAYED RELEASE ORAL at 06:49

## 2024-05-07 RX ADMIN — Medication 1 CAPSULE(S): at 11:38

## 2024-05-07 RX ADMIN — HEPARIN SODIUM 5000 UNIT(S): 5000 INJECTION INTRAVENOUS; SUBCUTANEOUS at 05:18

## 2024-05-07 RX ADMIN — Medication 1: at 22:03

## 2024-05-07 NOTE — PROGRESS NOTE ADULT - ASSESSMENT
83F, from home (lives alone), with PMH DM, HTN, HLD, ESRD on HD (MWF, L-AVF), gout, hemochromatosis, Foresenius, and previous smoker who presents with shortness of breath. Increased WOB in ED, placed on BiPAP and then taken off. ICU evaluated and stated to re-consult if continued work of breathing after dialysis. Admitted for AHRF 2/2 PNA. Admitted to ICU overnight for worsening respiratory status requiring BiPAP.      =================== Neuro============  - Alert and oriented x 3 at base line   - mentating at baseline     ================= Cardiovascular=========  No acute issues  - ECHO ?    ================- Pulm===============  # Acute hypoxic respiratory failure 2/2 Volume overload requiring HD and PNA  - CT chest - bilateral patchy consolidation in lower lobes and R middle lobe.  - HD as scheduled - removed 1.7L      ==================ID================  lactate, PNA on CT, leukopenia, tachypnea, tachycardia, sick contact   -consider  CAP coverage  -consider legionella work up as pt has diarrhea and Hyponatremia  -send RVP     ================= Nephro===========    ESRD scheduled     =================GI================  As per home diet  Asp precaution     ================ Heme==============  Leukopenia, infectious   Anemia of chronic dz or anemia of renal dz      =================Endocrine===========    On prolia,    DM on Tujeo, repaglinide  holding home meds  Insulin sliding scale    ================= Skin/Catheters======    no issues   =================Prophylaxis =========  heparin sc     ==================GOC================  FULL CODE   previously   DNR DNI in 2021, please f/u GOC , CPR after cardiac arrest will not benefit her  83F, from home (lives alone), with PMH DM, HTN, HLD, ESRD on HD (MWF, L-AVF), gout, hemochromatosis, Foresenius, and previous smoker who presents with shortness of breath. Increased WOB in ED, placed on BiPAP and then taken off. ICU evaluated and stated to re-consult if continued work of breathing after dialysis. Admitted for AHRF 2/2 PNA. Admitted to ICU overnight for worsening respiratory status requiring BiPAP.      =================== Neuro============  - Alert and oriented x 3 at base line   - mentating at baseline     ================= Cardiovascular=========  No acute issues  - ECHO ?    ================- Pulm===============  # Acute hypoxic respiratory failure 2/2 Volume overload requiring HD and PNA  - CT chest - bilateral patchy consolidation in lower lobes and R middle lobe.  - HD as scheduled - removed 1.7L last session.  - chest PT + 3% inhalation + duonebs  - nasal cannula  - f/u sputum CX, mycoplasma, legionella, strep pneumo  - on ceftriaxone, azithro    ==================ID================  # PNA  - CT chest - bilateral patchy consolidation in lower lobes and R middle lobe.  - f/u sputum CX, mycoplasma, legionella, strep pneumo  - on ceftriaxone, azithro    ================= Nephro===========  # ESRD on dialysis  - HD as scheduled.    =================GI================  - aspiration precautions  - DASH diet    ================ Heme==============  # anemia of renal disease  - monitor CBC    =================Endocrine===========  - home meds: Tujeo, repaglinide  - holding home meds  - sugars 200s  - will start lantus 3u + ISS    ================= Skin/Catheters======  peripheral IVs    =================Prophylaxis =========  DVT ppx - heparin SQ    ==================GOC================  FULL CODE   previously DNR DNI in 2021, please f/u GOC , CPR after cardiac arrest will not benefit her  83F, from home (lives alone), with PMH DM, HTN, HLD, ESRD on HD (MWF, L-AVF), gout, hemochromatosis, Foresenius, and previous smoker who presents with shortness of breath. Increased WOB in ED, placed on BiPAP and then taken off. ICU evaluated and stated to re-consult if continued work of breathing after dialysis. Admitted for AHRF 2/2 PNA. Admitted to ICU overnight for worsening respiratory status requiring BiPAP.      =================== Neuro============  - Alert and oriented x 3 at base line   - mentating at baseline     ================= Cardiovascular=========  # Demand ischemia  - trops 75>127  - EKG - sinus tachycardia, LVH. No ST-T wave changes.  - ?consider ECHO if trops continue to rise    ================- Pulm===============  # Acute hypoxic respiratory failure 2/2 Volume overload requiring HD and PNA  - CT chest - bilateral patchy consolidation in lower lobes and R middle lobe.  - HD as scheduled - removed 1.7L last session.  - chest PT + 3% inhalation + duonebs  - nasal cannula  - f/u sputum CX, mycoplasma, legionella, strep pneumo  - on ceftriaxone, azithro    ==================ID================  # PNA  - CT chest - bilateral patchy consolidation in lower lobes and R middle lobe.  - f/u sputum CX, mycoplasma, legionella, strep pneumo  - on ceftriaxone, azithro    ================= Nephro===========  # ESRD on dialysis  - HD as scheduled.    =================GI================  - aspiration precautions  - DASH diet    ================ Heme==============  # anemia of renal disease  - monitor CBC    =================Endocrine===========  - home meds: Tujeo, repaglinide  - holding home meds  - sugars 200s  - will start lantus 3u + ISS    ================= Skin/Catheters======  peripheral IVs    =================Prophylaxis =========  DVT ppx - heparin SQ    ==================GOC================  FULL CODE   previously DNR DNI in 2021, please f/u GOC , CPR after cardiac arrest will not benefit her

## 2024-05-07 NOTE — DIETITIAN INITIAL EVALUATION ADULT - OTHER INFO
Patient is on HD 3 times per week. She has H/O DM, HTN, HLD, ESRD. Patient indicated that she lost about 30 lbs. about 1 year ago and that it was due to a blood disease, hemochromotosis.

## 2024-05-07 NOTE — PROGRESS NOTE ADULT - SUBJECTIVE AND OBJECTIVE BOX
Reason for Admission:  · Reason for Admission	AHRF      · Subjective and Objective:   INTERVAL HPI/OVERNIGHT EVENTS: Patient seen and examined at bedside. Resting comfortably. No acute overnight events. BiPAP > 4L NC, satting well.       PRESSORS: [ ] YES [x ] NO  WHICH:    ANTIBIOTICS:                  DATE STARTED:  ANTIBIOTICS:                  DATE STARTED:    Antimicrobial:  azithromycin  IVPB 500 milliGRAM(s) IV Intermittent every 24 hours  cefTRIAXone   IVPB 2000 milliGRAM(s) IV Intermittent every 24 hours    Cardiovascular:    Pulmonary:  albuterol/ipratropium for Nebulization 3 milliLiter(s) Nebulizer every 6 hours    Hematalogic:  heparin   Injectable 5000 Unit(s) SubCutaneous every 12 hours    Other:  acetaminophen     Tablet .. 650 milliGRAM(s) Oral every 6 hours PRN  aluminum hydroxide/magnesium hydroxide/simethicone Suspension 30 milliLiter(s) Oral every 4 hours PRN  insulin lispro (ADMELOG) corrective regimen sliding scale   SubCutaneous three times a day before meals  insulin lispro (ADMELOG) corrective regimen sliding scale   SubCutaneous at bedtime  melatonin 3 milliGRAM(s) Oral at bedtime PRN  ondansetron Injectable 4 milliGRAM(s) IV Push every 8 hours PRN  pancrelipase  (CREON 36,000 Lipase Units) 1 Capsule(s) Oral four times a day with meals  pantoprazole    Tablet 40 milliGRAM(s) Oral before breakfast  simvastatin 20 milliGRAM(s) Oral at bedtime    acetaminophen     Tablet .. 650 milliGRAM(s) Oral every 6 hours PRN  albuterol/ipratropium for Nebulization 3 milliLiter(s) Nebulizer every 6 hours  aluminum hydroxide/magnesium hydroxide/simethicone Suspension 30 milliLiter(s) Oral every 4 hours PRN  azithromycin  IVPB 500 milliGRAM(s) IV Intermittent every 24 hours  cefTRIAXone   IVPB 2000 milliGRAM(s) IV Intermittent every 24 hours  heparin   Injectable 5000 Unit(s) SubCutaneous every 12 hours  insulin lispro (ADMELOG) corrective regimen sliding scale   SubCutaneous three times a day before meals  insulin lispro (ADMELOG) corrective regimen sliding scale   SubCutaneous at bedtime  melatonin 3 milliGRAM(s) Oral at bedtime PRN  ondansetron Injectable 4 milliGRAM(s) IV Push every 8 hours PRN  pancrelipase  (CREON 36,000 Lipase Units) 1 Capsule(s) Oral four times a day with meals  pantoprazole    Tablet 40 milliGRAM(s) Oral before breakfast  simvastatin 20 milliGRAM(s) Oral at bedtime    Drug Dosing Weight  Height (cm): 149.9 (06 May 2024 09:00)  Weight (kg): 49.8 (06 May 2024 21:45)  BMI (kg/m2): 22.2 (06 May 2024 21:45)  BSA (m2): 1.43 (06 May 2024 21:45)    PHYSICAL EXAM:  GENERAL: NAD  EYES: EOMI, PERRLA  NECK: Supple, No JVD; Normal thyroid; Trachea midline: No LAD   NERVOUS SYSTEM:  Alert & Oriented X3,  Motor Strength 5/5 B/L upper and lower extremities; DTRs 2+ intact and symmetric  CHEST/LUNG: +crackles worse at the bases. +congestion, mild expiratory wheezing  HEART: Regular rate and rhythm; No murmurs, no gallops  ABDOMEN: Soft, Nontender, Nondistended; Bowel sounds present, no pain or masses on palpation  : voiding well  EXTREMITIES:  2+ Peripheral Pulses, No clubbing, cyanosis, or edema  SKIN: warm, intact, no lesions     LINES/DRAINS/DEVICES  CENTRAL LINE: [ ] YES [x ] NO  LOCATION:     MONIQUE: [ ] YES [x ] NO     A-LINE:  [ ] YES [x ] NO  LOCATION:       ICU Vital Signs Last 24 Hrs  T(C): 37.3 (07 May 2024 05:00), Max: 37.3 (07 May 2024 00:00)  T(F): 99.1 (07 May 2024 05:00), Max: 99.2 (07 May 2024 00:00)  HR: 110 (07 May 2024 12:16) (60 - 118)  BP: 124/78 (07 May 2024 11:00) (106/70 - 136/72)  BP(mean): 91 (07 May 2024 11:00) (72 - 108)  ABP: --  ABP(mean): --  RR: 27 (07 May 2024 11:00) (18 - 33)  SpO2: 92% (07 May 2024 12:16) (92% - 100%)    O2 Parameters below as of 07 May 2024 07:00  Patient On (Oxygen Delivery Method): nasal cannula  O2 Flow (L/min): 4          ABG - ( 07 May 2024 03:37 )  pH, Arterial: 7.51  pH, Blood: x     /  pCO2: 34    /  pO2: 197   / HCO3: 27    / Base Excess: 4.3   /  SaO2: 100                   05-06 @ 07:01  -  05-07 @ 07:00  --------------------------------------------------------  IN: 600 mL / OUT: 2300 mL / NET: -1700 mL              LABS:  CBC Full  -  ( 07 May 2024 04:43 )  WBC Count : 7.69 K/uL  RBC Count : 3.36 M/uL  Hemoglobin : 9.8 g/dL  Hematocrit : 29.5 %  Platelet Count - Automated : 153 K/uL  Mean Cell Volume : 87.8 fl  Mean Cell Hemoglobin : 29.2 pg  Mean Cell Hemoglobin Concentration : 33.2 gm/dL  Auto Neutrophil # : 7.46 K/uL  Auto Lymphocyte # : 0.15 K/uL  Auto Monocyte # : 0.08 K/uL  Auto Eosinophil # : 0.00 K/uL  Auto Basophil # : 0.00 K/uL  Auto Neutrophil % : 97.0 %  Auto Lymphocyte % : 2.0 %  Auto Monocyte % : 1.0 %  Auto Eosinophil % : 0.0 %  Auto Basophil % : 0.0 %    05-07    135  |  95<L>  |  26<H>  ----------------------------<  169<H>  3.3<L>   |  26  |  3.16<H>    Ca    8.4      07 May 2024 04:43  Phos  3.1     05-07  Mg     2.3     05-07    TPro  5.6<L>  /  Alb  1.9<L>  /  TBili  1.2  /  DBili  x   /  AST  62<H>  /  ALT  29  /  AlkPhos  245<H>  05-07      Urinalysis Basic - ( 07 May 2024 04:43 )    Color: x / Appearance: x / SG: x / pH: x  Gluc: 169 mg/dL / Ketone: x  / Bili: x / Urobili: x   Blood: x / Protein: x / Nitrite: x   Leuk Esterase: x / RBC: x / WBC x   Sq Epi: x / Non Sq Epi: x / Bacteria: x          RADIOLOGY & ADDITIONAL STUDIES REVIEWED DURING TEAM ROUNDS    [ ]GOALS OF CARE DISCUSSION WITH PATIENT/FAMILY/PROXY:    CRITICAL CARE TIME SPENT: 35 minutes        Assessment and Plan:   · Assessment	  83F, from home (lives alone), with PMH DM, HTN, HLD, ESRD on HD (MWF, L-AVF), gout, hemochromatosis, Foresenius, and previous smoker who presents with shortness of breath. Increased WOB in ED, placed on BiPAP and then taken off. ICU evaluated and stated to re-consult if continued work of breathing after dialysis. Admitted for AHRF 2/2 PNA. Admitted to ICU overnight for worsening respiratory status requiring BiPAP.      =================== Neuro============  - Alert and oriented x 3 at base line   - mentating at baseline     ================= Cardiovascular=========  # Demand ischemia  - trops 75>127  - EKG - sinus tachycardia, LVH. No ST-T wave changes.  - ?consider ECHO if trops continue to rise    ================- Pulm===============  # Acute hypoxic respiratory failure 2/2 Volume overload requiring HD and PNA  - CT chest - bilateral patchy consolidation in lower lobes and R middle lobe.  - HD as scheduled - removed 1.7L last session.  - chest PT + 3% inhalation + duonebs  - nasal cannula  - f/u sputum CX, mycoplasma, legionella, strep pneumo  - on ceftriaxone, azithro    ==================ID================  # PNA  - CT chest - bilateral patchy consolidation in lower lobes and R middle lobe.  - f/u sputum CX, mycoplasma, legionella, strep pneumo  - on ceftriaxone, azithro    ================= Nephro===========  # ESRD on dialysis  - HD as scheduled.    =================GI================  - aspiration precautions  - DASH diet    ================ Heme==============  # anemia of renal disease  - monitor CBC    =================Endocrine===========  - home meds: Tujeo, repaglinide  - holding home meds  - sugars 200s  - will start lantus 3u + ISS    ================= Skin/Catheters======  peripheral IVs    =================Prophylaxis =========  DVT ppx - heparin SQ    ==================GOC================  FULL CODE   previously DNR DNI in 2021, please f/u GOC , CPR after cardiac arrest will not benefit her

## 2024-05-07 NOTE — ADVANCED PRACTICE NURSE CONSULT - ASSESSMENT
This is a 83yr old female patient admitted for Heart Failure, presenting with a Stage 1 Pressure Injury to the Coccyx, as evident by non-blanchable erythema. There is evidence of blanchable erythema to the Bilateral Heels

## 2024-05-07 NOTE — DIETITIAN INITIAL EVALUATION ADULT - PERTINENT LABORATORY DATA
05-07    135  |  95<L>  |  26<H>  ----------------------------<  169<H>  3.3<L>   |  26  |  3.16<H>    Ca    8.4      07 May 2024 04:43  Phos  3.1     05-07  Mg     2.3     05-07    TPro  5.6<L>  /  Alb  1.9<L>  /  TBili  1.2  /  DBili  x   /  AST  62<H>  /  ALT  29  /  AlkPhos  245<H>  05-07  POCT Blood Glucose.: 231 mg/dL (05-07-24 @ 11:02)  A1C with Estimated Average Glucose Result: 5.4 % (05-07-24 @ 04:43)

## 2024-05-07 NOTE — DIETITIAN INITIAL EVALUATION ADULT - NSFNSPHYEXAMSKINFT_GEN_A_CORE
Pressure Injury 1: coccyx, Stage I  Pressure Injury 2: none, none  Pressure Injury 3: none, none  Pressure Injury 4: none, none  Pressure Injury 5: none, none  Pressure Injury 6: none, none  Pressure Injury 7: none, none  Pressure Injury 8: none, none  Pressure Injury 9: none, none  Pressure Injury 10: none, none  Pressure Injury 11: none, none, Pressure Injury 1: sacrun, Stage I  Pressure Injury 2: none, none  Pressure Injury 3: none, none  Pressure Injury 4: none, none  Pressure Injury 5: none, none  Pressure Injury 6: none, none  Pressure Injury 7: none, none  Pressure Injury 8: none, none  Pressure Injury 9: none, none  Pressure Injury 10: none, none  Pressure Injury 11: none, none, Pressure Injury 1: sacrum, Stage I  Pressure Injury 2: none, none  Pressure Injury 3: none, none  Pressure Injury 4: none, none  Pressure Injury 5: none, none  Pressure Injury 6: none, none  Pressure Injury 7: none, none  Pressure Injury 8: none, none  Pressure Injury 9: none, none  Pressure Injury 10: none, none  Pressure Injury 11: none, none

## 2024-05-07 NOTE — DIETITIAN INITIAL EVALUATION ADULT - NS FNS DIET ORDER
Diet, Regular:   Consistent Carbohydrate {No Snacks}  DASH/TLC {Sodium & Cholesterol Restricted} (05-06-24 @ 16:20)

## 2024-05-07 NOTE — CHART NOTE - NSCHARTNOTEFT_GEN_A_CORE
83F, from home (lives alone), with PMH DM, HTN, HLD, ESRD on HD (MWF, L-AVF), gout, hemochromatosis, Foresenius, and previous smoker who presents with shortness of breath. Increased WOB in ED, placed on BiPAP and then taken off. CT chest showed bilateral patchy consolidation in lower lobes and R middle lobe. ICU evaluated and stated to re-consult if continued work of breathing after dialysis. HD removed 1.7L.  Admitted for AHRF 2/2 PNA vs volume overload. Admitted to ICU overnight for worsening respiratory status requiring BiPAP. Sputum CX, mycoplasma. Unable to collect legionella, strep pneumo as patient making minimal urine. Patient started on ceftriaxone and azithro. Received 3% inhalation, duonebs, Chest PT for respiratory mobilization/clearance. Trops 79 > 127 > 123, EKG showing sinus tachycardia, LVH, no ST-T changes - likely demand ischemia. Sugars in the 200s, for which patient was started on lantus 3u + ISS.     Patient is stable for downgrade. Signed out to Attending Dr Desai and PGY1 Alexey.      Things to follow:  - consider ECHO  - f/u sputum CX  - f/u mycoplasma  - f/u legionella, strep pneumo - if possible (as making minimal urine)  - HD as scheduled  - adjust insulin regimen as per fingersticks

## 2024-05-07 NOTE — PROGRESS NOTE ADULT - SUBJECTIVE AND OBJECTIVE BOX
INTERVAL HPI/OVERNIGHT EVENTS: Patient seen and examined at bedside. Resting comfortably. No acute overnight events. BiPAP > 4L NC, satting well.       PRESSORS: [ ] YES [x ] NO  WHICH:    ANTIBIOTICS:                  DATE STARTED:  ANTIBIOTICS:                  DATE STARTED:    Antimicrobial:  azithromycin  IVPB 500 milliGRAM(s) IV Intermittent every 24 hours  cefTRIAXone   IVPB 2000 milliGRAM(s) IV Intermittent every 24 hours    Cardiovascular:    Pulmonary:  albuterol/ipratropium for Nebulization 3 milliLiter(s) Nebulizer every 6 hours    Hematalogic:  heparin   Injectable 5000 Unit(s) SubCutaneous every 12 hours    Other:  acetaminophen     Tablet .. 650 milliGRAM(s) Oral every 6 hours PRN  aluminum hydroxide/magnesium hydroxide/simethicone Suspension 30 milliLiter(s) Oral every 4 hours PRN  insulin lispro (ADMELOG) corrective regimen sliding scale   SubCutaneous three times a day before meals  insulin lispro (ADMELOG) corrective regimen sliding scale   SubCutaneous at bedtime  melatonin 3 milliGRAM(s) Oral at bedtime PRN  ondansetron Injectable 4 milliGRAM(s) IV Push every 8 hours PRN  pancrelipase  (CREON 36,000 Lipase Units) 1 Capsule(s) Oral four times a day with meals  pantoprazole    Tablet 40 milliGRAM(s) Oral before breakfast  simvastatin 20 milliGRAM(s) Oral at bedtime    acetaminophen     Tablet .. 650 milliGRAM(s) Oral every 6 hours PRN  albuterol/ipratropium for Nebulization 3 milliLiter(s) Nebulizer every 6 hours  aluminum hydroxide/magnesium hydroxide/simethicone Suspension 30 milliLiter(s) Oral every 4 hours PRN  azithromycin  IVPB 500 milliGRAM(s) IV Intermittent every 24 hours  cefTRIAXone   IVPB 2000 milliGRAM(s) IV Intermittent every 24 hours  heparin   Injectable 5000 Unit(s) SubCutaneous every 12 hours  insulin lispro (ADMELOG) corrective regimen sliding scale   SubCutaneous three times a day before meals  insulin lispro (ADMELOG) corrective regimen sliding scale   SubCutaneous at bedtime  melatonin 3 milliGRAM(s) Oral at bedtime PRN  ondansetron Injectable 4 milliGRAM(s) IV Push every 8 hours PRN  pancrelipase  (CREON 36,000 Lipase Units) 1 Capsule(s) Oral four times a day with meals  pantoprazole    Tablet 40 milliGRAM(s) Oral before breakfast  simvastatin 20 milliGRAM(s) Oral at bedtime    Drug Dosing Weight  Height (cm): 149.9 (06 May 2024 09:00)  Weight (kg): 49.8 (06 May 2024 21:45)  BMI (kg/m2): 22.2 (06 May 2024 21:45)  BSA (m2): 1.43 (06 May 2024 21:45)    PHYSICAL EXAM:  GENERAL: NAD  EYES: EOMI, PERRLA  NECK: Supple, No JVD; Normal thyroid; Trachea midline: No LAD   NERVOUS SYSTEM:  Alert & Oriented X3,  Motor Strength 5/5 B/L upper and lower extremities; DTRs 2+ intact and symmetric  CHEST/LUNG: +crackles worse at the bases. +congestion, mild expiratory wheezing  HEART: Regular rate and rhythm; No murmurs, no gallops  ABDOMEN: Soft, Nontender, Nondistended; Bowel sounds present, no pain or masses on palpation  : voiding well  EXTREMITIES:  2+ Peripheral Pulses, No clubbing, cyanosis, or edema  SKIN: warm, intact, no lesions     LINES/DRAINS/DEVICES  CENTRAL LINE: [ ] YES [x ] NO  LOCATION:     MONIQUE: [ ] YES [x ] NO     A-LINE:  [ ] YES [x ] NO  LOCATION:       ICU Vital Signs Last 24 Hrs  T(C): 37.3 (07 May 2024 05:00), Max: 37.3 (07 May 2024 00:00)  T(F): 99.1 (07 May 2024 05:00), Max: 99.2 (07 May 2024 00:00)  HR: 110 (07 May 2024 12:16) (60 - 118)  BP: 124/78 (07 May 2024 11:00) (106/70 - 136/72)  BP(mean): 91 (07 May 2024 11:00) (72 - 108)  ABP: --  ABP(mean): --  RR: 27 (07 May 2024 11:00) (18 - 33)  SpO2: 92% (07 May 2024 12:16) (92% - 100%)    O2 Parameters below as of 07 May 2024 07:00  Patient On (Oxygen Delivery Method): nasal cannula  O2 Flow (L/min): 4          ABG - ( 07 May 2024 03:37 )  pH, Arterial: 7.51  pH, Blood: x     /  pCO2: 34    /  pO2: 197   / HCO3: 27    / Base Excess: 4.3   /  SaO2: 100                   05-06 @ 07:01  -  05-07 @ 07:00  --------------------------------------------------------  IN: 600 mL / OUT: 2300 mL / NET: -1700 mL              LABS:  CBC Full  -  ( 07 May 2024 04:43 )  WBC Count : 7.69 K/uL  RBC Count : 3.36 M/uL  Hemoglobin : 9.8 g/dL  Hematocrit : 29.5 %  Platelet Count - Automated : 153 K/uL  Mean Cell Volume : 87.8 fl  Mean Cell Hemoglobin : 29.2 pg  Mean Cell Hemoglobin Concentration : 33.2 gm/dL  Auto Neutrophil # : 7.46 K/uL  Auto Lymphocyte # : 0.15 K/uL  Auto Monocyte # : 0.08 K/uL  Auto Eosinophil # : 0.00 K/uL  Auto Basophil # : 0.00 K/uL  Auto Neutrophil % : 97.0 %  Auto Lymphocyte % : 2.0 %  Auto Monocyte % : 1.0 %  Auto Eosinophil % : 0.0 %  Auto Basophil % : 0.0 %    05-07    135  |  95<L>  |  26<H>  ----------------------------<  169<H>  3.3<L>   |  26  |  3.16<H>    Ca    8.4      07 May 2024 04:43  Phos  3.1     05-07  Mg     2.3     05-07    TPro  5.6<L>  /  Alb  1.9<L>  /  TBili  1.2  /  DBili  x   /  AST  62<H>  /  ALT  29  /  AlkPhos  245<H>  05-07      Urinalysis Basic - ( 07 May 2024 04:43 )    Color: x / Appearance: x / SG: x / pH: x  Gluc: 169 mg/dL / Ketone: x  / Bili: x / Urobili: x   Blood: x / Protein: x / Nitrite: x   Leuk Esterase: x / RBC: x / WBC x   Sq Epi: x / Non Sq Epi: x / Bacteria: x          RADIOLOGY & ADDITIONAL STUDIES REVIEWED DURING TEAM ROUNDS    [ ]GOALS OF CARE DISCUSSION WITH PATIENT/FAMILY/PROXY:    CRITICAL CARE TIME SPENT: 35 minutes

## 2024-05-07 NOTE — DIETITIAN INITIAL EVALUATION ADULT - PROBLEM SELECTOR PLAN 4
Pt has a history of ESRD on HD, MWF, last session 5/3  pt follows with Dr. Michael Valdes at [facility], [address]  - C/w HD     Nephrology Dr. Pandey Consulted

## 2024-05-07 NOTE — DIETITIAN INITIAL EVALUATION ADULT - PERTINENT MEDS FT
MEDICATIONS  (STANDING):  albuterol/ipratropium for Nebulization 3 milliLiter(s) Nebulizer every 6 hours  azithromycin  IVPB 500 milliGRAM(s) IV Intermittent every 24 hours  cefTRIAXone   IVPB 2000 milliGRAM(s) IV Intermittent every 24 hours  heparin   Injectable 5000 Unit(s) SubCutaneous every 12 hours  insulin glargine Injectable (LANTUS) 3 Unit(s) SubCutaneous at bedtime  insulin lispro (ADMELOG) corrective regimen sliding scale   SubCutaneous three times a day before meals  insulin lispro (ADMELOG) corrective regimen sliding scale   SubCutaneous at bedtime  pancrelipase  (CREON 36,000 Lipase Units) 1 Capsule(s) Oral four times a day with meals  pantoprazole    Tablet 40 milliGRAM(s) Oral before breakfast  simvastatin 20 milliGRAM(s) Oral at bedtime    MEDICATIONS  (PRN):  acetaminophen     Tablet .. 650 milliGRAM(s) Oral every 6 hours PRN Temp greater or equal to 38C (100.4F), Mild Pain (1 - 3)  aluminum hydroxide/magnesium hydroxide/simethicone Suspension 30 milliLiter(s) Oral every 4 hours PRN Dyspepsia  melatonin 3 milliGRAM(s) Oral at bedtime PRN Insomnia  ondansetron Injectable 4 milliGRAM(s) IV Push every 8 hours PRN Nausea and/or Vomiting

## 2024-05-07 NOTE — PROGRESS NOTE ADULT - ASSESSMENT
# ESRD admitted with SOB, hypoxia PNA vs AHRF . s/p  HD yesterday.  events noted. SOB, tachycardia and chest pain on HD last evening, only tolerated 1.7 kilos of UF.  transferred to ICU. was on NIV now on nasal canula feels better.  will plan for HD in AM   # bilateral PNA. on ABX. symptomatically better  # hyponatremia- in light of fluid overload. improved with UF on HD. restrict fluids to 1.2L?DAY   # anemia of CKD. stable, no indication  for CULLEN.  # HTN stable UF with HD

## 2024-05-07 NOTE — PROGRESS NOTE ADULT - SUBJECTIVE AND OBJECTIVE BOX
[facility] : Progress Note :: [phone], (office [phone]),   Dr Pandey / Dr Mi / Dr Massey / Dr Etienne / Dr Cassandra DA SILVA / Dr Burt / Dr Garber / Dr Naeem lam  _____________________________________________________________________________________________    events noted. SOB, tachycardia and chest pain on HD last evening, only tolerated 1.7 kilos of UF.  transferred to ICU. was on NIV now on nasal canula feels better.    shellfish (Anaphylaxis)  aspirin (Unknown)  ibuprofen (Unknown)  Mushrooms (Anaphylaxis)    Hospital Medications:   MEDICATIONS  (STANDING):  albuterol/ipratropium for Nebulization 3 milliLiter(s) Nebulizer every 6 hours  azithromycin  IVPB 500 milliGRAM(s) IV Intermittent every 24 hours  cefTRIAXone   IVPB 2000 milliGRAM(s) IV Intermittent every 24 hours  heparin   Injectable 5000 Unit(s) SubCutaneous every 12 hours  insulin glargine Injectable (LANTUS) 3 Unit(s) SubCutaneous at bedtime  insulin lispro (ADMELOG) corrective regimen sliding scale   SubCutaneous three times a day before meals  insulin lispro (ADMELOG) corrective regimen sliding scale   SubCutaneous at bedtime  pancrelipase  (CREON 36,000 Lipase Units) 1 Capsule(s) Oral four times a day with meals  pantoprazole    Tablet 40 milliGRAM(s) Oral before breakfast  simvastatin 20 milliGRAM(s) Oral at bedtime  sodium chloride 3%  Inhalation 4 milliLiter(s) Inhalation every 12 hours      VITALS:  T(F): 98.6 (05-07-24 @ 16:17), Max: 99.3 (05-07-24 @ 08:00)  HR: 108 (05-07-24 @ 17:00)  BP: 136/72 (05-07-24 @ 17:00)  RR: 28 (05-07-24 @ 17:00)  SpO2: 95% (05-07-24 @ 17:00)  Wt(kg): --    05-06 @ 07:01  -  05-07 @ 07:00  --------------------------------------------------------  IN: 600 mL / OUT: 2300 mL / NET: -1700 mL    05-07 @ 07:01  -  05-07 @ 18:23  --------------------------------------------------------  IN: 200 mL / OUT: 0 mL / NET: 200 mL      Height (cm): 149.9 (05-07 @ 15:17)  Weight (kg): 49.8 (05-06 @ 21:45)  BMI (kg/m2): 22.2 (05-07 @ 15:17)  BSA (m2): 1.43 (05-07 @ 15:17)  PHYSICAL EXAM:  Constitutional: NAD  HEENT: anicteric sclera, oropharynx clear,  Neck: No JVD  Respiratory: CTAB, no wheezes, rales or rhonchi  Cardiovascular: S1, S2, RRR  Gastrointestinal: BS+, soft, NT/ND  Extremities: No peripheral edema  Neurological: A/O x 3, no focal deficits  Vascular Access: LUEAVF with thrill and bruit     LABS:  05-07    135  |  95<L>  |  26<H>  ----------------------------<  169<H>  3.3<L>   |  26  |  3.16<H>    Ca    8.4      07 May 2024 04:43  Phos  3.1     05-07  Mg     2.3     05-07    TPro  5.6<L>  /  Alb  1.9<L>  /  TBili  1.2  /  DBili      /  AST  62<H>  /  ALT  29  /  AlkPhos  245<H>  05-07    Creatinine Trend: 3.16 <--, 6.14 <--                        9.8    7.69  )-----------( 153      ( 07 May 2024 04:43 )             29.5     Urine Studies:  Urinalysis Basic - ( 07 May 2024 04:43 )    Color:  / Appearance:  / SG:  / pH:   Gluc: 169 mg/dL / Ketone:   / Bili:  / Urobili:    Blood:  / Protein:  / Nitrite:    Leuk Esterase:  / RBC:  / WBC    Sq Epi:  / Non Sq Epi:  / Bacteria:         RADIOLOGY & ADDITIONAL STUDIES:

## 2024-05-08 DIAGNOSIS — J96.01 ACUTE RESPIRATORY FAILURE WITH HYPOXIA: ICD-10-CM

## 2024-05-08 DIAGNOSIS — I24.89 OTHER FORMS OF ACUTE ISCHEMIC HEART DISEASE: ICD-10-CM

## 2024-05-08 DIAGNOSIS — Z75.8 OTHER PROBLEMS RELATED TO MEDICAL FACILITIES AND OTHER HEALTH CARE: ICD-10-CM

## 2024-05-08 LAB
ANION GAP SERPL CALC-SCNC: 13 MMOL/L — SIGNIFICANT CHANGE UP (ref 5–17)
ANION GAP SERPL CALC-SCNC: 15 MMOL/L — SIGNIFICANT CHANGE UP (ref 5–17)
BUN SERPL-MCNC: 19 MG/DL — HIGH (ref 7–18)
BUN SERPL-MCNC: 60 MG/DL — HIGH (ref 7–18)
CALCIUM SERPL-MCNC: 8.2 MG/DL — LOW (ref 8.4–10.5)
CALCIUM SERPL-MCNC: 8.5 MG/DL — SIGNIFICANT CHANGE UP (ref 8.4–10.5)
CHLORIDE SERPL-SCNC: 94 MMOL/L — LOW (ref 96–108)
CHLORIDE SERPL-SCNC: 97 MMOL/L — SIGNIFICANT CHANGE UP (ref 96–108)
CO2 SERPL-SCNC: 25 MMOL/L — SIGNIFICANT CHANGE UP (ref 22–31)
CO2 SERPL-SCNC: 25 MMOL/L — SIGNIFICANT CHANGE UP (ref 22–31)
CREAT SERPL-MCNC: 2.49 MG/DL — HIGH (ref 0.5–1.3)
CREAT SERPL-MCNC: 5.37 MG/DL — HIGH (ref 0.5–1.3)
EGFR: 18 ML/MIN/1.73M2 — LOW
EGFR: 7 ML/MIN/1.73M2 — LOW
GLUCOSE BLDC GLUCOMTR-MCNC: 153 MG/DL — HIGH (ref 70–99)
GLUCOSE BLDC GLUCOMTR-MCNC: 174 MG/DL — HIGH (ref 70–99)
GLUCOSE BLDC GLUCOMTR-MCNC: 180 MG/DL — HIGH (ref 70–99)
GLUCOSE BLDC GLUCOMTR-MCNC: 231 MG/DL — HIGH (ref 70–99)
GLUCOSE SERPL-MCNC: 149 MG/DL — HIGH (ref 70–99)
GLUCOSE SERPL-MCNC: 197 MG/DL — HIGH (ref 70–99)
HBV SURFACE AB SER-ACNC: REACTIVE
HBV SURFACE AG SER-ACNC: SIGNIFICANT CHANGE UP
HCT VFR BLD CALC: 26.6 % — LOW (ref 34.5–45)
HGB BLD-MCNC: 9 G/DL — LOW (ref 11.5–15.5)
M PNEUMO IGM SER-ACNC: 0.33 INDEX — SIGNIFICANT CHANGE UP (ref 0–0.9)
MAGNESIUM SERPL-MCNC: 2.9 MG/DL — HIGH (ref 1.6–2.6)
MCHC RBC-ENTMCNC: 29.4 PG — SIGNIFICANT CHANGE UP (ref 27–34)
MCHC RBC-ENTMCNC: 33.8 GM/DL — SIGNIFICANT CHANGE UP (ref 32–36)
MCV RBC AUTO: 86.9 FL — SIGNIFICANT CHANGE UP (ref 80–100)
MYCOPLASMA AG SPEC QL: NEGATIVE — SIGNIFICANT CHANGE UP
NRBC # BLD: 0 /100 WBCS — SIGNIFICANT CHANGE UP (ref 0–0)
PHOSPHATE SERPL-MCNC: 2.9 MG/DL — SIGNIFICANT CHANGE UP (ref 2.5–4.5)
PLATELET # BLD AUTO: 186 K/UL — SIGNIFICANT CHANGE UP (ref 150–400)
POTASSIUM SERPL-MCNC: 2.7 MMOL/L — CRITICAL LOW (ref 3.5–5.3)
POTASSIUM SERPL-MCNC: 3.1 MMOL/L — LOW (ref 3.5–5.3)
POTASSIUM SERPL-SCNC: 2.7 MMOL/L — CRITICAL LOW (ref 3.5–5.3)
POTASSIUM SERPL-SCNC: 3.1 MMOL/L — LOW (ref 3.5–5.3)
RBC # BLD: 3.06 M/UL — LOW (ref 3.8–5.2)
RBC # FLD: 14.5 % — SIGNIFICANT CHANGE UP (ref 10.3–14.5)
SODIUM SERPL-SCNC: 132 MMOL/L — LOW (ref 135–145)
SODIUM SERPL-SCNC: 137 MMOL/L — SIGNIFICANT CHANGE UP (ref 135–145)
WBC # BLD: 9.92 K/UL — SIGNIFICANT CHANGE UP (ref 3.8–10.5)
WBC # FLD AUTO: 9.92 K/UL — SIGNIFICANT CHANGE UP (ref 3.8–10.5)

## 2024-05-08 RX ORDER — CHLORHEXIDINE GLUCONATE 213 G/1000ML
1 SOLUTION TOPICAL
Refills: 0 | Status: DISCONTINUED | OUTPATIENT
Start: 2024-05-08 | End: 2024-05-16

## 2024-05-08 RX ADMIN — Medication 3 MILLILITER(S): at 20:58

## 2024-05-08 RX ADMIN — Medication 1 CAPSULE(S): at 09:05

## 2024-05-08 RX ADMIN — Medication 2: at 11:53

## 2024-05-08 RX ADMIN — SIMVASTATIN 20 MILLIGRAM(S): 20 TABLET, FILM COATED ORAL at 21:56

## 2024-05-08 RX ADMIN — SODIUM CHLORIDE 4 MILLILITER(S): 9 INJECTION INTRAMUSCULAR; INTRAVENOUS; SUBCUTANEOUS at 09:12

## 2024-05-08 RX ADMIN — PANTOPRAZOLE SODIUM 40 MILLIGRAM(S): 20 TABLET, DELAYED RELEASE ORAL at 05:28

## 2024-05-08 RX ADMIN — Medication 1 CAPSULE(S): at 21:55

## 2024-05-08 RX ADMIN — SODIUM CHLORIDE 4 MILLILITER(S): 9 INJECTION INTRAMUSCULAR; INTRAVENOUS; SUBCUTANEOUS at 20:58

## 2024-05-08 RX ADMIN — HEPARIN SODIUM 5000 UNIT(S): 5000 INJECTION INTRAVENOUS; SUBCUTANEOUS at 05:28

## 2024-05-08 RX ADMIN — Medication 3 MILLILITER(S): at 15:53

## 2024-05-08 RX ADMIN — INSULIN GLARGINE 3 UNIT(S): 100 INJECTION, SOLUTION SUBCUTANEOUS at 21:56

## 2024-05-08 RX ADMIN — Medication 0: at 22:00

## 2024-05-08 RX ADMIN — Medication 1: at 16:48

## 2024-05-08 RX ADMIN — Medication 3 MILLIGRAM(S): at 23:03

## 2024-05-08 RX ADMIN — Medication 1: at 07:54

## 2024-05-08 RX ADMIN — Medication 1 CAPSULE(S): at 16:48

## 2024-05-08 RX ADMIN — Medication 1 CAPSULE(S): at 11:54

## 2024-05-08 RX ADMIN — CHLORHEXIDINE GLUCONATE 1 APPLICATION(S): 213 SOLUTION TOPICAL at 12:48

## 2024-05-08 RX ADMIN — HEPARIN SODIUM 5000 UNIT(S): 5000 INJECTION INTRAVENOUS; SUBCUTANEOUS at 20:29

## 2024-05-08 RX ADMIN — AZITHROMYCIN 255 MILLIGRAM(S): 500 TABLET, FILM COATED ORAL at 20:29

## 2024-05-08 RX ADMIN — CEFTRIAXONE 100 MILLIGRAM(S): 500 INJECTION, POWDER, FOR SOLUTION INTRAMUSCULAR; INTRAVENOUS at 16:13

## 2024-05-08 RX ADMIN — Medication 3 MILLILITER(S): at 09:12

## 2024-05-08 NOTE — PROGRESS NOTE ADULT - ASSESSMENT
# ESRD admitted with SOB, hypoxia PNA vs AHRF . s/p  HD Monday.  events noted. SOB, tachycardia and chest pain on HD on Monday. only tolerated 1.7 kilos of UF.  downgraded from  ICU.  awaits HD today  # bilateral PNA. on ABX. symptomatically better  # hyponatremia- in light of fluid overload. improved with UF on HD. restrict fluids to 1.2L/DAY   # anemia of CKD. resume  CULLEN on HD.  # HTN stable UF with HD

## 2024-05-08 NOTE — PROGRESS NOTE ADULT - SUBJECTIVE AND OBJECTIVE BOX
PGY-1 Progress Note discussed with attending    PAGER #: [[phone]] TILL 5:00 PM  PLEASE CONTACT ON CALL TEAM:  - On Call Team (Please refer to Jose Raul) FROM 5:00 PM - 8:30PM  - Nightfloat Team FROM 8:30 -7:30 AM    CHIEF COMPLAINT & BRIEF HOSPITAL COURSE:      INTERVAL HPI/OVERNIGHT EVENTS:       REVIEW OF SYSTEMS:  CONSTITUTIONAL: No fever, weight loss, or fatigue  RESPIRATORY: No cough, wheezing, chills or hemoptysis; No shortness of breath  CARDIOVASCULAR: No chest pain, palpitations, dizziness, or leg swelling  GASTROINTESTINAL: No abdominal pain. No nausea, vomiting, or hematemesis; No diarrhea or constipation. No melena or hematochezia.  GENITOURINARY: No dysuria or hematuria, urinary frequency  NEUROLOGICAL: No headaches, memory loss, loss of strength, numbness, or tremors  SKIN: No itching, burning, rashes, or lesions     MEDICATIONS  (STANDING):  albuterol/ipratropium for Nebulization 3 milliLiter(s) Nebulizer every 6 hours  azithromycin  IVPB 500 milliGRAM(s) IV Intermittent every 24 hours  cefTRIAXone   IVPB 2000 milliGRAM(s) IV Intermittent every 24 hours  heparin   Injectable 5000 Unit(s) SubCutaneous every 12 hours  insulin glargine Injectable (LANTUS) 3 Unit(s) SubCutaneous at bedtime  insulin lispro (ADMELOG) corrective regimen sliding scale   SubCutaneous three times a day before meals  insulin lispro (ADMELOG) corrective regimen sliding scale   SubCutaneous at bedtime  pancrelipase  (CREON 36,000 Lipase Units) 1 Capsule(s) Oral four times a day with meals  pantoprazole    Tablet 40 milliGRAM(s) Oral before breakfast  simvastatin 20 milliGRAM(s) Oral at bedtime  sodium chloride 3%  Inhalation 4 milliLiter(s) Inhalation every 12 hours    MEDICATIONS  (PRN):  acetaminophen     Tablet .. 650 milliGRAM(s) Oral every 6 hours PRN Temp greater or equal to 38C (100.4F), Mild Pain (1 - 3)  aluminum hydroxide/magnesium hydroxide/simethicone Suspension 30 milliLiter(s) Oral every 4 hours PRN Dyspepsia  melatonin 3 milliGRAM(s) Oral at bedtime PRN Insomnia  ondansetron Injectable 4 milliGRAM(s) IV Push every 8 hours PRN Nausea and/or Vomiting      Vital Signs Last 24 Hrs  T(C): 36.7 (08 May 2024 05:33), Max: 37.2 (07 May 2024 12:00)  T(F): 98.1 (08 May 2024 05:33), Max: 99 (07 May 2024 12:00)  HR: 96 (08 May 2024 05:33) (96 - 113)  BP: 116/71 (08 May 2024 05:33) (105/84 - 136/72)  BP(mean): 91 (07 May 2024 17:00) (78 - 93)  RR: 17 (08 May 2024 05:33) (17 - 33)  SpO2: 99% (08 May 2024 05:33) (91% - 99%)    Parameters below as of 08 May 2024 05:33  Patient On (Oxygen Delivery Method): nasal cannula  O2 Flow (L/min): 3      PHYSICAL EXAMINATION:  GENERAL: NAD, well built  HEAD:  Atraumatic, Normocephalic  EYES:  conjunctiva and sclera clear  NECK: Supple, No JVD, Normal thyroid  CHEST/LUNG: Clear to auscultation. Clear to percussion bilaterally; No rales, rhonchi, wheezing, or rubs  HEART: Regular rate and rhythm; No murmurs, rubs, or gallops  ABDOMEN: Soft, Nontender, Nondistended; Bowel sounds present, no pain or masses on palpation  NERVOUS SYSTEM:  Alert & Oriented X3  : voiding well  EXTREMITIES:  2+ Peripheral Pulses, No clubbing, cyanosis, or edema  SKIN: warm dry                          9.8    7.69  )-----------( 153      ( 07 May 2024 04:43 )             29.5     05-07    135  |  95<L>  |  26<H>  ----------------------------<  169<H>  3.3<L>   |  26  |  3.16<H>    Ca    8.4      07 May 2024 04:43  Phos  3.1     05-07  Mg     2.3     05-07    TPro  5.6<L>  /  Alb  1.9<L>  /  TBili  1.2  /  DBili  x   /  AST  62<H>  /  ALT  29  /  AlkPhos  245<H>  05-07    LIVER FUNCTIONS - ( 07 May 2024 04:43 )  Alb: 1.9 g/dL / Pro: 5.6 g/dL / ALK PHOS: 245 U/L / ALT: 29 U/L DA / AST: 62 U/L / GGT: x                   I&O's Summary    07 May 2024 07:01  -  08 May 2024 07:00  --------------------------------------------------------  IN: 200 mL / OUT: 0 mL / NET: 200 mL            CAPILLARY BLOOD GLUCOSE      RADIOLOGY & ADDITIONAL TESTS:                   PGY-1 Progress Note discussed with attending    PAGER #: [[phone]] TILL 5:00 PM  PLEASE CONTACT ON CALL TEAM:  - On Call Team (Please refer to Jose Raul) FROM 5:00 PM - 8:30PM  - Nightfloat Team FROM 8:30 -7:30 AM    CHIEF COMPLAINT & BRIEF HOSPITAL COURSE:      INTERVAL HPI/OVERNIGHT EVENTS: No acute events, DG from ICU   Patient AO x 3, forgetful, PARESH  Patient endorses she feels embarrassed that she contnues to have accidents and messes up the bed, hence does not want to eat food. Patient reassured that she will be cleaned as needed and not to worry about eating.    Spoke to RN - patient has incontinence, no episodes of watery diarrhea   Scheduled for HD today     REVIEW OF SYSTEMS:  CONSTITUTIONAL: No fever, weight loss, or + fatigue  RESPIRATORY: No cough, wheezing, chills or hemoptysis; No shortness of breath  CARDIOVASCULAR: No chest pain, palpitations, dizziness, or leg swelling  GASTROINTESTINAL: No abdominal pain. No nausea, vomiting, or hematemesis; No diarrhea or constipation. No melena or hematochezia. + incontinence   GENITOURINARY: No dysuria or hematuria, urinary frequency  NEUROLOGICAL: No headaches, memory loss, loss of strength, numbness, or tremors  SKIN: No itching, burning, rashes, or lesions     MEDICATIONS  (STANDING):  albuterol/ipratropium for Nebulization 3 milliLiter(s) Nebulizer every 6 hours  azithromycin  IVPB 500 milliGRAM(s) IV Intermittent every 24 hours  cefTRIAXone   IVPB 2000 milliGRAM(s) IV Intermittent every 24 hours  heparin   Injectable 5000 Unit(s) SubCutaneous every 12 hours  insulin glargine Injectable (LANTUS) 3 Unit(s) SubCutaneous at bedtime  insulin lispro (ADMELOG) corrective regimen sliding scale   SubCutaneous three times a day before meals  insulin lispro (ADMELOG) corrective regimen sliding scale   SubCutaneous at bedtime  pancrelipase  (CREON 36,000 Lipase Units) 1 Capsule(s) Oral four times a day with meals  pantoprazole    Tablet 40 milliGRAM(s) Oral before breakfast  simvastatin 20 milliGRAM(s) Oral at bedtime  sodium chloride 3%  Inhalation 4 milliLiter(s) Inhalation every 12 hours    MEDICATIONS  (PRN):  acetaminophen     Tablet .. 650 milliGRAM(s) Oral every 6 hours PRN Temp greater or equal to 38C (100.4F), Mild Pain (1 - 3)  aluminum hydroxide/magnesium hydroxide/simethicone Suspension 30 milliLiter(s) Oral every 4 hours PRN Dyspepsia  melatonin 3 milliGRAM(s) Oral at bedtime PRN Insomnia  ondansetron Injectable 4 milliGRAM(s) IV Push every 8 hours PRN Nausea and/or Vomiting      Vital Signs Last 24 Hrs  T(C): 36.7 (08 May 2024 05:33), Max: 37.2 (07 May 2024 12:00)  T(F): 98.1 (08 May 2024 05:33), Max: 99 (07 May 2024 12:00)  HR: 96 (08 May 2024 05:33) (96 - 113)  BP: 116/71 (08 May 2024 05:33) (105/84 - 136/72)  BP(mean): 91 (07 May 2024 17:00) (78 - 93)  RR: 17 (08 May 2024 05:33) (17 - 33)  SpO2: 99% (08 May 2024 05:33) (91% - 99%)    Parameters below as of 08 May 2024 05:33  Patient On (Oxygen Delivery Method): nasal cannula  O2 Flow (L/min): 3      PHYSICAL EXAMINATION:  GENERAL: NAD  EYES: EOMI, PERRLA  NECK: Supple, No JVD; Normal thyroid; Trachea midline: No LAD   NERVOUS SYSTEM:  Alert & Oriented X3,  Motor Strength 5/5 B/L upper and lower extremities; DTRs 2+ intact and symmetric  CHEST/LUNG: + mild bibasilar crackles . NO congestion, NO wheezing  HEART: Regular rate and rhythm; No murmurs, no gallops  ABDOMEN: Soft, Nontender, Nondistended; Bowel sounds present, no pain or masses on palpation  : voiding well  EXTREMITIES:  2+ Peripheral Pulses, No clubbing, cyanosis, or edema  SKIN: warm, intact, no lesions                           9.8    7.69  )-----------( 153      ( 07 May 2024 04:43 )             29.5     05-07    135  |  95<L>  |  26<H>  ----------------------------<  169<H>  3.3<L>   |  26  |  3.16<H>    Ca    8.4      07 May 2024 04:43  Phos  3.1     05-07  Mg     2.3     05-07    TPro  5.6<L>  /  Alb  1.9<L>  /  TBili  1.2  /  DBili  x   /  AST  62<H>  /  ALT  29  /  AlkPhos  245<H>  05-07    LIVER FUNCTIONS - ( 07 May 2024 04:43 )  Alb: 1.9 g/dL / Pro: 5.6 g/dL / ALK PHOS: 245 U/L / ALT: 29 U/L DA / AST: 62 U/L / GGT: x                   I&O's Summary    07 May 2024 07:01  -  08 May 2024 07:00  --------------------------------------------------------  IN: 200 mL / OUT: 0 mL / NET: 200 mL            CAPILLARY BLOOD GLUCOSE      RADIOLOGY & ADDITIONAL TESTS:

## 2024-05-08 NOTE — PROGRESS NOTE ADULT - PROBLEM SELECTOR PLAN 2
# PNA  - CT chest - bilateral patchy consolidation in lower lobes and R middle lobe.  -  sputum CX, mycoplasma, legionella, strep pneumo, ordered , but not collected   - on ceftriaxone, azithro

## 2024-05-08 NOTE — PROGRESS NOTE ADULT - PROBLEM SELECTOR PLAN 1
2/2 Volume overload requiring HD and PNA  - CXR - patchy R LL opacity   - CTA neg PE   - CT chest - bilateral patchy consolidation in lower lobes and R middle lobe.  - HD as scheduled - removed 1.7L last session.  - chest PT + 3% inhalation + duonebs  - nasal cannula> weaned off , on RA   - sputum CX, mycoplasma, legionella, strep pneumo ordered> NOT collected as no sputum production and patient is anuric   - on ceftriaxone, azithro for CAP coverage

## 2024-05-08 NOTE — PROGRESS NOTE ADULT - PROBLEM SELECTOR PLAN 3
scheduled for HD today   Creat 3.16>5.37  anemia of renal disease  - monitor CBC- Hb stable at 9  Nephro Dr. Pandey following

## 2024-05-08 NOTE — PROGRESS NOTE ADULT - ASSESSMENT
83F, from home (lives alone), with PMH DM, HTN, HLD, ESRD on HD (MWF, L-AVF), gout, hemochromatosis, [place], and previous smoker who presents with shortness of breath. Increased WOB in ED, placed on BiPAP and then taken off. ICU evaluated and stated to re-consult if continued work of breathing after dialysis. Admitted for AHRF 2/2 PNA. Admitted to ICU overnight for worsening respiratory status requiring BiPAP, DG to medicine floor for further management saturating well on RA, scheduled for HD today , GOC - has prior MOLST, to be confirmed and documented

## 2024-05-08 NOTE — PROGRESS NOTE ADULT - SUBJECTIVE AND OBJECTIVE BOX
[facility] : Progress Note :: [phone], (office [phone]),   Dr Pandey / Dr Mi / Dr Massey / Dr Etienne / Dr Cassandra DA SILVA / Dr Burt / Dr Garber / Dr Naeem lam  _____________________________________________________________________________________________  downgraded from ICU.  awaits HD.    shellfish (Anaphylaxis)  aspirin (Unknown)  ibuprofen (Unknown)  Mushrooms (Anaphylaxis)    Hospital Medications:   MEDICATIONS  (STANDING):  albuterol/ipratropium for Nebulization 3 milliLiter(s) Nebulizer every 6 hours  azithromycin  IVPB 500 milliGRAM(s) IV Intermittent every 24 hours  cefTRIAXone   IVPB 2000 milliGRAM(s) IV Intermittent every 24 hours  chlorhexidine 2% Cloths 1 Application(s) Topical <User Schedule>  heparin   Injectable 5000 Unit(s) SubCutaneous every 12 hours  insulin glargine Injectable (LANTUS) 3 Unit(s) SubCutaneous at bedtime  insulin lispro (ADMELOG) corrective regimen sliding scale   SubCutaneous three times a day before meals  insulin lispro (ADMELOG) corrective regimen sliding scale   SubCutaneous at bedtime  pancrelipase  (CREON 36,000 Lipase Units) 1 Capsule(s) Oral four times a day with meals  pantoprazole    Tablet 40 milliGRAM(s) Oral before breakfast  simvastatin 20 milliGRAM(s) Oral at bedtime  sodium chloride 3%  Inhalation 4 milliLiter(s) Inhalation every 12 hours        VITALS:  T(F): 98.8 (05-08-24 @ 12:48), Max: 98.8 (05-08-24 @ 12:48)  HR: 108 (05-08-24 @ 12:48)  BP: 155/84 (05-08-24 @ 12:48)  RR: 18 (05-08-24 @ 12:48)  SpO2: 91% (05-08-24 @ 12:48)  Wt(kg): --    05-07 @ 07:01  -  05-08 @ 07:00  --------------------------------------------------------  IN: 200 mL / OUT: 0 mL / NET: 200 mL        PHYSICAL EXAM:  Constitutional: NAD  HEENT: anicteric sclera, oropharynx clear.  Neck: No JVD  Respiratory: CTAB, no wheezes, rales or rhonchi  Cardiovascular: S1, S2, RRR  Gastrointestinal: BS+, soft, NT/ND  Extremities:  No peripheral edema  Neurological: A/O x 3, no focal deficits.  Vascular Access: AVF with thrill and bruit     LABS:  05-07    135  |  95<L>  |  26<H>  ----------------------------<  169<H>  3.3<L>   |  26  |  3.16<H>    Ca    8.4      07 May 2024 04:43  Phos  3.1     05-07  Mg     2.3     05-07    TPro  5.6<L>  /  Alb  1.9<L>  /  TBili  1.2  /  DBili      /  AST  62<H>  /  ALT  29  /  AlkPhos  245<H>  05-07    Creatinine Trend: 3.16 <--, 6.14 <--                        9.8    7.69  )-----------( 153      ( 07 May 2024 04:43 )             29.5     Urine Studies:  Urinalysis Basic - ( 07 May 2024 04:43 )    Color:  / Appearance:  / SG:  / pH:   Gluc: 169 mg/dL / Ketone:   / Bili:  / Urobili:    Blood:  / Protein:  / Nitrite:    Leuk Esterase:  / RBC:  / WBC    Sq Epi:  / Non Sq Epi:  / Bacteria:         RADIOLOGY & ADDITIONAL STUDIES:

## 2024-05-08 NOTE — PROGRESS NOTE ADULT - PROBLEM SELECTOR PLAN 5
- home meds: Tujeo, repaglinide  - holding home meds  - sugars 200s  - started on  lantus 3u + ISS in ICU   - fingersticks ACHS   - Consistent carb diet  - A1C 5.4

## 2024-05-08 NOTE — PROGRESS NOTE ADULT - PROBLEM SELECTOR PLAN 4
- trops 75>127, trended down   - EKG - sinus tachycardia, LVH. No ST-T wave changes.    Last TTE - 2019> normal EF, G1DD   [ ] will consider TTE

## 2024-05-08 NOTE — PROGRESS NOTE ADULT - PROBLEM SELECTOR PLAN 7
SW to reinstate dialysis   Patient lives alone , NO HHA, walks with walker independent ADLs, has to walk up 2 flights of stairs   GO- [facility] prior documents DNR/DNI,   [ ] will confirm with patient and daughter in AM

## 2024-05-09 LAB
ALBUMIN SERPL ELPH-MCNC: 2 G/DL — LOW (ref 3.5–5)
ALP SERPL-CCNC: 300 U/L — HIGH (ref 40–120)
ALT FLD-CCNC: 71 U/L DA — HIGH (ref 10–60)
ANION GAP SERPL CALC-SCNC: 15 MMOL/L — SIGNIFICANT CHANGE UP (ref 5–17)
AST SERPL-CCNC: 150 U/L — HIGH (ref 10–40)
BASOPHILS # BLD AUTO: 0.01 K/UL — SIGNIFICANT CHANGE UP (ref 0–0.2)
BASOPHILS NFR BLD AUTO: 0.2 % — SIGNIFICANT CHANGE UP (ref 0–2)
BILIRUB SERPL-MCNC: 0.6 MG/DL — SIGNIFICANT CHANGE UP (ref 0.2–1.2)
BUN SERPL-MCNC: 24 MG/DL — HIGH (ref 7–18)
CALCIUM SERPL-MCNC: 8.7 MG/DL — SIGNIFICANT CHANGE UP (ref 8.4–10.5)
CHLORIDE SERPL-SCNC: 97 MMOL/L — SIGNIFICANT CHANGE UP (ref 96–108)
CO2 SERPL-SCNC: 27 MMOL/L — SIGNIFICANT CHANGE UP (ref 22–31)
CREAT SERPL-MCNC: 3.37 MG/DL — HIGH (ref 0.5–1.3)
EGFR: 13 ML/MIN/1.73M2 — LOW
EOSINOPHIL # BLD AUTO: 0.01 K/UL — SIGNIFICANT CHANGE UP (ref 0–0.5)
EOSINOPHIL NFR BLD AUTO: 0.2 % — SIGNIFICANT CHANGE UP (ref 0–6)
GLUCOSE BLDC GLUCOMTR-MCNC: 193 MG/DL — HIGH (ref 70–99)
GLUCOSE BLDC GLUCOMTR-MCNC: 228 MG/DL — HIGH (ref 70–99)
GLUCOSE BLDC GLUCOMTR-MCNC: 240 MG/DL — HIGH (ref 70–99)
GLUCOSE BLDC GLUCOMTR-MCNC: 316 MG/DL — HIGH (ref 70–99)
GLUCOSE SERPL-MCNC: 194 MG/DL — HIGH (ref 70–99)
HCT VFR BLD CALC: 29.7 % — LOW (ref 34.5–45)
HGB BLD-MCNC: 9.7 G/DL — LOW (ref 11.5–15.5)
IMM GRANULOCYTES NFR BLD AUTO: 3 % — HIGH (ref 0–0.9)
LYMPHOCYTES # BLD AUTO: 0.74 K/UL — LOW (ref 1–3.3)
LYMPHOCYTES # BLD AUTO: 11.8 % — LOW (ref 13–44)
MAGNESIUM SERPL-MCNC: 2.8 MG/DL — HIGH (ref 1.6–2.6)
MCHC RBC-ENTMCNC: 29.3 PG — SIGNIFICANT CHANGE UP (ref 27–34)
MCHC RBC-ENTMCNC: 32.7 GM/DL — SIGNIFICANT CHANGE UP (ref 32–36)
MCV RBC AUTO: 89.7 FL — SIGNIFICANT CHANGE UP (ref 80–100)
MONOCYTES # BLD AUTO: 0.47 K/UL — SIGNIFICANT CHANGE UP (ref 0–0.9)
MONOCYTES NFR BLD AUTO: 7.5 % — SIGNIFICANT CHANGE UP (ref 2–14)
MRSA PCR RESULT.: SIGNIFICANT CHANGE UP
NEUTROPHILS # BLD AUTO: 4.86 K/UL — SIGNIFICANT CHANGE UP (ref 1.8–7.4)
NEUTROPHILS NFR BLD AUTO: 77.3 % — HIGH (ref 43–77)
NRBC # BLD: 0 /100 WBCS — SIGNIFICANT CHANGE UP (ref 0–0)
PHOSPHATE SERPL-MCNC: 2.5 MG/DL — SIGNIFICANT CHANGE UP (ref 2.5–4.5)
PLATELET # BLD AUTO: 189 K/UL — SIGNIFICANT CHANGE UP (ref 150–400)
POTASSIUM SERPL-MCNC: 3.8 MMOL/L — SIGNIFICANT CHANGE UP (ref 3.5–5.3)
POTASSIUM SERPL-SCNC: 3.8 MMOL/L — SIGNIFICANT CHANGE UP (ref 3.5–5.3)
PROT SERPL-MCNC: 6.3 G/DL — SIGNIFICANT CHANGE UP (ref 6–8.3)
RBC # BLD: 3.31 M/UL — LOW (ref 3.8–5.2)
RBC # FLD: 14.5 % — SIGNIFICANT CHANGE UP (ref 10.3–14.5)
S AUREUS DNA NOSE QL NAA+PROBE: SIGNIFICANT CHANGE UP
SODIUM SERPL-SCNC: 139 MMOL/L — SIGNIFICANT CHANGE UP (ref 135–145)
WBC # BLD: 6.28 K/UL — SIGNIFICANT CHANGE UP (ref 3.8–10.5)
WBC # FLD AUTO: 6.28 K/UL — SIGNIFICANT CHANGE UP (ref 3.8–10.5)

## 2024-05-09 RX ORDER — ERYTHROPOIETIN 10000 [IU]/ML
4000 INJECTION, SOLUTION INTRAVENOUS; SUBCUTANEOUS
Refills: 0 | Status: DISCONTINUED | OUTPATIENT
Start: 2024-05-09 | End: 2024-05-14

## 2024-05-09 RX ADMIN — Medication 1 CAPSULE(S): at 21:14

## 2024-05-09 RX ADMIN — PANTOPRAZOLE SODIUM 40 MILLIGRAM(S): 20 TABLET, DELAYED RELEASE ORAL at 05:20

## 2024-05-09 RX ADMIN — CHLORHEXIDINE GLUCONATE 1 APPLICATION(S): 213 SOLUTION TOPICAL at 05:25

## 2024-05-09 RX ADMIN — INSULIN GLARGINE 3 UNIT(S): 100 INJECTION, SOLUTION SUBCUTANEOUS at 21:15

## 2024-05-09 RX ADMIN — CEFTRIAXONE 100 MILLIGRAM(S): 500 INJECTION, POWDER, FOR SOLUTION INTRAMUSCULAR; INTRAVENOUS at 15:14

## 2024-05-09 RX ADMIN — Medication 1: at 07:38

## 2024-05-09 RX ADMIN — SODIUM CHLORIDE 4 MILLILITER(S): 9 INJECTION INTRAMUSCULAR; INTRAVENOUS; SUBCUTANEOUS at 20:39

## 2024-05-09 RX ADMIN — HEPARIN SODIUM 5000 UNIT(S): 5000 INJECTION INTRAVENOUS; SUBCUTANEOUS at 17:02

## 2024-05-09 RX ADMIN — Medication 3 MILLILITER(S): at 03:16

## 2024-05-09 RX ADMIN — HEPARIN SODIUM 5000 UNIT(S): 5000 INJECTION INTRAVENOUS; SUBCUTANEOUS at 05:20

## 2024-05-09 RX ADMIN — SIMVASTATIN 20 MILLIGRAM(S): 20 TABLET, FILM COATED ORAL at 21:14

## 2024-05-09 RX ADMIN — AZITHROMYCIN 255 MILLIGRAM(S): 500 TABLET, FILM COATED ORAL at 15:42

## 2024-05-09 RX ADMIN — Medication 3 MILLILITER(S): at 20:39

## 2024-05-09 RX ADMIN — Medication 1 CAPSULE(S): at 07:32

## 2024-05-09 RX ADMIN — Medication 3 MILLILITER(S): at 15:51

## 2024-05-09 RX ADMIN — Medication 1 CAPSULE(S): at 16:34

## 2024-05-09 RX ADMIN — Medication 3 MILLILITER(S): at 09:37

## 2024-05-09 RX ADMIN — Medication 4: at 16:49

## 2024-05-09 RX ADMIN — Medication 2: at 11:30

## 2024-05-09 RX ADMIN — SODIUM CHLORIDE 4 MILLILITER(S): 9 INJECTION INTRAMUSCULAR; INTRAVENOUS; SUBCUTANEOUS at 09:58

## 2024-05-09 RX ADMIN — Medication 1 CAPSULE(S): at 11:30

## 2024-05-09 NOTE — PROGRESS NOTE ADULT - SUBJECTIVE AND OBJECTIVE BOX
PGY-1 Progress Note discussed with attending    PAGER #: [[phone]] TILL 5:00 PM  PLEASE CONTACT ON CALL TEAM:  - On Call Team (Please refer to Jose Raul) FROM 5:00 PM - 8:30PM  - Nightfloat Team FROM 8:30 -7:30 AM    CHIEF COMPLAINT & BRIEF HOSPITAL COURSE:      INTERVAL HPI/OVERNIGHT EVENTS:       REVIEW OF SYSTEMS:  CONSTITUTIONAL: No fever, weight loss, or fatigue  RESPIRATORY: No cough, wheezing, chills or hemoptysis; No shortness of breath  CARDIOVASCULAR: No chest pain, palpitations, dizziness, or leg swelling  GASTROINTESTINAL: No abdominal pain. No nausea, vomiting, or hematemesis; No diarrhea or constipation. No melena or hematochezia.  GENITOURINARY: No dysuria or hematuria, urinary frequency  NEUROLOGICAL: No headaches, memory loss, loss of strength, numbness, or tremors  SKIN: No itching, burning, rashes, or lesions     MEDICATIONS  (STANDING):  albuterol/ipratropium for Nebulization 3 milliLiter(s) Nebulizer every 6 hours  azithromycin  IVPB 500 milliGRAM(s) IV Intermittent every 24 hours  cefTRIAXone   IVPB 2000 milliGRAM(s) IV Intermittent every 24 hours  chlorhexidine 2% Cloths 1 Application(s) Topical <User Schedule>  heparin   Injectable 5000 Unit(s) SubCutaneous every 12 hours  insulin glargine Injectable (LANTUS) 3 Unit(s) SubCutaneous at bedtime  insulin lispro (ADMELOG) corrective regimen sliding scale   SubCutaneous three times a day before meals  insulin lispro (ADMELOG) corrective regimen sliding scale   SubCutaneous at bedtime  pancrelipase  (CREON 36,000 Lipase Units) 1 Capsule(s) Oral four times a day with meals  pantoprazole    Tablet 40 milliGRAM(s) Oral before breakfast  simvastatin 20 milliGRAM(s) Oral at bedtime  sodium chloride 3%  Inhalation 4 milliLiter(s) Inhalation every 12 hours    MEDICATIONS  (PRN):  acetaminophen     Tablet .. 650 milliGRAM(s) Oral every 6 hours PRN Temp greater or equal to 38C (100.4F), Mild Pain (1 - 3)  aluminum hydroxide/magnesium hydroxide/simethicone Suspension 30 milliLiter(s) Oral every 4 hours PRN Dyspepsia  melatonin 3 milliGRAM(s) Oral at bedtime PRN Insomnia  ondansetron Injectable 4 milliGRAM(s) IV Push every 8 hours PRN Nausea and/or Vomiting      Vital Signs Last 24 Hrs  T(C): 37.1 (09 May 2024 05:24), Max: 37.1 (08 May 2024 12:48)  T(F): 98.7 (09 May 2024 05:24), Max: 98.8 (08 May 2024 12:48)  HR: 107 (09 May 2024 05:24) (107 - 118)  BP: 133/67 (09 May 2024 05:24) (131/76 - 155/84)  BP(mean): --  RR: 20 (09 May 2024 05:24) (17 - 20)  SpO2: 94% (09 May 2024 05:24) (91% - 100%)    Parameters below as of 09 May 2024 05:24  Patient On (Oxygen Delivery Method): room air        PHYSICAL EXAMINATION:  GENERAL: NAD, well built  HEAD:  Atraumatic, Normocephalic  EYES:  conjunctiva and sclera clear  NECK: Supple, No JVD, Normal thyroid  CHEST/LUNG: Clear to auscultation. Clear to percussion bilaterally; No rales, rhonchi, wheezing, or rubs  HEART: Regular rate and rhythm; No murmurs, rubs, or gallops  ABDOMEN: Soft, Nontender, Nondistended; Bowel sounds present, no pain or masses on palpation  NERVOUS SYSTEM:  Alert & Oriented X3  : voiding well  EXTREMITIES:  2+ Peripheral Pulses, No clubbing, cyanosis, or edema  SKIN: warm dry                          9.7    6.28  )-----------( 189      ( 09 May 2024 06:50 )             29.7     05-09    139  |  97  |  24<H>  ----------------------------<  194<H>  3.8   |  27  |  3.37<H>    Ca    8.7      09 May 2024 06:50  Phos  2.5     05-09  Mg     2.8     05-09    TPro  6.3  /  Alb  2.0<L>  /  TBili  0.6  /  DBili  x   /  AST  150<H>  /  ALT  71<H>  /  AlkPhos  300<H>  05-09    LIVER FUNCTIONS - ( 09 May 2024 06:50 )  Alb: 2.0 g/dL / Pro: 6.3 g/dL / ALK PHOS: 300 U/L / ALT: 71 U/L DA / AST: 150 U/L / GGT: x                   I&O's Summary          CAPILLARY BLOOD GLUCOSE      RADIOLOGY & ADDITIONAL TESTS:                   PGY-1 Progress Note discussed with attending    PAGER #: [[phone]] TILL 5:00 PM  PLEASE CONTACT ON CALL TEAM:  - On Call Team (Please refer to Jose Raul) FROM 5:00 PM - 8:30PM  - Nightfloat Team FROM 8:30 -7:30 AM    CHIEF COMPLAINT & BRIEF HOSPITAL COURSE:      INTERVAL HPI/OVERNIGHT EVENTS: No acute events      REVIEW OF SYSTEMS:  CONSTITUTIONAL: No fever, weight loss, or fatigue  RESPIRATORY: No cough, wheezing, chills or hemoptysis; No shortness of breath  CARDIOVASCULAR: No chest pain, palpitations, dizziness, or leg swelling  GASTROINTESTINAL: No abdominal pain. No nausea, vomiting, or hematemesis; No diarrhea or constipation. No melena or hematochezia.  GENITOURINARY: No dysuria or hematuria, urinary frequency  NEUROLOGICAL: No headaches, memory loss, loss of strength, numbness, or tremors  SKIN: No itching, burning, rashes, or lesions     MEDICATIONS  (STANDING):  albuterol/ipratropium for Nebulization 3 milliLiter(s) Nebulizer every 6 hours  azithromycin  IVPB 500 milliGRAM(s) IV Intermittent every 24 hours  cefTRIAXone   IVPB 2000 milliGRAM(s) IV Intermittent every 24 hours  chlorhexidine 2% Cloths 1 Application(s) Topical <User Schedule>  heparin   Injectable 5000 Unit(s) SubCutaneous every 12 hours  insulin glargine Injectable (LANTUS) 3 Unit(s) SubCutaneous at bedtime  insulin lispro (ADMELOG) corrective regimen sliding scale   SubCutaneous three times a day before meals  insulin lispro (ADMELOG) corrective regimen sliding scale   SubCutaneous at bedtime  pancrelipase  (CREON 36,000 Lipase Units) 1 Capsule(s) Oral four times a day with meals  pantoprazole    Tablet 40 milliGRAM(s) Oral before breakfast  simvastatin 20 milliGRAM(s) Oral at bedtime  sodium chloride 3%  Inhalation 4 milliLiter(s) Inhalation every 12 hours    MEDICATIONS  (PRN):  acetaminophen     Tablet .. 650 milliGRAM(s) Oral every 6 hours PRN Temp greater or equal to 38C (100.4F), Mild Pain (1 - 3)  aluminum hydroxide/magnesium hydroxide/simethicone Suspension 30 milliLiter(s) Oral every 4 hours PRN Dyspepsia  melatonin 3 milliGRAM(s) Oral at bedtime PRN Insomnia  ondansetron Injectable 4 milliGRAM(s) IV Push every 8 hours PRN Nausea and/or Vomiting      Vital Signs Last 24 Hrs  T(C): 37.1 (09 May 2024 05:24), Max: 37.1 (08 May 2024 12:48)  T(F): 98.7 (09 May 2024 05:24), Max: 98.8 (08 May 2024 12:48)  HR: 107 (09 May 2024 05:24) (107 - 118)  BP: 133/67 (09 May 2024 05:24) (131/76 - 155/84)  BP(mean): --  RR: 20 (09 May 2024 05:24) (17 - 20)  SpO2: 94% (09 May 2024 05:24) (91% - 100%)    Parameters below as of 09 May 2024 05:24  Patient On (Oxygen Delivery Method): room air        PHYSICAL EXAMINATION:  GENERAL: NAD  EYES: EOMI, PERRLA  NECK: Supple, No JVD; Normal thyroid; Trachea midline: No LAD   NERVOUS SYSTEM:  Alert & Oriented X3,  Motor Strength 5/5 B/L upper and lower extremities; DTRs 2+ intact and symmetric  CHEST/LUNG: + minimal bibasilar crackles . NO congestion, NO wheezing  HEART: Regular rate and rhythm; No murmurs, no gallops  ABDOMEN: Soft, Nontender, Nondistended; Bowel sounds present, no pain or masses on palpation  : voiding well  EXTREMITIES:  2+ Peripheral Pulses, No clubbing, cyanosis, or edema  SKIN: warm, intact, no lesions                           9.7    6.28  )-----------( 189      ( 09 May 2024 06:50 )             29.7     05-09    139  |  97  |  24<H>  ----------------------------<  194<H>  3.8   |  27  |  3.37<H>    Ca    8.7      09 May 2024 06:50  Phos  2.5     05-09  Mg     2.8     05-09    TPro  6.3  /  Alb  2.0<L>  /  TBili  0.6  /  DBili  x   /  AST  150<H>  /  ALT  71<H>  /  AlkPhos  300<H>  05-09    LIVER FUNCTIONS - ( 09 May 2024 06:50 )  Alb: 2.0 g/dL / Pro: 6.3 g/dL / ALK PHOS: 300 U/L / ALT: 71 U/L DA / AST: 150 U/L / GGT: x                   I&O's Summary          CAPILLARY BLOOD GLUCOSE      RADIOLOGY & ADDITIONAL TESTS:

## 2024-05-09 NOTE — PROGRESS NOTE ADULT - PROBLEM SELECTOR PLAN 2
# PNA  - CT chest - bilateral patchy consolidation in lower lobes and R middle lobe.  -  sputum CX, mycoplasma, legionella, strep pneumo, ordered , but not collected   - on ceftriaxone, azithro poor

## 2024-05-09 NOTE — PROGRESS NOTE ADULT - SUBJECTIVE AND OBJECTIVE BOX
[facility] : Progress Note :: [phone], (office [phone]),   Dr Pandey / Dr Mi / Dr Massey / Dr Etienne / Dr Cassandra DA SILVA / Dr Burt / Dr Garber / Dr Naeem lam  _____________________________________________________________________________________________  feels much better.  noted, plans for D/C in AM     shellfish (Anaphylaxis)  aspirin (Unknown)  ibuprofen (Unknown)  Mushrooms (Anaphylaxis)    Hospital Medications:   MEDICATIONS  (STANDING):  albuterol/ipratropium for Nebulization 3 milliLiter(s) Nebulizer every 6 hours  azithromycin  IVPB 500 milliGRAM(s) IV Intermittent every 24 hours  cefTRIAXone   IVPB 2000 milliGRAM(s) IV Intermittent every 24 hours  chlorhexidine 2% Cloths 1 Application(s) Topical <User Schedule>  heparin   Injectable 5000 Unit(s) SubCutaneous every 12 hours  insulin glargine Injectable (LANTUS) 3 Unit(s) SubCutaneous at bedtime  insulin lispro (ADMELOG) corrective regimen sliding scale   SubCutaneous three times a day before meals  insulin lispro (ADMELOG) corrective regimen sliding scale   SubCutaneous at bedtime  pancrelipase  (CREON 36,000 Lipase Units) 1 Capsule(s) Oral four times a day with meals  pantoprazole    Tablet 40 milliGRAM(s) Oral before breakfast  simvastatin 20 milliGRAM(s) Oral at bedtime  sodium chloride 3%  Inhalation 4 milliLiter(s) Inhalation every 12 hours        VITALS:  T(F): 97.7 (05-09-24 @ 12:20), Max: 98.7 (05-09-24 @ 05:24)  HR: 116 (05-09-24 @ 12:20)  BP: 178/78 (05-09-24 @ 12:20)  RR: 18 (05-09-24 @ 12:20)  SpO2: 95% (05-09-24 @ 12:20)  Wt(kg): --      Weight (kg): 49.2 (05-09 @ 15:00)  PHYSICAL EXAM:  Constitutional: NAD  HEENT: anicteric sclera, oropharynx clear.  Neck: No JVD  Respiratory: CTAB, no wheezes, rales or rhonchi  Cardiovascular: S1, S2, RRR  Gastrointestinal: BS+, soft, NT/ND  Extremities:  No peripheral edema  Neurological: A/O x 3, no focal deficits.  : No CVA tenderness. No dickson.   Skin: No rashes  Vascular Access: AVF with thrill and bruit    LABS:  05-09    139  |  97  |  24<H>  ----------------------------<  194<H>  3.8   |  27  |  3.37<H>    Ca    8.7      09 May 2024 06:50  Phos  2.5     05-09  Mg     2.8     05-09    TPro  6.3  /  Alb  2.0<L>  /  TBili  0.6  /  DBili      /  AST  150<H>  /  ALT  71<H>  /  AlkPhos  300<H>  05-09    Creatinine Trend: 3.37 <--, 2.49 <--, 5.37 <--, 3.16 <--, 6.14 <--                        9.7    6.28  )-----------( 189      ( 09 May 2024 06:50 )             29.7     Urine Studies:  Urinalysis Basic - ( 09 May 2024 06:50 )    Color:  / Appearance:  / SG:  / pH:   Gluc: 194 mg/dL / Ketone:   / Bili:  / Urobili:    Blood:  / Protein:  / Nitrite:    Leuk Esterase:  / RBC:  / WBC    Sq Epi:  / Non Sq Epi:  / Bacteria:         RADIOLOGY & ADDITIONAL STUDIES:

## 2024-05-09 NOTE — PROGRESS NOTE ADULT - PROBLEM SELECTOR PLAN 7
SW to reinstate dialysis   Patient lives alone , NO HHA, walks with walker independent ADLs, has to walk up 2 flights of stairs   GO- [facility] prior documents DNR/DNI,   [ ] will confirm with patient and daughter in AM

## 2024-05-09 NOTE — PROGRESS NOTE ADULT - ASSESSMENT
# ESRD admitted with SOB, hypoxia PNA vs AHRF .  HD yesterday. feels much better.  HD in AM   noted, D/C planning.  # bilateral PNA. on ABX. symptomatically better  # hyponatremia- in light of fluid overload. Resolved with UF on HD. restrict fluids to 1.2L/DAY   # anemia of CKD. resume  CULLEN on HD.  # HTN stable UF with HD

## 2024-05-10 ENCOUNTER — TRANSCRIPTION ENCOUNTER (OUTPATIENT)
Age: 84
End: 2024-05-10

## 2024-05-10 LAB
GLUCOSE BLDC GLUCOMTR-MCNC: 128 MG/DL — HIGH (ref 70–99)
GLUCOSE BLDC GLUCOMTR-MCNC: 144 MG/DL — HIGH (ref 70–99)
GLUCOSE BLDC GLUCOMTR-MCNC: 150 MG/DL — HIGH (ref 70–99)
GLUCOSE BLDC GLUCOMTR-MCNC: 176 MG/DL — HIGH (ref 70–99)

## 2024-05-10 RX ADMIN — ERYTHROPOIETIN 4000 UNIT(S): 10000 INJECTION, SOLUTION INTRAVENOUS; SUBCUTANEOUS at 09:33

## 2024-05-10 RX ADMIN — Medication 3 MILLILITER(S): at 20:36

## 2024-05-10 RX ADMIN — INSULIN GLARGINE 3 UNIT(S): 100 INJECTION, SOLUTION SUBCUTANEOUS at 21:56

## 2024-05-10 RX ADMIN — HEPARIN SODIUM 5000 UNIT(S): 5000 INJECTION INTRAVENOUS; SUBCUTANEOUS at 05:33

## 2024-05-10 RX ADMIN — Medication 1: at 08:34

## 2024-05-10 RX ADMIN — CEFTRIAXONE 100 MILLIGRAM(S): 500 INJECTION, POWDER, FOR SOLUTION INTRAMUSCULAR; INTRAVENOUS at 18:25

## 2024-05-10 RX ADMIN — Medication 1 CAPSULE(S): at 18:24

## 2024-05-10 RX ADMIN — CHLORHEXIDINE GLUCONATE 1 APPLICATION(S): 213 SOLUTION TOPICAL at 05:36

## 2024-05-10 RX ADMIN — Medication 1 CAPSULE(S): at 08:34

## 2024-05-10 RX ADMIN — PANTOPRAZOLE SODIUM 40 MILLIGRAM(S): 20 TABLET, DELAYED RELEASE ORAL at 05:33

## 2024-05-10 RX ADMIN — SIMVASTATIN 20 MILLIGRAM(S): 20 TABLET, FILM COATED ORAL at 21:56

## 2024-05-10 RX ADMIN — Medication 1 CAPSULE(S): at 21:56

## 2024-05-10 RX ADMIN — Medication 1 CAPSULE(S): at 12:43

## 2024-05-10 RX ADMIN — Medication 3 MILLILITER(S): at 15:38

## 2024-05-10 RX ADMIN — SODIUM CHLORIDE 4 MILLILITER(S): 9 INJECTION INTRAMUSCULAR; INTRAVENOUS; SUBCUTANEOUS at 20:36

## 2024-05-10 RX ADMIN — AZITHROMYCIN 255 MILLIGRAM(S): 500 TABLET, FILM COATED ORAL at 18:25

## 2024-05-10 RX ADMIN — HEPARIN SODIUM 5000 UNIT(S): 5000 INJECTION INTRAVENOUS; SUBCUTANEOUS at 18:24

## 2024-05-10 RX ADMIN — SODIUM CHLORIDE 4 MILLILITER(S): 9 INJECTION INTRAMUSCULAR; INTRAVENOUS; SUBCUTANEOUS at 15:38

## 2024-05-10 NOTE — DISCHARGE NOTE PROVIDER - CARE PROVIDER_API CALL
Brenden Hu  Internal Medicine  [address] [place] [address]  Phone: [phone]  Fax: [phone]  Follow Up Time:     Ria Welch  Pediatrics  [address] [place] [id number]  Phone: [phone]  Fax: [phone]  Follow Up Time:

## 2024-05-10 NOTE — PROGRESS NOTE ADULT - SUBJECTIVE AND OBJECTIVE BOX
[facility] : Progress Note :: [phone], (office [phone]),   Dr Pandey / Dr Mi / Dr Massey / Dr Etienne / Dr Cassandra DA SILVA / Dr Burt / Dr Garber / Dr Naeem lam  _____________________________________________________________________________________________  Seen on HD- stable.    shellfish (Anaphylaxis)  aspirin (Unknown)  ibuprofen (Unknown)  Mushrooms (Anaphylaxis)    Hospital Medications:   MEDICATIONS  (STANDING):  albuterol/ipratropium for Nebulization 3 milliLiter(s) Nebulizer every 6 hours  azithromycin  IVPB 500 milliGRAM(s) IV Intermittent every 24 hours  cefTRIAXone   IVPB 2000 milliGRAM(s) IV Intermittent every 24 hours  chlorhexidine 2% Cloths 1 Application(s) Topical <User Schedule>  epoetin lara (PROCRIT) Injectable 4000 Unit(s) IV Push <User Schedule>  heparin   Injectable 5000 Unit(s) SubCutaneous every 12 hours  insulin glargine Injectable (LANTUS) 3 Unit(s) SubCutaneous at bedtime  insulin lispro (ADMELOG) corrective regimen sliding scale   SubCutaneous three times a day before meals  insulin lispro (ADMELOG) corrective regimen sliding scale   SubCutaneous at bedtime  pancrelipase  (CREON 36,000 Lipase Units) 1 Capsule(s) Oral four times a day with meals  pantoprazole    Tablet 40 milliGRAM(s) Oral before breakfast  simvastatin 20 milliGRAM(s) Oral at bedtime  sodium chloride 3%  Inhalation 4 milliLiter(s) Inhalation every 12 hours        VITALS:  T(F): 98.6 (05-10-24 @ 08:00), Max: 99 (05-10-24 @ 05:26)  HR: 96 (05-10-24 @ 08:00)  BP: 135/63 (05-10-24 @ 08:00)  RR: 20 (05-10-24 @ 08:00)  SpO2: 97% (05-10-24 @ 08:00)  Wt(kg): --      Weight (kg): 49.2 (05-09 @ 15:00)  PHYSICAL EXAM:  Constitutional: NAD  HEENT: anicteric sclera, oropharynx clear.  Neck: No JVD  Respiratory: CTAB, no wheezes, rales or rhonchi  Cardiovascular: S1, S2, RRR  Gastrointestinal: BS+, soft, NT/ND  Extremities No peripheral edema  Neurological: A/O x 3, no focal deficits  Vascular Access: AVF with thrill and bruit     LABS:  05-09    139  |  97  |  24<H>  ----------------------------<  194<H>  3.8   |  27  |  3.37<H>    Ca    8.7      09 May 2024 06:50  Phos  2.5     05-09  Mg     2.8     05-09    TPro  6.3  /  Alb  2.0<L>  /  TBili  0.6  /  DBili      /  AST  150<H>  /  ALT  71<H>  /  AlkPhos  300<H>  05-09    Creatinine Trend: 3.37 <--, 2.49 <--, 5.37 <--, 3.16 <--, 6.14 <--                        9.7    6.28  )-----------( 189      ( 09 May 2024 06:50 )             29.7     Urine Studies:  Urinalysis Basic - ( 09 May 2024 06:50 )    Color:  / Appearance:  / SG:  / pH:   Gluc: 194 mg/dL / Ketone:   / Bili:  / Urobili:    Blood:  / Protein:  / Nitrite:    Leuk Esterase:  / RBC:  / WBC    Sq Epi:  / Non Sq Epi:  / Bacteria:         RADIOLOGY & ADDITIONAL STUDIES:

## 2024-05-10 NOTE — DISCHARGE NOTE PROVIDER - NSDCCPCAREPLAN_GEN_ALL_CORE_FT
PRINCIPAL DISCHARGE DIAGNOSIS  Diagnosis: Acute hypoxic respiratory failure  Assessment and Plan of Treatment: You presented with shortness of breath. Your oxygen saturatuion was low. Your chest X ray was consistent with volume overload and pneumonia. You were placed on BiPAP in ER and transferred to ICU. Your CT angiogram of chest was normal. Nephrology was consulted and you underwent Dialysis. You were treated with anitbiotics for pneumonia.   You have symptomatically improved and are being discharged to sub acute rehab as per physical therapy evaluation.   You are recommended to follow up with your primary care within 1-2 weeks of discharge         SECONDARY DISCHARGE DIAGNOSES  Diagnosis: Pneumonia  Assessment and Plan of Treatment: Your chest X ray was consistent with pneumonia as explained above. You were treated with IV antibiotic course - ceftriaxone, azithromycin. Infectious workup was negative.   You have symptomatically improved and are being discharged to sub acute rehab as per physical therapy evaluation.   You are recommended to follow up with your primary care within 1-2 weeks of discharge       Diagnosis: ESRD on dialysis  Assessment and Plan of Treatment: You have a history of ESRD on dialysis. Nephrology was consulted and you underwent HD as per schedule ( Mon/Wed/Fri). Social work has reinstated your out patient dialysis.   You are recommended to follow up with your nephrologist within 1-2 weeks of discharge.       Diagnosis: Hyponatremia  Assessment and Plan of Treatment: You were found to have developed low sodium on labs. This was likely due to excess fluid imbalance i your body. Nephrology continued dialysis to remove fluid and you were treated with salt atbletes for 2 days. Your sodium normalized. You continued to be asymptomatic throughout.   You are recommende dto follow up with your Critical access hospital are and nephrologist within 1- 2weeks of discharge       Diagnosis: Demand ischemia of myocardium  Assessment and Plan of Treatment:   You were found to have elevated cardiac enzyme - troponin which was monitored and trended down. This was likely in the setting of your  acute illness as explained above. You EKG was done and showed- sinus tachycardia, LVH. No ST-T wave changes.  You are recommended to follow up with your primary care within 1-2 weeks of  discharge       Diagnosis: DM (diabetes mellitus)  Assessment and Plan of Treatment: You have a history of diabetes mellitus on home medications Tujeo, repaglinide. Your home medications were hels and you were continued on insulin as per your fingerstick measurements whgilt admitted . You were provided a consistent carbohydrate diet. Your Hb A1C was 5.4  You will be discharged on your hoome medications   You are recommended to follow up with your primary care or endocrinologist within 1-2 weeks of discharge.       Diagnosis: Prophylactic measure  Assessment and Plan of Treatment:   You were treated with heparin injections to prevent clot formation in your legs in light of your non ambulatory state during the admission.

## 2024-05-10 NOTE — PHYSICAL THERAPY INITIAL EVALUATION ADULT - GAIT DISTANCE, PT EVAL
7 ft; pt's gait distance limited by Aerobic Capacity/Endurance deficit; pt SpO2 decreased to 87% and pt reported mBorg of 4/10; during ambulation pt also had a BM and was promptly put back into bed to be cleaned 8 ft; pt's gait distance limited by Aerobic Capacity/Endurance deficit; pt SpO2 decreased to 87% and pt reported mBorg of 4/10, HR max to 132; during ambulation pt also had a BM and was promptly put back into bed to be cleaned. Pt reported gorss fatigue and weakness

## 2024-05-10 NOTE — PHYSICAL THERAPY INITIAL EVALUATION ADULT - LIVES WITH, PROFILE
Pt reports living alone on a 3rd floor apartment; pt reports there are 5 steps to enter the building and two sets of flight of stairs (15 steps each w/ R handrail) to get to the third floor/alone

## 2024-05-10 NOTE — DISCHARGE NOTE PROVIDER - NSDCMRMEDTOKEN_GEN_ALL_CORE_FT
ezetimibe-simvastatin 10 mg-20 mg oral tablet: 1 tab(s) orally once a day  pantoprazole 40 mg oral delayed release tablet: 1 tab(s) orally once a day  Prolia 60 mg/mL subcutaneous solution: 60 milligram(s) subcutaneously every 6 months  repaglinide 1 mg oral tablet: 1 tab(s) orally 3 times a day (with meals)  Toujeo SoloStar 300 units/mL subcutaneous solution: 9 unit(s) subcutaneous once a day 9u Sunday, Tuesday, Thursday    11u Mon, Wed, Fri, Sat  Zenpep 40,000 units-136,000 units-218,000 units oral delayed release capsule: 1 cap(s) orally 4 times a day   acetaminophen 325 mg oral tablet: 2 tab(s) orally every 6 hours As needed Temp greater or equal to 38C (100.4F), Mild Pain (1 - 3)  aluminum hydroxide-magnesium hydroxide 200 mg-200 mg/5 mL oral suspension: 30 milliliter(s) orally every 4 hours As needed Dyspepsia  ezetimibe-simvastatin 10 mg-20 mg oral tablet: 1 tab(s) orally once a day  pantoprazole 40 mg oral delayed release tablet: 1 tab(s) orally once a day  Prolia 60 mg/mL subcutaneous solution: 60 milligram(s) subcutaneously every 6 months  repaglinide 1 mg oral tablet: 1 tab(s) orally 3 times a day (with meals)  Toujeo SoloStar 300 units/mL subcutaneous solution: 9 unit(s) subcutaneous once a day 9u Sunday, Tuesday, Thursday    11u Mon, Wed, Fri, Sat  Zenpep 40,000 units-136,000 units-218,000 units oral delayed release capsule: 1 cap(s) orally 4 times a day

## 2024-05-10 NOTE — PHYSICAL THERAPY INITIAL EVALUATION ADULT - LEVEL OF INDEPENDENCE: STAIR NEGOTIATION, REHAB EVAL
Not assessed at this time due to pt's desaturation and increase in HR; will assess at a later time when pt's Aerobic Capacity/Endurance improves Not assessed at this time due to pt's desaturation and increase in HR; will  attempt this higher MET level activity assess at a later time when pt's Aerobic Capacity/Endurance improves, and HR improved

## 2024-05-10 NOTE — PROGRESS NOTE ADULT - SUBJECTIVE AND OBJECTIVE BOX
PGY-1 Progress Note discussed with attending    PAGER #: [[phone]] TILL 5:00 PM  PLEASE CONTACT ON CALL TEAM:  - On Call Team (Please refer to Jose Raul) FROM 5:00 PM - 8:30PM  - Nightfloat Team FROM 8:30 -7:30 AM    CHIEF COMPLAINT & BRIEF HOSPITAL COURSE:      INTERVAL HPI/OVERNIGHT EVENTS:       REVIEW OF SYSTEMS:  CONSTITUTIONAL: No fever, weight loss, or fatigue  RESPIRATORY: No cough, wheezing, chills or hemoptysis; No shortness of breath  CARDIOVASCULAR: No chest pain, palpitations, dizziness, or leg swelling  GASTROINTESTINAL: No abdominal pain. No nausea, vomiting, or hematemesis; No diarrhea or constipation. No melena or hematochezia.  GENITOURINARY: No dysuria or hematuria, urinary frequency  NEUROLOGICAL: No headaches, memory loss, loss of strength, numbness, or tremors  SKIN: No itching, burning, rashes, or lesions     MEDICATIONS  (STANDING):  albuterol/ipratropium for Nebulization 3 milliLiter(s) Nebulizer every 6 hours  azithromycin  IVPB 500 milliGRAM(s) IV Intermittent every 24 hours  cefTRIAXone   IVPB 2000 milliGRAM(s) IV Intermittent every 24 hours  chlorhexidine 2% Cloths 1 Application(s) Topical <User Schedule>  epoetin lara (PROCRIT) Injectable 4000 Unit(s) IV Push <User Schedule>  heparin   Injectable 5000 Unit(s) SubCutaneous every 12 hours  insulin glargine Injectable (LANTUS) 3 Unit(s) SubCutaneous at bedtime  insulin lispro (ADMELOG) corrective regimen sliding scale   SubCutaneous three times a day before meals  insulin lispro (ADMELOG) corrective regimen sliding scale   SubCutaneous at bedtime  pancrelipase  (CREON 36,000 Lipase Units) 1 Capsule(s) Oral four times a day with meals  pantoprazole    Tablet 40 milliGRAM(s) Oral before breakfast  simvastatin 20 milliGRAM(s) Oral at bedtime  sodium chloride 3%  Inhalation 4 milliLiter(s) Inhalation every 12 hours    MEDICATIONS  (PRN):  acetaminophen     Tablet .. 650 milliGRAM(s) Oral every 6 hours PRN Temp greater or equal to 38C (100.4F), Mild Pain (1 - 3)  aluminum hydroxide/magnesium hydroxide/simethicone Suspension 30 milliLiter(s) Oral every 4 hours PRN Dyspepsia  melatonin 3 milliGRAM(s) Oral at bedtime PRN Insomnia  ondansetron Injectable 4 milliGRAM(s) IV Push every 8 hours PRN Nausea and/or Vomiting      Vital Signs Last 24 Hrs  T(C): 36.7 (10 May 2024 13:00), Max: 37.2 (10 May 2024 05:26)  T(F): 98 (10 May 2024 13:00), Max: 99 (10 May 2024 05:26)  HR: 108 (10 May 2024 13:04) (96 - 108)  BP: 116/78 (10 May 2024 13:04) (109/68 - 149/82)  BP(mean): --  RR: 18 (10 May 2024 13:00) (17 - 20)  SpO2: 93% (10 May 2024 13:04) (93% - 98%)    Parameters below as of 10 May 2024 13:04  Patient On (Oxygen Delivery Method): room air        PHYSICAL EXAMINATION:  GENERAL: NAD, well built  HEAD:  Atraumatic, Normocephalic  EYES:  conjunctiva and sclera clear  NECK: Supple, No JVD, Normal thyroid  CHEST/LUNG: Clear to auscultation. Clear to percussion bilaterally; No rales, rhonchi, wheezing, or rubs  HEART: Regular rate and rhythm; No murmurs, rubs, or gallops  ABDOMEN: Soft, Nontender, Nondistended; Bowel sounds present, no pain or masses on palpation  NERVOUS SYSTEM:  Alert & Oriented X3  : voiding well  EXTREMITIES:  2+ Peripheral Pulses, No clubbing, cyanosis, or edema  SKIN: warm dry                          9.7    6.28  )-----------( 189      ( 09 May 2024 06:50 )             29.7     05-09    139  |  97  |  24<H>  ----------------------------<  194<H>  3.8   |  27  |  3.37<H>    Ca    8.7      09 May 2024 06:50  Phos  2.5     05-09  Mg     2.8     05-09    TPro  6.3  /  Alb  2.0<L>  /  TBili  0.6  /  DBili  x   /  AST  150<H>  /  ALT  71<H>  /  AlkPhos  300<H>  05-09    LIVER FUNCTIONS - ( 09 May 2024 06:50 )  Alb: 2.0 g/dL / Pro: 6.3 g/dL / ALK PHOS: 300 U/L / ALT: 71 U/L DA / AST: 150 U/L / GGT: x                   I&O's Summary    10 May 2024 07:01  -  10 May 2024 14:53  --------------------------------------------------------  IN: 0 mL / OUT: 2000 mL / NET: -2000 mL            CAPILLARY BLOOD GLUCOSE      RADIOLOGY & ADDITIONAL TESTS:                   PGY-1 Progress Note discussed with attending    PAGER #: [[phone]] TILL 5:00 PM  PLEASE CONTACT ON CALL TEAM:  - On Call Team (Please refer to Jose Raul) FROM 5:00 PM - 8:30PM  - Nightfloat Team FROM 8:30 -7:30 AM    CHIEF COMPLAINT & BRIEF HOSPITAL COURSE:      INTERVAL HPI/OVERNIGHT EVENTS: No acute events       REVIEW OF SYSTEMS:  CONSTITUTIONAL: No fever, weight loss, or  + fatigue, [place]   RESPIRATORY: No cough, wheezing, chills or hemoptysis; No shortness of breath  CARDIOVASCULAR: No chest pain, palpitations, dizziness, or leg swelling  GASTROINTESTINAL: No abdominal pain. No nausea, vomiting, or hematemesis; No diarrhea or constipation. No melena or hematochezia.  GENITOURINARY: No dysuria or hematuria, urinary frequency  NEUROLOGICAL: No headaches, memory loss, loss of strength, numbness, or tremors  SKIN: No itching, burning, rashes, or lesions     MEDICATIONS  (STANDING):  albuterol/ipratropium for Nebulization 3 milliLiter(s) Nebulizer every 6 hours  azithromycin  IVPB 500 milliGRAM(s) IV Intermittent every 24 hours  cefTRIAXone   IVPB 2000 milliGRAM(s) IV Intermittent every 24 hours  chlorhexidine 2% Cloths 1 Application(s) Topical <User Schedule>  epoetin lara (PROCRIT) Injectable 4000 Unit(s) IV Push <User Schedule>  heparin   Injectable 5000 Unit(s) SubCutaneous every 12 hours  insulin glargine Injectable (LANTUS) 3 Unit(s) SubCutaneous at bedtime  insulin lispro (ADMELOG) corrective regimen sliding scale   SubCutaneous three times a day before meals  insulin lispro (ADMELOG) corrective regimen sliding scale   SubCutaneous at bedtime  pancrelipase  (CREON 36,000 Lipase Units) 1 Capsule(s) Oral four times a day with meals  pantoprazole    Tablet 40 milliGRAM(s) Oral before breakfast  simvastatin 20 milliGRAM(s) Oral at bedtime  sodium chloride 3%  Inhalation 4 milliLiter(s) Inhalation every 12 hours    MEDICATIONS  (PRN):  acetaminophen     Tablet .. 650 milliGRAM(s) Oral every 6 hours PRN Temp greater or equal to 38C (100.4F), Mild Pain (1 - 3)  aluminum hydroxide/magnesium hydroxide/simethicone Suspension 30 milliLiter(s) Oral every 4 hours PRN Dyspepsia  melatonin 3 milliGRAM(s) Oral at bedtime PRN Insomnia  ondansetron Injectable 4 milliGRAM(s) IV Push every 8 hours PRN Nausea and/or Vomiting      Vital Signs Last 24 Hrs  T(C): 36.7 (10 May 2024 13:00), Max: 37.2 (10 May 2024 05:26)  T(F): 98 (10 May 2024 13:00), Max: 99 (10 May 2024 05:26)  HR: 108 (10 May 2024 13:04) (96 - 108)  BP: 116/78 (10 May 2024 13:04) (109/68 - 149/82)  BP(mean): --  RR: 18 (10 May 2024 13:00) (17 - 20)  SpO2: 93% (10 May 2024 13:04) (93% - 98%)    Parameters below as of 10 May 2024 13:04  Patient On (Oxygen Delivery Method): room air        PHYSICAL EXAMINATION:  GENERAL: NAD  EYES: EOMI, PERRLA  NECK: Supple, No JVD; Normal thyroid; Trachea midline: No LAD   NERVOUS SYSTEM:  Alert & Oriented X3,  Motor Strength 5/5 B/L upper and lower extremities; DTRs 2+ intact and symmetric  CHEST/LUNG: + minimal bibasilar crackles . NO congestion, NO wheezing  HEART: Regular rate and rhythm; No murmurs, no gallops  ABDOMEN: Soft, Nontender, Nondistended; Bowel sounds present, no pain or masses on palpation  : voiding well  EXTREMITIES:  2+ Peripheral Pulses, No clubbing, cyanosis, or edema  SKIN: warm, intact, no lesions                           9.7    6.28  )-----------( 189      ( 09 May 2024 06:50 )             29.7     05-09    139  |  97  |  24<H>  ----------------------------<  194<H>  3.8   |  27  |  3.37<H>    Ca    8.7      09 May 2024 06:50  Phos  2.5     05-09  Mg     2.8     05-09    TPro  6.3  /  Alb  2.0<L>  /  TBili  0.6  /  DBili  x   /  AST  150<H>  /  ALT  71<H>  /  AlkPhos  300<H>  05-09    LIVER FUNCTIONS - ( 09 May 2024 06:50 )  Alb: 2.0 g/dL / Pro: 6.3 g/dL / ALK PHOS: 300 U/L / ALT: 71 U/L DA / AST: 150 U/L / GGT: x                   I&O's Summary    10 May 2024 07:01  -  10 May 2024 14:53  --------------------------------------------------------  IN: 0 mL / OUT: 2000 mL / NET: -2000 mL            CAPILLARY BLOOD GLUCOSE      RADIOLOGY & ADDITIONAL TESTS:

## 2024-05-10 NOTE — PHYSICAL THERAPY INITIAL EVALUATION ADULT - PERTINENT HX OF CURRENT PROBLEM, REHAB EVAL
Pt admitted to hospital due to AHRF. Additional past medical history as detailed below by medical team. Pt admitted to hospital due to AHRF. Additional past medical history as detailed below by medical team. As per medical team pt. is medically ready for d/c today.

## 2024-05-10 NOTE — PHYSICAL THERAPY INITIAL EVALUATION ADULT - PLANNED THERAPY INTERVENTIONS, PT EVAL
balance training/bed mobility training/gait training/neuromuscular re-education/postural re-education/strengthening/transfer training aerobic capacity/endurance training/balance training/bed mobility training/gait training/neuromuscular re-education/postural re-education/strengthening/transfer training

## 2024-05-10 NOTE — PHYSICAL THERAPY INITIAL EVALUATION ADULT - GAIT DEVIATIONS NOTED, PT EVAL
decreased justina/increased time in double stance/decreased velocity of limb motion/decreased step length/decreased stride length/decreased weight-shifting ability

## 2024-05-10 NOTE — PROGRESS NOTE ADULT - SUBJECTIVE AND OBJECTIVE BOX
INTERVAL HPI/OVERNIGHT EVENTS:  Patient seen,doing better,no acute issues  VITAL SIGNS:  T(F): 98.6 (05-10-24 @ 08:00)  HR: 96 (05-10-24 @ 08:00)  BP: 135/63 (05-10-24 @ 08:00)  RR: 20 (05-10-24 @ 08:00)  SpO2: 97% (05-10-24 @ 08:00)  Wt(kg): --    PHYSICAL EXAM:  awake  Constitutional:  Eyes:  ENMT:perrla  Neck:  Respiratory:few  rales  Cardiovascular:s1s2,m-none  Gastrointestinal:soft,bs pos  Extremities:  Vascular:  Neurological:no focal deficit  Musculoskeletal:    MEDICATIONS  (STANDING):  albuterol/ipratropium for Nebulization 3 milliLiter(s) Nebulizer every 6 hours  azithromycin  IVPB 500 milliGRAM(s) IV Intermittent every 24 hours  cefTRIAXone   IVPB 2000 milliGRAM(s) IV Intermittent every 24 hours  chlorhexidine 2% Cloths 1 Application(s) Topical <User Schedule>  epoetin lara (PROCRIT) Injectable 4000 Unit(s) IV Push <User Schedule>  heparin   Injectable 5000 Unit(s) SubCutaneous every 12 hours  insulin glargine Injectable (LANTUS) 3 Unit(s) SubCutaneous at bedtime  insulin lispro (ADMELOG) corrective regimen sliding scale   SubCutaneous three times a day before meals  insulin lispro (ADMELOG) corrective regimen sliding scale   SubCutaneous at bedtime  pancrelipase  (CREON 36,000 Lipase Units) 1 Capsule(s) Oral four times a day with meals  pantoprazole    Tablet 40 milliGRAM(s) Oral before breakfast  simvastatin 20 milliGRAM(s) Oral at bedtime  sodium chloride 3%  Inhalation 4 milliLiter(s) Inhalation every 12 hours    MEDICATIONS  (PRN):  acetaminophen     Tablet .. 650 milliGRAM(s) Oral every 6 hours PRN Temp greater or equal to 38C (100.4F), Mild Pain (1 - 3)  aluminum hydroxide/magnesium hydroxide/simethicone Suspension 30 milliLiter(s) Oral every 4 hours PRN Dyspepsia  melatonin 3 milliGRAM(s) Oral at bedtime PRN Insomnia  ondansetron Injectable 4 milliGRAM(s) IV Push every 8 hours PRN Nausea and/or Vomiting      Allergies    shellfish (Anaphylaxis)  aspirin (Unknown)  ibuprofen (Unknown)  Mushrooms (Anaphylaxis)    Intolerances        LABS:                        9.7    6.28  )-----------( 189      ( 09 May 2024 06:50 )             29.7     05-09    139  |  97  |  24<H>  ----------------------------<  194<H>  3.8   |  27  |  3.37<H>    Ca    8.7      09 May 2024 06:50  Phos  2.5     05-09  Mg     2.8     05-09    TPro  6.3  /  Alb  2.0<L>  /  TBili  0.6  /  DBili  x   /  AST  150<H>  /  ALT  71<H>  /  AlkPhos  300<H>  05-09      Urinalysis Basic - ( 09 May 2024 06:50 )    Color: x / Appearance: x / SG: x / pH: x  Gluc: 194 mg/dL / Ketone: x  / Bili: x / Urobili: x   Blood: x / Protein: x / Nitrite: x   Leuk Esterase: x / RBC: x / WBC x   Sq Epi: x / Non Sq Epi: x / Bacteria: x        RADIOLOGY & ADDITIONAL TESTS:        Assessment and Plan:   · Assessment	  83F, from home (lives alone), with PMH DM, HTN, HLD, ESRD on HD (MWF, L-AVF), gout, hemochromatosis, Foresenius, and previous smoker who presents with shortness of breath. Increased WOB in ED, placed on BiPAP and then taken off. ICU evaluated and stated to re-consult if continued work of breathing after dialysis. Admitted for AHRF 2/2 PNA. Admitted to ICU overnight for worsening respiratory status requiring BiPAP, DG to medicine floor for further management saturating well on RA, scheduled for HD today , GOC - has prior MOLST, to be confirmed and documented        Problem/Plan - 1:  ·  Problem: Acute hypoxic respiratory failure.   ·  Plan: 2/2 Volume overload requiring HD and PNA  - CT chest - bilateral patchy consolidation in lower lobes and R middle lobe.  - HD as scheduled - removed 1.7L last session.  - chest PT + 3% inhalation + duonebs  - nasal cannula> weaned off , on RA   - sputum CX, mycoplasma, legionella, strep pneumo ordered> NOT collected as no sputum production and patient is anuric   - on ceftriaxone, azithro for CAP coverage.     Problem/Plan - 2:  ·  Problem: Pneumonia.   ·  Plan: # PNA  - CT chest - bilateral patchy consolidation in lower lobes and R middle lobe.  -  sputum CX, mycoplasma, legionella, strep pneumo, ordered , but not collected   - on ceftriaxone, azithro.     Problem/Plan - 3:  ·  Problem: ESRD on dialysis.   ·  Plan: scheduled for HD today   Creat 3.16>5.37  anemia of renal disease  - monitor CBC- Hb stable at 9  Nephro Dr. Pandey following.     Problem/Plan - 4:  ·  Problem: Demand ischemia of myocardium.   ·  Plan: - trops 75>127, trended down   - EKG - sinus tachycardia, LVH. No ST-T wave changes.    Last TTE - 2019> normal EF, G1DD   [ ] will consider TTE.     Problem/Plan - 5:  ·  Problem: DM (diabetes mellitus).   ·  Plan: - home meds: Tujeo, repaglinide  - holding home meds  - sugars 200s  - started on  lantus 3u + ISS in ICU   - fingersticks ACHS   - Consistent carb diet  - A1C 5.4.     Problem/Plan - 6:  ·  Problem: Prophylactic measure.   ·  Plan: DVT ppx - heparin SQ.     Problem/Plan - 7:  ·  Problem: Discharge planning issues.   ·  Plan: SW to reinstate dialysis   Patient lives alone , NO HHA, walks with walker independent ADLs, has to walk up 2 flights of stairs   [facility] prior documents DNR/DNI,   [ ] will confirm with patient and daughter in AM.

## 2024-05-10 NOTE — PHYSICAL THERAPY INITIAL EVALUATION ADULT - DIAGNOSIS, PT EVAL
(ICF Model) Pt. present w/deficits in Body Structures/Function (Impairments), incl: Strength, Balance, Postural Control, Aerobic Capacity/Endurance leading to deficits in performing the below noted Activities (Limitations).

## 2024-05-10 NOTE — PROGRESS NOTE ADULT - ASSESSMENT
83F, from home (lives alone), with PMH DM, HTN, HLD, ESRD on HD (MWF, L-AVF), gout, hemochromatosis, Foresenius, and previous smoker who presents with shortness of breath. Increased WOB in ED, placed on BiPAP and then taken off. ICU evaluated and stated to re-consult if continued work of breathing after dialysis. Admitted for AHRF 2/2 PNA. Admitted to ICU overnight for worsening respiratory status requiring BiPAP, DG to medicine floor for further management saturating well on RA, scheduled for HD today , GOC - has prior MOLST, to be confirmed and documented, PT recs NIKKIE .  83F, from home (lives alone), with PMH DM, HTN, HLD, ESRD on HD (MWF, L-AVF), gout, hemochromatosis, Foresenius, and previous smoker who presents with shortness of breath. Increased WOB in ED, placed on BiPAP and then taken off. ICU evaluated and stated to re-consult if continued work of breathing after dialysis. Admitted for AHRF 2/2 PNA. Admitted to ICU overnight for worsening respiratory status requiring BiPAP, DG to medicine floor for further management saturating well on RA, scheduled for HD today , GOC - has prior MOLST, to be confirmed and documented, PT recs NIKKIE, CM to follow

## 2024-05-10 NOTE — DISCHARGE NOTE PROVIDER - HOSPITAL COURSE
83F, from home (lives alone), with PMH DM, HTN, HLD, ESRD on HD (MWF, L-AVF), gout, hemochromatosis, Foresenius, and previous smoker who presents with shortness of breath. Increased WOB in ED, placed on BiPAP and then taken off. CT chest showed bilateral patchy consolidation in lower lobes and R middle lobe. ICU evaluated and stated to re-consult if continued work of breathing after dialysis. HD removed 1.7L.  Admitted for AHRF 2/2 PNA vs volume overload. Admitted to ICU overnight for worsening respiratory status requiring BiPAP. Sputum CX, mycoplasma were not collected as patient did not have a productive cough.. Unable to collect legionella, strep pneumo as patient making minimal urine. Patient was treated with ceftriaxone and azithro. Received 3% inhalation, duonebs, Chest PT for respiratory mobilization/clearance. Trops 79 > 127 > 123, EKG showing sinus tachycardia, LVH, no ST-T changes - likely demand ischemia. Sugars in the 200s, for which patient was started on lantus 3u + ISS. Patient was downgraded to medicine for further monitoring.   Patient was continued on HD as per nephrology consulted. Patient symptomatically improved, saturating well on RA.  Patient was continued on home medications - creon and simvastatin.     Patient has been deemed medically fit to be discharged as per primary team   SW was consulted to reinstate OP dialysis - MWF schedule    83F, from home (lives alone), with PMH DM, HTN, HLD, ESRD on HD (MWF, L-AVF), gout, hemochromatosis and previous smoker who presents with shortness of breath. Increased WOB in ED, placed on BiPAP and then taken off. CT chest showed bilateral patchy consolidation in lower lobes and R middle lobe. ICU evaluated and stated to re-consult if continued work of breathing after dialysis. HD removed 1.7L.  Admitted for AHRF 2/2 PNA vs volume overload. Admitted to ICU overnight for worsening respiratory status requiring BiPAP. Sputum CX, mycoplasma were not collected as patient did not have a productive cough.. Unable to collect legionella, strep pneumo as patient making minimal urine. Patient was treated with ceftriaxone and azithro. Received 3% inhalation, duonebs, Chest PT for respiratory mobilization/clearance. Trops 79 > 127 > 123, EKG showing sinus tachycardia, LVH, no ST-T changes - likely demand ischemia. Sugars in the 200s, for which patient was started on lantus 3u + ISS. Patient was downgraded to medicine for further monitoring.   Patient was continued on HD as per nephrology consulted. Patient symptomatically improved, saturating well on RA.  Patient developed asymptomatic hyponatremia, likeley 2/2 fluid overload. Patient was continued on HD and treated with NaCl tablets for 2 days. Hyponatremia resolved.   Patient was continued on home medications - creon and simvastatin.   PT recs- NIKKIE  Patient has been deemed medically fit to be discharged as per primary team

## 2024-05-10 NOTE — PHYSICAL THERAPY INITIAL EVALUATION ADULT - ASSISTIVE DEVICE FOR TRANSFER: STAND/SIT, REHAB EVAL
rolling walker Pt also performed transfer w/ straight cane but required minimum assist/rolling walker

## 2024-05-10 NOTE — PHYSICAL THERAPY INITIAL EVALUATION ADULT - GENERAL OBSERVATIONS, REHAB EVAL
Consult received, chart reviewed. Patient received supine in bed, NAD. Patient agreed to EVALUATION from Physical Therapist. Consult received, chart reviewed. Patient received supine in bed, NAD., had opportunity to rest and eat meal. Had dialysis earlier today. Patient agreed to EVALUATION from Physical Therapist.

## 2024-05-10 NOTE — PROGRESS NOTE ADULT - PROBLEM SELECTOR PLAN 7
SW to reinstate dialysis   Patient lives alone , NO HHA, walks with walker independent ADLs, has to walk up 2 flights of stairs   PT recyuliya PEREZ prior documents DNR/DNI,   [ ] will confirm with patient and daughter in AM

## 2024-05-10 NOTE — PHYSICAL THERAPY INITIAL EVALUATION ADULT - ADDITIONAL COMMENTS
--Pt reports she completes ADLs and ambulation independently but uses the straight cane from time to time when she feels "wobbly" or when she ambulates for long distances  --Pt reports her daughter would come visit her on occasion   --Pt denies any falls in the past 6 months but reports she almost fell last night using the bathroom  --Pt reports she does not use O2 at home

## 2024-05-10 NOTE — PHYSICAL THERAPY INITIAL EVALUATION ADULT - MANUAL MUSCLE TESTING RESULTS, REHAB EVAL
BUE: 4-/5; BLE: 3+/5/grossly assessed due to BUE: 4-/5; BLE: 3+/5. Postural 4-./5 grossly/grossly assessed due to

## 2024-05-10 NOTE — PHYSICAL THERAPY INITIAL EVALUATION ADULT - ACTIVE RANGE OF MOTION EXAMINATION, REHAB EVAL
omar. upper extremity Active ROM was WNL (within normal limits)/bilateral lower extremity Active ROM was WNL (within normal limits)

## 2024-05-10 NOTE — DISCHARGE NOTE PROVIDER - NSDCFUSCHEDAPPT_GEN_ALL_CORE_FT
Ria Welch  [facility]  [address]ETCS [address]  Scheduled Appointment: 06/20/2024    [facility]  PEDGENETC [address] [facility]  Scheduled Appointment: 06/20/2024

## 2024-05-10 NOTE — PROGRESS NOTE ADULT - ASSESSMENT
# ESRD admitted with SOB, hypoxia PNA vs AHRF .  HD yesterday. feels much better.  seen on HD stable  noted, D/C planning today   # bilateral PNA. on ABX. symptomatically better with ABX  # anemia of CKD.   CULLEN on HD.  # HTN stable UF with HD

## 2024-05-11 LAB
ANION GAP SERPL CALC-SCNC: 12 MMOL/L — SIGNIFICANT CHANGE UP (ref 5–17)
BUN SERPL-MCNC: 18 MG/DL — SIGNIFICANT CHANGE UP (ref 7–18)
CALCIUM SERPL-MCNC: 8 MG/DL — LOW (ref 8.4–10.5)
CHLORIDE SERPL-SCNC: 92 MMOL/L — LOW (ref 96–108)
CO2 SERPL-SCNC: 24 MMOL/L — SIGNIFICANT CHANGE UP (ref 22–31)
CREAT SERPL-MCNC: 3.24 MG/DL — HIGH (ref 0.5–1.3)
EGFR: 13 ML/MIN/1.73M2 — LOW
GLUCOSE BLDC GLUCOMTR-MCNC: 151 MG/DL — HIGH (ref 70–99)
GLUCOSE BLDC GLUCOMTR-MCNC: 208 MG/DL — HIGH (ref 70–99)
GLUCOSE BLDC GLUCOMTR-MCNC: 237 MG/DL — HIGH (ref 70–99)
GLUCOSE BLDC GLUCOMTR-MCNC: 252 MG/DL — HIGH (ref 70–99)
GLUCOSE SERPL-MCNC: 144 MG/DL — HIGH (ref 70–99)
HCT VFR BLD CALC: 26.7 % — LOW (ref 34.5–45)
HGB BLD-MCNC: 8.8 G/DL — LOW (ref 11.5–15.5)
MAGNESIUM SERPL-MCNC: 1.9 MG/DL — SIGNIFICANT CHANGE UP (ref 1.6–2.6)
MCHC RBC-ENTMCNC: 29.3 PG — SIGNIFICANT CHANGE UP (ref 27–34)
MCHC RBC-ENTMCNC: 33 GM/DL — SIGNIFICANT CHANGE UP (ref 32–36)
MCV RBC AUTO: 89 FL — SIGNIFICANT CHANGE UP (ref 80–100)
NRBC # BLD: 0 /100 WBCS — SIGNIFICANT CHANGE UP (ref 0–0)
PHOSPHATE SERPL-MCNC: 2.5 MG/DL — SIGNIFICANT CHANGE UP (ref 2.5–4.5)
PLATELET # BLD AUTO: 224 K/UL — SIGNIFICANT CHANGE UP (ref 150–400)
POTASSIUM SERPL-MCNC: 3.6 MMOL/L — SIGNIFICANT CHANGE UP (ref 3.5–5.3)
POTASSIUM SERPL-SCNC: 3.6 MMOL/L — SIGNIFICANT CHANGE UP (ref 3.5–5.3)
RBC # BLD: 3 M/UL — LOW (ref 3.8–5.2)
RBC # FLD: 14.2 % — SIGNIFICANT CHANGE UP (ref 10.3–14.5)
SODIUM SERPL-SCNC: 128 MMOL/L — LOW (ref 135–145)
WBC # BLD: 8.43 K/UL — SIGNIFICANT CHANGE UP (ref 3.8–10.5)
WBC # FLD AUTO: 8.43 K/UL — SIGNIFICANT CHANGE UP (ref 3.8–10.5)

## 2024-05-11 RX ADMIN — Medication 3 MILLILITER(S): at 20:58

## 2024-05-11 RX ADMIN — Medication 1 CAPSULE(S): at 21:50

## 2024-05-11 RX ADMIN — SIMVASTATIN 20 MILLIGRAM(S): 20 TABLET, FILM COATED ORAL at 21:50

## 2024-05-11 RX ADMIN — HEPARIN SODIUM 5000 UNIT(S): 5000 INJECTION INTRAVENOUS; SUBCUTANEOUS at 05:05

## 2024-05-11 RX ADMIN — Medication 1 CAPSULE(S): at 12:02

## 2024-05-11 RX ADMIN — CHLORHEXIDINE GLUCONATE 1 APPLICATION(S): 213 SOLUTION TOPICAL at 05:20

## 2024-05-11 RX ADMIN — Medication 1 CAPSULE(S): at 17:46

## 2024-05-11 RX ADMIN — Medication 1: at 21:50

## 2024-05-11 RX ADMIN — Medication 3 MILLILITER(S): at 15:38

## 2024-05-11 RX ADMIN — SODIUM CHLORIDE 4 MILLILITER(S): 9 INJECTION INTRAMUSCULAR; INTRAVENOUS; SUBCUTANEOUS at 09:27

## 2024-05-11 RX ADMIN — Medication 1: at 08:22

## 2024-05-11 RX ADMIN — INSULIN GLARGINE 3 UNIT(S): 100 INJECTION, SOLUTION SUBCUTANEOUS at 21:50

## 2024-05-11 RX ADMIN — Medication 3 MILLILITER(S): at 09:26

## 2024-05-11 RX ADMIN — HEPARIN SODIUM 5000 UNIT(S): 5000 INJECTION INTRAVENOUS; SUBCUTANEOUS at 17:46

## 2024-05-11 RX ADMIN — PANTOPRAZOLE SODIUM 40 MILLIGRAM(S): 20 TABLET, DELAYED RELEASE ORAL at 05:04

## 2024-05-11 RX ADMIN — Medication 1 CAPSULE(S): at 08:22

## 2024-05-11 RX ADMIN — Medication 2: at 17:45

## 2024-05-11 RX ADMIN — Medication 2: at 12:02

## 2024-05-11 NOTE — PROGRESS NOTE ADULT - SUBJECTIVE AND OBJECTIVE BOX
PGY-1 Progress Note discussed with attending    PAGER #: [[phone]] TILL 5:00 PM  PLEASE CONTACT ON CALL TEAM:  - On Call Team (Please refer to Jose Raul) FROM 5:00 PM - 8:30PM  - Nightfloat Team FROM 8:30 -7:30 AM    CHIEF COMPLAINT & BRIEF HOSPITAL COURSE:      INTERVAL HPI/OVERNIGHT EVENTS:       REVIEW OF SYSTEMS:  CONSTITUTIONAL: No fever, weight loss, or fatigue  RESPIRATORY: No cough, wheezing, chills or hemoptysis; No shortness of breath  CARDIOVASCULAR: No chest pain, palpitations, dizziness, or leg swelling  GASTROINTESTINAL: No abdominal pain. No nausea, vomiting, or hematemesis; No diarrhea or constipation. No melena or hematochezia.  GENITOURINARY: No dysuria or hematuria, urinary frequency  NEUROLOGICAL: No headaches, memory loss, loss of strength, numbness, or tremors  SKIN: No itching, burning, rashes, or lesions     MEDICATIONS  (STANDING):  albuterol/ipratropium for Nebulization 3 milliLiter(s) Nebulizer every 6 hours  azithromycin  IVPB 500 milliGRAM(s) IV Intermittent every 24 hours  cefTRIAXone   IVPB 2000 milliGRAM(s) IV Intermittent every 24 hours  chlorhexidine 2% Cloths 1 Application(s) Topical <User Schedule>  epoetin lara (PROCRIT) Injectable 4000 Unit(s) IV Push <User Schedule>  heparin   Injectable 5000 Unit(s) SubCutaneous every 12 hours  insulin glargine Injectable (LANTUS) 3 Unit(s) SubCutaneous at bedtime  insulin lispro (ADMELOG) corrective regimen sliding scale   SubCutaneous three times a day before meals  insulin lispro (ADMELOG) corrective regimen sliding scale   SubCutaneous at bedtime  pancrelipase  (CREON 36,000 Lipase Units) 1 Capsule(s) Oral four times a day with meals  pantoprazole    Tablet 40 milliGRAM(s) Oral before breakfast  simvastatin 20 milliGRAM(s) Oral at bedtime  sodium chloride 3%  Inhalation 4 milliLiter(s) Inhalation every 12 hours    MEDICATIONS  (PRN):  acetaminophen     Tablet .. 650 milliGRAM(s) Oral every 6 hours PRN Temp greater or equal to 38C (100.4F), Mild Pain (1 - 3)  aluminum hydroxide/magnesium hydroxide/simethicone Suspension 30 milliLiter(s) Oral every 4 hours PRN Dyspepsia  melatonin 3 milliGRAM(s) Oral at bedtime PRN Insomnia  ondansetron Injectable 4 milliGRAM(s) IV Push every 8 hours PRN Nausea and/or Vomiting      Vital Signs Last 24 Hrs  T(C): 36.6 (11 May 2024 05:00), Max: 36.8 (10 May 2024 11:24)  T(F): 97.8 (11 May 2024 05:00), Max: 98.2 (10 May 2024 11:24)  HR: 95 (11 May 2024 05:00) (95 - 108)  BP: 152/80 (11 May 2024 05:00) (109/68 - 159/76)  BP(mean): --  RR: 16 (11 May 2024 05:00) (16 - 20)  SpO2: 97% (11 May 2024 05:00) (93% - 98%)    Parameters below as of 11 May 2024 05:00  Patient On (Oxygen Delivery Method): room air        PHYSICAL EXAMINATION:  GENERAL: NAD, well built  HEAD:  Atraumatic, Normocephalic  EYES:  conjunctiva and sclera clear  NECK: Supple, No JVD, Normal thyroid  CHEST/LUNG: Clear to auscultation. Clear to percussion bilaterally; No rales, rhonchi, wheezing, or rubs  HEART: Regular rate and rhythm; No murmurs, rubs, or gallops  ABDOMEN: Soft, Nontender, Nondistended; Bowel sounds present, no pain or masses on palpation  NERVOUS SYSTEM:  Alert & Oriented X3  : voiding well  EXTREMITIES:  2+ Peripheral Pulses, No clubbing, cyanosis, or edema  SKIN: warm dry                      I&O's Summary    10 May 2024 07:01  -  11 May 2024 07:00  --------------------------------------------------------  IN: 0 mL / OUT: 2000 mL / NET: -2000 mL            CAPILLARY BLOOD GLUCOSE      RADIOLOGY & ADDITIONAL TESTS:                   PGY-1 Progress Note discussed with attending    PAGER #: [[phone]] TILL 5:00 PM  PLEASE CONTACT ON CALL TEAM:  - On Call Team (Please refer to Jose Raul) FROM 5:00 PM - 8:30PM  - Nightfloat Team FROM 8:30 -7:30 AM    CHIEF COMPLAINT & BRIEF HOSPITAL COURSE:      INTERVAL HPI/OVERNIGHT EVENTS: No acute events       REVIEW OF SYSTEMS:  CONSTITUTIONAL: No fever, weight loss, or  + fatigue, [place]   RESPIRATORY: No cough, wheezing, chills or hemoptysis; No shortness of breath  CARDIOVASCULAR: No chest pain, palpitations, dizziness, or leg swelling  GASTROINTESTINAL: No abdominal pain. No nausea, vomiting, or hematemesis; No diarrhea or constipation. No melena or hematochezia.  GENITOURINARY: No dysuria or hematuria, urinary frequency  NEUROLOGICAL: No headaches, memory loss, loss of strength, numbness, or tremors  SKIN: No itching, burning, rashes, or lesions       MEDICATIONS  (STANDING):  albuterol/ipratropium for Nebulization 3 milliLiter(s) Nebulizer every 6 hours  azithromycin  IVPB 500 milliGRAM(s) IV Intermittent every 24 hours  cefTRIAXone   IVPB 2000 milliGRAM(s) IV Intermittent every 24 hours  chlorhexidine 2% Cloths 1 Application(s) Topical <User Schedule>  epoetin lara (PROCRIT) Injectable 4000 Unit(s) IV Push <User Schedule>  heparin   Injectable 5000 Unit(s) SubCutaneous every 12 hours  insulin glargine Injectable (LANTUS) 3 Unit(s) SubCutaneous at bedtime  insulin lispro (ADMELOG) corrective regimen sliding scale   SubCutaneous three times a day before meals  insulin lispro (ADMELOG) corrective regimen sliding scale   SubCutaneous at bedtime  pancrelipase  (CREON 36,000 Lipase Units) 1 Capsule(s) Oral four times a day with meals  pantoprazole    Tablet 40 milliGRAM(s) Oral before breakfast  simvastatin 20 milliGRAM(s) Oral at bedtime  sodium chloride 3%  Inhalation 4 milliLiter(s) Inhalation every 12 hours    MEDICATIONS  (PRN):  acetaminophen     Tablet .. 650 milliGRAM(s) Oral every 6 hours PRN Temp greater or equal to 38C (100.4F), Mild Pain (1 - 3)  aluminum hydroxide/magnesium hydroxide/simethicone Suspension 30 milliLiter(s) Oral every 4 hours PRN Dyspepsia  melatonin 3 milliGRAM(s) Oral at bedtime PRN Insomnia  ondansetron Injectable 4 milliGRAM(s) IV Push every 8 hours PRN Nausea and/or Vomiting      Vital Signs Last 24 Hrs  T(C): 36.6 (11 May 2024 05:00), Max: 36.8 (10 May 2024 11:24)  T(F): 97.8 (11 May 2024 05:00), Max: 98.2 (10 May 2024 11:24)  HR: 95 (11 May 2024 05:00) (95 - 108)  BP: 152/80 (11 May 2024 05:00) (109/68 - 159/76)  BP(mean): --  RR: 16 (11 May 2024 05:00) (16 - 20)  SpO2: 97% (11 May 2024 05:00) (93% - 98%)    Parameters below as of 11 May 2024 05:00  Patient On (Oxygen Delivery Method): room air        PHYSICAL EXAMINATION:  GENERAL: NAD  EYES: EOMI, PERRLA  NECK: Supple, No JVD; Normal thyroid; Trachea midline: No LAD   NERVOUS SYSTEM:  Alert & Oriented X3,  Motor Strength 5/5 B/L upper and lower extremities; DTRs 2+ intact and symmetric  CHEST/LUNG: + minimal bibasilar crackles . NO congestion, NO wheezing  HEART: Regular rate and rhythm; No murmurs, no gallops  ABDOMEN: Soft, Nontender, Nondistended; Bowel sounds present, no pain or masses on palpation  : voiding well  EXTREMITIES:  2+ Peripheral Pulses, No clubbing, cyanosis, or edema  SKIN: warm, intact, no lesions                     I&O's Summary    10 May 2024 07:01  -  11 May 2024 07:00  --------------------------------------------------------  IN: 0 mL / OUT: 2000 mL / NET: -2000 mL            CAPILLARY BLOOD GLUCOSE      RADIOLOGY & ADDITIONAL TESTS:

## 2024-05-11 NOTE — PROGRESS NOTE ADULT - ATTENDING COMMENTS
82yo woman w/ PMH hemachromatosis and ESRD (M/W/F) presented with 3 days of progressive dyspnea; found with multifocal PNA and fluid overload in ED with severe sepsis; ICU asked to evaluate for NIPPV. Initially admitted to medicine for HD then ultimately transferred to ICU for BIPAP requirement.    #AHRF  #Severe sepsis  #Multifocal PNA  #ESRD  #Elevated troponin - suspect demand in sepsis  #Hemachromatosis    Plan:  - trial of NiV this AM; maintain 2L NC support for now  - telemetry monitoring  - cont cef/azithro coverage for multifocal PNA  - f/u peripheral ID workup/PNA workup  - ISS and FSG monitoring  - trend troponin and EKG   - HD per nephro  - trend BMP and lytes  - home PPI and statin  - DVT prophy    Transfer to medicine
d/w patient and staff plan of care

## 2024-05-11 NOTE — PROGRESS NOTE ADULT - ASSESSMENT
83F, from home (lives alone), with PMH DM, HTN, HLD, ESRD on HD (MWF, L-AVF), gout, hemochromatosis, Foresenius, and previous smoker who presents with shortness of breath. Increased WOB in ED, placed on BiPAP and then taken off. ICU evaluated and stated to re-consult if continued work of breathing after dialysis. Admitted for AHRF 2/2 PNA. Admitted to ICU overnight for worsening respiratory status requiring BiPAP, DG to medicine floor for further management saturating well on RA, scheduled for HD today , GOC - has prior MOLST, to be confirmed and documented, PT recs NIKKIE, CM to follow

## 2024-05-11 NOTE — PROGRESS NOTE ADULT - ASSESSMENT
# ESRD admitted with SOB, hypoxia PNA vs AHRF .  Continue HD MWF  seen on HD stable  noted, D/C planning today   # bilateral PNA. on ABX. symptomatically better with ABX  # anemia of CKD.   CULLEN on HD.  # HTN stable UF with HD     For any question, call:  Cell # [phone]  Pager # [phone]  Callback # [phone]

## 2024-05-11 NOTE — PHARMACOTHERAPY INTERVENTION NOTE - COMMENTS
Recommended to discontinue the azithromycin order since today is day 6 of azithromycin therapy.    Alfredo Reece, PharmD, [facility]  Clinical Pharmacy Specialist, Infectious Diseases  Tele-Antimicrobial Stewardship Program (Tele-ASP)  Tele-ASP Phone: [phone]

## 2024-05-11 NOTE — PROGRESS NOTE ADULT - PROBLEM SELECTOR PLAN 4
- trops 75>127, trended down   - EKG - sinus tachycardia, LVH. No ST-T wave changes.    Last TTE - 2019> normal EF, G1DD   [ ] will consider TTE - trops 75>127, trended down   - EKG - sinus tachycardia, LVH. No ST-T wave changes.    Last TTE - 2019> normal EF, G1DD

## 2024-05-11 NOTE — PROGRESS NOTE ADULT - SUBJECTIVE AND OBJECTIVE BOX
[facility] - ROBERT Garber / ROBERT Valdes / JONATHAN Burt/ ROBERT Trujillo/ ROBERT Massey/ JOSELIN Mi / RACHEL Pandey / SUJIT Alatorre / LILA Ayoub  ---------------------------------------------------------------------------------------------------------------  seen and examined today for ESRD  Interval : NAD  VITALS:  T(F): 99.1 (05-11-24 @ 12:59), Max: 99.1 (05-11-24 @ 12:59)  HR: 93 (05-11-24 @ 12:59)  BP: 124/64 (05-11-24 @ 12:59)  RR: 18 (05-11-24 @ 12:59)  SpO2: 93% (05-11-24 @ 12:59)  Wt(kg): --    05-10 @ 07:01  -  05-11 @ 07:00  --------------------------------------------------------  IN: 0 mL / OUT: 2000 mL / NET: -2000 mL      Physical Exam :-  Constitutional: NAD  Neck: Supple.  Respiratory: Bilateral equal breath sounds,  Cardiovascular: S1, S2 normal,  Gastrointestinal: Bowel Sounds present, soft, non tender.  Extremities: No edema  Neurological: Alert and Oriented x 1-2  Psychiatric: Normal mood, normal affect  Data:-  Allergies :   shellfish (Anaphylaxis)  aspirin (Unknown)  ibuprofen (Unknown)  Mushrooms (Anaphylaxis)    Hospital Medications:   MEDICATIONS  (STANDING):  albuterol/ipratropium for Nebulization 3 milliLiter(s) Nebulizer every 6 hours  chlorhexidine 2% Cloths 1 Application(s) Topical <User Schedule>  epoetin lara (PROCRIT) Injectable 4000 Unit(s) IV Push <User Schedule>  heparin   Injectable 5000 Unit(s) SubCutaneous every 12 hours  insulin glargine Injectable (LANTUS) 3 Unit(s) SubCutaneous at bedtime  insulin lispro (ADMELOG) corrective regimen sliding scale   SubCutaneous three times a day before meals  insulin lispro (ADMELOG) corrective regimen sliding scale   SubCutaneous at bedtime  pancrelipase  (CREON 36,000 Lipase Units) 1 Capsule(s) Oral four times a day with meals  pantoprazole    Tablet 40 milliGRAM(s) Oral before breakfast  simvastatin 20 milliGRAM(s) Oral at bedtime  sodium chloride 3%  Inhalation 4 milliLiter(s) Inhalation every 12 hours    05-11    128<L>  |  92<L>  |  18  ----------------------------<  144<H>  3.6   |  24  |  3.24<H>    Ca    8.0<L>      11 May 2024 07:25  Phos  2.5     05-11  Mg     1.9     05-11      Creatinine Trend: 3.24 <--, 3.37 <--, 2.49 <--, 5.37 <--, 3.16 <--, 6.14 <--                        8.8    8.43  )-----------( 224      ( 11 May 2024 07:25 )             26.7

## 2024-05-12 LAB
ANION GAP SERPL CALC-SCNC: 14 MMOL/L — SIGNIFICANT CHANGE UP (ref 5–17)
BUN SERPL-MCNC: 25 MG/DL — HIGH (ref 7–18)
CALCIUM SERPL-MCNC: 7.7 MG/DL — LOW (ref 8.4–10.5)
CHLORIDE SERPL-SCNC: 87 MMOL/L — LOW (ref 96–108)
CO2 SERPL-SCNC: 23 MMOL/L — SIGNIFICANT CHANGE UP (ref 22–31)
CREAT SERPL-MCNC: 4.59 MG/DL — HIGH (ref 0.5–1.3)
EGFR: 9 ML/MIN/1.73M2 — LOW
GLUCOSE BLDC GLUCOMTR-MCNC: 129 MG/DL — HIGH (ref 70–99)
GLUCOSE BLDC GLUCOMTR-MCNC: 134 MG/DL — HIGH (ref 70–99)
GLUCOSE BLDC GLUCOMTR-MCNC: 186 MG/DL — HIGH (ref 70–99)
GLUCOSE BLDC GLUCOMTR-MCNC: 215 MG/DL — HIGH (ref 70–99)
GLUCOSE SERPL-MCNC: 134 MG/DL — HIGH (ref 70–99)
HCT VFR BLD CALC: 26.3 % — LOW (ref 34.5–45)
HGB BLD-MCNC: 8.7 G/DL — LOW (ref 11.5–15.5)
MAGNESIUM SERPL-MCNC: 2 MG/DL — SIGNIFICANT CHANGE UP (ref 1.6–2.6)
MCHC RBC-ENTMCNC: 28.8 PG — SIGNIFICANT CHANGE UP (ref 27–34)
MCHC RBC-ENTMCNC: 33.1 GM/DL — SIGNIFICANT CHANGE UP (ref 32–36)
MCV RBC AUTO: 87.1 FL — SIGNIFICANT CHANGE UP (ref 80–100)
NRBC # BLD: 0 /100 WBCS — SIGNIFICANT CHANGE UP (ref 0–0)
PHOSPHATE SERPL-MCNC: 3.2 MG/DL — SIGNIFICANT CHANGE UP (ref 2.5–4.5)
PLATELET # BLD AUTO: 236 K/UL — SIGNIFICANT CHANGE UP (ref 150–400)
POTASSIUM SERPL-MCNC: 3.7 MMOL/L — SIGNIFICANT CHANGE UP (ref 3.5–5.3)
POTASSIUM SERPL-SCNC: 3.7 MMOL/L — SIGNIFICANT CHANGE UP (ref 3.5–5.3)
RBC # BLD: 3.02 M/UL — LOW (ref 3.8–5.2)
RBC # FLD: 13.9 % — SIGNIFICANT CHANGE UP (ref 10.3–14.5)
SODIUM SERPL-SCNC: 124 MMOL/L — LOW (ref 135–145)
WBC # BLD: 8.31 K/UL — SIGNIFICANT CHANGE UP (ref 3.8–10.5)
WBC # FLD AUTO: 8.31 K/UL — SIGNIFICANT CHANGE UP (ref 3.8–10.5)

## 2024-05-12 RX ORDER — SODIUM CHLORIDE 9 MG/ML
1 INJECTION INTRAMUSCULAR; INTRAVENOUS; SUBCUTANEOUS
Refills: 0 | Status: DISCONTINUED | OUTPATIENT
Start: 2024-05-12 | End: 2024-05-13

## 2024-05-12 RX ADMIN — SODIUM CHLORIDE 4 MILLILITER(S): 9 INJECTION INTRAMUSCULAR; INTRAVENOUS; SUBCUTANEOUS at 20:38

## 2024-05-12 RX ADMIN — HEPARIN SODIUM 5000 UNIT(S): 5000 INJECTION INTRAVENOUS; SUBCUTANEOUS at 17:04

## 2024-05-12 RX ADMIN — PANTOPRAZOLE SODIUM 40 MILLIGRAM(S): 20 TABLET, DELAYED RELEASE ORAL at 05:57

## 2024-05-12 RX ADMIN — Medication 3 MILLILITER(S): at 20:37

## 2024-05-12 RX ADMIN — Medication 1 CAPSULE(S): at 07:44

## 2024-05-12 RX ADMIN — Medication 3 MILLILITER(S): at 08:26

## 2024-05-12 RX ADMIN — SIMVASTATIN 20 MILLIGRAM(S): 20 TABLET, FILM COATED ORAL at 21:57

## 2024-05-12 RX ADMIN — Medication 3 MILLILITER(S): at 14:53

## 2024-05-12 RX ADMIN — SODIUM CHLORIDE 1 GRAM(S): 9 INJECTION INTRAMUSCULAR; INTRAVENOUS; SUBCUTANEOUS at 17:04

## 2024-05-12 RX ADMIN — INSULIN GLARGINE 3 UNIT(S): 100 INJECTION, SOLUTION SUBCUTANEOUS at 21:57

## 2024-05-12 RX ADMIN — Medication 1 CAPSULE(S): at 17:04

## 2024-05-12 RX ADMIN — HEPARIN SODIUM 5000 UNIT(S): 5000 INJECTION INTRAVENOUS; SUBCUTANEOUS at 05:57

## 2024-05-12 RX ADMIN — CHLORHEXIDINE GLUCONATE 1 APPLICATION(S): 213 SOLUTION TOPICAL at 05:59

## 2024-05-12 RX ADMIN — Medication 1 CAPSULE(S): at 21:57

## 2024-05-12 RX ADMIN — Medication 1 CAPSULE(S): at 11:56

## 2024-05-12 RX ADMIN — SODIUM CHLORIDE 4 MILLILITER(S): 9 INJECTION INTRAMUSCULAR; INTRAVENOUS; SUBCUTANEOUS at 08:26

## 2024-05-12 RX ADMIN — Medication 2: at 11:56

## 2024-05-12 NOTE — PROGRESS NOTE ADULT - ASSESSMENT
# ESRD admitted with SOB, hypoxia PNA vs AHRF .  Continue HD MWF  tolerating HD well  noted, D/C planning  #Hyponatremia  suspected from increased volume. ordered NaCl 1gr BID x 3days  # bilateral PNA. on ABX. symptomatically better with ABX  # anemia of CKD.   CULLEN on HD.  # HTN stable UF with HD     For any question, call:  Cell # [phone]  Pager # [phone]  Callback # [phone]

## 2024-05-12 NOTE — PROGRESS NOTE ADULT - SUBJECTIVE AND OBJECTIVE BOX
INTERVAL HPI/OVERNIGHT EVENTS:  Patient seen,no acute issues  VITAL SIGNS:  T(F): 98.6 (05-12-24 @ 05:00)  HR: 94 (05-12-24 @ 05:00)  BP: 138/72 (05-12-24 @ 05:00)  RR: 17 (05-12-24 @ 05:00)  SpO2: 96% (05-12-24 @ 05:00)  Wt(kg): --    PHYSICAL EXAM:  awake,comfortable  Constitutional:  Eyes:  ENMT:perrla  Neck:  Respiratory:few  rales  Cardiovascular:s1s2,m-none  Gastrointestinal:soft,bs pos  Extremities:  Vascular:  Neurological:no focal deficit  Musculoskeletal:    MEDICATIONS  (STANDING):  albuterol/ipratropium for Nebulization 3 milliLiter(s) Nebulizer every 6 hours  chlorhexidine 2% Cloths 1 Application(s) Topical <User Schedule>  epoetin lara (PROCRIT) Injectable 4000 Unit(s) IV Push <User Schedule>  heparin   Injectable 5000 Unit(s) SubCutaneous every 12 hours  insulin glargine Injectable (LANTUS) 3 Unit(s) SubCutaneous at bedtime  insulin lispro (ADMELOG) corrective regimen sliding scale   SubCutaneous three times a day before meals  insulin lispro (ADMELOG) corrective regimen sliding scale   SubCutaneous at bedtime  pancrelipase  (CREON 36,000 Lipase Units) 1 Capsule(s) Oral four times a day with meals  pantoprazole    Tablet 40 milliGRAM(s) Oral before breakfast  simvastatin 20 milliGRAM(s) Oral at bedtime  sodium chloride 3%  Inhalation 4 milliLiter(s) Inhalation every 12 hours    MEDICATIONS  (PRN):  acetaminophen     Tablet .. 650 milliGRAM(s) Oral every 6 hours PRN Temp greater or equal to 38C (100.4F), Mild Pain (1 - 3)  aluminum hydroxide/magnesium hydroxide/simethicone Suspension 30 milliLiter(s) Oral every 4 hours PRN Dyspepsia  melatonin 3 milliGRAM(s) Oral at bedtime PRN Insomnia  ondansetron Injectable 4 milliGRAM(s) IV Push every 8 hours PRN Nausea and/or Vomiting      Allergies    shellfish (Anaphylaxis)  aspirin (Unknown)  ibuprofen (Unknown)  Mushrooms (Anaphylaxis)    Intolerances        LABS:                        8.8    8.43  )-----------( 224      ( 11 May 2024 07:25 )             26.7     05-11    128<L>  |  92<L>  |  18  ----------------------------<  144<H>  3.6   |  24  |  3.24<H>    Ca    8.0<L>      11 May 2024 07:25  Phos  2.5     05-11  Mg     1.9     05-11        Urinalysis Basic - ( 11 May 2024 07:25 )    Color: x / Appearance: x / SG: x / pH: x  Gluc: 144 mg/dL / Ketone: x  / Bili: x / Urobili: x   Blood: x / Protein: x / Nitrite: x   Leuk Esterase: x / RBC: x / WBC x   Sq Epi: x / Non Sq Epi: x / Bacteria: x        RADIOLOGY & ADDITIONAL TESTS:        Assessment and Plan:   · Assessment	  83F, from home (lives alone), with PMH DM, HTN, HLD, ESRD on HD (MWF, L-AVF), gout, hemochromatosis, Foresenius, and previous smoker who presents with shortness of breath. Increased WOB in ED, placed on BiPAP and then taken off. ICU evaluated and stated to re-consult if continued work of breathing after dialysis. Admitted for AHRF 2/2 PNA. Admitted to ICU overnight for worsening respiratory status requiring BiPAP, DG to medicine floor for further management saturating well on RA, scheduled for HD today , GOC - has prior MOLST, to be confirmed and documented, PT recs NIKKIE, CM to follow        Problem/Plan - 1:  ·  Problem: Acute hypoxic respiratory failure.   ·  Plan: 2/2 Volume overload requiring HD and PNA  - CXR - patchy R LL opacity   - CT chest - bilateral patchy consolidation in lower lobes and R middle lobe.  - chest PT + 3% inhalation + duonebs  - nasal cannula> weaned off , on RA   - sputum CX, mycoplasma, legionella, strep pneumo ordered> NOT collected as no sputum production and patient is anuric   - on ceftriaxone, azithro for CAP coverage.     Problem/Plan - 2:  ·  Problem: Pneumonia.   ·  Plan: # PNA  - CT chest - bilateral patchy consolidation in lower lobes and R middle lobe.  -  sputum CX, mycoplasma, legionella, strep pneumo, ordered , but not collected   - on ceftriaxone, azithro.     Problem/Plan - 3:  ·  Problem: ESRD on dialysis.   ·  Plan: scheduled for HD today   Creat 3.16>5.37  anemia of renal disease  - monitor CBC- Hb stable at 9  Nephro Dr. Pandey following.     Problem/Plan - 4:  ·  Problem: Demand ischemia of myocardium.   ·  Plan: - trops 75>127, trended down   - EKG - sinus tachycardia, LVH. No ST-T wave changes.    Last TTE - 2019> normal EF, G1DD.     Problem/Plan - 5:  ·  Problem: DM (diabetes mellitus).   ·  Plan: - home meds: Tujeo, repaglinide  - holding home meds  - sugars 200s  - started on  lantus 3u + ISS in ICU   - fingersticks ACHS   - Consistent carb diet  - A1C 5.4.     Problem/Plan - 6:  ·  Problem: Prophylactic measure.   ·  Plan: DVT ppx - heparin SQ.     Problem/Plan - 7:  ·  Problem: Discharge planning issues.   ·  Plan: SW to reinstate dialysis   Patient lives alone , NO HHA, walks with walker independent ADLs, has to walk up 2 flights of stairs   PT recs NIKKIE PEREZ prior documents DNR/DNI,   d/c planning to str with HD ,family aware and agreed,awaiting for placement

## 2024-05-12 NOTE — PROGRESS NOTE ADULT - SUBJECTIVE AND OBJECTIVE BOX
[facility] - ROBERT Garber / ROBERT Valdes / JONATHAN Burt/ ROBERT Trujillo/ ROBERT Massey/ JOSELIN Mi / RACHEL Pandey / SUJIT Alatorre / LILA Ayoub  ---------------------------------------------------------------------------------------------------------------  seen and examined today for ESRD  Interval : hyponatremia noted  VITALS:  T(F): 98.6 (05-12-24 @ 05:00), Max: 99.1 (05-11-24 @ 12:59)  HR: 94 (05-12-24 @ 05:00)  BP: 138/72 (05-12-24 @ 05:00)  RR: 17 (05-12-24 @ 05:00)  SpO2: 96% (05-12-24 @ 05:00)  Wt(kg): --    Physical Exam :-  Constitutional: NAD  Neck: Supple.  Respiratory: Bilateral equal breath sounds,  Cardiovascular: S1, S2 normal,  Gastrointestinal: Bowel Sounds present, soft, non tender.  Extremities: No edema  Neurological: Alert and Oriented x 3, no focal deficits  Psychiatric: Normal mood, normal affect  Data:-  Allergies :   shellfish (Anaphylaxis)  aspirin (Unknown)  ibuprofen (Unknown)  Mushrooms (Anaphylaxis)    Hospital Medications:   MEDICATIONS  (STANDING):  albuterol/ipratropium for Nebulization 3 milliLiter(s) Nebulizer every 6 hours  chlorhexidine 2% Cloths 1 Application(s) Topical <User Schedule>  epoetin lara (PROCRIT) Injectable 4000 Unit(s) IV Push <User Schedule>  heparin   Injectable 5000 Unit(s) SubCutaneous every 12 hours  insulin glargine Injectable (LANTUS) 3 Unit(s) SubCutaneous at bedtime  insulin lispro (ADMELOG) corrective regimen sliding scale   SubCutaneous three times a day before meals  insulin lispro (ADMELOG) corrective regimen sliding scale   SubCutaneous at bedtime  pancrelipase  (CREON 36,000 Lipase Units) 1 Capsule(s) Oral four times a day with meals  pantoprazole    Tablet 40 milliGRAM(s) Oral before breakfast  simvastatin 20 milliGRAM(s) Oral at bedtime  sodium chloride 1 Gram(s) Oral two times a day  sodium chloride 3%  Inhalation 4 milliLiter(s) Inhalation every 12 hours    05-12    124<L>  |  87<L>  |  25<H>  ----------------------------<  134<H>  3.7   |  23  |  4.59<H>    Ca    7.7<L>      12 May 2024 06:55  Phos  3.2     05-12  Mg     2.0     05-12      Creatinine Trend: 4.59 <--, 3.24 <--, 3.37 <--, 2.49 <--, 5.37 <--, 3.16 <--, 6.14 <--                        8.7    8.31  )-----------( 236      ( 12 May 2024 06:55 )             26.3

## 2024-05-13 LAB
ANION GAP SERPL CALC-SCNC: 10 MMOL/L — SIGNIFICANT CHANGE UP (ref 5–17)
ANION GAP SERPL CALC-SCNC: 15 MMOL/L — SIGNIFICANT CHANGE UP (ref 5–17)
BUN SERPL-MCNC: 14 MG/DL — SIGNIFICANT CHANGE UP (ref 7–18)
BUN SERPL-MCNC: 32 MG/DL — HIGH (ref 7–18)
CALCIUM SERPL-MCNC: 7.5 MG/DL — LOW (ref 8.4–10.5)
CALCIUM SERPL-MCNC: 8.6 MG/DL — SIGNIFICANT CHANGE UP (ref 8.4–10.5)
CHLORIDE SERPL-SCNC: 86 MMOL/L — LOW (ref 96–108)
CHLORIDE SERPL-SCNC: 94 MMOL/L — LOW (ref 96–108)
CO2 SERPL-SCNC: 18 MMOL/L — LOW (ref 22–31)
CO2 SERPL-SCNC: 26 MMOL/L — SIGNIFICANT CHANGE UP (ref 22–31)
CREAT SERPL-MCNC: 2.73 MG/DL — HIGH (ref 0.5–1.3)
CREAT SERPL-MCNC: 5.35 MG/DL — HIGH (ref 0.5–1.3)
EGFR: 17 ML/MIN/1.73M2 — LOW
EGFR: 7 ML/MIN/1.73M2 — LOW
GLUCOSE BLDC GLUCOMTR-MCNC: 114 MG/DL — HIGH (ref 70–99)
GLUCOSE BLDC GLUCOMTR-MCNC: 135 MG/DL — HIGH (ref 70–99)
GLUCOSE BLDC GLUCOMTR-MCNC: 176 MG/DL — HIGH (ref 70–99)
GLUCOSE BLDC GLUCOMTR-MCNC: 189 MG/DL — HIGH (ref 70–99)
GLUCOSE SERPL-MCNC: 192 MG/DL — HIGH (ref 70–99)
GLUCOSE SERPL-MCNC: 195 MG/DL — HIGH (ref 70–99)
HCT VFR BLD CALC: 26.9 % — LOW (ref 34.5–45)
HGB BLD-MCNC: 9.1 G/DL — LOW (ref 11.5–15.5)
MAGNESIUM SERPL-MCNC: 1.8 MG/DL — SIGNIFICANT CHANGE UP (ref 1.6–2.6)
MAGNESIUM SERPL-MCNC: 1.8 MG/DL — SIGNIFICANT CHANGE UP (ref 1.6–2.6)
MCHC RBC-ENTMCNC: 28.9 PG — SIGNIFICANT CHANGE UP (ref 27–34)
MCHC RBC-ENTMCNC: 33.8 GM/DL — SIGNIFICANT CHANGE UP (ref 32–36)
MCV RBC AUTO: 85.4 FL — SIGNIFICANT CHANGE UP (ref 80–100)
NRBC # BLD: 0 /100 WBCS — SIGNIFICANT CHANGE UP (ref 0–0)
PHOSPHATE SERPL-MCNC: 2.3 MG/DL — LOW (ref 2.5–4.5)
PHOSPHATE SERPL-MCNC: 4.2 MG/DL — SIGNIFICANT CHANGE UP (ref 2.5–4.5)
PLATELET # BLD AUTO: 262 K/UL — SIGNIFICANT CHANGE UP (ref 150–400)
POTASSIUM SERPL-MCNC: 3.5 MMOL/L — SIGNIFICANT CHANGE UP (ref 3.5–5.3)
POTASSIUM SERPL-MCNC: 3.6 MMOL/L — SIGNIFICANT CHANGE UP (ref 3.5–5.3)
POTASSIUM SERPL-SCNC: 3.5 MMOL/L — SIGNIFICANT CHANGE UP (ref 3.5–5.3)
POTASSIUM SERPL-SCNC: 3.6 MMOL/L — SIGNIFICANT CHANGE UP (ref 3.5–5.3)
RBC # BLD: 3.15 M/UL — LOW (ref 3.8–5.2)
RBC # FLD: 13.7 % — SIGNIFICANT CHANGE UP (ref 10.3–14.5)
SODIUM SERPL-SCNC: 119 MMOL/L — CRITICAL LOW (ref 135–145)
SODIUM SERPL-SCNC: 130 MMOL/L — LOW (ref 135–145)
WBC # BLD: 8.75 K/UL — SIGNIFICANT CHANGE UP (ref 3.8–10.5)
WBC # FLD AUTO: 8.75 K/UL — SIGNIFICANT CHANGE UP (ref 3.8–10.5)

## 2024-05-13 RX ORDER — BENZOCAINE AND MENTHOL 5; 1 G/100ML; G/100ML
1 LIQUID ORAL THREE TIMES A DAY
Refills: 0 | Status: DISCONTINUED | OUTPATIENT
Start: 2024-05-13 | End: 2024-05-16

## 2024-05-13 RX ADMIN — BENZOCAINE AND MENTHOL 1 LOZENGE: 5; 1 LIQUID ORAL at 16:05

## 2024-05-13 RX ADMIN — Medication 3 MILLILITER(S): at 15:39

## 2024-05-13 RX ADMIN — HEPARIN SODIUM 5000 UNIT(S): 5000 INJECTION INTRAVENOUS; SUBCUTANEOUS at 05:18

## 2024-05-13 RX ADMIN — Medication 1 CAPSULE(S): at 17:33

## 2024-05-13 RX ADMIN — SODIUM CHLORIDE 4 MILLILITER(S): 9 INJECTION INTRAMUSCULAR; INTRAVENOUS; SUBCUTANEOUS at 20:13

## 2024-05-13 RX ADMIN — Medication 3 MILLILITER(S): at 02:59

## 2024-05-13 RX ADMIN — PANTOPRAZOLE SODIUM 40 MILLIGRAM(S): 20 TABLET, DELAYED RELEASE ORAL at 05:18

## 2024-05-13 RX ADMIN — ERYTHROPOIETIN 4000 UNIT(S): 10000 INJECTION, SOLUTION INTRAVENOUS; SUBCUTANEOUS at 11:32

## 2024-05-13 RX ADMIN — Medication 1 CAPSULE(S): at 07:34

## 2024-05-13 RX ADMIN — HEPARIN SODIUM 5000 UNIT(S): 5000 INJECTION INTRAVENOUS; SUBCUTANEOUS at 17:33

## 2024-05-13 RX ADMIN — SODIUM CHLORIDE 1 GRAM(S): 9 INJECTION INTRAMUSCULAR; INTRAVENOUS; SUBCUTANEOUS at 05:18

## 2024-05-13 RX ADMIN — Medication 1: at 17:19

## 2024-05-13 RX ADMIN — INSULIN GLARGINE 3 UNIT(S): 100 INJECTION, SOLUTION SUBCUTANEOUS at 22:32

## 2024-05-13 RX ADMIN — SODIUM CHLORIDE 1 GRAM(S): 9 INJECTION INTRAMUSCULAR; INTRAVENOUS; SUBCUTANEOUS at 17:33

## 2024-05-13 RX ADMIN — Medication 3 MILLILITER(S): at 20:13

## 2024-05-13 RX ADMIN — Medication 1 CAPSULE(S): at 22:32

## 2024-05-13 RX ADMIN — SIMVASTATIN 20 MILLIGRAM(S): 20 TABLET, FILM COATED ORAL at 22:32

## 2024-05-13 RX ADMIN — CHLORHEXIDINE GLUCONATE 1 APPLICATION(S): 213 SOLUTION TOPICAL at 06:46

## 2024-05-13 NOTE — PROGRESS NOTE ADULT - ASSESSMENT
# ESRD admitted with SOB, hypoxia PNA vs AHRF .  Continue HD MWF  tolerating HD well  noted, D/C planning  #Hyponatremiic from increased volume.  D/C salt tabs.   NEEDS FLUID RESTRICTION 1L/DAY.  RPT SODIUM SODIUM 130 AFTER HD.  # bilateral PNA. completed  ABX  # anemia of CKD.   CULLEN on HD.  # HTN stable UF with HD

## 2024-05-13 NOTE — PROGRESS NOTE ADULT - SUBJECTIVE AND OBJECTIVE BOX
PGY-1 Progress Note discussed with attending    PAGER #: [[phone]] TILL 5:00 PM  PLEASE CONTACT ON CALL TEAM:  - On Call Team (Please refer to Jose Raul) FROM 5:00 PM - 8:30PM  - Nightfloat Team FROM 8:30 -7:30 AM    CHIEF COMPLAINT & BRIEF HOSPITAL COURSE:      INTERVAL HPI/OVERNIGHT EVENTS:       REVIEW OF SYSTEMS:  CONSTITUTIONAL: No fever, weight loss, or fatigue  RESPIRATORY: No cough, wheezing, chills or hemoptysis; No shortness of breath  CARDIOVASCULAR: No chest pain, palpitations, dizziness, or leg swelling  GASTROINTESTINAL: No abdominal pain. No nausea, vomiting, or hematemesis; No diarrhea or constipation. No melena or hematochezia.  GENITOURINARY: No dysuria or hematuria, urinary frequency  NEUROLOGICAL: No headaches, memory loss, loss of strength, numbness, or tremors  SKIN: No itching, burning, rashes, or lesions     MEDICATIONS  (STANDING):  albuterol/ipratropium for Nebulization 3 milliLiter(s) Nebulizer every 6 hours  chlorhexidine 2% Cloths 1 Application(s) Topical <User Schedule>  epoetin lara (PROCRIT) Injectable 4000 Unit(s) IV Push <User Schedule>  heparin   Injectable 5000 Unit(s) SubCutaneous every 12 hours  insulin glargine Injectable (LANTUS) 3 Unit(s) SubCutaneous at bedtime  insulin lispro (ADMELOG) corrective regimen sliding scale   SubCutaneous three times a day before meals  insulin lispro (ADMELOG) corrective regimen sliding scale   SubCutaneous at bedtime  pancrelipase  (CREON 36,000 Lipase Units) 1 Capsule(s) Oral four times a day with meals  pantoprazole    Tablet 40 milliGRAM(s) Oral before breakfast  simvastatin 20 milliGRAM(s) Oral at bedtime  sodium chloride 1 Gram(s) Oral two times a day  sodium chloride 3%  Inhalation 4 milliLiter(s) Inhalation every 12 hours    MEDICATIONS  (PRN):  acetaminophen     Tablet .. 650 milliGRAM(s) Oral every 6 hours PRN Temp greater or equal to 38C (100.4F), Mild Pain (1 - 3)  aluminum hydroxide/magnesium hydroxide/simethicone Suspension 30 milliLiter(s) Oral every 4 hours PRN Dyspepsia  melatonin 3 milliGRAM(s) Oral at bedtime PRN Insomnia  ondansetron Injectable 4 milliGRAM(s) IV Push every 8 hours PRN Nausea and/or Vomiting      Vital Signs Last 24 Hrs  T(C): 36.9 (13 May 2024 05:00), Max: 37.1 (12 May 2024 12:16)  T(F): 98.4 (13 May 2024 05:00), Max: 98.7 (12 May 2024 12:16)  HR: 102 (13 May 2024 05:00) (93 - 102)  BP: 155/77 (13 May 2024 05:00) (148/74 - 167/76)  BP(mean): --  RR: 17 (13 May 2024 05:00) (16 - 18)  SpO2: 98% (13 May 2024 05:00) (97% - 98%)    Parameters below as of 13 May 2024 05:00  Patient On (Oxygen Delivery Method): room air        PHYSICAL EXAMINATION:  GENERAL: NAD, well built  HEAD:  Atraumatic, Normocephalic  EYES:  conjunctiva and sclera clear  NECK: Supple, No JVD, Normal thyroid  CHEST/LUNG: Clear to auscultation. Clear to percussion bilaterally; No rales, rhonchi, wheezing, or rubs  HEART: Regular rate and rhythm; No murmurs, rubs, or gallops  ABDOMEN: Soft, Nontender, Nondistended; Bowel sounds present, no pain or masses on palpation  NERVOUS SYSTEM:  Alert & Oriented X3  : voiding well  EXTREMITIES:  2+ Peripheral Pulses, No clubbing, cyanosis, or edema  SKIN: warm dry                          8.7    8.31  )-----------( 236      ( 12 May 2024 06:55 )             26.3     05-12    124<L>  |  87<L>  |  25<H>  ----------------------------<  134<H>  3.7   |  23  |  4.59<H>    Ca    7.7<L>      12 May 2024 06:55  Phos  3.2     05-12  Mg     2.0     05-12                I&O's Summary          CAPILLARY BLOOD GLUCOSE      RADIOLOGY & ADDITIONAL TESTS:                   PGY-1 Progress Note discussed with attending    PAGER #: [[phone]] TILL 5:00 PM  PLEASE CONTACT ON CALL TEAM:  - On Call Team (Please refer to Jose Raul) FROM 5:00 PM - 8:30PM  - Nightfloat Team FROM 8:30 -7:30 AM    CHIEF COMPLAINT & BRIEF HOSPITAL COURSE:      INTERVAL HPI/OVERNIGHT EVENTS: Patient Na downtrended over the weekend   Critical value - 119 this morning   Patient has no men ranjan status changes / headache/ confusion       REVIEW OF SYSTEMS:  CONSTITUTIONAL: No fever, weight loss, or fatigue  RESPIRATORY:  + dry  cough, No wheezing, chills or hemoptysis; No shortness of breath  CARDIOVASCULAR: No chest pain, palpitations, dizziness, or leg swelling  GASTROINTESTINAL: No abdominal pain. No nausea, vomiting, or hematemesis; No diarrhea or constipation. No melena or hematochezia.  GENITOURINARY: No dysuria or hematuria, urinary frequency  NEUROLOGICAL: No headaches, memory loss, loss of strength, numbness, or tremors  SKIN: No itching, burning, rashes, or lesions     MEDICATIONS  (STANDING):  albuterol/ipratropium for Nebulization 3 milliLiter(s) Nebulizer every 6 hours  chlorhexidine 2% Cloths 1 Application(s) Topical <User Schedule>  epoetin lara (PROCRIT) Injectable 4000 Unit(s) IV Push <User Schedule>  heparin   Injectable 5000 Unit(s) SubCutaneous every 12 hours  insulin glargine Injectable (LANTUS) 3 Unit(s) SubCutaneous at bedtime  insulin lispro (ADMELOG) corrective regimen sliding scale   SubCutaneous three times a day before meals  insulin lispro (ADMELOG) corrective regimen sliding scale   SubCutaneous at bedtime  pancrelipase  (CREON 36,000 Lipase Units) 1 Capsule(s) Oral four times a day with meals  pantoprazole    Tablet 40 milliGRAM(s) Oral before breakfast  simvastatin 20 milliGRAM(s) Oral at bedtime  sodium chloride 1 Gram(s) Oral two times a day  sodium chloride 3%  Inhalation 4 milliLiter(s) Inhalation every 12 hours    MEDICATIONS  (PRN):  acetaminophen     Tablet .. 650 milliGRAM(s) Oral every 6 hours PRN Temp greater or equal to 38C (100.4F), Mild Pain (1 - 3)  aluminum hydroxide/magnesium hydroxide/simethicone Suspension 30 milliLiter(s) Oral every 4 hours PRN Dyspepsia  melatonin 3 milliGRAM(s) Oral at bedtime PRN Insomnia  ondansetron Injectable 4 milliGRAM(s) IV Push every 8 hours PRN Nausea and/or Vomiting      Vital Signs Last 24 Hrs  T(C): 36.9 (13 May 2024 05:00), Max: 37.1 (12 May 2024 12:16)  T(F): 98.4 (13 May 2024 05:00), Max: 98.7 (12 May 2024 12:16)  HR: 102 (13 May 2024 05:00) (93 - 102)  BP: 155/77 (13 May 2024 05:00) (148/74 - 167/76)  BP(mean): --  RR: 17 (13 May 2024 05:00) (16 - 18)  SpO2: 98% (13 May 2024 05:00) (97% - 98%)    Parameters below as of 13 May 2024 05:00  Patient On (Oxygen Delivery Method): room air        PHYSICAL EXAMINATION:  GENERAL: NAD  EYES: EOMI, PERRLA  NECK: Supple, No JVD; Normal thyroid; Trachea midline: No LAD   NERVOUS SYSTEM:  Alert & Oriented X3,  Motor Strength 5/5 B/L upper and lower extremities; DTRs 2+ intact and symmetric  CHEST/LUNG: + minimal bibasilar crackles . NO congestion, NO wheezing  HEART: Regular rate and rhythm; No murmurs, no gallops  ABDOMEN: Soft, Nontender, Nondistended; Bowel sounds present, no pain or masses on palpation  : voiding well  EXTREMITIES:  2+ Peripheral Pulses, No clubbing, cyanosis, or edema  SKIN: warm, intact, no lesions                           8.7    8.31  )-----------( 236      ( 12 May 2024 06:55 )             26.3     05-12    124<L>  |  87<L>  |  25<H>  ----------------------------<  134<H>  3.7   |  23  |  4.59<H>    Ca    7.7<L>      12 May 2024 06:55  Phos  3.2     05-12  Mg     2.0     05-12                I&O's Summary          CAPILLARY BLOOD GLUCOSE      RADIOLOGY & ADDITIONAL TESTS:

## 2024-05-13 NOTE — PROGRESS NOTE ADULT - PROBLEM SELECTOR PLAN 1
2/2 Volume overload requiring HD and PNA  - CXR - patchy R LL opacity   - CTA neg PE   - CT chest - bilateral patchy consolidation in lower lobes and R middle lobe.  - HD as scheduled - removed 1.7L last session.  - chest PT + 3% inhalation + duonebs  - nasal cannula> weaned off , on RA   - sputum CX, mycoplasma, legionella, strep pneumo ordered> NOT collected as no sputum production and patient is anuric   - on ceftriaxone, azithro for CAP coverage Patient developed hyponatremia - Na trend 139>128>124>119 ( corrected = 121)   Patient continues  to be asymptomatic    Nephrology following   started on Na Cl tablets ( D 2 of 3)     [ ] f/u post dialysis sodium  [ ] monitor mental status   [  avoid overcorrection  ( max 6-8 mEq over 24 h )

## 2024-05-13 NOTE — PROGRESS NOTE ADULT - ASSESSMENT
83F, from home (lives alone), with PMH DM, HTN, HLD, ESRD on HD (MWF, L-AVF), gout, hemochromatosis, Foresenius, and previous smoker who presents with shortness of breath. Increased WOB in ED, placed on BiPAP and then taken off. ICU evaluated and stated to re-consult if continued work of breathing after dialysis. Admitted for AHRF 2/2 PNA. Admitted to ICU overnight for worsening respiratory status requiring BiPAP, DG to medicine floor for further management saturating well on RA, scheduled for HD today , GOC - has prior MOLST, to be confirmed and documented, PT recs NIKKIE, CM to follow. Patient developed hyponatremia, on treatment with NaCL tablets as per nephrology recs.  83F, from home (lives alone), with PMH DM, HTN, HLD, ESRD on HD (MWF, L-AVF), gout, hemochromatosis, Foresenius, and previous smoker who presents with shortness of breath. Increased WOB in ED, placed on BiPAP and then taken off. ICU evaluated and stated to re-consult if continued work of breathing after dialysis. Admitted for AHRF 2/2 PNA. Admitted to ICU overnight for worsening respiratory status requiring BiPAP, DG to medicine floor for further management saturating well on RA, scheduled for HD today , GOC - has prior MOLST, to be confirmed and documented, PT recs NIKKIE, CM to follow. Patient developed hyponatremia, on treatment with NaCL tablets as per nephrology recs, scheduled for HD today

## 2024-05-13 NOTE — PROGRESS NOTE ADULT - PROBLEM SELECTOR PLAN 6
DVT ppx - heparin SQ - home meds: Tujeo, repaglinide  - holding home meds  - sugars 200s  - started on  lantus 3u + ISS in ICU   - fingersticks ACHS   - Consistent carb diet  - A1C 5.4

## 2024-05-13 NOTE — PROGRESS NOTE ADULT - PROBLEM SELECTOR PLAN 3
scheduled for HD today   Creat 3.16>5.37  anemia of renal disease  - monitor CBC- Hb stable at 9  Nephro Dr. Pandey following # PNA  - CT chest - bilateral patchy consolidation in lower lobes and R middle lobe.  -  sputum CX, mycoplasma, legionella, strep pneumo, ordered , but not collected   - on ceftriaxone, azithro

## 2024-05-13 NOTE — PROGRESS NOTE ADULT - PROBLEM SELECTOR PLAN 5
- home meds: Tujeo, repaglinide  - holding home meds  - sugars 200s  - started on  lantus 3u + ISS in ICU   - fingersticks ACHS   - Consistent carb diet  - A1C 5.4 - trops 75>127, trended down   - EKG - sinus tachycardia, LVH. No ST-T wave changes.    Last TTE - 2019> normal EF, G1DD

## 2024-05-13 NOTE — PROGRESS NOTE ADULT - PROBLEM SELECTOR PLAN 7
SW to reinstate dialysis   Patient lives alone , NO HHA, walks with walker independent ADLs, has to walk up 2 flights of stairs   PT recyuliya PEREZ prior documents DNR/DNI,   [ ] will confirm with patient and daughter in AM DVT ppx - heparin SQ

## 2024-05-13 NOTE — PROGRESS NOTE ADULT - PROBLEM SELECTOR PLAN 2
# PNA  - CT chest - bilateral patchy consolidation in lower lobes and R middle lobe.  -  sputum CX, mycoplasma, legionella, strep pneumo, ordered , but not collected   - on ceftriaxone, azithro 2/2 Volume overload requiring HD and PNA  - CXR - patchy R LL opacity   - CTA neg PE   - CT chest - bilateral patchy consolidation in lower lobes and R middle lobe.  - HD as scheduled - removed 1.7L last session.  - chest PT + 3% inhalation + duonebs  - nasal cannula> weaned off , on RA   - sputum CX, mycoplasma, legionella, strep pneumo ordered> NOT collected as no sputum production and patient is anuric   - s/p  ceftriaxone, azithro for CAP coverage

## 2024-05-13 NOTE — PROGRESS NOTE ADULT - PROBLEM SELECTOR PLAN 4
[facility]  Follow Up Note     Patient: Brianna Perez Date of Service: 2022   : 1997    25 year old female      Chief Complaint   Patient presents with   • Physical       HISTORY OF PRESENT ILLNESS     This is a 25 year old female who presents today for the following:    Chronic urticaria seeing Allergy on a lot of histamine blockers.    Left ear feels like something is on it - pops/suction feeling?  Always left.  Has been on Flonase in the past.    Migraines on Sumatriptan.    Irregular menses still on OCP.    Anxiety on sertraline.  Took over script when her psychiatrist didn't get back to her.    Past medical history, surgical history, medications, allergies, family history and social history reviewed and updated in [facility].    REVIEW OF SYSTEMS       All other systems are reviewed and are negative except as documented in the history of present illness.    PHYSICAL EXAM     Vital Signs:   height is 5' 4\" (1.626 m) and weight is 67.8 kg (149 lb 6.4 oz). Her blood pressure is 122/84 and her pulse is 99.     General:  No acute distress, well nourished  female   HEENT:  EOMI, PERRL, no scleral icterus or conjunctival pallor, no ear or nasal discharge, oropharynx without erythema or exudate, perhaps some subtle retraction of L TM, scarring  otherwise normal bilaterally   Neck:  Trachea midline, supple, no palpable lymphadenopathy   Pulmonary:  CTAB, no wheezing/crackles/rales  Cardiovascular:  RRR, S1/S2 present, no m/r/g, no peripheral edema  Abdomen:  Flat, +BS, soft, NT/ND  Musculoskeletal:  ROM and motor strength in all 4 extremities grossly normal, no joint erythema or swelling   Skin:  Warm and dry, no rashes/lesions/ecchymoses/jaundice  Neuro: A/Ox3. CN II-XII grossly intact, no gross motor or sensory deficits, no gait abnormalities   Psych:  Normal, full range of affect, mood and affect are appropriate.     LABS     HgbA1c No results found for: HGBA1C   LDL  CALCULATED LDL (mg/dL)   Date Value   03/19/2018 80      Creatinine  Creatinine (mg/dL)   Date Value   02/16/2022 0.64      Hemoglobin HGB (g/dL)   Date Value   06/04/2021 14.5          ASSESSMENT AND PLAN     This is a 25 year old female who presents with:    1. Chronic urticaria  On antihistamines.  Seeing allergy.    2. Seasonal allergic rhinitis, unspecified trigger  On antihistamines - may see additional benefit of Flonase as below.    3. Dysfunction of left eustachian tube  May benefit from Flonase to open up eustachian tube which is my best guess as why she has the sensation of popping and pressure/suction.  - fluticasone (FLONASE) 50 MCG/ACT nasal spray; Spray 2 sprays in each nostril daily.  Dispense: 16 g; Refill: 1    4. Migraine without aura and without status migrainosus, not intractable  Stable.  - SUMAtriptan (IMITREX) 50 MG tablet; Take 1 tablet by mouth at onset of migraine. May repeat after 2 hours if needed.  Dispense: 30 tablet; Refill: 1    5. Vitamin D deficiency  On supplement.    6. Anxiety  On sertraline.  Sees psychiatry.    7. Abnormal uterine bleeding  On OCP.    8. Routine history and physical examination of adult  Otherwise quite healthy.  No need for screening labs - have been normal.      Return in about 1 year (around 5/13/2023) for Routine physical exam.    Patient's medical conditions, proposed management plan, risks, benefits and alternatives were discussed with the patient in detail. The patient understands and is agreeable.    Adrianne Washington MD   - trops 75>127, trended down   - EKG - sinus tachycardia, LVH. No ST-T wave changes.    Last TTE - 2019> normal EF, G1DD scheduled for HD today   Creat 3.16>5.37  anemia of renal disease  - monitor CBC- Hb stable at 9  Nephro Dr. Pandey following

## 2024-05-13 NOTE — PROGRESS NOTE ADULT - SUBJECTIVE AND OBJECTIVE BOX
[facility] : Progress Note :: [phone], (office [phone]),   Dr Pandey / Dr Mi / Dr Massey / Dr Etienne / Dr Cassandra DA SILVA / Dr Burt / Dr Garber / Dr Naeem lam  _____________________________________________________________________________________________    hyponatremia due excess fluid intake.    shellfish (Anaphylaxis)  aspirin (Unknown)  ibuprofen (Unknown)  Mushrooms (Anaphylaxis)    Hospital Medications:   MEDICATIONS  (STANDING):  albuterol/ipratropium for Nebulization 3 milliLiter(s) Nebulizer every 6 hours  chlorhexidine 2% Cloths 1 Application(s) Topical <User Schedule>  epoetin lara (PROCRIT) Injectable 4000 Unit(s) IV Push <User Schedule>  heparin   Injectable 5000 Unit(s) SubCutaneous every 12 hours  insulin glargine Injectable (LANTUS) 3 Unit(s) SubCutaneous at bedtime  insulin lispro (ADMELOG) corrective regimen sliding scale   SubCutaneous three times a day before meals  insulin lispro (ADMELOG) corrective regimen sliding scale   SubCutaneous at bedtime  pancrelipase  (CREON 36,000 Lipase Units) 1 Capsule(s) Oral four times a day with meals  pantoprazole    Tablet 40 milliGRAM(s) Oral before breakfast  simvastatin 20 milliGRAM(s) Oral at bedtime  sodium chloride 1 Gram(s) Oral two times a day  sodium chloride 3%  Inhalation 4 milliLiter(s) Inhalation every 12 hours      VITALS:  T(F): 97.8 (05-13-24 @ 13:08), Max: 98.4 (05-13-24 @ 05:00)  HR: 111 (05-13-24 @ 15:05)  BP: 127/86 (05-13-24 @ 15:05)  RR: 18 (05-13-24 @ 13:08)  SpO2: 94% (05-13-24 @ 15:05)  Wt(kg): --      PHYSICAL EXAM:  Constitutional: NAD  HEENT: anicteric sclera, oropharynx clear.  Neck: No JVD  Respiratory: CTAB, no wheezes, rales or rhonchi  Cardiovascular: S1, S2, RRR  Gastrointestinal: BS+, soft, NT/ND  Extremities:  No peripheral edema  Neurological: A/O x 3, no focal deficits.  : No CVA tenderness. No dickson.   Skin: No rashes  Vascular Access: AVF with thrill and bruit    LABS:  05-13    130<L>  |  94<L>  |  14  ----------------------------<  192<H>  3.5   |  26  |  2.73<H>    Ca    8.6      13 May 2024 15:33  Phos  2.3     05-13  Mg     1.8     05-13      Creatinine Trend: 2.73 <--, 5.35 <--, 4.59 <--, 3.24 <--, 3.37 <--, 2.49 <--, 5.37 <--, 3.16 <--                        9.1    8.75  )-----------( 262      ( 13 May 2024 09:13 )             26.9     Urine Studies:  Urinalysis Basic - ( 13 May 2024 15:33 )    Color:  / Appearance:  / SG:  / pH:   Gluc: 192 mg/dL / Ketone:   / Bili:  / Urobili:    Blood:  / Protein:  / Nitrite:    Leuk Esterase:  / RBC:  / WBC    Sq Epi:  / Non Sq Epi:  / Bacteria:         RADIOLOGY & ADDITIONAL STUDIES:

## 2024-05-14 LAB
ALBUMIN SERPL ELPH-MCNC: 1.9 G/DL — LOW (ref 3.5–5)
ALP SERPL-CCNC: 205 U/L — HIGH (ref 40–120)
ALT FLD-CCNC: 101 U/L DA — HIGH (ref 10–60)
ANION GAP SERPL CALC-SCNC: 8 MMOL/L — SIGNIFICANT CHANGE UP (ref 5–17)
AST SERPL-CCNC: 95 U/L — HIGH (ref 10–40)
BILIRUB SERPL-MCNC: 0.7 MG/DL — SIGNIFICANT CHANGE UP (ref 0.2–1.2)
BUN SERPL-MCNC: 18 MG/DL — SIGNIFICANT CHANGE UP (ref 7–18)
CALCIUM SERPL-MCNC: 7.9 MG/DL — LOW (ref 8.4–10.5)
CHLORIDE SERPL-SCNC: 97 MMOL/L — SIGNIFICANT CHANGE UP (ref 96–108)
CO2 SERPL-SCNC: 27 MMOL/L — SIGNIFICANT CHANGE UP (ref 22–31)
CREAT SERPL-MCNC: 3.81 MG/DL — HIGH (ref 0.5–1.3)
EGFR: 11 ML/MIN/1.73M2 — LOW
GLUCOSE BLDC GLUCOMTR-MCNC: 150 MG/DL — HIGH (ref 70–99)
GLUCOSE BLDC GLUCOMTR-MCNC: 154 MG/DL — HIGH (ref 70–99)
GLUCOSE BLDC GLUCOMTR-MCNC: 157 MG/DL — HIGH (ref 70–99)
GLUCOSE BLDC GLUCOMTR-MCNC: 286 MG/DL — HIGH (ref 70–99)
GLUCOSE SERPL-MCNC: 150 MG/DL — HIGH (ref 70–99)
HBV CORE IGM SER-ACNC: SIGNIFICANT CHANGE UP
HBV SURFACE AB SER-ACNC: REACTIVE
HBV SURFACE AG SER-ACNC: SIGNIFICANT CHANGE UP
HCT VFR BLD CALC: 24.3 % — LOW (ref 34.5–45)
HCV AB S/CO SERPL IA: 0.06 S/CO — SIGNIFICANT CHANGE UP (ref 0–0.99)
HCV AB SERPL-IMP: SIGNIFICANT CHANGE UP
HGB BLD-MCNC: 7.9 G/DL — LOW (ref 11.5–15.5)
MAGNESIUM SERPL-MCNC: 2 MG/DL — SIGNIFICANT CHANGE UP (ref 1.6–2.6)
MCHC RBC-ENTMCNC: 28.7 PG — SIGNIFICANT CHANGE UP (ref 27–34)
MCHC RBC-ENTMCNC: 32.5 GM/DL — SIGNIFICANT CHANGE UP (ref 32–36)
MCV RBC AUTO: 88.4 FL — SIGNIFICANT CHANGE UP (ref 80–100)
NRBC # BLD: 0 /100 WBCS — SIGNIFICANT CHANGE UP (ref 0–0)
PHOSPHATE SERPL-MCNC: 3.3 MG/DL — SIGNIFICANT CHANGE UP (ref 2.5–4.5)
PLATELET # BLD AUTO: 314 K/UL — SIGNIFICANT CHANGE UP (ref 150–400)
POTASSIUM SERPL-MCNC: 3.6 MMOL/L — SIGNIFICANT CHANGE UP (ref 3.5–5.3)
POTASSIUM SERPL-SCNC: 3.6 MMOL/L — SIGNIFICANT CHANGE UP (ref 3.5–5.3)
PROT SERPL-MCNC: 5.3 G/DL — LOW (ref 6–8.3)
RBC # BLD: 2.75 M/UL — LOW (ref 3.8–5.2)
RBC # FLD: 14.3 % — SIGNIFICANT CHANGE UP (ref 10.3–14.5)
SODIUM SERPL-SCNC: 132 MMOL/L — LOW (ref 135–145)
WBC # BLD: 7.26 K/UL — SIGNIFICANT CHANGE UP (ref 3.8–10.5)
WBC # FLD AUTO: 7.26 K/UL — SIGNIFICANT CHANGE UP (ref 3.8–10.5)

## 2024-05-14 RX ORDER — ACETAMINOPHEN 500 MG
2 TABLET ORAL
Refills: 0 | DISCHARGE
Start: 2024-05-14

## 2024-05-14 RX ORDER — ERYTHROPOIETIN 10000 [IU]/ML
10000 INJECTION, SOLUTION INTRAVENOUS; SUBCUTANEOUS
Refills: 0 | Status: DISCONTINUED | OUTPATIENT
Start: 2024-05-14 | End: 2024-05-16

## 2024-05-14 RX ADMIN — PANTOPRAZOLE SODIUM 40 MILLIGRAM(S): 20 TABLET, DELAYED RELEASE ORAL at 08:23

## 2024-05-14 RX ADMIN — SODIUM CHLORIDE 4 MILLILITER(S): 9 INJECTION INTRAMUSCULAR; INTRAVENOUS; SUBCUTANEOUS at 20:19

## 2024-05-14 RX ADMIN — Medication 1 CAPSULE(S): at 08:24

## 2024-05-14 RX ADMIN — CHLORHEXIDINE GLUCONATE 1 APPLICATION(S): 213 SOLUTION TOPICAL at 05:31

## 2024-05-14 RX ADMIN — Medication 1 CAPSULE(S): at 21:33

## 2024-05-14 RX ADMIN — Medication 1 CAPSULE(S): at 11:43

## 2024-05-14 RX ADMIN — Medication 3 MILLILITER(S): at 20:19

## 2024-05-14 RX ADMIN — Medication 3 MILLILITER(S): at 15:25

## 2024-05-14 RX ADMIN — HEPARIN SODIUM 5000 UNIT(S): 5000 INJECTION INTRAVENOUS; SUBCUTANEOUS at 18:03

## 2024-05-14 RX ADMIN — INSULIN GLARGINE 3 UNIT(S): 100 INJECTION, SOLUTION SUBCUTANEOUS at 21:33

## 2024-05-14 RX ADMIN — HEPARIN SODIUM 5000 UNIT(S): 5000 INJECTION INTRAVENOUS; SUBCUTANEOUS at 05:31

## 2024-05-14 RX ADMIN — Medication 3 MILLILITER(S): at 08:38

## 2024-05-14 RX ADMIN — Medication 3 MILLILITER(S): at 02:52

## 2024-05-14 RX ADMIN — Medication 3: at 11:43

## 2024-05-14 RX ADMIN — Medication 1: at 08:22

## 2024-05-14 RX ADMIN — Medication 1 CAPSULE(S): at 17:52

## 2024-05-14 RX ADMIN — SIMVASTATIN 20 MILLIGRAM(S): 20 TABLET, FILM COATED ORAL at 21:33

## 2024-05-14 RX ADMIN — SODIUM CHLORIDE 4 MILLILITER(S): 9 INJECTION INTRAMUSCULAR; INTRAVENOUS; SUBCUTANEOUS at 08:39

## 2024-05-14 NOTE — PROGRESS NOTE ADULT - SUBJECTIVE AND OBJECTIVE BOX
[facility] : Progress Note :: [phone], (office [phone]),   Dr Pandey / Dr Mi / Dr Massey / Dr Etienne / Dr Cassandra DA SILVA / Dr Burt / Dr Garber / Dr Naeem lam  _____________________________________________________________________________________________  feels much better.    shellfish (Anaphylaxis)  aspirin (Unknown)  ibuprofen (Unknown)  Mushrooms (Anaphylaxis)    Hospital Medications:   MEDICATIONS  (STANDING):  albuterol/ipratropium for Nebulization 3 milliLiter(s) Nebulizer every 6 hours  chlorhexidine 2% Cloths 1 Application(s) Topical <User Schedule>  epoetin lara (PROCRIT) Injectable 05153 Unit(s) IV Push <User Schedule>  heparin   Injectable 5000 Unit(s) SubCutaneous every 12 hours  insulin glargine Injectable (LANTUS) 3 Unit(s) SubCutaneous at bedtime  insulin lispro (ADMELOG) corrective regimen sliding scale   SubCutaneous three times a day before meals  insulin lispro (ADMELOG) corrective regimen sliding scale   SubCutaneous at bedtime  pancrelipase  (CREON 36,000 Lipase Units) 1 Capsule(s) Oral four times a day with meals  pantoprazole    Tablet 40 milliGRAM(s) Oral before breakfast  simvastatin 20 milliGRAM(s) Oral at bedtime  sodium chloride 3%  Inhalation 4 milliLiter(s) Inhalation every 12 hours        VITALS:  T(F): 99.5 (05-14-24 @ 11:41), Max: 99.5 (05-14-24 @ 11:41)  HR: 96 (05-14-24 @ 11:41)  BP: 126/68 (05-14-24 @ 11:41)  RR: 18 (05-14-24 @ 11:41)  SpO2: 95% (05-14-24 @ 11:41)  Wt(kg): --      PHYSICAL EXAM:  Constitutional: NAD  HEENT: anicteric sclera, oropharynx clear  Neck: No JVD  Respiratory: CTAB, no wheezes, rales or rhonchi  Cardiovascular: S1, S2, RRR  Gastrointestinal: BS+, soft, NT/ND  Extremities: No peripheral edema  Neurological: A/O x 3, no focal deficits.  : No CVA tenderness. No dickson.   Skin: No rashes  Vascular Access: AVF with thrill and bruit     LABS:  05-14    132<L>  |  97  |  18  ----------------------------<  150<H>  3.6   |  27  |  3.81<H>    Ca    7.9<L>      14 May 2024 07:20  Phos  3.3     05-14  Mg     2.0     05-14    TPro  5.3<L>  /  Alb  1.9<L>  /  TBili  0.7  /  DBili      /  AST  95<H>  /  ALT  101<H>  /  AlkPhos  205<H>  05-14    Creatinine Trend: 3.81 <--, 2.73 <--, 5.35 <--, 4.59 <--, 3.24 <--, 3.37 <--, 2.49 <--, 5.37 <--                        7.9    7.26  )-----------( 314      ( 14 May 2024 07:20 )             24.3     Urine Studies:  Urinalysis Basic - ( 14 May 2024 07:20 )    Color:  / Appearance:  / SG:  / pH:   Gluc: 150 mg/dL / Ketone:   / Bili:  / Urobili:    Blood:  / Protein:  / Nitrite:    Leuk Esterase:  / RBC:  / WBC    Sq Epi:  / Non Sq Epi:  / Bacteria:         RADIOLOGY & ADDITIONAL STUDIES:

## 2024-05-14 NOTE — PROGRESS NOTE ADULT - PROBLEM SELECTOR PLAN 1
Patient developed hyponatremia - Na trend 139>128>124>119 ( corrected = 121)   Patient continues  to be asymptomatic    Nephrology following   started on Na Cl tablets ( D 2 of 3)     [ ] f/u post dialysis sodium  [ ] monitor mental status   [  avoid overcorrection  ( max 6-8 mEq over 24 h )

## 2024-05-14 NOTE — PROGRESS NOTE ADULT - PROBLEM SELECTOR PLAN 2
2/2 Volume overload requiring HD and PNA  - CXR - patchy R LL opacity   - CTA neg PE   - CT chest - bilateral patchy consolidation in lower lobes and R middle lobe.  - HD as scheduled - removed 1.7L last session.  - chest PT + 3% inhalation + duonebs  - nasal cannula> weaned off , on RA   - sputum CX, mycoplasma, legionella, strep pneumo ordered> NOT collected as no sputum production and patient is anuric   - s/p  ceftriaxone, azithro for CAP coverage

## 2024-05-14 NOTE — PROGRESS NOTE ADULT - SUBJECTIVE AND OBJECTIVE BOX
"Patient arrived at 125pm fir a 1pm appointment.  Pt states he is late due to his increased pain and it was raining at his house (it is no longer raining).  I advised patient he could wait to see if someone else didn't show up or wait until the 3pm pt came in.  Pt stated he would "find someone else" and left.   " INTERVAL HPI/OVERNIGHT EVENTS:  Patient seen ,no acute issues  VITAL SIGNS:  T(F): 98.8 (05-14-24 @ 04:37)  HR: 100 (05-14-24 @ 04:37)  BP: 109/63 (05-14-24 @ 04:37)  RR: 18 (05-14-24 @ 04:37)  SpO2: 95% (05-14-24 @ 04:37)  Wt(kg): --    PHYSICAL EXAM:  awake,comfortable  Constitutional:  Eyes:  ENMT:perrla  Neck:  Respiratory:clear  Cardiovascular:s1s2,m-none  Gastrointestinal:soft  Extremities:  Vascular:  Neurological:no focal deficit  Musculoskeletal:    MEDICATIONS  (STANDING):  albuterol/ipratropium for Nebulization 3 milliLiter(s) Nebulizer every 6 hours  chlorhexidine 2% Cloths 1 Application(s) Topical <User Schedule>  epoetin lara (PROCRIT) Injectable 38213 Unit(s) IV Push <User Schedule>  heparin   Injectable 5000 Unit(s) SubCutaneous every 12 hours  insulin glargine Injectable (LANTUS) 3 Unit(s) SubCutaneous at bedtime  insulin lispro (ADMELOG) corrective regimen sliding scale   SubCutaneous three times a day before meals  insulin lispro (ADMELOG) corrective regimen sliding scale   SubCutaneous at bedtime  pancrelipase  (CREON 36,000 Lipase Units) 1 Capsule(s) Oral four times a day with meals  pantoprazole    Tablet 40 milliGRAM(s) Oral before breakfast  simvastatin 20 milliGRAM(s) Oral at bedtime  sodium chloride 3%  Inhalation 4 milliLiter(s) Inhalation every 12 hours    MEDICATIONS  (PRN):  acetaminophen     Tablet .. 650 milliGRAM(s) Oral every 6 hours PRN Temp greater or equal to 38C (100.4F), Mild Pain (1 - 3)  aluminum hydroxide/magnesium hydroxide/simethicone Suspension 30 milliLiter(s) Oral every 4 hours PRN Dyspepsia  benzocaine/menthol Lozenge 1 Lozenge Oral three times a day PRN Sore Throat  melatonin 3 milliGRAM(s) Oral at bedtime PRN Insomnia  ondansetron Injectable 4 milliGRAM(s) IV Push every 8 hours PRN Nausea and/or Vomiting      Allergies    shellfish (Anaphylaxis)  aspirin (Unknown)  ibuprofen (Unknown)  Mushrooms (Anaphylaxis)    Intolerances        LABS:                        7.9    7.26  )-----------( 314      ( 14 May 2024 07:20 )             24.3     05-14    132<L>  |  97  |  18  ----------------------------<  150<H>  3.6   |  27  |  3.81<H>    Ca    7.9<L>      14 May 2024 07:20  Phos  3.3     05-14  Mg     2.0     05-14    TPro  5.3<L>  /  Alb  1.9<L>  /  TBili  0.7  /  DBili  x   /  AST  95<H>  /  ALT  101<H>  /  AlkPhos  205<H>  05-14      Urinalysis Basic - ( 14 May 2024 07:20 )    Color: x / Appearance: x / SG: x / pH: x  Gluc: 150 mg/dL / Ketone: x  / Bili: x / Urobili: x   Blood: x / Protein: x / Nitrite: x   Leuk Esterase: x / RBC: x / WBC x   Sq Epi: x / Non Sq Epi: x / Bacteria: x        RADIOLOGY & ADDITIONAL TESTS:        Assessment and Plan:   · Assessment	  83F, from home (lives alone), with PMH DM, HTN, HLD, ESRD on HD (MWF, L-AVF), gout, hemochromatosis, Foresenius, and previous smoker who presents with shortness of breath. Increased WOB in ED, placed on BiPAP and then taken off. ICU evaluated and stated to re-consult if continued work of breathing after dialysis. Admitted for AHRF 2/2 PNA. Admitted to ICU overnight for worsening respiratory status requiring BiPAP, DG to medicine floor for further management saturating well on RA, scheduled for HD today , GOC - has prior MOLST, to be confirmed and documented, PT recs NIKKIE, CM to follow. Patient developed hyponatremia, on treatment with NaCL tablets as per nephrology recs, scheduled for HD today        Problem/Plan - 1:  ·  Problem: Hyponatremia-improved  Nephrology following   started on Na Cl tablets ( D 2 of 3)   [ ] f/u post dialysis sodium  [ ] monitor mental status   [  avoid overcorrection  ( max 6-8 mEq over 24 h ).     Problem/Plan - 2:  ·  Problem: Acute hypoxic respiratory failure-resolved  - CT chest - bilateral patchy consolidation in lower lobes and R middle lobe.  - chest PT + 3% inhalation + duonebs  - nasal cannula> weaned off , on RA   - sputum CX, mycoplasma, legionella, strep pneumo ordered> NOT collected as no sputum production and patient is anuric   - s/p  ceftriaxone, azithro for CAP coverage.     Problem/Plan - 3:  ·  Problem: Pneumonia.  ·  Plan: # PNA  - CT chest - bilateral patchy consolidation in lower lobes and R middle lobe.  -  sputum CX, mycoplasma, legionella, strep pneumo, ordered , but not collected   - on ceftriaxone, azithro.     Problem/Plan - 4:  ·  Problem: ESRD on dialysis.  ·  Plan: scheduled for HD today   Creat 3.16>5.37  anemia of renal disease  - monitor CBC- Hb stable at 9  Nephro Dr. Pandey following.     Problem/Plan - 5:  ·  Problem: Demand ischemia of myocardium.  ·  Plan: - trops 75>127, trended down   - EKG - sinus tachycardia, LVH. No ST-T wave changes.    Last TTE - 2019> normal EF, G1DD.     Problem/Plan - 6:  ·  Problem: DM (diabetes mellitus).  ·  Plan: - home meds: Tujeo, repaglinide  - holding home meds  - sugars 200s  - started on  lantus 3u + ISS in ICU   - fingersticks ACHS   - Consistent carb diet  - A1C 5.4.     Problem/Plan - 7:  ·  Problem: Prophylactic measure.  ·  Plan: DVT ppx - heparin SQ.     Problem/Plan - 8:  ·  Problem: Discharge planning issues.   ·  Plan: SW to reinstate dialysis   Patient lives alone , NO HHA, walks with walker independent ADLs, has to walk up 2 flights of stairs   PT recs NIKKIE SWARTZ- [facility] prior documents DNR/DNI,   [ ] will confirm with patient and daughter in AM.

## 2024-05-14 NOTE — PROGRESS NOTE ADULT - ASSESSMENT
# ESRD admitted with SOB, hypoxia PNA vs AHRF .  Continue HD MWF  tolerating HD well  noted, D/C planning to rehab   #Hyponatremia  from increased volume.   CONT  FLUID RESTRICTION 1L/DAY.  # bilateral PNA. completed  ABX  # anemia of CKD.   CULLEN on HD.  # HTN stable UF with HD

## 2024-05-14 NOTE — PROGRESS NOTE ADULT - PROBLEM SELECTOR PLAN 5
- trops 75>127, trended down   - EKG - sinus tachycardia, LVH. No ST-T wave changes.    Last TTE - 2019> normal EF, G1DD

## 2024-05-14 NOTE — PROGRESS NOTE ADULT - ASSESSMENT
83F, from home (lives alone), with PMH DM, HTN, HLD, ESRD on HD (MWF, L-AVF), gout, hemochromatosis, Foresenius, and previous smoker who presents with shortness of breath. Increased WOB in ED, placed on BiPAP and then taken off. ICU evaluated and stated to re-consult if continued work of breathing after dialysis. Admitted for AHRF 2/2 PNA. Admitted to ICU overnight for worsening respiratory status requiring BiPAP, DG to medicine floor for further management saturating well on RA, scheduled for HD today , GOC - has prior MOLST, to be confirmed and documented, PT recs NIKKIE, CM to follow. Patient developed hyponatremia resolved , pending auth for NIKKIE

## 2024-05-15 LAB
ANION GAP SERPL CALC-SCNC: 11 MMOL/L — SIGNIFICANT CHANGE UP (ref 5–17)
ANION GAP SERPL CALC-SCNC: 6 MMOL/L — SIGNIFICANT CHANGE UP (ref 5–17)
BUN SERPL-MCNC: 10 MG/DL — SIGNIFICANT CHANGE UP (ref 7–18)
BUN SERPL-MCNC: 25 MG/DL — HIGH (ref 7–18)
CALCIUM SERPL-MCNC: 7.7 MG/DL — LOW (ref 8.4–10.5)
CALCIUM SERPL-MCNC: 8.7 MG/DL — SIGNIFICANT CHANGE UP (ref 8.4–10.5)
CHLORIDE SERPL-SCNC: 90 MMOL/L — LOW (ref 96–108)
CHLORIDE SERPL-SCNC: 95 MMOL/L — LOW (ref 96–108)
CO2 SERPL-SCNC: 23 MMOL/L — SIGNIFICANT CHANGE UP (ref 22–31)
CO2 SERPL-SCNC: 30 MMOL/L — SIGNIFICANT CHANGE UP (ref 22–31)
CREAT SERPL-MCNC: 2.52 MG/DL — HIGH (ref 0.5–1.3)
CREAT SERPL-MCNC: 5.26 MG/DL — HIGH (ref 0.5–1.3)
EGFR: 18 ML/MIN/1.73M2 — LOW
EGFR: 8 ML/MIN/1.73M2 — LOW
GLUCOSE BLDC GLUCOMTR-MCNC: 135 MG/DL — HIGH (ref 70–99)
GLUCOSE BLDC GLUCOMTR-MCNC: 138 MG/DL — HIGH (ref 70–99)
GLUCOSE BLDC GLUCOMTR-MCNC: 206 MG/DL — HIGH (ref 70–99)
GLUCOSE BLDC GLUCOMTR-MCNC: 208 MG/DL — HIGH (ref 70–99)
GLUCOSE SERPL-MCNC: 158 MG/DL — HIGH (ref 70–99)
GLUCOSE SERPL-MCNC: 206 MG/DL — HIGH (ref 70–99)
HCT VFR BLD CALC: 25.1 % — LOW (ref 34.5–45)
HGB BLD-MCNC: 8.3 G/DL — LOW (ref 11.5–15.5)
MAGNESIUM SERPL-MCNC: 1.7 MG/DL — SIGNIFICANT CHANGE UP (ref 1.6–2.6)
MCHC RBC-ENTMCNC: 29 PG — SIGNIFICANT CHANGE UP (ref 27–34)
MCHC RBC-ENTMCNC: 33.1 GM/DL — SIGNIFICANT CHANGE UP (ref 32–36)
MCV RBC AUTO: 87.8 FL — SIGNIFICANT CHANGE UP (ref 80–100)
NRBC # BLD: 0 /100 WBCS — SIGNIFICANT CHANGE UP (ref 0–0)
PHOSPHATE SERPL-MCNC: 3.9 MG/DL — SIGNIFICANT CHANGE UP (ref 2.5–4.5)
PLATELET # BLD AUTO: 362 K/UL — SIGNIFICANT CHANGE UP (ref 150–400)
POTASSIUM SERPL-MCNC: 3.6 MMOL/L — SIGNIFICANT CHANGE UP (ref 3.5–5.3)
POTASSIUM SERPL-MCNC: 4 MMOL/L — SIGNIFICANT CHANGE UP (ref 3.5–5.3)
POTASSIUM SERPL-SCNC: 3.6 MMOL/L — SIGNIFICANT CHANGE UP (ref 3.5–5.3)
POTASSIUM SERPL-SCNC: 4 MMOL/L — SIGNIFICANT CHANGE UP (ref 3.5–5.3)
RBC # BLD: 2.86 M/UL — LOW (ref 3.8–5.2)
RBC # FLD: 14.2 % — SIGNIFICANT CHANGE UP (ref 10.3–14.5)
SODIUM SERPL-SCNC: 124 MMOL/L — LOW (ref 135–145)
SODIUM SERPL-SCNC: 131 MMOL/L — LOW (ref 135–145)
WBC # BLD: 7.59 K/UL — SIGNIFICANT CHANGE UP (ref 3.8–10.5)
WBC # FLD AUTO: 7.59 K/UL — SIGNIFICANT CHANGE UP (ref 3.8–10.5)

## 2024-05-15 RX ADMIN — HEPARIN SODIUM 5000 UNIT(S): 5000 INJECTION INTRAVENOUS; SUBCUTANEOUS at 17:28

## 2024-05-15 RX ADMIN — SODIUM CHLORIDE 4 MILLILITER(S): 9 INJECTION INTRAMUSCULAR; INTRAVENOUS; SUBCUTANEOUS at 20:40

## 2024-05-15 RX ADMIN — BENZOCAINE AND MENTHOL 1 LOZENGE: 5; 1 LIQUID ORAL at 03:46

## 2024-05-15 RX ADMIN — Medication 1 CAPSULE(S): at 17:28

## 2024-05-15 RX ADMIN — Medication 2: at 17:27

## 2024-05-15 RX ADMIN — Medication 1 CAPSULE(S): at 14:29

## 2024-05-15 RX ADMIN — CHLORHEXIDINE GLUCONATE 1 APPLICATION(S): 213 SOLUTION TOPICAL at 05:49

## 2024-05-15 RX ADMIN — BENZOCAINE AND MENTHOL 1 LOZENGE: 5; 1 LIQUID ORAL at 08:31

## 2024-05-15 RX ADMIN — Medication 1 CAPSULE(S): at 21:28

## 2024-05-15 RX ADMIN — SIMVASTATIN 20 MILLIGRAM(S): 20 TABLET, FILM COATED ORAL at 21:29

## 2024-05-15 RX ADMIN — INSULIN GLARGINE 3 UNIT(S): 100 INJECTION, SOLUTION SUBCUTANEOUS at 21:29

## 2024-05-15 RX ADMIN — PANTOPRAZOLE SODIUM 40 MILLIGRAM(S): 20 TABLET, DELAYED RELEASE ORAL at 05:49

## 2024-05-15 RX ADMIN — ERYTHROPOIETIN 10000 UNIT(S): 10000 INJECTION, SOLUTION INTRAVENOUS; SUBCUTANEOUS at 11:13

## 2024-05-15 RX ADMIN — Medication 3 MILLILITER(S): at 20:40

## 2024-05-15 RX ADMIN — Medication 1 CAPSULE(S): at 08:28

## 2024-05-15 RX ADMIN — HEPARIN SODIUM 5000 UNIT(S): 5000 INJECTION INTRAVENOUS; SUBCUTANEOUS at 05:49

## 2024-05-15 NOTE — PROGRESS NOTE ADULT - PROBLEM SELECTOR PLAN 1
Patient developed hyponatremia - Na trend 139>128>124>119 ( corrected = 121)   Na is 124   Patient continues  to be asymptomatic    Nephrology following   s/p Na Cl tablets     [ ] f/u post dialysis sodium

## 2024-05-15 NOTE — PROGRESS NOTE ADULT - ASSESSMENT
# ESRD admitted with SOB,PNA vs AHRF .  Continue HD MWF  seen on HD tolerating. noted, D/C planning to rehab   #Hyponatremia- with hypervolemia  CONT  FLUID RESTRICTION 1L/DAY.  # bilateral PNA. completed  ABX  # anemia of CKD.   CULLEN on HD.  # HTN stable UF with HD

## 2024-05-15 NOTE — PROGRESS NOTE ADULT - SUBJECTIVE AND OBJECTIVE BOX
PGY-1 Progress Note discussed with attending    PAGER #: [[phone]] TILL 5:00 PM  PLEASE CONTACT ON CALL TEAM:  - On Call Team (Please refer to Jose Raul) FROM 5:00 PM - 8:30PM  - Nightfloat Team FROM 8:30 -7:30 AM    CHIEF COMPLAINT & BRIEF HOSPITAL COURSE:      INTERVAL HPI/OVERNIGHT EVENTS: No acute events   Patient complains of throat dryness       REVIEW OF SYSTEMS:  CONSTITUTIONAL: No fever, weight loss, or + fatigue  RESPIRATORY: No cough, wheezing, chills or hemoptysis; No shortness of breath, + dry throat   CARDIOVASCULAR: No chest pain, palpitations, dizziness, or leg swelling  GASTROINTESTINAL: No abdominal pain. No nausea, vomiting, or hematemesis; No diarrhea or constipation. No melena or hematochezia.  GENITOURINARY: No dysuria or hematuria, urinary frequency  NEUROLOGICAL: No headaches, memory loss, loss of strength, numbness, or tremors  SKIN: No itching, burning, rashes, or lesions     MEDICATIONS  (STANDING):  albuterol/ipratropium for Nebulization 3 milliLiter(s) Nebulizer every 6 hours  chlorhexidine 2% Cloths 1 Application(s) Topical <User Schedule>  epoetin lara (PROCRIT) Injectable 83431 Unit(s) IV Push <User Schedule>  heparin   Injectable 5000 Unit(s) SubCutaneous every 12 hours  insulin glargine Injectable (LANTUS) 3 Unit(s) SubCutaneous at bedtime  insulin lispro (ADMELOG) corrective regimen sliding scale   SubCutaneous three times a day before meals  insulin lispro (ADMELOG) corrective regimen sliding scale   SubCutaneous at bedtime  pancrelipase  (CREON 36,000 Lipase Units) 1 Capsule(s) Oral four times a day with meals  pantoprazole    Tablet 40 milliGRAM(s) Oral before breakfast  simvastatin 20 milliGRAM(s) Oral at bedtime  sodium chloride 3%  Inhalation 4 milliLiter(s) Inhalation every 12 hours    MEDICATIONS  (PRN):  acetaminophen     Tablet .. 650 milliGRAM(s) Oral every 6 hours PRN Temp greater or equal to 38C (100.4F), Mild Pain (1 - 3)  aluminum hydroxide/magnesium hydroxide/simethicone Suspension 30 milliLiter(s) Oral every 4 hours PRN Dyspepsia  benzocaine/menthol Lozenge 1 Lozenge Oral three times a day PRN Sore Throat  melatonin 3 milliGRAM(s) Oral at bedtime PRN Insomnia  ondansetron Injectable 4 milliGRAM(s) IV Push every 8 hours PRN Nausea and/or Vomiting      Vital Signs Last 24 Hrs  T(C): 36.9 (15 May 2024 13:56), Max: 37.2 (14 May 2024 21:14)  T(F): 98.5 (15 May 2024 13:56), Max: 98.9 (14 May 2024 21:14)  HR: 104 (15 May 2024 15:57) (96 - 104)  BP: 111/75 (15 May 2024 13:56) (111/75 - 154/74)  BP(mean): --  RR: 19 (15 May 2024 15:57) (17 - 20)  SpO2: 95% (15 May 2024 15:57) (95% - 100%)    Parameters below as of 15 May 2024 15:57  Patient On (Oxygen Delivery Method): room air        PHYSICAL EXAMINATION:  GENERAL: NAD, well built  HEAD:  Atraumatic, Normocephalic  EYES:  conjunctiva and sclera clear  NECK: Supple, No JVD, Normal thyroid  CHEST/LUNG: mild BL basilar crackles  HEART: Regular rate and rhythm; No murmurs, rubs, or gallops  ABDOMEN: Soft, Nontender, Nondistended; Bowel sounds present, no pain or masses on palpation  NERVOUS SYSTEM:  Alert & Oriented X3, [place]   : voiding well  EXTREMITIES:  2+ Peripheral Pulses, No clubbing, cyanosis, or edema  SKIN: warm dry                          8.3    7.59  )-----------( 362      ( 15 May 2024 09:40 )             25.1     05-15    124<L>  |  90<L>  |  25<H>  ----------------------------<  158<H>  3.6   |  23  |  5.26<H>    Ca    7.7<L>      15 May 2024 09:40  Phos  3.9     05-15  Mg     1.7     05-15    TPro  5.3<L>  /  Alb  1.9<L>  /  TBili  0.7  /  DBili  x   /  AST  95<H>  /  ALT  101<H>  /  AlkPhos  205<H>  05-14    LIVER FUNCTIONS - ( 14 May 2024 07:20 )  Alb: 1.9 g/dL / Pro: 5.3 g/dL / ALK PHOS: 205 U/L / ALT: 101 U/L DA / AST: 95 U/L / GGT: x                   I&O's Summary          CAPILLARY BLOOD GLUCOSE      RADIOLOGY & ADDITIONAL TESTS:

## 2024-05-15 NOTE — PROGRESS NOTE ADULT - SUBJECTIVE AND OBJECTIVE BOX
[facility] : Progress Note :: [phone], (office [phone]),   Dr Pandey / Dr Mi / Dr Massey / Dr Etienne / Dr Cassandra DA SILVA / Dr Burt / Dr Garber / Dr Naeem lam  _____________________________________________________________________________________________  Seen on hemodialysis     shellfish (Anaphylaxis)  aspirin (Unknown)  ibuprofen (Unknown)  Mushrooms (Anaphylaxis)    Hospital Medications:   MEDICATIONS  (STANDING):  albuterol/ipratropium for Nebulization 3 milliLiter(s) Nebulizer every 6 hours  chlorhexidine 2% Cloths 1 Application(s) Topical <User Schedule>  epoetin lara (PROCRIT) Injectable 11012 Unit(s) IV Push <User Schedule>  heparin   Injectable 5000 Unit(s) SubCutaneous every 12 hours  insulin glargine Injectable (LANTUS) 3 Unit(s) SubCutaneous at bedtime  insulin lispro (ADMELOG) corrective regimen sliding scale   SubCutaneous three times a day before meals  insulin lispro (ADMELOG) corrective regimen sliding scale   SubCutaneous at bedtime  pancrelipase  (CREON 36,000 Lipase Units) 1 Capsule(s) Oral four times a day with meals  pantoprazole    Tablet 40 milliGRAM(s) Oral before breakfast  simvastatin 20 milliGRAM(s) Oral at bedtime  sodium chloride 3%  Inhalation 4 milliLiter(s) Inhalation every 12 hours        VITALS:  T(F): 97.8 (05-15-24 @ 09:36), Max: 98.9 (05-14-24 @ 21:14)  HR: 96 (05-15-24 @ 09:36)  BP: 133/74 (05-15-24 @ 09:36)  RR: 20 (05-15-24 @ 09:36)  SpO2: 100% (05-15-24 @ 09:36)  Wt(kg): --      PHYSICAL EXAM:  Constitutional: NAD  HEENT: anicteric sclera, oropharynx clear.  Neck: No JVD  Respiratory: CTAB, no wheezes, rales or rhonchi  Cardiovascular: S1, S2, RRR  Gastrointestinal: BS+, soft, NT/ND  Extremities:  No peripheral edema  Neurological: A/O x 3, no focal deficits  : No CVA tenderness. No dickson.   Vascular Access: AVF with thrill and bruit     LABS:  05-15    124<L>  |  90<L>  |  25<H>  ----------------------------<  158<H>  3.6   |  23  |  5.26<H>    Ca    7.7<L>      15 May 2024 09:40  Phos  3.9     05-15  Mg     1.7     05-15    TPro  5.3<L>  /  Alb  1.9<L>  /  TBili  0.7  /  DBili      /  AST  95<H>  /  ALT  101<H>  /  AlkPhos  205<H>  05-14    Creatinine Trend: 5.26 <--, 3.81 <--, 2.73 <--, 5.35 <--, 4.59 <--, 3.24 <--, 3.37 <--, 2.49 <--, 5.37 <--                        8.3    7.59  )-----------( 362      ( 15 May 2024 09:40 )             25.1     Urine Studies:  Urinalysis Basic - ( 15 May 2024 09:40 )    Color:  / Appearance:  / SG:  / pH:   Gluc: 158 mg/dL / Ketone:   / Bili:  / Urobili:    Blood:  / Protein:  / Nitrite:    Leuk Esterase:  / RBC:  / WBC    Sq Epi:  / Non Sq Epi:  / Bacteria:         RADIOLOGY & ADDITIONAL STUDIES:

## 2024-05-15 NOTE — PROGRESS NOTE ADULT - SUBJECTIVE AND OBJECTIVE BOX
INTERVAL HPI/OVERNIGHT EVENTS:  Patient seen,stable ,no events  VITAL SIGNS:  T(F): 97.8 (05-15-24 @ 09:36)  HR: 96 (05-15-24 @ 09:36)  BP: 133/74 (05-15-24 @ 09:36)  RR: 20 (05-15-24 @ 09:36)  SpO2: 100% (05-15-24 @ 09:36)  Wt(kg): --    PHYSICAL EXAM:  awake  Constitutional:  Eyes:  ENMT:perrla  Neck:  Respiratory:clear  Cardiovascular:s1s2,m-none  Gastrointestinal:soft,bs pos  Extremities:  Vascular:  Neurological:no focal deficit  Musculoskeletal:    MEDICATIONS  (STANDING):  albuterol/ipratropium for Nebulization 3 milliLiter(s) Nebulizer every 6 hours  chlorhexidine 2% Cloths 1 Application(s) Topical <User Schedule>  epoetin lara (PROCRIT) Injectable 82685 Unit(s) IV Push <User Schedule>  heparin   Injectable 5000 Unit(s) SubCutaneous every 12 hours  insulin glargine Injectable (LANTUS) 3 Unit(s) SubCutaneous at bedtime  insulin lispro (ADMELOG) corrective regimen sliding scale   SubCutaneous three times a day before meals  insulin lispro (ADMELOG) corrective regimen sliding scale   SubCutaneous at bedtime  pancrelipase  (CREON 36,000 Lipase Units) 1 Capsule(s) Oral four times a day with meals  pantoprazole    Tablet 40 milliGRAM(s) Oral before breakfast  simvastatin 20 milliGRAM(s) Oral at bedtime  sodium chloride 3%  Inhalation 4 milliLiter(s) Inhalation every 12 hours    MEDICATIONS  (PRN):  acetaminophen     Tablet .. 650 milliGRAM(s) Oral every 6 hours PRN Temp greater or equal to 38C (100.4F), Mild Pain (1 - 3)  aluminum hydroxide/magnesium hydroxide/simethicone Suspension 30 milliLiter(s) Oral every 4 hours PRN Dyspepsia  benzocaine/menthol Lozenge 1 Lozenge Oral three times a day PRN Sore Throat  melatonin 3 milliGRAM(s) Oral at bedtime PRN Insomnia  ondansetron Injectable 4 milliGRAM(s) IV Push every 8 hours PRN Nausea and/or Vomiting      Allergies    shellfish (Anaphylaxis)  aspirin (Unknown)  ibuprofen (Unknown)  Mushrooms (Anaphylaxis)    Intolerances        LABS:                        8.3    7.59  )-----------( 362      ( 15 May 2024 09:40 )             25.1     05-15    124<L>  |  90<L>  |  25<H>  ----------------------------<  158<H>  3.6   |  23  |  5.26<H>    Ca    7.7<L>      15 May 2024 09:40  Phos  3.9     05-15  Mg     1.7     05-15    TPro  5.3<L>  /  Alb  1.9<L>  /  TBili  0.7  /  DBili  x   /  AST  95<H>  /  ALT  101<H>  /  AlkPhos  205<H>  05-14      Urinalysis Basic - ( 15 May 2024 09:40 )    Color: x / Appearance: x / SG: x / pH: x  Gluc: 158 mg/dL / Ketone: x  / Bili: x / Urobili: x   Blood: x / Protein: x / Nitrite: x   Leuk Esterase: x / RBC: x / WBC x   Sq Epi: x / Non Sq Epi: x / Bacteria: x        RADIOLOGY & ADDITIONAL TESTS:      Assessment and Plan:   · Assessment	  83F, from home (lives alone), with PMH DM, HTN, HLD, ESRD on HD (MWF, L-AVF), gout, hemochromatosis, [place], and previous smoker who presents with shortness of breath. Increased WOB in ED, placed on BiPAP and then taken off. ICU evaluated and stated to re-consult if continued work of breathing after dialysis. Admitted for AHRF 2/2 PNA. Admitted to ICU overnight for worsening respiratory status requiring BiPAP, DG to medicine floor for further management saturating well on RA, scheduled for HD today , GOC - has prior MOLST, to be confirmed and documented, PT recs NIKKIE, CM to follow. Patient developed hyponatremia resolved , pending auth for NIKKIE       Problem/Plan - 1:  ·  Problem: Hyponatremia.   ·  Plan: Patient developed hyponatremia - Na trend 139>128>124>119 ( corrected = 121)   Patient continues  to be asymptomatic    Nephrology following   started on Na Cl tablets ( D 2 of 3)     [ ] f/u post dialysis sodium  [ ] monitor mental status   [  avoid overcorrection  ( max 6-8 mEq over 24 h ).     Problem/Plan - 2:  ·  Problem: Acute hypoxic respiratory failure.   ·  Plan: 2/2 Volume overload requiring HD and PNA  - CXR - patchy R LL opacity   - CTA neg PE   - CT chest - bilateral patchy consolidation in lower lobes and R middle lobe.  - HD as scheduled - removed 1.7L last session.  - chest PT + 3% inhalation + duonebs  - nasal cannula> weaned off , on RA   - sputum CX, mycoplasma, legionella, strep pneumo ordered> NOT collected as no sputum production and patient is anuric   - s/p  ceftriaxone, azithro for CAP coverage.     Problem/Plan - 3:  ·  Problem: Pneumonia.   ·  Plan: # PNA  - CT chest - bilateral patchy consolidation in lower lobes and R middle lobe.  -  sputum CX, mycoplasma, legionella, strep pneumo, ordered , but not collected   - on ceftriaxone, azithro.     Problem/Plan - 4:  ·  Problem: ESRD on dialysis.   ·  Plan: scheduled for HD today   Creat 3.16>5.37  anemia of renal disease  - monitor CBC- Hb stable at 9  Nephro Dr. Pandey following.     Problem/Plan - 5:  ·  Problem: Demand ischemia of myocardium.   ·  Plan: - trops 75>127, trended down   - EKG - sinus tachycardia, LVH. No ST-T wave changes.    Last TTE - 2019> normal EF, G1DD.     Problem/Plan - 6:  ·  Problem: DM (diabetes mellitus).   ·  Plan: - home meds: Tujeo, repaglinide  - holding home meds  - sugars 200s  - started on  lantus 3u + ISS in ICU   - fingersticks ACHS   - Consistent carb diet  - A1C 5.4.     Problem/Plan - 7:  ·  Problem: Prophylactic measure.   ·  Plan: DVT ppx - heparin SQ.     Problem/Plan - 8:  ·  Problem: Discharge planning issues.   ·  Plan: SW to reinstate dialysis   Patient lives alone , NO HHA, walks with walker independent ADLs, has to walk up 2 flights of stairs   PT recs NIKKIE PEREZ prior documents DNR/DNI,   dc planning to str ,pending for authorisation

## 2024-05-16 ENCOUNTER — TRANSCRIPTION ENCOUNTER (OUTPATIENT)
Age: 84
End: 2024-05-16

## 2024-05-16 VITALS
TEMPERATURE: 99 F | OXYGEN SATURATION: 95 % | SYSTOLIC BLOOD PRESSURE: 127 MMHG | HEART RATE: 88 BPM | RESPIRATION RATE: 17 BRPM | DIASTOLIC BLOOD PRESSURE: 60 MMHG

## 2024-05-16 LAB
FLUAV AG NPH QL: SIGNIFICANT CHANGE UP
FLUBV AG NPH QL: SIGNIFICANT CHANGE UP
GLUCOSE BLDC GLUCOMTR-MCNC: 139 MG/DL — HIGH (ref 70–99)
GLUCOSE BLDC GLUCOMTR-MCNC: 165 MG/DL — HIGH (ref 70–99)
GLUCOSE BLDC GLUCOMTR-MCNC: 262 MG/DL — HIGH (ref 70–99)
SARS-COV-2 RNA SPEC QL NAA+PROBE: SIGNIFICANT CHANGE UP

## 2024-05-16 PROCEDURE — 82962 GLUCOSE BLOOD TEST: CPT

## 2024-05-16 PROCEDURE — 94640 AIRWAY INHALATION TREATMENT: CPT

## 2024-05-16 PROCEDURE — 99261: CPT

## 2024-05-16 PROCEDURE — 87641 MR-STAPH DNA AMP PROBE: CPT

## 2024-05-16 PROCEDURE — 99291 CRITICAL CARE FIRST HOUR: CPT | Mod: 25

## 2024-05-16 PROCEDURE — 87640 STAPH A DNA AMP PROBE: CPT

## 2024-05-16 PROCEDURE — 83735 ASSAY OF MAGNESIUM: CPT

## 2024-05-16 PROCEDURE — 85027 COMPLETE CBC AUTOMATED: CPT

## 2024-05-16 PROCEDURE — 87637 SARSCOV2&INF A&B&RSV AMP PRB: CPT

## 2024-05-16 PROCEDURE — 83036 HEMOGLOBIN GLYCOSYLATED A1C: CPT

## 2024-05-16 PROCEDURE — 96375 TX/PRO/DX INJ NEW DRUG ADDON: CPT

## 2024-05-16 PROCEDURE — 80076 HEPATIC FUNCTION PANEL: CPT

## 2024-05-16 PROCEDURE — 86706 HEP B SURFACE ANTIBODY: CPT

## 2024-05-16 PROCEDURE — 36415 COLL VENOUS BLD VENIPUNCTURE: CPT

## 2024-05-16 PROCEDURE — 97162 PT EVAL MOD COMPLEX 30 MIN: CPT

## 2024-05-16 PROCEDURE — 82803 BLOOD GASES ANY COMBINATION: CPT

## 2024-05-16 PROCEDURE — 71045 X-RAY EXAM CHEST 1 VIEW: CPT

## 2024-05-16 PROCEDURE — 87340 HEPATITIS B SURFACE AG IA: CPT

## 2024-05-16 PROCEDURE — 85025 COMPLETE CBC W/AUTO DIFF WBC: CPT

## 2024-05-16 PROCEDURE — 71275 CT ANGIOGRAPHY CHEST: CPT | Mod: MC

## 2024-05-16 PROCEDURE — 84484 ASSAY OF TROPONIN QUANT: CPT

## 2024-05-16 PROCEDURE — 96365 THER/PROPH/DIAG IV INF INIT: CPT

## 2024-05-16 PROCEDURE — 80048 BASIC METABOLIC PNL TOTAL CA: CPT

## 2024-05-16 PROCEDURE — 93005 ELECTROCARDIOGRAM TRACING: CPT

## 2024-05-16 PROCEDURE — 83880 ASSAY OF NATRIURETIC PEPTIDE: CPT

## 2024-05-16 PROCEDURE — 96361 HYDRATE IV INFUSION ADD-ON: CPT

## 2024-05-16 PROCEDURE — 80053 COMPREHEN METABOLIC PANEL: CPT

## 2024-05-16 PROCEDURE — 84100 ASSAY OF PHOSPHORUS: CPT

## 2024-05-16 PROCEDURE — 86803 HEPATITIS C AB TEST: CPT

## 2024-05-16 PROCEDURE — 94660 CPAP INITIATION&MGMT: CPT

## 2024-05-16 PROCEDURE — 86705 HEP B CORE ANTIBODY IGM: CPT

## 2024-05-16 PROCEDURE — 97116 GAIT TRAINING THERAPY: CPT

## 2024-05-16 PROCEDURE — 86738 MYCOPLASMA ANTIBODY: CPT

## 2024-05-16 PROCEDURE — 83605 ASSAY OF LACTIC ACID: CPT

## 2024-05-16 PROCEDURE — 97530 THERAPEUTIC ACTIVITIES: CPT

## 2024-05-16 RX ADMIN — Medication 1 CAPSULE(S): at 11:47

## 2024-05-16 RX ADMIN — Medication 1 CAPSULE(S): at 17:35

## 2024-05-16 RX ADMIN — HEPARIN SODIUM 5000 UNIT(S): 5000 INJECTION INTRAVENOUS; SUBCUTANEOUS at 05:40

## 2024-05-16 RX ADMIN — Medication 3 MILLILITER(S): at 03:04

## 2024-05-16 RX ADMIN — Medication 3 MILLILITER(S): at 15:39

## 2024-05-16 RX ADMIN — CHLORHEXIDINE GLUCONATE 1 APPLICATION(S): 213 SOLUTION TOPICAL at 05:40

## 2024-05-16 RX ADMIN — SODIUM CHLORIDE 4 MILLILITER(S): 9 INJECTION INTRAMUSCULAR; INTRAVENOUS; SUBCUTANEOUS at 10:12

## 2024-05-16 RX ADMIN — PANTOPRAZOLE SODIUM 40 MILLIGRAM(S): 20 TABLET, DELAYED RELEASE ORAL at 05:40

## 2024-05-16 RX ADMIN — Medication 1: at 08:16

## 2024-05-16 RX ADMIN — BENZOCAINE AND MENTHOL 1 LOZENGE: 5; 1 LIQUID ORAL at 17:36

## 2024-05-16 RX ADMIN — Medication 1 CAPSULE(S): at 08:16

## 2024-05-16 RX ADMIN — HEPARIN SODIUM 5000 UNIT(S): 5000 INJECTION INTRAVENOUS; SUBCUTANEOUS at 17:35

## 2024-05-16 RX ADMIN — Medication 3: at 11:47

## 2024-05-16 RX ADMIN — Medication 3 MILLILITER(S): at 10:12

## 2024-05-16 NOTE — PROGRESS NOTE ADULT - PROVIDER SPECIALTY LIST ADULT
Internal Medicine
Internal Medicine
Nephrology
Critical Care
Internal Medicine
Nephrology
Internal Medicine
Internal Medicine
Nephrology
Internal Medicine

## 2024-05-16 NOTE — DISCHARGE NOTE NURSING/CASE MANAGEMENT/SOCIAL WORK - PATIENT PORTAL LINK FT
You can access the FollowMyHealth Patient Portal offered by [facility] by registering at the following website: [email or web address]. By joining [employer]’s FollowMyHealth portal, you will also be able to view your health information using other applications (apps) compatible with our system.

## 2024-05-16 NOTE — PROGRESS NOTE ADULT - SUBJECTIVE AND OBJECTIVE BOX
PGY-1 Progress Note discussed with attending    PAGER #: [phone]] TILL 5:00 PM  PLEASE CONTACT ON CALL TEAM:  - On Call Team (Please refer to Jose Raul) FROM 5:00 PM - 8:30PM  - Nightfloat Team FROM 8:30 -7:30 AM    INTERVAL HPI/OVERNIGHT EVENTS:     MEDICATIONS  (STANDING):  albuterol/ipratropium for Nebulization 3 milliLiter(s) Nebulizer every 6 hours  chlorhexidine 2% Cloths 1 Application(s) Topical <User Schedule>  epoetin lara (PROCRIT) Injectable 49112 Unit(s) IV Push <User Schedule>  heparin   Injectable 5000 Unit(s) SubCutaneous every 12 hours  insulin glargine Injectable (LANTUS) 3 Unit(s) SubCutaneous at bedtime  insulin lispro (ADMELOG) corrective regimen sliding scale   SubCutaneous three times a day before meals  insulin lispro (ADMELOG) corrective regimen sliding scale   SubCutaneous at bedtime  pancrelipase  (CREON 36,000 Lipase Units) 1 Capsule(s) Oral four times a day with meals  pantoprazole    Tablet 40 milliGRAM(s) Oral before breakfast  simvastatin 20 milliGRAM(s) Oral at bedtime  sodium chloride 3%  Inhalation 4 milliLiter(s) Inhalation every 12 hours    MEDICATIONS  (PRN):  acetaminophen     Tablet .. 650 milliGRAM(s) Oral every 6 hours PRN Temp greater or equal to 38C (100.4F), Mild Pain (1 - 3)  aluminum hydroxide/magnesium hydroxide/simethicone Suspension 30 milliLiter(s) Oral every 4 hours PRN Dyspepsia  benzocaine/menthol Lozenge 1 Lozenge Oral three times a day PRN Sore Throat  melatonin 3 milliGRAM(s) Oral at bedtime PRN Insomnia  ondansetron Injectable 4 milliGRAM(s) IV Push every 8 hours PRN Nausea and/or Vomiting      REVIEW OF SYSTEMS:  CONSTITUTIONAL:  No fevers or chills, good appetite, good general state  EYES/ENT:  No visual changes;  No vertigo or throat pain   NECK:  No neck pain or stiffness  RESPIRATORY:  No cough, wheezing, hemoptysis; No shortness of breath  CARDIOVASCULAR:  No chest pain or palpitations  GASTROINTESTINAL:  No abdominal pain. No nausea, vomiting, or hematemesis; No diarrhea or constipation. No melena or hematochezia.  GENITOURINARY:  No dysuria, frequency or hematuria  NEUROLOGICAL:  No HA, no numbness or LE weakness  MSK: no back pain, no joint pain  SKIN:  No itching, no skin rash    Vital Signs Last 24 Hrs  T(C): 37.1 (16 May 2024 05:00), Max: 37.3 (15 May 2024 20:10)  T(F): 98.7 (16 May 2024 05:00), Max: 99.2 (15 May 2024 20:10)  HR: 95 (16 May 2024 05:00) (95 - 108)  BP: 114/65 (16 May 2024 05:00) (111/75 - 142/78)  BP(mean): --  RR: 18 (16 May 2024 05:00) (18 - 20)  SpO2: 92% (16 May 2024 05:00) (92% - 100%)    Parameters below as of 16 May 2024 05:00  Patient On (Oxygen Delivery Method): room air        PHYSICAL EXAM:  VITALS: Vital Signs Last 24 Hrs  T(C): 37.1 (16 May 2024 05:00), Max: 37.3 (15 May 2024 20:10)  T(F): 98.7 (16 May 2024 05:00), Max: 99.2 (15 May 2024 20:10)  HR: 95 (16 May 2024 05:00) (95 - 108)  BP: 114/65 (16 May 2024 05:00) (111/75 - 142/78)  BP(mean): --  RR: 18 (16 May 2024 05:00) (18 - 20)  SpO2: 92% (16 May 2024 05:00) (92% - 100%)    Parameters below as of 16 May 2024 05:00  Patient On (Oxygen Delivery Method): room air      Gen: AOx3, NAD, non-toxic, pleasant  HEAD:  Atraumatic, Normocephalic  EYES: PERRLA, conjunctiva and sclera clear  ENT: Moist mucous membranes  NECK: Supple, No JVD  CV: S1+S2 normal, no murmurs   Resp: Clear bilat, no resp distress, no crackles/wheezes  Abd: Soft, nontender, +BS  Ext: No LE edema, no cyanosis, LE pulses present  : Reed (+/-)  IV/Skin: No thrombophlebitis, no skin rash  Msk: No arthralgias, no joint swelling  Neuro: AAOx3. No sensory deficits, no motor deficits                          8.3    7.59  )-----------( 362      ( 15 May 2024 09:40 )             25.1     05-15    131<L>  |  95<L>  |  10  ----------------------------<  206<H>  4.0   |  30  |  2.52<H>    Ca    8.7      15 May 2024 16:20  Phos  3.9     05-15  Mg     1.7     05-15                CAPILLARY BLOOD GLUCOSE      RADIOLOGY & ADDITIONAL TESTS:

## 2024-05-16 NOTE — PROGRESS NOTE ADULT - SUBJECTIVE AND OBJECTIVE BOX
[facility] : Progress Note :: [phone], (office [phone]),   Dr Pandey / Dr Mi / Dr Massey / Dr Etienne / Dr Cassandra DA SILVA / Dr Burt / Dr Garber / Dr Naeem lam  _____________________________________________________________________________________________    S/P hemodialysis yesterday. feels better    shellfish (Anaphylaxis)  aspirin (Unknown)  ibuprofen (Unknown)  Mushrooms (Anaphylaxis)    Hospital Medications:   MEDICATIONS  (STANDING):  albuterol/ipratropium for Nebulization 3 milliLiter(s) Nebulizer every 6 hours  chlorhexidine 2% Cloths 1 Application(s) Topical <User Schedule>  epoetin lara (PROCRIT) Injectable 27216 Unit(s) IV Push <User Schedule>  heparin   Injectable 5000 Unit(s) SubCutaneous every 12 hours  insulin glargine Injectable (LANTUS) 3 Unit(s) SubCutaneous at bedtime  insulin lispro (ADMELOG) corrective regimen sliding scale   SubCutaneous three times a day before meals  insulin lispro (ADMELOG) corrective regimen sliding scale   SubCutaneous at bedtime  pancrelipase  (CREON 36,000 Lipase Units) 1 Capsule(s) Oral four times a day with meals  pantoprazole    Tablet 40 milliGRAM(s) Oral before breakfast  simvastatin 20 milliGRAM(s) Oral at bedtime  sodium chloride 3%  Inhalation 4 milliLiter(s) Inhalation every 12 hours        VITALS:  T(F): 98.7 (05-16-24 @ 05:00), Max: 99.2 (05-15-24 @ 20:10)  HR: 95 (05-16-24 @ 05:00)  BP: 114/65 (05-16-24 @ 05:00)  RR: 18 (05-16-24 @ 05:00)  SpO2: 92% (05-16-24 @ 05:00)  Wt(kg): --      PHYSICAL EXAM:  Constitutional: NAD  HEENT: anicteric sclera, oropharynx clear  Neck: No JVD  Respiratory: CTAB, no wheezes, rales or rhonchi  Cardiovascular: S1, S2, RRR  Gastrointestinal: BS+, soft, NT/ND  Extremities:  No peripheral edema  Neurological: A/O x 3, no focal deficits  : No CVA tenderness. No dickson.   Skin: No rashes  Vascular Access: AVF with thrill and bruit     LABS:  05-15    131<L>  |  95<L>  |  10  ----------------------------<  206<H>  4.0   |  30  |  2.52<H>    Ca    8.7      15 May 2024 16:20  Phos  3.9     05-15  Mg     1.7     05-15      Creatinine Trend: 2.52 <--, 5.26 <--, 3.81 <--, 2.73 <--, 5.35 <--, 4.59 <--, 3.24 <--                        8.3    7.59  )-----------( 362      ( 15 May 2024 09:40 )             25.1     Urine Studies:  Urinalysis Basic - ( 15 May 2024 16:20 )    Color:  / Appearance:  / SG:  / pH:   Gluc: 206 mg/dL / Ketone:   / Bili:  / Urobili:    Blood:  / Protein:  / Nitrite:    Leuk Esterase:  / RBC:  / WBC    Sq Epi:  / Non Sq Epi:  / Bacteria:         RADIOLOGY & ADDITIONAL STUDIES:

## 2024-05-16 NOTE — PROGRESS NOTE ADULT - REASON FOR ADMISSION
AHRF

## 2024-05-16 NOTE — DISCHARGE NOTE NURSING/CASE MANAGEMENT/SOCIAL WORK - NSDCPEFALRISK_GEN_ALL_CORE
For information on Fall & Injury Prevention, visit: [email or web address] OR  [email or web address] OR  https://www.cdc.gov/steadi/patient.html

## 2024-05-20 NOTE — ED ADULT TRIAGE NOTE - TEMPERATURE IN CELSIUS (DEGREES C)
[] [facility]  Outpatient Rehabilitation &  Therapy  [address]  [phone]  [phone] [] [facility]  Outpatient Rehabilitation &  Therapy  [address]  P: [phone]  F: [phone] [] [facility]  Outpatient Rehabilitation &  Therapy  [address]  P: [phone]  F: [phone] [] [facility]  Outpatient Rehabilitation &  Therapy  [address]  [phone]  [phone] [] [facility]  Outpatient Rehabilitation &  Therapy  [address]   P: [phone]  F: [phone]  [x] [facility]  Outpatient Rehabilitation &  Therapy  [address]  P: [phone]  F: [phone] [] [facility]  Outpatient Rehabilitation &  Therapy  [address]  P: [phone]  F: [phone] [] [facility]  Outpatient Rehabilitation &  Therapy  [address]  P: [phone]  F: [phone] [] [facility]  Outpatient Rehabilitation &  Therapy  [address]  P: [phone]  F: [phone]  [] [facility] Outpatient Rehabilitation &  Therapy  [address]  P: [phone]  F: [phone]     Therapy Cancel/No Show note    Date: 2024  Patient: Oumar Claire  : 2008  MRN: [id number]    Cancels/No Shows to date:     For today's appointment patient:    [x]  Cancelled    [] Rescheduled appointment    [] No-show     Reason given by patient:    []  Patient ill    []  Conflicting appointment    [] No transportation      [] Conflict with work    [] No reason given    [] Weather related    [] COVID-19    [x] Other:   Mother (his transportation) worked over   Comments:        [x] Next appointment was confirmed    Electronically signed by: EDDIE HARRISON PT     
36.2

## 2024-05-27 ENCOUNTER — INPATIENT (INPATIENT)
Facility: HOSPITAL | Age: 84
LOS: 9 days | Discharge: NURSING, REHAB OR LONG-TERM CARE | DRG: 640 | End: 2024-06-06
Attending: INTERNAL MEDICINE | Admitting: INTERNAL MEDICINE
Payer: COMMERCIAL

## 2024-05-27 VITALS
WEIGHT: 108.03 LBS | DIASTOLIC BLOOD PRESSURE: 77 MMHG | OXYGEN SATURATION: 100 % | HEIGHT: 59 IN | SYSTOLIC BLOOD PRESSURE: 117 MMHG | HEART RATE: 120 BPM | RESPIRATION RATE: 22 BRPM

## 2024-05-27 DIAGNOSIS — E78.5 HYPERLIPIDEMIA, UNSPECIFIED: ICD-10-CM

## 2024-05-27 DIAGNOSIS — Z98.890 OTHER SPECIFIED POSTPROCEDURAL STATES: Chronic | ICD-10-CM

## 2024-05-27 DIAGNOSIS — N18.6 END STAGE RENAL DISEASE: ICD-10-CM

## 2024-05-27 DIAGNOSIS — I10 ESSENTIAL (PRIMARY) HYPERTENSION: ICD-10-CM

## 2024-05-27 DIAGNOSIS — Z29.9 ENCOUNTER FOR PROPHYLACTIC MEASURES, UNSPECIFIED: ICD-10-CM

## 2024-05-27 DIAGNOSIS — R79.89 OTHER SPECIFIED ABNORMAL FINDINGS OF BLOOD CHEMISTRY: ICD-10-CM

## 2024-05-27 DIAGNOSIS — E87.70 FLUID OVERLOAD, UNSPECIFIED: ICD-10-CM

## 2024-05-27 DIAGNOSIS — Z98.41 CATARACT EXTRACTION STATUS, RIGHT EYE: Chronic | ICD-10-CM

## 2024-05-27 DIAGNOSIS — Z96.652 PRESENCE OF LEFT ARTIFICIAL KNEE JOINT: Chronic | ICD-10-CM

## 2024-05-27 DIAGNOSIS — I77.0 ARTERIOVENOUS FISTULA, ACQUIRED: Chronic | ICD-10-CM

## 2024-05-27 DIAGNOSIS — E11.9 TYPE 2 DIABETES MELLITUS WITHOUT COMPLICATIONS: ICD-10-CM

## 2024-05-27 DIAGNOSIS — E87.1 HYPO-OSMOLALITY AND HYPONATREMIA: ICD-10-CM

## 2024-05-27 LAB
ALBUMIN SERPL ELPH-MCNC: 2.8 G/DL — LOW (ref 3.5–5)
ALP SERPL-CCNC: 168 U/L — HIGH (ref 40–120)
ALT FLD-CCNC: 73 U/L DA — HIGH (ref 10–60)
ANION GAP SERPL CALC-SCNC: 13 MMOL/L — SIGNIFICANT CHANGE UP (ref 5–17)
ANION GAP SERPL CALC-SCNC: 19 MMOL/L — HIGH (ref 5–17)
APTT BLD: 32.9 SEC — SIGNIFICANT CHANGE UP (ref 24.5–35.6)
AST SERPL-CCNC: 98 U/L — HIGH (ref 10–40)
BASE EXCESS BLDV CALC-SCNC: -6.2 MMOL/L — SIGNIFICANT CHANGE UP
BASOPHILS # BLD AUTO: 0 K/UL — SIGNIFICANT CHANGE UP (ref 0–0.2)
BASOPHILS NFR BLD AUTO: 0 % — SIGNIFICANT CHANGE UP (ref 0–2)
BILIRUB SERPL-MCNC: 0.6 MG/DL — SIGNIFICANT CHANGE UP (ref 0.2–1.2)
BUN SERPL-MCNC: 18 MG/DL — SIGNIFICANT CHANGE UP (ref 7–18)
BUN SERPL-MCNC: 35 MG/DL — HIGH (ref 7–18)
CALCIUM SERPL-MCNC: 8.9 MG/DL — SIGNIFICANT CHANGE UP (ref 8.4–10.5)
CALCIUM SERPL-MCNC: 9 MG/DL — SIGNIFICANT CHANGE UP (ref 8.4–10.5)
CHLORIDE SERPL-SCNC: 94 MMOL/L — LOW (ref 96–108)
CHLORIDE SERPL-SCNC: 96 MMOL/L — SIGNIFICANT CHANGE UP (ref 96–108)
CK MB CFR SERPL CALC: 6.1 NG/ML — HIGH (ref 0–3.6)
CO2 SERPL-SCNC: 19 MMOL/L — LOW (ref 22–31)
CO2 SERPL-SCNC: 21 MMOL/L — LOW (ref 22–31)
CREAT SERPL-MCNC: 2.97 MG/DL — HIGH (ref 0.5–1.3)
CREAT SERPL-MCNC: 5.46 MG/DL — HIGH (ref 0.5–1.3)
EGFR: 15 ML/MIN/1.73M2 — LOW
EGFR: 7 ML/MIN/1.73M2 — LOW
EOSINOPHIL # BLD AUTO: 0 K/UL — SIGNIFICANT CHANGE UP (ref 0–0.5)
EOSINOPHIL NFR BLD AUTO: 0 % — SIGNIFICANT CHANGE UP (ref 0–6)
FLUAV AG NPH QL: SIGNIFICANT CHANGE UP
FLUBV AG NPH QL: SIGNIFICANT CHANGE UP
GLUCOSE BLDC GLUCOMTR-MCNC: 328 MG/DL — HIGH (ref 70–99)
GLUCOSE SERPL-MCNC: 218 MG/DL — HIGH (ref 70–99)
GLUCOSE SERPL-MCNC: 280 MG/DL — HIGH (ref 70–99)
HCO3 BLDV-SCNC: 21 MMOL/L — LOW (ref 22–29)
HCT VFR BLD CALC: 29.3 % — LOW (ref 34.5–45)
HGB BLD-MCNC: 9.2 G/DL — LOW (ref 11.5–15.5)
INR BLD: 1.01 RATIO — SIGNIFICANT CHANGE UP (ref 0.85–1.18)
LACTATE SERPL-SCNC: 2.7 MMOL/L — HIGH (ref 0.7–2)
LACTATE SERPL-SCNC: 4.7 MMOL/L — CRITICAL HIGH (ref 0.7–2)
LACTATE SERPL-SCNC: 6.6 MMOL/L — CRITICAL HIGH (ref 0.7–2)
LYMPHOCYTES # BLD AUTO: 0.35 K/UL — LOW (ref 1–3.3)
LYMPHOCYTES # BLD AUTO: 3 % — LOW (ref 13–44)
MAGNESIUM SERPL-MCNC: 2 MG/DL — SIGNIFICANT CHANGE UP (ref 1.6–2.6)
MAGNESIUM SERPL-MCNC: 2.1 MG/DL — SIGNIFICANT CHANGE UP (ref 1.6–2.6)
MCHC RBC-ENTMCNC: 29.1 PG — SIGNIFICANT CHANGE UP (ref 27–34)
MCHC RBC-ENTMCNC: 31.4 GM/DL — LOW (ref 32–36)
MCV RBC AUTO: 92.7 FL — SIGNIFICANT CHANGE UP (ref 80–100)
MONOCYTES # BLD AUTO: 0.12 K/UL — SIGNIFICANT CHANGE UP (ref 0–0.9)
MONOCYTES NFR BLD AUTO: 1 % — LOW (ref 2–14)
NEUTROPHILS # BLD AUTO: 11.3 K/UL — HIGH (ref 1.8–7.4)
NEUTROPHILS NFR BLD AUTO: 96 % — HIGH (ref 43–77)
NT-PROBNP SERPL-SCNC: HIGH PG/ML (ref 0–450)
PCO2 BLDV: 45 MMHG — HIGH (ref 39–42)
PH BLDV: 7.27 — LOW (ref 7.32–7.43)
PHOSPHATE SERPL-MCNC: 4.4 MG/DL — SIGNIFICANT CHANGE UP (ref 2.5–4.5)
PLATELET # BLD AUTO: 306 K/UL — SIGNIFICANT CHANGE UP (ref 150–400)
PO2 BLDV: 23 MMHG — SIGNIFICANT CHANGE UP
POTASSIUM SERPL-MCNC: 3.6 MMOL/L — SIGNIFICANT CHANGE UP (ref 3.5–5.3)
POTASSIUM SERPL-MCNC: 4 MMOL/L — SIGNIFICANT CHANGE UP (ref 3.5–5.3)
POTASSIUM SERPL-SCNC: 3.6 MMOL/L — SIGNIFICANT CHANGE UP (ref 3.5–5.3)
POTASSIUM SERPL-SCNC: 4 MMOL/L — SIGNIFICANT CHANGE UP (ref 3.5–5.3)
PROT SERPL-MCNC: 6.6 G/DL — SIGNIFICANT CHANGE UP (ref 6–8.3)
PROTHROM AB SERPL-ACNC: 11.5 SEC — SIGNIFICANT CHANGE UP (ref 9.5–13)
RBC # BLD: 3.16 M/UL — LOW (ref 3.8–5.2)
RBC # FLD: 13.9 % — SIGNIFICANT CHANGE UP (ref 10.3–14.5)
SAO2 % BLDV: 30.1 % — SIGNIFICANT CHANGE UP
SARS-COV-2 RNA SPEC QL NAA+PROBE: SIGNIFICANT CHANGE UP
SODIUM SERPL-SCNC: 128 MMOL/L — LOW (ref 135–145)
SODIUM SERPL-SCNC: 134 MMOL/L — LOW (ref 135–145)
TROPONIN I, HIGH SENSITIVITY RESULT: 1027.6 NG/L — HIGH
TROPONIN I, HIGH SENSITIVITY RESULT: 1242.2 NG/L — HIGH
TROPONIN I, HIGH SENSITIVITY RESULT: 1436.3 NG/L — HIGH
WBC # BLD: 11.77 K/UL — HIGH (ref 3.8–10.5)
WBC # FLD AUTO: 11.77 K/UL — HIGH (ref 3.8–10.5)

## 2024-05-27 PROCEDURE — 71045 X-RAY EXAM CHEST 1 VIEW: CPT | Mod: 26

## 2024-05-27 PROCEDURE — 99285 EMERGENCY DEPT VISIT HI MDM: CPT

## 2024-05-27 RX ORDER — LANOLIN ALCOHOL/MO/W.PET/CERES
3 CREAM (GRAM) TOPICAL AT BEDTIME
Refills: 0 | Status: DISCONTINUED | OUTPATIENT
Start: 2024-05-27 | End: 2024-06-06

## 2024-05-27 RX ORDER — METOPROLOL TARTRATE 50 MG
25 TABLET ORAL EVERY 8 HOURS
Refills: 0 | Status: DISCONTINUED | OUTPATIENT
Start: 2024-05-27 | End: 2024-05-28

## 2024-05-27 RX ORDER — IPRATROPIUM/ALBUTEROL SULFATE 18-103MCG
3 AEROSOL WITH ADAPTER (GRAM) INHALATION
Refills: 0 | DISCHARGE
Start: 2024-05-27 | End: 2024-05-29

## 2024-05-27 RX ORDER — PANTOPRAZOLE SODIUM 20 MG/1
40 TABLET, DELAYED RELEASE ORAL
Refills: 0 | Status: DISCONTINUED | OUTPATIENT
Start: 2024-05-27 | End: 2024-06-06

## 2024-05-27 RX ORDER — IPRATROPIUM/ALBUTEROL SULFATE 18-103MCG
3 AEROSOL WITH ADAPTER (GRAM) INHALATION ONCE
Refills: 0 | Status: COMPLETED | OUTPATIENT
Start: 2024-05-27 | End: 2024-05-27

## 2024-05-27 RX ORDER — INSULIN LISPRO 100/ML
VIAL (ML) SUBCUTANEOUS AT BEDTIME
Refills: 0 | Status: DISCONTINUED | OUTPATIENT
Start: 2024-05-27 | End: 2024-06-06

## 2024-05-27 RX ORDER — METOPROLOL TARTRATE 50 MG
25 TABLET ORAL ONCE
Refills: 0 | Status: DISCONTINUED | OUTPATIENT
Start: 2024-05-27 | End: 2024-05-27

## 2024-05-27 RX ORDER — METOPROLOL TARTRATE 50 MG
25 TABLET ORAL ONCE
Refills: 0 | Status: COMPLETED | OUTPATIENT
Start: 2024-05-27 | End: 2024-05-27

## 2024-05-27 RX ORDER — INSULIN LISPRO 100/ML
VIAL (ML) SUBCUTANEOUS
Refills: 0 | Status: DISCONTINUED | OUTPATIENT
Start: 2024-05-27 | End: 2024-06-06

## 2024-05-27 RX ORDER — INSULIN GLARGINE 100 [IU]/ML
3 INJECTION, SOLUTION SUBCUTANEOUS AT BEDTIME
Refills: 0 | Status: DISCONTINUED | OUTPATIENT
Start: 2024-05-27 | End: 2024-06-06

## 2024-05-27 RX ORDER — ACETAMINOPHEN 500 MG
650 TABLET ORAL EVERY 6 HOURS
Refills: 0 | Status: DISCONTINUED | OUTPATIENT
Start: 2024-05-27 | End: 2024-06-06

## 2024-05-27 RX ORDER — ONDANSETRON 8 MG/1
4 TABLET, FILM COATED ORAL EVERY 8 HOURS
Refills: 0 | Status: DISCONTINUED | OUTPATIENT
Start: 2024-05-27 | End: 2024-06-06

## 2024-05-27 RX ORDER — SIMVASTATIN 20 MG/1
20 TABLET, FILM COATED ORAL AT BEDTIME
Refills: 0 | Status: DISCONTINUED | OUTPATIENT
Start: 2024-05-27 | End: 2024-06-06

## 2024-05-27 RX ORDER — HEPARIN SODIUM 5000 [USP'U]/ML
5000 INJECTION INTRAVENOUS; SUBCUTANEOUS EVERY 8 HOURS
Refills: 0 | Status: DISCONTINUED | OUTPATIENT
Start: 2024-05-27 | End: 2024-06-06

## 2024-05-27 RX ADMIN — SIMVASTATIN 20 MILLIGRAM(S): 20 TABLET, FILM COATED ORAL at 22:48

## 2024-05-27 RX ADMIN — Medication 2: at 22:21

## 2024-05-27 RX ADMIN — INSULIN GLARGINE 3 UNIT(S): 100 INJECTION, SOLUTION SUBCUTANEOUS at 22:33

## 2024-05-27 RX ADMIN — HEPARIN SODIUM 5000 UNIT(S): 5000 INJECTION INTRAVENOUS; SUBCUTANEOUS at 22:33

## 2024-05-27 RX ADMIN — Medication 25 MILLIGRAM(S): at 22:47

## 2024-05-27 RX ADMIN — Medication 3 MILLILITER(S): at 10:16

## 2024-05-27 RX ADMIN — Medication 125 MILLIGRAM(S): at 10:16

## 2024-05-27 NOTE — H&P ADULT - TIME BILLING
- Review of records, telemetry, vital signs and daily labs.   - General and cardiovascular physical examination.  - Generation of cardiovascular treatment plan.  - Coordination of care.      Patient was seen and examined by me on 5/27/24,interim events noted,labs and radiology studies reviewed.  Robert Concepcion MD,FACC.  [address] [place], [place] [id number].  [phone]

## 2024-05-27 NOTE — CHART NOTE - NSCHARTNOTEFT_GEN_A_CORE
elderly PT with ESRD on HD MWF, currently in rehab at [facility]  Presenting to ED with one day of SOB  has hyponatremia, elevated serum trops and pulmonary congestion on CXR.  Will arrange for HD today with UF as tolerated.

## 2024-05-27 NOTE — H&P ADULT - NSHPREVIEWOFSYSTEMS_GEN_ALL_CORE
CONSTITUTIONAL: (+) cold sweats. No fever, weight loss, or fatigue  RESPIRATORY: (+) SOB at rest. No cough, wheezing, chills or hemoptysis;   CARDIOVASCULAR: (+) chest pressure, midsternum, nonradiating, associated with flushing sensation, self resolves in less than 1 minute.  GASTROINTESTINAL: No abdominal pain. No nausea, vomiting, or hematemesis; No diarrhea or constipation. No melena or hematochezia.  GENITOURINARY: No dysuria or hematuria, urinary frequency  NEUROLOGICAL: No headaches, memory loss, loss of strength, numbness, or tremors  ENDOCRINE: No polyuria, polydipsia, or heat/cold intolerance  MUSKULOSKELETAL: No muscle aches, joint pains  HEME: no easy bruisability, no tender or enlarged lymph nodes  SKIN: (+) swelling of legs. No itching, burning, rashes, or lesions .

## 2024-05-27 NOTE — CHART NOTE - NSCHARTNOTEFT_GEN_A_CORE
RRT was called due to brief period of unresponsiveness witnessed by nurse and ancillary staff. NP and RRT responded promptly. Pt was A&Ox3, endorsed that she experienced RRT was called after pt was sustaining Vtach to 250s on tele. NP and RRT team responded promptly. Nurse reported that pt had LOC for 10-15 secs. She then regained consciousness, and reported feeling lightheaded. Dr. Concepcion was notified of event, recommended to initiate Metoprolol 25 mg q8. STAT dose given now. Pacer pads at bedside. BMP and Mag ordered. Pt remains on telemetry for close monitoring (See full RRT note) RRT was called after pt was sustaining Vtach to 250s on tele. NP and RRT team responded promptly. Nurse reported that pt had brief LOC for 10-15 secs. She then regained consciousness, and reported feeling lightheaded. Dr. Concepcion was notified of event, recommended to initiate Metoprolol 25 mg q8. STAT dose given now. Pacer pads at bedside. BMP and Mag ordered. Pt remains on telemetry for close monitoring (See full RRT note)

## 2024-05-27 NOTE — H&P ADULT - PROBLEM SELECTOR PLAN 5
h/o DM   on repaglinide 1 TID with meals and Toujeo 9U Sunday, Tuesday and Thursday  blood glucose 218 on admission  was on lantus 3 and ISS during last admission, will continue  monitor FS

## 2024-05-27 NOTE — H&P ADULT - NSVTERISKASSESS_GEN_ALL_CORE FT
[FreeTextEntry6] : congestion, chills, pressure on chest, fever, difficulty on inspiration, sore throat yesterday, not today Medical Assessment Completed on: 27-May-2024 15:02

## 2024-05-27 NOTE — H&P ADULT - PROBLEM SELECTOR PLAN 3
trop 1027 on admission  EKG sinus tachycardia  presenting with chest pressure on admission  likely demand ischemia from fluid overload  trend trop  tele monitoring

## 2024-05-27 NOTE — ED ADULT NURSE NOTE - OBJECTIVE STATEMENT
Pt AOx4, c/o difficulty breathing since 0300 today. Pt denies CP, dizziness, n/v/f. Pt AOx4, c/o difficulty breathing since 0300 today. Pt denies CP, dizziness, n/v/f.  h/o DM, CKD: HD (M,W,F) last dialysis on friday, Left arm fistula.

## 2024-05-27 NOTE — H&P ADULT - PROBLEM SELECTOR PLAN 4
Na 128 on admission  likely hypervolemic hyponatremia  rec'd NaCl tabs during last admission  HD today, makes minimal urine  trend Na

## 2024-05-27 NOTE — ED ADULT NURSE NOTE - NSFALLHARMRISKINTERV_ED_ALL_ED
Assistance OOB with selected safe patient handling equipment if applicable/Assistance with ambulation/Communicate risk of Fall with Harm to all staff, patient, and family/Monitor gait and stability/Provide visual cue: red socks, yellow wristband, yellow gown, etc/Reinforce activity limits and safety measures with patient and family/Bed in lowest position, wheels locked, appropriate side rails in place/Call bell, personal items and telephone in reach/Instruct patient to call for assistance before getting out of bed/chair/stretcher/Non-slip footwear applied when patient is off stretcher/[place] to call system/Physically safe environment - no spills, clutter or unnecessary equipment/Purposeful Proactive Rounding/Room/bathroom lighting operational, light cord in reach

## 2024-05-27 NOTE — ED PROVIDER NOTE - CLINICAL SUMMARY MEDICAL DECISION MAKING FREE TEXT BOX
83-year-old female presents with shortness of breath.  PE as above.    EKG, labs, chest x-ray, neb, steroid, reassess

## 2024-05-27 NOTE — H&P ADULT - ASSESSMENT
83F PMH DM, HTN, HLD, ESRD on HD M, W, F, presenting to the ED with SOB admitted for pulmonary congestion 2/2 fluid overload.

## 2024-05-27 NOTE — H&P ADULT - PROBLEM SELECTOR PLAN 1
presenting with SOB likely 2/2 fluid overload  CXR showing pulmonary congestion b/l  b/l wheezing heard likely cardiogenic  2+ pitting edema b/l  proBNP 45084 on admission  will get HD session today  Nephro Dr. Pandey following

## 2024-05-27 NOTE — ED ADULT NURSE REASSESSMENT NOTE - NS ED NURSE REASSESS COMMENT FT1
Pt in stable condition, report given to JANEY Burr from dialysis.
10:15PM Patient noted to be tachycardic with runs of Vtach, at around 2215, MARION Quintanilla who was in ED made aware, NP ordered EKG . During EKG, patient experienced a run of Vtach with HR around 200, patient then became  unresponsive and rapid response was called. Patient unresponsive for about 10 seconds and was able to revert to NS without intervention. MARION Quintanilla and tele resident responded. Medicated with PO metoprolol after event.  11:00 PM Patient appears to be stable with HR at 86s.

## 2024-05-27 NOTE — RAPID RESPONSE TEAM SUMMARY - NSOTHERINTERVENTIONSRRT_GEN_ALL_CORE
cardiac pads placed for possible ACLS intervention  ECHO ordered  Cardiology (Dr. Concepcion) informed  Primary Team (Helena Mora NP), ICU (Dr. PHIL Buenrostro) present  Primary Team to continue to monitor HR on Telemetry  Will continue to trend lactate   cardiac pads placed for possible ACLS intervention  ECHO ordered  Cardiology (Dr. Concepcion) informed  Primary Team (Helena Mora NP), ICU (Dr. PHIL Buenrostro) present  Primary Team to continue to monitor HR on Telemetry  Will continue to trend lactate

## 2024-05-27 NOTE — ED PROVIDER NOTE - OBJECTIVE STATEMENT
83-year-old female with a past medical history of diabetes, hypertension, hyperlipidemia, end-stage renal disease on hemodialysis Monday, Wednesday, Friday, gout, hemochromatosis presents with shortness of breath starting last night.  Patient was recently treated with a pneumonia and was discharged 10 days ago and has been otherwise feeling well until overnight when she started to feel short of breath.  States she was given a nebulizer treatment at the rehab facility which did improve her symptoms.  Patient states that in the morning the symptoms returned and she was given another nebulizer treatment but did not seem to help her symptoms.  Patient states for sore for warm sensation going over her whole entire body.  Denies other complaints at this time.  States she has been having extra fluid pulled off during dialysis due to lower extremity swelling.

## 2024-05-27 NOTE — H&P ADULT - PROBLEM SELECTOR PLAN 2
h/o ESRD on HD MWF  last HD on Friday, no missed sessions, had more fluid removed during last 2 sessions  SCr 5.46  next HD today  Nephro Dr. Pandey following

## 2024-05-27 NOTE — ED PROVIDER NOTE - PROGRESS NOTE DETAILS
EKG with sinus tachycardia.  Labs with elevated troponin, elevated proBNP..  Patient still with end-stage renal.  Chest x-ray with mild pulmonary congestion.  Will admit for cardiac workup as well as for dialysis.

## 2024-05-27 NOTE — H&P ADULT - NSHPPHYSICALEXAM_GEN_ALL_CORE
General - NAD, lying in bed, elderly, appears comfortable  Eyes - PERRLA, EOM intact  ENT - Nonicteric sclerae, PERRLA, EOMI. Oropharynx clear. Moist mucous membranes. Conjunctivae appear well perfused.   Neck - No noticeable or palpable swelling, redness or rash around throat or on face  Lymph Nodes - No lymphadenopathy  Cardiovascular - RRR no m/r/g, no JVD, no carotid bruits  Lungs - (+) mild scattered wheezing b/l in upper airways, decreased breath sounds in bases.   Skin - (+) left forearm AV fistula. No rashes, skin warm and dry, no erythematous areas  Abdomen - Normal bowel sounds, abdomen soft and nontender  Extremities - (+) 2+ pitting edema b/l LEs. No cyanosis or clubbing  Musculoskeletal - 5/5 strength, normal range of motion, no swollen or erythematous joints.  Neuro– Alert and oriented x 3, CN 2-12 grossly intact.

## 2024-05-27 NOTE — RAPID RESPONSE TEAM SUMMARY - NSSITUATIONBACKGROUNDRRT_GEN_ALL_CORE
Patient was noted to have episodes of non-sustained tachycardia 180s post-HD. She also felt lightheaded. She has no documented history of arrhythmia. RRT was called when she was sustaining 250s. She was unresponsive for 10-15 secs. SBP noted to be 100s.  By the time, RRT came, patient regained consciousness. SBP improved to 120s and HR 90-100s. Physical Exam was unremarkable. NRRR, chest auscultation was clear.

## 2024-05-27 NOTE — ED ADULT NURSE NOTE - NS ED NOTE  TALK SOMEONE YN
FOOD SECURITY SCREENING QUESTIONS  Hunger Vital Signs:  Within the past 12 months we worried whether our food would run out before we got money to buy more. Never  Within the past 12 months the food we bought just didn't last and we didn't have money to get more. Never  Sasha Gustafson LPN 8/17/2021 10:51 AM    Chief Complaint   Patient presents with     Prenatal Care       Medication Reconciliation: completed   Sasha Gustafson LPN  8/17/2021 10:51 AM    No

## 2024-05-27 NOTE — H&P ADULT - HISTORY OF PRESENT ILLNESS
83F PMH DM, HTN, HLD, ESRD on HD M, W, F, presenting to the ED with SOB. Pt was sent from [facility] at [facility], recently admitted to [facility] for PNA. She states that she started having SOB and cold sweats suddenly around 3:30AM, BP noted to be 147/102. She also endorsed experiencing chest pressure, non radiating, mid sternum, with a warm flushing sensation thoughtout her body, which lasts for less than a minute and resolves on its own. She was given duonebs with improvement in symptoms, but her symptoms later returned. She received another treatement of duonebs, then EMS was called. While seen in the ED, she stated she continued having the flushing sensation with chest pressure, again lasting for less than 1 minute. She denies any chills, N/V/D, abdominal pain, dysuria, numbness/tingling/weakness in extremities. She has not missed any of her HD sessions, but notes that she has required more fluid to be removed during the last 2 sessions due to worsening swelling of her legs. She is from [facility] and ambulates with a walker

## 2024-05-27 NOTE — ED ADULT NURSE REASSESSMENT NOTE - CONDITION
returned from dialysis, 1.7L removed instead of 3l which is patients goal due to low BP per dialysis RN./improved
Patient noted to be tachycardic with runs of Vtach, at around 2215, NP [place] who was in ED made aware, NP ordered EKG . During EKG, patient experienced a run of Vtach with HR around 200, patient then became  unresponsive and rapid response was called. Patient unresponsive for about 1oseconds and was able to revert to NS without intervention.

## 2024-05-27 NOTE — ED ADULT TRIAGE NOTE - NURSING HOMES
[place] [facility], [facility]

## 2024-05-27 NOTE — CHART NOTE - NSCHARTNOTEFT_GEN_A_CORE
EVENT: Repeat troponin and lactate noted. Trop downtrended 1436.3-->1242.2, lactate also improving from 6.6-->4.7     HPI: 83 F PMH DM, HTN, HLD, ESRD on HD M, W, F, presenting to the ED with SOB admitted for pulmonary congestion 2/2 fluid overload s/p HD 5/27.     SUBJECTIVE:    OBJECTIVE:  Vital Signs Last 24 Hrs  T(C): 36.6 (27 May 2024 20:29), Max: 36.7 (27 May 2024 19:24)  T(F): 97.9 (27 May 2024 20:29), Max: 98 (27 May 2024 19:24)  HR: 119 (27 May 2024 21:51) (100 - 120)  BP: 107/85 (27 May 2024 21:51) (107/68 - 120/70)  BP(mean): --  RR: 23 (27 May 2024 21:51) (18 - 33)  SpO2: 98% (27 May 2024 20:33) (96% - 100%)    Parameters below as of 27 May 2024 20:33  Patient On (Oxygen Delivery Method): nasal cannula  O2 Flow (L/min): 2    PHYSICAL EXAM:  Neuro: Awake and alert, oriented to person, place, and time  Cardiovascular: + S1, S2, no murmurs, rubs, or bruits  Respiratory: clear to auscultation bilaterally with good air entry   GI: Abdomen soft, non-tender, bowel sounds present   : Non distended;   Skin: warm and dry; no rash      LABS:                        9.2    11.77 )-----------( 306      ( 27 May 2024 10:20 )             29.3   CARDIAC MARKERS ( 27 May 2024 10:20 )  x     / x     / x     / x     / 6.1 ng/mL    05-27    128<L>  |  94<L>  |  35<H>  ----------------------------<  218<H>  4.0   |  21<L>  |  5.46<H>    Ca    8.9      27 May 2024 10:20  Phos  4.4     05-27  Mg     2.1     05-27    TPro  6.6  /  Alb  2.8<L>  /  TBili  0.6  /  DBili  x   /  AST  98<H>  /  ALT  73<H>  /  AlkPhos  168<H>  05-27        EKG:   IMAGING:    ASSESSMENT/PROBLEM: 1.                                       PLAN:     FOLLOW UP / RESULT: EVENT: Repeat troponin and lactate noted. Trop downtrended 1436.3-->1242.2, lactate also improving from 6.6-->4.7     HPI: 83 F PMH DM, HTN, HLD, ESRD on HD M, W, F, presenting to the ED with SOB admitted for pulmonary congestion 2/2 fluid overload s/p HD 5/27.     SUBJECTIVE: Pt seen and examined at bedside, A&Ox3, no complaints at this time.     OBJECTIVE:  Vital Signs Last 24 Hrs  T(C): 36.6 (27 May 2024 20:29), Max: 36.7 (27 May 2024 19:24)  T(F): 97.9 (27 May 2024 20:29), Max: 98 (27 May 2024 19:24)  HR: 119 (27 May 2024 21:51) (100 - 120)  BP: 107/85 (27 May 2024 21:51) (107/68 - 120/70)  BP(mean): --  RR: 23 (27 May 2024 21:51) (18 - 33)  SpO2: 98% (27 May 2024 20:33) (96% - 100%)    Parameters below as of 27 May 2024 20:33  Patient On (Oxygen Delivery Method): nasal cannula  O2 Flow (L/min): 2    PHYSICAL EXAM:  Neuro: Awake and alert, oriented to person, place, and time  Cardiovascular: + S1, S2, no murmurs, rubs, or bruits  Respiratory: diminished bilaterally   GI: Abdomen soft, non-tender, bowel sounds present   : Non distended;   Extremities: Left forearm AV fistula (+/+)  Skin: warm and dry; no rash      LABS:                        9.2    11.77 )-----------( 306      ( 27 May 2024 10:20 )             29.3   CARDIAC MARKERS ( 27 May 2024 10:20 )  x     / x     / x     / x     / 6.1 ng/mL    05-27    128<L>  |  94<L>  |  35<H>  ----------------------------<  218<H>  4.0   |  21<L>  |  5.46<H>    Ca    8.9      27 May 2024 10:20  Phos  4.4     05-27  Mg     2.1     05-27    TPro  6.6  /  Alb  2.8<L>  /  TBili  0.6  /  DBili  x   /  AST  98<H>  /  ALT  73<H>  /  AlkPhos  168<H>  05-27        EKG:   IMAGING:    ASSESSMENT/PROBLEM: 1. Elevated troponin (improving) likely in the setting of demand ischemia due to fluid overload 2. Elevated lactate (improving)                                        PLAN:     -Elevated lactate possibly due to inappropriate clearance of lactic acid in the setting of ESRD. Due to fluid overload, no additional IV fluids at this time  -Continue current/supportive measures     FOLLOW UP / RESULT:    -Plan as above

## 2024-05-28 ENCOUNTER — RESULT REVIEW (OUTPATIENT)
Age: 84
End: 2024-05-28

## 2024-05-28 DIAGNOSIS — I47.20 VENTRICULAR TACHYCARDIA, UNSPECIFIED: ICD-10-CM

## 2024-05-28 LAB
ANION GAP SERPL CALC-SCNC: 12 MMOL/L — SIGNIFICANT CHANGE UP (ref 5–17)
BUN SERPL-MCNC: 27 MG/DL — HIGH (ref 7–18)
CALCIUM SERPL-MCNC: 9.2 MG/DL — SIGNIFICANT CHANGE UP (ref 8.4–10.5)
CHLORIDE SERPL-SCNC: 97 MMOL/L — SIGNIFICANT CHANGE UP (ref 96–108)
CO2 SERPL-SCNC: 25 MMOL/L — SIGNIFICANT CHANGE UP (ref 22–31)
CREAT SERPL-MCNC: 3.43 MG/DL — HIGH (ref 0.5–1.3)
EGFR: 13 ML/MIN/1.73M2 — LOW
GLUCOSE BLDC GLUCOMTR-MCNC: 201 MG/DL — HIGH (ref 70–99)
GLUCOSE BLDC GLUCOMTR-MCNC: 212 MG/DL — HIGH (ref 70–99)
GLUCOSE BLDC GLUCOMTR-MCNC: 216 MG/DL — HIGH (ref 70–99)
GLUCOSE BLDC GLUCOMTR-MCNC: 241 MG/DL — HIGH (ref 70–99)
GLUCOSE BLDC GLUCOMTR-MCNC: 285 MG/DL — HIGH (ref 70–99)
GLUCOSE BLDC GLUCOMTR-MCNC: 290 MG/DL — HIGH (ref 70–99)
GLUCOSE BLDC GLUCOMTR-MCNC: 291 MG/DL — HIGH (ref 70–99)
GLUCOSE SERPL-MCNC: 228 MG/DL — HIGH (ref 70–99)
HCT VFR BLD CALC: 24.6 % — LOW (ref 34.5–45)
HGB BLD-MCNC: 7.9 G/DL — LOW (ref 11.5–15.5)
LACTATE SERPL-SCNC: 1.5 MMOL/L — SIGNIFICANT CHANGE UP (ref 0.7–2)
MAGNESIUM SERPL-MCNC: 2.2 MG/DL — SIGNIFICANT CHANGE UP (ref 1.6–2.6)
MCHC RBC-ENTMCNC: 28.8 PG — SIGNIFICANT CHANGE UP (ref 27–34)
MCHC RBC-ENTMCNC: 32.1 GM/DL — SIGNIFICANT CHANGE UP (ref 32–36)
MCV RBC AUTO: 89.8 FL — SIGNIFICANT CHANGE UP (ref 80–100)
NRBC # BLD: 0 /100 WBCS — SIGNIFICANT CHANGE UP (ref 0–0)
PHOSPHATE SERPL-MCNC: 3.2 MG/DL — SIGNIFICANT CHANGE UP (ref 2.5–4.5)
PLATELET # BLD AUTO: 335 K/UL — SIGNIFICANT CHANGE UP (ref 150–400)
POTASSIUM SERPL-MCNC: 3.4 MMOL/L — LOW (ref 3.5–5.3)
POTASSIUM SERPL-SCNC: 3.4 MMOL/L — LOW (ref 3.5–5.3)
RBC # BLD: 2.74 M/UL — LOW (ref 3.8–5.2)
RBC # FLD: 14 % — SIGNIFICANT CHANGE UP (ref 10.3–14.5)
SODIUM SERPL-SCNC: 134 MMOL/L — LOW (ref 135–145)
WBC # BLD: 15.49 K/UL — HIGH (ref 3.8–10.5)
WBC # FLD AUTO: 15.49 K/UL — HIGH (ref 3.8–10.5)

## 2024-05-28 PROCEDURE — 71045 X-RAY EXAM CHEST 1 VIEW: CPT | Mod: 26

## 2024-05-28 RX ORDER — MUPIROCIN 20 MG/G
1 OINTMENT TOPICAL
Refills: 0 | Status: COMPLETED | OUTPATIENT
Start: 2024-05-28 | End: 2024-06-02

## 2024-05-28 RX ORDER — LIPASE/PROTEASE/AMYLASE 16-48-48K
1 CAPSULE,DELAYED RELEASE (ENTERIC COATED) ORAL
Refills: 0 | DISCHARGE

## 2024-05-28 RX ORDER — CHLORHEXIDINE GLUCONATE 213 G/1000ML
1 SOLUTION TOPICAL
Refills: 0 | Status: DISCONTINUED | OUTPATIENT
Start: 2024-05-28 | End: 2024-06-06

## 2024-05-28 RX ORDER — METOPROLOL TARTRATE 50 MG
25 TABLET ORAL EVERY 8 HOURS
Refills: 0 | Status: DISCONTINUED | OUTPATIENT
Start: 2024-05-28 | End: 2024-06-06

## 2024-05-28 RX ADMIN — Medication 2: at 08:33

## 2024-05-28 RX ADMIN — PANTOPRAZOLE SODIUM 40 MILLIGRAM(S): 20 TABLET, DELAYED RELEASE ORAL at 06:08

## 2024-05-28 RX ADMIN — HEPARIN SODIUM 5000 UNIT(S): 5000 INJECTION INTRAVENOUS; SUBCUTANEOUS at 13:42

## 2024-05-28 RX ADMIN — MUPIROCIN 1 APPLICATION(S): 20 OINTMENT TOPICAL at 19:05

## 2024-05-28 RX ADMIN — SIMVASTATIN 20 MILLIGRAM(S): 20 TABLET, FILM COATED ORAL at 21:22

## 2024-05-28 RX ADMIN — INSULIN GLARGINE 3 UNIT(S): 100 INJECTION, SOLUTION SUBCUTANEOUS at 21:23

## 2024-05-28 RX ADMIN — CHLORHEXIDINE GLUCONATE 1 APPLICATION(S): 213 SOLUTION TOPICAL at 13:48

## 2024-05-28 RX ADMIN — Medication 25 MILLIGRAM(S): at 21:22

## 2024-05-28 RX ADMIN — Medication 3: at 13:46

## 2024-05-28 RX ADMIN — HEPARIN SODIUM 5000 UNIT(S): 5000 INJECTION INTRAVENOUS; SUBCUTANEOUS at 06:08

## 2024-05-28 RX ADMIN — Medication 650 MILLIGRAM(S): at 19:37

## 2024-05-28 RX ADMIN — Medication 3: at 17:47

## 2024-05-28 RX ADMIN — Medication 650 MILLIGRAM(S): at 19:07

## 2024-05-28 RX ADMIN — Medication 25 MILLIGRAM(S): at 06:08

## 2024-05-28 RX ADMIN — ONDANSETRON 4 MILLIGRAM(S): 8 TABLET, FILM COATED ORAL at 19:07

## 2024-05-28 RX ADMIN — Medication 25 MILLIGRAM(S): at 14:25

## 2024-05-28 RX ADMIN — HEPARIN SODIUM 5000 UNIT(S): 5000 INJECTION INTRAVENOUS; SUBCUTANEOUS at 21:23

## 2024-05-28 NOTE — PROGRESS NOTE ADULT - PROBLEM SELECTOR PLAN 1
proBNP [id number] on admission  S/p HD on 5/27 with 1.7L removed  Nephro Dr. Pandey following  Still with some crackles on auscultation, repeated cxray today shows improvement in congestion, still some evidence of overload  No obvious infiltrates despite WBC of 15k. proBNP [id number] on admission  S/p HD on 5/27 with 1.7L removed  Nephro Dr. Pandey following  Still with some crackles on auscultation, repeated cxray today shows improvement in congestion, still some evidence of overload  No obvious infiltrates despite WBC of 15k.  Was on 6L this AM, titrated down to RA, pulseox 94%

## 2024-05-28 NOTE — PROGRESS NOTE ADULT - PROBLEM SELECTOR PLAN 9
heparin Enbrel Counseling:  I discussed with the patient the risks of etanercept including but not limited to myelosuppression, immunosuppression, autoimmune hepatitis, demyelinating diseases, lymphoma, and infections.  The patient understands that monitoring is required including a PPD at baseline and must alert us or the primary physician if symptoms of infection or other concerning signs are noted.

## 2024-05-28 NOTE — CHART NOTE - NSCHARTNOTEFT_GEN_A_CORE
Examined patient at bedside. Lightheadedness subsided. HR better controlled 80-90s. SBP 120s. K and Mg normal. Will continue to monitor on Telemetry.

## 2024-05-28 NOTE — PROGRESS NOTE ADULT - PROBLEM SELECTOR PLAN 3
Occurred after HD. No recurrence of NSVT/VT on telemetry overnight.  Started on metoprolol tartrate 25mg q8hr  Keep Mag >2  Prelim echo with mildly reduced LVEF  If recurs, can consider amio load

## 2024-05-28 NOTE — PROGRESS NOTE ADULT - PROBLEM SELECTOR PLAN 1
proBNP [id number] on admission  S/p HD on 5/27 with 1.7L removed  Nephro Dr. Pandey following  Still with some crackles on auscultation, repeated cxray today shows improvement in congestion, still some evidence of overload  No obvious infiltrates despite WBC of 15k.  Was on 6L this AM, titrated down to RA, pulseox 94%

## 2024-05-28 NOTE — CONSULT NOTE ADULT - ASSESSMENT
# ESRD admitted with SOB- pulmonary edema improvement with HD and ultrafiltration  HD in AM  # Hyponatremia- sec to fluid overload. 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

## 2024-05-28 NOTE — PROGRESS NOTE ADULT - SUBJECTIVE AND OBJECTIVE BOX
pt seen and examined  Allergies    shellfish (Anaphylaxis)  ibuprofen (Unknown)  Mushrooms (Anaphylaxis)  aspirin (Unknown)    Intolerances        Vital Signs Last 24 Hrs  T(C): 37.2 (28 May 2024 13:13), Max: 37.2 (28 May 2024 13:13)  T(F): 99 (28 May 2024 13:13), Max: 99 (28 May 2024 13:13)  HR: 89 (28 May 2024 13:13) (80 - 119)  BP: 107/67 (28 May 2024 13:13) (96/59 - 130/71)  BP(mean): 93 (28 May 2024 05:42) (93 - 93)  RR: 18 (28 May 2024 13:13) (15 - 33)  SpO2: 93% (28 May 2024 13:13) (92% - 100%)    Parameters below as of 28 May 2024 13:13  Patient On (Oxygen Delivery Method): room air        MedsMEDICATIONS  (STANDING):  chlorhexidine 2% Cloths 1 Application(s) Topical <User Schedule>  heparin   Injectable 5000 Unit(s) SubCutaneous every 8 hours  insulin glargine Injectable (LANTUS) 3 Unit(s) SubCutaneous at bedtime  insulin lispro (ADMELOG) corrective regimen sliding scale   SubCutaneous three times a day before meals  insulin lispro (ADMELOG) corrective regimen sliding scale   SubCutaneous at bedtime  metoprolol tartrate 25 milliGRAM(s) Oral every 8 hours  mupirocin 2% Ointment 1 Application(s) Both Nostrils two times a day  pantoprazole    Tablet 40 milliGRAM(s) Oral before breakfast  simvastatin 20 milliGRAM(s) Oral at bedtime    MEDICATIONS  (PRN):  acetaminophen     Tablet .. 650 milliGRAM(s) Oral every 6 hours PRN Temp greater or equal to 38C (100.4F), Mild Pain (1 - 3)  aluminum hydroxide/magnesium hydroxide/simethicone Suspension 30 milliLiter(s) Oral every 4 hours PRN Dyspepsia  melatonin 3 milliGRAM(s) Oral at bedtime PRN Insomnia  ondansetron Injectable 4 milliGRAM(s) IV Push every 8 hours PRN Nausea and/or Vomiting      PHYSICAL EXAM:  GENERAL: NAD, well-groomed, well-developed  NEURO: AAOx3, ERIKA b/l, 4/5 b/l UE and 4/5 b/l LE motor strength  LUNG: dec breath sounds at bases  HEART: S1, S2+  ABDOMEN: Bowel sounds present, abd Soft, Nontender, Nondistended  EXTREMITIES:  2+ Peripheral Pulses b/l, No clubbing, cyanosis, or edema    Consultant(s) Notes Reviewed:  [x ] YES  [ ] NO  Care Discussed with Consultants/Other Providers [ x] YES  [ ] NO    LABS:                        7.9    15.49 )-----------( 335      ( 28 May 2024 05:21 )             24.6     05-28    134<L>  |  97  |  27<H>  ----------------------------<  228<H>  3.4<L>   |  25  |  3.43<H>    Ca    9.2      28 May 2024 05:21  Phos  3.2     05-28  Mg     2.2     05-28    TPro  6.6  /  Alb  2.8<L>  /  TBili  0.6  /  DBili  x   /  AST  98<H>  /  ALT  73<H>  /  AlkPhos  168<H>  05-27    PT/INR - ( 27 May 2024 10:20 )   PT: 11.5 sec;   INR: 1.01 ratio         PTT - ( 27 May 2024 10:20 )  PTT:32.9 sec    RADIOLOGY & ADDITIONAL TESTS:    Cxray from today:  Central infiltrates likely congestive has improved. There are   persistent lower lung field and pleural findings most significantly in   the left lower lobe.

## 2024-05-28 NOTE — PATIENT PROFILE ADULT - STAGE
Patient states she believes she has an ear infection.  C/o pain in the L ear that radiates to the back of her head.  States she had similar symptoms the last time she had a sinus infection.     stage I

## 2024-05-28 NOTE — PHARMACOTHERAPY INTERVENTION NOTE - COMMENTS
Patient is from [facility] and their medical record was used to update the outpatient medication review.
Allergies to latex and natural rubber entered from [facility] medical record

## 2024-05-28 NOTE — CONSULT NOTE ADULT - SUBJECTIVE AND OBJECTIVE BOX
[facility] : Progress Note :: [phone], (office [phone]),   Dr Pandey / Dr Mi / Dr Massey / Dr Etienne / Dr Cassandra DA SILVA / Dr Burt / Dr Garber / Dr Naeem lam  _____________________________________________________________________________________________  Patient is a 83y Female whom presented to the hospital with SOB. She has ESRD on HD, was recently admitted in this hospital with PNA, transferred to [facility]. presented yesterday morning with SOB. in ED found with elevated PR0-BNP and pulm edema, requiring emergent HD with significant improvement in symptoms.    PAST MEDICAL & SURGICAL HISTORY:  Hypertension      Hyperlipidemia      Gout      Cataract      Breast lump      Osteoporosis      Osteoarthritis      Vitamin D deficiency      Seasonal allergies      CKD (chronic kidney disease)      Diabetes mellitus      ESRD on dialysis      H/O bilateral cataract extraction  2012  and 2014      History of breast surgery  Excision of left breast lump - benign  1991      History of knee replacement, total, left  2009      Arteriovenous fistula  left side limb alert        shellfish (Anaphylaxis)  ibuprofen (Unknown)  latex (Unknown)  natural rubber (Unknown)  Mushrooms (Anaphylaxis)  aspirin (Unknown)    Home Medications Reviewed  Hospital Medications:   MEDICATIONS  (STANDING):  chlorhexidine 2% Cloths 1 Application(s) Topical <User Schedule>  heparin   Injectable 5000 Unit(s) SubCutaneous every 8 hours  insulin glargine Injectable (LANTUS) 3 Unit(s) SubCutaneous at bedtime  insulin lispro (ADMELOG) corrective regimen sliding scale   SubCutaneous three times a day before meals  insulin lispro (ADMELOG) corrective regimen sliding scale   SubCutaneous at bedtime  metoprolol tartrate 25 milliGRAM(s) Oral every 8 hours  mupirocin 2% Ointment 1 Application(s) Both Nostrils two times a day  pantoprazole    Tablet 40 milliGRAM(s) Oral before breakfast  simvastatin 20 milliGRAM(s) Oral at bedtime    SOCIAL HISTORY:  Denies ETOh,Smoking,   FAMILY HISTORY:  Family history of cancer          VITALS:  T(F): 98.1 (05-28-24 @ 16:11), Max: 99 (05-28-24 @ 13:13)  HR: 80 (05-28-24 @ 16:11)  BP: 110/70 (05-28-24 @ 16:11)  RR: 18 (05-28-24 @ 16:11)  SpO2: 94% (05-28-24 @ 16:11)  Wt(kg): --    05-28 @ 07:01  -  05-28 @ 19:43  --------------------------------------------------------  IN: 540 mL / OUT: 0 mL / NET: 540 mL        PHYSICAL EXAM:  Constitutional: NAD  HEENT: anicteric sclera, oropharynx clear  Neck: No JVD  Respiratory: CTAB, no wheezes, rales or rhonchi  Cardiovascular: S1, S2, RRR  Gastrointestinal: BS+, soft, NT/ND  Extremities:  No peripheral edema  Neurological: A/O x 3, no focal deficits  Vascular Access: AVF with thrill and bruit     LABS:  05-28    134<L>  |  97  |  27<H>  ----------------------------<  228<H>  3.4<L>   |  25  |  3.43<H>    Ca    9.2      28 May 2024 05:21  Phos  3.2     05-28  Mg     2.2     05-28    TPro  6.6  /  Alb  2.8<L>  /  TBili  0.6  /  DBili      /  AST  98<H>  /  ALT  73<H>  /  AlkPhos  168<H>  05-27    Creatinine Trend: 3.43 <--, 2.97 <--, 5.46 <--                        7.9    15.49 )-----------( 335      ( 28 May 2024 05:21 )             24.6     Urine Studies:  Urinalysis Basic - ( 28 May 2024 05:21 )    Color:  / Appearance:  / SG:  / pH:   Gluc: 228 mg/dL / Ketone:   / Bili:  / Urobili:    Blood:  / Protein:  / Nitrite:    Leuk Esterase:  / RBC:  / WBC    Sq Epi:  / Non Sq Epi:  / Bacteria:         RADIOLOGY & ADDITIONAL STUDIES:

## 2024-05-28 NOTE — PROGRESS NOTE ADULT - PROBLEM SELECTOR PROBLEM 3
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

## 2024-05-28 NOTE — PROGRESS NOTE ADULT - SUBJECTIVE AND OBJECTIVE BOX
PATIENT SEEN AND EXAMINED BY ARIANNA CHAVIRA M.D. ON :- 5/28/24  DATE OF SERVICE:  5/28/24           Interim events noted,Labs ,Radiological studies and Cardiology tests reviewed .    Patient is a 83y old  Female who presents with a chief complaint of SOB 2/2 fluid overload (28 May 2024 19:43)      HPI:  83F PMH DM, HTN, HLD, ESRD on HD M, W, F, presenting to the ED with SOB. Pt was sent from [facility] at [facility], recently admitted to [facility] for PNA. She states that she started having SOB and cold sweats suddenly around 3:30AM, BP noted to be 147/102. She also endorsed experiencing chest pressure, non radiating, mid sternum, with a warm flushing sensation thoughtout her body, which lasts for less than a minute and resolves on its own. She was given duonebs with improvement in symptoms, but her symptoms later returned. She received another treatement of duonebs, then EMS was called. While seen in the ED, she stated she continued having the flushing sensation with chest pressure, again lasting for less than 1 minute. She denies any chills, N/V/D, abdominal pain, dysuria, numbness/tingling/weakness in extremities. She has not missed any of her HD sessions, but notes that she has required more fluid to be removed during the last 2 sessions due to worsening swelling of her legs. She is from [facility] and ambulates with a walker (27 May 2024 14:49)      PAST MEDICAL & SURGICAL HISTORY:  Hypertension      Hyperlipidemia      Gout      Cataract      Breast lump      Osteoporosis      Osteoarthritis      Vitamin D deficiency      Seasonal allergies      CKD (chronic kidney disease)      Diabetes mellitus      ESRD on dialysis      H/O bilateral cataract extraction  2012  and 2014      History of breast surgery  Excision of left breast lump - benign  1991      History of knee replacement, total, left  2009      Arteriovenous fistula  left side limb alert          PREVIOUS DIAGNOSTIC TESTING:      ECHO  FINDINGS:    STRESS  FINDINGS:    CATHETERIZATION  FINDINGS:    MEDICATIONS  (STANDING):  chlorhexidine 2% Cloths 1 Application(s) Topical <User Schedule>  heparin   Injectable 5000 Unit(s) SubCutaneous every 8 hours  insulin glargine Injectable (LANTUS) 3 Unit(s) SubCutaneous at bedtime  insulin lispro (ADMELOG) corrective regimen sliding scale   SubCutaneous at bedtime  insulin lispro (ADMELOG) corrective regimen sliding scale   SubCutaneous three times a day before meals  metoprolol tartrate 25 milliGRAM(s) Oral every 8 hours  mupirocin 2% Ointment 1 Application(s) Both Nostrils two times a day  pantoprazole    Tablet 40 milliGRAM(s) Oral before breakfast  simvastatin 20 milliGRAM(s) Oral at bedtime    MEDICATIONS  (PRN):  acetaminophen     Tablet .. 650 milliGRAM(s) Oral every 6 hours PRN Temp greater or equal to 38C (100.4F), Mild Pain (1 - 3)  aluminum hydroxide/magnesium hydroxide/simethicone Suspension 30 milliLiter(s) Oral every 4 hours PRN Dyspepsia  melatonin 3 milliGRAM(s) Oral at bedtime PRN Insomnia  ondansetron Injectable 4 milliGRAM(s) IV Push every 8 hours PRN Nausea and/or Vomiting      FAMILY HISTORY:  Family history of cancer        SOCIAL HISTORY:    CIGARETTES:    ALCOHOL:    REVIEW OF SYSTEMS:  CONSTITUTIONAL: No fever, weight loss, or fatigue  EYES: No eye pain, visual disturbances, or discharge  ENMT:  No difficulty hearing, tinnitus, vertigo; No sinus or throat pain  NECK: No pain or stiffness  RESPIRATORY: No cough, wheezing, chills or hemoptysis; No shortness of breath  CARDIOVASCULAR: No chest pain, palpitations, dizziness, or leg swelling  GASTROINTESTINAL: No abdominal or epigastric pain. No nausea, vomiting, or hematemesis; No diarrhea or constipation. No melena or hematochezia.  GENITOURINARY: No dysuria, frequency, hematuria, or incontinence  NEUROLOGICAL: No headaches, memory loss, loss of strength, numbness, or tremors  SKIN: No itching, burning, rashes, or lesions   LYMPH NODES: No enlarged glands  ENDOCRINE: No heat or cold intolerance; No hair loss  MUSCULOSKELETAL: No joint pain or swelling; No muscle, back, or extremity pain  PSYCHIATRIC: No depression, anxiety, mood swings, or difficulty sleeping  HEME/LYMPH: No easy bruising, or bleeding gums  ALLERY AND IMMUNOLOGIC: No hives or eczema    Vital Signs Last 24 Hrs  T(C): 36.7 (28 May 2024 16:11), Max: 37.2 (28 May 2024 13:13)  T(F): 98.1 (28 May 2024 16:11), Max: 99 (28 May 2024 13:13)  HR: 80 (28 May 2024 16:11) (80 - 119)  BP: 110/70 (28 May 2024 16:11) (96/59 - 130/71)  BP(mean): 93 (28 May 2024 05:42) (93 - 93)  RR: 18 (28 May 2024 16:11) (15 - 23)  SpO2: 94% (28 May 2024 16:11) (92% - 100%)    Parameters below as of 28 May 2024 16:11  Patient On (Oxygen Delivery Method): room air          PHYSICAL EXAM:  GENERAL: NAD, well-groomed, well-developed  HEAD:  Atraumatic, Normocephalic  EYES: EOMI, PERRLA, conjunctiva and sclera clear  ENMT: No tonsillar erythema, exudates, or enlargement; Moist mucous membranes, Good dentition, No lesions  NECK: Supple, No JVD, Normal thyroid  NERVOUS SYSTEM:  Alert & Oriented X3, Good concentration; Motor Strength 5/5 B/L upper and lower extremities; DTRs 2+ intact and symmetric  CHEST/LUNG: Clear to percussion bilaterally; No rales, rhonchi, wheezing, or rubs  HEART: Regular rate and rhythm; No murmurs, rubs, or gallops  ABDOMEN: Soft, Nontender, Nondistended; Bowel sounds present  EXTREMITIES:  2+ Peripheral Pulses, No clubbing, cyanosis, or edema  LYMPH: No lymphadenopathy noted  SKIN: No rashes or lesions      INTERPRETATION OF TELEMETRY:    ECG:    [facility]:     LABS:                        7.9    15.49 )-----------( 335      ( 28 May 2024 05:21 )             24.6     05-28    134<L>  |  97  |  27<H>  ----------------------------<  228<H>  3.4<L>   |  25  |  3.43<H>    Ca    9.2      28 May 2024 05:21  Phos  3.2     05-28  Mg     2.2     05-28    TPro  6.6  /  Alb  2.8<L>  /  TBili  0.6  /  DBili  x   /  AST  98<H>  /  ALT  73<H>  /  AlkPhos  168<H>  05-27    CARDIAC MARKERS ( 27 May 2024 10:20 )  x     / x     / x     / x     / 6.1 ng/mL      PT/INR - ( 27 May 2024 10:20 )   PT: 11.5 sec;   INR: 1.01 ratio         PTT - ( 27 May 2024 10:20 )  PTT:32.9 sec  Urinalysis Basic - ( 28 May 2024 05:21 )    Color: x / Appearance: x / SG: x / pH: x  Gluc: 228 mg/dL / Ketone: x  / Bili: x / Urobili: x   Blood: x / Protein: x / Nitrite: x   Leuk Esterase: x / RBC: x / WBC x   Sq Epi: x / Non Sq Epi: x / Bacteria: x      Lipid Panel:   I&O's Summary    28 May 2024 07:01  -  28 May 2024 21:03  --------------------------------------------------------  IN: 540 mL / OUT: 0 mL / NET: 540 mL        RADIOLOGY & ADDITIONAL STUDIES:    CONCLUSIONS:  1. Normal mitral valve. Mild mitral regurgitation.  2. Calcified trileaflet aortic valve with decreased  opening. Peak transaortic valve gradient equals 21.2 mm Hg,  estimated aortic valve area equals 1.6 sqcm (by continuity  equation), consistent with mild aortic stenosis.  3. Aortic Root: 3.8 cm.    4. Moderately dilated left atrium.  LA volume index = 45  cc/m2.  5. Normal left ventricular internal dimensions and wall  thicknesses.  6. Normal Left Ventricular Systolic Function,  (EF = 55 to  60%)  7. Grade II diastolic dysfunction.  8. Normal right atrium.  9. Normal right ventricular size and systolic function  (TAPSE  2.5cm).  10. RA Pressure is 8mm Hg.  11. RV systolic pressure is moderately increased at  49 mm  Hg.  12. There is mild tricuspid regurgitation.  13. There is mild pulmonic regurgitation.  14. Normal pericardium with no pericardial effusion.

## 2024-05-28 NOTE — PROGRESS NOTE ADULT - PROBLEM SELECTOR PLAN 1
proBNP 73471 on admission  S/p HD on 5/27 with 1.7L removed  Nephro Dr. Pandey following  Still with some crackles on auscultation, repeated cxray today shows improvement in congestion, still some evidence of overload  No obvious infiltrates despite WBC of 15k.  Was on 6L this AM, titrated down to RA, pulseox 94%

## 2024-05-28 NOTE — PROGRESS NOTE ADULT - PROBLEM SELECTOR PLAN 5
Na 128 on admission  likely hypervolemic hyponatremia  rec'd NaCl tabs during last admission  Today is 134, will monitor daily

## 2024-05-28 NOTE — PROGRESS NOTE ADULT - SUBJECTIVE AND OBJECTIVE BOX
Underwent hemodialysis yesterday, reviewed HD flowsheet, appears BP's trended down, 1.7L fluid removed.  After dialysis per review of notes, pt went into -220's with LOC for 10-15 seconds. RRT was called, it had self-terminated and patient was awake by the time RRT team arrived.  No telemetry available to review for VT.    Pt with daughter at bedside.  Pt feels breathing is improved but not at baseline. Mild nonprod cough  Denies pain.    Allergies    shellfish (Anaphylaxis)  ibuprofen (Unknown)  Mushrooms (Anaphylaxis)  aspirin (Unknown)    Intolerances        Vital Signs Last 24 Hrs  T(C): 37.2 (28 May 2024 13:13), Max: 37.2 (28 May 2024 13:13)  T(F): 99 (28 May 2024 13:13), Max: 99 (28 May 2024 13:13)  HR: 89 (28 May 2024 13:13) (80 - 119)  BP: 107/67 (28 May 2024 13:13) (96/59 - 130/71)  BP(mean): 93 (28 May 2024 05:42) (93 - 93)  RR: 18 (28 May 2024 13:13) (15 - 33)  SpO2: 93% (28 May 2024 13:13) (92% - 100%)    Parameters below as of 28 May 2024 13:13  Patient On (Oxygen Delivery Method): room air        MedsMEDICATIONS  (STANDING):  chlorhexidine 2% Cloths 1 Application(s) Topical <User Schedule>  heparin   Injectable 5000 Unit(s) SubCutaneous every 8 hours  insulin glargine Injectable (LANTUS) 3 Unit(s) SubCutaneous at bedtime  insulin lispro (ADMELOG) corrective regimen sliding scale   SubCutaneous three times a day before meals  insulin lispro (ADMELOG) corrective regimen sliding scale   SubCutaneous at bedtime  metoprolol tartrate 25 milliGRAM(s) Oral every 8 hours  mupirocin 2% Ointment 1 Application(s) Both Nostrils two times a day  pantoprazole    Tablet 40 milliGRAM(s) Oral before breakfast  simvastatin 20 milliGRAM(s) Oral at bedtime    MEDICATIONS  (PRN):  acetaminophen     Tablet .. 650 milliGRAM(s) Oral every 6 hours PRN Temp greater or equal to 38C (100.4F), Mild Pain (1 - 3)  aluminum hydroxide/magnesium hydroxide/simethicone Suspension 30 milliLiter(s) Oral every 4 hours PRN Dyspepsia  melatonin 3 milliGRAM(s) Oral at bedtime PRN Insomnia  ondansetron Injectable 4 milliGRAM(s) IV Push every 8 hours PRN Nausea and/or Vomiting      PHYSICAL EXAM:  GENERAL: NAD, well-groomed, well-developed  NEURO: AAOx3, ERIKA b/l, 4/5 b/l UE and 4/5 b/l LE motor strength  LUNG: Lungs with bibasilar crackles L>R, no wheezing or rhonchi.   HEART: S1, S2, no S3 or S4. Regular rate and rhythm. 2/6 holosystolic murmur, no gallops, or rubs. No JVD, LUE AV fistula +bruit, +thrill  ABDOMEN: Bowel sounds present, abd Soft, Nontender, Nondistended  EXTREMITIES:  2+ Peripheral Pulses b/l, No clubbing, cyanosis, or edema  SKIN: No rashes or lesions    Consultant(s) Notes Reviewed:  [x ] YES  [ ] NO  Care Discussed with Consultants/Other Providers [ x] YES  [ ] NO    LABS:                        7.9    15.49 )-----------( 335      ( 28 May 2024 05:21 )             24.6     05-28    134<L>  |  97  |  27<H>  ----------------------------<  228<H>  3.4<L>   |  25  |  3.43<H>    Ca    9.2      28 May 2024 05:21  Phos  3.2     05-28  Mg     2.2     05-28    TPro  6.6  /  Alb  2.8<L>  /  TBili  0.6  /  DBili  x   /  AST  98<H>  /  ALT  73<H>  /  AlkPhos  168<H>  05-27    PT/INR - ( 27 May 2024 10:20 )   PT: 11.5 sec;   INR: 1.01 ratio         PTT - ( 27 May 2024 10:20 )  PTT:32.9 sec    RADIOLOGY & ADDITIONAL TESTS:    Cxray from today:  Central infiltrates likely congestive has improved. There are   persistent lower lung field and pleural findings most significantly in   the left lower lobe.

## 2024-05-29 DIAGNOSIS — D63.8 ANEMIA IN OTHER CHRONIC DISEASES CLASSIFIED ELSEWHERE: ICD-10-CM

## 2024-05-29 DIAGNOSIS — Z75.8 OTHER PROBLEMS RELATED TO MEDICAL FACILITIES AND OTHER HEALTH CARE: ICD-10-CM

## 2024-05-29 DIAGNOSIS — D72.829 ELEVATED WHITE BLOOD CELL COUNT, UNSPECIFIED: ICD-10-CM

## 2024-05-29 DIAGNOSIS — Z86.39 PERSONAL HISTORY OF OTHER ENDOCRINE, NUTRITIONAL AND METABOLIC DISEASE: ICD-10-CM

## 2024-05-29 LAB
ANION GAP SERPL CALC-SCNC: 9 MMOL/L — SIGNIFICANT CHANGE UP (ref 5–17)
BUN SERPL-MCNC: 45 MG/DL — HIGH (ref 7–18)
CALCIUM SERPL-MCNC: 9 MG/DL — SIGNIFICANT CHANGE UP (ref 8.4–10.5)
CHLORIDE SERPL-SCNC: 95 MMOL/L — LOW (ref 96–108)
CO2 SERPL-SCNC: 27 MMOL/L — SIGNIFICANT CHANGE UP (ref 22–31)
CREAT SERPL-MCNC: 4.81 MG/DL — HIGH (ref 0.5–1.3)
EGFR: 8 ML/MIN/1.73M2 — LOW
GLUCOSE BLDC GLUCOMTR-MCNC: 188 MG/DL — HIGH (ref 70–99)
GLUCOSE BLDC GLUCOMTR-MCNC: 198 MG/DL — HIGH (ref 70–99)
GLUCOSE BLDC GLUCOMTR-MCNC: 254 MG/DL — HIGH (ref 70–99)
GLUCOSE BLDC GLUCOMTR-MCNC: 265 MG/DL — HIGH (ref 70–99)
GLUCOSE SERPL-MCNC: 180 MG/DL — HIGH (ref 70–99)
HCT VFR BLD CALC: 24.8 % — LOW (ref 34.5–45)
HGB BLD-MCNC: 7.9 G/DL — LOW (ref 11.5–15.5)
MAGNESIUM SERPL-MCNC: 2.4 MG/DL — SIGNIFICANT CHANGE UP (ref 1.6–2.6)
MCHC RBC-ENTMCNC: 28.5 PG — SIGNIFICANT CHANGE UP (ref 27–34)
MCHC RBC-ENTMCNC: 31.9 GM/DL — LOW (ref 32–36)
MCV RBC AUTO: 89.5 FL — SIGNIFICANT CHANGE UP (ref 80–100)
NRBC # BLD: 0 /100 WBCS — SIGNIFICANT CHANGE UP (ref 0–0)
PHOSPHATE SERPL-MCNC: 4.3 MG/DL — SIGNIFICANT CHANGE UP (ref 2.5–4.5)
PLATELET # BLD AUTO: 288 K/UL — SIGNIFICANT CHANGE UP (ref 150–400)
POTASSIUM SERPL-MCNC: 3.9 MMOL/L — SIGNIFICANT CHANGE UP (ref 3.5–5.3)
POTASSIUM SERPL-SCNC: 3.9 MMOL/L — SIGNIFICANT CHANGE UP (ref 3.5–5.3)
RBC # BLD: 2.77 M/UL — LOW (ref 3.8–5.2)
RBC # FLD: 14.2 % — SIGNIFICANT CHANGE UP (ref 10.3–14.5)
SODIUM SERPL-SCNC: 131 MMOL/L — LOW (ref 135–145)
TROPONIN I, HIGH SENSITIVITY RESULT: 1073.2 NG/L — HIGH
WBC # BLD: 15.8 K/UL — HIGH (ref 3.8–10.5)
WBC # FLD AUTO: 15.8 K/UL — HIGH (ref 3.8–10.5)

## 2024-05-29 PROCEDURE — 71275 CT ANGIOGRAPHY CHEST: CPT | Mod: 26

## 2024-05-29 RX ORDER — ERYTHROPOIETIN 10000 [IU]/ML
10000 INJECTION, SOLUTION INTRAVENOUS; SUBCUTANEOUS
Refills: 0 | Status: DISCONTINUED | OUTPATIENT
Start: 2024-05-29 | End: 2024-05-29

## 2024-05-29 RX ORDER — ERYTHROPOIETIN 10000 [IU]/ML
10000 INJECTION, SOLUTION INTRAVENOUS; SUBCUTANEOUS
Refills: 0 | Status: DISCONTINUED | OUTPATIENT
Start: 2024-05-29 | End: 2024-06-06

## 2024-05-29 RX ADMIN — INSULIN GLARGINE 3 UNIT(S): 100 INJECTION, SOLUTION SUBCUTANEOUS at 21:49

## 2024-05-29 RX ADMIN — MUPIROCIN 1 APPLICATION(S): 20 OINTMENT TOPICAL at 05:30

## 2024-05-29 RX ADMIN — MUPIROCIN 1 APPLICATION(S): 20 OINTMENT TOPICAL at 17:14

## 2024-05-29 RX ADMIN — Medication 3 MILLIGRAM(S): at 01:14

## 2024-05-29 RX ADMIN — Medication 1: at 08:23

## 2024-05-29 RX ADMIN — Medication 3: at 17:14

## 2024-05-29 RX ADMIN — Medication 25 MILLIGRAM(S): at 05:30

## 2024-05-29 RX ADMIN — HEPARIN SODIUM 5000 UNIT(S): 5000 INJECTION INTRAVENOUS; SUBCUTANEOUS at 05:29

## 2024-05-29 RX ADMIN — HEPARIN SODIUM 5000 UNIT(S): 5000 INJECTION INTRAVENOUS; SUBCUTANEOUS at 21:58

## 2024-05-29 RX ADMIN — Medication 25 MILLIGRAM(S): at 21:57

## 2024-05-29 RX ADMIN — PANTOPRAZOLE SODIUM 40 MILLIGRAM(S): 20 TABLET, DELAYED RELEASE ORAL at 05:31

## 2024-05-29 RX ADMIN — SIMVASTATIN 20 MILLIGRAM(S): 20 TABLET, FILM COATED ORAL at 21:58

## 2024-05-29 RX ADMIN — CHLORHEXIDINE GLUCONATE 1 APPLICATION(S): 213 SOLUTION TOPICAL at 05:35

## 2024-05-29 RX ADMIN — ERYTHROPOIETIN 10000 UNIT(S): 10000 INJECTION, SOLUTION INTRAVENOUS; SUBCUTANEOUS at 18:13

## 2024-05-29 NOTE — GOALS OF CARE CONVERSATION - ADVANCED CARE PLANNING - CONVERSATION DETAILS
Spoke to pt and daughter, hospital course, treatment plan/options and current challenges discussed at length, all questions answered.   Also spoke about cardiac and respiratory resuscitative measures including CPR, shock, vasopressors, invasive ventilatory support via intubation.All risks and benefits discussed. All questions answered.   Pt reported that she would want the resuscitative measures including CPR and intubation if necessary   Pt is full code

## 2024-05-29 NOTE — PROGRESS NOTE ADULT - SUBJECTIVE AND OBJECTIVE BOX
PATIENT SEEN AND EXAMINED BY ARIANNA CHAVIRA M.D. ON :- 5/29/24  DATE OF SERVICE:   5/29/24          Interim events noted,Labs ,Radiological studies and Cardiology tests reviewed .    Patient is a 83y old  Female who presents with a chief complaint of SOB 2/2 fluid overload (29 May 2024 15:41)      HPI:  83F PMH DM, HTN, HLD, ESRD on HD M, W, F, presenting to the ED with SOB. Pt was sent from [facility] at [facility], [facility]. She states that she started having SOB and cold sweats suddenly around 3:30AM, BP noted to be 147/102. She also endorsed experiencing chest pressure, non radiating, mid sternum, with a warm flushing sensation thoughtout her body, which lasts for less than a minute and resolves on its own. She was given duonebs with improvement in symptoms, but her symptoms later returned. She received another treatement of duonebs, then EMS was called. While seen in the ED, she stated she continued having the flushing sensation with chest pressure, again lasting for less than 1 minute. She denies any chills, N/V/D, abdominal pain, dysuria, numbness/tingling/weakness in extremities. She has not missed any of her HD sessions, but notes that she has required more fluid to be removed during the last 2 sessions due to worsening swelling of her legs. She is from [facility] and ambulates with a walker (27 May 2024 14:49)      PAST MEDICAL & SURGICAL HISTORY:  Hypertension      Hyperlipidemia      Gout      Cataract      Breast lump      Osteoporosis      Osteoarthritis      Vitamin D deficiency      Seasonal allergies      CKD (chronic kidney disease)      Diabetes mellitus      ESRD on dialysis      H/O bilateral cataract extraction  2012  and 2014      History of breast surgery  Excision of left breast lump - benign  1991      History of knee replacement, total, left  2009      Arteriovenous fistula  left side limb alert          PREVIOUS DIAGNOSTIC TESTING:      ECHO  FINDINGS:    STRESS  FINDINGS:    CATHETERIZATION  FINDINGS:    MEDICATIONS  (STANDING):  chlorhexidine 2% Cloths 1 Application(s) Topical <User Schedule>  epoetin lara (PROCRIT) Injectable 78957 Unit(s) IV Push <User Schedule>  heparin   Injectable 5000 Unit(s) SubCutaneous every 8 hours  insulin glargine Injectable (LANTUS) 3 Unit(s) SubCutaneous at bedtime  insulin lispro (ADMELOG) corrective regimen sliding scale   SubCutaneous at bedtime  insulin lispro (ADMELOG) corrective regimen sliding scale   SubCutaneous three times a day before meals  metoprolol tartrate 25 milliGRAM(s) Oral every 8 hours  mupirocin 2% Ointment 1 Application(s) Both Nostrils two times a day  pantoprazole    Tablet 40 milliGRAM(s) Oral before breakfast  simvastatin 20 milliGRAM(s) Oral at bedtime    MEDICATIONS  (PRN):  acetaminophen     Tablet .. 650 milliGRAM(s) Oral every 6 hours PRN Temp greater or equal to 38C (100.4F), Mild Pain (1 - 3)  aluminum hydroxide/magnesium hydroxide/simethicone Suspension 30 milliLiter(s) Oral every 4 hours PRN Dyspepsia  melatonin 3 milliGRAM(s) Oral at bedtime PRN Insomnia  ondansetron Injectable 4 milliGRAM(s) IV Push every 8 hours PRN Nausea and/or Vomiting      FAMILY HISTORY:  Family history of cancer        SOCIAL HISTORY:    CIGARETTES:    ALCOHOL:    REVIEW OF SYSTEMS:  CONSTITUTIONAL: No fever, weight loss, or fatigue  EYES: No eye pain, visual disturbances, or discharge  ENMT:  No difficulty hearing, tinnitus, vertigo; No sinus or throat pain  NECK: No pain or stiffness  RESPIRATORY: No cough, wheezing, chills or hemoptysis; No shortness of breath  CARDIOVASCULAR: No chest pain, palpitations, dizziness, or leg swelling  GASTROINTESTINAL: No abdominal or epigastric pain. No nausea, vomiting, or hematemesis; No diarrhea or constipation. No melena or hematochezia.  GENITOURINARY: No dysuria, frequency, hematuria, or incontinence  NEUROLOGICAL: No headaches, memory loss, loss of strength, numbness, or tremors  SKIN: No itching, burning, rashes, or lesions   LYMPH NODES: No enlarged glands  ENDOCRINE: No heat or cold intolerance; No hair loss  MUSCULOSKELETAL: No joint pain or swelling; No muscle, back, or extremity pain  PSYCHIATRIC: No depression, anxiety, mood swings, or difficulty sleeping  HEME/LYMPH: No easy bruising, or bleeding gums  ALLERY AND IMMUNOLOGIC: No hives or eczema    Vital Signs Last 24 Hrs  T(C): 37 (29 May 2024 16:32), Max: 37 (29 May 2024 16:32)  T(F): 98.6 (29 May 2024 16:32), Max: 98.6 (29 May 2024 16:32)  HR: 80 (29 May 2024 16:32) (69 - 87)  BP: 104/56 (29 May 2024 16:32) (93/61 - 118/66)  BP(mean): --  RR: 18 (29 May 2024 16:32) (17 - 22)  SpO2: 99% (29 May 2024 16:32) (95% - 100%)    Parameters below as of 29 May 2024 16:32  Patient On (Oxygen Delivery Method): nasal cannula  O2 Flow (L/min): 2        PHYSICAL EXAM:  GENERAL: NAD, well-groomed, well-developed  HEAD:  Atraumatic, Normocephalic  EYES: EOMI, PERRLA, conjunctiva and sclera clear  ENMT: No tonsillar erythema, exudates, or enlargement; Moist mucous membranes, Good dentition, No lesions  NECK: Supple, No JVD, Normal thyroid  NERVOUS SYSTEM:  Alert & Oriented X3, Good concentration; Motor Strength 5/5 B/L upper and lower extremities; DTRs 2+ intact and symmetric  CHEST/LUNG: Clear to percussion bilaterally; No rales, rhonchi, wheezing, or rubs  HEART: Regular rate and rhythm; No murmurs, rubs, or gallops  ABDOMEN: Soft, Nontender, Nondistended; Bowel sounds present  EXTREMITIES:  2+ Peripheral Pulses, No clubbing, cyanosis, or edema  LYMPH: No lymphadenopathy noted  SKIN: No rashes or lesions      INTERPRETATION OF TELEMETRY:    ECG:    [facility]:     LABS:                        7.9    15.80 )-----------( 288      ( 29 May 2024 07:16 )             24.8     05-29    131<L>  |  95<L>  |  45<H>  ----------------------------<  180<H>  3.9   |  27  |  4.81<H>    Ca    9.0      29 May 2024 07:16  Phos  4.3     05-29  Mg     2.4     05-29            Urinalysis Basic - ( 29 May 2024 07:16 )    Color: x / Appearance: x / SG: x / pH: x  Gluc: 180 mg/dL / Ketone: x  / Bili: x / Urobili: x   Blood: x / Protein: x / Nitrite: x   Leuk Esterase: x / RBC: x / WBC x   Sq Epi: x / Non Sq Epi: x / Bacteria: x      Lipid Panel:   I&O's Summary    28 May 2024 07:01  -  29 May 2024 07:00  --------------------------------------------------------  IN: 540 mL / OUT: 0 mL / NET: 540 mL        RADIOLOGY & ADDITIONAL STUDIES:

## 2024-05-29 NOTE — PROGRESS NOTE ADULT - PROBLEM SELECTOR PLAN 5
-likely hypervolemic hyponatremia  -cont HD as per Nephro   -may need salt tabs if no improvement in Na level  -mon BMP -likely demand ischemia from fluid overload  -trended down  -tele monitoring

## 2024-05-29 NOTE — CONSULT NOTE ADULT - NS ATTEND AMEND GEN_ALL_CORE FT
pt seen and examined. Chart reviewed and case d/w ACP on case with agreement of her A/P. . 83y female with ESRD on HD recent admission for pneumonia who followed by Dr. Shelby for homozygous H63D hemochromatosis and was on phlebotomy with last treatment on 3/2024. admitted for sob/weakness/volume overload and was found to have anemia with initial Hb 9s then decreased to 7.9. anemia workup is on going. 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 renal and GI consult is in process. recommend RBC transfusion for <7 and hold phlebotomy. will followup further anemia workup. Pt may be candidate for EPO if not done yet. Further recommendation pending ferritin level and other anemia workup

## 2024-05-29 NOTE — PROGRESS NOTE ADULT - PROBLEM SELECTOR PLAN 7
-not on anti-hypertensives at home   -Metoprolol started on setting of Vtach   -mon BP -likely hypervolemic hyponatremia  -cont HD as per Nephro   -may need salt tabs if no improvement in Na level  -mon BMP

## 2024-05-29 NOTE — ADVANCED PRACTICE NURSE CONSULT - ASSESSMENT
This is a 83yr old female patient admitted for Abnormal Blood Chemistry, presenting with a Stage 1 Pressure Injury to the Coccyx and Bilateral Heels, as evident by non-blanchable erythema

## 2024-05-29 NOTE — PROGRESS NOTE ADULT - PROBLEM SELECTOR PLAN 4
-likely demand ischemia from fluid overload  -trended down  -tele monitoring -episode of VT on 5/27 after HD, no further episodes   -echo with mildly reduced LVEF  -cont Metoprolol  -Cardio Dr. Concepcion

## 2024-05-29 NOTE — PROGRESS NOTE ADULT - PROBLEM SELECTOR PLAN 11
-pt from [facility], likely return back pending improvement in fluid overload   -PT eval when Pulm status more stable -dvt ppx: Heparin

## 2024-05-29 NOTE — PROGRESS NOTE ADULT - PROBLEM SELECTOR PLAN 9
-dvt ppx: Heparin -not on anti-hypertensives at home   -Metoprolol started on setting of Vtach   -mon BP -on repaglinide 1 TID with meals and Toujeo 9U Sunday, Tuesday and Thursday  -cont Lantus 3 units and ISSC  -consistent carbohydrate diet

## 2024-05-29 NOTE — PROGRESS NOTE ADULT - PROBLEM SELECTOR PLAN 2
-on HD MWF  -cont HD as per Nephro   -Nephro Dr. Pandey -on HD MWF  -s/p dialysis today   -plan to dialyze tomorrow within 24hrs of receiving IV contrast for CTA   -cont HD as per Nephro   -Nephro Dr. Pandey

## 2024-05-29 NOTE — PROGRESS NOTE ADULT - SUBJECTIVE AND OBJECTIVE BOX
Patient is a 83y old  Female who presents with a chief complaint of SOB 2/2 fluid overload (28 May 2024 19:43)  Pt seen and examined with daughter at bedside     OVERNIGHT EVENTS: pt reports improvement in sob, pt appears dyspneic and frail      REVIEW OF SYSTEMS:  CONSTITUTIONAL: No fever, chills  ENMT:  No difficulty hearing, no change in vision  NECK: No pain or stiffness  RESPIRATORY: +ve cough, +ve SOB  CARDIOVASCULAR: No chest pain, palpitations  GASTROINTESTINAL: No abdominal pain.   NEUROLOGICAL: No HA  MUSCULOSKELETAL: No joint pain     T(C): 36.2 (05-29-24 @ 10:50), Max: 36.7 (05-28-24 @ 16:11)  HR: 75 (05-29-24 @ 10:50) (69 - 87)  BP: 93/61 (05-29-24 @ 10:50) (93/61 - 110/70)  RR: 19 (05-29-24 @ 10:50) (18 - 22)  SpO2: 100% (05-29-24 @ 10:50) (94% - 100%)  Wt(kg): --Vital Signs Last 24 Hrs  T(C): 36.2 (29 May 2024 10:50), Max: 36.7 (28 May 2024 16:11)  T(F): 97.2 (29 May 2024 10:50), Max: 98.1 (28 May 2024 16:11)  HR: 75 (29 May 2024 10:50) (69 - 87)  BP: 93/61 (29 May 2024 10:50) (93/61 - 110/70)  BP(mean): --  RR: 19 (29 May 2024 10:50) (18 - 22)  SpO2: 100% (29 May 2024 10:50) (94% - 100%)    Parameters below as of 29 May 2024 10:50  Patient On (Oxygen Delivery Method): nasal cannula  O2 Flow (L/min): 2    MEDICATIONS  (STANDING):  chlorhexidine 2% Cloths 1 Application(s) Topical <User Schedule>  heparin   Injectable 5000 Unit(s) SubCutaneous every 8 hours  insulin glargine Injectable (LANTUS) 3 Unit(s) SubCutaneous at bedtime  insulin lispro (ADMELOG) corrective regimen sliding scale   SubCutaneous three times a day before meals  insulin lispro (ADMELOG) corrective regimen sliding scale   SubCutaneous at bedtime  metoprolol tartrate 25 milliGRAM(s) Oral every 8 hours  mupirocin 2% Ointment 1 Application(s) Both Nostrils two times a day  pantoprazole    Tablet 40 milliGRAM(s) Oral before breakfast  simvastatin 20 milliGRAM(s) Oral at bedtime    MEDICATIONS  (PRN):  acetaminophen     Tablet .. 650 milliGRAM(s) Oral every 6 hours PRN Temp greater or equal to 38C (100.4F), Mild Pain (1 - 3)  aluminum hydroxide/magnesium hydroxide/simethicone Suspension 30 milliLiter(s) Oral every 4 hours PRN Dyspepsia  melatonin 3 milliGRAM(s) Oral at bedtime PRN Insomnia  ondansetron Injectable 4 milliGRAM(s) IV Push every 8 hours PRN Nausea and/or Vomiting    PHYSICAL EXAM:  GENERAL: frail, cachectic   EYES: clear conjunctiva  ENMT: Moist mucous membranes  NECK: Supple, No JVD  CHEST/LUNG: Clear to diminished  bilaterally; No rales, rhonchi, wheezing, or rubs  HEART: S1, S2, Regular rate and rhythm  ABDOMEN: Soft, Nontender, Nondistended; Bowel sounds present  NEURO: Alert & Oriented X3  EXTREMITIES: +1 b/l LE edema, no calf tenderness, LUE AV fistula +bruit, +thrill  SKIN:  Stage 1 Pressure Injury to the Coccyx and Bilateral Heels    Consultant(s) Notes Reviewed:  [x ] YES  [ ] NO  Care Discussed with Consultants/Other Providers [ x] YES  [ ] NO    LABS:                        7.9    15.80 )-----------( 288      ( 29 May 2024 07:16 )             24.8     05-29    131<L>  |  95<L>  |  45<H>  ----------------------------<  180<H>  3.9   |  27  |  4.81<H>    Ca    9.0      29 May 2024 07:16  Phos  4.3     05-29  Mg     2.4     05-29    CAPILLARY BLOOD GLUCOSE  POCT Blood Glucose.: 265 mg/dL (29 May 2024 10:52)  POCT Blood Glucose.: 188 mg/dL (29 May 2024 07:54)  POCT Blood Glucose.: 201 mg/dL (28 May 2024 21:10)  POCT Blood Glucose.: 212 mg/dL (28 May 2024 18:59)  POCT Blood Glucose.: 291 mg/dL (28 May 2024 17:46)  POCT Blood Glucose.: 216 mg/dL (28 May 2024 16:33)  POCT Blood Glucose.: 285 mg/dL (28 May 2024 13:23)    Urinalysis Basic - ( 29 May 2024 07:16 )  Color: x / Appearance: x / SG: x / pH: x  Gluc: 180 mg/dL / Ketone: x  / Bili: x / Urobili: x   Blood: x / Protein: x / Nitrite: x   Leuk Esterase: x / RBC: x / WBC x   Sq Epi: x / Non Sq Epi: x / Bacteria: x    RADIOLOGY & ADDITIONAL TESTS:  < from: Xray Chest 1 View- PORTABLE-Urgent (Xray Chest 1 View- PORTABLE-Urgent .) (05.28.24 @ 10:47) >    ACC: [id number] EXAM:  XR CHEST PORTABLE URGENT 1V   ORDERED BY: MIRTA MEYERS     PROCEDURE DATE:  05/28/2024          INTERPRETATION:  AP erect chest on May 28, 2024 at 10:25 AM. After   dialysis patient had an episode of V. Tach and now has elevated white   count.    Heart magnified by technique.    Infiltrate around the right hilum again noted.    Retrocardiac density consistent with left lower lobe atelectasis and   associated effusion is relatively stable.    Mid upper lung field infiltrates likely congestive on May 27 show some   improvement.    There is slightly increased blunting right base laterally on present film.    IMPRESSION: Central infiltrates likely congestive has improved. There are   persistent lower lung field and pleural findings most significantly in   the left lower lobe.    < end of copied text >

## 2024-05-29 NOTE — PROGRESS NOTE ADULT - SUBJECTIVE AND OBJECTIVE BOX
[facility] : Progress Note :: [phone], (office [phone]),   Dr Pandey / Dr Mi / Dr Massey / Dr Etienne / Dr Cassandra DA SILVA / Dr Burt / Dr Garber / Dr Naeem lam  ____________________________________________________________________________________________  noted for CTA chest in light of persistent hypoxia.    shellfish (Anaphylaxis)  ibuprofen (Unknown)  latex (Unknown)  natural rubber (Unknown)  Mushrooms (Anaphylaxis)  aspirin (Unknown)    Hospital Medications:   MEDICATIONS  (STANDING):  chlorhexidine 2% Cloths 1 Application(s) Topical <User Schedule>  epoetin lara (PROCRIT) Injectable 10682 Unit(s) IV Push <User Schedule>  heparin   Injectable 5000 Unit(s) SubCutaneous every 8 hours  insulin glargine Injectable (LANTUS) 3 Unit(s) SubCutaneous at bedtime  insulin lispro (ADMELOG) corrective regimen sliding scale   SubCutaneous at bedtime  insulin lispro (ADMELOG) corrective regimen sliding scale   SubCutaneous three times a day before meals  metoprolol tartrate 25 milliGRAM(s) Oral every 8 hours  mupirocin 2% Ointment 1 Application(s) Both Nostrils two times a day  pantoprazole    Tablet 40 milliGRAM(s) Oral before breakfast  simvastatin 20 milliGRAM(s) Oral at bedtime        VITALS:  T(F): 98.1 (05-29-24 @ 14:37), Max: 98.1 (05-28-24 @ 16:11)  HR: 80 (05-29-24 @ 14:37)  BP: 118/66 (05-29-24 @ 14:37)  RR: 19 (05-29-24 @ 14:37)  SpO2: 98% (05-29-24 @ 14:37)  Wt(kg): --    05-28 @ 07:01  -  05-29 @ 07:00  --------------------------------------------------------  IN: 540 mL / OUT: 0 mL / NET: 540 mL        PHYSICAL EXAM:  Constitutional: NAD  HEENT: anicteric sclera, oropharynx clear,  Neck: No JVD  Respiratory: CTAB, no wheezes, rales or rhonchi  Cardiovascular: S1, S2, RRR  Gastrointestinal: BS+, soft, NT/ND  Extremities: No peripheral edema  Neurological: A/O x 3, no focal deficits  Vascular Access: AVF with thrill and bruit    LABS:  05-29    131<L>  |  95<L>  |  45<H>  ----------------------------<  180<H>  3.9   |  27  |  4.81<H>    Ca    9.0      29 May 2024 07:16  Phos  4.3     05-29  Mg     2.4     05-29      Creatinine Trend: 4.81 <--, 3.43 <--, 2.97 <--, 5.46 <--                        7.9    15.80 )-----------( 288      ( 29 May 2024 07:16 )             24.8     Urine Studies:  Urinalysis Basic - ( 29 May 2024 07:16 )    Color:  / Appearance:  / SG:  / pH:   Gluc: 180 mg/dL / Ketone:   / Bili:  / Urobili:    Blood:  / Protein:  / Nitrite:    Leuk Esterase:  / RBC:  / WBC    Sq Epi:  / Non Sq Epi:  / Bacteria:         RADIOLOGY & ADDITIONAL STUDIES:

## 2024-05-29 NOTE — CONSULT NOTE ADULT - SUBJECTIVE AND OBJECTIVE BOX
Reason for Admission: SOB 2/2 fluid overload  History of Present Illness:   83F PMH DM, HTN, HLD, ESRD on HD M, W, F, presenting to the ED with SOB. Pt was sent from [facility] at [facility], recently admitted to [facility] for PNA. She states that she started having SOB and cold sweats suddenly around 3:30AM, BP noted to be 147/102. She also endorsed experiencing chest pressure, non radiating, mid sternum, with a warm flushing sensation thoughtout her body, which lasts for less than a minute and resolves on its own. She was given duonebs with improvement in symptoms, but her symptoms later returned. She received another treatement of duonebs, then EMS was called. While seen in the ED, she stated she continued having the flushing sensation with chest pressure, again lasting for less than 1 minute. She denies any chills, N/V/D, abdominal pain, dysuria, numbness/tingling/weakness in extremities. She has not missed any of her HD sessions, but notes that she has required more fluid to be removed during the last 2 sessions due to worsening swelling of her legs. She is from [facility] and ambulates with a walker            Allergies and Intolerances:        Allergies:  	shellfish: Food, Anaphylaxis  	Mushrooms: Food, Anaphylaxis  	ibuprofen: Drug, Unknown, Palpitation  	aspirin: Drug, Unknown, palpitation    Home Medications:   * Patient Currently Takes Medications as of 14-May-2024 13:11 documented in Structured Notes  · 	aluminum hydroxide-magnesium hydroxide 200 mg-200 mg/5 mL oral suspension: 30 milliliter(s) orally every 4 hours As needed Dyspepsia  · 	acetaminophen 325 mg oral tablet: 2 tab(s) orally every 6 hours As needed Temp greater or equal to 38C (100.4F), Mild Pain (1 - 3)  · 	Zenpep 40,000 units-136,000 units-218,000 units oral delayed release capsule: 1 cap(s) orally 4 times a day  · 	repaglinide 1 mg oral tablet: 1 tab(s) orally 3 times a day (with meals)  · 	pantoprazole 40 mg oral delayed release tablet: 1 tab(s) orally once a day  · 	Toujeo SoloStar 300 units/mL subcutaneous solution: 9 unit(s) subcutaneous once a day 9u Sunday, Tuesday, Thursday  	  	11u Mon, Wed, Fri, Sat  · 	ezetimibe-simvastatin 10 mg-20 mg oral tablet: 1 tab(s) orally once a day  · 	Prolia 60 mg/mL subcutaneous solution: 60 milligram(s) subcutaneously every 6 months    Patient History:    Social History:  · Substance use	No     Tobacco Screening:  · Core Measure Site	Yes  · Has the patient used tobacco in the past 30 days?	No      MEDICATIONS  (STANDING):  chlorhexidine 2% Cloths 1 Application(s) Topical <User Schedule>  epoetin lara (PROCRIT) Injectable 44377 Unit(s) IV Push <User Schedule>  heparin   Injectable 5000 Unit(s) SubCutaneous every 8 hours  insulin glargine Injectable (LANTUS) 3 Unit(s) SubCutaneous at bedtime  insulin lispro (ADMELOG) corrective regimen sliding scale   SubCutaneous at bedtime  insulin lispro (ADMELOG) corrective regimen sliding scale   SubCutaneous three times a day before meals  metoprolol tartrate 25 milliGRAM(s) Oral every 8 hours  mupirocin 2% Ointment 1 Application(s) Both Nostrils two times a day  pantoprazole    Tablet 40 milliGRAM(s) Oral before breakfast  simvastatin 20 milliGRAM(s) Oral at bedtime    MEDICATIONS  (PRN):  acetaminophen     Tablet .. 650 milliGRAM(s) Oral every 6 hours PRN Temp greater or equal to 38C (100.4F), Mild Pain (1 - 3)  aluminum hydroxide/magnesium hydroxide/simethicone Suspension 30 milliLiter(s) Oral every 4 hours PRN Dyspepsia  melatonin 3 milliGRAM(s) Oral at bedtime PRN Insomnia  ondansetron Injectable 4 milliGRAM(s) IV Push every 8 hours PRN Nausea and/or Vomiting    CAPILLARY BLOOD GLUCOSE      POCT Blood Glucose.: 265 mg/dL (29 May 2024 10:52)  POCT Blood Glucose.: 188 mg/dL (29 May 2024 07:54)  POCT Blood Glucose.: 201 mg/dL (28 May 2024 21:10)  POCT Blood Glucose.: 212 mg/dL (28 May 2024 18:59)  POCT Blood Glucose.: 291 mg/dL (28 May 2024 17:46)  POCT Blood Glucose.: 216 mg/dL (28 May 2024 16:33)    I&O's Summary    28 May 2024 07:01  -  29 May 2024 07:00  --------------------------------------------------------  IN: 540 mL / OUT: 0 mL / NET: 540 mL        PHYSICAL EXAM:  Vital Signs Last 24 Hrs  T(C): 36.7 (29 May 2024 14:37), Max: 36.7 (28 May 2024 16:11)  T(F): 98.1 (29 May 2024 14:37), Max: 98.1 (28 May 2024 16:11)  HR: 80 (29 May 2024 14:37) (69 - 87)  BP: 118/66 (29 May 2024 14:37) (93/61 - 118/66)  BP(mean): --  RR: 19 (29 May 2024 14:37) (18 - 22)  SpO2: 98% (29 May 2024 14:37) (94% - 100%)    Parameters below as of 29 May 2024 14:37  Patient On (Oxygen Delivery Method): nasal cannula  O2 Flow (L/min): 2      LABS:                        7.9    15.80 )-----------( 288      ( 29 May 2024 07:16 )             24.8     05-29    131<L>  |  95<L>  |  45<H>  ----------------------------<  180<H>  3.9   |  27  |  4.81<H>    Ca    9.0      29 May 2024 07:16  Phos  4.3     05-29  Mg     2.4     05-29            Urinalysis Basic - ( 29 May 2024 07:16 )    Color: x / Appearance: x / SG: x / pH: x  Gluc: 180 mg/dL / Ketone: x  / Bili: x / Urobili: x   Blood: x / Protein: x / Nitrite: x   Leuk Esterase: x / RBC: x / WBC x   Sq Epi: x / Non Sq Epi: x / Bacteria: x        Culture - Blood (collected 27 May 2024 10:25)  Source: .Blood Blood-Peripheral  Preliminary Report (29 May 2024 13:02):    No growth at 48 Hours    Culture - Blood (collected 27 May 2024 10:20)  Source: .Blood Blood-Peripheral  Preliminary Report (29 May 2024 13:02):    No growth at 48 Hours          RADIOLOGY & ADDITIONAL TESTS:  < from: Xray Chest 1 View- PORTABLE-Urgent (Xray Chest 1 View- PORTABLE-Urgent .) (05.28.24 @ 10:47) >    ACC: [id number] EXAM:  XR CHEST PORTABLE URGENT 1V   ORDERED BY: MIRTA MEYERS     PROCEDURE DATE:  05/28/2024          INTERPRETATION:  AP erect chest on May 28, 2024 at 10:25 AM. After   dialysis patient had an episode of V. Tach and now has elevated white   count.    Heart magnified by technique.    Infiltrate around the right hilum again noted.    Retrocardiac density consistent with left lower lobe atelectasis and   associated effusion is relatively stable.    Mid upper lung field infiltrates likely congestive on May 27 show some   improvement.    There is slightly increased blunting right base laterally on present film.    IMPRESSION: Central infiltrates likely congestive has improved. There are   persistent lower lung field and pleural findings most significantly in   the left lower lobe.    < end of copied text >  < from: Xray Chest 1 View- PORTABLE-Urgent (Xray Chest 1 View- PORTABLE-Urgent .) (05.27.24 @ 09:53) >    IMPRESSION: Bilateral airspace disease as above. Pneumonia not excluded    < end of copied text >       Reason for Admission: SOB 2/2 fluid overload  History of Present Illness:   83F PMH DM, HTN, HLD, ESRD on HD M, W, F, presenting to the ED with SOB. Pt was sent from [facility] at [facility], recently admitted to [facility] for PNA. She states that she started having SOB and cold sweats suddenly around 3:30AM, BP noted to be 147/102. She also endorsed experiencing chest pressure, non radiating, mid sternum, with a warm flushing sensation thoughtout her body, which lasts for less than a minute and resolves on its own. She was given duonebs with improvement in symptoms, but her symptoms later returned. She received another treatement of duonebs, then EMS was called. While seen in the ED, she stated she continued having the flushing sensation with chest pressure, again lasting for less than 1 minute. She denies any chills, N/V/D, abdominal pain, dysuria, numbness/tingling/weakness in extremities. She has not missed any of her HD sessions, but notes that she has required more fluid to be removed during the last 2 sessions due to worsening swelling of her legs. She is from [facility] and ambulates with a walker      Allergies and Intolerances:        Allergies:  	shellfish: Food, Anaphylaxis  	Mushrooms: Food, Anaphylaxis  	ibuprofen: Drug, Unknown, Palpitation  	aspirin: Drug, Unknown, palpitation    Home Medications:   * Patient Currently Takes Medications as of 14-May-2024 13:11 documented in Structured Notes  · 	aluminum hydroxide-magnesium hydroxide 200 mg-200 mg/5 mL oral suspension: 30 milliliter(s) orally every 4 hours As needed Dyspepsia  · 	acetaminophen 325 mg oral tablet: 2 tab(s) orally every 6 hours As needed Temp greater or equal to 38C (100.4F), Mild Pain (1 - 3)  · 	Zenpep 40,000 units-136,000 units-218,000 units oral delayed release capsule: 1 cap(s) orally 4 times a day  · 	repaglinide 1 mg oral tablet: 1 tab(s) orally 3 times a day (with meals)  · 	pantoprazole 40 mg oral delayed release tablet: 1 tab(s) orally once a day  · 	Toujeo SoloStar 300 units/mL subcutaneous solution: 9 unit(s) subcutaneous once a day 9u Sunday, Tuesday, Thursday  	  	11u Mon, Wed, Fri, Sat  · 	ezetimibe-simvastatin 10 mg-20 mg oral tablet: 1 tab(s) orally once a day  · 	Prolia 60 mg/mL subcutaneous solution: 60 milligram(s) subcutaneously every 6 months    Patient History:    Social History:  · Substance use	No     Tobacco Screening:  · Core Measure Site	Yes  · Has the patient used tobacco in the past 30 days?	No      MEDICATIONS  (STANDING):  chlorhexidine 2% Cloths 1 Application(s) Topical <User Schedule>  epoetin lara (PROCRIT) Injectable 24208 Unit(s) IV Push <User Schedule>  heparin   Injectable 5000 Unit(s) SubCutaneous every 8 hours  insulin glargine Injectable (LANTUS) 3 Unit(s) SubCutaneous at bedtime  insulin lispro (ADMELOG) corrective regimen sliding scale   SubCutaneous at bedtime  insulin lispro (ADMELOG) corrective regimen sliding scale   SubCutaneous three times a day before meals  metoprolol tartrate 25 milliGRAM(s) Oral every 8 hours  mupirocin 2% Ointment 1 Application(s) Both Nostrils two times a day  pantoprazole    Tablet 40 milliGRAM(s) Oral before breakfast  simvastatin 20 milliGRAM(s) Oral at bedtime    MEDICATIONS  (PRN):  acetaminophen     Tablet .. 650 milliGRAM(s) Oral every 6 hours PRN Temp greater or equal to 38C (100.4F), Mild Pain (1 - 3)  aluminum hydroxide/magnesium hydroxide/simethicone Suspension 30 milliLiter(s) Oral every 4 hours PRN Dyspepsia  melatonin 3 milliGRAM(s) Oral at bedtime PRN Insomnia  ondansetron Injectable 4 milliGRAM(s) IV Push every 8 hours PRN Nausea and/or Vomiting    CAPILLARY BLOOD GLUCOSE      POCT Blood Glucose.: 265 mg/dL (29 May 2024 10:52)  POCT Blood Glucose.: 188 mg/dL (29 May 2024 07:54)  POCT Blood Glucose.: 201 mg/dL (28 May 2024 21:10)  POCT Blood Glucose.: 212 mg/dL (28 May 2024 18:59)  POCT Blood Glucose.: 291 mg/dL (28 May 2024 17:46)  POCT Blood Glucose.: 216 mg/dL (28 May 2024 16:33)    I&O's Summary    28 May 2024 07:01  -  29 May 2024 07:00  --------------------------------------------------------  IN: 540 mL / OUT: 0 mL / NET: 540 mL        PHYSICAL EXAM:  Vital Signs Last 24 Hrs  T(C): 36.7 (29 May 2024 14:37), Max: 36.7 (28 May 2024 16:11)  T(F): 98.1 (29 May 2024 14:37), Max: 98.1 (28 May 2024 16:11)  HR: 80 (29 May 2024 14:37) (69 - 87)  BP: 118/66 (29 May 2024 14:37) (93/61 - 118/66)  BP(mean): --  RR: 19 (29 May 2024 14:37) (18 - 22)  SpO2: 98% (29 May 2024 14:37) (94% - 100%)    Parameters below as of 29 May 2024 14:37  Patient On (Oxygen Delivery Method): nasal cannula  O2 Flow (L/min): 2    GEN: NAD; A and O x 3, thin  LUNGS: decreased BS LLL, NC O2 in place  HEART: S1 S2  ABDOMEN: soft, non-tender, non-distended, + BS  EXTREMITIES: no edema      LABS:                        7.9    15.80 )-----------( 288      ( 29 May 2024 07:16 )             24.8     05-29    131<L>  |  95<L>  |  45<H>  ----------------------------<  180<H>  3.9   |  27  |  4.81<H>    Ca    9.0      29 May 2024 07:16  Phos  4.3     05-29  Mg     2.4     05-29            Urinalysis Basic - ( 29 May 2024 07:16 )    Color: x / Appearance: x / SG: x / pH: x  Gluc: 180 mg/dL / Ketone: x  / Bili: x / Urobili: x   Blood: x / Protein: x / Nitrite: x   Leuk Esterase: x / RBC: x / WBC x   Sq Epi: x / Non Sq Epi: x / Bacteria: x        Culture - Blood (collected 27 May 2024 10:25)  Source: .Blood Blood-Peripheral  Preliminary Report (29 May 2024 13:02):    No growth at 48 Hours    Culture - Blood (collected 27 May 2024 10:20)  Source: .Blood Blood-Peripheral  Preliminary Report (29 May 2024 13:02):    No growth at 48 Hours          RADIOLOGY & ADDITIONAL TESTS:  < from: Xray Chest 1 View- PORTABLE-Urgent (Xray Chest 1 View- PORTABLE-Urgent .) (05.28.24 @ 10:47) >    ACC: [id number] EXAM:  XR CHEST PORTABLE URGENT 1V   ORDERED BY: MIRTA MEYERS     PROCEDURE DATE:  05/28/2024          INTERPRETATION:  AP erect chest on May 28, 2024 at 10:25 AM. After   dialysis patient had an episode of V. Tach and now has elevated white   count.    Heart magnified by technique.    Infiltrate around the right hilum again noted.    Retrocardiac density consistent with left lower lobe atelectasis and   associated effusion is relatively stable.    Mid upper lung field infiltrates likely congestive on May 27 show some   improvement.    There is slightly increased blunting right base laterally on present film.    IMPRESSION: Central infiltrates likely congestive has improved. There are   persistent lower lung field and pleural findings most significantly in   the left lower lobe.    < end of copied text >  < from: Xray Chest 1 View- PORTABLE-Urgent (Xray Chest 1 View- PORTABLE-Urgent .) (05.27.24 @ 09:53) >    IMPRESSION: Bilateral airspace disease as above. Pneumonia not excluded    < end of copied text >

## 2024-05-29 NOTE — CONSULT NOTE ADULT - ASSESSMENT
83F PMH DM, HTN, HLD, ESRD on HD M, W, F, presenting to the ED with SOB. Pt was sent from [facility] at [facility], recently admitted to [facility] for PNA. She states that she started having SOB and cold sweats suddenly around 3:30AM, BP noted to be 147/102. She also endorsed experiencing chest pressure, non radiating, mid sternum, with a warm flushing sensation thoughtout her body, which lasts for less than a minute and resolves on its own. She was given duonebs with improvement in symptoms, but her symptoms later returned. She received another treatement of duonebs, then EMS was called. While seen in the ED, she stated she continued having the flushing sensation with chest pressure, again lasting for less than 1 minute. She denies any chills, N/V/D, abdominal pain, dysuria, numbness/tingling/weakness in extremities. She has not missed any of her HD sessions, but notes that she has required more fluid to be removed during the last 2 sessions due to worsening swelling of her legs. She is from [facility] and ambulates with a walker    #    #VTE Prophylaxis    Thank you for the referral. Will continue to monitor the patient.  Please call with any questions [phone]  Above reviewed with Attending Dr. Fairchild  [facility] at [place]  [address], [address], [address]th [place] [address] [phone]  *Note not finalized until signed by Attending Physician     83F PMH DM, HTN, HLD, ESRD on HD M, W, F, presenting to the ED with SOB. Pt was sent from [facility] [place] [facility], [facility]. She states that she started having SOB and cold sweats suddenly around 3:30AM, BP noted to be 147/102. She also endorsed experiencing chest pressure, non radiating, mid sternum, with a warm flushing sensation thoughtout her body, which lasts for less than a minute and resolves on its own. She was given duonebs with improvement in symptoms, but her symptoms later returned. She received another treatement of duonebs, then EMS was called. While seen in the ED, she stated she continued having the flushing sensation with chest pressure, again lasting for less than 1 minute. She denies any chills, N/V/D, abdominal pain, dysuria, numbness/tingling/weakness in extremities. She has not missed any of her HD sessions, but notes that she has required more fluid to be removed during the last 2 sessions due to worsening swelling of her legs. She is from [facility] and ambulates with a walker    #Hemochromatosis, homozygous for H63D  #Normocytic Anemia  pt follows with our colleague Dr. 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 Will continue to monitor the patient.  Please call with any questions [phone]  Above reviewed with Attending Dr. Fairchild  [facility] at [address], [address], [address]th [place] [address]  [phone]  *Note not finalized until signed by Attending Physician

## 2024-05-29 NOTE — PROGRESS NOTE ADULT - ASSESSMENT
# ESRD admitted with SOB- pulmonary edema improvement with HD and ultrafiltration  HD today.  noted, for possible CTA chest. Will consider HD in AM   # Hyponatremia- sec to fluid overload. UF on HD fluid restriction.  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

## 2024-05-29 NOTE — PROGRESS NOTE ADULT - PROBLEM SELECTOR PLAN 12
-pt from [facility], likely return back pending improvement in fluid overload   -PT eval when Pulm status more stable -pt from [facility], likely return back pending improvement in fluid overload and further work up   -PT eval when Pulm status more stable

## 2024-05-29 NOTE — PROGRESS NOTE ADULT - PROBLEM SELECTOR PLAN 6
-on repaglinide 1 TID with meals and Toujeo 9U Sunday, Tuesday and Thursday  -cont Lantus 3 units and ISSC  -consistent carbohydrate diet -hx of hemochromatosis, follow with Dr. Shelby   -A consulted -hx of hemochromatosis, follow with Dr. Shelby   -hx of phlebotomies   -f/u iron studies   -QMA consulted

## 2024-05-29 NOTE — PROGRESS NOTE ADULT - PROBLEM SELECTOR PLAN 1
proBNP 00366 on admission  S/p HD on 5/27 with 1.7L removed  Nephro Dr. Pandey following  Still with some crackles on auscultation, repeated cxray today shows improvement in congestion, still some evidence of overload  No obvious infiltrates despite WBC of 15k.  Was on 6L this AM, titrated down to RA, pulseox 94%

## 2024-05-29 NOTE — PROGRESS NOTE ADULT - PROBLEM SELECTOR PLAN 1
-proBNP 91963 on admission  S/p HD on 5/27 with 1.7L removed  Nephro Dr. Pandey following  Still with some crackles on auscultation, repeated cxray today shows improvement in congestion, still some evidence of overload  No obvious infiltrates despite WBC of 15k.  Was on 6L this AM, titrated down to RA, pulseox 94% -proBNP 16759 on admission s/p HD  -remains with dyspnea  -CXR with improving congestion   -cont HD as per Nephro   -Nephro Dr. Pandey following  -mon I&Os -proBNP 29346 on admission s/p HD  -remains with dyspnea  -CXR with improving congestion   -cont HD as per Nephro   -Nephro Dr. Pandey following  -mon I&Os  -daily weights -proBNP 08652 on admission s/p HD  -remains with dyspnea  -given dyspnea and hypoxia, evaluate for PE   -f/u CTA, plan to dialyze within 24hrs of contrast- d/w Nephro, pt for dialysis tomorrow   -CXR with improving congestion   -cont HD as per Nephro   -Nephro Dr. Pandey following  -mon I&Os  -daily weights

## 2024-05-29 NOTE — PROGRESS NOTE ADULT - PROBLEM SELECTOR PLAN 3
Occurred after HD. No recurrence of NSVT/VT on telemetry overnight.  Started on metoprolol tartrate 25mg q8hr  Keep Mag >2  Prelim echo with mildly reduced LVEF  If recurs, can consider amio load -leukocytosis, afebrile ? due to steroids on 5/24  -mon off abx for now  -mon for sign or symptoms of infection -leukocytosis, afebrile ? due to steroids on 5/27  -mon off abx for now  -mon for sign or symptoms of infection

## 2024-05-29 NOTE — PROGRESS NOTE ADULT - PROBLEM SELECTOR PLAN 8
-cont Statin -on repaglinide 1 TID with meals and Toujeo 9U Sunday, Tuesday and Thursday  -cont Lantus 3 units and ISSC  -consistent carbohydrate diet -likely anemia of CKD   -no acute sign or symptoms of bleeding   -mon CBC   -keep type and screen active

## 2024-05-29 NOTE — PROGRESS NOTE ADULT - ASSESSMENT
CONCLUSIONS:      1. Left ventricular cavity is normal in size. Left ventricular systolic function is mildly decreased with an ejection fraction of 48 % by Lee's method of disks. Global left ventricular hypokinesis.   2. There is mild (grade 1)left ventricular diastolic dysfunction.   3. Normal right ventricular cavity size, with normal wall thickness, and normal systolic function. Tricuspid annular plane systolic excursion (TAPSE) is 2.1 cm (normal >=1.7 cm). Tricuspid annular tissue Doppler S' is 10.0 cm/s (normal >10 cm/s).   4. Mild mitral regurgitation.   5. Normal left and right atrial size.   6. Mild tricuspid regurgitation.   7. Estimated pulmonary artery systolic pressure is 43 mmHg, consistent with mild pulmonary hypertension.   8. Mild aortic regurgitation.   9. Mild left ventricular hypertrophy.  10. There is calcification of the mitral valve annulus.  11. There is increased LV mass and concentric hypertrophy.  12. Trace pulmonic regurgitation.  13. Trileaflet aortic valve with reduced systolic excursion. There is calcification of the aortic valve leaflets. Mild aortic stenosis.  14. Bilateral pleural effusion noted.  15. No pericardial effusion seen.  16. No prior echocardiogram is available for comparison.

## 2024-05-29 NOTE — PROGRESS NOTE ADULT - PROBLEM SELECTOR PLAN 10
-dvt ppx: Heparin -not on anti-hypertensives at home   -Metoprolol started on setting of Vtach   -mon BP

## 2024-05-29 NOTE — PROGRESS NOTE ADULT - ASSESSMENT
83F  from [facility], PM DM, HTN, HLD, hemochromatosis, ESRD on HD M, W, F, presenting to the ED with SOB admitted for pulmonary congestion 2/2 fluid overload. Admitted for  fluid overload, started on dialysis   Pt with episode of VT arrest on 5/27 and had an RRT, VT episode was self limiting and did not require any interventions   Nephro Dr. Pandey following, HD was continued.   Pt has hx of hemochromatosis follows with Dr. Shelby, A group consulted.

## 2024-05-30 LAB
ALBUMIN SERPL ELPH-MCNC: 2.4 G/DL — LOW (ref 3.5–5)
ALP SERPL-CCNC: 130 U/L — HIGH (ref 40–120)
ALT FLD-CCNC: 36 U/L DA — SIGNIFICANT CHANGE UP (ref 10–60)
ANION GAP SERPL CALC-SCNC: 8 MMOL/L — SIGNIFICANT CHANGE UP (ref 5–17)
AST SERPL-CCNC: 31 U/L — SIGNIFICANT CHANGE UP (ref 10–40)
BILIRUB SERPL-MCNC: 0.4 MG/DL — SIGNIFICANT CHANGE UP (ref 0.2–1.2)
BLD GP AB SCN SERPL QL: SIGNIFICANT CHANGE UP
BUN SERPL-MCNC: 23 MG/DL — HIGH (ref 7–18)
CALCIUM SERPL-MCNC: 8.6 MG/DL — SIGNIFICANT CHANGE UP (ref 8.4–10.5)
CHLORIDE SERPL-SCNC: 98 MMOL/L — SIGNIFICANT CHANGE UP (ref 96–108)
CO2 SERPL-SCNC: 29 MMOL/L — SIGNIFICANT CHANGE UP (ref 22–31)
CREAT SERPL-MCNC: 3.35 MG/DL — HIGH (ref 0.5–1.3)
EGFR: 13 ML/MIN/1.73M2 — LOW
FERRITIN SERPL-MCNC: 1178 NG/ML — HIGH (ref 13–330)
FOLATE SERPL-MCNC: 2.3 NG/ML — LOW
GLUCOSE BLDC GLUCOMTR-MCNC: 104 MG/DL — HIGH (ref 70–99)
GLUCOSE BLDC GLUCOMTR-MCNC: 143 MG/DL — HIGH (ref 70–99)
GLUCOSE BLDC GLUCOMTR-MCNC: 171 MG/DL — HIGH (ref 70–99)
GLUCOSE BLDC GLUCOMTR-MCNC: 268 MG/DL — HIGH (ref 70–99)
GLUCOSE SERPL-MCNC: 114 MG/DL — HIGH (ref 70–99)
HAPTOGLOB SERPL-MCNC: 233 MG/DL — HIGH (ref 34–200)
HCT VFR BLD CALC: 22.5 % — LOW (ref 34.5–45)
HCT VFR BLD CALC: 26.2 % — LOW (ref 34.5–45)
HGB BLD-MCNC: 7 G/DL — CRITICAL LOW (ref 11.5–15.5)
HGB BLD-MCNC: 8.1 G/DL — LOW (ref 11.5–15.5)
IRON SATN MFR SERPL: 18 % — SIGNIFICANT CHANGE UP (ref 15–50)
IRON SATN MFR SERPL: 25 UG/DL — LOW (ref 40–150)
LDH SERPL L TO P-CCNC: 224 U/L — SIGNIFICANT CHANGE UP (ref 120–225)
MCHC RBC-ENTMCNC: 28.6 PG — SIGNIFICANT CHANGE UP (ref 27–34)
MCHC RBC-ENTMCNC: 28.9 PG — SIGNIFICANT CHANGE UP (ref 27–34)
MCHC RBC-ENTMCNC: 30.9 GM/DL — LOW (ref 32–36)
MCHC RBC-ENTMCNC: 31.1 GM/DL — LOW (ref 32–36)
MCV RBC AUTO: 91.8 FL — SIGNIFICANT CHANGE UP (ref 80–100)
MCV RBC AUTO: 93.6 FL — SIGNIFICANT CHANGE UP (ref 80–100)
NRBC # BLD: 0 /100 WBCS — SIGNIFICANT CHANGE UP (ref 0–0)
NRBC # BLD: 0 /100 WBCS — SIGNIFICANT CHANGE UP (ref 0–0)
PLATELET # BLD AUTO: 245 K/UL — SIGNIFICANT CHANGE UP (ref 150–400)
PLATELET # BLD AUTO: 256 K/UL — SIGNIFICANT CHANGE UP (ref 150–400)
POTASSIUM SERPL-MCNC: 3.6 MMOL/L — SIGNIFICANT CHANGE UP (ref 3.5–5.3)
POTASSIUM SERPL-SCNC: 3.6 MMOL/L — SIGNIFICANT CHANGE UP (ref 3.5–5.3)
PROT SERPL-MCNC: 5 G/DL — LOW (ref 6–8.3)
PROT SERPL-MCNC: 5.5 G/DL — LOW (ref 6–8.3)
RBC # BLD: 2.45 M/UL — LOW (ref 3.8–5.2)
RBC # BLD: 2.45 M/UL — LOW (ref 3.8–5.2)
RBC # BLD: 2.8 M/UL — LOW (ref 3.8–5.2)
RBC # FLD: 14 % — SIGNIFICANT CHANGE UP (ref 10.3–14.5)
RBC # FLD: 14.2 % — SIGNIFICANT CHANGE UP (ref 10.3–14.5)
RETICS #: 34.4 K/UL — SIGNIFICANT CHANGE UP (ref 25–125)
RETICS/RBC NFR: 1.4 % — SIGNIFICANT CHANGE UP (ref 0.5–2.5)
RHEUMATOID FACT SERPL-ACNC: 30 IU/ML — HIGH (ref 0–13)
SODIUM SERPL-SCNC: 135 MMOL/L — SIGNIFICANT CHANGE UP (ref 135–145)
TIBC SERPL-MCNC: 139 UG/DL — LOW (ref 250–450)
TSH SERPL-MCNC: 1.35 UU/ML — SIGNIFICANT CHANGE UP (ref 0.34–4.82)
UIBC SERPL-MCNC: 114 UG/DL — SIGNIFICANT CHANGE UP (ref 110–370)
VIT B12 SERPL-MCNC: 708 PG/ML — SIGNIFICANT CHANGE UP (ref 232–1245)
WBC # BLD: 7.63 K/UL — SIGNIFICANT CHANGE UP (ref 3.8–10.5)
WBC # BLD: 8.79 K/UL — SIGNIFICANT CHANGE UP (ref 3.8–10.5)
WBC # FLD AUTO: 7.63 K/UL — SIGNIFICANT CHANGE UP (ref 3.8–10.5)
WBC # FLD AUTO: 8.79 K/UL — SIGNIFICANT CHANGE UP (ref 3.8–10.5)

## 2024-05-30 RX ORDER — FOLIC ACID 0.8 MG
1 TABLET ORAL DAILY
Refills: 0 | Status: DISCONTINUED | OUTPATIENT
Start: 2024-05-30 | End: 2024-06-06

## 2024-05-30 RX ORDER — SENNA PLUS 8.6 MG/1
2 TABLET ORAL AT BEDTIME
Refills: 0 | Status: DISCONTINUED | OUTPATIENT
Start: 2024-05-30 | End: 2024-06-06

## 2024-05-30 RX ADMIN — SIMVASTATIN 20 MILLIGRAM(S): 20 TABLET, FILM COATED ORAL at 21:56

## 2024-05-30 RX ADMIN — Medication 3 MILLIGRAM(S): at 21:56

## 2024-05-30 RX ADMIN — Medication 25 MILLIGRAM(S): at 21:57

## 2024-05-30 RX ADMIN — HEPARIN SODIUM 5000 UNIT(S): 5000 INJECTION INTRAVENOUS; SUBCUTANEOUS at 05:28

## 2024-05-30 RX ADMIN — MUPIROCIN 1 APPLICATION(S): 20 OINTMENT TOPICAL at 17:22

## 2024-05-30 RX ADMIN — Medication 25 MILLIGRAM(S): at 14:38

## 2024-05-30 RX ADMIN — INSULIN GLARGINE 3 UNIT(S): 100 INJECTION, SOLUTION SUBCUTANEOUS at 21:54

## 2024-05-30 RX ADMIN — PANTOPRAZOLE SODIUM 40 MILLIGRAM(S): 20 TABLET, DELAYED RELEASE ORAL at 05:27

## 2024-05-30 RX ADMIN — CHLORHEXIDINE GLUCONATE 1 APPLICATION(S): 213 SOLUTION TOPICAL at 05:30

## 2024-05-30 RX ADMIN — Medication 3: at 17:22

## 2024-05-30 RX ADMIN — MUPIROCIN 1 APPLICATION(S): 20 OINTMENT TOPICAL at 05:29

## 2024-05-30 RX ADMIN — SENNA PLUS 2 TABLET(S): 8.6 TABLET ORAL at 21:58

## 2024-05-30 RX ADMIN — HEPARIN SODIUM 5000 UNIT(S): 5000 INJECTION INTRAVENOUS; SUBCUTANEOUS at 21:56

## 2024-05-30 NOTE — PROGRESS NOTE ADULT - PROBLEM SELECTOR PLAN 6
-hx of hemochromatosis, follow with Dr. Shelby   -hx of phlebotomies   - iron studies show anemia of chronic disease  -will start folic acid for low folate  -hgb 7.0 today repeat --->  -transfuse if <7.0  -QMA following -hx of hemochromatosis, follow with Dr. Shelby   -hx of phlebotomies   - iron studies show anemia of chronic disease  -will start folic acid for low folate  -hgb 7.0 today repeat ---> 8.0  -transfuse if <7.0  -QMA following

## 2024-05-30 NOTE — PROGRESS NOTE ADULT - SUBJECTIVE AND OBJECTIVE BOX
NP Note discussed with  Primary Attending    Patient is a 83y old  Female who presents with a chief complaint of SOB 2/2 fluid overload (30 May 2024 13:54)      INTERVAL HPI/OVERNIGHT EVENTS: no new complaints    MEDICATIONS  (STANDING):  chlorhexidine 2% Cloths 1 Application(s) Topical <User Schedule>  epoetin lara (PROCRIT) Injectable 96781 Unit(s) IV Push <User Schedule>  folic acid 1 milliGRAM(s) Oral daily  heparin   Injectable 5000 Unit(s) SubCutaneous every 8 hours  insulin glargine Injectable (LANTUS) 3 Unit(s) SubCutaneous at bedtime  insulin lispro (ADMELOG) corrective regimen sliding scale   SubCutaneous at bedtime  insulin lispro (ADMELOG) corrective regimen sliding scale   SubCutaneous three times a day before meals  metoprolol tartrate 25 milliGRAM(s) Oral every 8 hours  mupirocin 2% Ointment 1 Application(s) Both Nostrils two times a day  pantoprazole    Tablet 40 milliGRAM(s) Oral before breakfast  simvastatin 20 milliGRAM(s) Oral at bedtime    MEDICATIONS  (PRN):  acetaminophen     Tablet .. 650 milliGRAM(s) Oral every 6 hours PRN Temp greater or equal to 38C (100.4F), Mild Pain (1 - 3)  aluminum hydroxide/magnesium hydroxide/simethicone Suspension 30 milliLiter(s) Oral every 4 hours PRN Dyspepsia  melatonin 3 milliGRAM(s) Oral at bedtime PRN Insomnia  ondansetron Injectable 4 milliGRAM(s) IV Push every 8 hours PRN Nausea and/or Vomiting      __________________________________________________  REVIEW OF SYSTEMS:    CONSTITUTIONAL: No fever,   EYES: no acute visual disturbances  NECK: No pain or stiffness  RESPIRATORY: No cough; No shortness of breath  CARDIOVASCULAR: No chest pain, no palpitations  GASTROINTESTINAL: No pain. No nausea or vomiting; No diarrhea   NEUROLOGICAL: No headache or numbness, no tremors  MUSCULOSKELETAL: No joint pain, no muscle pain  GENITOURINARY: no dysuria, no frequency, no hesitancy  PSYCHIATRY: no depression , no anxiety  ALL OTHER  ROS negative        Vital Signs Last 24 Hrs  T(C): 36.6 (30 May 2024 16:00), Max: 37 (29 May 2024 16:32)  T(F): 97.9 (30 May 2024 16:00), Max: 98.6 (29 May 2024 16:32)  HR: 50 (30 May 2024 16:00) (50 - 87)  BP: 101/55 (30 May 2024 16:00) (100/67 - 139/74)  BP(mean): 94 (30 May 2024 14:29) (83 - 94)  RR: 18 (30 May 2024 16:00) (17 - 18)  SpO2: 100% (30 May 2024 16:00) (97% - 100%)    Parameters below as of 30 May 2024 16:00  Patient On (Oxygen Delivery Method): nasal cannula  O2 Flow (L/min): 2      ________________________________________________  PHYSICAL EXAM:  GENERAL: NAD  HEENT: Normocephalic;  conjunctivae and sclerae clear; moist mucous membranes;   NECK : supple  CHEST/LUNG: Clear to auscultation bilaterally with good air entry   HEART: S1 S2  regular; no murmurs, gallops or rubs  ABDOMEN: Soft, Nontender, Nondistended; Bowel sounds present  EXTREMITIES: no cyanosis; no edema; no calf tenderness  SKIN: warm and dry; no rash  NERVOUS SYSTEM:  Awake and alert; Oriented  to place, person and time ; no new deficits    _________________________________________________  LABS:                        7.0    8.79  )-----------( 256      ( 30 May 2024 07:23 )             22.5     05-30    135  |  98  |  23<H>  ----------------------------<  114<H>  3.6   |  29  |  3.35<H>    Ca    8.6      30 May 2024 07:23  Phos  4.3     05-29  Mg     2.4     05-29    TPro  5.5<L>  /  Alb  2.4<L>  /  TBili  0.4  /  DBili  x   /  AST  31  /  ALT  36  /  AlkPhos  130<H>  05-30      Urinalysis Basic - ( 30 May 2024 07:23 )    Color: x / Appearance: x / SG: x / pH: x  Gluc: 114 mg/dL / Ketone: x  / Bili: x / Urobili: x   Blood: x / Protein: x / Nitrite: x   Leuk Esterase: x / RBC: x / WBC x   Sq Epi: x / Non Sq Epi: x / Bacteria: x      CAPILLARY BLOOD GLUCOSE      POCT Blood Glucose.: 143 mg/dL (30 May 2024 12:30)  POCT Blood Glucose.: 104 mg/dL (30 May 2024 08:05)  POCT Blood Glucose.: 198 mg/dL (29 May 2024 20:58)  POCT Blood Glucose.: 254 mg/dL (29 May 2024 17:09)        RADIOLOGY & ADDITIONAL TESTS:    < from: CT Angio Chest PE Protocol w/ IV Cont (05.29.24 @ 16:57) >  FINDINGS:    LUNGS AND AIRWAYS: Small debris within the right lateral mid to upper   trachea. Small debris in the distal posterior tracheal. Compressive   atelectasis the bilateral lower lobes right greater than left with   additional subsegmental atelectasis in the lingula, posterior right upper   lobe and posterior right middle lobe.  PLEURA: Moderate bilateral pleural effusions.  MEDIASTINUM AND ELDA: No lymphadenopathy.  VESSELS: No evidence of acute pulmonary embolism. Atherosclerotic changes   of the aorta and coronary vasculature.  HEART: Heart is mildly enlarged. No pericardial effusion.  CHEST WALL AND LOWER NECK: Within normal limits.  VISUALIZED UPPER ABDOMEN: Partially visualized atrophy of the bilateral   kidneys. Suggestion of surface contour nodularity of the liver, correlate   with liver function tests to exclude cirrhosis.  BONES: Degenerative changes.    IMPRESSION:  Moderate bilateral pleural effusions right greater than left with   compressive atelectasis right greater than left. Close of debris within   the trachea.    No evidence of acute pulmonary embolism.        --- End of Report ---    < end of copied text >      < from: Xray Chest 1 View- PORTABLE-Urgent (Xray Chest 1 View- PORTABLE-Urgent .) (05.28.24 @ 10:47) >  INTERPRETATION:  AP erect chest on May 28, 2024 at 10:25 AM. After   dialysis patient had an episode of V. Tach and now has elevated white   count.    Heart magnified by technique.    Infiltrate around the right hilum again noted.    Retrocardiac density consistent with left lower lobe atelectasis and   associated effusion is relatively stable.    Mid upper lung field infiltrates likely congestive on May 27 show some   improvement.    There is slightly increased blunting right base laterally on present film.    IMPRESSION: Central infiltrates likely congestive has improved. There are   persistent lower lung field and pleural findings most significantly in   the left lower lobe.    --- End of Report ---    < end of copied text >    Imaging Personally Reviewed:  YES    Consultant(s) Notes Reviewed:   YES    Plan of care was discussed with patient and /or primary care giver; all questions and concerns were addressed and care was aligned with patient's wishes.

## 2024-05-30 NOTE — DIETITIAN INITIAL EVALUATION ADULT - NAME AND PHONE
Pt to be followed by dietitian at skilled nursing facility and out-pt dialysis center when medically ready to be discharged  Pt to be followed by dietitian at skilled nursing facility and out-pt dialysis center when medically ready to be discharged.  Pt to be followed by dietitian at skilled nursing facility and out-pt dialysis center when medically ready to be discharged

## 2024-05-30 NOTE — DIETITIAN INITIAL EVALUATION ADULT - PROBLEM/PLAN-1
A: 44F with metastatic ovarian cancer and gallbladder perforation s/p placement of a 10F drain in her gallbladder and yaa-cholecystic fluid with immediate drainage of 55cc yesterday.    P:  - Meropenem/Vanc empiric antibiotics  - PCA for pain control  - NPO  - Monitor drain output  - f/u drain culture  - Appreciate care of SICU DISPLAY PLAN FREE TEXT

## 2024-05-30 NOTE — PROGRESS NOTE ADULT - SUBJECTIVE AND OBJECTIVE BOX
Patient is a 83y old  Female who presents with a chief complaint of Other specified abnormal findings of blood chemistry      SUBJECTIVE / OVERNIGHT EVENTS:    MEDICATIONS  (STANDING):  chlorhexidine 2% Cloths 1 Application(s) Topical <User Schedule>  epoetin lara (PROCRIT) Injectable 64295 Unit(s) IV Push <User Schedule>  heparin   Injectable 5000 Unit(s) SubCutaneous every 8 hours  insulin glargine Injectable (LANTUS) 3 Unit(s) SubCutaneous at bedtime  insulin lispro (ADMELOG) corrective regimen sliding scale   SubCutaneous three times a day before meals  insulin lispro (ADMELOG) corrective regimen sliding scale   SubCutaneous at bedtime  metoprolol tartrate 25 milliGRAM(s) Oral every 8 hours  mupirocin 2% Ointment 1 Application(s) Both Nostrils two times a day  pantoprazole    Tablet 40 milliGRAM(s) Oral before breakfast  simvastatin 20 milliGRAM(s) Oral at bedtime    MEDICATIONS  (PRN):  acetaminophen     Tablet .. 650 milliGRAM(s) Oral every 6 hours PRN Temp greater or equal to 38C (100.4F), Mild Pain (1 - 3)  aluminum hydroxide/magnesium hydroxide/simethicone Suspension 30 milliLiter(s) Oral every 4 hours PRN Dyspepsia  melatonin 3 milliGRAM(s) Oral at bedtime PRN Insomnia  ondansetron Injectable 4 milliGRAM(s) IV Push every 8 hours PRN Nausea and/or Vomiting    CAPILLARY BLOOD GLUCOSE      POCT Blood Glucose.: 143 mg/dL (30 May 2024 12:30)  POCT Blood Glucose.: 104 mg/dL (30 May 2024 08:05)  POCT Blood Glucose.: 198 mg/dL (29 May 2024 20:58)  POCT Blood Glucose.: 254 mg/dL (29 May 2024 17:09)    I&O's Summary      PHYSICAL EXAM:  Vital Signs Last 24 Hrs  T(C): 36.4 (30 May 2024 10:38), Max: 37 (29 May 2024 16:32)  T(F): 97.5 (30 May 2024 10:38), Max: 98.6 (29 May 2024 16:32)  HR: 86 (30 May 2024 10:38) (79 - 87)  BP: 110/61 (30 May 2024 10:38) (100/67 - 126/64)  BP(mean): 83 (30 May 2024 07:53) (83 - 83)  RR: 18 (30 May 2024 10:38) (18 - 19)  SpO2: 97% (30 May 2024 07:53) (97% - 100%)    Parameters below as of 30 May 2024 10:38  Patient On (Oxygen Delivery Method): nasal cannula  O2 Flow (L/min): 2      LABS:                        7.0    8.79  )-----------( 256      ( 30 May 2024 07:23 )             22.5     05-30    135  |  98  |  23<H>  ----------------------------<  114<H>  3.6   |  29  |  3.35<H>    Ca    8.6      30 May 2024 07:23  Phos  4.3     05-29  Mg     2.4     05-29    TPro  5.5<L>  /  Alb  2.4<L>  /  TBili  0.4  /  DBili  x   /  AST  31  /  ALT  36  /  AlkPhos  130<H>  05-30          Urinalysis Basic - ( 30 May 2024 07:23 )    Color: x / Appearance: x / SG: x / pH: x  Gluc: 114 mg/dL / Ketone: x  / Bili: x / Urobili: x   Blood: x / Protein: x / Nitrite: x   Leuk Esterase: x / RBC: x / WBC x   Sq Epi: x / Non Sq Epi: x / Bacteria: x               Patient is a 83y old  Female who presents with a chief complaint of Other specified abnormal findings of blood chemistry      SUBJECTIVE / OVERNIGHT EVENTS: events noted. No new complaints    MEDICATIONS  (STANDING):  chlorhexidine 2% Cloths 1 Application(s) Topical <User Schedule>  epoetin lara (PROCRIT) Injectable 85087 Unit(s) IV Push <User Schedule>  heparin   Injectable 5000 Unit(s) SubCutaneous every 8 hours  insulin glargine Injectable (LANTUS) 3 Unit(s) SubCutaneous at bedtime  insulin lispro (ADMELOG) corrective regimen sliding scale   SubCutaneous three times a day before meals  insulin lispro (ADMELOG) corrective regimen sliding scale   SubCutaneous at bedtime  metoprolol tartrate 25 milliGRAM(s) Oral every 8 hours  mupirocin 2% Ointment 1 Application(s) Both Nostrils two times a day  pantoprazole    Tablet 40 milliGRAM(s) Oral before breakfast  simvastatin 20 milliGRAM(s) Oral at bedtime    MEDICATIONS  (PRN):  acetaminophen     Tablet .. 650 milliGRAM(s) Oral every 6 hours PRN Temp greater or equal to 38C (100.4F), Mild Pain (1 - 3)  aluminum hydroxide/magnesium hydroxide/simethicone Suspension 30 milliLiter(s) Oral every 4 hours PRN Dyspepsia  melatonin 3 milliGRAM(s) Oral at bedtime PRN Insomnia  ondansetron Injectable 4 milliGRAM(s) IV Push every 8 hours PRN Nausea and/or Vomiting    CAPILLARY BLOOD GLUCOSE      POCT Blood Glucose.: 143 mg/dL (30 May 2024 12:30)  POCT Blood Glucose.: 104 mg/dL (30 May 2024 08:05)  POCT Blood Glucose.: 198 mg/dL (29 May 2024 20:58)  POCT Blood Glucose.: 254 mg/dL (29 May 2024 17:09)    I&O's Summary      PHYSICAL EXAM:  Vital Signs Last 24 Hrs  T(C): 36.4 (30 May 2024 10:38), Max: 37 (29 May 2024 16:32)  T(F): 97.5 (30 May 2024 10:38), Max: 98.6 (29 May 2024 16:32)  HR: 86 (30 May 2024 10:38) (79 - 87)  BP: 110/61 (30 May 2024 10:38) (100/67 - 126/64)  BP(mean): 83 (30 May 2024 07:53) (83 - 83)  RR: 18 (30 May 2024 10:38) (18 - 19)  SpO2: 97% (30 May 2024 07:53) (97% - 100%)    Parameters below as of 30 May 2024 10:38  Patient On (Oxygen Delivery Method): nasal cannula  O2 Flow (L/min): 2    GEN: NAD; A and O x 3, thin  LUNGS: decreased BS LLL, NC O2 in place  HEART: S1 S2  ABDOMEN: soft, non-tender, non-distended, + BS  EXTREMITIES: no edema  LABS:                        7.0    8.79  )-----------( 256      ( 30 May 2024 07:23 )             22.5     05-30    135  |  98  |  23<H>  ----------------------------<  114<H>  3.6   |  29  |  3.35<H>    Ca    8.6      30 May 2024 07:23  Phos  4.3     05-29  Mg     2.4     05-29    TPro  5.5<L>  /  Alb  2.4<L>  /  TBili  0.4  /  DBili  x   /  AST  31  /  ALT  36  /  AlkPhos  130<H>  05-30          Urinalysis Basic - ( 30 May 2024 07:23 )    Color: x / Appearance: x / SG: x / pH: x  Gluc: 114 mg/dL / Ketone: x  / Bili: x / Urobili: x   Blood: x / Protein: x / Nitrite: x   Leuk Esterase: x / RBC: x / WBC x   Sq Epi: x / Non Sq Epi: x / Bacteria: x

## 2024-05-30 NOTE — PROGRESS NOTE ADULT - PROBLEM SELECTOR PLAN 4
-episode of VT on 5/27 after HD, no further episodes   -echo with mildly reduced LVEF  -cont Metoprolol  -Cardio Dr. Concepcion

## 2024-05-30 NOTE — PROGRESS NOTE ADULT - PROBLEM SELECTOR PLAN 2
-on HD MWF  -s/p dialysis 5/30  -dialyze 5/30 within 24hrs of receiving IV contrast for CTA   -cont HD as per Nephro   -Nephro Dr. Pandey

## 2024-05-30 NOTE — PROGRESS NOTE ADULT - PROBLEM SELECTOR PLAN 9
-on repaglinide 1 TID with meals and Toujeo 9U Sunday, Tuesday and Thursday  -cont Lantus 3 units and ISSC  -consistent carbohydrate diet

## 2024-05-30 NOTE — PROGRESS NOTE ADULT - PROBLEM SELECTOR PLAN 1
proBNP 03287 on admission  S/p HD on 5/27 with 1.7L removed  Nephro Dr. Pandey following  Still with some crackles on auscultation, repeated cxray today shows improvement in congestion, still some evidence of overload  No obvious infiltrates despite WBC of 15k.  Was on 6L this AM, titrated down to RA, pulseox 94%

## 2024-05-30 NOTE — PROGRESS NOTE ADULT - PROBLEM SELECTOR PLAN 1
-pro BNP 32120 on admission s/p HD  -remains with dyspnea  -given dyspnea and hypoxia, evaluate for PE   -CTA chest negative for PE  -received dialysis today 5/30  -CXR with improving congestion   -cont HD as per Nephro   -Nephro Dr. Pandey following  -mon I&Os  -daily weights

## 2024-05-30 NOTE — DIETITIAN INITIAL EVALUATION ADULT - PROBLEM SELECTOR PLAN 1
presenting with SOB likely 2/2 fluid overload  CXR showing pulmonary congestion b/l  b/l wheezing heard likely cardiogenic  2+ pitting edema b/l  proBNP 09109 on admission  will get HD session today  Nephro Dr. Pandey following

## 2024-05-30 NOTE — PROGRESS NOTE ADULT - ASSESSMENT
83F  from [facility] [place], PMH DM, HTN, HLD, hemochromatosis, ESRD on HD M, W, F, presenting to the ED with SOB admitted for pulmonary congestion 2/2 fluid overload. Admitted for fluid overload, started on dialysis. Pt with episode of VT arrest on 5/27 and had an RRT, VT episode was self limiting and did not require any interventions. Nephro Dr. Pandey following, HD was continued.   Pt has hx of hemochromatosis follows with Dr. Shelby, A group consulted. Now with low Hgb like anemia of chronic disease, no active bleeding noted, f/u anemia w/u.  83F  from [facility], PMH DM, HTN, HLD, hemochromatosis, ESRD on HD M, W, F, presenting to the ED with SOB admitted for pulmonary congestion 2/2 fluid overload. Admitted for fluid overload, started on dialysis. Pt with episode of VT arrest on 5/27 and had an RRT, VT episode was self limiting and did not require any interventions. Nephro Dr. Pandey following, HD was continued.   Pt has hx of hemochromatosis follows with Dr. Shelby, A group consulted. Now with low Hgb like anemia of chronic disease, no active bleeding noted, f/u anemia w/u.       Daughter at bedside and updated on plan of care all questions answered.

## 2024-05-30 NOTE — PROGRESS NOTE ADULT - SUBJECTIVE AND OBJECTIVE BOX
PATIENT SEEN AND EXAMINED BY ARIANNA CHAVIRA M.D. ON :- 5/30/24  DATE OF SERVICE:      5/30/24       Interim events noted,Labs ,Radiological studies and Cardiology tests reviewed .    Patient is a 83y old  Female who presents with a chief complaint of SOB 2/2 fluid overload (30 May 2024 18:00)      HPI:  83F PMH DM, HTN, HLD, ESRD on HD M, W, F, presenting to the ED with SOB. Pt was sent from [facility] at [facility], recently admitted to [facility] for PNA. She states that she started having SOB and cold sweats suddenly around 3:30AM, BP noted to be 147/102. She also endorsed experiencing chest pressure, non radiating, mid sternum, with a warm flushing sensation thoughtout her body, which lasts for less than a minute and resolves on its own. She was given duonebs with improvement in symptoms, but her symptoms later returned. She received another treatement of duonebs, then EMS was called. While seen in the ED, she stated she continued having the flushing sensation with chest pressure, again lasting for less than 1 minute. She denies any chills, N/V/D, abdominal pain, dysuria, numbness/tingling/weakness in extremities. She has not missed any of her HD sessions, but notes that she has required more fluid to be removed during the last 2 sessions due to worsening swelling of her legs. She is from [facility] and ambulates with a walker (27 May 2024 14:49)      PAST MEDICAL & SURGICAL HISTORY:  Hypertension      Hyperlipidemia      Gout      Cataract      Breast lump      Osteoporosis      Osteoarthritis      Vitamin D deficiency      Seasonal allergies      CKD (chronic kidney disease)      Diabetes mellitus      ESRD on dialysis      H/O bilateral cataract extraction  2012  and 2014      History of breast surgery  Excision of left breast lump - benign  1991      History of knee replacement, total, left  2009      Arteriovenous fistula  left side limb alert          PREVIOUS DIAGNOSTIC TESTING:      ECHO  FINDINGS:    STRESS  FINDINGS:    CATHETERIZATION  FINDINGS:    MEDICATIONS  (STANDING):  chlorhexidine 2% Cloths 1 Application(s) Topical <User Schedule>  epoetin lara (PROCRIT) Injectable 90513 Unit(s) IV Push <User Schedule>  folic acid 1 milliGRAM(s) Oral daily  heparin   Injectable 5000 Unit(s) SubCutaneous every 8 hours  insulin glargine Injectable (LANTUS) 3 Unit(s) SubCutaneous at bedtime  insulin lispro (ADMELOG) corrective regimen sliding scale   SubCutaneous at bedtime  insulin lispro (ADMELOG) corrective regimen sliding scale   SubCutaneous three times a day before meals  metoprolol tartrate 25 milliGRAM(s) Oral every 8 hours  mupirocin 2% Ointment 1 Application(s) Both Nostrils two times a day  pantoprazole    Tablet 40 milliGRAM(s) Oral before breakfast  senna 2 Tablet(s) Oral at bedtime  simvastatin 20 milliGRAM(s) Oral at bedtime    MEDICATIONS  (PRN):  acetaminophen     Tablet .. 650 milliGRAM(s) Oral every 6 hours PRN Temp greater or equal to 38C (100.4F), Mild Pain (1 - 3)  aluminum hydroxide/magnesium hydroxide/simethicone Suspension 30 milliLiter(s) Oral every 4 hours PRN Dyspepsia  melatonin 3 milliGRAM(s) Oral at bedtime PRN Insomnia  ondansetron Injectable 4 milliGRAM(s) IV Push every 8 hours PRN Nausea and/or Vomiting      FAMILY HISTORY:  Family history of cancer        SOCIAL HISTORY:    CIGARETTES:    ALCOHOL:    REVIEW OF SYSTEMS:  CONSTITUTIONAL: No fever, weight loss, or fatigue  EYES: No eye pain, visual disturbances, or discharge  ENMT:  No difficulty hearing, tinnitus, vertigo; No sinus or throat pain  NECK: No pain or stiffness  RESPIRATORY: No cough, wheezing, chills or hemoptysis; No shortness of breath  CARDIOVASCULAR: No chest pain, palpitations, dizziness, or leg swelling  GASTROINTESTINAL: No abdominal or epigastric pain. No nausea, vomiting, or hematemesis; No diarrhea or constipation. No melena or hematochezia.  GENITOURINARY: No dysuria, frequency, hematuria, or incontinence  NEUROLOGICAL: No headaches, memory loss, loss of strength, numbness, or tremors  SKIN: No itching, burning, rashes, or lesions   LYMPH NODES: No enlarged glands  ENDOCRINE: No heat or cold intolerance; No hair loss  MUSCULOSKELETAL: No joint pain or swelling; No muscle, back, or extremity pain  PSYCHIATRIC: No depression, anxiety, mood swings, or difficulty sleeping  HEME/LYMPH: No easy bruising, or bleeding gums  ALLERY AND IMMUNOLOGIC: No hives or eczema    Vital Signs Last 24 Hrs  T(C): 37.3 (30 May 2024 19:40), Max: 37.3 (30 May 2024 19:40)  T(F): 99.1 (30 May 2024 19:40), Max: 99.1 (30 May 2024 19:40)  HR: 76 (30 May 2024 19:40) (50 - 87)  BP: 109/63 (30 May 2024 19:40) (100/67 - 139/74)  BP(mean): 94 (30 May 2024 14:29) (83 - 94)  RR: 19 (30 May 2024 19:40) (17 - 19)  SpO2: 93% (30 May 2024 19:40) (93% - 100%)    Parameters below as of 30 May 2024 19:40  Patient On (Oxygen Delivery Method): nasal cannula  O2 Flow (L/min): 2        PHYSICAL EXAM:  GENERAL: NAD, well-groomed, well-developed  HEAD:  Atraumatic, Normocephalic  EYES: EOMI, PERRLA, conjunctiva and sclera clear  ENMT: No tonsillar erythema, exudates, or enlargement; Moist mucous membranes, Good dentition, No lesions  NECK: Supple, No JVD, Normal thyroid  NERVOUS SYSTEM:  Alert & Oriented X3, Good concentration; Motor Strength 5/5 B/L upper and lower extremities; DTRs 2+ intact and symmetric  CHEST/LUNG: Clear to percussion bilaterally; No rales, rhonchi, wheezing, or rubs  HEART: Regular rate and rhythm; No murmurs, rubs, or gallops  ABDOMEN: Soft, Nontender, Nondistended; Bowel sounds present  EXTREMITIES:  2+ Peripheral Pulses, No clubbing, cyanosis, or edema  LYMPH: No lymphadenopathy noted  SKIN: No rashes or lesions      INTERPRETATION OF TELEMETRY:    ECG:    [facility]:     LABS:                        8.1    7.63  )-----------( 245      ( 30 May 2024 16:08 )             26.2     05-30    135  |  98  |  23<H>  ----------------------------<  114<H>  3.6   |  29  |  3.35<H>    Ca    8.6      30 May 2024 07:23  Phos  4.3     05-29  Mg     2.4     05-29    TPro  5.5<L>  /  Alb  2.4<L>  /  TBili  0.4  /  DBili  x   /  AST  31  /  ALT  36  /  AlkPhos  130<H>  05-30          Urinalysis Basic - ( 30 May 2024 07:23 )    Color: x / Appearance: x / SG: x / pH: x  Gluc: 114 mg/dL / Ketone: x  / Bili: x / Urobili: x   Blood: x / Protein: x / Nitrite: x   Leuk Esterase: x / RBC: x / WBC x   Sq Epi: x / Non Sq Epi: x / Bacteria: x      Lipid Panel:   I&O's Summary      RADIOLOGY & ADDITIONAL STUDIES:    _______________________________________________________________________________________    CONCLUSIONS:      1. Left ventricular cavity is normal in size. Left ventricular systolic function is mildly decreased with an ejection fraction of 48 % by Lee's method of disks. Global left ventricular hypokinesis.   2. There is mild (grade 1)left ventricular diastolic dysfunction.   3. Normal right ventricular cavity size, with normal wall thickness, and normal systolic function. Tricuspid annular plane systolic excursion (TAPSE) is 2.1 cm (normal >=1.7 cm). Tricuspid annular tissue Doppler S' is 10.0 cm/s (normal >10 cm/s).   4. Mild mitral regurgitation.   5. Normal left and right atrial size.   6. Mild tricuspid regurgitation.   7. Estimated pulmonary artery systolic pressure is 43 mmHg, consistent with mild pulmonary hypertension.   8. Mild aortic regurgitation.   9. Mild left ventricular hypertrophy.  10. There is calcification of the mitral valve annulus.  11. There is increased LV mass and concentric hypertrophy.  12. Trace pulmonic regurgitation.  13. Trileaflet aortic valve with reduced systolic excursion. There is calcification of the aortic valve leaflets. Mild aortic stenosis.  14. Bilateral pleural effusion noted.  15. No pericardial effusion seen.  16. No prior echocardiogram is available for comparison.

## 2024-05-30 NOTE — DIETITIAN INITIAL EVALUATION ADULT - PERTINENT MEDS FT
MEDICATIONS  (STANDING):  chlorhexidine 2% Cloths 1 Application(s) Topical <User Schedule>  epoetin lara (PROCRIT) Injectable 15732 Unit(s) IV Push <User Schedule>  heparin   Injectable 5000 Unit(s) SubCutaneous every 8 hours  insulin glargine Injectable (LANTUS) 3 Unit(s) SubCutaneous at bedtime  insulin lispro (ADMELOG) corrective regimen sliding scale   SubCutaneous at bedtime  insulin lispro (ADMELOG) corrective regimen sliding scale   SubCutaneous three times a day before meals  metoprolol tartrate 25 milliGRAM(s) Oral every 8 hours  mupirocin 2% Ointment 1 Application(s) Both Nostrils two times a day  pantoprazole    Tablet 40 milliGRAM(s) Oral before breakfast  simvastatin 20 milliGRAM(s) Oral at bedtime    MEDICATIONS  (PRN):  acetaminophen     Tablet .. 650 milliGRAM(s) Oral every 6 hours PRN Temp greater or equal to 38C (100.4F), Mild Pain (1 - 3)  aluminum hydroxide/magnesium hydroxide/simethicone Suspension 30 milliLiter(s) Oral every 4 hours PRN Dyspepsia  melatonin 3 milliGRAM(s) Oral at bedtime PRN Insomnia  ondansetron Injectable 4 milliGRAM(s) IV Push every 8 hours PRN Nausea and/or Vomiting

## 2024-05-30 NOTE — PROGRESS NOTE ADULT - SUBJECTIVE AND OBJECTIVE BOX
[facility] : Progress Note :: [phone], (office [phone]),   Dr Pandey / Dr Mi / Dr Massey / Dr Etienne / Dr Cassandra DA SILVA / Dr Burt / Dr Garber / Dr Naeem lam  _____________________________________________________________________________________________  S/P CTA chest yesterday, no PE, bilateral  effusions.  seen earlier on HD     shellfish (Anaphylaxis)  ibuprofen (Unknown)  latex (Unknown)  natural rubber (Unknown)  Mushrooms (Anaphylaxis)  aspirin (Unknown)    Hospital Medications:   MEDICATIONS  (STANDING):  chlorhexidine 2% Cloths 1 Application(s) Topical <User Schedule>  epoetin lara (PROCRIT) Injectable 03142 Unit(s) IV Push <User Schedule>  folic acid 1 milliGRAM(s) Oral daily  heparin   Injectable 5000 Unit(s) SubCutaneous every 8 hours  insulin glargine Injectable (LANTUS) 3 Unit(s) SubCutaneous at bedtime  insulin lispro (ADMELOG) corrective regimen sliding scale   SubCutaneous three times a day before meals  insulin lispro (ADMELOG) corrective regimen sliding scale   SubCutaneous at bedtime  metoprolol tartrate 25 milliGRAM(s) Oral every 8 hours  mupirocin 2% Ointment 1 Application(s) Both Nostrils two times a day  pantoprazole    Tablet 40 milliGRAM(s) Oral before breakfast  simvastatin 20 milliGRAM(s) Oral at bedtime        VITALS:  T(F): 97.9 (05-30-24 @ 16:00), Max: 98.6 (05-29-24 @ 21:54)  HR: 50 (05-30-24 @ 16:00)  BP: 101/55 (05-30-24 @ 16:00)  RR: 18 (05-30-24 @ 16:00)  SpO2: 100% (05-30-24 @ 16:00)  Wt(kg): --      PHYSICAL EXAM:  Constitutional: NAD  HEENT: anicteric sclera, oropharynx clear.  Neck: No JVD  Respiratory: CTAB, no wheezes, rales or rhonchi  Cardiovascular: S1, S2, RRR  Gastrointestinal: BS+, soft, NT/ND  Extremities:  No peripheral edema  Neurological: A/O x 3, no focal deficits  Vascular Access: AVF with thrill and bruit    LABS:  05-30    135  |  98  |  23<H>  ----------------------------<  114<H>  3.6   |  29  |  3.35<H>    Ca    8.6      30 May 2024 07:23  Phos  4.3     05-29  Mg     2.4     05-29    TPro  5.5<L>  /  Alb  2.4<L>  /  TBili  0.4  /  DBili      /  AST  31  /  ALT  36  /  AlkPhos  130<H>  05-30    Creatinine Trend: 3.35 <--, 4.81 <--, 3.43 <--, 2.97 <--, 5.46 <--                        8.1    7.63  )-----------( 245      ( 30 May 2024 16:08 )             26.2     Urine Studies:  Urinalysis Basic - ( 30 May 2024 07:23 )    Color:  / Appearance:  / SG:  / pH:   Gluc: 114 mg/dL / Ketone:   / Bili:  / Urobili:    Blood:  / Protein:  / Nitrite:    Leuk Esterase:  / RBC:  / WBC    Sq Epi:  / Non Sq Epi:  / Bacteria:         RADIOLOGY & ADDITIONAL STUDIES:

## 2024-05-30 NOTE — DIETITIAN INITIAL EVALUATION ADULT - PERTINENT LABORATORY DATA
05-30    135  |  98  |  23<H>  ----------------------------<  114<H>  3.6   |  29  |  3.35<H>    Ca    8.6      30 May 2024 07:23  Phos  4.3     05-29  Mg     2.4     05-29    TPro  5.5<L>  /  Alb  2.4<L>  /  TBili  0.4  /  DBili  x   /  AST  31  /  ALT  36  /  AlkPhos  130<H>  05-30  POCT Blood Glucose.: 104 mg/dL (05-30-24 @ 08:05)  A1C with Estimated Average Glucose Result: 5.4 % (05-07-24 @ 04:43)

## 2024-05-30 NOTE — DIETITIAN INITIAL EVALUATION ADULT - FACTORS AFF FOOD INTAKE
acute on chronic comorbidities including ESRD on HD/Temple/ethnic/cultural/personal food preferences

## 2024-05-30 NOTE — PROGRESS NOTE ADULT - ASSESSMENT
# ESRD admitted with SOB- pulmonary edema improvement with HD and ultrafiltration  HD yesterday and today in light of getting  CTA chest.   # bilateral pleural effusion - consulted pulmonary   # Hyponatremia- improved. sec to fluid overload. UF on HD fluid restriction.  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

## 2024-05-30 NOTE — PROGRESS NOTE ADULT - PROBLEM SELECTOR PLAN 3
-leukocytosis, afebrile possible due to steroids on 5/27  -mon off abx for now  -mon for sign or symptoms of infection  -wnl now

## 2024-05-30 NOTE — PROGRESS NOTE ADULT - PROBLEM SELECTOR PLAN 8
-likely anemia of CKD   -no acute sign or symptoms of bleeding   -mon CBC   -keep type and screen active

## 2024-05-30 NOTE — DIETITIAN INITIAL EVALUATION ADULT - OTHER INFO
Consent for IV contrast signed per patient and placed on chart.     Lea Fermin RN  06/21/17 3430     Pt from rehab, alert, oriented, being transported for procedure when visited; Allergy to mushroom, shellfish; Tolerating 26-50% meals per Flowsheet; Pending discharge planning to skilled nursing facility for rehab when medically ready per team  Pt from rehab, alert, oriented, being transported for procedure when visited; Allergy to mushroom, shellfish; Tolerating 26-50% meal at time per Flowsheet; Pending discharge planning to skilled nursing facility for rehab when medically ready per team

## 2024-05-30 NOTE — PROGRESS NOTE ADULT - PROBLEM SELECTOR PLAN 12
-pt from [facility], likely return back pending improvement in fluid overload and further work up   -PT eval

## 2024-05-30 NOTE — PROGRESS NOTE ADULT - ASSESSMENT
complete note to follow    #VTE Prophylaxis       Assessment and Recommendation:   · Assessment	  83F PMH DM, HTN, HLD, ESRD on HD M, W, F, presenting to the ED with SOB. Pt was sent from [facility] [place] at [facility], recently admitted to [facility] for PNA. She states that she started having SOB and cold sweats suddenly around 3:30AM, BP noted to be 147/102. She also endorsed experiencing chest pressure, non radiating, mid sternum, with a warm flushing sensation thoughtout her body, which lasts for less than a minute and resolves on its own. She was given duonebs with improvement in symptoms, but her symptoms later returned. She received another treatement of duonebs, then EMS was called. While seen in the ED, she stated she continued having the flushing sensation with chest pressure, again lasting for less than 1 minute. She denies any chills, N/V/D, abdominal pain, dysuria, numbness/tingling/weakness in extremities. She has not missed any of her HD sessions, but notes that she has required more fluid to be removed during the last 2 sessions due to worsening swelling of her legs. She is from [facility] and ambulates with a walker    #Hemochromatosis, homozygous for H63D  #Normocytic Anemia  pt follows with our colleague Dr. 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 Shelby    #Dysphagia to solids  S&S eval    #VTE Prophylaxis    Thank you for the referral. Will continue to monitor the patient.  Please call with any questions [phone]  Above reviewed with Attending Dr. Fairchild  [facility] [place]  [address], [address]th [place] [address]  [phone]  *Note not finalized until signed by Attending Physician         Assessment and Recommendation:   · Assessment	  83F PMH DM, HTN, HLD, ESRD on HD M, W, F, presenting to the ED with SOB. Pt was sent from [facility] at [facility], recently admitted to [facility] for PNA. She states that she started having SOB and cold sweats suddenly around 3:30AM, BP noted to be 147/102. She also endorsed experiencing chest pressure, non radiating, mid sternum, with a warm flushing sensation thoughtout her body, which lasts for less than a minute and resolves on its own. She was given duonebs with improvement in symptoms, but her symptoms later returned. She received another treatement of duonebs, then EMS was called. While seen in the ED, she stated she continued having the flushing sensation with chest pressure, again lasting for less than 1 minute. She denies any chills, N/V/D, abdominal pain, dysuria, numbness/tingling/weakness in extremities. She has not missed any of her HD sessions, but notes that she has required more fluid to be removed during the last 2 sessions due to worsening swelling of her legs. She is from [facility] and ambulates with a walker    #Hemochromatosis, homozygous for H63D  #Normocytic Anemia  pt follows with our colleague Dr. 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 Shelby    #Dysphagia to solids  S&S eval    #VTE Prophylaxis    Thank you for the referral. Will continue to monitor the patient.  Please call with any questions [phone]  Above reviewed with Attending Dr. Fairchild  [facility]  [address], [address], [address]th [place] [address]  [phone]  *Note not finalized until signed by Attending Physician

## 2024-05-30 NOTE — DIETITIAN INITIAL EVALUATION ADULT - FEEDING ASSISTANCE
Provide food choices within diet Rx as available/updated'; Nursing to continue feeding assistance and encouragement as needed Provide food choices within diet Rx as available/updated'; Nursing to continue feeding assistance and encouragement as needed.

## 2024-05-31 LAB
GLUCOSE BLDC GLUCOMTR-MCNC: 106 MG/DL — HIGH (ref 70–99)
GLUCOSE BLDC GLUCOMTR-MCNC: 169 MG/DL — HIGH (ref 70–99)
GLUCOSE BLDC GLUCOMTR-MCNC: 216 MG/DL — HIGH (ref 70–99)
GLUCOSE BLDC GLUCOMTR-MCNC: 339 MG/DL — HIGH (ref 70–99)

## 2024-05-31 RX ADMIN — HEPARIN SODIUM 5000 UNIT(S): 5000 INJECTION INTRAVENOUS; SUBCUTANEOUS at 21:33

## 2024-05-31 RX ADMIN — CHLORHEXIDINE GLUCONATE 1 APPLICATION(S): 213 SOLUTION TOPICAL at 06:40

## 2024-05-31 RX ADMIN — Medication 25 MILLIGRAM(S): at 05:59

## 2024-05-31 RX ADMIN — HEPARIN SODIUM 5000 UNIT(S): 5000 INJECTION INTRAVENOUS; SUBCUTANEOUS at 13:27

## 2024-05-31 RX ADMIN — INSULIN GLARGINE 3 UNIT(S): 100 INJECTION, SOLUTION SUBCUTANEOUS at 21:33

## 2024-05-31 RX ADMIN — PANTOPRAZOLE SODIUM 40 MILLIGRAM(S): 20 TABLET, DELAYED RELEASE ORAL at 06:43

## 2024-05-31 RX ADMIN — SENNA PLUS 2 TABLET(S): 8.6 TABLET ORAL at 21:34

## 2024-05-31 RX ADMIN — Medication 25 MILLIGRAM(S): at 13:27

## 2024-05-31 RX ADMIN — MUPIROCIN 1 APPLICATION(S): 20 OINTMENT TOPICAL at 17:36

## 2024-05-31 RX ADMIN — Medication 1 MILLIGRAM(S): at 11:32

## 2024-05-31 RX ADMIN — MUPIROCIN 1 APPLICATION(S): 20 OINTMENT TOPICAL at 05:59

## 2024-05-31 RX ADMIN — HEPARIN SODIUM 5000 UNIT(S): 5000 INJECTION INTRAVENOUS; SUBCUTANEOUS at 05:59

## 2024-05-31 RX ADMIN — SIMVASTATIN 20 MILLIGRAM(S): 20 TABLET, FILM COATED ORAL at 21:34

## 2024-05-31 RX ADMIN — Medication 2: at 17:36

## 2024-05-31 RX ADMIN — Medication 4: at 12:08

## 2024-05-31 NOTE — PROGRESS NOTE ADULT - PROBLEM SELECTOR PLAN 1
-pro BNP 48404 on admission s/p HD  -remains with dyspnea  -given dyspnea and hypoxia, evaluated for PE   -CTA chest negative for PE but shows moderate plueral effusion  -received dialysis today 5/30  -CXR with improving congestion   -cont HD as per Nephro   -Nephro Dr. Pandey following  -mon I&Os  -daily weights  - Pulm consulted for effusions

## 2024-05-31 NOTE — PROGRESS NOTE ADULT - ASSESSMENT
83F  from [facility], PMH DM, HTN, HLD, hemochromatosis, ESRD on HD M, W, F, presenting to the ED with SOB admitted for pulmonary congestion 2/2 fluid overload. Admitted for fluid overload, started on dialysis. Pt with episode of VT arrest on 5/27 and had an RRT, VT episode was self limiting and did not require any interventions. Nephro Dr. Pandey following, HD was continued.   Pt has hx of hemochromatosis follows with Dr. Shelby, A group consulted. Now with low Hgb like anemia of chronic disease, no active bleeding noted, f/u anemia w/u, repeat Hgb stable      Daughter at bedside and updated on plan of care all questions answered.

## 2024-05-31 NOTE — PHYSICAL THERAPY INITIAL EVALUATION ADULT - LIVES WITH, PROFILE
in a 3rd floor apartment with 5+15+15 steps to negotiate; recently came from [facility] where she was receiving skilled rehabilitation services s/p recent hospitalization for pneumonia/alone
Instructions (Optional): Follow up with ophthalmology for further evaluation.
Introduction Text (Please End With A Colon): Procedure to be performed at follow up
Detail Level: Zone

## 2024-05-31 NOTE — PROGRESS NOTE ADULT - ASSESSMENT
# ESRD admitted with SOB- pulmonary edema improvement with HD and ultrafiltration.  s/p HD wednesday and thursday. next HD in AM    # bilateral pleural effusion - consulted pulmonary - Dr valdovinos.  # Hyponatremia- improved. sec to fluid overload. UF on HD fluid restriction.  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

## 2024-05-31 NOTE — PROGRESS NOTE ADULT - PROBLEM SELECTOR PLAN 1
-pro BNP 14127 on admission s/p HD  -remains with dyspnea  -given dyspnea and hypoxia, evaluated for PE   -CTA chest negative for PE but shows moderate plueral effusion  -received dialysis today 5/30  -CXR with improving congestion   -cont HD as per Nephro   -Nephro Dr. Pandey following  -mon I&Os  -daily weights  - Pulm consulted for effusions

## 2024-05-31 NOTE — PROGRESS NOTE ADULT - PROBLEM SELECTOR PLAN 12
-pt from [facility]  -pending Pulm and GI reccs  -PT eval  - possible d/c MON back to [facility] pending above

## 2024-05-31 NOTE — PROGRESS NOTE ADULT - ASSESSMENT
83F  from [facility] [place], PMH DM, HTN, HLD, hemochromatosis, ESRD on HD M, W, F, presenting to the ED with SOB admitted for pulmonary congestion 2/2 fluid overload. Admitted for fluid overload, started on dialysis. Pt with episode of VT arrest on 5/27 and had an RRT, VT episode was self limiting and did not require any interventions. Nephro Dr. Pandey following, HD was continued.   Pt has hx of hemochromatosis follows with Dr. Shelby, A group consulted. Now with low Hgb like anemia of chronic disease, no active bleeding noted, f/u anemia w/u, repeat Hgb stable      Daughter at bedside and updated on plan of care all questions answered.

## 2024-05-31 NOTE — PHYSICAL THERAPY INITIAL EVALUATION ADULT - NSPTDISCHREC_GEN_A_CORE
patient may finish recovery and rehabilitation at current [facility] prior to discharge home/Sub-acute Rehab

## 2024-05-31 NOTE — PROGRESS NOTE ADULT - SUBJECTIVE AND OBJECTIVE BOX
NP Note discussed with  Primary Attending    Patient is a 83y old  Female who presents with a chief complaint of SOB 2/2 fluid overload (30 May 2024 18:00)      INTERVAL HPI/OVERNIGHT EVENTS: no acute events overnight    MEDICATIONS  (STANDING):  chlorhexidine 2% Cloths 1 Application(s) Topical <User Schedule>  epoetin lara (PROCRIT) Injectable 21794 Unit(s) IV Push <User Schedule>  folic acid 1 milliGRAM(s) Oral daily  heparin   Injectable 5000 Unit(s) SubCutaneous every 8 hours  insulin glargine Injectable (LANTUS) 3 Unit(s) SubCutaneous at bedtime  insulin lispro (ADMELOG) corrective regimen sliding scale   SubCutaneous three times a day before meals  insulin lispro (ADMELOG) corrective regimen sliding scale   SubCutaneous at bedtime  metoprolol tartrate 25 milliGRAM(s) Oral every 8 hours  mupirocin 2% Ointment 1 Application(s) Both Nostrils two times a day  pantoprazole    Tablet 40 milliGRAM(s) Oral before breakfast  senna 2 Tablet(s) Oral at bedtime  simvastatin 20 milliGRAM(s) Oral at bedtime    MEDICATIONS  (PRN):  acetaminophen     Tablet .. 650 milliGRAM(s) Oral every 6 hours PRN Temp greater or equal to 38C (100.4F), Mild Pain (1 - 3)  aluminum hydroxide/magnesium hydroxide/simethicone Suspension 30 milliLiter(s) Oral every 4 hours PRN Dyspepsia  melatonin 3 milliGRAM(s) Oral at bedtime PRN Insomnia  ondansetron Injectable 4 milliGRAM(s) IV Push every 8 hours PRN Nausea and/or Vomiting      __________________________________________________  REVIEW OF SYSTEMS:    CONSTITUTIONAL: No fever,   EYES: no acute visual disturbances  NECK: No pain or stiffness  RESPIRATORY: No cough; No shortness of breath  CARDIOVASCULAR: No chest pain, no palpitations  GASTROINTESTINAL: No pain. No nausea or vomiting; No diarrhea   NEUROLOGICAL: No headache or numbness, no tremors  MUSCULOSKELETAL: No joint pain, no muscle pain  GENITOURINARY: no dysuria, no frequency, no hesitancy  PSYCHIATRY: no depression , no anxiety  ALL OTHER  ROS negative        Vital Signs Last 24 Hrs  T(C): 36.8 (31 May 2024 11:07), Max: 37.3 (30 May 2024 19:40)  T(F): 98.3 (31 May 2024 11:07), Max: 99.1 (30 May 2024 19:40)  HR: 88 (31 May 2024 11:07) (50 - 88)  BP: 120/58 (31 May 2024 11:07) (100/66 - 139/74)  BP(mean): 77 (31 May 2024 10:58) (73 - 94)  RR: 18 (31 May 2024 11:07) (17 - 19)  SpO2: 97% (31 May 2024 11:07) (93% - 100%)    Parameters below as of 31 May 2024 11:07  Patient On (Oxygen Delivery Method): room air        ________________________________________________  PHYSICAL EXAM:  GENERAL: NAD  HEENT: Normocephalic  NECK : supple  CHEST/LUNG: Clear to auscultation bilaterally  HEART: S1 S2  regular  ABDOMEN: Soft, Nontender, Nondistended; Bowel sounds present  EXTREMITIES: no edema  SKIN: warm and dry; no rash  NERVOUS SYSTEM:  Awake and alert; Oriented  to place, person and time    _________________________________________________  LABS:                        8.1    7.63  )-----------( 245      ( 30 May 2024 16:08 )             26.2     05-30    135  |  98  |  23<H>  ----------------------------<  114<H>  3.6   |  29  |  3.35<H>    Ca    8.6      30 May 2024 07:23    TPro  5.5<L>  /  Alb  2.4<L>  /  TBili  0.4  /  DBili  x   /  AST  31  /  ALT  36  /  AlkPhos  130<H>  05-30      Urinalysis Basic - ( 30 May 2024 07:23 )    Color: x / Appearance: x / SG: x / pH: x  Gluc: 114 mg/dL / Ketone: x  / Bili: x / Urobili: x   Blood: x / Protein: x / Nitrite: x   Leuk Esterase: x / RBC: x / WBC x   Sq Epi: x / Non Sq Epi: x / Bacteria: x      CAPILLARY BLOOD GLUCOSE      POCT Blood Glucose.: 339 mg/dL (31 May 2024 11:36)  POCT Blood Glucose.: 106 mg/dL (31 May 2024 07:40)  POCT Blood Glucose.: 171 mg/dL (30 May 2024 20:53)  POCT Blood Glucose.: 268 mg/dL (30 May 2024 16:44)        RADIOLOGY & ADDITIONAL TESTS:    < from: CT Angio Chest PE Protocol w/ IV Cont (05.29.24 @ 16:57) >  FINDINGS:    LUNGS AND AIRWAYS: Small debris within the right lateral mid to upper   trachea. Small debris in the distal posterior tracheal. Compressive   atelectasis the bilateral lower lobes right greater than left with   additional subsegmental atelectasis in the lingula, posterior right upper   lobe and posterior right middle lobe.  PLEURA: Moderate bilateral pleural effusions.  MEDIASTINUM AND ELDA: No lymphadenopathy.  VESSELS: No evidence of acute pulmonary embolism. Atherosclerotic changes   of the aorta and coronary vasculature.  HEART: Heart is mildly enlarged. No pericardial effusion.  CHEST WALL AND LOWER NECK: Within normal limits.  VISUALIZED UPPER ABDOMEN: Partially visualized atrophy of the bilateral   kidneys. Suggestion of surface contour nodularity of the liver, correlate   with liver function tests to exclude cirrhosis.  BONES: Degenerative changes.    IMPRESSION:  Moderate bilateral pleural effusions right greater than left with   compressive atelectasis right greater than left. Close of debris within   the trachea.    No evidence of acute pulmonary embolism.        --- End of Report ---    < end of copied text >    Imaging Personally Reviewed:  YES    Consultant(s) Notes Reviewed:   YES      Plan of care was discussed with patient and /or primary care giver; all questions and concerns were addressed and care was aligned with patient's wishes.

## 2024-05-31 NOTE — PROGRESS NOTE ADULT - SUBJECTIVE AND OBJECTIVE BOX
PATIENT SEEN AND EXAMINED BY ARIANNA CHAVIRA M.D. ON :- 5/31/24  DATE OF SERVICE: 5/31/24            Interim events noted,Labs ,Radiological studies and Cardiology tests reviewed .    Patient is a 83y old  Female who presents with a chief complaint of SOB 2/2 fluid overload (31 May 2024 17:59)      HPI:  83F PMH DM, HTN, HLD, ESRD on HD M, W, F, presenting to the ED with SOB. Pt was sent from [facility] at [facility], recently admitted to [facility] for PNA. She states that she started having SOB and cold sweats suddenly around 3:30AM, BP noted to be 147/102. She also endorsed experiencing chest pressure, non radiating, mid sternum, with a warm flushing sensation thoughtout her body, which lasts for less than a minute and resolves on its own. She was given duonebs with improvement in symptoms, but her symptoms later returned. She received another treatement of duonebs, then EMS was called. While seen in the ED, she stated she continued having the flushing sensation with chest pressure, again lasting for less than 1 minute. She denies any chills, N/V/D, abdominal pain, dysuria, numbness/tingling/weakness in extremities. She has not missed any of her HD sessions, but notes that she has required more fluid to be removed during the last 2 sessions due to worsening swelling of her legs. She is from [facility] and ambulates with a walker (27 May 2024 14:49)      PAST MEDICAL & SURGICAL HISTORY:  Hypertension      Hyperlipidemia      Gout      Cataract      Breast lump      Osteoporosis      Osteoarthritis      Vitamin D deficiency      Seasonal allergies      CKD (chronic kidney disease)      Diabetes mellitus      ESRD on dialysis      H/O bilateral cataract extraction  2012  and 2014      History of breast surgery  Excision of left breast lump - benign  1991      History of knee replacement, total, left  2009      Arteriovenous fistula  left side limb alert          PREVIOUS DIAGNOSTIC TESTING:      ECHO  FINDINGS:    STRESS  FINDINGS:    CATHETERIZATION  FINDINGS:    MEDICATIONS  (STANDING):  chlorhexidine 2% Cloths 1 Application(s) Topical <User Schedule>  epoetin lara (PROCRIT) Injectable 58249 Unit(s) IV Push <User Schedule>  folic acid 1 milliGRAM(s) Oral daily  heparin   Injectable 5000 Unit(s) SubCutaneous every 8 hours  insulin glargine Injectable (LANTUS) 3 Unit(s) SubCutaneous at bedtime  insulin lispro (ADMELOG) corrective regimen sliding scale   SubCutaneous at bedtime  insulin lispro (ADMELOG) corrective regimen sliding scale   SubCutaneous three times a day before meals  metoprolol tartrate 25 milliGRAM(s) Oral every 8 hours  mupirocin 2% Ointment 1 Application(s) Both Nostrils two times a day  pantoprazole    Tablet 40 milliGRAM(s) Oral before breakfast  senna 2 Tablet(s) Oral at bedtime  simvastatin 20 milliGRAM(s) Oral at bedtime    MEDICATIONS  (PRN):  acetaminophen     Tablet .. 650 milliGRAM(s) Oral every 6 hours PRN Temp greater or equal to 38C (100.4F), Mild Pain (1 - 3)  aluminum hydroxide/magnesium hydroxide/simethicone Suspension 30 milliLiter(s) Oral every 4 hours PRN Dyspepsia  melatonin 3 milliGRAM(s) Oral at bedtime PRN Insomnia  ondansetron Injectable 4 milliGRAM(s) IV Push every 8 hours PRN Nausea and/or Vomiting      FAMILY HISTORY:  Family history of cancer        SOCIAL HISTORY:    CIGARETTES:    ALCOHOL:    REVIEW OF SYSTEMS:  CONSTITUTIONAL: No fever, weight loss, or fatigue  EYES: No eye pain, visual disturbances, or discharge  ENMT:  No difficulty hearing, tinnitus, vertigo; No sinus or throat pain  NECK: No pain or stiffness  RESPIRATORY: No cough, wheezing, chills or hemoptysis; No shortness of breath  CARDIOVASCULAR: No chest pain, palpitations, dizziness, or leg swelling  GASTROINTESTINAL: No abdominal or epigastric pain. No nausea, vomiting, or hematemesis; No diarrhea or constipation. No melena or hematochezia.  GENITOURINARY: No dysuria, frequency, hematuria, or incontinence  NEUROLOGICAL: No headaches, memory loss, loss of strength, numbness, or tremors  SKIN: No itching, burning, rashes, or lesions   LYMPH NODES: No enlarged glands  ENDOCRINE: No heat or cold intolerance; No hair loss  MUSCULOSKELETAL: No joint pain or swelling; No muscle, back, or extremity pain  PSYCHIATRIC: No depression, anxiety, mood swings, or difficulty sleeping  HEME/LYMPH: No easy bruising, or bleeding gums  ALLERY AND IMMUNOLOGIC: No hives or eczema    Vital Signs Last 24 Hrs  T(C): 37 (31 May 2024 20:09), Max: 37.1 (31 May 2024 05:18)  T(F): 98.6 (31 May 2024 20:09), Max: 98.8 (31 May 2024 05:18)  HR: 80 (31 May 2024 20:09) (72 - 88)  BP: 107/64 (31 May 2024 20:09) (100/66 - 120/70)  BP(mean): 77 (31 May 2024 10:58) (73 - 77)  RR: 18 (31 May 2024 20:09) (18 - 19)  SpO2: 96% (31 May 2024 20:09) (93% - 97%)    Parameters below as of 31 May 2024 20:09  Patient On (Oxygen Delivery Method): room air          PHYSICAL EXAM:  GENERAL: NAD, well-groomed, well-developed  HEAD:  Atraumatic, Normocephalic  EYES: EOMI, PERRLA, conjunctiva and sclera clear  ENMT: No tonsillar erythema, exudates, or enlargement; Moist mucous membranes, Good dentition, No lesions  NECK: Supple, No JVD, Normal thyroid  NERVOUS SYSTEM:  Alert & Oriented X3, Good concentration; Motor Strength 5/5 B/L upper and lower extremities; DTRs 2+ intact and symmetric  CHEST/LUNG: Clear to percussion bilaterally; No rales, rhonchi, wheezing, or rubs  HEART: Regular rate and rhythm; No murmurs, rubs, or gallops  ABDOMEN: Soft, Nontender, Nondistended; Bowel sounds present  EXTREMITIES:  2+ Peripheral Pulses, No clubbing, cyanosis, or edema  LYMPH: No lymphadenopathy noted  SKIN: No rashes or lesions      INTERPRETATION OF TELEMETRY:    ECG:    CATEVAS:     LABS:                        8.1    7.63  )-----------( 245      ( 30 May 2024 16:08 )             26.2     05-30    135  |  98  |  23<H>  ----------------------------<  114<H>  3.6   |  29  |  3.35<H>    Ca    8.6      30 May 2024 07:23    TPro  5.5<L>  /  Alb  2.4<L>  /  TBili  0.4  /  DBili  x   /  AST  31  /  ALT  36  /  AlkPhos  130<H>  05-30          Urinalysis Basic - ( 30 May 2024 07:23 )    Color: x / Appearance: x / SG: x / pH: x  Gluc: 114 mg/dL / Ketone: x  / Bili: x / Urobili: x   Blood: x / Protein: x / Nitrite: x   Leuk Esterase: x / RBC: x / WBC x   Sq Epi: x / Non Sq Epi: x / Bacteria: x      Lipid Panel:   I&O's Summary      RADIOLOGY & ADDITIONAL STUDIES:  CONCLUSIONS:      1. Left ventricular cavity is normal in size. Left ventricular systolic function is mildly decreased with an ejection fraction of 48 % by Lee's method of disks. Global left ventricular hypokinesis.   2. There is mild (grade 1)left ventricular diastolic dysfunction.   3. Normal right ventricular cavity size, with normal wall thickness, and normal systolic function. Tricuspid annular plane systolic excursion (TAPSE) is 2.1 cm (normal >=1.7 cm). Tricuspid annular tissue Doppler S' is 10.0 cm/s (normal >10 cm/s).   4. Mild mitral regurgitation.   5. Normal left and right atrial size.   6. Mild tricuspid regurgitation.   7. Estimated pulmonary artery systolic pressure is 43 mmHg, consistent with mild pulmonary hypertension.   8. Mild aortic regurgitation.   9. Mild left ventricular hypertrophy.  10. There is calcification of the mitral valve annulus.  11. There is increased LV mass and concentric hypertrophy.  12. Trace pulmonic regurgitation.  13. Trileaflet aortic valve with reduced systolic excursion. There is calcification of the aortic valve leaflets. Mild aortic stenosis.  14. Bilateral pleural effusion noted.  15. No pericardial effusion seen.  16. No prior echocardiogram is available for comparison.

## 2024-05-31 NOTE — PROGRESS NOTE ADULT - PROBLEM SELECTOR PLAN 8
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

## 2024-05-31 NOTE — PROGRESS NOTE ADULT - PROBLEM SELECTOR PLAN 12
-pt from [facility]  -pending Pulm and GI reccs  -PT eval  - possible d/c MON back to [facility] pending above

## 2024-05-31 NOTE — PHYSICAL THERAPY INITIAL EVALUATION ADULT - PATIENT PROFILE REVIEW, REHAB EVAL
Pt was A+O x4 throughout the shift, she began to desat earlier in the am on FiO2 100% on 40L. Nonrebreather mask applied intermittently @ 15L. Pt BP was low throughout the day, given 500ml bolus by dayshift nurse. Pt denies any pain and was afebrile. Pt is resting, bed in the lowest locked position.      Problem: Patient Centered Care  Goal: Patient preferences are identified and integrated in the patient's plan of care  Description: Interventions:  - What would you like us to know as we care for you?   - Provide timely, complete, and accurate information to patient/family  - Incorporate patient and family knowledge, values, beliefs, and cultural backgrounds into the planning and delivery of care  - Encourage patient/family to participate in care and decision-making at the level they choose  - Honor patient and family perspectives and choices  Outcome: Progressing     Problem: Patient/Family Goals  Goal: Patient/Family Long Term Goal  Description: Patient's Long Term Goal:     Interventions:  -   - See additional Care Plan goals for specific interventions  Outcome: Progressing  Goal: Patient/Family Short Term Goal  Description: Patient's Short Term Goal:     Interventions:   -   - See additional Care Plan goals for specific interventions  Outcome: Progressing     Problem: PAIN - ADULT  Goal: Verbalizes/displays adequate comfort level or patient's stated pain goal  Description: INTERVENTIONS:  - Encourage pt to monitor pain and request assistance  - Assess pain using appropriate pain scale  - Administer analgesics based on type and severity of pain and evaluate response  - Implement non-pharmacological measures as appropriate and evaluate response  - Consider cultural and social influences on pain and pain management  - Manage/alleviate anxiety  - Utilize distraction and/or relaxation techniques  - Monitor for opioid side effects  - Notify MD/LIP if interventions unsuccessful or patient reports new pain  - Anticipate EMR, labs, radiology and imaging reports reviewed/yes increased pain with activity and pre-medicate as appropriate  Outcome: Progressing     Problem: RISK FOR INFECTION - ADULT  Goal: Absence of fever/infection during anticipated neutropenic period  Description: INTERVENTIONS  - Monitor WBC  - Administer growth factors as ordered  - Implement neutropenic guidelines  Outcome: Progressing     Problem: SAFETY ADULT - FALL  Goal: Free from fall injury  Description: INTERVENTIONS:  - Assess pt frequently for physical needs  - Identify cognitive and physical deficits and behaviors that affect risk of falls.   - [place] fall precautions as indicated by assessment.  - Educate pt/family on patient safety including physical limitations  - Instruct pt to call for assistance with activity based on assessment  - Modify environment to reduce risk of injury  - Provide assistive devices as appropriate  - Consider OT/PT consult to assist with strengthening/mobility  - Encourage toileting schedule  Outcome: Progressing     Problem: DISCHARGE PLANNING  Goal: Discharge to home or other facility with appropriate resources  Description: INTERVENTIONS:  - Identify barriers to discharge w/pt and caregiver  - Include patient/family/discharge partner in discharge planning  - Arrange for needed discharge resources and transportation as appropriate  - Identify discharge learning needs (meds, wound care, etc)  - Arrange for interpreters to assist at discharge as needed  - Consider post-discharge preferences of patient/family/discharge partner  - Complete POLST form as appropriate  - Assess patient's ability to be responsible for managing their own health  - Refer to Case Management Department for coordinating discharge planning if the patient needs post-hospital services based on physician/LIP order or complex needs related to functional status, cognitive ability or social support system  Outcome: Progressing     Problem: Anxiety Related to PTSD  Goal: Long Term Goal  Outcome: Progressing  Goal: Patient Stated Goal  Description: Goal Description:     Objectives: Interventions:         Responsible professional:           Responsible professional:    Outcome: Progressing  Goal: Patient Goal  Description: Goal Description:     Objectives: Interventions:         Responsible professional:           Responsible professional:          Responsible professional:  Outcome: Progressing     Problem: RESPIRATORY - ADULT  Goal: Achieves optimal ventilation and oxygenation  Description: INTERVENTIONS:  - Assess for changes in respiratory status  - Assess for changes in mentation and behavior  - Position to facilitate oxygenation and minimize respiratory effort  - Oxygen supplementation based on oxygen saturation or ABGs  - Provide Smoking Cessation handout, if applicable  - Encourage broncho-pulmonary hygiene including cough, deep breathe, Incentive Spirometry  - Assess the need for suctioning and perform as needed  - Assess and instruct to report SOB or any respiratory difficulty  - Respiratory Therapy support as indicated  - Manage/alleviate anxiety  - Monitor for signs/symptoms of CO2 retention  Outcome: Not Progressing     Problem: CARDIOVASCULAR - ADULT  Goal: Maintains optimal cardiac output and hemodynamic stability  Description: INTERVENTIONS:  - Monitor vital signs, rhythm, and trends  - Monitor for bleeding, hypotension and signs of decreased cardiac output  - Evaluate effectiveness of vasoactive medications to optimize hemodynamic stability  - Monitor arterial and/or venous puncture sites for bleeding and/or hematoma  - Assess quality of pulses, skin color and temperature  - Assess for signs of decreased coronary artery perfusion - ex.  Angina  - Evaluate fluid balance, assess for edema, trend weights  Outcome: Not Progressing  Goal: Absence of cardiac arrhythmias or at baseline  Description: INTERVENTIONS:  - Continuous cardiac monitoring, monitor vital signs, obtain 12 lead EKG if indicated  - Evaluate effectiveness of antiarrhythmic and heart rate control medications as ordered  - Initiate emergency measures for life threatening arrhythmias  - Monitor electrolytes and administer replacement therapy as ordered  Outcome: Not Progressing

## 2024-05-31 NOTE — PROGRESS NOTE ADULT - PROBLEM SELECTOR PLAN 6
-hx of hemochromatosis, follow with Dr. Shelby   -hx of phlebotomies   - iron studies show anemia of chronic disease  -will start folic acid for low folate  -hgb 7.0 today repeat ---> 8.0  -transfuse if <7.0  -QMA following

## 2024-06-01 LAB
ALBUMIN SERPL ELPH-MCNC: 2.4 G/DL — LOW (ref 3.5–5)
ALP SERPL-CCNC: 171 U/L — HIGH (ref 40–120)
ALT FLD-CCNC: 45 U/L DA — SIGNIFICANT CHANGE UP (ref 10–60)
ANA TITR SER: NEGATIVE — SIGNIFICANT CHANGE UP
ANION GAP SERPL CALC-SCNC: 6 MMOL/L — SIGNIFICANT CHANGE UP (ref 5–17)
AST SERPL-CCNC: 41 U/L — HIGH (ref 10–40)
BILIRUB SERPL-MCNC: 0.4 MG/DL — SIGNIFICANT CHANGE UP (ref 0.2–1.2)
BUN SERPL-MCNC: 27 MG/DL — HIGH (ref 7–18)
CALCIUM SERPL-MCNC: 8.5 MG/DL — SIGNIFICANT CHANGE UP (ref 8.4–10.5)
CHLORIDE SERPL-SCNC: 98 MMOL/L — SIGNIFICANT CHANGE UP (ref 96–108)
CO2 SERPL-SCNC: 29 MMOL/L — SIGNIFICANT CHANGE UP (ref 22–31)
CREAT SERPL-MCNC: 4.49 MG/DL — HIGH (ref 0.5–1.3)
CULTURE RESULTS: SIGNIFICANT CHANGE UP
CULTURE RESULTS: SIGNIFICANT CHANGE UP
EGFR: 9 ML/MIN/1.73M2 — LOW
GLUCOSE BLDC GLUCOMTR-MCNC: 119 MG/DL — HIGH (ref 70–99)
GLUCOSE BLDC GLUCOMTR-MCNC: 178 MG/DL — HIGH (ref 70–99)
GLUCOSE BLDC GLUCOMTR-MCNC: 268 MG/DL — HIGH (ref 70–99)
GLUCOSE BLDC GLUCOMTR-MCNC: 310 MG/DL — HIGH (ref 70–99)
GLUCOSE SERPL-MCNC: 271 MG/DL — HIGH (ref 70–99)
HCT VFR BLD CALC: 23.8 % — LOW (ref 34.5–45)
HGB BLD-MCNC: 7.5 G/DL — LOW (ref 11.5–15.5)
MAGNESIUM SERPL-MCNC: 2 MG/DL — SIGNIFICANT CHANGE UP (ref 1.6–2.6)
MCHC RBC-ENTMCNC: 29.1 PG — SIGNIFICANT CHANGE UP (ref 27–34)
MCHC RBC-ENTMCNC: 31.5 GM/DL — LOW (ref 32–36)
MCV RBC AUTO: 92.2 FL — SIGNIFICANT CHANGE UP (ref 80–100)
NRBC # BLD: 0 /100 WBCS — SIGNIFICANT CHANGE UP (ref 0–0)
PHOSPHATE SERPL-MCNC: 0.9 MG/DL — CRITICAL LOW (ref 2.5–4.5)
PLATELET # BLD AUTO: 272 K/UL — SIGNIFICANT CHANGE UP (ref 150–400)
POTASSIUM SERPL-MCNC: 3.6 MMOL/L — SIGNIFICANT CHANGE UP (ref 3.5–5.3)
POTASSIUM SERPL-SCNC: 3.6 MMOL/L — SIGNIFICANT CHANGE UP (ref 3.5–5.3)
PROT SERPL-MCNC: 5.6 G/DL — LOW (ref 6–8.3)
RBC # BLD: 2.58 M/UL — LOW (ref 3.8–5.2)
RBC # FLD: 14 % — SIGNIFICANT CHANGE UP (ref 10.3–14.5)
SODIUM SERPL-SCNC: 133 MMOL/L — LOW (ref 135–145)
SPECIMEN SOURCE: SIGNIFICANT CHANGE UP
SPECIMEN SOURCE: SIGNIFICANT CHANGE UP
WBC # BLD: 7.85 K/UL — SIGNIFICANT CHANGE UP (ref 3.8–10.5)
WBC # FLD AUTO: 7.85 K/UL — SIGNIFICANT CHANGE UP (ref 3.8–10.5)

## 2024-06-01 RX ADMIN — Medication 25 MILLIGRAM(S): at 17:50

## 2024-06-01 RX ADMIN — Medication 3 MILLIGRAM(S): at 21:20

## 2024-06-01 RX ADMIN — HEPARIN SODIUM 5000 UNIT(S): 5000 INJECTION INTRAVENOUS; SUBCUTANEOUS at 06:07

## 2024-06-01 RX ADMIN — Medication 25 MILLIGRAM(S): at 05:58

## 2024-06-01 RX ADMIN — PANTOPRAZOLE SODIUM 40 MILLIGRAM(S): 20 TABLET, DELAYED RELEASE ORAL at 05:58

## 2024-06-01 RX ADMIN — Medication 25 MILLIGRAM(S): at 21:20

## 2024-06-01 RX ADMIN — Medication 1 MILLIGRAM(S): at 11:51

## 2024-06-01 RX ADMIN — MUPIROCIN 1 APPLICATION(S): 20 OINTMENT TOPICAL at 06:00

## 2024-06-01 RX ADMIN — INSULIN GLARGINE 3 UNIT(S): 100 INJECTION, SOLUTION SUBCUTANEOUS at 21:21

## 2024-06-01 RX ADMIN — Medication 1: at 21:49

## 2024-06-01 RX ADMIN — Medication 4: at 11:40

## 2024-06-01 RX ADMIN — HEPARIN SODIUM 5000 UNIT(S): 5000 INJECTION INTRAVENOUS; SUBCUTANEOUS at 21:21

## 2024-06-01 RX ADMIN — CHLORHEXIDINE GLUCONATE 1 APPLICATION(S): 213 SOLUTION TOPICAL at 06:03

## 2024-06-01 RX ADMIN — Medication 1: at 08:12

## 2024-06-01 RX ADMIN — MUPIROCIN 1 APPLICATION(S): 20 OINTMENT TOPICAL at 17:52

## 2024-06-01 RX ADMIN — SIMVASTATIN 20 MILLIGRAM(S): 20 TABLET, FILM COATED ORAL at 21:21

## 2024-06-01 NOTE — CONSULT NOTE ADULT - ASSESSMENT
1. Anemia (etiology multifactorial)  2. Drop in H/H most likely due to hydration  3. No evidence of acute GI bleeding  4. R/o chronic GI bleeding  5. Constipation    Suggestions:    1. Monitor H/H  2. Transfuse PRBC as needed  3. Check stool for occult blood  4. Protonix 40mg daily  5. Avoid NSAID  6. CT-Scan of abdomen and pelvis  7. Daily stool softener as needed  8. DVT prophylaxis

## 2024-06-01 NOTE — CONSULT NOTE ADULT - SUBJECTIVE AND OBJECTIVE BOX
Time of visit:    CHIEF COMPLAINT: Patient is a 83y old  Female who presents with a chief complaint of SOB 2/2 fluid overload (01 Jun 2024 10:57)      HPI:  83F PMH DM, HTN, HLD, ESRD on HD M, W, F, presenting to the ED with SOB. Pt was sent from [facility] at [facility], recently admitted to [facility] for PNA. She states that she started having SOB and cold sweats suddenly around 3:30AM, BP noted to be 147/102. She also endorsed experiencing chest pressure, non radiating, mid sternum, with a warm flushing sensation thoughtout her body, which lasts for less than a minute and resolves on its own. She was given duonebs with improvement in symptoms, but her symptoms later returned. She received another treatement of duonebs, then EMS was called. While seen in the ED, she stated she continued having the flushing sensation with chest pressure, again lasting for less than 1 minute. She denies any chills, N/V/D, abdominal pain, dysuria, numbness/tingling/weakness in extremities. She has not missed any of her HD sessions, but notes that she has required more fluid to be removed during the last 2 sessions due to worsening swelling of her legs. She is from [facility] and ambulates with a walker (27 May 2024 14:49)   Patient seen and examined.     PAST MEDICAL & SURGICAL HISTORY:  Hypertension      Hyperlipidemia      Gout      Cataract      Breast lump      Osteoporosis      Osteoarthritis      Vitamin D deficiency      Seasonal allergies      CKD (chronic kidney disease)      Diabetes mellitus      ESRD on dialysis      H/O bilateral cataract extraction  2012  and 2014      History of breast surgery  Excision of left breast lump - benign  1991      History of knee replacement, total, left  2009      Arteriovenous fistula  left side limb alert          Allergies    shellfish (Anaphylaxis)  ibuprofen (Unknown)  latex (Unknown)  natural rubber (Unknown)  Mushrooms (Anaphylaxis)  aspirin (Unknown)    Intolerances        MEDICATIONS  (STANDING):  chlorhexidine 2% Cloths 1 Application(s) Topical <User Schedule>  epoetin lara (PROCRIT) Injectable 41930 Unit(s) IV Push <User Schedule>  folic acid 1 milliGRAM(s) Oral daily  heparin   Injectable 5000 Unit(s) SubCutaneous every 8 hours  insulin glargine Injectable (LANTUS) 3 Unit(s) SubCutaneous at bedtime  insulin lispro (ADMELOG) corrective regimen sliding scale   SubCutaneous three times a day before meals  insulin lispro (ADMELOG) corrective regimen sliding scale   SubCutaneous at bedtime  metoprolol tartrate 25 milliGRAM(s) Oral every 8 hours  mupirocin 2% Ointment 1 Application(s) Both Nostrils two times a day  pantoprazole    Tablet 40 milliGRAM(s) Oral before breakfast  senna 2 Tablet(s) Oral at bedtime  simvastatin 20 milliGRAM(s) Oral at bedtime      MEDICATIONS  (PRN):  acetaminophen     Tablet .. 650 milliGRAM(s) Oral every 6 hours PRN Temp greater or equal to 38C (100.4F), Mild Pain (1 - 3)  aluminum hydroxide/magnesium hydroxide/simethicone Suspension 30 milliLiter(s) Oral every 4 hours PRN Dyspepsia  melatonin 3 milliGRAM(s) Oral at bedtime PRN Insomnia  ondansetron Injectable 4 milliGRAM(s) IV Push every 8 hours PRN Nausea and/or Vomiting   Medications up to date at time of exam.    Medications up to date at time of exam.    FAMILY HISTORY:  Family history of cancer        SOCIAL HISTORY  Smoking History: [   ] smoking/smoke exposure, [   ] former smoker  Living Condition: [   ] apartment, [   ] private house  Work History:   Travel History: denies recent travel  Illicit Substance Use: denies  Alcohol Use: denies    REVIEW OF SYSTEMS:    CONSTITUTIONAL:  denies fevers, chills, sweats, weight loss    HEENT:  denies diplopia or blurred vision, sore throat or runny nose.    CARDIOVASCULAR:  denies pressure, squeezing, tightness, or heaviness about the chest; no palpitations.    RESPIRATORY:  denies SOB, cough, HEMPHILL, wheezing.    GASTROINTESTINAL:  denies abdominal pain, nausea, vomiting or diarrhea.    GENITOURINARY: denies dysuria, frequency or urgency.    NEUROLOGIC:  denies numbness, tingling, seizures or weakness.    PSYCHIATRIC:  denies disorder of thought or mood.    MSK: denies swelling, redness      PHYSICAL EXAMINATION:    GENERAL: The patient  in no apparent distress.     Vital Signs Last 24 Hrs  T(C): 36.7 (01 Jun 2024 11:17), Max: 37 (31 May 2024 20:09)  T(F): 98.1 (01 Jun 2024 11:17), Max: 98.6 (31 May 2024 20:09)  HR: 82 (01 Jun 2024 11:17) (72 - 82)  BP: 121/64 (01 Jun 2024 11:17) (103/62 - 128/71)  BP(mean): 82 (01 Jun 2024 04:52) (82 - 89)  RR: 18 (01 Jun 2024 11:17) (17 - 18)  SpO2: 96% (01 Jun 2024 11:17) (95% - 100%)    Parameters below as of 01 Jun 2024 11:17  Patient On (Oxygen Delivery Method): nasal cannula  O2 Flow (L/min): 2     (if applicable)    Chest Tube (if applicable)    HEENT: Head is normocephalic and atraumatic. Extraocular muscles are intact. Mucous membranes are moist.     NECK: Supple, no palpable adenopathy.    LUNGS: Fair b/l breath sounds, no wheezing, rales, or rhonchi.    HEART: Regular rate and rhythm without murmur.    ABDOMEN: Soft, nontender, and nondistended.  No hepatosplenomegaly is noted.    RENAL: No difficulty voiding, no pelvic pain    EXTREMITIES: Without any cyanosis, clubbing, rash, lesions or edema.    NEUROLOGIC: Awake, alert, oriented, grossly intact    SKIN: Warm, dry, good turgor.      LABS:                        8.1    7.63  )-----------( 245      ( 30 May 2024 16:08 )             26.2   MICROBIOLOGY: (if applicable)    RADIOLOGY & ADDITIONAL STUDIES:  EKG:   CXR:  < from: CT Angio Chest PE Protocol w/ IV Cont (05.29.24 @ 16:57) >    ACC: [id number] EXAM:  CT ANGIO CHEST PULM [facility]   ORDERED BY: SAMIR CASTILLO     PROCEDURE DATE:  05/29/2024          INTERPRETATION:  CLINICAL INFORMATION: Chest pain    COMPARISON: CT chest 5/6/2024    CONTRAST/COMPLICATIONS:  IV Contrast: Omnipaque 350  54 cc administered   46 cc discarded  Oral Contrast: NONE  Complications: None reported at time of study completion    PROCEDURE:  CT Angiography of the Chest.  Sagittal and coronal reformats were performed as well as 3D (MIP)   reconstructions.    FINDINGS:    LUNGS AND AIRWAYS: Small debris within the right lateral mid to upper   trachea. Small debris in the distal posterior tracheal. Compressive   atelectasis the bilateral lower lobes right greater than left with   additional subsegmental atelectasis in the lingula, posterior right upper   lobe and posterior right middle lobe.  PLEURA: Moderate bilateral pleural effusions.  MEDIASTINUM AND ELDA: No lymphadenopathy.  VESSELS: No evidence of acute pulmonary embolism. Atherosclerotic changes   of the aorta and coronary vasculature.  HEART: Heart is mildly enlarged. No pericardial effusion.  CHEST WALL AND LOWER NECK: Within normal limits.  VISUALIZED UPPER ABDOMEN: Partially visualized atrophy of the bilateral   kidneys. Suggestion of surface contour nodularity of the liver, correlate   with liver function tests to exclude cirrhosis.  BONES: Degenerative changes.    IMPRESSION:  Moderate bilateral pleural effusions right greater than left with   compressive atelectasis right greater than left. Close of debris within   the trachea.    No evidence of acute pulmonary embolism.        --- End of Report ---            JEFF HENLEY MD; Attending Radiologist  This document has been electronically signed. May 29 2024  5:04PM    < end of copied text >  ECHO:    IMPRESSION: 83y Female PAST MEDICAL & SURGICAL HISTORY:  Hypertension      Hyperlipidemia      Gout      Cataract      Breast lump      Osteoporosis      Osteoarthritis      Vitamin D deficiency      Seasonal allergies      CKD (chronic kidney disease)      Diabetes mellitus      ESRD on dialysis      H/O bilateral cataract extraction  2012  and 2014      History of breast surgery  Excision of left breast lump - benign  1991      History of knee replacement, total, left  2009      Arteriovenous fistula  left side limb alert       p/w                  Time of visit: pat seen and evaluate in dialysis suite     CHIEF COMPLAINT: Patient is a 83y old  Female who presents with a chief complaint of SOB 2/2 fluid overload (01 Jun 2024 10:57)      HPI: This is an 83 yr old with DM, HTN, HLD, ESRD on HD M, W, F, presenting to the ED with SOB. Pt was sent from [facility], recently admitted to [facility] for PNA. She states that she started having SOB and cold sweats suddenly around 3:30AM, BP noted to be 147/102. She also endorsed experiencing chest pressure, non radiating, mid sternum, with a warm flushing sensation thought out her body, which lasts for less than a minute and resolves on its own. She was given duonebs with improvement in symptoms, but her symptoms later returned. She received another treatement of duonebs, then EMS was called. While seen in the ED, she stated she continued having the flushing sensation with chest pressure, again lasting for less than 1 minute. She denies any chills, N/V/D, abdominal pain, dysuria, numbness/tingling/weakness in extremities. She has not missed any of her HD sessions, but notes that she has required more fluid to be removed during the last 2 sessions due to worsening swelling of her legs. She is from [facility] and ambulates with a walker (27 May 2024 14:49)   Patient seen and examined.     PAST MEDICAL & SURGICAL HISTORY:  Hypertension      Hyperlipidemia      Gout      Cataract      Breast lump      Osteoporosis      Osteoarthritis      Vitamin D deficiency      Seasonal allergies      CKD (chronic kidney disease)      Diabetes mellitus      ESRD on dialysis      H/O bilateral cataract extraction  2012  and 2014      History of breast surgery  Excision of left breast lump - benign  1991      History of knee replacement, total, left  2009      Arteriovenous fistula  left side limb alert          Allergies    shellfish (Anaphylaxis)  ibuprofen (Unknown)  latex (Unknown)  natural rubber (Unknown)  Mushrooms (Anaphylaxis)  aspirin (Unknown)    Intolerances        MEDICATIONS  (STANDING):  chlorhexidine 2% Cloths 1 Application(s) Topical <User Schedule>  epoetin lara (PROCRIT) Injectable 69067 Unit(s) IV Push <User Schedule>  folic acid 1 milliGRAM(s) Oral daily  heparin   Injectable 5000 Unit(s) SubCutaneous every 8 hours  insulin glargine Injectable (LANTUS) 3 Unit(s) SubCutaneous at bedtime  insulin lispro (ADMELOG) corrective regimen sliding scale   SubCutaneous three times a day before meals  insulin lispro (ADMELOG) corrective regimen sliding scale   SubCutaneous at bedtime  metoprolol tartrate 25 milliGRAM(s) Oral every 8 hours  mupirocin 2% Ointment 1 Application(s) Both Nostrils two times a day  pantoprazole    Tablet 40 milliGRAM(s) Oral before breakfast  senna 2 Tablet(s) Oral at bedtime  simvastatin 20 milliGRAM(s) Oral at bedtime      MEDICATIONS  (PRN):  acetaminophen     Tablet .. 650 milliGRAM(s) Oral every 6 hours PRN Temp greater or equal to 38C (100.4F), Mild Pain (1 - 3)  aluminum hydroxide/magnesium hydroxide/simethicone Suspension 30 milliLiter(s) Oral every 4 hours PRN Dyspepsia  melatonin 3 milliGRAM(s) Oral at bedtime PRN Insomnia  ondansetron Injectable 4 milliGRAM(s) IV Push every 8 hours PRN Nausea and/or Vomiting   Medications up to date at time of exam.    Medications up to date at time of exam.    FAMILY HISTORY:  Family history of cancer        SOCIAL HISTORY  Smoking History: [   ] smoking/smoke exposure, [  x ] former smoker  Living Condition: [   ] apartment, [   ] private house  Work History:   Travel History: denies recent travel  Illicit Substance Use: denies  Alcohol Use: denies    REVIEW OF SYSTEMS:    CONSTITUTIONAL:  denies fevers, chills, sweats, weight loss    HEENT:  denies diplopia or blurred vision, sore throat or runny nose.    CARDIOVASCULAR:  denies pressure, squeezing, tightness, or heaviness about the chest; no palpitations.    RESPIRATORY:  denies SOB, cough, HEMPHILL, wheezing.    GASTROINTESTINAL:  denies abdominal pain, nausea, vomiting or diarrhea.    GENITOURINARY: denies dysuria, frequency or urgency.    NEUROLOGIC:  denies numbness, tingling, seizures or weakness.    PSYCHIATRIC:  denies disorder of thought or mood.    MSK: denies swelling, redness      PHYSICAL EXAMINATION:    GENERAL: The patient  in no apparent distress.     Vital Signs Last 24 Hrs  T(C): 36.7 (01 Jun 2024 11:17), Max: 37 (31 May 2024 20:09)  T(F): 98.1 (01 Jun 2024 11:17), Max: 98.6 (31 May 2024 20:09)  HR: 82 (01 Jun 2024 11:17) (72 - 82)  BP: 121/64 (01 Jun 2024 11:17) (103/62 - 128/71)  BP(mean): 82 (01 Jun 2024 04:52) (82 - 89)  RR: 18 (01 Jun 2024 11:17) (17 - 18)  SpO2: 96% (01 Jun 2024 11:17) (95% - 100%)    Parameters below as of 01 Jun 2024 11:17  Patient On (Oxygen Delivery Method): nasal cannula  O2 Flow (L/min): 2     (if applicable)    Chest Tube (if applicable)    HEENT: Head is normocephalic and atraumatic. Extraocular muscles are intact. Mucous membranes are moist.     NECK: Supple, no palpable adenopathy.    LUNGS: Fair b/l breath sounds, no wheezing, rales, or rhonchi.    HEART: Regular rate and rhythm without murmur.    ABDOMEN: Soft, nontender, and nondistended.  No hepatosplenomegaly is noted.    RENAL: No difficulty voiding, no pelvic pain    EXTREMITIES: Without any cyanosis, clubbing, rash, lesions or edema. LUE AVF     NEUROLOGIC: Awake, alert, oriented, grossly intact    SKIN: Warm, dry, good turgor.      LABS:                        8.1    7.63  )-----------( 245      ( 30 May 2024 16:08 )             26.2   MICROBIOLOGY: (if applicable)    RADIOLOGY & ADDITIONAL STUDIES:  EKG:   CXR:  < from: CT Angio Chest PE Protocol w/ IV Cont (05.29.24 @ 16:57) >    ACC: [id number] EXAM:  [facility]   ORDERED BY: SAMIR CASTILLO     PROCEDURE DATE:  05/29/2024          INTERPRETATION:  CLINICAL INFORMATION: Chest pain    COMPARISON: CT chest 5/6/2024    CONTRAST/COMPLICATIONS:  IV Contrast: Omnipaque 350  54 cc administered   46 cc discarded  Oral Contrast: NONE  Complications: None reported at time of study completion    PROCEDURE:  CT Angiography of the Chest.  Sagittal and coronal reformats were performed as well as 3D (MIP)   reconstructions.    FINDINGS:    LUNGS AND AIRWAYS: Small debris within the right lateral mid to upper   trachea. Small debris in the distal posterior tracheal. Compressive   atelectasis the bilateral lower lobes right greater than left with   additional subsegmental atelectasis in the lingula, posterior right upper   lobe and posterior right middle lobe.  PLEURA: Moderate bilateral pleural effusions.  MEDIASTINUM AND ELDA: No lymphadenopathy.  VESSELS: No evidence of acute pulmonary embolism. Atherosclerotic changes   of the aorta and coronary vasculature.  HEART: Heart is mildly enlarged. No pericardial effusion.  CHEST WALL AND LOWER NECK: Within normal limits.  VISUALIZED UPPER ABDOMEN: Partially visualized atrophy of the bilateral   kidneys. Suggestion of surface contour nodularity of the liver, correlate   with liver function tests to exclude cirrhosis.  BONES: Degenerative changes.    IMPRESSION:  Moderate bilateral pleural effusions right greater than left with   compressive atelectasis right greater than left. Close of debris within   the trachea.    No evidence of acute pulmonary embolism.        --- End of Report ---            JEFF HENLEY MD; Attending Radiologist  This document has been electronically signed. May 29 2024  5:04PM    < end of copied text >  ECHO:    IMPRESSION: 83y Female PAST MEDICAL & SURGICAL HISTORY:  Hypertension      Hyperlipidemia      Gout      Cataract      Breast lump      Osteoporosis      Osteoarthritis      Vitamin D deficiency      Seasonal allergies      CKD (chronic kidney disease)      Diabetes mellitus      ESRD on dialysis      H/O bilateral cataract extraction  2012  and 2014      History of breast surgery  Excision of left breast lump - benign  1991      History of knee replacement, total, left  2009      Arteriovenous fistula  left side limb alert       p/w         IMP: This is an 83 yr old with DM, HTN, HLD, ESRD on HD M, W, F, presenting to the ED with SOB. Pt was sent from [facility], recently admitted to [facility] for PNA. She states that she started having SOB due to acute hypoxic resp failure due to pulmonary edema requiring supp O2 and dialysis .         ASSESSMENT     - Acute Hypoxic Resp Failure   - B/L Pulmonary edema   - ESRD on HD   - HTN HLD   - T2DM       Sugg:     - Continue O2 supp as needed to maintain sat >90%  - Dialysis as per Neph   - Repeat CXR in 24 hrs   - Monitor blood glucose with coverage   - DVT GI prophy

## 2024-06-01 NOTE — PROGRESS NOTE ADULT - PROBLEM SELECTOR PLAN 1
-pro BNP [id number] on admission s/p HD  -remains with dyspnea  -given dyspnea and hypoxia, evaluated for PE   -CTA chest negative for PE but shows moderate plueral effusion  -received dialysis today 5/30  -CXR with improving congestion   -cont HD as per Nephro   -Nephro Dr. Pandey following  -mon I&Os  -daily weights  - Pulm consulted for effusions

## 2024-06-01 NOTE — PROGRESS NOTE ADULT - PROBLEM SELECTOR PLAN 12
-pt from [facility]  -pending Pulm and GI reccs  -PT eval  - possible d/c MON back to [facility] pending above

## 2024-06-01 NOTE — PROGRESS NOTE ADULT - ASSESSMENT
# ESRD admitted with SOB- pulmonary edema improvement with HD and ultrafiltration.  s/p HD wednesday and thursday and today -> next HD session Monday early for DC after  # bilateral pleural effusion - consulted pulmonary - Dr valdovinos.  # Hyponatremia- improved. sec to fluid overload. UF on HD fluid restriction.  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

## 2024-06-01 NOTE — PROGRESS NOTE ADULT - SUBJECTIVE AND OBJECTIVE BOX
PATIENT SEEN AND EXAMINED BY ARIANNA CHAVIRA M.D. ON :- 6/1/24  DATE OF SERVICE:  6/1/24           Interim events noted,Labs ,Radiological studies and Cardiology tests reviewed .    Patient is a 83y old  Female who presents with a chief complaint of SOB 2/2 fluid overload (01 Jun 2024 14:28)      HPI:  83F PMH DM, HTN, HLD, ESRD on HD M, W, F, presenting to the ED with SOB. Pt was sent from [facility] at [facility] [place], recently admitted to [facility] for PNA. She states that she started having SOB and cold sweats suddenly around 3:30AM, BP noted to be 147/102. She also endorsed experiencing chest pressure, non radiating, mid sternum, with a warm flushing sensation thoughtout her body, which lasts for less than a minute and resolves on its own. She was given duonebs with improvement in symptoms, but her symptoms later returned. She received another treatement of duonebs, then EMS was called. While seen in the ED, she stated she continued having the flushing sensation with chest pressure, again lasting for less than 1 minute. She denies any chills, N/V/D, abdominal pain, dysuria, numbness/tingling/weakness in extremities. She has not missed any of her HD sessions, but notes that she has required more fluid to be removed during the last 2 sessions due to worsening swelling of her legs. She is from [facility] [place] and ambulates with a walker (27 May 2024 14:49)      PAST MEDICAL & SURGICAL HISTORY:  Hypertension      Hyperlipidemia      Gout      Cataract      Breast lump      Osteoporosis      Osteoarthritis      Vitamin D deficiency      Seasonal allergies      CKD (chronic kidney disease)      Diabetes mellitus      ESRD on dialysis      H/O bilateral cataract extraction  2012  and 2014      History of breast surgery  Excision of left breast lump - benign  1991      History of knee replacement, total, left  2009      Arteriovenous fistula  left side limb alert          PREVIOUS DIAGNOSTIC TESTING:      ECHO  FINDINGS:    STRESS  FINDINGS:    CATHETERIZATION  FINDINGS:    MEDICATIONS  (STANDING):  chlorhexidine 2% Cloths 1 Application(s) Topical <User Schedule>  epoetin lara (PROCRIT) Injectable 48041 Unit(s) IV Push <User Schedule>  folic acid 1 milliGRAM(s) Oral daily  heparin   Injectable 5000 Unit(s) SubCutaneous every 8 hours  insulin glargine Injectable (LANTUS) 3 Unit(s) SubCutaneous at bedtime  insulin lispro (ADMELOG) corrective regimen sliding scale   SubCutaneous three times a day before meals  insulin lispro (ADMELOG) corrective regimen sliding scale   SubCutaneous at bedtime  metoprolol tartrate 25 milliGRAM(s) Oral every 8 hours  mupirocin 2% Ointment 1 Application(s) Both Nostrils two times a day  pantoprazole    Tablet 40 milliGRAM(s) Oral before breakfast  senna 2 Tablet(s) Oral at bedtime  simvastatin 20 milliGRAM(s) Oral at bedtime    MEDICATIONS  (PRN):  acetaminophen     Tablet .. 650 milliGRAM(s) Oral every 6 hours PRN Temp greater or equal to 38C (100.4F), Mild Pain (1 - 3)  aluminum hydroxide/magnesium hydroxide/simethicone Suspension 30 milliLiter(s) Oral every 4 hours PRN Dyspepsia  melatonin 3 milliGRAM(s) Oral at bedtime PRN Insomnia  ondansetron Injectable 4 milliGRAM(s) IV Push every 8 hours PRN Nausea and/or Vomiting      FAMILY HISTORY:  Family history of cancer        SOCIAL HISTORY:    CIGARETTES:    ALCOHOL:    REVIEW OF SYSTEMS:  CONSTITUTIONAL: No fever, weight loss, or fatigue  EYES: No eye pain, visual disturbances, or discharge  ENMT:  No difficulty hearing, tinnitus, vertigo; No sinus or throat pain  NECK: No pain or stiffness  RESPIRATORY: No cough, wheezing, chills or hemoptysis; No shortness of breath  CARDIOVASCULAR: No chest pain, palpitations, dizziness, or leg swelling  GASTROINTESTINAL: No abdominal or epigastric pain. No nausea, vomiting, or hematemesis; No diarrhea or constipation. No melena or hematochezia.  GENITOURINARY: No dysuria, frequency, hematuria, or incontinence  NEUROLOGICAL: No headaches, memory loss, loss of strength, numbness, or tremors  SKIN: No itching, burning, rashes, or lesions   LYMPH NODES: No enlarged glands  ENDOCRINE: No heat or cold intolerance; No hair loss  MUSCULOSKELETAL: No joint pain or swelling; No muscle, back, or extremity pain  PSYCHIATRIC: No depression, anxiety, mood swings, or difficulty sleeping  HEME/LYMPH: No easy bruising, or bleeding gums  ALLERY AND IMMUNOLOGIC: No hives or eczema    Vital Signs Last 24 Hrs  T(C): 37.7 (01 Jun 2024 19:36), Max: 37.7 (01 Jun 2024 19:36)  T(F): 99.9 (01 Jun 2024 19:36), Max: 99.9 (01 Jun 2024 19:36)  HR: 76 (01 Jun 2024 19:36) (75 - 89)  BP: 137/68 (01 Jun 2024 19:36) (106/70 - 144/74)  BP(mean): 89 (01 Jun 2024 19:36) (82 - 89)  RR: 17 (01 Jun 2024 19:36) (17 - 18)  SpO2: 93% (01 Jun 2024 19:36) (93% - 100%)    Parameters below as of 01 Jun 2024 19:36  Patient On (Oxygen Delivery Method): room air          PHYSICAL EXAM:  GENERAL: NAD, well-groomed, well-developed  HEAD:  Atraumatic, Normocephalic  EYES: EOMI, PERRLA, conjunctiva and sclera clear  ENMT: No tonsillar erythema, exudates, or enlargement; Moist mucous membranes, Good dentition, No lesions  NECK: Supple, No JVD, Normal thyroid  NERVOUS SYSTEM:  Alert & Oriented X3, Good concentration; Motor Strength 5/5 B/L upper and lower extremities; DTRs 2+ intact and symmetric  CHEST/LUNG: Clear to percussion bilaterally; No rales, rhonchi, wheezing, or rubs  HEART: Regular rate and rhythm; No murmurs, rubs, or gallops  ABDOMEN: Soft, Nontender, Nondistended; Bowel sounds present  EXTREMITIES:  2+ Peripheral Pulses, No clubbing, cyanosis, or edema  LYMPH: No lymphadenopathy noted  SKIN: No rashes or lesions      INTERPRETATION OF TELEMETRY:    ECG:    CATEDSVAS:     LABS:                        7.5    7.85  )-----------( 272      ( 01 Jun 2024 13:30 )             23.8     06-01    133<L>  |  98  |  27<H>  ----------------------------<  271<H>  3.6   |  29  |  4.49<H>    Ca    8.5      01 Jun 2024 13:30  Phos  0.9     06-01  Mg     2.0     06-01    TPro  5.6<L>  /  Alb  2.4<L>  /  TBili  0.4  /  DBili  x   /  AST  41<H>  /  ALT  45  /  AlkPhos  171<H>  06-01          Urinalysis Basic - ( 01 Jun 2024 13:30 )    Color: x / Appearance: x / SG: x / pH: x  Gluc: 271 mg/dL / Ketone: x  / Bili: x / Urobili: x   Blood: x / Protein: x / Nitrite: x   Leuk Esterase: x / RBC: x / WBC x   Sq Epi: x / Non Sq Epi: x / Bacteria: x      Lipid Panel:   I&O's Summary      RADIOLOGY & ADDITIONAL STUDIES:

## 2024-06-01 NOTE — CONSULT NOTE ADULT - SUBJECTIVE AND OBJECTIVE BOX
[  ] STAT REQUEST              [ X ] ROUTINE REQUEST    Patient is a 83 year old female with anemia. GI consulted to evaluate.         HPI:  83F PMH DM, HTN, HLD, ESRD on HD M, W, F, presenting to the ED with SOB. Pt was sent from [facility] at [facility] [place], recently admitted to [facility] for PNA. She states that she started having SOB and cold sweats suddenly around 3:30AM, BP noted to be 147/102. She also endorsed experiencing chest pressure, non radiating, mid sternum, with a warm flushing sensation thoughtout her body, which lasts for less than a minute and resolves on its own. She was given duonebs with improvement in symptoms, but her symptoms later returned. She received another treatement of duonebs, then EMS was called. While seen in the ED, she stated she continued having the flushing sensation with chest pressure, again lasting for less than 1 minute. She denies any chills, N/V/D, abdominal pain, dysuria, numbness/tingling/weakness in extremities. She has not missed any of her HD sessions, but notes that she has required more fluid to be removed during the last 2 sessions due to worsening swelling of her legs. She is from [facility] [place] and ambulates with a walker        PAIN MANAGEMENT:  Pain Scale:                 /10  Pain Location:      Prior Colonoscopy:    PAST MEDICAL HISTORY  Hypertension    Hyperlipidemia    Gout    Cataract    Breast lump    Osteoporosis    Osteoarthritis    Vitamin D deficiency    Seasonal allergies    CKD (chronic kidney disease)    Diabetes mellitus    ESRD on dialysis        PAST SURGICAL HISTORY  H/O bilateral cataract extraction    History of breast surgery    History of knee replacement, total, left    Arteriovenous fistula        Allergies    shellfish (Anaphylaxis)  ibuprofen (Unknown)  latex (Unknown)  natural rubber (Unknown)  Mushrooms (Anaphylaxis)  aspirin (Unknown)    Intolerances        HOME MEDICATIONS    MEDICATIONS  (STANDING):  chlorhexidine 2% Cloths 1 Application(s) Topical <User Schedule>  epoetin lara (PROCRIT) Injectable 09293 Unit(s) IV Push <User Schedule>  folic acid 1 milliGRAM(s) Oral daily  heparin   Injectable 5000 Unit(s) SubCutaneous every 8 hours  insulin glargine Injectable (LANTUS) 3 Unit(s) SubCutaneous at bedtime  insulin lispro (ADMELOG) corrective regimen sliding scale   SubCutaneous three times a day before meals  insulin lispro (ADMELOG) corrective regimen sliding scale   SubCutaneous at bedtime  metoprolol tartrate 25 milliGRAM(s) Oral every 8 hours  mupirocin 2% Ointment 1 Application(s) Both Nostrils two times a day  pantoprazole    Tablet 40 milliGRAM(s) Oral before breakfast  senna 2 Tablet(s) Oral at bedtime  simvastatin 20 milliGRAM(s) Oral at bedtime    MEDICATIONS  (PRN):  acetaminophen     Tablet .. 650 milliGRAM(s) Oral every 6 hours PRN Temp greater or equal to 38C (100.4F), Mild Pain (1 - 3)  aluminum hydroxide/magnesium hydroxide/simethicone Suspension 30 milliLiter(s) Oral every 4 hours PRN Dyspepsia  melatonin 3 milliGRAM(s) Oral at bedtime PRN Insomnia  ondansetron Injectable 4 milliGRAM(s) IV Push every 8 hours PRN Nausea and/or Vomiting      SOCIAL HISTORY  Advanced Directives:       [  ] Full Code       [  ] DNR  Marital Status:         [  ] M      [  ] S      [  ] D       [  ] W  Children:       [  ] Yes      [  ] No  Occupation:        [  ] Employed       [  ] Unemployed       [  ] Retired  Diet:       [  ] Regular       [  ] PEG feeding          [  ] NG tube feeding  Drug Use:           [  ] Patient denied          [  ] Yes  Alcohol:           [  ] No             [  ] Yes (socially)         [  ] Yes (chronic)  Tobacco:           [  ] Yes           [  ] No    FAMILY HISTORY  [  ] Heart Disease            [  ] Diabetes             [  ] HTN             [  ] Colon Cancer             [  ] Stomach Cancer              [  ] Pancreatic Cancer    VITAL SIGNS   Vital Signs Last 24 Hrs  T(C): 36.8 (24 @ 07:35), Max: 37 (24 @ 20:09)  T(F): 98.2 (24 @ 07:35), Max: 98.6 (24 @ 20:09)  HR: 75 (24 @ 07:35) (72 - 88)  BP: 123/86 (24 @ 07:35) (103/62 - 128/71)  BP(mean): 82 (24 @ 04:52) (82 - 89)  RR: 18 (24 @ 07:35) (17 - 18)  SpO2: 95% (24 @ 07:35) (95% - 100%)  Daily Weight in k.5 (2024 04:52)       CBC Full  -  ( 30 May 2024 16:08 )  WBC Count : 7.63 K/uL  RBC Count : 2.80 M/uL  Hemoglobin : 8.1 g/dL  Hematocrit : 26.2 %  Platelet Count - Automated : 245 K/uL  Mean Cell Volume : 93.6 fl  Mean Cell Hemoglobin : 28.9 pg  Mean Cell Hemoglobin Concentration : 30.9 gm/dL  Auto Neutrophil # : x  Auto Lymphocyte # : x  Auto Monocyte # : x  Auto Eosinophil # : x  Auto Basophil # : x  Auto Neutrophil % : x  Auto Lymphocyte % : x  Auto Monocyte % : x  Auto Eosinophil % : x  Auto Basophil % : x                  ALT/SGPT 36  Albumin, Serum 2.4  Alkaline Phosphatase 130  AST/SGOT 31  Bilirubin Direct --  Bilirubin Total 0.4  Indirect Bilirubin --  Hepatitis A Total --  Hepatitis B Core Antibody --  Hepatitis B Surface Antibody --  Hepatitis B Surface Antigen --  Hepatitis C Virus Interpretation --  Hepatitis C Virus Genotype --              RADIOLOGY/IMAGING                           [  ] STAT REQUEST              [ X ] ROUTINE REQUEST    Patient is a 83 year old female with anemia. GI consulted to evaluate.         HPI:  83 year old female with past medical history significant for DM, HTN, Hyperlipidemia, Gout, Osteoporosis, Osteoarthritis, Vitamin D deficiency, Cataract, Breast lump, ESRD on HD M, W, F, presented to the emergency room with SOB and chest pain. On admission patient had anemia and during the hospital course developed drop in her H/H. Patient c/o worsening constipation but denies nausea, vomiting, hematemesis, hematochezia, melena, epistaxis, hemoptysis, fever, chills, palpitation, cough, hematuria, dysuria or diarrhea.     PAIN MANAGEMENT:  Pain Scale:                010  Pain Location:      Prior Colonoscopy:  UNknown      PAST MEDICAL HISTORY    Hypertension    Hyperlipidemia    Gout    Cataract    Breast lump    Osteoporosis    Osteoarthritis    Vitamin D deficiency    Seasonal allergies     Diabetes mellitus    ESRD on dialysis        PAST SURGICAL HISTORY    Bilateral cataract extraction    Breast surgery    Knee replacement, total, left    Arteriovenous fistula        Allergies    shellfish (Anaphylaxis)  ibuprofen (Unknown)  latex (Unknown)  natural rubber (Unknown)  Mushrooms (Anaphylaxis)  aspirin (Unknown)    Intolerances  None         MEDICATIONS  (STANDING):  chlorhexidine 2% Cloths 1 Application(s) Topical <User Schedule>  epoetin lara (PROCRIT) Injectable 34644 Unit(s) IV Push <User Schedule>  folic acid 1 milliGRAM(s) Oral daily  heparin   Injectable 5000 Unit(s) SubCutaneous every 8 hours  insulin glargine Injectable (LANTUS) 3 Unit(s) SubCutaneous at bedtime  insulin lispro (ADMELOG) corrective regimen sliding scale   SubCutaneous three times a day before meals  insulin lispro (ADMELOG) corrective regimen sliding scale   SubCutaneous at bedtime  metoprolol tartrate 25 milliGRAM(s) Oral every 8 hours  mupirocin 2% Ointment 1 Application(s) Both Nostrils two times a day  pantoprazole    Tablet 40 milliGRAM(s) Oral before breakfast  senna 2 Tablet(s) Oral at bedtime  simvastatin 20 milliGRAM(s) Oral at bedtime    MEDICATIONS  (PRN):  acetaminophen     Tablet .. 650 milliGRAM(s) Oral every 6 hours PRN Temp greater or equal to 38C (100.4F), Mild Pain (1 - 3)  aluminum hydroxide/magnesium hydroxide/simethicone Suspension 30 milliLiter(s) Oral every 4 hours PRN Dyspepsia  melatonin 3 milliGRAM(s) Oral at bedtime PRN Insomnia  ondansetron Injectable 4 milliGRAM(s) IV Push every 8 hours PRN Nausea and/or Vomiting      SOCIAL HISTORY  Advanced Directives:       [ X ] Full Code       [  ] DNR  Marital Status:         [  ] M      [  ] S      [  ] D       [  ] W  Children:       [ X ] Yes      [  ] No  Occupation:        [  ] Employed       [ X ] Unemployed       [  ] Retired  Diet:       [ X ] Regular       [  ] PEG feeding          [  ] NG tube feeding  Drug Use:           [ X ] Patient denied          [  ] Yes  Alcohol:           [ X ] No             [  ] Yes (socially)         [  ] Yes (chronic)  Tobacco:           [  ] Yes           [X  ] No      FAMILY HISTORY  [ X ] Heart Disease            [ X ] Diabetes             [ X ] HTN             [  ] Colon Cancer             [  ] Stomach Cancer              [  ] Pancreatic Cancer      VITAL SIGNS   Vital Signs Last 24 Hrs  T(C): 36.8 (24 @ 07:35), Max: 37 (24 @ 20:09)  T(F): 98.2 (24 @ 07:35), Max: 98.6 (24 @ 20:09)  HR: 75 (24 @ 07:35) (72 - 88)  BP: 123/86 (24 @ 07:35) (103/62 - 128/71)  BP(mean): 82 (24 @ 04:52) (82 - 89)  RR: 18 (24 @ 07:35) (17 - 18)  SpO2: 95% (24 @ 07:35) (95% - 100%)  Daily Weight in k.5 (2024 04:52)         CBC Full  -  ( 30 May 2024 16:08 )  WBC Count : 7.63 K/uL  RBC Count : 2.80 M/uL  Hemoglobin : 8.1 g/dL  Hematocrit : 26.2 %  Platelet Count - Automated : 245 K/uL  Mean Cell Volume : 93.6 fl  Mean Cell Hemoglobin : 28.9 pg  Mean Cell Hemoglobin Concentration : 30.9 gm/dL  Auto Neutrophil # : x  Auto Lymphocyte # : x  Auto Monocyte # : x  Auto Eosinophil # : x  Auto Basophil # : x  Auto Neutrophil % : x  Auto Lymphocyte % : x  Auto Monocyte % : x  Auto Eosinophil % : x  Auto Basophil % : x     Iron with Total Binding Capacity in AM (05.30.24 @ 07:23)   Iron - Total Binding Capacity.: 139 ug/dL  % Saturation, Iron: 18 %  Iron Total: 25 ug/dL  Unsaturated Iron Binding Capacity: 114 ug/dL    Urinalysis (09.10.22 @ 18:30)   Blood, Urine: Large  Glucose Qualitative, Urine: Negative  pH Urine: 8.0  Color: Red  Urine Appearance: Slightly Turbid  Bilirubin: Negative  Ketone - Urine: Negative  Specific Gravity: 1.010  Protein, Urine: 500 mg/dL  Urobilinogen: Negative  Nitrite: Negative  Leukocyte Esterase Concentration: Moderate      ECG     Ventricular Rate 76 BPM    Atrial Rate 76 BPM    P-R Interval 170 ms    QRS Duration 76 ms    Q-T Interval 492 ms    QTC Calculation(Bazett) 553 ms    P Axis 47 degrees    R Axis 8 degrees    T Axis 166 degrees    Diagnosis Line Normal sinus rhythm  T wave abnormality, consider inferolateral ischemia  Prolonged QT  Abnormal ECG         RADIOLOGY/IMAGING                    ACC: [id number] [facility]   ORDERED BY: SAMIR CASTILLO     PROCEDURE DATE:  2024          INTERPRETATION:  CLINICAL INFORMATION: Chest pain    COMPARISON: CT chest 2024    CONTRAST/COMPLICATIONS:  IV Contrast: Omnipaque 350  54 cc administered   46 cc discarded  Oral Contrast: NONE  Complications: None reported at time of study completion    PROCEDURE:  CT Angiography of the Chest.  Sagittal and coronal reformats were performed as well as 3D (MIP)   reconstructions.    FINDINGS:    LUNGS AND AIRWAYS: Small debris within the right lateral mid to upper   trachea. Small debris in the distal posterior tracheal. Compressive   atelectasis the bilateral lower lobes right greater than left with   additional subsegmental atelectasis in the lingula, posterior right upper   lobe and posterior right middle lobe.  PLEURA: Moderate bilateral pleural effusions.  MEDIASTINUM AND ELDA: No lymphadenopathy.  VESSELS: No evidence of acute pulmonary embolism. Atherosclerotic changes   of the aorta and coronary vasculature.  HEART: Heart is mildly enlarged. No pericardial effusion.  CHEST WALL AND LOWER NECK: Within normal limits.  VISUALIZED UPPER ABDOMEN: Partially visualized atrophy of the bilateral   kidneys. Suggestion of surface contour nodularity of the liver, correlate   with liver function tests to exclude cirrhosis.  BONES: Degenerative changes.    IMPRESSION:  Moderate bilateral pleural effusions right greater than left with   compressive atelectasis right greater than left. Close of debris within   the trachea.    No evidence of acute pulmonary embolism.

## 2024-06-01 NOTE — PROGRESS NOTE ADULT - SUBJECTIVE AND OBJECTIVE BOX
[facility] - ROBERT Garber / ROBERT Valdes / JONATHAN Burt/ ROBERT Trujillo/ ROBERT Massey/ JOSELIN Mi / RACHEL Pandey / SUJIT Alatorre / LILA Ayoub  ---------------------------------------------------------------------------------------------------------------  seen and examined today for ESRD  Interval : tolerating HD well today  VITALS:  T(F): 98.1 (06-01-24 @ 11:17), Max: 98.6 (05-31-24 @ 20:09)  HR: 82 (06-01-24 @ 11:17)  BP: 121/64 (06-01-24 @ 11:17)  RR: 18 (06-01-24 @ 11:17)  SpO2: 96% (06-01-24 @ 11:17)  Wt(kg): --    Physical Exam :-  Constitutional: NAD  Neck: Supple.  Respiratory: Bilateral equal breath sounds,  Cardiovascular: S1, S2 normal,  Gastrointestinal: Bowel Sounds present, soft, non tender.  Extremities: No edema  Neurological: Alert and Oriented x 3, no focal deficits  Psychiatric: Normal mood, normal affect  Data:-  Allergies :   shellfish (Anaphylaxis)  ibuprofen (Unknown)  latex (Unknown)  natural rubber (Unknown)  Mushrooms (Anaphylaxis)  aspirin (Unknown)    Hospital Medications:   MEDICATIONS  (STANDING):  chlorhexidine 2% Cloths 1 Application(s) Topical <User Schedule>  epoetin lara (PROCRIT) Injectable 00112 Unit(s) IV Push <User Schedule>  folic acid 1 milliGRAM(s) Oral daily  heparin   Injectable 5000 Unit(s) SubCutaneous every 8 hours  insulin glargine Injectable (LANTUS) 3 Unit(s) SubCutaneous at bedtime  insulin lispro (ADMELOG) corrective regimen sliding scale   SubCutaneous three times a day before meals  insulin lispro (ADMELOG) corrective regimen sliding scale   SubCutaneous at bedtime  metoprolol tartrate 25 milliGRAM(s) Oral every 8 hours  mupirocin 2% Ointment 1 Application(s) Both Nostrils two times a day  pantoprazole    Tablet 40 milliGRAM(s) Oral before breakfast  senna 2 Tablet(s) Oral at bedtime  simvastatin 20 milliGRAM(s) Oral at bedtime    06-01    133<L>  |  98  |  27<H>  ----------------------------<  271<H>  3.6   |  29  |  4.49<H>    Ca    8.5      01 Jun 2024 13:30  Phos  0.9     06-01  Mg     2.0     06-01    TPro  5.6<L>  /  Alb  2.4<L>  /  TBili  0.4  /  DBili      /  AST  41<H>  /  ALT  45  /  AlkPhos  171<H>  06-01    Creatinine Trend: 4.49 <--, 3.35 <--, 4.81 <--, 3.43 <--, 2.97 <--, 5.46 <--                        7.5    7.85  )-----------( 272      ( 01 Jun 2024 13:30 )             23.8

## 2024-06-01 NOTE — PROGRESS NOTE ADULT - ASSESSMENT
83F  from [facility], PMH DM, HTN, HLD, hemochromatosis, ESRD on HD M, W, F, presenting to the ED with SOB admitted for pulmonary congestion 2/2 fluid overload. Admitted for fluid overload, started on dialysis. Pt with episode of VT arrest on 5/27 and had an RRT, VT episode was self limiting and did not require any interventions. Nephro Dr. Pandey following, HD was continued.   Pt has hx of hemochromatosis follows with Dr. Shelby, A group consulted. Now with low Hgb like anemia of chronic disease, no active bleeding noted, f/u anemia w/u, repeat Hgb stable      Daughter at bedside and updated on plan of care all questions answered.

## 2024-06-02 ENCOUNTER — TRANSCRIPTION ENCOUNTER (OUTPATIENT)
Age: 84
End: 2024-06-02

## 2024-06-02 LAB
GLUCOSE BLDC GLUCOMTR-MCNC: 198 MG/DL — HIGH (ref 70–99)
GLUCOSE BLDC GLUCOMTR-MCNC: 210 MG/DL — HIGH (ref 70–99)
GLUCOSE BLDC GLUCOMTR-MCNC: 293 MG/DL — HIGH (ref 70–99)
GLUCOSE BLDC GLUCOMTR-MCNC: 397 MG/DL — HIGH (ref 70–99)

## 2024-06-02 RX ORDER — SODIUM,POTASSIUM PHOSPHATES 278-250MG
1 POWDER IN PACKET (EA) ORAL ONCE
Refills: 0 | Status: COMPLETED | OUTPATIENT
Start: 2024-06-02 | End: 2024-06-02

## 2024-06-02 RX ADMIN — INSULIN GLARGINE 3 UNIT(S): 100 INJECTION, SOLUTION SUBCUTANEOUS at 21:22

## 2024-06-02 RX ADMIN — Medication 1 MILLIGRAM(S): at 12:26

## 2024-06-02 RX ADMIN — MUPIROCIN 1 APPLICATION(S): 20 OINTMENT TOPICAL at 05:49

## 2024-06-02 RX ADMIN — HEPARIN SODIUM 5000 UNIT(S): 5000 INJECTION INTRAVENOUS; SUBCUTANEOUS at 14:25

## 2024-06-02 RX ADMIN — Medication 1: at 21:21

## 2024-06-02 RX ADMIN — SIMVASTATIN 20 MILLIGRAM(S): 20 TABLET, FILM COATED ORAL at 21:22

## 2024-06-02 RX ADMIN — PANTOPRAZOLE SODIUM 40 MILLIGRAM(S): 20 TABLET, DELAYED RELEASE ORAL at 05:49

## 2024-06-02 RX ADMIN — Medication 25 MILLIGRAM(S): at 17:21

## 2024-06-02 RX ADMIN — Medication 25 MILLIGRAM(S): at 05:49

## 2024-06-02 RX ADMIN — HEPARIN SODIUM 5000 UNIT(S): 5000 INJECTION INTRAVENOUS; SUBCUTANEOUS at 05:49

## 2024-06-02 RX ADMIN — Medication 1: at 08:23

## 2024-06-02 RX ADMIN — Medication 2: at 17:20

## 2024-06-02 RX ADMIN — SENNA PLUS 2 TABLET(S): 8.6 TABLET ORAL at 21:22

## 2024-06-02 RX ADMIN — CHLORHEXIDINE GLUCONATE 1 APPLICATION(S): 213 SOLUTION TOPICAL at 05:49

## 2024-06-02 RX ADMIN — Medication 1 PACKET(S): at 14:25

## 2024-06-02 RX ADMIN — Medication 5: at 11:50

## 2024-06-02 RX ADMIN — Medication 25 MILLIGRAM(S): at 21:23

## 2024-06-02 RX ADMIN — HEPARIN SODIUM 5000 UNIT(S): 5000 INJECTION INTRAVENOUS; SUBCUTANEOUS at 21:22

## 2024-06-02 NOTE — PROGRESS NOTE ADULT - ASSESSMENT
83F  from [facility], PMH DM, HTN, HLD, hemochromatosis, ESRD on HD M, W, F, presenting to the ED with SOB admitted for pulmonary congestion 2/2 fluid overload. Admitted for fluid overload, started on dialysis. Pt with episode of VT arrest on 5/27 and had an RRT, VT episode was self limiting and did not require any interventions. Nephro Dr. Pandey following, HD was continued.   Pt has hx of hemochromatosis follows with Dr. Shelby, A group consulted. Now with low Hgb like anemia of chronic disease, no active bleeding noted, f/u anemia w/u, repeat Hgb stable      Daughter at bedside and updated on plan of care all questions answered.

## 2024-06-02 NOTE — PROGRESS NOTE ADULT - SUBJECTIVE AND OBJECTIVE BOX
PATIENT SEEN AND EXAMINED BY ARIANNA CHAVIRA M.D. ON :- 6/2/24  DATE OF SERVICE:     6/2/24        Interim events noted,Labs ,Radiological studies and Cardiology tests reviewed .    Patient is a 83y old  Female who presents with a chief complaint of SOB 2/2 fluid overload (02 Jun 2024 16:41)      HPI:  83F PMH DM, HTN, HLD, ESRD on HD M, W, F, presenting to the ED with SOB. Pt was sent from [facility] at [facility], recently admitted to [facility] for PNA. She states that she started having SOB and cold sweats suddenly around 3:30AM, BP noted to be 147/102. She also endorsed experiencing chest pressure, non radiating, mid sternum, with a warm flushing sensation thoughtout her body, which lasts for less than a minute and resolves on its own. She was given duonebs with improvement in symptoms, but her symptoms later returned. She received another treatement of duonebs, then EMS was called. While seen in the ED, she stated she continued having the flushing sensation with chest pressure, again lasting for less than 1 minute. She denies any chills, N/V/D, abdominal pain, dysuria, numbness/tingling/weakness in extremities. She has not missed any of her HD sessions, but notes that she has required more fluid to be removed during the last 2 sessions due to worsening swelling of her legs. She is from [facility] and ambulates with a walker (27 May 2024 14:49)      PAST MEDICAL & SURGICAL HISTORY:  Hypertension      Hyperlipidemia      Gout      Cataract      Breast lump      Osteoporosis      Osteoarthritis      Vitamin D deficiency      Seasonal allergies      CKD (chronic kidney disease)      Diabetes mellitus      ESRD on dialysis      H/O bilateral cataract extraction  2012  and 2014      History of breast surgery  Excision of left breast lump - benign  1991      History of knee replacement, total, left  2009      Arteriovenous fistula  left side limb alert          PREVIOUS DIAGNOSTIC TESTING:      ECHO  FINDINGS:    STRESS  FINDINGS:    CATHETERIZATION  FINDINGS:    MEDICATIONS  (STANDING):  chlorhexidine 2% Cloths 1 Application(s) Topical <User Schedule>  epoetin lara (PROCRIT) Injectable 66339 Unit(s) IV Push <User Schedule>  folic acid 1 milliGRAM(s) Oral daily  heparin   Injectable 5000 Unit(s) SubCutaneous every 8 hours  insulin glargine Injectable (LANTUS) 3 Unit(s) SubCutaneous at bedtime  insulin lispro (ADMELOG) corrective regimen sliding scale   SubCutaneous three times a day before meals  insulin lispro (ADMELOG) corrective regimen sliding scale   SubCutaneous at bedtime  metoprolol tartrate 25 milliGRAM(s) Oral every 8 hours  pantoprazole    Tablet 40 milliGRAM(s) Oral before breakfast  senna 2 Tablet(s) Oral at bedtime  simvastatin 20 milliGRAM(s) Oral at bedtime    MEDICATIONS  (PRN):  acetaminophen     Tablet .. 650 milliGRAM(s) Oral every 6 hours PRN Temp greater or equal to 38C (100.4F), Mild Pain (1 - 3)  aluminum hydroxide/magnesium hydroxide/simethicone Suspension 30 milliLiter(s) Oral every 4 hours PRN Dyspepsia  melatonin 3 milliGRAM(s) Oral at bedtime PRN Insomnia  ondansetron Injectable 4 milliGRAM(s) IV Push every 8 hours PRN Nausea and/or Vomiting      FAMILY HISTORY:  Family history of cancer        SOCIAL HISTORY:    CIGARETTES:    ALCOHOL:    REVIEW OF SYSTEMS:  CONSTITUTIONAL: No fever, weight loss, or fatigue  EYES: No eye pain, visual disturbances, or discharge  ENMT:  No difficulty hearing, tinnitus, vertigo; No sinus or throat pain  NECK: No pain or stiffness  RESPIRATORY: No cough, wheezing, chills or hemoptysis; No shortness of breath  CARDIOVASCULAR: No chest pain, palpitations, dizziness, or leg swelling  GASTROINTESTINAL: No abdominal or epigastric pain. No nausea, vomiting, or hematemesis; No diarrhea or constipation. No melena or hematochezia.  GENITOURINARY: No dysuria, frequency, hematuria, or incontinence  NEUROLOGICAL: No headaches, memory loss, loss of strength, numbness, or tremors  SKIN: No itching, burning, rashes, or lesions   LYMPH NODES: No enlarged glands  ENDOCRINE: No heat or cold intolerance; No hair loss  MUSCULOSKELETAL: No joint pain or swelling; No muscle, back, or extremity pain  PSYCHIATRIC: No depression, anxiety, mood swings, or difficulty sleeping  HEME/LYMPH: No easy bruising, or bleeding gums  ALLERY AND IMMUNOLOGIC: No hives or eczema    Vital Signs Last 24 Hrs  T(C): 36.9 (02 Jun 2024 16:21), Max: 37.1 (01 Jun 2024 23:12)  T(F): 98.5 (02 Jun 2024 16:21), Max: 98.8 (01 Jun 2024 23:12)  HR: 73 (02 Jun 2024 16:21) (65 - 73)  BP: 105/76 (02 Jun 2024 16:21) (104/82 - 136/76)  BP(mean): --  RR: 18 (02 Jun 2024 16:21) (18 - 20)  SpO2: 100% (02 Jun 2024 16:21) (95% - 100%)    Parameters below as of 02 Jun 2024 16:21  Patient On (Oxygen Delivery Method): room air          PHYSICAL EXAM:  GENERAL: NAD, well-groomed, well-developed  HEAD:  Atraumatic, Normocephalic  EYES: EOMI, PERRLA, conjunctiva and sclera clear  ENMT: No tonsillar erythema, exudates, or enlargement; Moist mucous membranes, Good dentition, No lesions  NECK: Supple, No JVD, Normal thyroid  NERVOUS SYSTEM:  Alert & Oriented X3, Good concentration; Motor Strength 5/5 B/L upper and lower extremities; DTRs 2+ intact and symmetric  CHEST/LUNG: Clear to percussion bilaterally; No rales, rhonchi, wheezing, or rubs  HEART: Regular rate and rhythm; No murmurs, rubs, or gallops  ABDOMEN: Soft, Nontender, Nondistended; Bowel sounds present  EXTREMITIES:  2+ Peripheral Pulses, No clubbing, cyanosis, or edema  LYMPH: No lymphadenopathy noted  SKIN: No rashes or lesions      INTERPRETATION OF TELEMETRY:    ECG:    [facility]     LABS:                        7.5    7.85  )-----------( 272      ( 01 Jun 2024 13:30 )             23.8     06-01    133<L>  |  98  |  27<H>  ----------------------------<  271<H>  3.6   |  29  |  4.49<H>    Ca    8.5      01 Jun 2024 13:30  Phos  0.9     06-01  Mg     2.0     06-01    TPro  5.6<L>  /  Alb  2.4<L>  /  TBili  0.4  /  DBili  x   /  AST  41<H>  /  ALT  45  /  AlkPhos  171<H>  06-01          Urinalysis Basic - ( 01 Jun 2024 13:30 )    Color: x / Appearance: x / SG: x / pH: x  Gluc: 271 mg/dL / Ketone: x  / Bili: x / Urobili: x   Blood: x / Protein: x / Nitrite: x   Leuk Esterase: x / RBC: x / WBC x   Sq Epi: x / Non Sq Epi: x / Bacteria: x      Lipid Panel:   I&O's Summary      RADIOLOGY & ADDITIONAL STUDIES:

## 2024-06-02 NOTE — DISCHARGE NOTE PROVIDER - HOSPITAL COURSE
===INCOMPLETE===  83F PMH DM, HTN, HLD, ESRD on HD M, W, F, presenting to the ED with SOB. Pt was sent from [facility] at [facility], recently admitted to [facility] for PNA. She states that she started having SOB and cold sweats suddenly around 3:30AM, BP noted to be 147/102. She also endorsed experiencing chest pressure, non radiating, mid sternum, with a warm flushing sensation thoughtout her body, which lasts for less than a minute and resolves on its own. She was given duonebs with improvement in symptoms, but her symptoms later returned. She received another treatement of duonebs, then EMS was called. While seen in the ED, she stated she continued having the flushing sensation with chest pressure, again lasting for less than 1 minute. She denies any chills, N/V/D, abdominal pain, dysuria, numbness/tingling/weakness in extremities. She has not missed any of her HD sessions, but notes that she has required more fluid to be removed during the last 2 sessions due to worsening swelling of her legs. She is from [facility] and ambulates with a walker  In the ED: Temp 97.5, , /77, RR 22, Pulseox 100% on 5L. cxray with pulm congestion, trop 1027, proBNP 70991,Na+ 128, lactate 2.7, Admitted to telemetry for ACS and dialysis. Seen by Nephrology and planned for HD on day of arrival    #ESRD on HD with fluid overload  Underwent HD on day of admission  Post dialysis in ED she had VT to 200's with LOC, RRT was called on 5/27  When RRT team arrived, it had self-terminated and she was back to baseline  Cardiology was called and pt was started on metoprolol  GOC were discussed and pt/daughter agreed to remain full code  Followed by Nephrology  PT saw pt and rec [facility]  Last underwent HD on 6/3/24    #VT  Occurred in setting after HD  Started on Metoprolol tartrate 25mg q8hr  Maintained on telemetry  No further NSVT/VT  Echo showed mildly dec LVEF, global LV hypokinesis, no segmental WMA,     #Chest pain/Elev Trop  Likely demand ischemia  Chest CTA neg for PE    #B/l pleural eff  Pulm was consulted    #Hemochromatosis  Seen by heme/onc    #Anemia  Seen by GI and Heme/onc  Hgb remained stable in 7.5-8, no transfusion was recommended    #Hyponatremia  Secondary to fluid overload  Improved with HD   83F PMH DM, HTN, HLD, ESRD on HD M, W, F, presenting to the ED with SOB. Pt was sent from [facility] at [facility], recently admitted to [facility] for PNA. She states that she started having SOB and cold sweats suddenly around 3:30AM, BP noted to be 147/102. She also endorsed experiencing chest pressure, non radiating, mid sternum, with a warm flushing sensation thoughtout her body, which lasts for less than a minute and resolves on its own. She was given duonebs with improvement in symptoms, but her symptoms later returned. She received another treatement of duonebs, then EMS was called. While seen in the ED, she stated she continued having the flushing sensation with chest pressure, again lasting for less than 1 minute. She denies any chills, N/V/D, abdominal pain, dysuria, numbness/tingling/weakness in extremities. She has not missed any of her HD sessions, but notes that she has required more fluid to be removed during the last 2 sessions due to worsening swelling of her legs. She is from [facility] and ambulates with a walker  In the ED: Temp 97.5, , /77, RR 22, Pulseox 100% on 5L. cxray with pulm congestion, trop 1027, proBNP 15180,Na+ 128, lactate 2.7, Admitted to telemetry for ACS and dialysis. Seen by Nephrology and planned for HD on day of arrival    #ESRD on HD with fluid overload  Underwent HD on day of admission  Post dialysis in ED she had VT to 200's with LOC, RRT was called on 5/27  When RRT team arrived, it had self-terminated and she was back to baseline  Cardiology was called and pt was started on metoprolol  GOC were discussed and pt/daughter agreed to remain full code  Followed by Nephrology  PT saw pt and rec NIKKIE  Last underwent HD on 6/3/24    #VT  Occurred in setting after HD  Started on Metoprolol tartrate 25mg q8hr  Maintained on telemetry  No further NSVT/VT  Echo showed mildly dec LVEF, global LV hypokinesis, no segmental WMA,     #Chest pain/Elev Trop  Likely demand ischemia  Chest CTA neg for PE    #B/l pleural eff  Pulm was consulted    #Hemochromatosis  Seen by heme/onc    #Anemia  Seen by GI and Heme/onc  Hgb remained stable in 7.5-8, no transfusion was recommended    #Hyponatremia  Secondary to fluid overload  Improved with HD    Please note that this a brief summary of hospital course please refer to daily progress notes and consult notes for full course and events. Patient seen and examined at bedside, discussed with medical attending. Patient medically cleared for discharge to [facility].

## 2024-06-02 NOTE — PROGRESS NOTE ADULT - SUBJECTIVE AND OBJECTIVE BOX
Time of Visit:  Patient seen and examined.     MEDICATIONS  (STANDING):  chlorhexidine 2% Cloths 1 Application(s) Topical <User Schedule>  epoetin lara (PROCRIT) Injectable 09593 Unit(s) IV Push <User Schedule>  folic acid 1 milliGRAM(s) Oral daily  heparin   Injectable 5000 Unit(s) SubCutaneous every 8 hours  insulin glargine Injectable (LANTUS) 3 Unit(s) SubCutaneous at bedtime  insulin lispro (ADMELOG) corrective regimen sliding scale   SubCutaneous three times a day before meals  insulin lispro (ADMELOG) corrective regimen sliding scale   SubCutaneous at bedtime  metoprolol tartrate 25 milliGRAM(s) Oral every 8 hours  pantoprazole    Tablet 40 milliGRAM(s) Oral before breakfast  senna 2 Tablet(s) Oral at bedtime  simvastatin 20 milliGRAM(s) Oral at bedtime      MEDICATIONS  (PRN):  acetaminophen     Tablet .. 650 milliGRAM(s) Oral every 6 hours PRN Temp greater or equal to 38C (100.4F), Mild Pain (1 - 3)  aluminum hydroxide/magnesium hydroxide/simethicone Suspension 30 milliLiter(s) Oral every 4 hours PRN Dyspepsia  melatonin 3 milliGRAM(s) Oral at bedtime PRN Insomnia  ondansetron Injectable 4 milliGRAM(s) IV Push every 8 hours PRN Nausea and/or Vomiting       Medications up to date at time of exam.      PHYSICAL EXAMINATION:  Patient has no new complaints.  GENERAL: The patient  in no apparent distress.     Vital Signs Last 24 Hrs  T(C): 36.9 (02 Jun 2024 11:18), Max: 37.7 (01 Jun 2024 19:36)  T(F): 98.4 (02 Jun 2024 11:18), Max: 99.9 (01 Jun 2024 19:36)  HR: 72 (02 Jun 2024 11:18) (65 - 87)  BP: 104/82 (02 Jun 2024 11:18) (104/82 - 144/74)  BP(mean): 89 (01 Jun 2024 19:36) (89 - 89)  RR: 18 (02 Jun 2024 11:18) (17 - 20)  SpO2: 100% (02 Jun 2024 11:18) (93% - 100%)    Parameters below as of 02 Jun 2024 11:18  Patient On (Oxygen Delivery Method): room air       (if applicable)    Chest Tube (if applicable)    HEENT: Head is normocephalic and atraumatic. Extraocular muscles are intact. Mucous membranes are moist.     NECK: Supple, no palpable adenopathy.    LUNGS: Fair bilateral air entry   no wheezing, rales, or rhonchi.    HEART: Regular rate and rhythm without murmur.    ABDOMEN: Soft, nontender, and nondistended.  No hepatosplenomegaly is noted.    : No painful voiding, no pelvic pain    EXTREMITIES: Without any cyanosis, clubbing, rash, lesions or edema.    NEUROLOGIC: Awake, alert, oriented, grossly intact    SKIN: Warm, dry, good turgor.      LABS:                        7.5    7.85  )-----------( 272      ( 01 Jun 2024 13:30 )             23.8     06-01    133<L>  |  98  |  27<H>  ----------------------------<  271<H>  3.6   |  29  |  4.49<H>    Ca    8.5      01 Jun 2024 13:30  Phos  0.9     06-01  Mg     2.0     06-01    TPro  5.6<L>  /  Alb  2.4<L>  /  TBili  0.4  /  DBili  x   /  AST  41<H>  /  ALT  45  /  AlkPhos  171<H>  06-01      Urinalysis Basic - ( 01 Jun 2024 13:30 )    Color: x / Appearance: x / SG: x / pH: x  Gluc: 271 mg/dL / Ketone: x  / Bili: x / Urobili: x   Blood: x / Protein: x / Nitrite: x   Leuk Esterase: x / RBC: x / WBC x   Sq Epi: x / Non Sq Epi: x / Bacteria: x                      MICROBIOLOGY: (if applicable)    RADIOLOGY & ADDITIONAL STUDIES:  EKG:   CXR:  ECHO:    IMPRESSION: 83y Female PAST MEDICAL & SURGICAL HISTORY:  Hypertension      Hyperlipidemia      Gout      Cataract      Breast lump      Osteoporosis      Osteoarthritis      Vitamin D deficiency      Seasonal allergies      CKD (chronic kidney disease)      Diabetes mellitus      ESRD on dialysis      H/O bilateral cataract extraction  2012  and 2014      History of breast surgery  Excision of left breast lump - benign  1991      History of knee replacement, total, left  2009      Arteriovenous fistula  left side limb alert       p/w            Time of Visit:  Patient seen and examined.     MEDICATIONS  (STANDING):  chlorhexidine 2% Cloths 1 Application(s) Topical <User Schedule>  epoetin lara (PROCRIT) Injectable 31400 Unit(s) IV Push <User Schedule>  folic acid 1 milliGRAM(s) Oral daily  heparin   Injectable 5000 Unit(s) SubCutaneous every 8 hours  insulin glargine Injectable (LANTUS) 3 Unit(s) SubCutaneous at bedtime  insulin lispro (ADMELOG) corrective regimen sliding scale   SubCutaneous three times a day before meals  insulin lispro (ADMELOG) corrective regimen sliding scale   SubCutaneous at bedtime  metoprolol tartrate 25 milliGRAM(s) Oral every 8 hours  pantoprazole    Tablet 40 milliGRAM(s) Oral before breakfast  senna 2 Tablet(s) Oral at bedtime  simvastatin 20 milliGRAM(s) Oral at bedtime      MEDICATIONS  (PRN):  acetaminophen     Tablet .. 650 milliGRAM(s) Oral every 6 hours PRN Temp greater or equal to 38C (100.4F), Mild Pain (1 - 3)  aluminum hydroxide/magnesium hydroxide/simethicone Suspension 30 milliLiter(s) Oral every 4 hours PRN Dyspepsia  melatonin 3 milliGRAM(s) Oral at bedtime PRN Insomnia  ondansetron Injectable 4 milliGRAM(s) IV Push every 8 hours PRN Nausea and/or Vomiting       Medications up to date at time of exam.      PHYSICAL EXAMINATION:  Patient has no new complaints.  GENERAL: The patient  in no apparent distress.     Vital Signs Last 24 Hrs  T(C): 36.9 (02 Jun 2024 11:18), Max: 37.7 (01 Jun 2024 19:36)  T(F): 98.4 (02 Jun 2024 11:18), Max: 99.9 (01 Jun 2024 19:36)  HR: 72 (02 Jun 2024 11:18) (65 - 87)  BP: 104/82 (02 Jun 2024 11:18) (104/82 - 144/74)  BP(mean): 89 (01 Jun 2024 19:36) (89 - 89)  RR: 18 (02 Jun 2024 11:18) (17 - 20)  SpO2: 100% (02 Jun 2024 11:18) (93% - 100%)    Parameters below as of 02 Jun 2024 11:18  Patient On (Oxygen Delivery Method): room air       (if applicable)    Chest Tube (if applicable)    HEENT: Head is normocephalic and atraumatic. Extraocular muscles are intact. Mucous membranes are moist.     NECK: Supple, no palpable adenopathy.    LUNGS: Fair bilateral air entry   no wheezing, rales, or rhonchi.    HEART: Regular rate and rhythm without murmur.    ABDOMEN: Soft, nontender, and nondistended.  No hepatosplenomegaly is noted.    : No painful voiding, no pelvic pain    EXTREMITIES: Without any cyanosis, clubbing, rash, lesions or edema.    NEUROLOGIC: Awake, alert, oriented, grossly intact    SKIN: Warm, dry, good turgor.      LABS:                        7.5    7.85  )-----------( 272      ( 01 Jun 2024 13:30 )             23.8     06-01    133<L>  |  98  |  27<H>  ----------------------------<  271<H>  3.6   |  29  |  4.49<H>    Ca    8.5      01 Jun 2024 13:30  Phos  0.9     06-01  Mg     2.0     06-01    TPro  5.6<L>  /  Alb  2.4<L>  /  TBili  0.4  /  DBili  x   /  AST  41<H>  /  ALT  45  /  AlkPhos  171<H>  06-01      Urinalysis Basic - ( 01 Jun 2024 13:30 )    Color: x / Appearance: x / SG: x / pH: x  Gluc: 271 mg/dL / Ketone: x  / Bili: x / Urobili: x   Blood: x / Protein: x / Nitrite: x   Leuk Esterase: x / RBC: x / WBC x   Sq Epi: x / Non Sq Epi: x / Bacteria: x                      MICROBIOLOGY: (if applicable)    RADIOLOGY & ADDITIONAL STUDIES:  EKG:   CXR:  ECHO:    IMPRESSION: 83y Female PAST MEDICAL & SURGICAL HISTORY:  Hypertension      Hyperlipidemia      Gout      Cataract      Breast lump      Osteoporosis      Osteoarthritis      Vitamin D deficiency      Seasonal allergies      CKD (chronic kidney disease)      Diabetes mellitus      ESRD on dialysis      H/O bilateral cataract extraction  2012  and 2014      History of breast surgery  Excision of left breast lump - benign  1991      History of knee replacement, total, left  2009      Arteriovenous fistula  left side limb alert       p/w         IMP: This is an 83 yr old with DM, HTN, HLD, ESRD on HD M, W, F, presenting to the ED with SOB. Pt was sent from [facility] at [facility] recently admitted to [facility] for PNA. She states that she started having SOB due to acute hypoxic resp failure due to pulmonary edema requiring supp O2 and dialysis .         ASSESSMENT     - Acute Hypoxic Resp Failure   - B/L Pulmonary edema   - ESRD on HD   - HTN HLD   - T2DM       Sugg:     - Continue O2 supp as needed to maintain sat >90%  - Dialysis as per Neph   - Repeat CXR in 24 hrs   - Monitor blood glucose with coverage   - DVT GI prophy

## 2024-06-02 NOTE — DISCHARGE NOTE PROVIDER - NSDCMRMEDTOKEN_GEN_ALL_CORE_FT
acetaminophen 325 mg oral tablet: 2 tab(s) orally every 6 hours as needed for  pain  amLODIPine 5 mg oral tablet: 1 tab(s) orally once a day  DuoNeb 0.5 mg-2.5 mg/3 mL inhalation solution: 3 milliliter(s) by nebulizer every 6 hours at 12 AM, 6 AM, 12 PM, 6 PM for 2 days  ezetimibe-simvastatin 10 mg-20 mg oral tablet: 1 tab(s) orally once a day at 9 PM  ferrous sulfate 325 mg (65 mg elemental iron) oral delayed release tablet: 1 tab(s) orally once a day at 9 AM  magnesium hydroxide/aluminum hydroxide/simethicone 200 mg-200 mg-20 mg/5 mL oral suspension: 30 milliliter(s) orally every 4 hours as needed for  heartburn/functional dyspepsia  Orajel 3X Mouth Sores 20%-0.1%-0.15% mucosal gel: 1 application to the affected area/mucous membrane 2 times a day for 2 weeks  pancrelipase 12,000 units-38,000 units-60,000 units oral delayed release capsule: 3 cap(s) orally 4 times a day at 9 AM, 1 PM, 5 PM, and 9 PM  pantoprazole 40 mg oral delayed release tablet: 1 tab(s) orally once a day  predniSONE 20 mg oral tablet: 2 tab(s) orally once a day at 9 AM for 5 days  Prolia 60 mg/mL subcutaneous solution: 60 milligram(s) subcutaneously every 6 months  repaglinide 1 mg oral tablet: 1 tab(s) orally 3 times a day (with meals)  Systane Ultra ophthalmic solution: 1 drop(s) in each eye once a day at 9 AM for 8 weeks  Toujeo SoloStar 300 units/mL subcutaneous solution: 11 unit(s) subcutaneous 4 times a week at bedtime on Monday, Wednesday, Friday, Saturday  Toujeo SoloStar 300 units/mL subcutaneous solution: 9 unit(s) subcutaneous 3 times a week at bedtime on Sunday, Tuesday, Thursday   acetaminophen 325 mg oral tablet: 2 tab(s) orally every 6 hours As needed Temp greater or equal to 38C (100.4F), Mild Pain (1 - 3)  aluminum hydroxide-magnesium hydroxide 200 mg-200 mg/5 mL oral suspension: 30 milliliter(s) orally every 4 hours As needed Dyspepsia  ferrous sulfate 325 mg (65 mg elemental iron) oral delayed release tablet: 1 tab(s) orally once a day at 9 AM  folic acid 1 mg oral tablet: 1 tab(s) orally once a day  insulin glargine 100 units/mL subcutaneous solution: 3 unit(s) subcutaneous once a day (at bedtime)  insulin lispro 100 units/mL injectable solution: 1 unit(s) injectable 4 times a day (before meals and at bedtime) The current order:        insulin lispro (ADMELOG) corrective regimen sliding scale-----      1 Unit(s) if Glucose 151 - 200      2 Unit(s) if Glucose 201 - 250      3 Unit(s) if Glucose 251 - 300      4 Unit(s) if Glucose 301 - 350      5 Unit(s) if Glucose 351 - 400      6 Unit(s) if Glucose Greater Than 400 + Contact MD  SubCutaneous, three times a day before meals  metoprolol tartrate 25 mg oral tablet: 1 tab(s) orally every 8 hours  pancrelipase 12,000 units-38,000 units-60,000 units oral delayed release capsule: 3 cap(s) orally 4 times a day at 9 AM, 1 PM, 5 PM, and 9 PM  pantoprazole 40 mg oral delayed release tablet: 1 tab(s) orally once a day (before a meal)  senna leaf extract oral tablet: 2 tab(s) orally once a day (at bedtime)  simvastatin 20 mg oral tablet: 1 tab(s) orally once a day (at bedtime)

## 2024-06-02 NOTE — DISCHARGE NOTE PROVIDER - CARE PROVIDER_API CALL
Robert Concepcion  Cardiology  [address] [place] [address]  Phone: [phone]  Fax: [phone]  Established Patient  Follow Up Time: 2 weeks

## 2024-06-02 NOTE — DISCHARGE NOTE PROVIDER - NSDCFUSCHEDAPPT_GEN_ALL_CORE_FT
Ria Welch  [facility]  [address]  Scheduled Appointment: 06/20/2024    [facility]  [address]GENETC [address] [facility]  Scheduled Appointment: 06/20/2024

## 2024-06-02 NOTE — PROGRESS NOTE ADULT - ASSESSMENT
# ESRD admitted with SOB- pulmonary edema improvement with HD and ultrafiltration.  s/p HD wednesday and thursday and today -> next HD session Monday early for DC after  # bilateral pleural effusion - consulted pulmonary - Dr valdovinos.  # Hyponatremia- improved. sec to fluid overload. UF on HD fluid restriction.  # HTN blood pressure stable  # CKDMBD- phos low-> ordered supplementation PO    For any question, call:  Cell # [phone]  Pager # [phone]  Callback # [phone]

## 2024-06-02 NOTE — DISCHARGE NOTE PROVIDER - NSDCCPCAREPLAN_GEN_ALL_CORE_FT
PRINCIPAL DISCHARGE DIAGNOSIS  Diagnosis: Acute hypoxic respiratory failure  Assessment and Plan of Treatment: You came in with shortness of breath and were found to be fluid overloaded.  You were seen by a Nephrologist in the Emergency Room and underwent dialysis on the same day you arrived.  Your breathing improved with continued dialysis treatments.      SECONDARY DISCHARGE DIAGNOSES  Diagnosis: Ventricular tachycardia  Assessment and Plan of Treatment: After dialysis on 5/27/24, your heart went into an abnormal rhythm called Ventricular Tachycardia and was beating very fast.  Your briefly fainted and the heart rhythm went back to normal on it's own.  You were seen by Cardiology and started on metoprolol.  You were monitoed on the heart monitor throughout your hospitalization and no further arrhythmias occurred.  You had an echocardiogram which showed mildly decreased pumping power of the heart.  Follow-up with Cardiologist at the Rehab.    Diagnosis: Elevated troponin  Assessment and Plan of Treatment: See above    Diagnosis: ESRD on dialysis  Assessment and Plan of Treatment: See above.    Diagnosis: Fluid overload  Assessment and Plan of Treatment: Improved with dialysis.    Diagnosis: Hyponatremia  Assessment and Plan of Treatment: Improved with dialysis.    Diagnosis: History of hemochromatosis  Assessment and Plan of Treatment: You were seen and followed by a Hematologist.  Your blood work was followed closely and no inpatient treatment was needed.

## 2024-06-02 NOTE — PROGRESS NOTE ADULT - PROBLEM SELECTOR PLAN 1
-pro BNP [id number] on admission s/p HD  -remains with dyspnea  -given dyspnea and hypoxia, evaluated for PE   -CTA chest negative for PE but shows moderate plueral effusion  -received dialysis today 5/30  -CXR with improving congestion   -cont HD as per Nephro   -Nephro Dr. Pandey following  -mon I&Os  -daily weights  - Pulm consulted for effusions

## 2024-06-02 NOTE — PROGRESS NOTE ADULT - SUBJECTIVE AND OBJECTIVE BOX
[facility] - ROBERT Garber / ROBERT Valdes / JONATHAN Burt/ ROBERT Trujillo/ ROBERT Massey/ JOSELIN Mi / RACHEL Pandey / SUJIT Alatorre / LILA Ayoub  ---------------------------------------------------------------------------------------------------------------  seen and examined today for ESRD  Interval : NAD  VITALS:  T(F): 98.4 (06-02-24 @ 11:18), Max: 99.9 (06-01-24 @ 19:36)  HR: 72 (06-02-24 @ 11:18)  BP: 104/82 (06-02-24 @ 11:18)  RR: 18 (06-02-24 @ 11:18)  SpO2: 100% (06-02-24 @ 11:18)  Wt(kg): --    Physical Exam :-  Constitutional: NAD  Neck: Supple.  Respiratory: Bilateral equal breath sounds,  Cardiovascular: S1, S2 normal,  Gastrointestinal: Bowel Sounds present, soft, non tender.  Extremities: No edema  Neurological: Alert and Oriented x 3, no focal deficits  Psychiatric: Normal mood, normal affect  Data:-  Allergies :   shellfish (Anaphylaxis)  ibuprofen (Unknown)  latex (Unknown)  natural rubber (Unknown)  Mushrooms (Anaphylaxis)  aspirin (Unknown)    Hospital Medications:   MEDICATIONS  (STANDING):  chlorhexidine 2% Cloths 1 Application(s) Topical <User Schedule>  epoetin lara (PROCRIT) Injectable 25483 Unit(s) IV Push <User Schedule>  folic acid 1 milliGRAM(s) Oral daily  heparin   Injectable 5000 Unit(s) SubCutaneous every 8 hours  insulin glargine Injectable (LANTUS) 3 Unit(s) SubCutaneous at bedtime  insulin lispro (ADMELOG) corrective regimen sliding scale   SubCutaneous three times a day before meals  insulin lispro (ADMELOG) corrective regimen sliding scale   SubCutaneous at bedtime  metoprolol tartrate 25 milliGRAM(s) Oral every 8 hours  pantoprazole    Tablet 40 milliGRAM(s) Oral before breakfast  senna 2 Tablet(s) Oral at bedtime  simvastatin 20 milliGRAM(s) Oral at bedtime    06-01    133<L>  |  98  |  27<H>  ----------------------------<  271<H>  3.6   |  29  |  4.49<H>    Ca    8.5      01 Jun 2024 13:30  Phos  0.9     06-01  Mg     2.0     06-01    TPro  5.6<L>  /  Alb  2.4<L>  /  TBili  0.4  /  DBili      /  AST  41<H>  /  ALT  45  /  AlkPhos  171<H>  06-01    Creatinine Trend: 4.49 <--, 3.35 <--, 4.81 <--, 3.43 <--, 2.97 <--, 5.46 <--                        7.5    7.85  )-----------( 272      ( 01 Jun 2024 13:30 )             23.8

## 2024-06-02 NOTE — PROGRESS NOTE ADULT - PROBLEM SELECTOR PLAN 12
-pt from [facility]  -pending Pulm and GI reccs  -PT eval  - possible d/c MON back to [facility] pending above

## 2024-06-02 NOTE — PROGRESS NOTE ADULT - SUBJECTIVE AND OBJECTIVE BOX
[   ] ICU                                          [   ] CCU                                      [ X  ] Medical Floor    Patient is a 83 year old female with anemia. GI consulted to evaluate.        83 year old female with past medical history significant for DM, HTN, Hyperlipidemia, Gout, Osteoporosis, Osteoarthritis, Vitamin D deficiency, Cataract, Breast lump, ESRD on HD M, W, F, presented to the emergency room with SOB and chest pain. On admission patient had anemia and during the hospital course developed drop in her H/H. Patient c/o worsening constipation but denies nausea, vomiting, hematemesis, hematochezia, melena, epistaxis, hemoptysis, fever, chills, palpitation, cough, hematuria, dysuria or diarrhea.     Patient appears comfortable. No new complaints reported, No abdominal pain, N/V, hematemesis, hematochezia, melena, fever, chills, chest pain, SOB, cough or diarrhea reported.    PAIN MANAGEMENT:  Pain Scale:                010  Pain Location:      Prior Colonoscopy:  UNknown      PAST MEDICAL HISTORY    Hypertension    Hyperlipidemia    Gout    Cataract    Breast lump    Osteoporosis    Osteoarthritis    Vitamin D deficiency    Seasonal allergies     Diabetes mellitus    ESRD on dialysis        PAST SURGICAL HISTORY    Bilateral cataract extraction    Breast surgery    Knee replacement, total, left    Arteriovenous fistula        Allergies    shellfish (Anaphylaxis)  ibuprofen (Unknown)  latex (Unknown)  natural rubber (Unknown)  Mushrooms (Anaphylaxis)  aspirin (Unknown)    Intolerances  None          SOCIAL HISTORY  Advanced Directives:       [ X ] Full Code       [  ] DNR  Marital Status:         [  ] M      [  ] S      [  ] D       [  ] W  Children:       [ X ] Yes      [  ] No  Occupation:        [  ] Employed       [ X ] Unemployed       [  ] Retired  Diet:       [ X ] Regular       [  ] PEG feeding          [  ] NG tube feeding  Drug Use:           [ X ] Patient denied          [  ] Yes  Alcohol:           [ X ] No             [  ] Yes (socially)         [  ] Yes (chronic)  Tobacco:           [  ] Yes           [X  ] No      FAMILY HISTORY  [ X ] Heart Disease            [ X ] Diabetes             [ X ] HTN             [  ] Colon Cancer             [  ] Stomach Cancer              [  ] Pancreatic Cancer        VITALS   Vital Signs Last 24 Hrs  T(C): 36.9 (06-02-24 @ 11:18), Max: 37.7 (06-01-24 @ 19:36)  T(F): 98.4 (06-02-24 @ 11:18), Max: 99.9 (06-01-24 @ 19:36)  HR: 72 (06-02-24 @ 11:18) (65 - 89)  BP: 104/82 (06-02-24 @ 11:18) (104/82 - 144/74)  BP(mean): 89 (06-01-24 @ 19:36) (89 - 89)  RR: 18 (06-02-24 @ 11:18) (17 - 20)  SpO2: 100% (06-02-24 @ 11:18) (93% - 100%)        MEDICATIONS  (STANDING):  chlorhexidine 2% Cloths 1 Application(s) Topical <User Schedule>  epoetin lara (PROCRIT) Injectable 19928 Unit(s) IV Push <User Schedule>  folic acid 1 milliGRAM(s) Oral daily  heparin   Injectable 5000 Unit(s) SubCutaneous every 8 hours  insulin glargine Injectable (LANTUS) 3 Unit(s) SubCutaneous at bedtime  insulin lispro (ADMELOG) corrective regimen sliding scale   SubCutaneous three times a day before meals  insulin lispro (ADMELOG) corrective regimen sliding scale   SubCutaneous at bedtime  metoprolol tartrate 25 milliGRAM(s) Oral every 8 hours  pantoprazole    Tablet 40 milliGRAM(s) Oral before breakfast  senna 2 Tablet(s) Oral at bedtime  simvastatin 20 milliGRAM(s) Oral at bedtime    MEDICATIONS  (PRN):  acetaminophen     Tablet .. 650 milliGRAM(s) Oral every 6 hours PRN Temp greater or equal to 38C (100.4F), Mild Pain (1 - 3)  aluminum hydroxide/magnesium hydroxide/simethicone Suspension 30 milliLiter(s) Oral every 4 hours PRN Dyspepsia  melatonin 3 milliGRAM(s) Oral at bedtime PRN Insomnia  ondansetron Injectable 4 milliGRAM(s) IV Push every 8 hours PRN Nausea and/or Vomiting                            7.5    7.85  )-----------( 272      ( 01 Jun 2024 13:30 )             23.8       06-01    133<L>  |  98  |  27<H>  ----------------------------<  271<H>  3.6   |  29  |  4.49<H>    Ca    8.5      01 Jun 2024 13:30  Phos  0.9     06-01  Mg     2.0     06-01    TPro  5.6<L>  /  Alb  2.4<L>  /  TBili  0.4  /  DBili  x   /  AST  41<H>  /  ALT  45  /  AlkPhos  171<H>  06-01

## 2024-06-03 LAB
% ALBUMIN: 50.4 % — SIGNIFICANT CHANGE UP
% ALPHA 1: 10.2 % — SIGNIFICANT CHANGE UP
% ALPHA 2: 18.8 % — SIGNIFICANT CHANGE UP
% BETA: 11.2 % — SIGNIFICANT CHANGE UP
% GAMMA: 9.4 % — SIGNIFICANT CHANGE UP
% M SPIKE: 2.4 % — SIGNIFICANT CHANGE UP
ALBUMIN SERPL ELPH-MCNC: 2.5 G/DL — LOW (ref 3.6–5.5)
ALBUMIN/GLOB SERPL ELPH: 1 RATIO — SIGNIFICANT CHANGE UP
ALPHA1 GLOB SERPL ELPH-MCNC: 0.5 G/DL — HIGH (ref 0.1–0.4)
ALPHA2 GLOB SERPL ELPH-MCNC: 0.9 G/DL — SIGNIFICANT CHANGE UP (ref 0.5–1)
ANION GAP SERPL CALC-SCNC: 6 MMOL/L — SIGNIFICANT CHANGE UP (ref 5–17)
B-GLOBULIN SERPL ELPH-MCNC: 0.6 G/DL — SIGNIFICANT CHANGE UP (ref 0.5–1)
BUN SERPL-MCNC: 22 MG/DL — HIGH (ref 7–18)
CALCIUM SERPL-MCNC: 8.9 MG/DL — SIGNIFICANT CHANGE UP (ref 8.4–10.5)
CHLORIDE SERPL-SCNC: 100 MMOL/L — SIGNIFICANT CHANGE UP (ref 96–108)
CO2 SERPL-SCNC: 29 MMOL/L — SIGNIFICANT CHANGE UP (ref 22–31)
CREAT SERPL-MCNC: 4.67 MG/DL — HIGH (ref 0.5–1.3)
EGFR: 9 ML/MIN/1.73M2 — LOW
GAMMA GLOBULIN: 0.5 G/DL — LOW (ref 0.6–1.6)
GLUCOSE BLDC GLUCOMTR-MCNC: 170 MG/DL — HIGH (ref 70–99)
GLUCOSE BLDC GLUCOMTR-MCNC: 177 MG/DL — HIGH (ref 70–99)
GLUCOSE BLDC GLUCOMTR-MCNC: 215 MG/DL — HIGH (ref 70–99)
GLUCOSE BLDC GLUCOMTR-MCNC: 271 MG/DL — HIGH (ref 70–99)
GLUCOSE SERPL-MCNC: 163 MG/DL — HIGH (ref 70–99)
HCT VFR BLD CALC: 25.4 % — LOW (ref 34.5–45)
HGB BLD-MCNC: 8.2 G/DL — LOW (ref 11.5–15.5)
INTERPRETATION SERPL IFE-IMP: SIGNIFICANT CHANGE UP
KAPPA LC SER QL IFE: 5.58 MG/DL — HIGH (ref 0.33–1.94)
KAPPA/LAMBDA FREE LIGHT CHAIN RATIO, SERUM: 0.28 RATIO — SIGNIFICANT CHANGE UP (ref 0.26–1.65)
LAMBDA LC SER QL IFE: 19.72 MG/DL — HIGH (ref 0.57–2.63)
M-SPIKE: 0.1 G/DL — HIGH (ref 0–0)
MCHC RBC-ENTMCNC: 29.3 PG — SIGNIFICANT CHANGE UP (ref 27–34)
MCHC RBC-ENTMCNC: 32.3 GM/DL — SIGNIFICANT CHANGE UP (ref 32–36)
MCV RBC AUTO: 90.7 FL — SIGNIFICANT CHANGE UP (ref 80–100)
NRBC # BLD: 0 /100 WBCS — SIGNIFICANT CHANGE UP (ref 0–0)
PLATELET # BLD AUTO: 255 K/UL — SIGNIFICANT CHANGE UP (ref 150–400)
POTASSIUM SERPL-MCNC: 3.5 MMOL/L — SIGNIFICANT CHANGE UP (ref 3.5–5.3)
POTASSIUM SERPL-SCNC: 3.5 MMOL/L — SIGNIFICANT CHANGE UP (ref 3.5–5.3)
PROT PATTERN SERPL ELPH-IMP: SIGNIFICANT CHANGE UP
PROT SERPL-MCNC: 5 G/DL — LOW (ref 6–8.3)
RBC # BLD: 2.8 M/UL — LOW (ref 3.8–5.2)
RBC # FLD: 14.8 % — HIGH (ref 10.3–14.5)
SODIUM SERPL-SCNC: 135 MMOL/L — SIGNIFICANT CHANGE UP (ref 135–145)
WBC # BLD: 9.01 K/UL — SIGNIFICANT CHANGE UP (ref 3.8–10.5)
WBC # FLD AUTO: 9.01 K/UL — SIGNIFICANT CHANGE UP (ref 3.8–10.5)

## 2024-06-03 RX ADMIN — Medication 3: at 11:43

## 2024-06-03 RX ADMIN — Medication 25 MILLIGRAM(S): at 21:30

## 2024-06-03 RX ADMIN — PANTOPRAZOLE SODIUM 40 MILLIGRAM(S): 20 TABLET, DELAYED RELEASE ORAL at 05:39

## 2024-06-03 RX ADMIN — Medication 1 MILLIGRAM(S): at 11:42

## 2024-06-03 RX ADMIN — ERYTHROPOIETIN 10000 UNIT(S): 10000 INJECTION, SOLUTION INTRAVENOUS; SUBCUTANEOUS at 19:29

## 2024-06-03 RX ADMIN — Medication 1: at 08:08

## 2024-06-03 RX ADMIN — HEPARIN SODIUM 5000 UNIT(S): 5000 INJECTION INTRAVENOUS; SUBCUTANEOUS at 05:39

## 2024-06-03 RX ADMIN — CHLORHEXIDINE GLUCONATE 1 APPLICATION(S): 213 SOLUTION TOPICAL at 05:39

## 2024-06-03 RX ADMIN — SENNA PLUS 2 TABLET(S): 8.6 TABLET ORAL at 21:32

## 2024-06-03 RX ADMIN — Medication 25 MILLIGRAM(S): at 14:56

## 2024-06-03 RX ADMIN — SIMVASTATIN 20 MILLIGRAM(S): 20 TABLET, FILM COATED ORAL at 21:30

## 2024-06-03 RX ADMIN — Medication 25 MILLIGRAM(S): at 05:39

## 2024-06-03 RX ADMIN — HEPARIN SODIUM 5000 UNIT(S): 5000 INJECTION INTRAVENOUS; SUBCUTANEOUS at 21:31

## 2024-06-03 RX ADMIN — HEPARIN SODIUM 5000 UNIT(S): 5000 INJECTION INTRAVENOUS; SUBCUTANEOUS at 14:56

## 2024-06-03 RX ADMIN — INSULIN GLARGINE 3 UNIT(S): 100 INJECTION, SOLUTION SUBCUTANEOUS at 21:31

## 2024-06-03 NOTE — PROGRESS NOTE ADULT - SUBJECTIVE AND OBJECTIVE BOX
Patient is a 83y old  Female who presents with a chief complaint of SOB 2/2 fluid overload       SUBJECTIVE / OVERNIGHT EVENTS:      MEDICATIONS  (STANDING):  chlorhexidine 2% Cloths 1 Application(s) Topical <User Schedule>  epoetin lara (PROCRIT) Injectable 75290 Unit(s) IV Push <User Schedule>  folic acid 1 milliGRAM(s) Oral daily  heparin   Injectable 5000 Unit(s) SubCutaneous every 8 hours  insulin glargine Injectable (LANTUS) 3 Unit(s) SubCutaneous at bedtime  insulin lispro (ADMELOG) corrective regimen sliding scale   SubCutaneous three times a day before meals  insulin lispro (ADMELOG) corrective regimen sliding scale   SubCutaneous at bedtime  metoprolol tartrate 25 milliGRAM(s) Oral every 8 hours  pantoprazole    Tablet 40 milliGRAM(s) Oral before breakfast  senna 2 Tablet(s) Oral at bedtime  simvastatin 20 milliGRAM(s) Oral at bedtime    MEDICATIONS  (PRN):  acetaminophen     Tablet .. 650 milliGRAM(s) Oral every 6 hours PRN Temp greater or equal to 38C (100.4F), Mild Pain (1 - 3)  aluminum hydroxide/magnesium hydroxide/simethicone Suspension 30 milliLiter(s) Oral every 4 hours PRN Dyspepsia  melatonin 3 milliGRAM(s) Oral at bedtime PRN Insomnia  ondansetron Injectable 4 milliGRAM(s) IV Push every 8 hours PRN Nausea and/or Vomiting    CAPILLARY BLOOD GLUCOSE      POCT Blood Glucose.: 271 mg/dL (03 Jun 2024 11:27)  POCT Blood Glucose.: 170 mg/dL (03 Jun 2024 07:37)  POCT Blood Glucose.: 293 mg/dL (02 Jun 2024 21:03)  POCT Blood Glucose.: 210 mg/dL (02 Jun 2024 16:43)    I&O's Summary      PHYSICAL EXAM:  Vital Signs Last 24 Hrs  T(C): 37 (03 Jun 2024 11:13), Max: 37 (02 Jun 2024 20:51)  T(F): 98.6 (03 Jun 2024 11:13), Max: 98.6 (02 Jun 2024 20:51)  HR: 74 (03 Jun 2024 11:13) (72 - 74)  BP: 113/58 (03 Jun 2024 11:13) (105/76 - 124/65)  BP(mean): --  RR: 18 (03 Jun 2024 11:13) (18 - 18)  SpO2: 98% (03 Jun 2024 11:13) (98% - 100%)    Parameters below as of 03 Jun 2024 11:13  Patient On (Oxygen Delivery Method): room air        LABS:                               Patient is a 83y old  Female who presents with a chief complaint of SOB 2/2 fluid overload       SUBJECTIVE / OVERNIGHT EVENTS: events noted. No new complaints. Feels better      MEDICATIONS  (STANDING):  chlorhexidine 2% Cloths 1 Application(s) Topical <User Schedule>  epoetin lara (PROCRIT) Injectable 88591 Unit(s) IV Push <User Schedule>  folic acid 1 milliGRAM(s) Oral daily  heparin   Injectable 5000 Unit(s) SubCutaneous every 8 hours  insulin glargine Injectable (LANTUS) 3 Unit(s) SubCutaneous at bedtime  insulin lispro (ADMELOG) corrective regimen sliding scale   SubCutaneous three times a day before meals  insulin lispro (ADMELOG) corrective regimen sliding scale   SubCutaneous at bedtime  metoprolol tartrate 25 milliGRAM(s) Oral every 8 hours  pantoprazole    Tablet 40 milliGRAM(s) Oral before breakfast  senna 2 Tablet(s) Oral at bedtime  simvastatin 20 milliGRAM(s) Oral at bedtime    MEDICATIONS  (PRN):  acetaminophen     Tablet .. 650 milliGRAM(s) Oral every 6 hours PRN Temp greater or equal to 38C (100.4F), Mild Pain (1 - 3)  aluminum hydroxide/magnesium hydroxide/simethicone Suspension 30 milliLiter(s) Oral every 4 hours PRN Dyspepsia  melatonin 3 milliGRAM(s) Oral at bedtime PRN Insomnia  ondansetron Injectable 4 milliGRAM(s) IV Push every 8 hours PRN Nausea and/or Vomiting    CAPILLARY BLOOD GLUCOSE      POCT Blood Glucose.: 271 mg/dL (03 Jun 2024 11:27)  POCT Blood Glucose.: 170 mg/dL (03 Jun 2024 07:37)  POCT Blood Glucose.: 293 mg/dL (02 Jun 2024 21:03)  POCT Blood Glucose.: 210 mg/dL (02 Jun 2024 16:43)    I&O's Summary      PHYSICAL EXAM:  Vital Signs Last 24 Hrs  T(C): 37 (03 Jun 2024 11:13), Max: 37 (02 Jun 2024 20:51)  T(F): 98.6 (03 Jun 2024 11:13), Max: 98.6 (02 Jun 2024 20:51)  HR: 74 (03 Jun 2024 11:13) (72 - 74)  BP: 113/58 (03 Jun 2024 11:13) (105/76 - 124/65)  BP(mean): --  RR: 18 (03 Jun 2024 11:13) (18 - 18)  SpO2: 98% (03 Jun 2024 11:13) (98% - 100%)    Parameters below as of 03 Jun 2024 11:13  Patient On (Oxygen Delivery Method): room air      GEN: NAD; A and O x 3, thin  LUNGS: cta  HEART: S1 S2  ABDOMEN: soft, non-tender, non-distended, + BS  EXTREMITIES: no edema    LABS:                               Patient is a 83y old  Female who presents with a chief complaint of SOB 2/2 fluid overload       SUBJECTIVE / OVERNIGHT EVENTS: events noted. No new complaints. Feels better      MEDICATIONS  (STANDING):  chlorhexidine 2% Cloths 1 Application(s) Topical <User Schedule>  epoetin lara (PROCRIT) Injectable 66078 Unit(s) IV Push <User Schedule>  folic acid 1 milliGRAM(s) Oral daily  heparin   Injectable 5000 Unit(s) SubCutaneous every 8 hours  insulin glargine Injectable (LANTUS) 3 Unit(s) SubCutaneous at bedtime  insulin lispro (ADMELOG) corrective regimen sliding scale   SubCutaneous three times a day before meals  insulin lispro (ADMELOG) corrective regimen sliding scale   SubCutaneous at bedtime  metoprolol tartrate 25 milliGRAM(s) Oral every 8 hours  pantoprazole    Tablet 40 milliGRAM(s) Oral before breakfast  senna 2 Tablet(s) Oral at bedtime  simvastatin 20 milliGRAM(s) Oral at bedtime    MEDICATIONS  (PRN):  acetaminophen     Tablet .. 650 milliGRAM(s) Oral every 6 hours PRN Temp greater or equal to 38C (100.4F), Mild Pain (1 - 3)  aluminum hydroxide/magnesium hydroxide/simethicone Suspension 30 milliLiter(s) Oral every 4 hours PRN Dyspepsia  melatonin 3 milliGRAM(s) Oral at bedtime PRN Insomnia  ondansetron Injectable 4 milliGRAM(s) IV Push every 8 hours PRN Nausea and/or Vomiting    CAPILLARY BLOOD GLUCOSE      POCT Blood Glucose.: 271 mg/dL (03 Jun 2024 11:27)  POCT Blood Glucose.: 170 mg/dL (03 Jun 2024 07:37)  POCT Blood Glucose.: 293 mg/dL (02 Jun 2024 21:03)  POCT Blood Glucose.: 210 mg/dL (02 Jun 2024 16:43)    I&O's Summary      PHYSICAL EXAM:  Vital Signs Last 24 Hrs  T(C): 37 (03 Jun 2024 11:13), Max: 37 (02 Jun 2024 20:51)  T(F): 98.6 (03 Jun 2024 11:13), Max: 98.6 (02 Jun 2024 20:51)  HR: 74 (03 Jun 2024 11:13) (72 - 74)  BP: 113/58 (03 Jun 2024 11:13) (105/76 - 124/65)  BP(mean): --  RR: 18 (03 Jun 2024 11:13) (18 - 18)  SpO2: 98% (03 Jun 2024 11:13) (98% - 100%)    Parameters below as of 03 Jun 2024 11:13  Patient On (Oxygen Delivery Method): room air      GEN: NAD; A and O x 3, thin  LUNGS: cta  HEART: S1 S2  ABDOMEN: soft, non-tender, non-distended, + BS  EXTREMITIES: no edema                        8.2    9.01  )-----------( 255      ( 03 Jun 2024 17:05 )             25.4       06-03    135  |  100  |  22<H>  ----------------------------<  163<H>  3.5   |  29  |  4.67<H>    Ca    8.9      03 Jun 2024 17:05

## 2024-06-03 NOTE — PROGRESS NOTE ADULT - SUBJECTIVE AND OBJECTIVE BOX
Patient is a 83y old  Female who presents with a chief complaint of SOB 2/2 fluid overload (03 Jun 2024       pt seen and examined      REVIEW OF SYSTEMS:  CONSTITUTIONAL: No fever, chills  ENMT:  No difficulty hearing, no change in vision  NECK: No pain or stiffness  RESPIRATORY: No cough, SOB  CARDIOVASCULAR: No chest pain, palpitations  GASTROINTESTINAL: No abdominal pain. No nausea, vomiting, or diarrhea  GENITOURINARY: No dysuria  NEUROLOGICAL: No HA  SKIN: No itching, burning, rashes, or lesions   LYMPH NODES: No enlarged glands  ENDOCRINE: No heat or cold intolerance; No hair loss  MUSCULOSKELETAL: No joint pain or swelling; No muscle, back, or extremity pain  PSYCHIATRIC: No depression, anxiety  HEME/LYMPH: No easy bruising, or bleeding gums    T(C): 36.4 (06-03-24 @ 15:59), Max: 37 (06-02-24 @ 20:51)  HR: 84 (06-03-24 @ 15:59) (72 - 84)  BP: 135/68 (06-03-24 @ 15:59) (113/58 - 136/77)  RR: 18 (06-03-24 @ 15:59) (18 - 18)  SpO2: 96% (06-03-24 @ 15:59) (96% - 100%)  Wt(kg): --Vital Signs Last 24 Hrs  T(C): 36.4 (03 Jun 2024 15:59), Max: 37 (02 Jun 2024 20:51)  T(F): 97.5 (03 Jun 2024 15:59), Max: 98.6 (02 Jun 2024 20:51)  HR: 84 (03 Jun 2024 15:59) (72 - 84)  BP: 135/68 (03 Jun 2024 15:59) (113/58 - 136/77)  BP(mean): 95 (03 Jun 2024 13:40) (95 - 95)  RR: 18 (03 Jun 2024 15:59) (18 - 18)  SpO2: 96% (03 Jun 2024 15:59) (96% - 100%)    Parameters below as of 03 Jun 2024 15:59  Patient On (Oxygen Delivery Method): room air          PHYSICAL EXAM:  GENERAL: NAD  EYES: clear conjunctiva; EOMI  ENMT: Moist mucous membranes  NECK: Supple, No JVD, Normal thyroid  CHEST/LUNG: dec breath sounds at bases  HEART: S1, S2, Regular rate and rhythm  ABDOMEN: Soft, Nontender, Nondistended; Bowel sounds present  NEURO: Alert awake  EXTREMITIES: No LE edema, no calf tenderness  LYMPH: No lymphadenopathy noted  SKIN: No rashes or lesions    Consultant(s) Notes Reviewed:  [x ] YES  [ ] NO  Care Discussed with Consultants/Other Providers [ x] YES  [ ] NO  LABS:            CAPILLARY BLOOD GLUCOSE      POCT Blood Glucose.: 177 mg/dL (03 Jun 2024 16:35)  POCT Blood Glucose.: 271 mg/dL (03 Jun 2024 11:27)  POCT Blood Glucose.: 170 mg/dL (03 Jun 2024 07:37)  POCT Blood Glucose.: 293 mg/dL (02 Jun 2024 21:03)          RADIOLOGY & ADDITIONAL TESTS:  Imaging Personally Reviewed:  [ ] YES  [ ] NO

## 2024-06-03 NOTE — PROGRESS NOTE ADULT - PROBLEM SELECTOR PLAN 12
-pt from [facility]  -pending Pulm and GI reccs  -PT eval  - possible d/c back to [facility] pending above -pt from [facility]  -pending Pulm and GI reccs  -PT eval recs NIKKIE   - possible d/c back to [facility] pending above  -CT A/P pending

## 2024-06-03 NOTE — PROGRESS NOTE ADULT - ASSESSMENT
# ESRD admitted with SOB- pulmonary edema improvement with HD and ultrafiltration.  s/p HD wednesday and thursday and Saturday -> next HD session Monday early for DC after  # bilateral pleural effusion -  pulmonary eval noted.  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  s/p HD wednesday and thursday and Saturday -> HD today.  # bilateral pleural effusion -  pulmonary eval noted.  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

## 2024-06-03 NOTE — PROGRESS NOTE ADULT - SUBJECTIVE AND OBJECTIVE BOX
[   ] ICU                                          [   ] CCU                                      [ X  ] Medical Floor    Patient is a 83 year old female with anemia. GI consulted to evaluate.        83 year old female with past medical history significant for DM, HTN, Hyperlipidemia, Gout, Osteoporosis, Osteoarthritis, Vitamin D deficiency, Cataract, Breast lump, ESRD on HD M, W, F, presented to the emergency room with SOB and chest pain. On admission patient had anemia and during the hospital course developed drop in her H/H. Patient c/o worsening constipation but denies nausea, vomiting, hematemesis, hematochezia, melena, epistaxis, hemoptysis, fever, chills, palpitation, cough, hematuria, dysuria or diarrhea.     Patient appears comfortable. No new complaints reported, No abdominal pain, N/V, hematemesis, hematochezia, melena, fever, chills, chest pain, SOB, cough or diarrhea reported.    PAIN MANAGEMENT:  Pain Scale:                010  Pain Location:      Prior Colonoscopy:  UNknown      PAST MEDICAL HISTORY    Hypertension    Hyperlipidemia    Gout    Cataract    Breast lump    Osteoporosis    Osteoarthritis    Vitamin D deficiency    Seasonal allergies     Diabetes mellitus    ESRD on dialysis        PAST SURGICAL HISTORY    Bilateral cataract extraction    Breast surgery    Knee replacement, total, left    Arteriovenous fistula        Allergies    shellfish (Anaphylaxis)  ibuprofen (Unknown)  latex (Unknown)  natural rubber (Unknown)  Mushrooms (Anaphylaxis)  aspirin (Unknown)    Intolerances  None          SOCIAL HISTORY  Advanced Directives:       [ X ] Full Code       [  ] DNR  Marital Status:         [  ] M      [  ] S      [  ] D       [  ] W  Children:       [ X ] Yes      [  ] No  Occupation:        [  ] Employed       [ X ] Unemployed       [  ] Retired  Diet:       [ X ] Regular       [  ] PEG feeding          [  ] NG tube feeding  Drug Use:           [ X ] Patient denied          [  ] Yes  Alcohol:           [ X ] No             [  ] Yes (socially)         [  ] Yes (chronic)  Tobacco:           [  ] Yes           [X  ] No      FAMILY HISTORY  [ X ] Heart Disease            [ X ] Diabetes             [ X ] HTN             [  ] Colon Cancer             [  ] Stomach Cancer              [  ] Pancreatic Cancer        VITALS   Vital Signs Last 24 Hrs  T(C): 36.7 (06-03-24 @ 07:19), Max: 37 (06-02-24 @ 20:51)  T(F): 98.1 (06-03-24 @ 07:19), Max: 98.6 (06-02-24 @ 20:51)  HR: 73 (06-03-24 @ 07:19) (72 - 74)  BP: 123/67 (06-03-24 @ 07:19) (104/82 - 124/65)   RR: 18 (06-03-24 @ 07:19) (18 - 18)  SpO2: 100% (06-03-24 @ 07:19) (100% - 100%)      MEDICATIONS  (STANDING):  chlorhexidine 2% Cloths 1 Application(s) Topical <User Schedule>  epoetin lara (PROCRIT) Injectable 99286 Unit(s) IV Push <User Schedule>  folic acid 1 milliGRAM(s) Oral daily  heparin   Injectable 5000 Unit(s) SubCutaneous every 8 hours  insulin glargine Injectable (LANTUS) 3 Unit(s) SubCutaneous at bedtime  insulin lispro (ADMELOG) corrective regimen sliding scale   SubCutaneous three times a day before meals  insulin lispro (ADMELOG) corrective regimen sliding scale   SubCutaneous at bedtime  metoprolol tartrate 25 milliGRAM(s) Oral every 8 hours  pantoprazole    Tablet 40 milliGRAM(s) Oral before breakfast  senna 2 Tablet(s) Oral at bedtime  simvastatin 20 milliGRAM(s) Oral at bedtime    MEDICATIONS  (PRN):  acetaminophen     Tablet .. 650 milliGRAM(s) Oral every 6 hours PRN Temp greater or equal to 38C (100.4F), Mild Pain (1 - 3)  aluminum hydroxide/magnesium hydroxide/simethicone Suspension 30 milliLiter(s) Oral every 4 hours PRN Dyspepsia  melatonin 3 milliGRAM(s) Oral at bedtime PRN Insomnia  ondansetron Injectable 4 milliGRAM(s) IV Push every 8 hours PRN Nausea and/or Vomiting                            7.5    7.85  )-----------( 272      ( 01 Jun 2024 13:30 )             23.8       06-01    133<L>  |  98  |  27<H>  ----------------------------<  271<H>  3.6   |  29  |  4.49<H>    Ca    8.5      01 Jun 2024 13:30  Phos  0.9     06-01  Mg     2.0     06-01    TPro  5.6<L>  /  Alb  2.4<L>  /  TBili  0.4  /  DBili  x   /  AST  41<H>  /  ALT  45  /  AlkPhos  171<H>  06-01

## 2024-06-03 NOTE — PROGRESS NOTE ADULT - SUBJECTIVE AND OBJECTIVE BOX
Patient is a 83y old  Female who presents with a chief complaint of SOB 2/2 fluid overload (03 Jun 2024 13:39)      OVERNIGHT EVENTS: none noted     REVIEW OF SYSTEMS:  CONSTITUTIONAL: No fever, chills  ENMT:  No difficulty hearing, no change in vision  NECK: No pain or stiffness  RESPIRATORY: No cough, SOB  CARDIOVASCULAR: No chest pain, palpitations  GASTROINTESTINAL: No abdominal pain. No nausea, vomiting, or diarrhea  GENITOURINARY: No dysuria  NEUROLOGICAL: No HA  SKIN: No itching, burning, rashes, or lesions   LYMPH NODES: No enlarged glands  ENDOCRINE: No heat or cold intolerance; No hair loss  MUSCULOSKELETAL: No joint pain or swelling; No muscle, back, or extremity pain  PSYCHIATRIC: No depression, anxiety  HEME/LYMPH: No easy bruising, or bleeding gums    T(C): 36.4 (06-03-24 @ 15:59), Max: 37 (06-02-24 @ 20:51)  HR: 84 (06-03-24 @ 15:59) (72 - 84)  BP: 135/68 (06-03-24 @ 15:59) (113/58 - 136/77)  RR: 18 (06-03-24 @ 15:59) (18 - 18)  SpO2: 96% (06-03-24 @ 15:59) (96% - 100%)  Wt(kg): --Vital Signs Last 24 Hrs  T(C): 36.4 (03 Jun 2024 15:59), Max: 37 (02 Jun 2024 20:51)  T(F): 97.5 (03 Jun 2024 15:59), Max: 98.6 (02 Jun 2024 20:51)  HR: 84 (03 Jun 2024 15:59) (72 - 84)  BP: 135/68 (03 Jun 2024 15:59) (113/58 - 136/77)  BP(mean): 95 (03 Jun 2024 13:40) (95 - 95)  RR: 18 (03 Jun 2024 15:59) (18 - 18)  SpO2: 96% (03 Jun 2024 15:59) (96% - 100%)    Parameters below as of 03 Jun 2024 15:59  Patient On (Oxygen Delivery Method): room air          PHYSICAL EXAM:  GENERAL: NAD  EYES: clear conjunctiva; EOMI  ENMT: Moist mucous membranes  NECK: Supple, No JVD, Normal thyroid  CHEST/LUNG: Clear to auscultation bilaterally; No rales, rhonchi, wheezing, or rubs  HEART: S1, S2, Regular rate and rhythm  ABDOMEN: Soft, Nontender, Nondistended; Bowel sounds present  NEURO: Alert & Oriented X3  EXTREMITIES: No LE edema, no calf tenderness  LYMPH: No lymphadenopathy noted  SKIN: No rashes or lesions    Consultant(s) Notes Reviewed:  [x ] YES  [ ] NO  Care Discussed with Consultants/Other Providers [ x] YES  [ ] NO  LABS:            CAPILLARY BLOOD GLUCOSE      POCT Blood Glucose.: 177 mg/dL (03 Jun 2024 16:35)  POCT Blood Glucose.: 271 mg/dL (03 Jun 2024 11:27)  POCT Blood Glucose.: 170 mg/dL (03 Jun 2024 07:37)  POCT Blood Glucose.: 293 mg/dL (02 Jun 2024 21:03)          RADIOLOGY & ADDITIONAL TESTS:  Imaging Personally Reviewed:  [ ] YES  [ ] NO

## 2024-06-03 NOTE — PROGRESS NOTE ADULT - ASSESSMENT
83F  from [facility], [facility] DM, HTN, HLD, hemochromatosis, ESRD on HD M, W, F, presenting to the ED with SOB admitted for pulmonary congestion 2/2 fluid overload. Admitted for fluid overload, started on dialysis. Pt with episode of VT arrest on 5/27 and had an RRT, VT episode was self limiting and did not require any interventions. Nephro Dr. Pandey following, HD was continued.   Pt has hx of hemochromatosis follows with Dr. Shelby, Pending sale to [facility] group consulted. Now with low Hgb like anemia of chronic disease, no active bleeding noted, f/u anemia w/u, repeat Hgb stable      6/3: Pending HD today, Auth pending for pt to go back to [facility], [facility] following. Drop in hemoglobin noted, CT A/P pending.  Spoke to pt's daughter via telephone and updated on mother's status.

## 2024-06-03 NOTE — PROGRESS NOTE ADULT - ASSESSMENT
83F  from [facility], [facility] DM, HTN, HLD, hemochromatosis, ESRD on HD M, W, F, presenting to the ED with SOB admitted for pulmonary congestion 2/2 fluid overload. Admitted for fluid overload, started on dialysis. Pt with episode of VT arrest on 5/27 and had an RRT, VT episode was self limiting and did not require any interventions. Nephro Dr. Pandey following, HD was continued.   Pt has hx of hemochromatosis follows with Dr. Shelby, [facility] group consulted. Now with low Hgb like anemia of chronic disease, no active bleeding noted, f/u anemia w/u, repeat Hgb stable        # Fluid overload.   #ESRD  # Pleural effusion   # elevated trop  - 2/2 ESRD  - dialyssi per renal  - chest xray noted- pulm f/u   - Pulm consulted for effusions.  -echo with mildly reduced LVEF  -cont Metoprolol  -likely demand ischemia from fluid overload  -trended down  -tele monitoring.

## 2024-06-03 NOTE — PROGRESS NOTE ADULT - SUBJECTIVE AND OBJECTIVE BOX
[facility] : Progress Note :: [phone], (office [phone]),   Dr Pandey / Dr Mi / Dr Massey / Dr Etienne / Dr Cassandra DA SILVA / Dr Burt / Dr Garber / Dr Naeem lam  _____________________________________________________________________________________________  offers no complains.  HD today.    shellfish (Anaphylaxis)  ibuprofen (Unknown)  latex (Unknown)  natural rubber (Unknown)  Mushrooms (Anaphylaxis)  aspirin (Unknown)    Hospital Medications:   MEDICATIONS  (STANDING):  chlorhexidine 2% Cloths 1 Application(s) Topical <User Schedule>  epoetin lara (PROCRIT) Injectable 09484 Unit(s) IV Push <User Schedule>  folic acid 1 milliGRAM(s) Oral daily  heparin   Injectable 5000 Unit(s) SubCutaneous every 8 hours  insulin glargine Injectable (LANTUS) 3 Unit(s) SubCutaneous at bedtime  insulin lispro (ADMELOG) corrective regimen sliding scale   SubCutaneous three times a day before meals  insulin lispro (ADMELOG) corrective regimen sliding scale   SubCutaneous at bedtime  metoprolol tartrate 25 milliGRAM(s) Oral every 8 hours  pantoprazole    Tablet 40 milliGRAM(s) Oral before breakfast  senna 2 Tablet(s) Oral at bedtime  simvastatin 20 milliGRAM(s) Oral at bedtime        VITALS:  T(F): 98.2 (06-03-24 @ 17:15), Max: 98.6 (06-02-24 @ 20:51)  HR: 84 (06-03-24 @ 17:15)  BP: 142/72 (06-03-24 @ 17:15)  RR: 28 (06-03-24 @ 17:15)  SpO2: 95% (06-03-24 @ 17:15)  Wt(kg): --      PHYSICAL EXAM:  Constitutional: NAD  HEENT: anicteric sclera, oropharynx clear.  Neck: No JVD  Respiratory: CTAB, no wheezes, rales or rhonchi  Cardiovascular: S1, S2, RRR  Gastrointestinal: BS+, soft, NT/ND  Extremities: No peripheral edema  Neurological: A/O x 3, no focal deficits.  : No CVA tenderness. No dickson.   Skin: No rashes  Vascular Access: AVF with thrill and bruit    LABS:  06-03    135  |  100  |  22<H>  ----------------------------<  163<H>  3.5   |  29  |  4.67<H>    Ca    8.9      03 Jun 2024 17:05      Creatinine Trend: 4.67 <--, 4.49 <--, 3.35 <--, 4.81 <--, 3.43 <--, 2.97 <--                        8.2    9.01  )-----------( 255      ( 03 Jun 2024 17:05 )             25.4     Urine Studies:  Urinalysis Basic - ( 03 Jun 2024 17:05 )    Color:  / Appearance:  / SG:  / pH:   Gluc: 163 mg/dL / Ketone:   / Bili:  / Urobili:    Blood:  / Protein:  / Nitrite:    Leuk Esterase:  / RBC:  / WBC    Sq Epi:  / Non Sq Epi:  / Bacteria:         RADIOLOGY & ADDITIONAL STUDIES:

## 2024-06-03 NOTE — PROGRESS NOTE ADULT - SUBJECTIVE AND OBJECTIVE BOX
Time of Visit:  Patient seen and examined.     MEDICATIONS  (STANDING):  chlorhexidine 2% Cloths 1 Application(s) Topical <User Schedule>  epoetin lara (PROCRIT) Injectable 27051 Unit(s) IV Push <User Schedule>  folic acid 1 milliGRAM(s) Oral daily  heparin   Injectable 5000 Unit(s) SubCutaneous every 8 hours  insulin glargine Injectable (LANTUS) 3 Unit(s) SubCutaneous at bedtime  insulin lispro (ADMELOG) corrective regimen sliding scale   SubCutaneous three times a day before meals  insulin lispro (ADMELOG) corrective regimen sliding scale   SubCutaneous at bedtime  metoprolol tartrate 25 milliGRAM(s) Oral every 8 hours  pantoprazole    Tablet 40 milliGRAM(s) Oral before breakfast  senna 2 Tablet(s) Oral at bedtime  simvastatin 20 milliGRAM(s) Oral at bedtime      MEDICATIONS  (PRN):  acetaminophen     Tablet .. 650 milliGRAM(s) Oral every 6 hours PRN Temp greater or equal to 38C (100.4F), Mild Pain (1 - 3)  aluminum hydroxide/magnesium hydroxide/simethicone Suspension 30 milliLiter(s) Oral every 4 hours PRN Dyspepsia  melatonin 3 milliGRAM(s) Oral at bedtime PRN Insomnia  ondansetron Injectable 4 milliGRAM(s) IV Push every 8 hours PRN Nausea and/or Vomiting       Medications up to date at time of exam.    ROS; No fever, chills, cough , nasal congestion .   PHYSICAL EXAMINATION:  Vital Signs Last 24 Hrs  T(C): 37 (03 Jun 2024 11:13), Max: 37 (02 Jun 2024 20:51)  T(F): 98.6 (03 Jun 2024 11:13), Max: 98.6 (02 Jun 2024 20:51)  HR: 74 (03 Jun 2024 11:13) (72 - 74)  BP: 113/58 (03 Jun 2024 11:13) (105/76 - 124/65)  BP(mean): --  RR: 18 (03 Jun 2024 11:13) (18 - 18)  SpO2: 98% (03 Jun 2024 11:13) (98% - 100%)    Parameters below as of 03 Jun 2024 11:13  Patient On (Oxygen Delivery Method): room air       (if applicable)    General : Alert and oriented. Able to answer question with no SOB. No acute distress .     HEENT: Head is normocephalic and atraumatic. Extraocular muscles are intact. Mucous membranes are moist.     NECK: Supple, no palpable adenopathy.    LUNGS: Non labored. No wheezing, rales, or rhonchi. No use of accessory muscle.     HEART: S1 S2 Regular rate and rhythm without murmur.    ABDOMEN: Soft, nontender, and nondistended. Active bowel sounds.     EXTREMITIES: Without any cyanosis, clubbing, rash, lesions or edema.    NEUROLOGIC: Awake, alert, oriented.     SKIN: Warm, dry, good turgor.      LABS:      MICROBIOLOGY: (if applicable)    RADIOLOGY & ADDITIONAL STUDIES:  < from: Xray Chest 1 View- PORTABLE-Urgent (Xray Chest 1 View- PORTABLE-Urgent .) (05.28.24 @ 10:47) >    ACC: [id number] EXAM:  XR CHEST PORTABLE URGENT 1V   ORDERED BY: MIRTA MEYERS     PROCEDURE DATE:  05/28/2024          INTERPRETATION:  AP erect chest on May 28, 2024 at 10:25 AM. After   dialysis patient had an episode of V. Tach and now has elevated white   count.    Heart magnified by technique.    Infiltrate around the right hilum again noted.    Retrocardiac density consistent with left lower lobe atelectasis and   associated effusion is relatively stable.    Mid upper lung field infiltrates likely congestive on May 27 show some   improvement.    There is slightly increased blunting right base laterally on present film.    IMPRESSION: Central infiltrates likely congestive has improved. There are   persistent lower lung field and pleural findings most significantly in   the left lower lobe.    --- End of Report ---            DAVID PETER MD; Attending Radiologist  This document has been electronically signed. May 28 2024 11:54AM    < end of copied text >    CT: < from: CT Angio Chest PE Protocol w/ IV Cont (05.29.24 @ 16:57) >    ACC: [id number] EXAM:  CT ANGIO CHEST PULM ART WAWIC   ORDERED BY: SAMIR CASTILLO     PROCEDURE DATE:  05/29/2024          INTERPRETATION:  CLINICAL INFORMATION: Chest pain    COMPARISON: CT chest 5/6/2024    CONTRAST/COMPLICATIONS:  IV Contrast: Omnipaque 350  54 cc administered   46 cc discarded  Oral Contrast: NONE  Complications: None reported at time of study completion    PROCEDURE:  CT Angiography of the Chest.  Sagittal and coronal reformats were performed as well as 3D (MIP)   reconstructions.    FINDINGS:    LUNGS AND AIRWAYS: Small debris within the right lateral mid to upper   trachea. Small debris in the distal posterior tracheal. Compressive   atelectasis the bilateral lower lobes right greater than left with   additional subsegmental atelectasis in the lingula, posterior right upper   lobe and posterior right middle lobe.  PLEURA: Moderate bilateral pleural effusions.  MEDIASTINUM AND ELDA: No lymphadenopathy.  VESSELS: No evidence of acute pulmonary embolism. Atherosclerotic changes   of the aorta and coronary vasculature.  HEART: Heart is mildly enlarged. No pericardial effusion.  CHEST WALL AND LOWER NECK: Within normal limits.  VISUALIZED UPPER ABDOMEN: Partially visualized atrophy of the bilateral   kidneys. Suggestion of surface contour nodularity of the liver, correlate   with liver function tests to exclude cirrhosis.  BONES: Degenerative changes.    IMPRESSION:  Moderate bilateral pleural effusions right greater than left with   compressive atelectasis right greater than left. Close of debris within   the trachea.    No evidence of acute pulmonary embolism.        --- End of Report ---            JEFF HENLEY MD; Attending Radiologist  This document has been electronically signed. May 29 2024  5:04PM    < end of copied text >    ECHO:    IMPRESSION: 83y Female PAST MEDICAL & SURGICAL HISTORY:  Hypertension      Hyperlipidemia      Gout      Cataract      Breast lump      Osteoporosis      Osteoarthritis      Vitamin D deficiency      Seasonal allergies      CKD (chronic kidney disease)      Diabetes mellitus      ESRD on dialysis      H/O bilateral cataract extraction  2012  and 2014      History of breast surgery  Excision of left breast lump - benign  1991      History of knee replacement, total, left  2009      Arteriovenous fistula  left side limb alert       Impression; This is an 84 Y/O female from [facility] presented to ED with SOB. . Recently admitted to [facility] for Pneumonia . For pulmonary follow up for Acute hypoxic respiratory failure due to B/L Pulmonary Edema and ESRD on HD.      Suggestion :  O2 saturation 95% room air. So far saturating good room air.   Dialysis as per Nephrology .    Monitor blood glucose with Admelog insulin coverage   DVT GI prophylactic.

## 2024-06-03 NOTE — PROGRESS NOTE ADULT - ASSESSMENT
83F  from [facility], [facility] DM, HTN, HLD, hemochromatosis, ESRD on HD M, W, F, presenting to the ED with SOB admitted for pulmonary congestion 2/2 fluid overload. Admitted for fluid overload, started on dialysis. Pt with episode of VT arrest on 5/27 and had an RRT, VT episode was self limiting and did not require any interventions. Nephro Dr. Pandey following, HD was continued.   Pt has hx of hemochromatosis follows with Dr. Shelby, [facility] group consulted. Now with low Hgb like anemia of chronic disease, no active bleeding noted, f/u anemia w/u, repeat Hgb stable      6/3: Pending HD today, Auth pending for pt to go back to [facility], [facility] following. Spoke to pt's daughter via telephone and updated on mother's status.  83F  from [facility], [facility] DM, HTN, HLD, hemochromatosis, ESRD on HD M, W, F, presenting to the ED with SOB admitted for pulmonary congestion 2/2 fluid overload. Admitted for fluid overload, started on dialysis. Pt with episode of VT arrest on 5/27 and had an RRT, VT episode was self limiting and did not require any interventions. Nephro Dr. Pandey following, HD was continued.   Pt has hx of hemochromatosis follows with Dr. Shelby, [facility] group consulted. Now with low Hgb like anemia of chronic disease, no active bleeding noted, f/u anemia w/u, repeat Hgb stable      6/3: Pending HD today, Auth pending for pt to go back to [facility], [facility] following. Drop in hemoglobin noted, CT A/P pending.  Spoke to pt's daughter via telephone and updated on mother's status.

## 2024-06-03 NOTE — PROGRESS NOTE ADULT - PROBLEM SELECTOR PLAN 12
-pt from [facility]  -pending Pulm and GI reccs  -PT eval recs NIKKIE   - possible d/c back to [facility] pending above  -CT A/P pending

## 2024-06-03 NOTE — PROGRESS NOTE ADULT - PROBLEM SELECTOR PLAN 1
-pro BNP 24592 on admission s/p HD  -remains with dyspnea  -given dyspnea and hypoxia, evaluated for PE   -CTA chest negative for PE but shows moderate plueral effusion  -received dialysis 6/1 as pt received contrast for CT   -CXR with improving congestion   -cont HD as per Nephro   -Nephro Dr. Pandey following  -mon I&Os  -daily weights  - Pulm consulted for effusions

## 2024-06-03 NOTE — PROGRESS NOTE ADULT - ASSESSMENT
complete note to follow    #VTE Prophylaxis     Assessment and Recommendation:   · Assessment	  83F PMH DM, HTN, HLD, ESRD on HD M, W, F, presenting to the ED with SOB. Pt was sent from [facility] at [facility], recently admitted to [facility] for PNA. She states that she started having SOB and cold sweats suddenly around 3:30AM, BP noted to be 147/102. She also endorsed experiencing chest pressure, non radiating, mid sternum, with a warm flushing sensation thoughtout her body, which lasts for less than a minute and resolves on its own. She was given duonebs with improvement in symptoms, but her symptoms later returned. She received another treatement of duonebs, then EMS was called. While seen in the ED, she stated she continued having the flushing sensation with chest pressure, again lasting for less than 1 minute. She denies any chills, N/V/D, abdominal pain, dysuria, numbness/tingling/weakness in extremities. She has not missed any of her HD sessions, but notes that she has required more fluid to be removed during the last 2 sessions due to worsening swelling of her legs. She is from [facility] and ambulates with a walker    #Hemochromatosis, homozygous for H63D  #Normocytic Anemia  pt follows with our colleague Dr. 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 Shelby 6/13 at 4:30pm    #Dysphagia to solids  S&S eval    #VTE Prophylaxis    Thank you for the referral. Will continue to monitor the patient.  Please call with any questions [phone]  Above reviewed with Attending Dr. Fairchild  [facility]  [address], [address], [address]th [place] [address]  [phone]  *Note not finalized until signed by Attending Physician

## 2024-06-03 NOTE — PROGRESS NOTE ADULT - SUBJECTIVE AND OBJECTIVE BOX
PATIENT SEEN AND EXAMINED BY ARIANNA CHAVIRA M.D. ON :- 6/3/24  DATE OF SERVICE:   6/3/24          Interim events noted,Labs ,Radiological studies and Cardiology tests reviewed .    Patient is a 83y old  Female who presents with a chief complaint of SOB 2/2 fluid overload (03 Jun 2024 19:30)      HPI:  83F PMH DM, HTN, HLD, ESRD on HD M, W, F, presenting to the ED with SOB. Pt was sent from [facility] at [facility], recently admitted to [facility] for PNA. She states that she started having SOB and cold sweats suddenly around 3:30AM, BP noted to be 147/102. She also endorsed experiencing chest pressure, non radiating, mid sternum, with a warm flushing sensation thoughtout her body, which lasts for less than a minute and resolves on its own. She was given duonebs with improvement in symptoms, but her symptoms later returned. She received another treatement of duonebs, then EMS was called. While seen in the ED, she stated she continued having the flushing sensation with chest pressure, again lasting for less than 1 minute. She denies any chills, N/V/D, abdominal pain, dysuria, numbness/tingling/weakness in extremities. She has not missed any of her HD sessions, but notes that she has required more fluid to be removed during the last 2 sessions due to worsening swelling of her legs. She is from [facility] and ambulates with a walker (27 May 2024 14:49)      PAST MEDICAL & SURGICAL HISTORY:  Hypertension      Hyperlipidemia      Gout      Cataract      Breast lump      Osteoporosis      Osteoarthritis      Vitamin D deficiency      Seasonal allergies      CKD (chronic kidney disease)      Diabetes mellitus      ESRD on dialysis      H/O bilateral cataract extraction  2012  and 2014      History of breast surgery  Excision of left breast lump - benign  1991      History of knee replacement, total, left  2009      Arteriovenous fistula  left side limb alert          PREVIOUS DIAGNOSTIC TESTING:      ECHO  FINDINGS:    STRESS  FINDINGS:    CATHETERIZATION  FINDINGS:    MEDICATIONS  (STANDING):  chlorhexidine 2% Cloths 1 Application(s) Topical <User Schedule>  epoetin lara (PROCRIT) Injectable 33126 Unit(s) IV Push <User Schedule>  folic acid 1 milliGRAM(s) Oral daily  heparin   Injectable 5000 Unit(s) SubCutaneous every 8 hours  insulin glargine Injectable (LANTUS) 3 Unit(s) SubCutaneous at bedtime  insulin lispro (ADMELOG) corrective regimen sliding scale   SubCutaneous three times a day before meals  insulin lispro (ADMELOG) corrective regimen sliding scale   SubCutaneous at bedtime  metoprolol tartrate 25 milliGRAM(s) Oral every 8 hours  pantoprazole    Tablet 40 milliGRAM(s) Oral before breakfast  senna 2 Tablet(s) Oral at bedtime  simvastatin 20 milliGRAM(s) Oral at bedtime    MEDICATIONS  (PRN):  acetaminophen     Tablet .. 650 milliGRAM(s) Oral every 6 hours PRN Temp greater or equal to 38C (100.4F), Mild Pain (1 - 3)  aluminum hydroxide/magnesium hydroxide/simethicone Suspension 30 milliLiter(s) Oral every 4 hours PRN Dyspepsia  melatonin 3 milliGRAM(s) Oral at bedtime PRN Insomnia  ondansetron Injectable 4 milliGRAM(s) IV Push every 8 hours PRN Nausea and/or Vomiting      FAMILY HISTORY:  Family history of cancer        SOCIAL HISTORY:    CIGARETTES:    ALCOHOL:    REVIEW OF SYSTEMS:  CONSTITUTIONAL: No fever, weight loss, or fatigue  EYES: No eye pain, visual disturbances, or discharge  ENMT:  No difficulty hearing, tinnitus, vertigo; No sinus or throat pain  NECK: No pain or stiffness  RESPIRATORY: No cough, wheezing, chills or hemoptysis; No shortness of breath  CARDIOVASCULAR: No chest pain, palpitations, dizziness, or leg swelling  GASTROINTESTINAL: No abdominal or epigastric pain. No nausea, vomiting, or hematemesis; No diarrhea or constipation. No melena or hematochezia.  GENITOURINARY: No dysuria, frequency, hematuria, or incontinence  NEUROLOGICAL: No headaches, memory loss, loss of strength, numbness, or tremors  SKIN: No itching, burning, rashes, or lesions   LYMPH NODES: No enlarged glands  ENDOCRINE: No heat or cold intolerance; No hair loss  MUSCULOSKELETAL: No joint pain or swelling; No muscle, back, or extremity pain  PSYCHIATRIC: No depression, anxiety, mood swings, or difficulty sleeping  HEME/LYMPH: No easy bruising, or bleeding gums  ALLERY AND IMMUNOLOGIC: No hives or eczema    Vital Signs Last 24 Hrs  T(C): 36.8 (03 Jun 2024 17:15), Max: 37 (02 Jun 2024 20:51)  T(F): 98.2 (03 Jun 2024 17:15), Max: 98.6 (02 Jun 2024 20:51)  HR: 84 (03 Jun 2024 17:15) (72 - 84)  BP: 142/72 (03 Jun 2024 17:15) (113/58 - 142/72)  BP(mean): 95 (03 Jun 2024 13:40) (95 - 95)  RR: 28 (03 Jun 2024 17:15) (18 - 28)  SpO2: 95% (03 Jun 2024 17:15) (95% - 100%)    Parameters below as of 03 Jun 2024 17:15  Patient On (Oxygen Delivery Method): room air          PHYSICAL EXAM:  GENERAL: NAD, well-groomed, well-developed  HEAD:  Atraumatic, Normocephalic  EYES: EOMI, PERRLA, conjunctiva and sclera clear  ENMT: No tonsillar erythema, exudates, or enlargement; Moist mucous membranes, Good dentition, No lesions  NECK: Supple, No JVD, Normal thyroid  NERVOUS SYSTEM:  Alert & Oriented X3, Good concentration; Motor Strength 5/5 B/L upper and lower extremities; DTRs 2+ intact and symmetric  CHEST/LUNG: Clear to percussion bilaterally; No rales, rhonchi, wheezing, or rubs  HEART: Regular rate and rhythm; No murmurs, rubs, or gallops  ABDOMEN: Soft, Nontender, Nondistended; Bowel sounds present  EXTREMITIES:  2+ Peripheral Pulses, No clubbing, cyanosis, or edema  LYMPH: No lymphadenopathy noted  SKIN: No rashes or lesions      INTERPRETATION OF TELEMETRY:    ECG:    CHADSVASC:     LABS:                        8.2    9.01  )-----------( 255      ( 03 Jun 2024 17:05 )             25.4     06-03    135  |  100  |  22<H>  ----------------------------<  163<H>  3.5   |  29  |  4.67<H>    Ca    8.9      03 Jun 2024 17:05            Urinalysis Basic - ( 03 Jun 2024 17:05 )    Color: x / Appearance: x / SG: x / pH: x  Gluc: 163 mg/dL / Ketone: x  / Bili: x / Urobili: x   Blood: x / Protein: x / Nitrite: x   Leuk Esterase: x / RBC: x / WBC x   Sq Epi: x / Non Sq Epi: x / Bacteria: x      Lipid Panel:   I&O's Summary      RADIOLOGY & ADDITIONAL STUDIES:  CONCLUSIONS:      1. Left ventricular cavity is normal in size. Left ventricular systolic function is mildly decreased with an ejection fraction of 48 % by Lee's method of disks. Global left ventricular hypokinesis.   2. There is mild (grade 1)left ventricular diastolic dysfunction.   3. Normal right ventricular cavity size, with normal wall thickness, and normal systolic function. Tricuspid annular plane systolic excursion (TAPSE) is 2.1 cm (normal >=1.7 cm). Tricuspid annular tissue Doppler S' is 10.0 cm/s (normal >10 cm/s).   4. Mild mitral regurgitation.   5. Normal left and right atrial size.   6. Mild tricuspid regurgitation.   7. Estimated pulmonary artery systolic pressure is 43 mmHg, consistent with mild pulmonary hypertension.   8. Mild aortic regurgitation.   9. Mild left ventricular hypertrophy.  10. There is calcification of the mitral valve annulus.  11. There is increased LV mass and concentric hypertrophy.  12. Trace pulmonic regurgitation.  13. Trileaflet aortic valve with reduced systolic excursion. There is calcification of the aortic valve leaflets. Mild aortic stenosis.  14. Bilateral pleural effusion noted.  15. No pericardial effusion seen.  16. No prior echocardiogram is available for comparison.

## 2024-06-03 NOTE — PROGRESS NOTE ADULT - PROBLEM SELECTOR PLAN 1
-pro BNP 04198 on admission s/p HD  -remains with dyspnea  -given dyspnea and hypoxia, evaluated for PE   -CTA chest negative for PE but shows moderate plueral effusion  -received dialysis 6/1 as pt received contrast for CT   -CXR with improving congestion   -cont HD as per Nephro   -Nephro Dr. Pandey following  -mon I&Os  -daily weights  - Pulm consulted for effusions

## 2024-06-04 LAB
ANION GAP SERPL CALC-SCNC: 10 MMOL/L — SIGNIFICANT CHANGE UP (ref 5–17)
BUN SERPL-MCNC: 13 MG/DL — SIGNIFICANT CHANGE UP (ref 7–18)
CALCIUM SERPL-MCNC: 8.6 MG/DL — SIGNIFICANT CHANGE UP (ref 8.4–10.5)
CHLORIDE SERPL-SCNC: 100 MMOL/L — SIGNIFICANT CHANGE UP (ref 96–108)
CO2 SERPL-SCNC: 29 MMOL/L — SIGNIFICANT CHANGE UP (ref 22–31)
CREAT SERPL-MCNC: 3.42 MG/DL — HIGH (ref 0.5–1.3)
EGFR: 13 ML/MIN/1.73M2 — LOW
GLUCOSE BLDC GLUCOMTR-MCNC: 158 MG/DL — HIGH (ref 70–99)
GLUCOSE BLDC GLUCOMTR-MCNC: 322 MG/DL — HIGH (ref 70–99)
GLUCOSE SERPL-MCNC: 130 MG/DL — HIGH (ref 70–99)
HCT VFR BLD CALC: 29.6 % — LOW (ref 34.5–45)
HGB BLD-MCNC: 9 G/DL — LOW (ref 11.5–15.5)
MCHC RBC-ENTMCNC: 28.4 PG — SIGNIFICANT CHANGE UP (ref 27–34)
MCHC RBC-ENTMCNC: 30.4 GM/DL — LOW (ref 32–36)
MCV RBC AUTO: 93.4 FL — SIGNIFICANT CHANGE UP (ref 80–100)
NRBC # BLD: 0 /100 WBCS — SIGNIFICANT CHANGE UP (ref 0–0)
PLATELET # BLD AUTO: 231 K/UL — SIGNIFICANT CHANGE UP (ref 150–400)
POTASSIUM SERPL-MCNC: 3.4 MMOL/L — LOW (ref 3.5–5.3)
POTASSIUM SERPL-SCNC: 3.4 MMOL/L — LOW (ref 3.5–5.3)
RBC # BLD: 3.17 M/UL — LOW (ref 3.8–5.2)
RBC # FLD: 15.2 % — HIGH (ref 10.3–14.5)
SODIUM SERPL-SCNC: 139 MMOL/L — SIGNIFICANT CHANGE UP (ref 135–145)
WBC # BLD: 6.92 K/UL — SIGNIFICANT CHANGE UP (ref 3.8–10.5)
WBC # FLD AUTO: 6.92 K/UL — SIGNIFICANT CHANGE UP (ref 3.8–10.5)

## 2024-06-04 PROCEDURE — 74177 CT ABD & PELVIS W/CONTRAST: CPT | Mod: 26

## 2024-06-04 RX ORDER — POTASSIUM CHLORIDE 20 MEQ
20 PACKET (EA) ORAL ONCE
Refills: 0 | Status: COMPLETED | OUTPATIENT
Start: 2024-06-04 | End: 2024-06-04

## 2024-06-04 RX ADMIN — Medication 25 MILLIGRAM(S): at 05:59

## 2024-06-04 RX ADMIN — HEPARIN SODIUM 5000 UNIT(S): 5000 INJECTION INTRAVENOUS; SUBCUTANEOUS at 13:14

## 2024-06-04 RX ADMIN — HEPARIN SODIUM 5000 UNIT(S): 5000 INJECTION INTRAVENOUS; SUBCUTANEOUS at 21:20

## 2024-06-04 RX ADMIN — Medication 20 MILLIEQUIVALENT(S): at 13:15

## 2024-06-04 RX ADMIN — Medication 25 MILLIGRAM(S): at 21:20

## 2024-06-04 RX ADMIN — Medication 4: at 11:49

## 2024-06-04 RX ADMIN — Medication 25 MILLIGRAM(S): at 13:15

## 2024-06-04 RX ADMIN — PANTOPRAZOLE SODIUM 40 MILLIGRAM(S): 20 TABLET, DELAYED RELEASE ORAL at 05:59

## 2024-06-04 RX ADMIN — Medication 1 MILLIGRAM(S): at 11:02

## 2024-06-04 RX ADMIN — CHLORHEXIDINE GLUCONATE 1 APPLICATION(S): 213 SOLUTION TOPICAL at 05:55

## 2024-06-04 RX ADMIN — SIMVASTATIN 20 MILLIGRAM(S): 20 TABLET, FILM COATED ORAL at 21:20

## 2024-06-04 RX ADMIN — Medication 1: at 08:12

## 2024-06-04 RX ADMIN — HEPARIN SODIUM 5000 UNIT(S): 5000 INJECTION INTRAVENOUS; SUBCUTANEOUS at 05:59

## 2024-06-04 RX ADMIN — Medication 1: at 17:28

## 2024-06-04 RX ADMIN — INSULIN GLARGINE 3 UNIT(S): 100 INJECTION, SOLUTION SUBCUTANEOUS at 21:20

## 2024-06-04 NOTE — PROGRESS NOTE ADULT - ASSESSMENT
83F  from [facility], PMH DM, HTN, HLD, hemochromatosis, ESRD on HD M, W, F, presenting to the ED with SOB admitted for pulmonary congestion 2/2 fluid overload. Admitted for fluid overload, started on dialysis. Pt with episode of VT arrest on 5/27 and had an RRT, VT episode was self limiting and did not require any interventions. Nephro Dr. Pandey following, HD was continued.   Pt has hx of hemochromatosis follows with Dr. Shelby, [facility] group consulted. Now with low Hgb like anemia of chronic disease, no active bleeding noted, f/u anemia w/u, repeat Hgb stable      6/3: Pending HD today, Auth pending for pt to go back to [facility], CNM following. Drop in hemoglobin noted, CT A/P pending.  Spoke to pt's daughter via telephone and updated on mother's status.     6/4: pending GI final recs for outpatient. Auth pending to go back to [facility]. H/H 9.0/29.6

## 2024-06-04 NOTE — PROGRESS NOTE ADULT - NEGATIVE NEUROLOGICAL SYMPTOMS
no syncope/no tremors/no vertigo/no loss of sensation/no difficulty walking/no headache

## 2024-06-04 NOTE — PROGRESS NOTE ADULT - SUBJECTIVE AND OBJECTIVE BOX
PATIENT SEEN AND EXAMINED BY ARIANNA CHAVIRA M.D. ON :- 6/4/24  DATE OF SERVICE:    6/4/24         Interim events noted,Labs ,Radiological studies and Cardiology tests reviewed .    Patient is a 83y old  Female who presents with a chief complaint of SOB 2/2 fluid overload (04 Jun 2024 18:20)      HPI:  83F PMH DM, HTN, HLD, ESRD on HD M, W, F, presenting to the ED with SOB. Pt was sent from [facility] at [facility], recently admitted to [facility] for PNA. She states that she started having SOB and cold sweats suddenly around 3:30AM, BP noted to be 147/102. She also endorsed experiencing chest pressure, non radiating, mid sternum, with a warm flushing sensation thoughtout her body, which lasts for less than a minute and resolves on its own. She was given duonebs with improvement in symptoms, but her symptoms later returned. She received another treatement of duonebs, then EMS was called. While seen in the ED, she stated she continued having the flushing sensation with chest pressure, again lasting for less than 1 minute. She denies any chills, N/V/D, abdominal pain, dysuria, numbness/tingling/weakness in extremities. She has not missed any of her HD sessions, but notes that she has required more fluid to be removed during the last 2 sessions due to worsening swelling of her legs. She is from [facility] and ambulates with a walker (27 May 2024 14:49)      PAST MEDICAL & SURGICAL HISTORY:  Hypertension      Hyperlipidemia      Gout      Cataract      Breast lump      Osteoporosis      Osteoarthritis      Vitamin D deficiency      Seasonal allergies      CKD (chronic kidney disease)      Diabetes mellitus      ESRD on dialysis      H/O bilateral cataract extraction  2012  and 2014      History of breast surgery  Excision of left breast lump - benign  1991      History of knee replacement, total, left  2009      Arteriovenous fistula  left side limb alert          PREVIOUS DIAGNOSTIC TESTING:      ECHO  FINDINGS:    STRESS  FINDINGS:    CATHETERIZATION  FINDINGS:    MEDICATIONS  (STANDING):  chlorhexidine 2% Cloths 1 Application(s) Topical <User Schedule>  epoetin lara (PROCRIT) Injectable 21039 Unit(s) IV Push <User Schedule>  folic acid 1 milliGRAM(s) Oral daily  heparin   Injectable 5000 Unit(s) SubCutaneous every 8 hours  insulin glargine Injectable (LANTUS) 3 Unit(s) SubCutaneous at bedtime  insulin lispro (ADMELOG) corrective regimen sliding scale   SubCutaneous three times a day before meals  insulin lispro (ADMELOG) corrective regimen sliding scale   SubCutaneous at bedtime  metoprolol tartrate 25 milliGRAM(s) Oral every 8 hours  pantoprazole    Tablet 40 milliGRAM(s) Oral before breakfast  senna 2 Tablet(s) Oral at bedtime  simvastatin 20 milliGRAM(s) Oral at bedtime    MEDICATIONS  (PRN):  acetaminophen     Tablet .. 650 milliGRAM(s) Oral every 6 hours PRN Temp greater or equal to 38C (100.4F), Mild Pain (1 - 3)  aluminum hydroxide/magnesium hydroxide/simethicone Suspension 30 milliLiter(s) Oral every 4 hours PRN Dyspepsia  melatonin 3 milliGRAM(s) Oral at bedtime PRN Insomnia  ondansetron Injectable 4 milliGRAM(s) IV Push every 8 hours PRN Nausea and/or Vomiting      FAMILY HISTORY:  Family history of cancer        SOCIAL HISTORY:    CIGARETTES:    ALCOHOL:    REVIEW OF SYSTEMS:  CONSTITUTIONAL: No fever, weight loss, or fatigue  EYES: No eye pain, visual disturbances, or discharge  ENMT:  No difficulty hearing, tinnitus, vertigo; No sinus or throat pain  NECK: No pain or stiffness  RESPIRATORY: No cough, wheezing, chills or hemoptysis; No shortness of breath  CARDIOVASCULAR: No chest pain, palpitations, dizziness, or leg swelling  GASTROINTESTINAL: No abdominal or epigastric pain. No nausea, vomiting, or hematemesis; No diarrhea or constipation. No melena or hematochezia.  GENITOURINARY: No dysuria, frequency, hematuria, or incontinence  NEUROLOGICAL: No headaches, memory loss, loss of strength, numbness, or tremors  SKIN: No itching, burning, rashes, or lesions   LYMPH NODES: No enlarged glands  ENDOCRINE: No heat or cold intolerance; No hair loss  MUSCULOSKELETAL: No joint pain or swelling; No muscle, back, or extremity pain  PSYCHIATRIC: No depression, anxiety, mood swings, or difficulty sleeping  HEME/LYMPH: No easy bruising, or bleeding gums  ALLERY AND IMMUNOLOGIC: No hives or eczema    Vital Signs Last 24 Hrs  T(C): 37.2 (04 Jun 2024 19:30), Max: 37.2 (04 Jun 2024 19:30)  T(F): 99 (04 Jun 2024 19:30), Max: 99 (04 Jun 2024 19:30)  HR: 75 (04 Jun 2024 19:30) (69 - 83)  BP: 133/63 (04 Jun 2024 19:30) (106/48 - 133/63)  BP(mean): --  RR: 18 (04 Jun 2024 19:30) (18 - 19)  SpO2: 96% (04 Jun 2024 19:30) (95% - 100%)    Parameters below as of 04 Jun 2024 19:30  Patient On (Oxygen Delivery Method): room air          PHYSICAL EXAM:  GENERAL: NAD, well-groomed, well-developed  HEAD:  Atraumatic, Normocephalic  EYES: EOMI, PERRLA, conjunctiva and sclera clear  ENMT: No tonsillar erythema, exudates, or enlargement; Moist mucous membranes, Good dentition, No lesions  NECK: Supple, No JVD, Normal thyroid  NERVOUS SYSTEM:  Alert & Oriented X3, Good concentration; Motor Strength 5/5 B/L upper and lower extremities; DTRs 2+ intact and symmetric  CHEST/LUNG: Clear to percussion bilaterally; No rales, rhonchi, wheezing, or rubs  HEART: Regular rate and rhythm; No murmurs, rubs, or gallops  ABDOMEN: Soft, Nontender, Nondistended; Bowel sounds present  EXTREMITIES:  2+ Peripheral Pulses, No clubbing, cyanosis, or edema  LYMPH: No lymphadenopathy noted  SKIN: No rashes or lesions      INTERPRETATION OF TELEMETRY:    ECG:    CHADSVASC:     LABS:                        9.0    6.92  )-----------( 231      ( 04 Jun 2024 07:01 )             29.6     06-04    139  |  100  |  13  ----------------------------<  130<H>  3.4<L>   |  29  |  3.42<H>    Ca    8.6      04 Jun 2024 07:01            Urinalysis Basic - ( 04 Jun 2024 07:01 )    Color: x / Appearance: x / SG: x / pH: x  Gluc: 130 mg/dL / Ketone: x  / Bili: x / Urobili: x   Blood: x / Protein: x / Nitrite: x   Leuk Esterase: x / RBC: x / WBC x   Sq Epi: x / Non Sq Epi: x / Bacteria: x      Lipid Panel:   I&O's Summary    03 Jun 2024 07:01  -  04 Jun 2024 07:00  --------------------------------------------------------  IN: 500 mL / OUT: 2500 mL / NET: -2000 mL    04 Jun 2024 07:01  -  04 Jun 2024 19:50  --------------------------------------------------------  IN: 420 mL / OUT: 0 mL / NET: 420 mL        RADIOLOGY & ADDITIONAL STUDIES:    CONCLUSIONS:      1. Left ventricular cavity is normal in size. Left ventricular systolic function is mildly decreased with an ejection fraction of 48 % by Lee's method of disks. Global left ventricular hypokinesis.   2. There is mild (grade 1)left ventricular diastolic dysfunction.   3. Normal right ventricular cavity size, with normal wall thickness, and normal systolic function. Tricuspid annular plane systolic excursion (TAPSE) is 2.1 cm (normal >=1.7 cm). Tricuspid annular tissue Doppler S' is 10.0 cm/s (normal >10 cm/s).   4. Mild mitral regurgitation.   5. Normal left and right atrial size.   6. Mild tricuspid regurgitation.   7. Estimated pulmonary artery systolic pressure is 43 mmHg, consistent with mild pulmonary hypertension.   8. Mild aortic regurgitation.   9. Mild left ventricular hypertrophy.  10. There is calcification of the mitral valve annulus.  11. There is increased LV mass and concentric hypertrophy.  12. Trace pulmonic regurgitation.  13. Trileaflet aortic valve with reduced systolic excursion. There is calcification of the aortic valve leaflets. Mild aortic stenosis.  14. Bilateral pleural effusion noted.  15. No pericardial effusion seen.  16. No prior echocardiogram is available for comparison.

## 2024-06-04 NOTE — PROGRESS NOTE ADULT - NEGATIVE ENMT SYMPTOMS
no hearing difficulty/no ear pain/no tinnitus/no vertigo/no sinus symptoms/no nasal congestion/no nose bleeds/no gum bleeding/no dry mouth/no throat pain/no dysphagia

## 2024-06-04 NOTE — PROGRESS NOTE ADULT - ASSESSMENT
complete note to follow       Assessment and Recommendation:   · Assessment	  83F PMH DM, HTN, HLD, ESRD on HD M, W, F, presenting to the ED with SOB. Pt was sent from [facility] at [facility], recently admitted to [facility] for PNA. She states that she started having SOB and cold sweats suddenly around 3:30AM, BP noted to be 147/102. She also endorsed experiencing chest pressure, non radiating, mid sternum, with a warm flushing sensation thoughtout her body, which lasts for less than a minute and resolves on its own. She was given duonebs with improvement in symptoms, but her symptoms later returned. She received another treatement of duonebs, then EMS was called. While seen in the ED, she stated she continued having the flushing sensation with chest pressure, again lasting for less than 1 minute. She denies any chills, N/V/D, abdominal pain, dysuria, numbness/tingling/weakness in extremities. She has not missed any of her HD sessions, but notes that she has required more fluid to be removed during the last 2 sessions due to worsening swelling of her legs. She is from [facility] and ambulates with a walker    #Hemochromatosis, homozygous for H63D  #Normocytic Anemia  pt follows with our colleague Dr. 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, 3 polyps (tubular adenoma)  Rec's:  multifactorial hosp phleb for blood draws, inflammation/?continued infnxn (PNA), drug-induced, ESRD, ?GIB, other  -Hold Phleb at this time  -TSH/retic nl, no hemolysis, iron studies c/w ACD, RF elevated at 30, folate level is low  -continue folic acid 1mg po qd  -CTA chest no PE, but shows B/L Pleural effusions  -monitor H/H---Hgb=7.0, repeat CBC to confirm  -PRBC transfusion if Hgb <7.0 or symptomatic   -GI consult appreciated, rec CT A/P, outpt GI w/u  -CT A/P shows thickened duodenal bulb, ?PUD, ?mass, 5mm panc mass ?IPMN  -will w/u IPMN with addt'l imaging as outpt  -await GI rec's, but considering H/H is stable may have GI w/u as outpt  -Nephrology following  -Continue retacrit  upon d/c pt to f/u with Dr. Shelby 6/13 at 4:30pm    #Dysphagia to solids  S&S eval    #VTE Prophylaxis    at this time will sign-off, please re-consult as needed  Thank you for the referral. Will continue to monitor the patient.  Please call with any questions [phone]  Above reviewed with Attending Dr. Fairchild  [facility] [place]  [address], [address], 5th Floor  [place], [place] [id number] [phone]  *Note not finalized until signed by Attending Physician         Assessment and Recommendation:   · Assessment	  83F PMH DM, HTN, HLD, ESRD on HD M, W, F, presenting to the ED with SOB. Pt was sent from [facility] at [facility], recently admitted to [facility] for PNA. She states that she started having SOB and cold sweats suddenly around 3:30AM, BP noted to be 147/102. She also endorsed experiencing chest pressure, non radiating, mid sternum, with a warm flushing sensation thoughtout her body, which lasts for less than a minute and resolves on its own. She was given duonebs with improvement in symptoms, but her symptoms later returned. She received another treatement of duonebs, then EMS was called. While seen in the ED, she stated she continued having the flushing sensation with chest pressure, again lasting for less than 1 minute. She denies any chills, N/V/D, abdominal pain, dysuria, numbness/tingling/weakness in extremities. She has not missed any of her HD sessions, but notes that she has required more fluid to be removed during the last 2 sessions due to worsening swelling of her legs. She is from [facility] and ambulates with a walker    #Hemochromatosis, homozygous for H63D  #Normocytic Anemia  pt follows with our colleague Dr. 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, 3 polyps (tubular adenoma)  Rec's:  multifactorial hosp phleb for blood draws, inflammation/?continued infnxn (PNA), drug-induced, ESRD, ?GIB, other  -Hold Phleb at this time  -TSH/retic nl, no hemolysis, iron studies c/w ACD, RF elevated at 30, folate level is low  -continue folic acid 1mg po qd  -CTA chest no PE, but shows B/L Pleural effusions  -monitor H/H---Hgb=7.0, repeat CBC to confirm  -PRBC transfusion if Hgb <7.0 or symptomatic   -GI consult appreciated, rec CT A/P, outpt GI w/u  -CT A/P shows thickened duodenal bulb, ?PUD, ?mass, 5mm panc mass ?IPMN  -will w/u IPMN with addt'l imaging as outpt  -await GI rec's, but considering H/H is stable may have GI w/u as outpt  -Nephrology following  -Continue retacrit  upon d/c pt to f/u with Dr. Shelby 6/13 at 4:30pm    I spoke with pt's daughter Janet to explain CT findings and discussed anemia as well, she informed me that her mom had EGD two months ago which was normal. I recommend she f/u with her GI within a week of d/ so they can determine if repeat EGD is indicated for this CT finding and ongoing anemia. She understands    #Dysphagia to solids  S&S eval    #VTE Prophylaxis    at this time will sign-off, please re-consult as needed  Thank you for the referral. Will continue to monitor the patient.  Please call with any questions [phone]  Above reviewed with Attending Dr. Fairchild  [facility] [place]  [address], [address], 5th Floor  [place], [place] [id number] [phone]  *Note not finalized until signed by Attending Physician

## 2024-06-04 NOTE — PROGRESS NOTE ADULT - NEGATIVE GASTROINTESTINAL SYMPTOMS
no nausea/no vomiting/no abdominal pain/no melena/no hematochezia/no steatorrhea/no jaundice/no hiccoughs

## 2024-06-04 NOTE — PROGRESS NOTE ADULT - ASSESSMENT
# ESRD admitted with SOB- pulmonary edema improvement with HD and ultrafiltration.  s/p HD wednesday and thursday and Saturday -> HD in AM   # CT abdomen with ? gastric mass. heme-onc F/U noted  # bilateral pleural effusion -  pulmonary eval noted.  # HTN blood pressure stable

## 2024-06-04 NOTE — PROGRESS NOTE ADULT - PROBLEM SELECTOR PLAN 6
-hx of hemochromatosis, follow with Dr. Shelby   -hx of phlebotomies   - iron studies show anemia of chronic disease  -c/w folic acid for low folate  -QMA following

## 2024-06-04 NOTE — PROGRESS NOTE ADULT - SUBJECTIVE AND OBJECTIVE BOX
Patient is a 83y old  Female who presents with a chief complaint of SOB 2/2 fluid overload       SUBJECTIVE / OVERNIGHT EVENTS:        MEDICATIONS  (STANDING):  chlorhexidine 2% Cloths 1 Application(s) Topical <User Schedule>  epoetin lara (PROCRIT) Injectable 32205 Unit(s) IV Push <User Schedule>  folic acid 1 milliGRAM(s) Oral daily  heparin   Injectable 5000 Unit(s) SubCutaneous every 8 hours  insulin glargine Injectable (LANTUS) 3 Unit(s) SubCutaneous at bedtime  insulin lispro (ADMELOG) corrective regimen sliding scale   SubCutaneous three times a day before meals  insulin lispro (ADMELOG) corrective regimen sliding scale   SubCutaneous at bedtime  metoprolol tartrate 25 milliGRAM(s) Oral every 8 hours  pantoprazole    Tablet 40 milliGRAM(s) Oral before breakfast  senna 2 Tablet(s) Oral at bedtime  simvastatin 20 milliGRAM(s) Oral at bedtime    MEDICATIONS  (PRN):  acetaminophen     Tablet .. 650 milliGRAM(s) Oral every 6 hours PRN Temp greater or equal to 38C (100.4F), Mild Pain (1 - 3)  aluminum hydroxide/magnesium hydroxide/simethicone Suspension 30 milliLiter(s) Oral every 4 hours PRN Dyspepsia  melatonin 3 milliGRAM(s) Oral at bedtime PRN Insomnia  ondansetron Injectable 4 milliGRAM(s) IV Push every 8 hours PRN Nausea and/or Vomiting    CAPILLARY BLOOD GLUCOSE      POCT Blood Glucose.: 322 mg/dL (04 Jun 2024 11:20)  POCT Blood Glucose.: 158 mg/dL (04 Jun 2024 07:41)  POCT Blood Glucose.: 215 mg/dL (03 Jun 2024 21:13)  POCT Blood Glucose.: 177 mg/dL (03 Jun 2024 16:35)    I&O's Summary    03 Jun 2024 07:01  -  04 Jun 2024 07:00  --------------------------------------------------------  IN: 500 mL / OUT: 2500 mL / NET: -2000 mL    04 Jun 2024 07:01  -  04 Jun 2024 13:24  --------------------------------------------------------  IN: 240 mL / OUT: 0 mL / NET: 240 mL        PHYSICAL EXAM:  Vital Signs Last 24 Hrs  T(C): 36.1 (04 Jun 2024 11:15), Max: 36.8 (03 Jun 2024 17:15)  T(F): 97 (04 Jun 2024 11:15), Max: 98.3 (03 Jun 2024 19:20)  HR: 75 (04 Jun 2024 11:15) (70 - 84)  BP: 106/48 (04 Jun 2024 11:15) (106/48 - 142/72)  BP(mean): 95 (03 Jun 2024 13:40) (95 - 95)  RR: 18 (04 Jun 2024 11:15) (18 - 28)  SpO2: 98% (04 Jun 2024 11:15) (95% - 100%)    Parameters below as of 04 Jun 2024 11:15  Patient On (Oxygen Delivery Method): room air          LABS:                        9.0    6.92  )-----------( 231      ( 04 Jun 2024 07:01 )             29.6     06-04    139  |  100  |  13  ----------------------------<  130<H>  3.4<L>   |  29  |  3.42<H>    Ca    8.6      04 Jun 2024 07:01            Urinalysis Basic - ( 04 Jun 2024 07:01 )    Color: x / Appearance: x / SG: x / pH: x  Gluc: 130 mg/dL / Ketone: x  / Bili: x / Urobili: x   Blood: x / Protein: x / Nitrite: x   Leuk Esterase: x / RBC: x / WBC x   Sq Epi: x / Non Sq Epi: x / Bacteria: x                 Patient is a 83y old  Female who presents with a chief complaint of SOB 2/2 fluid overload       SUBJECTIVE / OVERNIGHT EVENTS: events noted. No new complaints      MEDICATIONS  (STANDING):  chlorhexidine 2% Cloths 1 Application(s) Topical <User Schedule>  epoetin lara (PROCRIT) Injectable 46294 Unit(s) IV Push <User Schedule>  folic acid 1 milliGRAM(s) Oral daily  heparin   Injectable 5000 Unit(s) SubCutaneous every 8 hours  insulin glargine Injectable (LANTUS) 3 Unit(s) SubCutaneous at bedtime  insulin lispro (ADMELOG) corrective regimen sliding scale   SubCutaneous three times a day before meals  insulin lispro (ADMELOG) corrective regimen sliding scale   SubCutaneous at bedtime  metoprolol tartrate 25 milliGRAM(s) Oral every 8 hours  pantoprazole    Tablet 40 milliGRAM(s) Oral before breakfast  senna 2 Tablet(s) Oral at bedtime  simvastatin 20 milliGRAM(s) Oral at bedtime    MEDICATIONS  (PRN):  acetaminophen     Tablet .. 650 milliGRAM(s) Oral every 6 hours PRN Temp greater or equal to 38C (100.4F), Mild Pain (1 - 3)  aluminum hydroxide/magnesium hydroxide/simethicone Suspension 30 milliLiter(s) Oral every 4 hours PRN Dyspepsia  melatonin 3 milliGRAM(s) Oral at bedtime PRN Insomnia  ondansetron Injectable 4 milliGRAM(s) IV Push every 8 hours PRN Nausea and/or Vomiting    CAPILLARY BLOOD GLUCOSE      POCT Blood Glucose.: 322 mg/dL (04 Jun 2024 11:20)  POCT Blood Glucose.: 158 mg/dL (04 Jun 2024 07:41)  POCT Blood Glucose.: 215 mg/dL (03 Jun 2024 21:13)  POCT Blood Glucose.: 177 mg/dL (03 Jun 2024 16:35)    I&O's Summary    03 Jun 2024 07:01  -  04 Jun 2024 07:00  --------------------------------------------------------  IN: 500 mL / OUT: 2500 mL / NET: -2000 mL    04 Jun 2024 07:01  -  04 Jun 2024 13:24  --------------------------------------------------------  IN: 240 mL / OUT: 0 mL / NET: 240 mL        PHYSICAL EXAM:  Vital Signs Last 24 Hrs  T(C): 36.1 (04 Jun 2024 11:15), Max: 36.8 (03 Jun 2024 17:15)  T(F): 97 (04 Jun 2024 11:15), Max: 98.3 (03 Jun 2024 19:20)  HR: 75 (04 Jun 2024 11:15) (70 - 84)  BP: 106/48 (04 Jun 2024 11:15) (106/48 - 142/72)  BP(mean): 95 (03 Jun 2024 13:40) (95 - 95)  RR: 18 (04 Jun 2024 11:15) (18 - 28)  SpO2: 98% (04 Jun 2024 11:15) (95% - 100%)    Parameters below as of 04 Jun 2024 11:15  Patient On (Oxygen Delivery Method): room air      GEN: NAD; A and O x 3, thin  LUNGS: cta  HEART: S1 S2  ABDOMEN: soft, non-tender, non-distended, + BS  EXTREMITIES: no edema    LABS:                        9.0    6.92  )-----------( 231      ( 04 Jun 2024 07:01 )             29.6     06-04    139  |  100  |  13  ----------------------------<  130<H>  3.4<L>   |  29  |  3.42<H>    Ca    8.6      04 Jun 2024 07:01            Urinalysis Basic - ( 04 Jun 2024 07:01 )    Color: x / Appearance: x / SG: x / pH: x  Gluc: 130 mg/dL / Ketone: x  / Bili: x / Urobili: x   Blood: x / Protein: x / Nitrite: x   Leuk Esterase: x / RBC: x / WBC x   Sq Epi: x / Non Sq Epi: x / Bacteria: x

## 2024-06-04 NOTE — PROGRESS NOTE ADULT - SUBJECTIVE AND OBJECTIVE BOX
[facility] : Progress Note :: [phone], (office [phone]),   Dr Pandey / Dr Mi / Dr Massey / Dr Etienne / Dr Cassandra DA SILVA / Dr Burt / Dr Garber / Dr Naeem lam  _____________________________________________________________________________________________  CT abdomen noted    shellfish (Anaphylaxis)  ibuprofen (Unknown)  latex (Unknown)  natural rubber (Unknown)  Mushrooms (Anaphylaxis)  aspirin (Unknown)    Hospital Medications:   MEDICATIONS  (STANDING):  chlorhexidine 2% Cloths 1 Application(s) Topical <User Schedule>  epoetin lara (PROCRIT) Injectable 90116 Unit(s) IV Push <User Schedule>  folic acid 1 milliGRAM(s) Oral daily  heparin   Injectable 5000 Unit(s) SubCutaneous every 8 hours  insulin glargine Injectable (LANTUS) 3 Unit(s) SubCutaneous at bedtime  insulin lispro (ADMELOG) corrective regimen sliding scale   SubCutaneous three times a day before meals  insulin lispro (ADMELOG) corrective regimen sliding scale   SubCutaneous at bedtime  metoprolol tartrate 25 milliGRAM(s) Oral every 8 hours  pantoprazole    Tablet 40 milliGRAM(s) Oral before breakfast  senna 2 Tablet(s) Oral at bedtime  simvastatin 20 milliGRAM(s) Oral at bedtime        VITALS:  T(F): 97.7 (06-04-24 @ 15:04), Max: 98.3 (06-03-24 @ 19:20)  HR: 69 (06-04-24 @ 15:04)  BP: 118/60 (06-04-24 @ 15:04)  RR: 18 (06-04-24 @ 15:04)  SpO2: 98% (06-04-24 @ 15:04)  Wt(kg): --    06-03 @ 07:01  -  06-04 @ 07:00  --------------------------------------------------------  IN: 500 mL / OUT: 2500 mL / NET: -2000 mL    06-04 @ 07:01  -  06-04 @ 18:21  --------------------------------------------------------  IN: 420 mL / OUT: 0 mL / NET: 420 mL        PHYSICAL EXAM:  Constitutional: NAD  HEENT: anicteric sclera, oropharynx clear.  Neck: No JVD  Respiratory: CTAB, no wheezes, rales or rhonchi  Cardiovascular: S1, S2, RRR  Gastrointestinal: BS+, soft, NT/ND  Extremities: No peripheral edema  Neurological: A/O x 3, no focal deficits  Vascular Access: AVF with thrill and bruit     LABS:  06-04    139  |  100  |  13  ----------------------------<  130<H>  3.4<L>   |  29  |  3.42<H>    Ca    8.6      04 Jun 2024 07:01      Creatinine Trend: 3.42 <--, 4.67 <--, 4.49 <--, 3.35 <--, 4.81 <--                        9.0    6.92  )-----------( 231      ( 04 Jun 2024 07:01 )             29.6     Urine Studies:  Urinalysis Basic - ( 04 Jun 2024 07:01 )    Color:  / Appearance:  / SG:  / pH:   Gluc: 130 mg/dL / Ketone:   / Bili:  / Urobili:    Blood:  / Protein:  / Nitrite:    Leuk Esterase:  / RBC:  / WBC    Sq Epi:  / Non Sq Epi:  / Bacteria:         RADIOLOGY & ADDITIONAL STUDIES:

## 2024-06-04 NOTE — PROGRESS NOTE ADULT - SUBJECTIVE AND OBJECTIVE BOX
Patient is a 83y old  Female who presents with a chief complaint of SOB 2/2 fluid overload (04 Jun 2024 13:24)      OVERNIGHT EVENTS:    REVIEW OF SYSTEMS:  CONSTITUTIONAL: No fever, chills  ENMT:  No difficulty hearing, no change in vision  NECK: No pain or stiffness  RESPIRATORY: No cough, SOB  CARDIOVASCULAR: No chest pain, palpitations  GASTROINTESTINAL: No abdominal pain. No nausea, vomiting, or diarrhea  GENITOURINARY: No dysuria  NEUROLOGICAL: No HA  SKIN: No itching, burning, rashes, or lesions   LYMPH NODES: No enlarged glands  ENDOCRINE: No heat or cold intolerance; No hair loss  MUSCULOSKELETAL: No joint pain or swelling; No muscle, back, or extremity pain  PSYCHIATRIC: No depression, anxiety  HEME/LYMPH: No easy bruising, or bleeding gums    T(C): 36.5 (06-04-24 @ 15:04), Max: 36.8 (06-03-24 @ 17:15)  HR: 69 (06-04-24 @ 15:04) (69 - 84)  BP: 118/60 (06-04-24 @ 15:04) (106/48 - 142/72)  RR: 18 (06-04-24 @ 15:04) (18 - 28)  SpO2: 98% (06-04-24 @ 15:04) (95% - 100%)  Wt(kg): --Vital Signs Last 24 Hrs  T(C): 36.5 (04 Jun 2024 15:04), Max: 36.8 (03 Jun 2024 17:15)  T(F): 97.7 (04 Jun 2024 15:04), Max: 98.3 (03 Jun 2024 19:20)  HR: 69 (04 Jun 2024 15:04) (69 - 84)  BP: 118/60 (04 Jun 2024 15:04) (106/48 - 142/72)  BP(mean): --  RR: 18 (04 Jun 2024 15:04) (18 - 28)  SpO2: 98% (04 Jun 2024 15:04) (95% - 100%)    Parameters below as of 04 Jun 2024 15:04  Patient On (Oxygen Delivery Method): room air          PHYSICAL EXAM:  GENERAL: NAD  EYES: clear conjunctiva; EOMI  ENMT: Moist mucous membranes  NECK: Supple, No JVD, Normal thyroid  CHEST/LUNG: Clear to auscultation bilaterally; No rales, rhonchi, wheezing, or rubs  HEART: S1, S2, Regular rate and rhythm  ABDOMEN: Soft, Nontender, Nondistended; Bowel sounds present  NEURO: Alert & Oriented X3  EXTREMITIES: No LE edema, no calf tenderness  LYMPH: No lymphadenopathy noted  SKIN: No rashes or lesions    Consultant(s) Notes Reviewed:  [x ] YES  [ ] NO  Care Discussed with Consultants/Other Providers [ x] YES  [ ] NO  LABS:                        9.0    6.92  )-----------( 231      ( 04 Jun 2024 07:01 )             29.6     06-04    139  |  100  |  13  ----------------------------<  130<H>  3.4<L>   |  29  |  3.42<H>    Ca    8.6      04 Jun 2024 07:01        CAPILLARY BLOOD GLUCOSE      POCT Blood Glucose.: 322 mg/dL (04 Jun 2024 11:20)  POCT Blood Glucose.: 158 mg/dL (04 Jun 2024 07:41)  POCT Blood Glucose.: 215 mg/dL (03 Jun 2024 21:13)  POCT Blood Glucose.: 177 mg/dL (03 Jun 2024 16:35)      Urinalysis Basic - ( 04 Jun 2024 07:01 )    Color: x / Appearance: x / SG: x / pH: x  Gluc: 130 mg/dL / Ketone: x  / Bili: x / Urobili: x   Blood: x / Protein: x / Nitrite: x   Leuk Esterase: x / RBC: x / WBC x   Sq Epi: x / Non Sq Epi: x / Bacteria: x        RADIOLOGY & ADDITIONAL TESTS:  Imaging Personally Reviewed:  [ ] YES  [ ] NO

## 2024-06-04 NOTE — PROGRESS NOTE ADULT - NEGATIVE OPHTHALMOLOGIC SYMPTOMS
no diplopia/no photophobia/no lacrimation L/no lacrimation R/no blurred vision L/no blurred vision R/no discharge L/no discharge R/no pain L/no pain R/no irritation L/no irritation R/no scleral injection L/no scleral injection R

## 2024-06-04 NOTE — PROGRESS NOTE ADULT - ASSESSMENT
1. Anemia (etiology multifactorial)  2. Drop in H/H most likely due to hydration  3. No evidence of acute GI bleeding  4. R/o chronic GI bleeding  5. Constipation    Suggestions:    1. Monitor H/H  2. Transfuse PRBC as needed  3. Check stool for occult blood  4. Protonix 40mg daily  5. Avoid NSAID  6. CT-Scan of abdomen and pelvis  7. Daily stool softener as needed  8. DVT prophylaxis  1. Anemia (etiology multifactorial)  2. Drop in H/H most likely due to hydration  3. No evidence of acute GI bleeding  4. R/o chronic GI bleeding  5. Constipation  6. S/p CT-Scan  7. Gall stone (asymptomatic)  8. Pancreatic cyst  9. Diverticulosis without diverticulitis  10. Thickened duodenal bulb  12. R/o duodenal ulcer      Suggestions:    1. Monitor H/H  2. Transfuse PRBC as needed  3. Check stool for occult blood  4. Protonix 40mg daily  5. Avoid NSAID  6. Check   7. Daily stool softener as needed  8. MRI of abdomen out patient  9. EGD (out patient) H/H stable  10. DVT prophylaxis

## 2024-06-04 NOTE — PROGRESS NOTE ADULT - SUBJECTIVE AND OBJECTIVE BOX
[   ] ICU                                          [   ] CCU                                      [ X  ] Medical Floor    Patient is a 83 year old female with anemia. GI consulted to evaluate.        83 year old female with past medical history significant for DM, HTN, Hyperlipidemia, Gout, Osteoporosis, Osteoarthritis, Vitamin D deficiency, Cataract, Breast lump, ESRD on HD M, W, F, presented to the emergency room with SOB and chest pain. On admission patient had anemia and during the hospital course developed drop in her H/H. Patient c/o worsening constipation but denies nausea, vomiting, hematemesis, hematochezia, melena, epistaxis, hemoptysis, fever, chills, palpitation, cough, hematuria, dysuria or diarrhea.     Patient appears comfortable. No new complaints reported, No abdominal pain, N/V, hematemesis, hematochezia, melena, fever, chills, chest pain, SOB, cough or diarrhea reported.    PAIN MANAGEMENT:  Pain Scale:                010  Pain Location:      Prior Colonoscopy:  UNknown      PAST MEDICAL HISTORY    Hypertension    Hyperlipidemia    Gout    Cataract    Breast lump    Osteoporosis    Osteoarthritis    Vitamin D deficiency    Seasonal allergies     Diabetes mellitus    ESRD on dialysis        PAST SURGICAL HISTORY    Bilateral cataract extraction    Breast surgery    Knee replacement, total, left    Arteriovenous fistula        Allergies    shellfish (Anaphylaxis)  ibuprofen (Unknown)  latex (Unknown)  natural rubber (Unknown)  Mushrooms (Anaphylaxis)  aspirin (Unknown)    Intolerances  None          SOCIAL HISTORY  Advanced Directives:       [ X ] Full Code       [  ] DNR  Marital Status:         [  ] M      [  ] S      [  ] D       [  ] W  Children:       [ X ] Yes      [  ] No  Occupation:        [  ] Employed       [ X ] Unemployed       [  ] Retired  Diet:       [ X ] Regular       [  ] PEG feeding          [  ] NG tube feeding  Drug Use:           [ X ] Patient denied          [  ] Yes  Alcohol:           [ X ] No             [  ] Yes (socially)         [  ] Yes (chronic)  Tobacco:           [  ] Yes           [X  ] No      FAMILY HISTORY  [ X ] Heart Disease            [ X ] Diabetes             [ X ] HTN             [  ] Colon Cancer             [  ] Stomach Cancer              [  ] Pancreatic Cancer        VITALS   Vital Signs Last 24 Hrs  T(C): 37.2 (06-04-24 @ 19:30), Max: 37.2 (06-04-24 @ 19:30)  T(F): 99 (06-04-24 @ 19:30), Max: 99 (06-04-24 @ 19:30)  HR: 75 (06-04-24 @ 19:30) (69 - 81)  BP: 133/63 (06-04-24 @ 19:30) (106/48 - 133/63)   RR: 18 (06-04-24 @ 19:30) (18 - 19)  SpO2: 96% (06-04-24 @ 19:30) (96% - 100%)      MEDICATIONS  (STANDING):  chlorhexidine 2% Cloths 1 Application(s) Topical <User Schedule>  epoetin lara (PROCRIT) Injectable 60205 Unit(s) IV Push <User Schedule>  folic acid 1 milliGRAM(s) Oral daily  heparin   Injectable 5000 Unit(s) SubCutaneous every 8 hours  insulin glargine Injectable (LANTUS) 3 Unit(s) SubCutaneous at bedtime  insulin lispro (ADMELOG) corrective regimen sliding scale   SubCutaneous three times a day before meals  insulin lispro (ADMELOG) corrective regimen sliding scale   SubCutaneous at bedtime  metoprolol tartrate 25 milliGRAM(s) Oral every 8 hours  pantoprazole    Tablet 40 milliGRAM(s) Oral before breakfast  senna 2 Tablet(s) Oral at bedtime  simvastatin 20 milliGRAM(s) Oral at bedtime    MEDICATIONS  (PRN):  acetaminophen     Tablet .. 650 milliGRAM(s) Oral every 6 hours PRN Temp greater or equal to 38C (100.4F), Mild Pain (1 - 3)  aluminum hydroxide/magnesium hydroxide/simethicone Suspension 30 milliLiter(s) Oral every 4 hours PRN Dyspepsia  melatonin 3 milliGRAM(s) Oral at bedtime PRN Insomnia  ondansetron Injectable 4 milliGRAM(s) IV Push every 8 hours PRN Nausea and/or Vomiting                            9.0    6.92  )-----------( 231      ( 04 Jun 2024 07:01 )             29.6       06-04    139  |  100  |  13  ----------------------------<  130<H>  3.4<L>   |  29  |  3.42<H>    Ca    8.6      04 Jun 2024 07:01         [   ] ICU                                          [   ] CCU                                      [ X  ] Medical Floor    Patient is a 83 year old female with anemia. GI consulted to evaluate.        83 year old female with past medical history significant for DM, HTN, Hyperlipidemia, Gout, Osteoporosis, Osteoarthritis, Vitamin D deficiency, Cataract, Breast lump, ESRD on HD M, W, F, presented to the emergency room with SOB and chest pain. On admission patient had anemia and during the hospital course developed drop in her H/H. Patient c/o worsening constipation but denies nausea, vomiting, hematemesis, hematochezia, melena, epistaxis, hemoptysis, fever, chills, palpitation, cough, hematuria, dysuria or diarrhea.     Patient appears comfortable. No new complaints reported, No abdominal pain, N/V, hematemesis, hematochezia, melena, fever, chills, chest pain, SOB, cough or diarrhea reported.    PAIN MANAGEMENT:  Pain Scale:                010  Pain Location:      Prior Colonoscopy:  UNknown      PAST MEDICAL HISTORY    Hypertension    Hyperlipidemia    Gout    Cataract    Breast lump    Osteoporosis    Osteoarthritis    Vitamin D deficiency    Seasonal allergies     Diabetes mellitus    ESRD on dialysis        PAST SURGICAL HISTORY    Bilateral cataract extraction    Breast surgery    Knee replacement, total, left    Arteriovenous fistula        Allergies    shellfish (Anaphylaxis)  ibuprofen (Unknown)  latex (Unknown)  natural rubber (Unknown)  Mushrooms (Anaphylaxis)  aspirin (Unknown)    Intolerances  None          SOCIAL HISTORY  Advanced Directives:       [ X ] Full Code       [  ] DNR  Marital Status:         [  ] M      [  ] S      [  ] D       [  ] W  Children:       [ X ] Yes      [  ] No  Occupation:        [  ] Employed       [ X ] Unemployed       [  ] Retired  Diet:       [ X ] Regular       [  ] PEG feeding          [  ] NG tube feeding  Drug Use:           [ X ] Patient denied          [  ] Yes  Alcohol:           [ X ] No             [  ] Yes (socially)         [  ] Yes (chronic)  Tobacco:           [  ] Yes           [X  ] No      FAMILY HISTORY  [ X ] Heart Disease            [ X ] Diabetes             [ X ] HTN             [  ] Colon Cancer             [  ] Stomach Cancer              [  ] Pancreatic Cancer        VITALS   Vital Signs Last 24 Hrs  T(C): 37.2 (06-04-24 @ 19:30), Max: 37.2 (06-04-24 @ 19:30)  T(F): 99 (06-04-24 @ 19:30), Max: 99 (06-04-24 @ 19:30)  HR: 75 (06-04-24 @ 19:30) (69 - 81)  BP: 133/63 (06-04-24 @ 19:30) (106/48 - 133/63)   RR: 18 (06-04-24 @ 19:30) (18 - 19)  SpO2: 96% (06-04-24 @ 19:30) (96% - 100%)      MEDICATIONS  (STANDING):  chlorhexidine 2% Cloths 1 Application(s) Topical <User Schedule>  epoetin lara (PROCRIT) Injectable 41401 Unit(s) IV Push <User Schedule>  folic acid 1 milliGRAM(s) Oral daily  heparin   Injectable 5000 Unit(s) SubCutaneous every 8 hours  insulin glargine Injectable (LANTUS) 3 Unit(s) SubCutaneous at bedtime  insulin lispro (ADMELOG) corrective regimen sliding scale   SubCutaneous three times a day before meals  insulin lispro (ADMELOG) corrective regimen sliding scale   SubCutaneous at bedtime  metoprolol tartrate 25 milliGRAM(s) Oral every 8 hours  pantoprazole    Tablet 40 milliGRAM(s) Oral before breakfast  senna 2 Tablet(s) Oral at bedtime  simvastatin 20 milliGRAM(s) Oral at bedtime    MEDICATIONS  (PRN):  acetaminophen     Tablet .. 650 milliGRAM(s) Oral every 6 hours PRN Temp greater or equal to 38C (100.4F), Mild Pain (1 - 3)  aluminum hydroxide/magnesium hydroxide/simethicone Suspension 30 milliLiter(s) Oral every 4 hours PRN Dyspepsia  melatonin 3 milliGRAM(s) Oral at bedtime PRN Insomnia  ondansetron Injectable 4 milliGRAM(s) IV Push every 8 hours PRN Nausea and/or Vomiting                            9.0    6.92  )-----------( 231      ( 04 Jun 2024 07:01 )             29.6       06-04    139  |  100  |  13  ----------------------------<  130<H>  3.4<L>   |  29  |  3.42<H>    Ca    8.6      04 Jun 2024 07:01        ACC: [id number] EXAM:  CT ABDOMEN AND PELVIS IC   ORDERED BY: ROGELIO LEYVA     PROCEDURE DATE:  06/04/2024          INTERPRETATION:  CLINICAL INFORMATION: 83 years  Female with GIB.    COMPARISON: Noncontrast CT abdomen and pelvis 9/10/2022    CONTRAST/COMPLICATIONS:  IV Contrast: Omnipaque 350  90 cc administered   10 cc discarded  Oral Contrast: NONE  Complications: None reported at time of study completion    PROCEDURE:  CT of the Abdomen and Pelvis was performed.  Sagittal and coronal reformats were performed.    FINDINGS:  LOWER CHEST: Moderate bilateral pleural effusions with underlying passive   atelectasis. Cardiomegaly.    LIVER: Within normal limits.  BILE DUCTS: Normal caliber.  GALLBLADDER: Layering stones versus sludge.  SPLEEN:Within normal limits.  PANCREAS: 5 mm pancreatic body hypodensity (2:40).  ADRENALS: Within normal limits.  KIDNEYS/URETERS: Markedly atrophic kidneys. No hydronephrosis. Chronic 4   mm right distal ureteral calculus (2:107).    BLADDER: Decompressed.  REPRODUCTIVE ORGANS: Unremarkable uterus.    BOWEL: No bowel obstruction. Appendix is normal.. Thick-walled duodenal   bulb. Pancolonic diverticulosis without diverticulitis.  PERITONEUM: Within normal limits  VESSELS: Atherosclerotic changes.  RETROPERITONEUM/LYMPH NODES: No lymphadenopathy.  ABDOMINAL WALL: Within normal limits.  BONES: Degenerative changes.    IMPRESSION:    Thick-walled duodenal bulb. Recommend endoscopy to evaluate for peptic   ulcer or underlying mass.    Moderate bilateral pleural effusions with underlying passive atelectasis.    Atrophic kidneys. Chronic 4 mm right distal ureteral calculus without   hydronephrosis.    Layering gallstones versus sludge.    Pancolonic diverticulosis without diverticulitis.    5 mm pancreatic body hypodensity, possibly IPMN. Correlate with MRI.

## 2024-06-04 NOTE — PROGRESS NOTE ADULT - PROBLEM SELECTOR PLAN 11
-pending GI reccs for outpatient followup based on CT scan reading  -PT eval recs NIKKIE   -d/c back to [facility] pending above and Auth

## 2024-06-04 NOTE — PROGRESS NOTE ADULT - NEGATIVE CARDIOVASCULAR SYMPTOMS
no chest pain/no palpitations/no orthopnea/no peripheral edema

## 2024-06-04 NOTE — PROGRESS NOTE ADULT - PROBLEM SELECTOR PLAN 1
-dyspnea and hypoxia; pro BNP 70461 on admission s/p HD  -CTA chest negative for PE but shows moderate plueral effusion  -received dialysis 6/1 as pt received contrast for CT   -CXR with improving congestion  -on room air   -cont HD as per Nephro   -Nephro Dr. Pandey following  - Pulm following

## 2024-06-04 NOTE — PROGRESS NOTE ADULT - RESPIRATORY AND THORAX
details…
Unique Flap 2 Text: The flap is incised and reflected downward. The medial portion of the flap was dissected just deep to the ordicularis oculi, and laterally the flap is dissected to the lateral canthal tendon area. The flap is rotated and advanced into place and sutured in a multilaminal fashion. The mucosa defect does not require closure. At completion, the repair is under little tension and the lid is well supported. All vertical tension is directed onto the temple.

## 2024-06-04 NOTE — PROGRESS NOTE ADULT - PROBLEM SELECTOR PLAN 3
-leukocytosis, afebrile possible due to steroids on 5/27  -mon off abx for now  -mon for sign or symptoms of infection  -WNL today- 6.92

## 2024-06-05 DIAGNOSIS — K80.20 CALCULUS OF GALLBLADDER WITHOUT CHOLECYSTITIS WITHOUT OBSTRUCTION: ICD-10-CM

## 2024-06-05 LAB
ANION GAP SERPL CALC-SCNC: 8 MMOL/L — SIGNIFICANT CHANGE UP (ref 5–17)
BUN SERPL-MCNC: 20 MG/DL — HIGH (ref 7–18)
CALCIUM SERPL-MCNC: 8.5 MG/DL — SIGNIFICANT CHANGE UP (ref 8.4–10.5)
CHLORIDE SERPL-SCNC: 97 MMOL/L — SIGNIFICANT CHANGE UP (ref 96–108)
CO2 SERPL-SCNC: 28 MMOL/L — SIGNIFICANT CHANGE UP (ref 22–31)
CREAT SERPL-MCNC: 5.2 MG/DL — HIGH (ref 0.5–1.3)
EGFR: 8 ML/MIN/1.73M2 — LOW
GLUCOSE BLDC GLUCOMTR-MCNC: 128 MG/DL — HIGH (ref 70–99)
GLUCOSE BLDC GLUCOMTR-MCNC: 132 MG/DL — HIGH (ref 70–99)
GLUCOSE BLDC GLUCOMTR-MCNC: 150 MG/DL — HIGH (ref 70–99)
GLUCOSE BLDC GLUCOMTR-MCNC: 224 MG/DL — HIGH (ref 70–99)
GLUCOSE SERPL-MCNC: 270 MG/DL — HIGH (ref 70–99)
HCT VFR BLD CALC: 26.4 % — LOW (ref 34.5–45)
HGB BLD-MCNC: 8.3 G/DL — LOW (ref 11.5–15.5)
MCHC RBC-ENTMCNC: 28.8 PG — SIGNIFICANT CHANGE UP (ref 27–34)
MCHC RBC-ENTMCNC: 31.4 GM/DL — LOW (ref 32–36)
MCV RBC AUTO: 91.7 FL — SIGNIFICANT CHANGE UP (ref 80–100)
NRBC # BLD: 0 /100 WBCS — SIGNIFICANT CHANGE UP (ref 0–0)
PLATELET # BLD AUTO: 236 K/UL — SIGNIFICANT CHANGE UP (ref 150–400)
POTASSIUM SERPL-MCNC: 3.9 MMOL/L — SIGNIFICANT CHANGE UP (ref 3.5–5.3)
POTASSIUM SERPL-SCNC: 3.9 MMOL/L — SIGNIFICANT CHANGE UP (ref 3.5–5.3)
RBC # BLD: 2.88 M/UL — LOW (ref 3.8–5.2)
RBC # FLD: 15.4 % — HIGH (ref 10.3–14.5)
SODIUM SERPL-SCNC: 133 MMOL/L — LOW (ref 135–145)
WBC # BLD: 7.29 K/UL — SIGNIFICANT CHANGE UP (ref 3.8–10.5)
WBC # FLD AUTO: 7.29 K/UL — SIGNIFICANT CHANGE UP (ref 3.8–10.5)

## 2024-06-05 RX ADMIN — Medication 1 MILLIGRAM(S): at 14:17

## 2024-06-05 RX ADMIN — Medication 25 MILLIGRAM(S): at 06:13

## 2024-06-05 RX ADMIN — ERYTHROPOIETIN 10000 UNIT(S): 10000 INJECTION, SOLUTION INTRAVENOUS; SUBCUTANEOUS at 11:26

## 2024-06-05 RX ADMIN — Medication 25 MILLIGRAM(S): at 21:04

## 2024-06-05 RX ADMIN — Medication 25 MILLIGRAM(S): at 14:17

## 2024-06-05 RX ADMIN — Medication 2: at 16:59

## 2024-06-05 RX ADMIN — HEPARIN SODIUM 5000 UNIT(S): 5000 INJECTION INTRAVENOUS; SUBCUTANEOUS at 21:05

## 2024-06-05 RX ADMIN — INSULIN GLARGINE 3 UNIT(S): 100 INJECTION, SOLUTION SUBCUTANEOUS at 21:13

## 2024-06-05 RX ADMIN — HEPARIN SODIUM 5000 UNIT(S): 5000 INJECTION INTRAVENOUS; SUBCUTANEOUS at 06:12

## 2024-06-05 RX ADMIN — CHLORHEXIDINE GLUCONATE 1 APPLICATION(S): 213 SOLUTION TOPICAL at 06:11

## 2024-06-05 RX ADMIN — PANTOPRAZOLE SODIUM 40 MILLIGRAM(S): 20 TABLET, DELAYED RELEASE ORAL at 06:13

## 2024-06-05 RX ADMIN — HEPARIN SODIUM 5000 UNIT(S): 5000 INJECTION INTRAVENOUS; SUBCUTANEOUS at 14:19

## 2024-06-05 RX ADMIN — SIMVASTATIN 20 MILLIGRAM(S): 20 TABLET, FILM COATED ORAL at 21:05

## 2024-06-05 NOTE — PROGRESS NOTE ADULT - TIME BILLING
- Review of records, telemetry, vital signs and daily labs.   - General and cardiovascular physical examination.  - Generation of cardiovascular treatment plan.  - Coordination of care.      Patient was seen and examined by me on 6/4/24,interim events noted,labs and radiology studies reviewed.  Robert Concepcion MD,FACC.  [address] [place], [place] [id number].  [phone]
- Review of records, telemetry, vital signs and daily labs.   - General and cardiovascular physical examination.  - Generation of cardiovascular treatment plan.  - Coordination of care.      Patient was seen and examined by me on 6/5/24,interim events noted,labs and radiology studies reviewed.  Robert Concepcion MD,FACC.  [address] [place], [place] [id number].  [phone]
- Review of records, telemetry, vital signs and daily labs.   - General and cardiovascular physical examination.  - Generation of cardiovascular treatment plan.  - Coordination of care.      Patient was seen and examined by me on 5/28/24,interim events noted,labs and radiology studies reviewed.  Robert Concepcion MD,FACC.  [address] [place], [place] [id number].  [phone]
- Review of records, telemetry, vital signs and daily labs.   - General and cardiovascular physical examination.  - Generation of cardiovascular treatment plan.  - Coordination of care.      Patient was seen and examined by me on 6/3/24,interim events noted,labs and radiology studies reviewed.  Robert Concepcion MD,FACC.  [address] [place], [place] [id number].  [phone]
- Review of records, telemetry, vital signs and daily labs.   - General and cardiovascular physical examination.  - Generation of cardiovascular treatment plan.  - Coordination of care.      Patient was seen and examined by me on 5/31/24,interim events noted,labs and radiology studies reviewed.  Robert Concepcion MD,FACC.  [address] [place], [place] [id number].  [phone]
- Review of records, telemetry, vital signs and daily labs.   - General and cardiovascular physical examination.  - Generation of cardiovascular treatment plan.  - Coordination of care.      Patient was seen and examined by me on 6/2/24,interim events noted,labs and radiology studies reviewed.  Robert Concepcion MD,FACC.  [address] [place], [place] [id number].  [phone]
- Review of records, telemetry, vital signs and daily labs.   - General and cardiovascular physical examination.  - Generation of cardiovascular treatment plan.  - Coordination of care.      Patient was seen and examined by me on 5/29/24,interim events noted,labs and radiology studies reviewed.  Robert Concepcion MD,FACC.  [address] [place], [place] [id number].  [phone]
- Review of records, telemetry, vital signs and daily labs.   - General and cardiovascular physical examination.  - Generation of cardiovascular treatment plan.  - Coordination of care.      Patient was seen and examined by me on 5/30/24,interim events noted,labs and radiology studies reviewed.  Robert Concepcion MD,FACC.  [address] [place], [place] [id number].  [phone]
- Review of records, telemetry, vital signs and daily labs.   - General and cardiovascular physical examination.  - Generation of cardiovascular treatment plan.  - Coordination of care.      Patient was seen and examined by me on 6/1/24,interim events noted,labs and radiology studies reviewed.  Robert Concepcion MD,FACC.  [address] [place], [place] [id number].  [phone]

## 2024-06-05 NOTE — PROGRESS NOTE ADULT - SUBJECTIVE AND OBJECTIVE BOX
NP Note discussed with  Primary Attending    Patient is a 83y old  Female who presents with a chief complaint of SOB 2/2 fluid overload (05 Jun 2024 14:37)      INTERVAL HPI/OVERNIGHT EVENTS: no new complaints    MEDICATIONS  (STANDING):  chlorhexidine 2% Cloths 1 Application(s) Topical <User Schedule>  epoetin lara (PROCRIT) Injectable 79578 Unit(s) IV Push <User Schedule>  folic acid 1 milliGRAM(s) Oral daily  heparin   Injectable 5000 Unit(s) SubCutaneous every 8 hours  insulin glargine Injectable (LANTUS) 3 Unit(s) SubCutaneous at bedtime  insulin lispro (ADMELOG) corrective regimen sliding scale   SubCutaneous three times a day before meals  insulin lispro (ADMELOG) corrective regimen sliding scale   SubCutaneous at bedtime  metoprolol tartrate 25 milliGRAM(s) Oral every 8 hours  pantoprazole    Tablet 40 milliGRAM(s) Oral before breakfast  senna 2 Tablet(s) Oral at bedtime  simvastatin 20 milliGRAM(s) Oral at bedtime    MEDICATIONS  (PRN):  acetaminophen     Tablet .. 650 milliGRAM(s) Oral every 6 hours PRN Temp greater or equal to 38C (100.4F), Mild Pain (1 - 3)  aluminum hydroxide/magnesium hydroxide/simethicone Suspension 30 milliLiter(s) Oral every 4 hours PRN Dyspepsia  melatonin 3 milliGRAM(s) Oral at bedtime PRN Insomnia  ondansetron Injectable 4 milliGRAM(s) IV Push every 8 hours PRN Nausea and/or Vomiting      __________________________________________________  REVIEW OF SYSTEMS:    CONSTITUTIONAL: No fever,   EYES: no acute visual disturbances  NECK: No pain or stiffness  RESPIRATORY: No cough; No shortness of breath  CARDIOVASCULAR: No chest pain, no palpitations  GASTROINTESTINAL: No pain. No nausea or vomiting; No diarrhea   NEUROLOGICAL: No headache or numbness, no tremors  MUSCULOSKELETAL: No joint pain, no muscle pain  GENITOURINARY: no dysuria, no frequency, no hesitancy  PSYCHIATRY: no depression , no anxiety  ALL OTHER  ROS negative        Vital Signs Last 24 Hrs  T(C): 36.1 (05 Jun 2024 12:50), Max: 37.2 (04 Jun 2024 19:30)  T(F): 96.9 (05 Jun 2024 12:50), Max: 99 (04 Jun 2024 19:30)  HR: 73 (05 Jun 2024 12:50) (65 - 75)  BP: 113/60 (05 Jun 2024 12:50) (108/55 - 133/63)  BP(mean): --  RR: 17 (05 Jun 2024 12:50) (17 - 18)  SpO2: 96% (05 Jun 2024 12:50) (92% - 100%)    Parameters below as of 05 Jun 2024 12:50  Patient On (Oxygen Delivery Method): room air        ________________________________________________  PHYSICAL EXAM:  GENERAL: NAD  HEENT: Normocephalic;  conjunctivae and sclerae clear; moist mucous membranes;   NECK : supple  CHEST/LUNG: Clear to auscultation bilaterally with good air entry   HEART: S1 S2  regular; no murmurs, gallops or rubs  ABDOMEN: Soft, Nontender, Nondistended; Bowel sounds present  EXTREMITIES: no cyanosis; no edema; no calf tenderness  SKIN: warm and dry; no rash  NERVOUS SYSTEM:  Awake and alert; Oriented  to place, person and time ; no new deficits    _________________________________________________  LABS:                        8.3    7.29  )-----------( 236      ( 05 Jun 2024 09:45 )             26.4     06-05    133<L>  |  97  |  20<H>  ----------------------------<  270<H>  3.9   |  28  |  5.20<H>    Ca    8.5      05 Jun 2024 09:45        Urinalysis Basic - ( 05 Jun 2024 09:45 )    Color: x / Appearance: x / SG: x / pH: x  Gluc: 270 mg/dL / Ketone: x  / Bili: x / Urobili: x   Blood: x / Protein: x / Nitrite: x   Leuk Esterase: x / RBC: x / WBC x   Sq Epi: x / Non Sq Epi: x / Bacteria: x      CAPILLARY BLOOD GLUCOSE      POCT Blood Glucose.: 128 mg/dL (05 Jun 2024 11:36)  POCT Blood Glucose.: 132 mg/dL (05 Jun 2024 07:57)  POCT Blood Glucose.: 158 mg/dL (04 Jun 2024 21:09)  POCT Blood Glucose.: 158 mg/dL (04 Jun 2024 16:46)        RADIOLOGY & ADDITIONAL TESTS:    Imaging  Reviewed:  YES/NO    Consultant(s) Notes Reviewed:   YES/ No      Plan of care was discussed with patient and /or primary care giver; all questions and concerns were addressed  NP Note discussed with  Primary Attending    Patient is a 83y old  Female who presents with a chief complaint of SOB 2/2 fluid overload (05 Jun 2024 14:37)      INTERVAL HPI/OVERNIGHT EVENTS: no new complaints    MEDICATIONS  (STANDING):  chlorhexidine 2% Cloths 1 Application(s) Topical <User Schedule>  epoetin lara (PROCRIT) Injectable 67688 Unit(s) IV Push <User Schedule>  folic acid 1 milliGRAM(s) Oral daily  heparin   Injectable 5000 Unit(s) SubCutaneous every 8 hours  insulin glargine Injectable (LANTUS) 3 Unit(s) SubCutaneous at bedtime  insulin lispro (ADMELOG) corrective regimen sliding scale   SubCutaneous three times a day before meals  insulin lispro (ADMELOG) corrective regimen sliding scale   SubCutaneous at bedtime  metoprolol tartrate 25 milliGRAM(s) Oral every 8 hours  pantoprazole    Tablet 40 milliGRAM(s) Oral before breakfast  senna 2 Tablet(s) Oral at bedtime  simvastatin 20 milliGRAM(s) Oral at bedtime    MEDICATIONS  (PRN):  acetaminophen     Tablet .. 650 milliGRAM(s) Oral every 6 hours PRN Temp greater or equal to 38C (100.4F), Mild Pain (1 - 3)  aluminum hydroxide/magnesium hydroxide/simethicone Suspension 30 milliLiter(s) Oral every 4 hours PRN Dyspepsia  melatonin 3 milliGRAM(s) Oral at bedtime PRN Insomnia  ondansetron Injectable 4 milliGRAM(s) IV Push every 8 hours PRN Nausea and/or Vomiting      __________________________________________________  REVIEW OF SYSTEMS:    CONSTITUTIONAL: No fever,   EYES: no acute visual disturbances  NECK: No pain or stiffness  RESPIRATORY: No cough; No shortness of breath  CARDIOVASCULAR: No chest pain, no palpitations  GASTROINTESTINAL: No pain. No nausea or vomiting; No diarrhea   NEUROLOGICAL: No headache or numbness, no tremors  MUSCULOSKELETAL: No joint pain, no muscle pain  GENITOURINARY: no dysuria, no frequency, no hesitancy  PSYCHIATRY: no depression , no anxiety  ALL OTHER  ROS negative        Vital Signs Last 24 Hrs  T(C): 36.1 (05 Jun 2024 12:50), Max: 37.2 (04 Jun 2024 19:30)  T(F): 96.9 (05 Jun 2024 12:50), Max: 99 (04 Jun 2024 19:30)  HR: 73 (05 Jun 2024 12:50) (65 - 75)  BP: 113/60 (05 Jun 2024 12:50) (108/55 - 133/63)  BP(mean): --  RR: 17 (05 Jun 2024 12:50) (17 - 18)  SpO2: 96% (05 Jun 2024 12:50) (92% - 100%)    Parameters below as of 05 Jun 2024 12:50  Patient On (Oxygen Delivery Method): room air        ________________________________________________  PHYSICAL EXAM:  GENERAL: NAD  HEENT: Normocephalic;  conjunctivae and sclerae clear; moist mucous membranes;   NECK : supple  CHEST/LUNG: Clear to auscultation bilaterally with good air entry   HEART: S1 S2  regular; no murmurs, gallops or rubs  ABDOMEN: Soft, Nontender, Nondistended; Bowel sounds present  EXTREMITIES: Left AVF bruit and thrill, no cyanosis; no edema; no calf tenderness  SKIN: warm and dry; no rash  NERVOUS SYSTEM:  Awake and alert; Oriented  to place, person and time ; no new deficits    _________________________________________________  LABS:                        8.3    7.29  )-----------( 236      ( 05 Jun 2024 09:45 )             26.4     06-05    133<L>  |  97  |  20<H>  ----------------------------<  270<H>  3.9   |  28  |  5.20<H>    Ca    8.5      05 Jun 2024 09:45        Urinalysis Basic - ( 05 Jun 2024 09:45 )    Color: x / Appearance: x / SG: x / pH: x  Gluc: 270 mg/dL / Ketone: x  / Bili: x / Urobili: x   Blood: x / Protein: x / Nitrite: x   Leuk Esterase: x / RBC: x / WBC x   Sq Epi: x / Non Sq Epi: x / Bacteria: x      CAPILLARY BLOOD GLUCOSE      POCT Blood Glucose.: 128 mg/dL (05 Jun 2024 11:36)  POCT Blood Glucose.: 132 mg/dL (05 Jun 2024 07:57)  POCT Blood Glucose.: 158 mg/dL (04 Jun 2024 21:09)  POCT Blood Glucose.: 158 mg/dL (04 Jun 2024 16:46)        RADIOLOGY & ADDITIONAL TESTS:  < from: CT Abdomen and Pelvis w/ IV Cont (06.04.24 @ 13:04) >  ACC: [id number] EXAM:  CT ABDOMEN AND PELVIS IC   ORDERED BY: ROGELIO LEYVA     PROCEDURE DATE:  06/04/2024          INTERPRETATION:  CLINICAL INFORMATION: 83 years  Female with GIB.    COMPARISON: Noncontrast CT abdomen and pelvis 9/10/2022    CONTRAST/COMPLICATIONS:  IV Contrast: Omnipaque 350  90 cc administered   10 cc discarded  Oral Contrast: NONE  Complications: None reported at time of study completion    PROCEDURE:  CT of the Abdomen and Pelvis was performed.  Sagittal and coronal reformats were performed.    FINDINGS:  LOWER CHEST: Moderate bilateral pleural effusions with underlying passive   atelectasis. Cardiomegaly.    LIVER: Within normal limits.  BILE DUCTS: Normal caliber.  GALLBLADDER: Layering stones versus sludge.  SPLEEN:Within normal limits.  PANCREAS: 5 mm pancreatic body hypodensity (2:40).  ADRENALS: Within normal limits.  KIDNEYS/URETERS: Markedly atrophic kidneys. No hydronephrosis. Chronic 4   mm right distal ureteral calculus (2:107).    BLADDER: Decompressed.  REPRODUCTIVE ORGANS: Unremarkable uterus.    BOWEL: No bowel obstruction. Appendix is normal.. Thick-walled duodenal   bulb. Pancolonic diverticulosis without diverticulitis.  PERITONEUM: Within normal limits  VESSELS: Atherosclerotic changes.  RETROPERITONEUM/LYMPH NODES: No lymphadenopathy.  ABDOMINAL WALL: Within normal limits.  BONES: Degenerative changes.    IMPRESSION:    Thick-walled duodenal bulb. Recommend endoscopy to evaluate for peptic   ulcer or underlying mass.    Moderate bilateral pleural effusions with underlying passive atelectasis.    Atrophic kidneys. Chronic 4 mm right distal ureteral calculus without   hydronephrosis.    Layering gallstones versus sludge.    Pancolonic diverticulosis without diverticulitis.    5 mm pancreatic body hypodensity, possibly IPMN. Correlate with MRI.        --- End of Report ---    < end of copied text >  < from: CT Angio Chest PE Protocol w/ IV Cont (05.29.24 @ 16:57) >  IMPRESSION:  Moderate bilateral pleural effusions right greater than left with   compressive atelectasis right greater than left. Close of debris within   the trachea.    No evidence of acute pulmonary embolism.        --- End of Report ---    < end of copied text >  < from: Xray Chest 1 View- PORTABLE-Urgent (Xray Chest 1 View- PORTABLE-Urgent .) (05.28.24 @ 10:47) >  IMPRESSION: Central infiltrates likely congestive has improved. There are   persistent lower lung field and pleural findings most significantly in   the left lower lobe.    --- End of Report ---    < end of copied text >  < from: TTE W or WO Ultrasound Enhancing Agent (05.28.24 @ 12:28) >  _______________________________________________________________________________________    CONCLUSIONS:      1. Left ventricular cavity is normal in size. Left ventricular systolic function is mildly decreased with an ejection fraction of 48 % by Lee's method of disks. Global left ventricular hypokinesis.   2. There is mild (grade 1)left ventricular diastolic dysfunction.   3. Normal right ventricular cavity size, with normal wall thickness, and normal systolic function. Tricuspid annular plane systolic excursion (TAPSE) is 2.1 cm (normal >=1.7 cm). Tricuspid annular tissue Doppler S' is 10.0 cm/s (normal >10 cm/s).   4. Mild mitral regurgitation.   5. Normal left and right atrial size.   6. Mild tricuspid regurgitation.   7. Estimated pulmonary artery systolic pressure is 43 mmHg, consistent with mild pulmonary hypertension.   8. Mild aortic regurgitation.   9. Mild left ventricular hypertrophy.  10. There is calcification of the mitral valve annulus.  11. There is increased LV mass and concentric hypertrophy.  12. Trace pulmonic regurgitation.  13. Trileaflet aortic valve with reduced systolic excursion. There is calcification of the aortic valve leaflets. Mild aortic stenosis.  14. Bilateral pleural effusion noted.  15. No pericardial effusion seen.  16. No prior echocardiogram is available for comparison.    ________________________________________________________________________________________    < end of copied text >    Imaging  Reviewed:  YES    Consultant(s) Notes Reviewed:   YES      Plan of care was discussed with patient and /or primary care giver; all questions and concerns were addressed

## 2024-06-05 NOTE — PROGRESS NOTE ADULT - PROBLEM SELECTOR PROBLEM 1
Fluid overload

## 2024-06-05 NOTE — PROGRESS NOTE ADULT - ASSESSMENT
83F  from [facility], PMH DM, HTN, HLD, hemochromatosis, ESRD on HD M, W, F, presenting to the ED with SOB admitted for pulmonary congestion 2/2 fluid overload. Admitted for fluid overload, started on dialysis. Pt with episode of VT arrest on 5/27 and had an RRT, VT episode was self limiting and did not require any interventions. Nephro Dr. Pandey following, HD was continued.   Pt has hx of hemochromatosis follows with Dr. [place], A group consulted. Now with low Hgb like anemia of chronic disease, no active bleeding noted, f/u anemia w/u, repeat Hgb stable    # Fluid overload.   #ESRD  # Pleural effusion   # elevated trop  - 2/2 ESRD  - dialyssi per renal  - chest xray noted- pulm f/u   - Pulm consulted for effusions.  -echo with mildly reduced LVEF  -cont Metoprolol  -likely demand ischemia from fluid overload  -d/c planning

## 2024-06-05 NOTE — PROGRESS NOTE ADULT - SUBJECTIVE AND OBJECTIVE BOX
Time of Visit:  Patient seen and examined.     MEDICATIONS  (STANDING):  chlorhexidine 2% Cloths 1 Application(s) Topical <User Schedule>  epoetin lara (PROCRIT) Injectable 88435 Unit(s) IV Push <User Schedule>  folic acid 1 milliGRAM(s) Oral daily  heparin   Injectable 5000 Unit(s) SubCutaneous every 8 hours  insulin glargine Injectable (LANTUS) 3 Unit(s) SubCutaneous at bedtime  insulin lispro (ADMELOG) corrective regimen sliding scale   SubCutaneous three times a day before meals  insulin lispro (ADMELOG) corrective regimen sliding scale   SubCutaneous at bedtime  metoprolol tartrate 25 milliGRAM(s) Oral every 8 hours  pantoprazole    Tablet 40 milliGRAM(s) Oral before breakfast  senna 2 Tablet(s) Oral at bedtime  simvastatin 20 milliGRAM(s) Oral at bedtime      MEDICATIONS  (PRN):  acetaminophen     Tablet .. 650 milliGRAM(s) Oral every 6 hours PRN Temp greater or equal to 38C (100.4F), Mild Pain (1 - 3)  aluminum hydroxide/magnesium hydroxide/simethicone Suspension 30 milliLiter(s) Oral every 4 hours PRN Dyspepsia  melatonin 3 milliGRAM(s) Oral at bedtime PRN Insomnia  ondansetron Injectable 4 milliGRAM(s) IV Push every 8 hours PRN Nausea and/or Vomiting       Medications up to date at time of exam.      PHYSICAL EXAMINATION:  Patient has no new complaints.  GENERAL: The patient  in no apparent distress.     Vital Signs Last 24 Hrs  T(C): 37.1 (05 Jun 2024 15:47), Max: 37.2 (04 Jun 2024 19:30)  T(F): 98.8 (05 Jun 2024 15:47), Max: 99 (04 Jun 2024 19:30)  HR: 76 (05 Jun 2024 15:47) (65 - 76)  BP: 114/60 (05 Jun 2024 15:47) (108/55 - 133/63)  BP(mean): --  RR: 18 (05 Jun 2024 15:47) (17 - 18)  SpO2: 95% (05 Jun 2024 15:47) (92% - 100%)    Parameters below as of 05 Jun 2024 15:47  Patient On (Oxygen Delivery Method): room air       (if applicable)    Chest Tube (if applicable)    HEENT: Head is normocephalic and atraumatic. Extraocular muscles are intact. Mucous membranes are moist.     NECK: Supple, no palpable adenopathy.    LUNGS: Fair bilateral air entry   no wheezing, rales, or rhonchi.    HEART: Regular rate and rhythm without murmur.    ABDOMEN: Soft, nontender, and nondistended.  No hepatosplenomegaly is noted.    : No painful voiding, no pelvic pain    EXTREMITIES: Without any cyanosis, clubbing, rash, lesions or edema.    NEUROLOGIC: Awake, alert, oriented, grossly intact    SKIN: Warm, dry, good turgor.      LABS:                        8.3    7.29  )-----------( 236      ( 05 Jun 2024 09:45 )             26.4     06-05    133<L>  |  97  |  20<H>  ----------------------------<  270<H>  3.9   |  28  |  5.20<H>    Ca    8.5      05 Jun 2024 09:45        Urinalysis Basic - ( 05 Jun 2024 09:45 )    Color: x / Appearance: x / SG: x / pH: x  Gluc: 270 mg/dL / Ketone: x  / Bili: x / Urobili: x   Blood: x / Protein: x / Nitrite: x   Leuk Esterase: x / RBC: x / WBC x   Sq Epi: x / Non Sq Epi: x / Bacteria: x                      MICROBIOLOGY: (if applicable)    RADIOLOGY & ADDITIONAL STUDIES:  EKG:   CXR:  ECHO:    IMPRESSION: 83y Female PAST MEDICAL & SURGICAL HISTORY:  Hypertension      Hyperlipidemia      Gout      Cataract      Breast lump      Osteoporosis      Osteoarthritis      Vitamin D deficiency      Seasonal allergies      CKD (chronic kidney disease)      Diabetes mellitus      ESRD on dialysis      H/O bilateral cataract extraction  2012  and 2014      History of breast surgery  Excision of left breast lump - benign  1991      History of knee replacement, total, left  2009      Arteriovenous fistula  left side limb alert       p/w            Time of Visit:  Patient seen and examined.     MEDICATIONS  (STANDING):  chlorhexidine 2% Cloths 1 Application(s) Topical <User Schedule>  epoetin lara (PROCRIT) Injectable 72893 Unit(s) IV Push <User Schedule>  folic acid 1 milliGRAM(s) Oral daily  heparin   Injectable 5000 Unit(s) SubCutaneous every 8 hours  insulin glargine Injectable (LANTUS) 3 Unit(s) SubCutaneous at bedtime  insulin lispro (ADMELOG) corrective regimen sliding scale   SubCutaneous three times a day before meals  insulin lispro (ADMELOG) corrective regimen sliding scale   SubCutaneous at bedtime  metoprolol tartrate 25 milliGRAM(s) Oral every 8 hours  pantoprazole    Tablet 40 milliGRAM(s) Oral before breakfast  senna 2 Tablet(s) Oral at bedtime  simvastatin 20 milliGRAM(s) Oral at bedtime      MEDICATIONS  (PRN):  acetaminophen     Tablet .. 650 milliGRAM(s) Oral every 6 hours PRN Temp greater or equal to 38C (100.4F), Mild Pain (1 - 3)  aluminum hydroxide/magnesium hydroxide/simethicone Suspension 30 milliLiter(s) Oral every 4 hours PRN Dyspepsia  melatonin 3 milliGRAM(s) Oral at bedtime PRN Insomnia  ondansetron Injectable 4 milliGRAM(s) IV Push every 8 hours PRN Nausea and/or Vomiting       Medications up to date at time of exam.      PHYSICAL EXAMINATION:  Patient has no new complaints.  GENERAL: The patient  in no apparent distress.     Vital Signs Last 24 Hrs  T(C): 37.1 (05 Jun 2024 15:47), Max: 37.2 (04 Jun 2024 19:30)  T(F): 98.8 (05 Jun 2024 15:47), Max: 99 (04 Jun 2024 19:30)  HR: 76 (05 Jun 2024 15:47) (65 - 76)  BP: 114/60 (05 Jun 2024 15:47) (108/55 - 133/63)  BP(mean): --  RR: 18 (05 Jun 2024 15:47) (17 - 18)  SpO2: 95% (05 Jun 2024 15:47) (92% - 100%)    Parameters below as of 05 Jun 2024 15:47  Patient On (Oxygen Delivery Method): room air       (if applicable)    Chest Tube (if applicable)    HEENT: Head is normocephalic and atraumatic. Extraocular muscles are intact. Mucous membranes are moist.     NECK: Supple, no palpable adenopathy.    LUNGS: Fair bilateral air entry   no wheezing, rales, or rhonchi.    HEART: Regular rate and rhythm without murmur.    ABDOMEN: Soft, nontender, and nondistended.  No hepatosplenomegaly is noted.    : No painful voiding, no pelvic pain    EXTREMITIES: Without any cyanosis, clubbing, rash, lesions or edema.    NEUROLOGIC: Awake, alert, oriented, grossly intact    SKIN: Warm, dry, good turgor.      LABS:                        8.3    7.29  )-----------( 236      ( 05 Jun 2024 09:45 )             26.4     06-05    133<L>  |  97  |  20<H>  ----------------------------<  270<H>  3.9   |  28  |  5.20<H>    Ca    8.5      05 Jun 2024 09:45        Urinalysis Basic - ( 05 Jun 2024 09:45 )    Color: x / Appearance: x / SG: x / pH: x  Gluc: 270 mg/dL / Ketone: x  / Bili: x / Urobili: x   Blood: x / Protein: x / Nitrite: x   Leuk Esterase: x / RBC: x / WBC x   Sq Epi: x / Non Sq Epi: x / Bacteria: x                      MICROBIOLOGY: (if applicable)    RADIOLOGY & ADDITIONAL STUDIES:  EKG:   CXR:  ECHO:    IMPRESSION: 83y Female PAST MEDICAL & SURGICAL HISTORY:  Hypertension      Hyperlipidemia      Gout      Cataract      Breast lump      Osteoporosis      Osteoarthritis      Vitamin D deficiency      Seasonal allergies      CKD (chronic kidney disease)      Diabetes mellitus      ESRD on dialysis      H/O bilateral cataract extraction  2012  and 2014      History of breast surgery  Excision of left breast lump - benign  1991      History of knee replacement, total, left  2009      Arteriovenous fistula  left side limb alert       p/w       Impression; This is an 84 Y/O female from [facility] presented to ED with SOB. . Recently admitted to [facility] for Pneumonia . For pulmonary follow up for Acute hypoxic respiratory failure due to B/L Pulmonary Edema and ESRD on HD.      Suggestion :  O2 saturation 95% room air. So far saturating good room air.   Dialysis as per Nephrology .    Monitor blood glucose with Admelog insulin coverage   DVT GI prophylactic.

## 2024-06-05 NOTE — PROGRESS NOTE ADULT - PROBLEM SELECTOR PLAN 1
-dyspnea and hypoxia; pro BNP 13364 on admission s/p HD  -CTA chest negative for PE but shows moderate pleural effusion  -received dialysis 6/1 as pt received contrast for CT   -CXR with improving congestion  -on room air   -cont HD as per Nephro   -Nephro Dr. Pandey following  - Pulm following

## 2024-06-05 NOTE — PROGRESS NOTE ADULT - ASSESSMENT
83 year old Female  from [facility], [facility] DM, HTN, HLD, hemochromatosis, ESRD Left AVF, (M, W, F). Patient, presented to the ED with SOB admitted for pulmonary congestion 2/2 fluid overload. Admitted for fluid overload, started on dialysis. Pt with episode of VT arrest on 5/27 and had an RRT, VT episode was self limiting and did not require any interventions. Nephro Dr. Pandey following, HD was continued.   Pt has hx of hemochromatosis follows with Dr. Shelby, A consulted. Now with low Hgb like anemia of chronic disease, no active bleeding noted. CT A/P moderate bilateral Pleural effusion, 5mm pancreatic body hypodensity, duodenal bulb (outpatient endoscopy), pancolonic diverticulosis without diverticulitis, gallstone versus sludge atherosclerotic. GI consulted recommending MRI outpatient.

## 2024-06-05 NOTE — PROGRESS NOTE ADULT - ASSESSMENT
# ESRD- seen on HD today, stable hypervolemia better  # H/O hemachromatosis  #CT abdomen with ? gastric mass. heme-onc F/U noted  # HTN blood pressure stable

## 2024-06-05 NOTE — PROGRESS NOTE ADULT - SUBJECTIVE AND OBJECTIVE BOX
PATIENT SEEN AND EXAMINED BY ARIANNA HCAVIRA M.D. ON :- 6/5/24  DATE OF SERVICE:   6/5/24          Interim events noted,Labs ,Radiological studies and Cardiology tests reviewed .    Patient is a 83y old  Female who presents with a chief complaint of SOB 2/2 fluid overload (05 Jun 2024 17:57)      HPI:  83F PMH DM, HTN, HLD, ESRD on HD M, W, F, presenting to the ED with SOB. Pt was sent from [facility], recently admitted to [facility] for PNA. She states that she started having SOB and cold sweats suddenly around 3:30AM, BP noted to be 147/102. She also endorsed experiencing chest pressure, non radiating, mid sternum, with a warm flushing sensation thoughtout her body, which lasts for less than a minute and resolves on its own. She was given duonebs with improvement in symptoms, but her symptoms later returned. She received another treatement of duonebs, then EMS was called. While seen in the ED, she stated she continued having the flushing sensation with chest pressure, again lasting for less than 1 minute. She denies any chills, N/V/D, abdominal pain, dysuria, numbness/tingling/weakness in extremities. She has not missed any of her HD sessions, but notes that she has required more fluid to be removed during the last 2 sessions due to worsening swelling of her legs. She is from [facility] and ambulates with a walker (27 May 2024 14:49)      PAST MEDICAL & SURGICAL HISTORY:  Hypertension      Hyperlipidemia      Gout      Cataract      Breast lump      Osteoporosis      Osteoarthritis      Vitamin D deficiency      Seasonal allergies      CKD (chronic kidney disease)      Diabetes mellitus      ESRD on dialysis      H/O bilateral cataract extraction  2012  and 2014      History of breast surgery  Excision of left breast lump - benign  1991      History of knee replacement, total, left  2009      Arteriovenous fistula  left side limb alert          PREVIOUS DIAGNOSTIC TESTING:      ECHO  FINDINGS:    STRESS  FINDINGS:    CATHETERIZATION  FINDINGS:    MEDICATIONS  (STANDING):  chlorhexidine 2% Cloths 1 Application(s) Topical <User Schedule>  epoetin lara (PROCRIT) Injectable 96803 Unit(s) IV Push <User Schedule>  folic acid 1 milliGRAM(s) Oral daily  heparin   Injectable 5000 Unit(s) SubCutaneous every 8 hours  insulin glargine Injectable (LANTUS) 3 Unit(s) SubCutaneous at bedtime  insulin lispro (ADMELOG) corrective regimen sliding scale   SubCutaneous three times a day before meals  insulin lispro (ADMELOG) corrective regimen sliding scale   SubCutaneous at bedtime  metoprolol tartrate 25 milliGRAM(s) Oral every 8 hours  pantoprazole    Tablet 40 milliGRAM(s) Oral before breakfast  senna 2 Tablet(s) Oral at bedtime  simvastatin 20 milliGRAM(s) Oral at bedtime    MEDICATIONS  (PRN):  acetaminophen     Tablet .. 650 milliGRAM(s) Oral every 6 hours PRN Temp greater or equal to 38C (100.4F), Mild Pain (1 - 3)  aluminum hydroxide/magnesium hydroxide/simethicone Suspension 30 milliLiter(s) Oral every 4 hours PRN Dyspepsia  melatonin 3 milliGRAM(s) Oral at bedtime PRN Insomnia  ondansetron Injectable 4 milliGRAM(s) IV Push every 8 hours PRN Nausea and/or Vomiting      FAMILY HISTORY:  Family history of cancer        SOCIAL HISTORY:    CIGARETTES:    ALCOHOL:    REVIEW OF SYSTEMS:  CONSTITUTIONAL: No fever, weight loss, or fatigue  EYES: No eye pain, visual disturbances, or discharge  ENMT:  No difficulty hearing, tinnitus, vertigo; No sinus or throat pain  NECK: No pain or stiffness  RESPIRATORY: No cough, wheezing, chills or hemoptysis; No shortness of breath  CARDIOVASCULAR: No chest pain, palpitations, dizziness, or leg swelling  GASTROINTESTINAL: No abdominal or epigastric pain. No nausea, vomiting, or hematemesis; No diarrhea or constipation. No melena or hematochezia.  GENITOURINARY: No dysuria, frequency, hematuria, or incontinence  NEUROLOGICAL: No headaches, memory loss, loss of strength, numbness, or tremors  SKIN: No itching, burning, rashes, or lesions   LYMPH NODES: No enlarged glands  ENDOCRINE: No heat or cold intolerance; No hair loss  MUSCULOSKELETAL: No joint pain or swelling; No muscle, back, or extremity pain  PSYCHIATRIC: No depression, anxiety, mood swings, or difficulty sleeping  HEME/LYMPH: No easy bruising, or bleeding gums  ALLERY AND IMMUNOLOGIC: No hives or eczema    Vital Signs Last 24 Hrs  T(C): 37.1 (05 Jun 2024 15:47), Max: 37.1 (05 Jun 2024 15:47)  T(F): 98.8 (05 Jun 2024 15:47), Max: 98.8 (05 Jun 2024 15:47)  HR: 76 (05 Jun 2024 15:47) (65 - 76)  BP: 114/60 (05 Jun 2024 15:47) (108/55 - 129/69)  BP(mean): --  RR: 18 (05 Jun 2024 15:47) (17 - 18)  SpO2: 95% (05 Jun 2024 15:47) (92% - 100%)    Parameters below as of 05 Jun 2024 15:47  Patient On (Oxygen Delivery Method): room air          PHYSICAL EXAM:  GENERAL: NAD, well-groomed, well-developed  HEAD:  Atraumatic, Normocephalic  EYES: EOMI, PERRLA, conjunctiva and sclera clear  ENMT: No tonsillar erythema, exudates, or enlargement; Moist mucous membranes, Good dentition, No lesions  NECK: Supple, No JVD, Normal thyroid  NERVOUS SYSTEM:  Alert & Oriented X3, Good concentration; Motor Strength 5/5 B/L upper and lower extremities; DTRs 2+ intact and symmetric  CHEST/LUNG: Clear to percussion bilaterally; No rales, rhonchi, wheezing, or rubs  HEART: Regular rate and rhythm; No murmurs, rubs, or gallops  ABDOMEN: Soft, Nontender, Nondistended; Bowel sounds present  EXTREMITIES:  2+ Peripheral Pulses, No clubbing, cyanosis, or edema  LYMPH: No lymphadenopathy noted  SKIN: No rashes or lesions      INTERPRETATION OF TELEMETRY:    ECG:    [facility]:     LABS:                        8.3    7.29  )-----------( 236      ( 05 Jun 2024 09:45 )             26.4     06-05    133<L>  |  97  |  20<H>  ----------------------------<  270<H>  3.9   |  28  |  5.20<H>    Ca    8.5      05 Jun 2024 09:45            Urinalysis Basic - ( 05 Jun 2024 09:45 )    Color: x / Appearance: x / SG: x / pH: x  Gluc: 270 mg/dL / Ketone: x  / Bili: x / Urobili: x   Blood: x / Protein: x / Nitrite: x   Leuk Esterase: x / RBC: x / WBC x   Sq Epi: x / Non Sq Epi: x / Bacteria: x      Lipid Panel:   I&O's Summary    04 Jun 2024 07:01  -  05 Jun 2024 07:00  --------------------------------------------------------  IN: 420 mL / OUT: 0 mL / NET: 420 mL    05 Jun 2024 07:01  -  05 Jun 2024 20:07  --------------------------------------------------------  IN: 500 mL / OUT: 3000 mL / NET: -2500 mL        RADIOLOGY & ADDITIONAL STUDIES:    CONCLUSIONS:      1. Left ventricular cavity is normal in size. Left ventricular systolic function is mildly decreased with an ejection fraction of 48 % by Lee's method of disks. Global left ventricular hypokinesis.   2. There is mild (grade 1)left ventricular diastolic dysfunction.   3. Normal right ventricular cavity size, with normal wall thickness, and normal systolic function. Tricuspid annular plane systolic excursion (TAPSE) is 2.1 cm (normal >=1.7 cm). Tricuspid annular tissue Doppler S' is 10.0 cm/s (normal >10 cm/s).   4. Mild mitral regurgitation.   5. Normal left and right atrial size.   6. Mild tricuspid regurgitation.   7. Estimated pulmonary artery systolic pressure is 43 mmHg, consistent with mild pulmonary hypertension.   8. Mild aortic regurgitation.   9. Mild left ventricular hypertrophy.  10. There is calcification of the mitral valve annulus.  11. There is increased LV mass and concentric hypertrophy.  12. Trace pulmonic regurgitation.  13. Trileaflet aortic valve with reduced systolic excursion. There is calcification of the aortic valve leaflets. Mild aortic stenosis.  14. Bilateral pleural effusion noted.  15. No pericardial effusion seen.  16. No prior echocardiogram is available for comparison.

## 2024-06-05 NOTE — PROGRESS NOTE ADULT - SUBJECTIVE AND OBJECTIVE BOX
[facility] : Progress Note :: [phone], (office [phone]),   Dr Pandey / Dr Mi / Dr Massey / Dr Etienne / Dr Cassandra DA SILVA / Dr Burt / Dr Garber / Dr Naeem lam  _____________________________________________________________________________________________    Seen earlier on HD     shellfish (Anaphylaxis)  ibuprofen (Unknown)  latex (Unknown)  natural rubber (Unknown)  Mushrooms (Anaphylaxis)  aspirin (Unknown)    Hospital Medications:   MEDICATIONS  (STANDING):  chlorhexidine 2% Cloths 1 Application(s) Topical <User Schedule>  epoetin lara (PROCRIT) Injectable 01659 Unit(s) IV Push <User Schedule>  folic acid 1 milliGRAM(s) Oral daily  heparin   Injectable 5000 Unit(s) SubCutaneous every 8 hours  insulin glargine Injectable (LANTUS) 3 Unit(s) SubCutaneous at bedtime  insulin lispro (ADMELOG) corrective regimen sliding scale   SubCutaneous three times a day before meals  insulin lispro (ADMELOG) corrective regimen sliding scale   SubCutaneous at bedtime  metoprolol tartrate 25 milliGRAM(s) Oral every 8 hours  pantoprazole    Tablet 40 milliGRAM(s) Oral before breakfast  senna 2 Tablet(s) Oral at bedtime  simvastatin 20 milliGRAM(s) Oral at bedtime        VITALS:  T(F): 96.9 (06-05-24 @ 12:50), Max: 99 (06-04-24 @ 19:30)  HR: 73 (06-05-24 @ 12:50)  BP: 113/60 (06-05-24 @ 12:50)  RR: 17 (06-05-24 @ 12:50)  SpO2: 96% (06-05-24 @ 12:50)  Wt(kg): --    06-04 @ 07:01  -  06-05 @ 07:00  --------------------------------------------------------  IN: 420 mL / OUT: 0 mL / NET: 420 mL    06-05 @ 07:01  -  06-05 @ 14:37  --------------------------------------------------------  IN: 500 mL / OUT: 3000 mL / NET: -2500 mL        PHYSICAL EXAM:  Constitutional: NAD  HEENT: anicteric sclera, oropharynx clear.  Neck: No JVD  Respiratory: CTAB, no wheezes, rales or rhonchi  Cardiovascular: S1, S2, RRR  Gastrointestinal: BS+, soft, NT/ND  Extremities:  No peripheral edema  Neurological: A/O x 3, no focal deficits  Vascular Access: AVF with thrill and bruit     LABS:  06-05    133<L>  |  97  |  20<H>  ----------------------------<  270<H>  3.9   |  28  |  5.20<H>    Ca    8.5      05 Jun 2024 09:45      Creatinine Trend: 5.20 <--, 3.42 <--, 4.67 <--, 4.49 <--, 3.35 <--                        8.3    7.29  )-----------( 236      ( 05 Jun 2024 09:45 )             26.4     Urine Studies:  Urinalysis Basic - ( 05 Jun 2024 09:45 )    Color:  / Appearance:  / SG:  / pH:   Gluc: 270 mg/dL / Ketone:   / Bili:  / Urobili:    Blood:  / Protein:  / Nitrite:    Leuk Esterase:  / RBC:  / WBC    Sq Epi:  / Non Sq Epi:  / Bacteria:         RADIOLOGY & ADDITIONAL STUDIES:

## 2024-06-06 ENCOUNTER — TRANSCRIPTION ENCOUNTER (OUTPATIENT)
Age: 84
End: 2024-06-06

## 2024-06-06 VITALS
RESPIRATION RATE: 17 BRPM | DIASTOLIC BLOOD PRESSURE: 58 MMHG | SYSTOLIC BLOOD PRESSURE: 119 MMHG | TEMPERATURE: 98 F | HEART RATE: 53 BPM | OXYGEN SATURATION: 97 %

## 2024-06-06 LAB
ALBUMIN SERPL ELPH-MCNC: 2.7 G/DL — LOW (ref 3.5–5)
ALP SERPL-CCNC: 148 U/L — HIGH (ref 40–120)
ALT FLD-CCNC: 24 U/L DA — SIGNIFICANT CHANGE UP (ref 10–60)
ANION GAP SERPL CALC-SCNC: 7 MMOL/L — SIGNIFICANT CHANGE UP (ref 5–17)
AST SERPL-CCNC: 19 U/L — SIGNIFICANT CHANGE UP (ref 10–40)
BILIRUB SERPL-MCNC: 0.6 MG/DL — SIGNIFICANT CHANGE UP (ref 0.2–1.2)
BUN SERPL-MCNC: 9 MG/DL — SIGNIFICANT CHANGE UP (ref 7–18)
CALCIUM SERPL-MCNC: 9.1 MG/DL — SIGNIFICANT CHANGE UP (ref 8.4–10.5)
CANCER AG19-9 SERPL-ACNC: 35 U/ML — SIGNIFICANT CHANGE UP
CHLORIDE SERPL-SCNC: 100 MMOL/L — SIGNIFICANT CHANGE UP (ref 96–108)
CO2 SERPL-SCNC: 30 MMOL/L — SIGNIFICANT CHANGE UP (ref 22–31)
CREAT SERPL-MCNC: 3.59 MG/DL — HIGH (ref 0.5–1.3)
EGFR: 12 ML/MIN/1.73M2 — LOW
GLUCOSE BLDC GLUCOMTR-MCNC: 172 MG/DL — HIGH (ref 70–99)
GLUCOSE BLDC GLUCOMTR-MCNC: 281 MG/DL — HIGH (ref 70–99)
GLUCOSE SERPL-MCNC: 125 MG/DL — HIGH (ref 70–99)
HCT VFR BLD CALC: 28.9 % — LOW (ref 34.5–45)
HGB BLD-MCNC: 9.1 G/DL — LOW (ref 11.5–15.5)
MAGNESIUM SERPL-MCNC: 2.1 MG/DL — SIGNIFICANT CHANGE UP (ref 1.6–2.6)
MCHC RBC-ENTMCNC: 28.9 PG — SIGNIFICANT CHANGE UP (ref 27–34)
MCHC RBC-ENTMCNC: 31.5 GM/DL — LOW (ref 32–36)
MCV RBC AUTO: 91.7 FL — SIGNIFICANT CHANGE UP (ref 80–100)
NRBC # BLD: 0 /100 WBCS — SIGNIFICANT CHANGE UP (ref 0–0)
PHOSPHATE SERPL-MCNC: 1.1 MG/DL — LOW (ref 2.5–4.5)
PLATELET # BLD AUTO: 222 K/UL — SIGNIFICANT CHANGE UP (ref 150–400)
POTASSIUM SERPL-MCNC: 3.7 MMOL/L — SIGNIFICANT CHANGE UP (ref 3.5–5.3)
POTASSIUM SERPL-SCNC: 3.7 MMOL/L — SIGNIFICANT CHANGE UP (ref 3.5–5.3)
PROT SERPL-MCNC: 6.1 G/DL — SIGNIFICANT CHANGE UP (ref 6–8.3)
RBC # BLD: 3.15 M/UL — LOW (ref 3.8–5.2)
RBC # FLD: 16.2 % — HIGH (ref 10.3–14.5)
SODIUM SERPL-SCNC: 137 MMOL/L — SIGNIFICANT CHANGE UP (ref 135–145)
WBC # BLD: 7.36 K/UL — SIGNIFICANT CHANGE UP (ref 3.8–10.5)
WBC # FLD AUTO: 7.36 K/UL — SIGNIFICANT CHANGE UP (ref 3.8–10.5)

## 2024-06-06 PROCEDURE — 85730 THROMBOPLASTIN TIME PARTIAL: CPT

## 2024-06-06 PROCEDURE — 85027 COMPLETE CBC AUTOMATED: CPT

## 2024-06-06 PROCEDURE — 99261: CPT

## 2024-06-06 PROCEDURE — 86850 RBC ANTIBODY SCREEN: CPT

## 2024-06-06 PROCEDURE — 71045 X-RAY EXAM CHEST 1 VIEW: CPT

## 2024-06-06 PROCEDURE — 84155 ASSAY OF PROTEIN SERUM: CPT

## 2024-06-06 PROCEDURE — 86038 ANTINUCLEAR ANTIBODIES: CPT

## 2024-06-06 PROCEDURE — 87637 SARSCOV2&INF A&B&RSV AMP PRB: CPT

## 2024-06-06 PROCEDURE — 83605 ASSAY OF LACTIC ACID: CPT

## 2024-06-06 PROCEDURE — 97162 PT EVAL MOD COMPLEX 30 MIN: CPT

## 2024-06-06 PROCEDURE — 82728 ASSAY OF FERRITIN: CPT

## 2024-06-06 PROCEDURE — 86334 IMMUNOFIX E-PHORESIS SERUM: CPT

## 2024-06-06 PROCEDURE — 83880 ASSAY OF NATRIURETIC PEPTIDE: CPT

## 2024-06-06 PROCEDURE — 87040 BLOOD CULTURE FOR BACTERIA: CPT

## 2024-06-06 PROCEDURE — 83550 IRON BINDING TEST: CPT

## 2024-06-06 PROCEDURE — 82746 ASSAY OF FOLIC ACID SERUM: CPT

## 2024-06-06 PROCEDURE — 84484 ASSAY OF TROPONIN QUANT: CPT

## 2024-06-06 PROCEDURE — 86901 BLOOD TYPING SEROLOGIC RH(D): CPT

## 2024-06-06 PROCEDURE — 84443 ASSAY THYROID STIM HORMONE: CPT

## 2024-06-06 PROCEDURE — 97530 THERAPEUTIC ACTIVITIES: CPT

## 2024-06-06 PROCEDURE — 85045 AUTOMATED RETICULOCYTE COUNT: CPT

## 2024-06-06 PROCEDURE — 80053 COMPREHEN METABOLIC PANEL: CPT

## 2024-06-06 PROCEDURE — 83735 ASSAY OF MAGNESIUM: CPT

## 2024-06-06 PROCEDURE — 85025 COMPLETE CBC W/AUTO DIFF WBC: CPT

## 2024-06-06 PROCEDURE — 82803 BLOOD GASES ANY COMBINATION: CPT

## 2024-06-06 PROCEDURE — 82607 VITAMIN B-12: CPT

## 2024-06-06 PROCEDURE — 83521 IG LIGHT CHAINS FREE EACH: CPT

## 2024-06-06 PROCEDURE — 86900 BLOOD TYPING SEROLOGIC ABO: CPT

## 2024-06-06 PROCEDURE — 83010 ASSAY OF HAPTOGLOBIN QUANT: CPT

## 2024-06-06 PROCEDURE — 94640 AIRWAY INHALATION TREATMENT: CPT

## 2024-06-06 PROCEDURE — 84165 PROTEIN E-PHORESIS SERUM: CPT

## 2024-06-06 PROCEDURE — 96374 THER/PROPH/DIAG INJ IV PUSH: CPT

## 2024-06-06 PROCEDURE — 36415 COLL VENOUS BLD VENIPUNCTURE: CPT

## 2024-06-06 PROCEDURE — 83615 LACTATE (LD) (LDH) ENZYME: CPT

## 2024-06-06 PROCEDURE — 82962 GLUCOSE BLOOD TEST: CPT

## 2024-06-06 PROCEDURE — 71275 CT ANGIOGRAPHY CHEST: CPT | Mod: MC

## 2024-06-06 PROCEDURE — 83540 ASSAY OF IRON: CPT

## 2024-06-06 PROCEDURE — 86431 RHEUMATOID FACTOR QUANT: CPT

## 2024-06-06 PROCEDURE — 82553 CREATINE MB FRACTION: CPT

## 2024-06-06 PROCEDURE — 93306 TTE W/DOPPLER COMPLETE: CPT

## 2024-06-06 PROCEDURE — 93005 ELECTROCARDIOGRAM TRACING: CPT

## 2024-06-06 PROCEDURE — 84100 ASSAY OF PHOSPHORUS: CPT

## 2024-06-06 PROCEDURE — 74177 CT ABD & PELVIS W/CONTRAST: CPT | Mod: MC

## 2024-06-06 PROCEDURE — 97116 GAIT TRAINING THERAPY: CPT

## 2024-06-06 PROCEDURE — 99285 EMERGENCY DEPT VISIT HI MDM: CPT | Mod: 25

## 2024-06-06 PROCEDURE — 86301 IMMUNOASSAY TUMOR CA 19-9: CPT

## 2024-06-06 PROCEDURE — 85610 PROTHROMBIN TIME: CPT

## 2024-06-06 PROCEDURE — 80048 BASIC METABOLIC PNL TOTAL CA: CPT

## 2024-06-06 RX ORDER — FOLIC ACID 0.8 MG
1 TABLET ORAL
Qty: 0 | Refills: 0 | DISCHARGE
Start: 2024-06-06

## 2024-06-06 RX ORDER — INSULIN LISPRO 100/ML
1 VIAL (ML) SUBCUTANEOUS
Qty: 0 | Refills: 0 | DISCHARGE
Start: 2024-06-06

## 2024-06-06 RX ORDER — INSULIN GLARGINE 100 [IU]/ML
9 INJECTION, SOLUTION SUBCUTANEOUS
Refills: 0 | DISCHARGE

## 2024-06-06 RX ORDER — METOPROLOL TARTRATE 50 MG
1 TABLET ORAL
Qty: 0 | Refills: 0 | DISCHARGE
Start: 2024-06-06

## 2024-06-06 RX ORDER — PANTOPRAZOLE SODIUM 20 MG/1
1 TABLET, DELAYED RELEASE ORAL
Qty: 0 | Refills: 0 | DISCHARGE
Start: 2024-06-06

## 2024-06-06 RX ORDER — DENOSUMAB 60 MG/ML
60 INJECTION SUBCUTANEOUS
Refills: 0 | DISCHARGE

## 2024-06-06 RX ORDER — SIMVASTATIN 20 MG/1
1 TABLET, FILM COATED ORAL
Qty: 0 | Refills: 0 | DISCHARGE
Start: 2024-06-06

## 2024-06-06 RX ORDER — ACETAMINOPHEN 500 MG
2 TABLET ORAL
Qty: 0 | Refills: 0 | DISCHARGE
Start: 2024-06-06

## 2024-06-06 RX ORDER — POTASSIUM PHOSPHATE, MONOBASIC POTASSIUM PHOSPHATE, DIBASIC 236; 224 MG/ML; MG/ML
15 INJECTION, SOLUTION INTRAVENOUS ONCE
Refills: 0 | Status: COMPLETED | OUTPATIENT
Start: 2024-06-06 | End: 2024-06-06

## 2024-06-06 RX ORDER — REPAGLINIDE 1 MG/1
1 TABLET ORAL
Refills: 0 | DISCHARGE

## 2024-06-06 RX ORDER — INSULIN GLARGINE 100 [IU]/ML
11 INJECTION, SOLUTION SUBCUTANEOUS
Refills: 0 | DISCHARGE

## 2024-06-06 RX ORDER — SENNA PLUS 8.6 MG/1
2 TABLET ORAL
Qty: 0 | Refills: 0 | DISCHARGE
Start: 2024-06-06

## 2024-06-06 RX ORDER — PANTOPRAZOLE SODIUM 20 MG/1
1 TABLET, DELAYED RELEASE ORAL
Refills: 0 | DISCHARGE

## 2024-06-06 RX ORDER — INSULIN GLARGINE 100 [IU]/ML
3 INJECTION, SOLUTION SUBCUTANEOUS
Qty: 0 | Refills: 0 | DISCHARGE
Start: 2024-06-06

## 2024-06-06 RX ORDER — EZETIMIBE AND SIMVASTATIN 10; 80 MG/1; MG/1
1 TABLET, FILM COATED ORAL
Refills: 0 | DISCHARGE

## 2024-06-06 RX ORDER — AMLODIPINE BESYLATE 2.5 MG/1
1 TABLET ORAL
Refills: 0 | DISCHARGE

## 2024-06-06 RX ADMIN — PANTOPRAZOLE SODIUM 40 MILLIGRAM(S): 20 TABLET, DELAYED RELEASE ORAL at 05:48

## 2024-06-06 RX ADMIN — CHLORHEXIDINE GLUCONATE 1 APPLICATION(S): 213 SOLUTION TOPICAL at 05:48

## 2024-06-06 RX ADMIN — HEPARIN SODIUM 5000 UNIT(S): 5000 INJECTION INTRAVENOUS; SUBCUTANEOUS at 13:09

## 2024-06-06 RX ADMIN — Medication 25 MILLIGRAM(S): at 05:48

## 2024-06-06 RX ADMIN — Medication 1 MILLIGRAM(S): at 11:55

## 2024-06-06 RX ADMIN — Medication 3: at 11:53

## 2024-06-06 RX ADMIN — HEPARIN SODIUM 5000 UNIT(S): 5000 INJECTION INTRAVENOUS; SUBCUTANEOUS at 05:49

## 2024-06-06 RX ADMIN — Medication 1: at 07:47

## 2024-06-06 RX ADMIN — POTASSIUM PHOSPHATE, MONOBASIC POTASSIUM PHOSPHATE, DIBASIC 62.5 MILLIMOLE(S): 236; 224 INJECTION, SOLUTION INTRAVENOUS at 09:46

## 2024-06-06 NOTE — PROGRESS NOTE ADULT - REASON FOR ADMISSION
SOB 2/2 fluid overload

## 2024-06-06 NOTE — PROGRESS NOTE ADULT - SKIN
warm and dry/no rashes/no ulcers
warm and dry/no rashes/no ulcers
warm and dry/no rashes
warm and dry/no rashes

## 2024-06-06 NOTE — DISCHARGE NOTE NURSING/CASE MANAGEMENT/SOCIAL WORK - PATIENT PORTAL LINK FT
You can access the FollowMyHealth Patient Portal offered by [facility] by registering at the following website: [email or web address]. By joining [employer]’s FollowMyHealth portal, you will also be able to view your health information using other applications (apps) compatible with our system.

## 2024-06-06 NOTE — PROGRESS NOTE ADULT - PROVIDER SPECIALTY LIST ADULT
Cardiology
Cardiology
Heme/Onc
Nephrology
Pulmonology
Heme/Onc
Heme/Onc
Internal Medicine
Nephrology
Internal Medicine
Nephrology
Nephrology
Pulmonology
Nephrology
Internal Medicine
Cardiology
Cardiology
Gastroenterology
Internal Medicine
Cardiology
Cardiology
Gastroenterology
Cardiology
Internal Medicine
Gastroenterology
Internal Medicine
Gastroenterology
Internal Medicine
Cardiology
Cardiology
Internal Medicine

## 2024-06-06 NOTE — PROGRESS NOTE ADULT - SUBJECTIVE AND OBJECTIVE BOX
[facility] : Progress Note :: [phone], (office [phone]),   Dr Pandey / Dr Mi / Dr Massey / Dr Etienne / Dr Cassandra DA SILVA / Dr Burt / Dr Garber / Dr Naeem lam  _____________________________________________________________________________________________    s/p HD yesterday    shellfish (Anaphylaxis)  ibuprofen (Unknown)  latex (Unknown)  natural rubber (Unknown)  Mushrooms (Anaphylaxis)  aspirin (Unknown)    Hospital Medications:   MEDICATIONS  (STANDING):  chlorhexidine 2% Cloths 1 Application(s) Topical <User Schedule>  epoetin lara (PROCRIT) Injectable 02919 Unit(s) IV Push <User Schedule>  folic acid 1 milliGRAM(s) Oral daily  heparin   Injectable 5000 Unit(s) SubCutaneous every 8 hours  insulin glargine Injectable (LANTUS) 3 Unit(s) SubCutaneous at bedtime  insulin lispro (ADMELOG) corrective regimen sliding scale   SubCutaneous at bedtime  insulin lispro (ADMELOG) corrective regimen sliding scale   SubCutaneous three times a day before meals  metoprolol tartrate 25 milliGRAM(s) Oral every 8 hours  pantoprazole    Tablet 40 milliGRAM(s) Oral before breakfast  senna 2 Tablet(s) Oral at bedtime  simvastatin 20 milliGRAM(s) Oral at bedtime        VITALS:  T(F): 97.7 (06-06-24 @ 08:46), Max: 99 (06-05-24 @ 23:10)  HR: 79 (06-06-24 @ 09:11)  BP: 90/44 (06-06-24 @ 09:11)  RR: 19 (06-06-24 @ 08:46)  SpO2: 96% (06-06-24 @ 09:11)  Wt(kg): --    06-05 @ 07:01  -  06-06 @ 07:00  --------------------------------------------------------  IN: 500 mL / OUT: 3000 mL / NET: -2500 mL        PHYSICAL EXAM:  Constitutional: NAD  HEENT: anicteric sclera, oropharynx clear  Neck: No JVD  Respiratory: CTAB, no wheezes, rales or rhonchi  Cardiovascular: S1, S2, RRR  Gastrointestinal: BS+, soft, NT/ND  Extremities:  No peripheral edema  Neurological: A/O x 3, no focal deficits  Vascular Access: AVF with thrill and bruit     LABS:  06-06    137  |  100  |  9   ----------------------------<  125<H>  3.7   |  30  |  3.59<H>    Ca    9.1      06 Jun 2024 06:35  Phos  1.1     06-06  Mg     2.1     06-06    TPro  6.1  /  Alb  2.7<L>  /  TBili  0.6  /  DBili      /  AST  19  /  ALT  24  /  AlkPhos  148<H>  06-06    Creatinine Trend: 3.59 <--, 5.20 <--, 3.42 <--, 4.67 <--, 4.49 <--                        9.1    7.36  )-----------( 222      ( 06 Jun 2024 06:35 )             28.9     Urine Studies:  Urinalysis Basic - ( 06 Jun 2024 06:35 )    Color:  / Appearance:  / SG:  / pH:   Gluc: 125 mg/dL / Ketone:   / Bili:  / Urobili:    Blood:  / Protein:  / Nitrite:    Leuk Esterase:  / RBC:  / WBC    Sq Epi:  / Non Sq Epi:  / Bacteria:         RADIOLOGY & ADDITIONAL STUDIES:

## 2024-06-06 NOTE — PROGRESS NOTE ADULT - ASSESSMENT
# ESRD- s/p HD yesterday. cont HD MWF  # H/O hemachromatosis  #CT abdomen with ? gastric mass. heme-onc F/U noted  d/c planning

## 2024-06-06 NOTE — PROGRESS NOTE ADULT - NECK
supple/symmetric
supple/symmetric/no tracheal deviation
supple/symmetric
supple/symmetric/no tracheal deviation

## 2024-06-06 NOTE — PROGRESS NOTE ADULT - EYES
PERRL/EOMI/conjunctiva clear/non icteric sclera
PERRL/EOMI/conjunctiva clear
PERRL/EOMI/conjunctiva clear/non icteric sclera
PERRL/EOMI/conjunctiva clear/non icteric sclera

## 2024-06-06 NOTE — PROGRESS NOTE ADULT - SUBJECTIVE AND OBJECTIVE BOX
Time of Visit:  Patient seen and examined.     MEDICATIONS  (STANDING):  chlorhexidine 2% Cloths 1 Application(s) Topical <User Schedule>  epoetin lara (PROCRIT) Injectable 86923 Unit(s) IV Push <User Schedule>  folic acid 1 milliGRAM(s) Oral daily  heparin   Injectable 5000 Unit(s) SubCutaneous every 8 hours  insulin glargine Injectable (LANTUS) 3 Unit(s) SubCutaneous at bedtime  insulin lispro (ADMELOG) corrective regimen sliding scale   SubCutaneous at bedtime  insulin lispro (ADMELOG) corrective regimen sliding scale   SubCutaneous three times a day before meals  metoprolol tartrate 25 milliGRAM(s) Oral every 8 hours  pantoprazole    Tablet 40 milliGRAM(s) Oral before breakfast  senna 2 Tablet(s) Oral at bedtime  simvastatin 20 milliGRAM(s) Oral at bedtime      MEDICATIONS  (PRN):  acetaminophen     Tablet .. 650 milliGRAM(s) Oral every 6 hours PRN Temp greater or equal to 38C (100.4F), Mild Pain (1 - 3)  aluminum hydroxide/magnesium hydroxide/simethicone Suspension 30 milliLiter(s) Oral every 4 hours PRN Dyspepsia  melatonin 3 milliGRAM(s) Oral at bedtime PRN Insomnia  ondansetron Injectable 4 milliGRAM(s) IV Push every 8 hours PRN Nausea and/or Vomiting       Medications up to date at time of exam.    ROS; No fever, chills, cough, nasal congestion on exam.   PHYSICAL EXAMINATION:    Vital Signs Last 24 Hrs  T(C): 36.4 (06 Jun 2024 12:16), Max: 37.2 (05 Jun 2024 23:10)  T(F): 97.5 (06 Jun 2024 12:16), Max: 99 (05 Jun 2024 23:10)  HR: 73 (06 Jun 2024 12:16) (69 - 79)  BP: 97/48 (06 Jun 2024 12:16) (90/44 - 119/58)  BP(mean): --  RR: 18 (06 Jun 2024 12:16) (18 - 19)  SpO2: 97% (06 Jun 2024 12:16) (92% - 100%)    Parameters below as of 06 Jun 2024 12:16  Patient On (Oxygen Delivery Method): room air      General : Alert and oriented. Able to answer question with no SOB. No acute distress .     HEENT: Head is normocephalic and atraumatic. [place] . Extraocular muscles are intact. Mucous membranes are moist.     NECK: Supple, no palpable adenopathy.    LUNGS: Non labored. No wheezing, rales, or rhonchi. No use of accessory muscle.     HEART: S1 S2 Regular rate and rhythm without murmur.    ABDOMEN: Soft, nontender, and nondistended. Active bowel sounds.     EXTREMITIES: Without any cyanosis, clubbing, rash, lesions or edema.    NEUROLOGIC: Awake, alert, oriented.     SKIN: Warm, dry, good turgor.      LABS:                        9.1    7.36  )-----------( 222      ( 06 Jun 2024 06:35 )             28.9     06-06    137  |  100  |  9   ----------------------------<  125<H>  3.7   |  30  |  3.59<H>    Ca    9.1      06 Jun 2024 06:35  Phos  1.1     06-06  Mg     2.1     06-06    TPro  6.1  /  Alb  2.7<L>  /  TBili  0.6  /  DBili  x   /  AST  19  /  ALT  24  /  AlkPhos  148<H>  06-06      Urinalysis Basic - ( 06 Jun 2024 06:35 )    Color: x / Appearance: x / SG: x / pH: x  Gluc: 125 mg/dL / Ketone: x  / Bili: x / Urobili: x   Blood: x / Protein: x / Nitrite: x   Leuk Esterase: x / RBC: x / WBC x   Sq Epi: x / Non Sq Epi: x / Bacteria: x                      MICROBIOLOGY: (if applicable)    RADIOLOGY & ADDITIONAL STUDIES:  EKG:   CXR:  ECHO:    IMPRESSION: 83y Female PAST MEDICAL & SURGICAL HISTORY:  Hypertension      Hyperlipidemia      Gout      Cataract      Breast lump      Osteoporosis      Osteoarthritis      Vitamin D deficiency      Seasonal allergies      CKD (chronic kidney disease)      Diabetes mellitus      ESRD on dialysis      H/O bilateral cataract extraction  2012  and 2014      History of breast surgery  Excision of left breast lump - benign  1991      History of knee replacement, total, left  2009      Arteriovenous fistula  left side limb alert       Impression; This is an 84 Y/O female from [facility] presented to ED with SOB. . Recently admitted to [facility] for Pneumonia . For pulmonary follow up for Acute hypoxic respiratory failure due to B/L Pulmonary Edema and ESRD on HD.      Suggestion :  O2 saturation 96% room air. So far been saturating good room air.   Dialysis as per Nephrology .    Monitor blood glucose with Admelog insulin coverage   DVT GI prophylactic.

## 2024-06-06 NOTE — PROGRESS NOTE ADULT - ASSESSMENT
1. Anemia (etiology multifactorial)  2. Drop in H/H most likely due to hydration  3. No evidence of acute GI bleeding  4. R/o chronic GI bleeding  5. Constipation  6. S/p CT-Scan  7. Gall stone (asymptomatic)  8. Pancreatic cyst  9. Diverticulosis without diverticulitis  10. Thickened duodenal bulb  12. R/o duodenal ulcer      Suggestions:    1. Monitor H/H  2. Transfuse PRBC as needed  3. Check stool for occult blood  4. Protonix 40mg daily  5. Avoid NSAID  6. Check   7. Daily stool softener as needed  8. MRI of abdomen out patient  9. EGD (out patient) H/H stable  10. DVT prophylaxis

## 2024-06-06 NOTE — PROGRESS NOTE ADULT - SUBJECTIVE AND OBJECTIVE BOX
NP Note discussed with  Primary Attending    Patient is a 83y old  Female who presents with a chief complaint of SOB 2/2 fluid overload (06 Jun 2024 13:02)      INTERVAL HPI/OVERNIGHT EVENTS: no new complaints    MEDICATIONS  (STANDING):  chlorhexidine 2% Cloths 1 Application(s) Topical <User Schedule>  epoetin lara (PROCRIT) Injectable 26887 Unit(s) IV Push <User Schedule>  folic acid 1 milliGRAM(s) Oral daily  heparin   Injectable 5000 Unit(s) SubCutaneous every 8 hours  insulin glargine Injectable (LANTUS) 3 Unit(s) SubCutaneous at bedtime  insulin lispro (ADMELOG) corrective regimen sliding scale   SubCutaneous at bedtime  insulin lispro (ADMELOG) corrective regimen sliding scale   SubCutaneous three times a day before meals  metoprolol tartrate 25 milliGRAM(s) Oral every 8 hours  pantoprazole    Tablet 40 milliGRAM(s) Oral before breakfast  senna 2 Tablet(s) Oral at bedtime  simvastatin 20 milliGRAM(s) Oral at bedtime    MEDICATIONS  (PRN):  acetaminophen     Tablet .. 650 milliGRAM(s) Oral every 6 hours PRN Temp greater or equal to 38C (100.4F), Mild Pain (1 - 3)  aluminum hydroxide/magnesium hydroxide/simethicone Suspension 30 milliLiter(s) Oral every 4 hours PRN Dyspepsia  melatonin 3 milliGRAM(s) Oral at bedtime PRN Insomnia  ondansetron Injectable 4 milliGRAM(s) IV Push every 8 hours PRN Nausea and/or Vomiting      __________________________________________________  REVIEW OF SYSTEMS:    CONSTITUTIONAL: No fever,   EYES: no acute visual disturbances  NECK: No pain or stiffness  RESPIRATORY: No cough; No shortness of breath  CARDIOVASCULAR: No chest pain, no palpitations  GASTROINTESTINAL: No pain. No nausea or vomiting; No diarrhea   NEUROLOGICAL: No headache or numbness, no tremors  MUSCULOSKELETAL: No joint pain, no muscle pain  GENITOURINARY: no dysuria, no frequency, no hesitancy  PSYCHIATRY: no depression , no anxiety  ALL OTHER  ROS negative        Vital Signs Last 24 Hrs  T(C): 36.4 (06 Jun 2024 12:16), Max: 37.2 (05 Jun 2024 23:10)  T(F): 97.5 (06 Jun 2024 12:16), Max: 99 (05 Jun 2024 23:10)  HR: 73 (06 Jun 2024 12:16) (69 - 79)  BP: 97/48 (06 Jun 2024 12:16) (90/44 - 119/58)  BP(mean): --  RR: 18 (06 Jun 2024 12:16) (18 - 19)  SpO2: 97% (06 Jun 2024 12:16) (92% - 100%)    Parameters below as of 06 Jun 2024 12:16  Patient On (Oxygen Delivery Method): room air        ________________________________________________  PHYSICAL EXAM:  GENERAL: NAD  HEENT: Normocephalic;  conjunctivae and sclerae clear; moist mucous membranes;   NECK : supple  CHEST/LUNG: Clear to auscultation bilaterally with good air entry   HEART: S1 S2  regular; no murmurs, gallops or rubs  ABDOMEN: Soft, Nontender, Nondistended; Bowel sounds present  EXTREMITIES: no cyanosis; no edema; no calf tenderness  SKIN: warm and dry; no rash  NERVOUS SYSTEM:  Awake and alert; Oriented  to place, person and time ; no new deficits    _________________________________________________  LABS:                        9.1    7.36  )-----------( 222      ( 06 Jun 2024 06:35 )             28.9     06-06    137  |  100  |  9   ----------------------------<  125<H>  3.7   |  30  |  3.59<H>    Ca    9.1      06 Jun 2024 06:35  Phos  1.1     06-06  Mg     2.1     06-06    TPro  6.1  /  Alb  2.7<L>  /  TBili  0.6  /  DBili  x   /  AST  19  /  ALT  24  /  AlkPhos  148<H>  06-06      Urinalysis Basic - ( 06 Jun 2024 06:35 )    Color: x / Appearance: x / SG: x / pH: x  Gluc: 125 mg/dL / Ketone: x  / Bili: x / Urobili: x   Blood: x / Protein: x / Nitrite: x   Leuk Esterase: x / RBC: x / WBC x   Sq Epi: x / Non Sq Epi: x / Bacteria: x      CAPILLARY BLOOD GLUCOSE      POCT Blood Glucose.: 281 mg/dL (06 Jun 2024 11:47)  POCT Blood Glucose.: 172 mg/dL (06 Jun 2024 07:42)  POCT Blood Glucose.: 150 mg/dL (05 Jun 2024 21:10)  POCT Blood Glucose.: 224 mg/dL (05 Jun 2024 16:38)        RADIOLOGY & ADDITIONAL TESTS:    Imaging  Reviewed:  YES/NO    Consultant(s) Notes Reviewed:   YES/ No      Plan of care was discussed with patient and /or primary care giver; all questions and concerns were addressed

## 2024-06-06 NOTE — DISCHARGE NOTE NURSING/CASE MANAGEMENT/SOCIAL WORK - NSDCPEFALRISK_GEN_ALL_CORE
For information on Fall & Injury Prevention, visit: [email or web address] OR  [email or web address] OR  https://www.cdc.gov/steadi/patient.html

## 2024-06-06 NOTE — PROGRESS NOTE ADULT - NS ATTEND AMEND GEN_ALL_CORE FT
Impression; This is an 82 Y/O female from [facility] presented to ED with SOB. . Recently admitted to [facility] for Pneumonia . For pulmonary follow up for Acute hypoxic respiratory failure due to B/L Pulmonary Edema and ESRD on HD.      Suggestion :  O2 saturation 96% room air. So far been saturating good room air.   Dialysis as per Nephrology .    Monitor blood glucose with Admelog insulin coverage   DVT GI prophylactic.
pt seen and examined by ACP. Chart reviewed and case d/w ACP on case with agreement of her A/P. . 83y female with ESRD on HD recent admission for pneumonia who followed by Dr. Shelby for homozygous H63D hemochromatosis and was on phlebotomy with last treatment on 3/2024. admitted for sob/weakness/volume overload and was found to have anemia with initial Hb 9s then decreased to 7.9. anemia workup is on going. 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 renal and GI consult is in process. recommend RBC transfusion for <7 and hold phlebotomy. will followup further anemia workup. Pt may be candidate for EPO if not done yet. Further recommendation pending ferritin level and other anemia workup.  Ferritin 1190 likely due to hemachromatosis + acutely sick                      8.1    7.63  )-----------( 245      ( 30 May 2024 16:08 )             26.2   MEDICATIONS  (STANDING):  chlorhexidine 2% Cloths 1 Application(s) Topical <User Schedule>  epoetin lara (PROCRIT) Injectable 07128 Unit(s) IV Push <User Schedule>  folic acid 1 milliGRAM(s) Oral daily  heparin   Injectable 5000 Unit(s) SubCutaneous every 8 hours  insulin glargine Injectable (LANTUS) 3 Unit(s) SubCutaneous at bedtime  insulin lispro (ADMELOG) corrective regimen sliding scale   SubCutaneous three times a day before meals  insulin lispro (ADMELOG) corrective regimen sliding scale   SubCutaneous at bedtime  metoprolol tartrate 25 milliGRAM(s) Oral every 8 hours  mupirocin 2% Ointment 1 Application(s) Both Nostrils two times a day  pantoprazole    Tablet 40 milliGRAM(s) Oral before breakfast  simvastatin 20 milliGRAM(s) Oral at bedtime    MEDICATIONS  (PRN):  acetaminophen     Tablet .. 650 milliGRAM(s) Oral every 6 hours PRN Temp greater or equal to 38C (100.4F), Mild Pain (1 - 3)  aluminum hydroxide/magnesium hydroxide/simethicone Suspension 30 milliLiter(s) Oral every 4 hours PRN Dyspepsia  melatonin 3 milliGRAM(s) Oral at bedtime PRN Insomnia  ondansetron Injectable 4 milliGRAM(s) IV Push every 8 hours PRN Nausea and/or Vomiting
pt seen and examined by ACP. Chart reviewed and case d/w ACP on case with agreement of her A/P. 83y female with ESRD on HD recent admission for pneumonia who followed by Dr. Shelby for homozygous H63D hemochromatosis and was on phlebotomy with last treatment on 3/2024. admitted for sob/weakness/volume overload and was found to have anemia with initial Hb 9s then decreased to 7.9. anemia workup is on going. 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 renal and GI consult is in process. recommend RBC transfusion for <7 and hold phlebotomy. will followup further anemia workup. Pt may be candidate for EPO and treatment is on going . Ferritin 1190 likely due to hemachromatosis + acutely sick . hb 8.5 stable. CT of AP showed ? duodenal abnormalities . GI need to comment on further management. will not active followup pt. pt will be followup with Dr. Shelby in office when pt d/c rehab.  please call for new question at [phone].
pt seen and examined by ACP. Chart reviewed and case d/w ACP on case with agreement of her A/P. 83y female with ESRD on HD recent admission for pneumonia who followed by Dr. Shelby for homozygous H63D hemochromatosis and was on phlebotomy with last treatment on 3/2024. admitted for sob/weakness/volume overload and was found to have anemia with initial Hb 9s then decreased to 7.9. anemia workup is on going. 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 renal and GI consult is in process. recommend RBC transfusion for <7 and hold phlebotomy. will followup further anemia workup. Pt may be candidate for EPO and treatment is on going . Ferritin 1190 likely due to hemachromatosis + acutely sick . hb 8.5 stable. will not active followup pt. pt will be followup with Dr. Shelby in office when pt d/c rehab.  please call for new question at [phone]

## 2024-06-06 NOTE — PROGRESS NOTE ADULT - SUBJECTIVE AND OBJECTIVE BOX
[   ] ICU                                          [   ] CCU                                      [ X  ] Medical Floor    Patient is a 83 year old female with anemia. GI consulted to evaluate.        83 year old female with past medical history significant for DM, HTN, Hyperlipidemia, Gout, Osteoporosis, Osteoarthritis, Vitamin D deficiency, Cataract, Breast lump, ESRD on HD M, W, F, presented to the emergency room with SOB and chest pain. On admission patient had anemia and during the hospital course developed drop in her H/H. Patient c/o worsening constipation but denies nausea, vomiting, hematemesis, hematochezia, melena, epistaxis, hemoptysis, fever, chills, palpitation, cough, hematuria, dysuria or diarrhea.     Patient appears comfortable. No new complaints reported, No abdominal pain, N/V, hematemesis, hematochezia, melena, fever, chills, chest pain, SOB, cough or diarrhea reported.    PAIN MANAGEMENT:  Pain Scale:                010  Pain Location:      Prior Colonoscopy:  UNknown      PAST MEDICAL HISTORY    Hypertension    Hyperlipidemia    Gout    Cataract    Breast lump    Osteoporosis    Osteoarthritis    Vitamin D deficiency    Seasonal allergies     Diabetes mellitus    ESRD on dialysis        PAST SURGICAL HISTORY    Bilateral cataract extraction    Breast surgery    Knee replacement, total, left    Arteriovenous fistula        Allergies    shellfish (Anaphylaxis)  ibuprofen (Unknown)  latex (Unknown)  natural rubber (Unknown)  Mushrooms (Anaphylaxis)  aspirin (Unknown)    Intolerances  None          SOCIAL HISTORY  Advanced Directives:       [ X ] Full Code       [  ] DNR  Marital Status:         [  ] M      [  ] S      [  ] D       [  ] W  Children:       [ X ] Yes      [  ] No  Occupation:        [  ] Employed       [ X ] Unemployed       [  ] Retired  Diet:       [ X ] Regular       [  ] PEG feeding          [  ] NG tube feeding  Drug Use:           [ X ] Patient denied          [  ] Yes  Alcohol:           [ X ] No             [  ] Yes (socially)         [  ] Yes (chronic)  Tobacco:           [  ] Yes           [X  ] No      FAMILY HISTORY  [ X ] Heart Disease            [ X ] Diabetes             [ X ] HTN             [  ] Colon Cancer             [  ] Stomach Cancer              [  ] Pancreatic Cancer        VITALS   Vital Signs Last 24 Hrs  T(C): 36.4 (06-06-24 @ 12:16), Max: 37.2 (06-05-24 @ 23:10)  T(F): 97.5 (06-06-24 @ 12:16), Max: 99 (06-05-24 @ 23:10)  HR: 73 (06-06-24 @ 12:16) (69 - 79)  BP: 97/48 (06-06-24 @ 12:16) (90/44 - 119/58)   RR: 18 (06-06-24 @ 12:16) (18 - 19)  SpO2: 97% (06-06-24 @ 12:16) (92% - 100%)        MEDICATIONS  (STANDING):  chlorhexidine 2% Cloths 1 Application(s) Topical <User Schedule>  epoetin lara (PROCRIT) Injectable 66127 Unit(s) IV Push <User Schedule>  folic acid 1 milliGRAM(s) Oral daily  heparin   Injectable 5000 Unit(s) SubCutaneous every 8 hours  insulin glargine Injectable (LANTUS) 3 Unit(s) SubCutaneous at bedtime  insulin lispro (ADMELOG) corrective regimen sliding scale   SubCutaneous at bedtime  insulin lispro (ADMELOG) corrective regimen sliding scale   SubCutaneous three times a day before meals  metoprolol tartrate 25 milliGRAM(s) Oral every 8 hours  pantoprazole    Tablet 40 milliGRAM(s) Oral before breakfast  senna 2 Tablet(s) Oral at bedtime  simvastatin 20 milliGRAM(s) Oral at bedtime    MEDICATIONS  (PRN):  acetaminophen     Tablet .. 650 milliGRAM(s) Oral every 6 hours PRN Temp greater or equal to 38C (100.4F), Mild Pain (1 - 3)  aluminum hydroxide/magnesium hydroxide/simethicone Suspension 30 milliLiter(s) Oral every 4 hours PRN Dyspepsia  melatonin 3 milliGRAM(s) Oral at bedtime PRN Insomnia  ondansetron Injectable 4 milliGRAM(s) IV Push every 8 hours PRN Nausea and/or Vomiting                            9.1    7.36  )-----------( 222      ( 06 Jun 2024 06:35 )             28.9       06-06    137  |  100  |  9   ----------------------------<  125<H>  3.7   |  30  |  3.59<H>    Ca    9.1      06 Jun 2024 06:35  Phos  1.1     06-06  Mg     2.1     06-06    TPro  6.1  /  Alb  2.7<L>  /  TBili  0.6  /  DBili  x   /  AST  19  /  ALT  24  /  AlkPhos  148<H>  06-06

## 2024-06-06 NOTE — PROGRESS NOTE ADULT - TONSILS
no redness/no swelling
no redness/no swelling
no redness/no discharge
no redness/no swelling/no discharge

## 2024-06-20 ENCOUNTER — APPOINTMENT (OUTPATIENT)
Dept: PEDIATRIC MEDICAL GENETICS | Facility: TELEHEALTH | Age: 84
End: 2024-06-20

## 2024-06-20 ENCOUNTER — APPOINTMENT (OUTPATIENT)
Age: 84
End: 2024-06-20

## 2024-06-22 PROBLEM — N18.6 END STAGE RENAL DISEASE: Chronic | Status: ACTIVE | Noted: 2024-04-02

## 2024-06-22 PROBLEM — N18.9 CHRONIC KIDNEY DISEASE, UNSPECIFIED: Chronic | Status: ACTIVE | Noted: 2018-05-29

## 2024-06-22 PROBLEM — E78.5 HYPERLIPIDEMIA, UNSPECIFIED: Chronic | Status: ACTIVE | Noted: 2018-05-29

## 2024-06-22 PROBLEM — E11.9 TYPE 2 DIABETES MELLITUS WITHOUT COMPLICATIONS: Chronic | Status: ACTIVE | Noted: 2018-05-29

## 2024-06-22 PROBLEM — I10 ESSENTIAL (PRIMARY) HYPERTENSION: Chronic | Status: ACTIVE | Noted: 2018-05-29

## 2024-06-22 PROBLEM — N63.0 UNSPECIFIED LUMP IN UNSPECIFIED BREAST: Chronic | Status: ACTIVE | Noted: 2018-05-29

## 2024-06-22 PROBLEM — M19.90 UNSPECIFIED OSTEOARTHRITIS, UNSPECIFIED SITE: Chronic | Status: ACTIVE | Noted: 2018-05-29

## 2024-06-22 PROBLEM — M81.0 AGE-RELATED OSTEOPOROSIS WITHOUT CURRENT PATHOLOGICAL FRACTURE: Chronic | Status: ACTIVE | Noted: 2018-05-29

## 2024-06-22 PROBLEM — J30.2 OTHER SEASONAL ALLERGIC RHINITIS: Chronic | Status: ACTIVE | Noted: 2018-05-29

## 2024-06-27 ENCOUNTER — INPATIENT (INPATIENT)
Facility: HOSPITAL | Age: 84
LOS: 3 days | Discharge: ANOTHER HOSPITAL | DRG: 195 | End: 2024-07-01
Attending: INTERNAL MEDICINE | Admitting: INTERNAL MEDICINE
Payer: COMMERCIAL

## 2024-06-27 VITALS
SYSTOLIC BLOOD PRESSURE: 152 MMHG | TEMPERATURE: 98 F | HEART RATE: 90 BPM | WEIGHT: 108.03 LBS | HEIGHT: 59 IN | RESPIRATION RATE: 20 BRPM | DIASTOLIC BLOOD PRESSURE: 81 MMHG | OXYGEN SATURATION: 100 %

## 2024-06-27 DIAGNOSIS — I77.0 ARTERIOVENOUS FISTULA, ACQUIRED: Chronic | ICD-10-CM

## 2024-06-27 DIAGNOSIS — Z98.41 CATARACT EXTRACTION STATUS, RIGHT EYE: Chronic | ICD-10-CM

## 2024-06-27 DIAGNOSIS — Z96.652 PRESENCE OF LEFT ARTIFICIAL KNEE JOINT: Chronic | ICD-10-CM

## 2024-06-27 DIAGNOSIS — Z98.890 OTHER SPECIFIED POSTPROCEDURAL STATES: Chronic | ICD-10-CM

## 2024-06-27 PROCEDURE — 99285 EMERGENCY DEPT VISIT HI MDM: CPT

## 2024-06-28 DIAGNOSIS — N18.6 END STAGE RENAL DISEASE: ICD-10-CM

## 2024-06-28 DIAGNOSIS — J90 PLEURAL EFFUSION, NOT ELSEWHERE CLASSIFIED: ICD-10-CM

## 2024-06-28 DIAGNOSIS — J44.9 CHRONIC OBSTRUCTIVE PULMONARY DISEASE, UNSPECIFIED: ICD-10-CM

## 2024-06-28 DIAGNOSIS — J18.9 PNEUMONIA, UNSPECIFIED ORGANISM: ICD-10-CM

## 2024-06-28 DIAGNOSIS — E11.9 TYPE 2 DIABETES MELLITUS WITHOUT COMPLICATIONS: ICD-10-CM

## 2024-06-28 DIAGNOSIS — J69.0 PNEUMONITIS DUE TO INHALATION OF FOOD AND VOMIT: ICD-10-CM

## 2024-06-28 DIAGNOSIS — E78.5 HYPERLIPIDEMIA, UNSPECIFIED: ICD-10-CM

## 2024-06-28 DIAGNOSIS — Z86.79 PERSONAL HISTORY OF OTHER DISEASES OF THE CIRCULATORY SYSTEM: ICD-10-CM

## 2024-06-28 DIAGNOSIS — J96.01 ACUTE RESPIRATORY FAILURE WITH HYPOXIA: ICD-10-CM

## 2024-06-28 DIAGNOSIS — I10 ESSENTIAL (PRIMARY) HYPERTENSION: ICD-10-CM

## 2024-06-28 DIAGNOSIS — A41.9 SEPSIS, UNSPECIFIED ORGANISM: ICD-10-CM

## 2024-06-28 DIAGNOSIS — Z29.9 ENCOUNTER FOR PROPHYLACTIC MEASURES, UNSPECIFIED: ICD-10-CM

## 2024-06-28 LAB
ALBUMIN SERPL ELPH-MCNC: 2.7 G/DL — LOW (ref 3.5–5)
ALBUMIN SERPL ELPH-MCNC: 2.9 G/DL — LOW (ref 3.5–5)
ALP SERPL-CCNC: 129 U/L — HIGH (ref 40–120)
ALP SERPL-CCNC: 138 U/L — HIGH (ref 40–120)
ALT FLD-CCNC: 12 U/L DA — SIGNIFICANT CHANGE UP (ref 10–60)
ALT FLD-CCNC: 15 U/L DA — SIGNIFICANT CHANGE UP (ref 10–60)
ANION GAP SERPL CALC-SCNC: 8 MMOL/L — SIGNIFICANT CHANGE UP (ref 5–17)
ANION GAP SERPL CALC-SCNC: 8 MMOL/L — SIGNIFICANT CHANGE UP (ref 5–17)
AST SERPL-CCNC: 17 U/L — SIGNIFICANT CHANGE UP (ref 10–40)
AST SERPL-CCNC: 23 U/L — SIGNIFICANT CHANGE UP (ref 10–40)
BASE EXCESS BLDV CALC-SCNC: -1.1 MMOL/L — SIGNIFICANT CHANGE UP
BASOPHILS # BLD AUTO: 0.01 K/UL — SIGNIFICANT CHANGE UP (ref 0–0.2)
BASOPHILS # BLD AUTO: 0.03 K/UL — SIGNIFICANT CHANGE UP (ref 0–0.2)
BASOPHILS NFR BLD AUTO: 0.1 % — SIGNIFICANT CHANGE UP (ref 0–2)
BASOPHILS NFR BLD AUTO: 0.3 % — SIGNIFICANT CHANGE UP (ref 0–2)
BILIRUB SERPL-MCNC: 0.5 MG/DL — SIGNIFICANT CHANGE UP (ref 0.2–1.2)
BILIRUB SERPL-MCNC: 0.7 MG/DL — SIGNIFICANT CHANGE UP (ref 0.2–1.2)
BLOOD GAS COMMENTS, VENOUS: SIGNIFICANT CHANGE UP
BUN SERPL-MCNC: 17 MG/DL — SIGNIFICANT CHANGE UP (ref 7–18)
BUN SERPL-MCNC: 18 MG/DL — SIGNIFICANT CHANGE UP (ref 7–18)
CALCIUM SERPL-MCNC: 8.8 MG/DL — SIGNIFICANT CHANGE UP (ref 8.4–10.5)
CALCIUM SERPL-MCNC: 9.3 MG/DL — SIGNIFICANT CHANGE UP (ref 8.4–10.5)
CHLORIDE SERPL-SCNC: 95 MMOL/L — LOW (ref 96–108)
CHLORIDE SERPL-SCNC: 96 MMOL/L — SIGNIFICANT CHANGE UP (ref 96–108)
CO2 SERPL-SCNC: 25 MMOL/L — SIGNIFICANT CHANGE UP (ref 22–31)
CO2 SERPL-SCNC: 26 MMOL/L — SIGNIFICANT CHANGE UP (ref 22–31)
CREAT SERPL-MCNC: 4.01 MG/DL — HIGH (ref 0.5–1.3)
CREAT SERPL-MCNC: 4.11 MG/DL — HIGH (ref 0.5–1.3)
EGFR: 10 ML/MIN/1.73M2 — LOW
EGFR: 10 ML/MIN/1.73M2 — LOW
EOSINOPHIL # BLD AUTO: 0.01 K/UL — SIGNIFICANT CHANGE UP (ref 0–0.5)
EOSINOPHIL # BLD AUTO: 0.01 K/UL — SIGNIFICANT CHANGE UP (ref 0–0.5)
EOSINOPHIL NFR BLD AUTO: 0.1 % — SIGNIFICANT CHANGE UP (ref 0–6)
EOSINOPHIL NFR BLD AUTO: 0.1 % — SIGNIFICANT CHANGE UP (ref 0–6)
FLUAV AG NPH QL: SIGNIFICANT CHANGE UP
FLUBV AG NPH QL: SIGNIFICANT CHANGE UP
GAS PNL BLDV: SIGNIFICANT CHANGE UP
GLUCOSE BLDC GLUCOMTR-MCNC: 112 MG/DL — HIGH (ref 70–99)
GLUCOSE BLDC GLUCOMTR-MCNC: 149 MG/DL — HIGH (ref 70–99)
GLUCOSE BLDC GLUCOMTR-MCNC: 157 MG/DL — HIGH (ref 70–99)
GLUCOSE BLDC GLUCOMTR-MCNC: 184 MG/DL — HIGH (ref 70–99)
GLUCOSE SERPL-MCNC: 257 MG/DL — HIGH (ref 70–99)
GLUCOSE SERPL-MCNC: 271 MG/DL — HIGH (ref 70–99)
HCO3 BLDV-SCNC: 26 MMOL/L — SIGNIFICANT CHANGE UP (ref 22–29)
HCT VFR BLD CALC: 28.8 % — LOW (ref 34.5–45)
HCT VFR BLD CALC: 31.9 % — LOW (ref 34.5–45)
HGB BLD-MCNC: 10.1 G/DL — LOW (ref 11.5–15.5)
HGB BLD-MCNC: 9.2 G/DL — LOW (ref 11.5–15.5)
HIV 1 & 2 AB SERPL IA.RAPID: SIGNIFICANT CHANGE UP
IMM GRANULOCYTES NFR BLD AUTO: 0.3 % — SIGNIFICANT CHANGE UP (ref 0–0.9)
IMM GRANULOCYTES NFR BLD AUTO: 0.3 % — SIGNIFICANT CHANGE UP (ref 0–0.9)
LACTATE SERPL-SCNC: 1.3 MMOL/L — SIGNIFICANT CHANGE UP (ref 0.7–2)
LYMPHOCYTES # BLD AUTO: 0.56 K/UL — LOW (ref 1–3.3)
LYMPHOCYTES # BLD AUTO: 1.19 K/UL — SIGNIFICANT CHANGE UP (ref 1–3.3)
LYMPHOCYTES # BLD AUTO: 16.8 % — SIGNIFICANT CHANGE UP (ref 13–44)
LYMPHOCYTES # BLD AUTO: 5.2 % — LOW (ref 13–44)
MAGNESIUM SERPL-MCNC: 1.9 MG/DL — SIGNIFICANT CHANGE UP (ref 1.6–2.6)
MCHC RBC-ENTMCNC: 27.7 PG — SIGNIFICANT CHANGE UP (ref 27–34)
MCHC RBC-ENTMCNC: 27.9 PG — SIGNIFICANT CHANGE UP (ref 27–34)
MCHC RBC-ENTMCNC: 31.7 GM/DL — LOW (ref 32–36)
MCHC RBC-ENTMCNC: 31.9 GM/DL — LOW (ref 32–36)
MCV RBC AUTO: 87.3 FL — SIGNIFICANT CHANGE UP (ref 80–100)
MCV RBC AUTO: 87.6 FL — SIGNIFICANT CHANGE UP (ref 80–100)
MONOCYTES # BLD AUTO: 0.49 K/UL — SIGNIFICANT CHANGE UP (ref 0–0.9)
MONOCYTES # BLD AUTO: 0.54 K/UL — SIGNIFICANT CHANGE UP (ref 0–0.9)
MONOCYTES NFR BLD AUTO: 5 % — SIGNIFICANT CHANGE UP (ref 2–14)
MONOCYTES NFR BLD AUTO: 6.9 % — SIGNIFICANT CHANGE UP (ref 2–14)
MRSA PCR RESULT.: SIGNIFICANT CHANGE UP
NEUTROPHILS # BLD AUTO: 5.38 K/UL — SIGNIFICANT CHANGE UP (ref 1.8–7.4)
NEUTROPHILS # BLD AUTO: 9.64 K/UL — HIGH (ref 1.8–7.4)
NEUTROPHILS NFR BLD AUTO: 75.8 % — SIGNIFICANT CHANGE UP (ref 43–77)
NEUTROPHILS NFR BLD AUTO: 89.1 % — HIGH (ref 43–77)
NRBC # BLD: 0 /100 WBCS — SIGNIFICANT CHANGE UP (ref 0–0)
NRBC # BLD: 0 /100 WBCS — SIGNIFICANT CHANGE UP (ref 0–0)
NT-PROBNP SERPL-SCNC: HIGH PG/ML (ref 0–450)
OSMOLALITY UR: 205 MOS/KG — SIGNIFICANT CHANGE UP (ref 50–1200)
PCO2 BLDV: 53 MMHG — HIGH (ref 39–42)
PH BLDV: 7.3 — LOW (ref 7.32–7.43)
PHOSPHATE SERPL-MCNC: 4 MG/DL — SIGNIFICANT CHANGE UP (ref 2.5–4.5)
PLATELET # BLD AUTO: 146 K/UL — LOW (ref 150–400)
PLATELET # BLD AUTO: 161 K/UL — SIGNIFICANT CHANGE UP (ref 150–400)
PO2 BLDV: 34 MMHG — SIGNIFICANT CHANGE UP
POTASSIUM SERPL-MCNC: 3.6 MMOL/L — SIGNIFICANT CHANGE UP (ref 3.5–5.3)
POTASSIUM SERPL-MCNC: 3.9 MMOL/L — SIGNIFICANT CHANGE UP (ref 3.5–5.3)
POTASSIUM SERPL-SCNC: 3.6 MMOL/L — SIGNIFICANT CHANGE UP (ref 3.5–5.3)
POTASSIUM SERPL-SCNC: 3.9 MMOL/L — SIGNIFICANT CHANGE UP (ref 3.5–5.3)
PROCALCITONIN SERPL-MCNC: 0.77 NG/ML — HIGH (ref 0.02–0.1)
PROT SERPL-MCNC: 5.5 G/DL — LOW (ref 6–8.3)
PROT SERPL-MCNC: 5.9 G/DL — LOW (ref 6–8.3)
RBC # BLD: 3.3 M/UL — LOW (ref 3.8–5.2)
RBC # BLD: 3.64 M/UL — LOW (ref 3.8–5.2)
RBC # FLD: 13.9 % — SIGNIFICANT CHANGE UP (ref 10.3–14.5)
RBC # FLD: 13.9 % — SIGNIFICANT CHANGE UP (ref 10.3–14.5)
S AUREUS DNA NOSE QL NAA+PROBE: SIGNIFICANT CHANGE UP
SAO2 % BLDV: 54.9 % — SIGNIFICANT CHANGE UP
SARS-COV-2 RNA SPEC QL NAA+PROBE: SIGNIFICANT CHANGE UP
SODIUM SERPL-SCNC: 129 MMOL/L — LOW (ref 135–145)
SODIUM SERPL-SCNC: 129 MMOL/L — LOW (ref 135–145)
SODIUM UR-SCNC: 35 MMOL/L — SIGNIFICANT CHANGE UP
WBC # BLD: 10.81 K/UL — HIGH (ref 3.8–10.5)
WBC # BLD: 7.1 K/UL — SIGNIFICANT CHANGE UP (ref 3.8–10.5)
WBC # FLD AUTO: 10.81 K/UL — HIGH (ref 3.8–10.5)
WBC # FLD AUTO: 7.1 K/UL — SIGNIFICANT CHANGE UP (ref 3.8–10.5)

## 2024-06-28 PROCEDURE — 71045 X-RAY EXAM CHEST 1 VIEW: CPT | Mod: 26

## 2024-06-28 RX ORDER — ONDANSETRON HYDROCHLORIDE 2 MG/ML
4 INJECTION INTRAMUSCULAR; INTRAVENOUS EVERY 8 HOURS
Refills: 0 | Status: DISCONTINUED | OUTPATIENT
Start: 2024-06-28 | End: 2024-07-01

## 2024-06-28 RX ORDER — CEFTRIAXONE SODIUM 500 MG
1000 VIAL (EA) INJECTION ONCE
Refills: 0 | Status: COMPLETED | OUTPATIENT
Start: 2024-06-28 | End: 2024-06-28

## 2024-06-28 RX ORDER — INSULIN LISPRO 100 [IU]/ML
INJECTION, SOLUTION SUBCUTANEOUS AT BEDTIME
Refills: 0 | Status: DISCONTINUED | OUTPATIENT
Start: 2024-06-28 | End: 2024-07-01

## 2024-06-28 RX ORDER — ERYTHROPOIETIN 4000 [IU]/ML
10000 INJECTION, SOLUTION INTRAVENOUS; SUBCUTANEOUS
Refills: 0 | Status: DISCONTINUED | OUTPATIENT
Start: 2024-06-28 | End: 2024-07-01

## 2024-06-28 RX ORDER — LIPASE/PROTEASE/AMYLASE 4.5-25-20K
3 CAPSULE,DELAYED RELEASE (ENTERIC COATED) ORAL
Refills: 0 | Status: DISCONTINUED | OUTPATIENT
Start: 2024-06-28 | End: 2024-06-28

## 2024-06-28 RX ORDER — INSULIN GLARGINE 100 [IU]/ML
3 INJECTION, SOLUTION SUBCUTANEOUS AT BEDTIME
Refills: 0 | Status: DISCONTINUED | OUTPATIENT
Start: 2024-06-28 | End: 2024-07-01

## 2024-06-28 RX ORDER — AZITHROMYCIN 250 MG/1
500 TABLET, FILM COATED ORAL ONCE
Refills: 0 | Status: COMPLETED | OUTPATIENT
Start: 2024-06-28 | End: 2024-06-28

## 2024-06-28 RX ORDER — SIMVASTATIN 40 MG
20 TABLET ORAL AT BEDTIME
Refills: 0 | Status: DISCONTINUED | OUTPATIENT
Start: 2024-06-28 | End: 2024-07-01

## 2024-06-28 RX ORDER — FERROUS SULFATE 325(65) MG
325 TABLET ORAL DAILY
Refills: 0 | Status: DISCONTINUED | OUTPATIENT
Start: 2024-06-28 | End: 2024-07-01

## 2024-06-28 RX ORDER — ACETAMINOPHEN 325 MG
650 TABLET ORAL EVERY 6 HOURS
Refills: 0 | Status: DISCONTINUED | OUTPATIENT
Start: 2024-06-28 | End: 2024-07-01

## 2024-06-28 RX ORDER — CLINDAMYCIN PHOSPHATE 150 MG/ML
600 INJECTION, SOLUTION INTRAVENOUS EVERY 8 HOURS
Refills: 0 | Status: DISCONTINUED | OUTPATIENT
Start: 2024-06-28 | End: 2024-07-01

## 2024-06-28 RX ORDER — PANTOPRAZOLE SODIUM 40 MG/10ML
40 INJECTION, POWDER, FOR SOLUTION INTRAVENOUS
Refills: 0 | Status: DISCONTINUED | OUTPATIENT
Start: 2024-06-28 | End: 2024-07-01

## 2024-06-28 RX ORDER — ERYTHROPOIETIN 4000 [IU]/ML
10000 INJECTION, SOLUTION INTRAVENOUS; SUBCUTANEOUS
Refills: 0 | Status: DISCONTINUED | OUTPATIENT
Start: 2024-06-28 | End: 2024-06-28

## 2024-06-28 RX ORDER — SENNOSIDES 8.6 MG
2 TABLET ORAL AT BEDTIME
Refills: 0 | Status: DISCONTINUED | OUTPATIENT
Start: 2024-06-28 | End: 2024-07-01

## 2024-06-28 RX ORDER — LISINOPRIL 5 MG/1
2.5 TABLET ORAL DAILY
Refills: 0 | Status: DISCONTINUED | OUTPATIENT
Start: 2024-06-28 | End: 2024-07-01

## 2024-06-28 RX ORDER — MAGNESIUM, ALUMINUM HYDROXIDE 400-400
30 TABLET,CHEWABLE ORAL EVERY 4 HOURS
Refills: 0 | Status: DISCONTINUED | OUTPATIENT
Start: 2024-06-28 | End: 2024-07-01

## 2024-06-28 RX ORDER — METOPROLOL TARTRATE 50 MG
25 TABLET ORAL EVERY 8 HOURS
Refills: 0 | Status: DISCONTINUED | OUTPATIENT
Start: 2024-06-28 | End: 2024-07-01

## 2024-06-28 RX ORDER — INSULIN LISPRO 100 [IU]/ML
INJECTION, SOLUTION SUBCUTANEOUS
Refills: 0 | Status: DISCONTINUED | OUTPATIENT
Start: 2024-06-28 | End: 2024-07-01

## 2024-06-28 RX ORDER — NITROGLYCERIN 2.5 MG
0.4 CAPSULE, EXTENDED RELEASE ORAL
Refills: 0 | Status: DISCONTINUED | OUTPATIENT
Start: 2024-06-28 | End: 2024-06-28

## 2024-06-28 RX ORDER — LIPASE/PROTEASE/AMYLASE 4.5-25-20K
1 CAPSULE,DELAYED RELEASE (ENTERIC COATED) ORAL
Refills: 0 | Status: DISCONTINUED | OUTPATIENT
Start: 2024-06-28 | End: 2024-07-01

## 2024-06-28 RX ORDER — CEFTRIAXONE SODIUM 500 MG
1000 VIAL (EA) INJECTION EVERY 24 HOURS
Refills: 0 | Status: DISCONTINUED | OUTPATIENT
Start: 2024-06-29 | End: 2024-07-01

## 2024-06-28 RX ORDER — HEPARIN SODIUM 50 [USP'U]/ML
5000 INJECTION, SOLUTION INTRAVENOUS EVERY 12 HOURS
Refills: 0 | Status: DISCONTINUED | OUTPATIENT
Start: 2024-06-28 | End: 2024-07-01

## 2024-06-28 RX ORDER — FOLIC ACID
1 POWDER (GRAM) MISCELLANEOUS DAILY
Refills: 0 | Status: DISCONTINUED | OUTPATIENT
Start: 2024-06-28 | End: 2024-07-01

## 2024-06-28 RX ADMIN — HEPARIN SODIUM 5000 UNIT(S): 50 INJECTION, SOLUTION INTRAVENOUS at 07:07

## 2024-06-28 RX ADMIN — Medication 20 MILLIGRAM(S): at 21:38

## 2024-06-28 RX ADMIN — Medication 100 MILLIGRAM(S): at 02:40

## 2024-06-28 RX ADMIN — Medication 25 MILLIGRAM(S): at 13:26

## 2024-06-28 RX ADMIN — Medication 25 MILLIGRAM(S): at 07:06

## 2024-06-28 RX ADMIN — INSULIN LISPRO 1: 100 INJECTION, SOLUTION SUBCUTANEOUS at 08:20

## 2024-06-28 RX ADMIN — Medication 1 MILLIGRAM(S): at 12:47

## 2024-06-28 RX ADMIN — Medication 1 APPLICATION(S): at 12:48

## 2024-06-28 RX ADMIN — AZITHROMYCIN 255 MILLIGRAM(S): 250 TABLET, FILM COATED ORAL at 03:04

## 2024-06-28 RX ADMIN — Medication 1 CAPSULE(S): at 10:12

## 2024-06-28 RX ADMIN — Medication 2 TABLET(S): at 21:38

## 2024-06-28 RX ADMIN — Medication 325 MILLIGRAM(S): at 12:47

## 2024-06-28 RX ADMIN — PANTOPRAZOLE SODIUM 40 MILLIGRAM(S): 40 INJECTION, POWDER, FOR SOLUTION INTRAVENOUS at 07:07

## 2024-06-28 RX ADMIN — ERYTHROPOIETIN 10000 UNIT(S): 4000 INJECTION, SOLUTION INTRAVENOUS; SUBCUTANEOUS at 17:40

## 2024-06-28 RX ADMIN — Medication 1 CAPSULE(S): at 21:37

## 2024-06-28 RX ADMIN — INSULIN GLARGINE 3 UNIT(S): 100 INJECTION, SOLUTION SUBCUTANEOUS at 21:38

## 2024-06-28 RX ADMIN — CLINDAMYCIN PHOSPHATE 100 MILLIGRAM(S): 150 INJECTION, SOLUTION INTRAVENOUS at 21:37

## 2024-06-28 RX ADMIN — Medication 25 MILLIGRAM(S): at 21:39

## 2024-06-28 RX ADMIN — Medication 1 CAPSULE(S): at 12:47

## 2024-06-29 LAB
ANION GAP SERPL CALC-SCNC: 6 MMOL/L — SIGNIFICANT CHANGE UP (ref 5–17)
BUN SERPL-MCNC: 11 MG/DL — SIGNIFICANT CHANGE UP (ref 7–18)
CALCIUM SERPL-MCNC: 9 MG/DL — SIGNIFICANT CHANGE UP (ref 8.4–10.5)
CHLORIDE SERPL-SCNC: 99 MMOL/L — SIGNIFICANT CHANGE UP (ref 96–108)
CO2 SERPL-SCNC: 30 MMOL/L — SIGNIFICANT CHANGE UP (ref 22–31)
CREAT SERPL-MCNC: 3.01 MG/DL — HIGH (ref 0.5–1.3)
EGFR: 15 ML/MIN/1.73M2 — LOW
GLUCOSE BLDC GLUCOMTR-MCNC: 147 MG/DL — HIGH (ref 70–99)
GLUCOSE BLDC GLUCOMTR-MCNC: 168 MG/DL — HIGH (ref 70–99)
GLUCOSE BLDC GLUCOMTR-MCNC: 261 MG/DL — HIGH (ref 70–99)
GLUCOSE BLDC GLUCOMTR-MCNC: 79 MG/DL — SIGNIFICANT CHANGE UP (ref 70–99)
GLUCOSE SERPL-MCNC: 123 MG/DL — HIGH (ref 70–99)
HBV SURFACE AB SER-ACNC: REACTIVE
HBV SURFACE AG SER-ACNC: SIGNIFICANT CHANGE UP
HCT VFR BLD CALC: 29.8 % — LOW (ref 34.5–45)
HGB BLD-MCNC: 9.2 G/DL — LOW (ref 11.5–15.5)
MCHC RBC-ENTMCNC: 27.3 PG — SIGNIFICANT CHANGE UP (ref 27–34)
MCHC RBC-ENTMCNC: 30.9 GM/DL — LOW (ref 32–36)
MCV RBC AUTO: 88.4 FL — SIGNIFICANT CHANGE UP (ref 80–100)
NRBC # BLD: 0 /100 WBCS — SIGNIFICANT CHANGE UP (ref 0–0)
PLATELET # BLD AUTO: 143 K/UL — LOW (ref 150–400)
POTASSIUM SERPL-MCNC: 3.4 MMOL/L — LOW (ref 3.5–5.3)
POTASSIUM SERPL-SCNC: 3.4 MMOL/L — LOW (ref 3.5–5.3)
RBC # BLD: 3.37 M/UL — LOW (ref 3.8–5.2)
RBC # FLD: 14.3 % — SIGNIFICANT CHANGE UP (ref 10.3–14.5)
SODIUM SERPL-SCNC: 135 MMOL/L — SIGNIFICANT CHANGE UP (ref 135–145)
WBC # BLD: 5.06 K/UL — SIGNIFICANT CHANGE UP (ref 3.8–10.5)
WBC # FLD AUTO: 5.06 K/UL — SIGNIFICANT CHANGE UP (ref 3.8–10.5)

## 2024-06-29 RX ADMIN — INSULIN GLARGINE 3 UNIT(S): 100 INJECTION, SOLUTION SUBCUTANEOUS at 21:55

## 2024-06-29 RX ADMIN — Medication 25 MILLIGRAM(S): at 13:56

## 2024-06-29 RX ADMIN — LISINOPRIL 2.5 MILLIGRAM(S): 5 TABLET ORAL at 05:27

## 2024-06-29 RX ADMIN — CLINDAMYCIN PHOSPHATE 100 MILLIGRAM(S): 150 INJECTION, SOLUTION INTRAVENOUS at 06:12

## 2024-06-29 RX ADMIN — HEPARIN SODIUM 5000 UNIT(S): 50 INJECTION, SOLUTION INTRAVENOUS at 18:01

## 2024-06-29 RX ADMIN — Medication 1 CAPSULE(S): at 12:14

## 2024-06-29 RX ADMIN — CLINDAMYCIN PHOSPHATE 100 MILLIGRAM(S): 150 INJECTION, SOLUTION INTRAVENOUS at 21:55

## 2024-06-29 RX ADMIN — CLINDAMYCIN PHOSPHATE 100 MILLIGRAM(S): 150 INJECTION, SOLUTION INTRAVENOUS at 13:54

## 2024-06-29 RX ADMIN — Medication 1 CAPSULE(S): at 21:54

## 2024-06-29 RX ADMIN — Medication 1 MILLIGRAM(S): at 12:13

## 2024-06-29 RX ADMIN — Medication 325 MILLIGRAM(S): at 12:14

## 2024-06-29 RX ADMIN — Medication 1 CAPSULE(S): at 08:18

## 2024-06-29 RX ADMIN — Medication 1 APPLICATION(S): at 05:28

## 2024-06-29 RX ADMIN — Medication 2 TABLET(S): at 21:54

## 2024-06-29 RX ADMIN — HEPARIN SODIUM 5000 UNIT(S): 50 INJECTION, SOLUTION INTRAVENOUS at 05:27

## 2024-06-29 RX ADMIN — Medication 1 CAPSULE(S): at 18:01

## 2024-06-29 RX ADMIN — PANTOPRAZOLE SODIUM 40 MILLIGRAM(S): 40 INJECTION, POWDER, FOR SOLUTION INTRAVENOUS at 06:12

## 2024-06-29 RX ADMIN — Medication 25 MILLIGRAM(S): at 21:53

## 2024-06-29 RX ADMIN — Medication 3 MILLIGRAM(S): at 21:58

## 2024-06-29 RX ADMIN — Medication 25 MILLIGRAM(S): at 05:27

## 2024-06-29 RX ADMIN — INSULIN LISPRO 3: 100 INJECTION, SOLUTION SUBCUTANEOUS at 18:18

## 2024-06-29 RX ADMIN — Medication 20 MILLIGRAM(S): at 21:54

## 2024-06-30 LAB
ANION GAP SERPL CALC-SCNC: 9 MMOL/L — SIGNIFICANT CHANGE UP (ref 5–17)
BUN SERPL-MCNC: 18 MG/DL — SIGNIFICANT CHANGE UP (ref 7–18)
CALCIUM SERPL-MCNC: 9 MG/DL — SIGNIFICANT CHANGE UP (ref 8.4–10.5)
CHLORIDE SERPL-SCNC: 99 MMOL/L — SIGNIFICANT CHANGE UP (ref 96–108)
CO2 SERPL-SCNC: 24 MMOL/L — SIGNIFICANT CHANGE UP (ref 22–31)
CREAT SERPL-MCNC: 4.13 MG/DL — HIGH (ref 0.5–1.3)
EGFR: 10 ML/MIN/1.73M2 — LOW
GLUCOSE BLDC GLUCOMTR-MCNC: 142 MG/DL — HIGH (ref 70–99)
GLUCOSE BLDC GLUCOMTR-MCNC: 144 MG/DL — HIGH (ref 70–99)
GLUCOSE BLDC GLUCOMTR-MCNC: 178 MG/DL — HIGH (ref 70–99)
GLUCOSE BLDC GLUCOMTR-MCNC: 295 MG/DL — HIGH (ref 70–99)
GLUCOSE SERPL-MCNC: 104 MG/DL — HIGH (ref 70–99)
HCT VFR BLD CALC: 29.3 % — LOW (ref 34.5–45)
HGB BLD-MCNC: 9.1 G/DL — LOW (ref 11.5–15.5)
MCHC RBC-ENTMCNC: 27.3 PG — SIGNIFICANT CHANGE UP (ref 27–34)
MCHC RBC-ENTMCNC: 31.1 GM/DL — LOW (ref 32–36)
MCV RBC AUTO: 88 FL — SIGNIFICANT CHANGE UP (ref 80–100)
NRBC # BLD: 0 /100 WBCS — SIGNIFICANT CHANGE UP (ref 0–0)
PLATELET # BLD AUTO: 153 K/UL — SIGNIFICANT CHANGE UP (ref 150–400)
POTASSIUM SERPL-MCNC: 4.8 MMOL/L — SIGNIFICANT CHANGE UP (ref 3.5–5.3)
POTASSIUM SERPL-SCNC: 4.8 MMOL/L — SIGNIFICANT CHANGE UP (ref 3.5–5.3)
RBC # BLD: 3.33 M/UL — LOW (ref 3.8–5.2)
RBC # FLD: 14.6 % — HIGH (ref 10.3–14.5)
SODIUM SERPL-SCNC: 132 MMOL/L — LOW (ref 135–145)
WBC # BLD: 6.59 K/UL — SIGNIFICANT CHANGE UP (ref 3.8–10.5)
WBC # FLD AUTO: 6.59 K/UL — SIGNIFICANT CHANGE UP (ref 3.8–10.5)

## 2024-06-30 RX ADMIN — Medication 2 TABLET(S): at 22:00

## 2024-06-30 RX ADMIN — Medication 25 MILLIGRAM(S): at 22:00

## 2024-06-30 RX ADMIN — Medication 1 CAPSULE(S): at 17:39

## 2024-06-30 RX ADMIN — INSULIN LISPRO 1: 100 INJECTION, SOLUTION SUBCUTANEOUS at 17:39

## 2024-06-30 RX ADMIN — Medication 325 MILLIGRAM(S): at 13:01

## 2024-06-30 RX ADMIN — INSULIN GLARGINE 3 UNIT(S): 100 INJECTION, SOLUTION SUBCUTANEOUS at 22:05

## 2024-06-30 RX ADMIN — Medication 100 MILLIGRAM(S): at 06:29

## 2024-06-30 RX ADMIN — CLINDAMYCIN PHOSPHATE 100 MILLIGRAM(S): 150 INJECTION, SOLUTION INTRAVENOUS at 14:51

## 2024-06-30 RX ADMIN — HEPARIN SODIUM 5000 UNIT(S): 50 INJECTION, SOLUTION INTRAVENOUS at 05:44

## 2024-06-30 RX ADMIN — HEPARIN SODIUM 5000 UNIT(S): 50 INJECTION, SOLUTION INTRAVENOUS at 17:39

## 2024-06-30 RX ADMIN — Medication 1 CAPSULE(S): at 08:39

## 2024-06-30 RX ADMIN — Medication 1 APPLICATION(S): at 05:50

## 2024-06-30 RX ADMIN — Medication 3 MILLIGRAM(S): at 21:59

## 2024-06-30 RX ADMIN — Medication 20 MILLIGRAM(S): at 22:03

## 2024-06-30 RX ADMIN — LISINOPRIL 2.5 MILLIGRAM(S): 5 TABLET ORAL at 06:30

## 2024-06-30 RX ADMIN — CLINDAMYCIN PHOSPHATE 100 MILLIGRAM(S): 150 INJECTION, SOLUTION INTRAVENOUS at 22:04

## 2024-06-30 RX ADMIN — Medication 25 MILLIGRAM(S): at 13:02

## 2024-06-30 RX ADMIN — INSULIN LISPRO 3: 100 INJECTION, SOLUTION SUBCUTANEOUS at 12:13

## 2024-06-30 RX ADMIN — CLINDAMYCIN PHOSPHATE 100 MILLIGRAM(S): 150 INJECTION, SOLUTION INTRAVENOUS at 05:44

## 2024-06-30 RX ADMIN — Medication 1 CAPSULE(S): at 13:02

## 2024-06-30 RX ADMIN — PANTOPRAZOLE SODIUM 40 MILLIGRAM(S): 40 INJECTION, POWDER, FOR SOLUTION INTRAVENOUS at 06:31

## 2024-06-30 RX ADMIN — Medication 25 MILLIGRAM(S): at 05:46

## 2024-06-30 RX ADMIN — Medication 1 CAPSULE(S): at 22:00

## 2024-06-30 RX ADMIN — Medication 1 MILLIGRAM(S): at 13:02

## 2024-07-01 ENCOUNTER — INPATIENT (INPATIENT)
Facility: HOSPITAL | Age: 84
LOS: 0 days | Discharge: HOME | End: 2024-07-02
Attending: STUDENT IN AN ORGANIZED HEALTH CARE EDUCATION/TRAINING PROGRAM | Admitting: STUDENT IN AN ORGANIZED HEALTH CARE EDUCATION/TRAINING PROGRAM
Payer: MEDICARE

## 2024-07-01 VITALS
OXYGEN SATURATION: 95 % | DIASTOLIC BLOOD PRESSURE: 61 MMHG | HEART RATE: 64 BPM | SYSTOLIC BLOOD PRESSURE: 114 MMHG | TEMPERATURE: 97 F | RESPIRATION RATE: 15 BRPM

## 2024-07-01 VITALS
HEIGHT: 59 IN | DIASTOLIC BLOOD PRESSURE: 75 MMHG | TEMPERATURE: 98 F | HEART RATE: 62 BPM | WEIGHT: 106.04 LBS | SYSTOLIC BLOOD PRESSURE: 131 MMHG | OXYGEN SATURATION: 96 % | RESPIRATION RATE: 18 BRPM

## 2024-07-01 DIAGNOSIS — N18.6 END STAGE RENAL DISEASE: ICD-10-CM

## 2024-07-01 DIAGNOSIS — I27.20 PULMONARY HYPERTENSION, UNSPECIFIED: ICD-10-CM

## 2024-07-01 DIAGNOSIS — I77.0 ARTERIOVENOUS FISTULA, ACQUIRED: Chronic | ICD-10-CM

## 2024-07-01 DIAGNOSIS — I47.20 VENTRICULAR TACHYCARDIA, UNSPECIFIED: ICD-10-CM
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
## 2024-07-01 DIAGNOSIS — Z98.890 OTHER SPECIFIED POSTPROCEDURAL STATES: Chronic | ICD-10-CM

## 2024-07-01 DIAGNOSIS — M19.90 UNSPECIFIED OSTEOARTHRITIS, UNSPECIFIED SITE: ICD-10-CM

## 2024-07-01 DIAGNOSIS — J69.0 PNEUMONITIS DUE TO INHALATION OF FOOD AND VOMIT: ICD-10-CM

## 2024-07-01 DIAGNOSIS — I10 ESSENTIAL (PRIMARY) HYPERTENSION: ICD-10-CM

## 2024-07-01 DIAGNOSIS — Z96.652 PRESENCE OF LEFT ARTIFICIAL KNEE JOINT: Chronic | ICD-10-CM

## 2024-07-01 DIAGNOSIS — E11.9 TYPE 2 DIABETES MELLITUS WITHOUT COMPLICATIONS: ICD-10-CM

## 2024-07-01 DIAGNOSIS — Z98.41 CATARACT EXTRACTION STATUS, RIGHT EYE: Chronic | ICD-10-CM

## 2024-07-01 DIAGNOSIS — I50.21 ACUTE SYSTOLIC (CONGESTIVE) HEART FAILURE: ICD-10-CM

## 2024-07-01 DIAGNOSIS — E78.5 HYPERLIPIDEMIA, UNSPECIFIED: ICD-10-CM

## 2024-07-01 DIAGNOSIS — Z29.9 ENCOUNTER FOR PROPHYLACTIC MEASURES, UNSPECIFIED: ICD-10-CM

## 2024-07-01 PROBLEM — N63.0 UNSPECIFIED LUMP IN UNSPECIFIED BREAST: Chronic | Status: INACTIVE | Noted: 2018-05-29 | Resolved: 2024-07-01

## 2024-07-01 PROBLEM — Z86.79 PERSONAL HISTORY OF OTHER DISEASES OF THE CIRCULATORY SYSTEM: Chronic | Status: INACTIVE | Noted: 2024-06-28 | Resolved: 2024-07-01

## 2024-07-01 PROBLEM — N18.9 CHRONIC KIDNEY DISEASE, UNSPECIFIED: Chronic | Status: INACTIVE | Noted: 2018-05-29 | Resolved: 2024-07-01

## 2024-07-01 PROBLEM — J30.2 OTHER SEASONAL ALLERGIC RHINITIS: Chronic | Status: INACTIVE | Noted: 2018-05-29 | Resolved: 2024-07-01

## 2024-07-01 PROBLEM — M81.0 AGE-RELATED OSTEOPOROSIS WITHOUT CURRENT PATHOLOGICAL FRACTURE: Chronic | Status: INACTIVE | Noted: 2018-05-29 | Resolved: 2024-07-01

## 2024-07-01 LAB
DIALYSIS INSTRUMENT RESULT - HEPATITIS B SURFACE ANTIGEN: NEGATIVE — SIGNIFICANT CHANGE UP
GLUCOSE BLDC GLUCOMTR-MCNC: 126 MG/DL — HIGH (ref 70–99)
GLUCOSE BLDC GLUCOMTR-MCNC: 128 MG/DL — HIGH (ref 70–99)
GLUCOSE BLDC GLUCOMTR-MCNC: 139 MG/DL — HIGH (ref 70–99)

## 2024-07-01 PROCEDURE — 84100 ASSAY OF PHOSPHORUS: CPT

## 2024-07-01 PROCEDURE — 83880 ASSAY OF NATRIURETIC PEPTIDE: CPT

## 2024-07-01 PROCEDURE — 82962 GLUCOSE BLOOD TEST: CPT

## 2024-07-01 PROCEDURE — 82803 BLOOD GASES ANY COMBINATION: CPT

## 2024-07-01 PROCEDURE — 84300 ASSAY OF URINE SODIUM: CPT

## 2024-07-01 PROCEDURE — 86706 HEP B SURFACE ANTIBODY: CPT

## 2024-07-01 PROCEDURE — 83735 ASSAY OF MAGNESIUM: CPT

## 2024-07-01 PROCEDURE — 87640 STAPH A DNA AMP PROBE: CPT

## 2024-07-01 PROCEDURE — 87340 HEPATITIS B SURFACE AG IA: CPT

## 2024-07-01 PROCEDURE — 85025 COMPLETE CBC W/AUTO DIFF WBC: CPT

## 2024-07-01 PROCEDURE — 87040 BLOOD CULTURE FOR BACTERIA: CPT

## 2024-07-01 PROCEDURE — 99285 EMERGENCY DEPT VISIT HI MDM: CPT

## 2024-07-01 PROCEDURE — 80053 COMPREHEN METABOLIC PANEL: CPT

## 2024-07-01 PROCEDURE — 84145 PROCALCITONIN (PCT): CPT

## 2024-07-01 PROCEDURE — 83605 ASSAY OF LACTIC ACID: CPT

## 2024-07-01 PROCEDURE — 87636 SARSCOV2 & INF A&B AMP PRB: CPT

## 2024-07-01 PROCEDURE — 96372 THER/PROPH/DIAG INJ SC/IM: CPT | Mod: XU

## 2024-07-01 PROCEDURE — 80048 BASIC METABOLIC PNL TOTAL CA: CPT

## 2024-07-01 PROCEDURE — 87641 MR-STAPH DNA AMP PROBE: CPT

## 2024-07-01 PROCEDURE — 85027 COMPLETE CBC AUTOMATED: CPT

## 2024-07-01 PROCEDURE — 96374 THER/PROPH/DIAG INJ IV PUSH: CPT

## 2024-07-01 PROCEDURE — 96375 TX/PRO/DX INJ NEW DRUG ADDON: CPT

## 2024-07-01 PROCEDURE — 93005 ELECTROCARDIOGRAM TRACING: CPT

## 2024-07-01 PROCEDURE — 93010 ELECTROCARDIOGRAM REPORT: CPT

## 2024-07-01 PROCEDURE — 99261: CPT

## 2024-07-01 PROCEDURE — 36415 COLL VENOUS BLD VENIPUNCTURE: CPT

## 2024-07-01 PROCEDURE — 71045 X-RAY EXAM CHEST 1 VIEW: CPT

## 2024-07-01 PROCEDURE — 92610 EVALUATE SWALLOWING FUNCTION: CPT

## 2024-07-01 PROCEDURE — 93458 L HRT ARTERY/VENTRICLE ANGIO: CPT | Mod: 26

## 2024-07-01 PROCEDURE — 83935 ASSAY OF URINE OSMOLALITY: CPT

## 2024-07-01 PROCEDURE — 86703 HIV-1/HIV-2 1 RESULT ANTBDY: CPT

## 2024-07-01 RX ORDER — FOLIC ACID
1 POWDER (GRAM) MISCELLANEOUS DAILY
Refills: 0 | Status: DISCONTINUED | OUTPATIENT
Start: 2024-07-01 | End: 2024-07-02

## 2024-07-01 RX ORDER — DEXTROSE MONOHYDRATE AND SODIUM CHLORIDE 5; .3 G/100ML; G/100ML
1000 INJECTION, SOLUTION INTRAVENOUS
Refills: 0 | Status: DISCONTINUED | OUTPATIENT
Start: 2024-07-01 | End: 2024-07-02

## 2024-07-01 RX ORDER — INSULIN GLARGINE 100 [IU]/ML
3 INJECTION, SOLUTION SUBCUTANEOUS AT BEDTIME
Refills: 0 | Status: DISCONTINUED | OUTPATIENT
Start: 2024-07-01 | End: 2024-07-02

## 2024-07-01 RX ORDER — METOPROLOL TARTRATE 50 MG
25 TABLET ORAL
Refills: 0 | Status: DISCONTINUED | OUTPATIENT
Start: 2024-07-01 | End: 2024-07-02

## 2024-07-01 RX ORDER — PANTOPRAZOLE SODIUM 40 MG/10ML
40 INJECTION, POWDER, FOR SOLUTION INTRAVENOUS
Refills: 0 | Status: DISCONTINUED | OUTPATIENT
Start: 2024-07-01 | End: 2024-07-02

## 2024-07-01 RX ORDER — INSULIN GLARGINE 100 [IU]/ML
11 INJECTION, SOLUTION SUBCUTANEOUS
Refills: 0 | DISCHARGE

## 2024-07-01 RX ORDER — LIPASE/PROTEASE/AMYLASE 4.5-25-20K
1 CAPSULE,DELAYED RELEASE (ENTERIC COATED) ORAL
Refills: 0 | DISCHARGE

## 2024-07-01 RX ORDER — REPAGLINIDE 2 MG
1 TABLET ORAL
Refills: 0 | DISCHARGE

## 2024-07-01 RX ORDER — EZETIMIBE AND SIMVASTATIN 10; 10 MG/1; MG/1
1 TABLET ORAL
Refills: 0 | DISCHARGE

## 2024-07-01 RX ORDER — DEXTROSE 30 % IN WATER 30 %
25 VIAL (ML) INTRAVENOUS ONCE
Refills: 0 | Status: DISCONTINUED | OUTPATIENT
Start: 2024-07-01 | End: 2024-07-02

## 2024-07-01 RX ORDER — DENOSUMAB 120 MG/1.7ML
60 INJECTION SUBCUTANEOUS
Refills: 0 | DISCHARGE

## 2024-07-01 RX ORDER — GLUCAGON HYDROCHLORIDE 1 MG/ML
1 INJECTION, POWDER, FOR SOLUTION INTRAMUSCULAR; INTRAVENOUS; SUBCUTANEOUS ONCE
Refills: 0 | Status: DISCONTINUED | OUTPATIENT
Start: 2024-07-01 | End: 2024-07-02

## 2024-07-01 RX ORDER — PANTOPRAZOLE SODIUM 40 MG/10ML
1 INJECTION, POWDER, FOR SOLUTION INTRAVENOUS
Refills: 0 | DISCHARGE

## 2024-07-01 RX ORDER — KETOTIFEN FUMARATE 0.35 MG/ML
1 SOLUTION/ DROPS OPHTHALMIC
Refills: 0 | Status: DISCONTINUED | OUTPATIENT
Start: 2024-07-01 | End: 2024-07-02

## 2024-07-01 RX ORDER — FERROUS SULFATE 325(65) MG
325 TABLET ORAL DAILY
Refills: 0 | Status: DISCONTINUED | OUTPATIENT
Start: 2024-07-01 | End: 2024-07-02

## 2024-07-01 RX ORDER — SODIUM CHLORIDE 0.9 % (FLUSH) 0.9 %
100 SYRINGE (ML) INJECTION
Refills: 0 | Status: DISCONTINUED | OUTPATIENT
Start: 2024-07-01 | End: 2024-07-02

## 2024-07-01 RX ORDER — DEXTROSE 30 % IN WATER 30 %
12.5 VIAL (ML) INTRAVENOUS ONCE
Refills: 0 | Status: DISCONTINUED | OUTPATIENT
Start: 2024-07-01 | End: 2024-07-02

## 2024-07-01 RX ORDER — LIPASE/PROTEASE/AMYLASE 16-48-48K
3 CAPSULE,DELAYED RELEASE (ENTERIC COATED) ORAL
Refills: 0 | DISCHARGE

## 2024-07-01 RX ORDER — OLOPATADINE HCL 0.1 %
1 DROPS OPHTHALMIC (EYE)
Refills: 0 | DISCHARGE

## 2024-07-01 RX ORDER — FERROUS SULFATE 325(65) MG
1 TABLET ORAL
Refills: 0 | DISCHARGE

## 2024-07-01 RX ORDER — ACETAMINOPHEN 325 MG
650 TABLET ORAL EVERY 6 HOURS
Refills: 0 | Status: DISCONTINUED | OUTPATIENT
Start: 2024-07-01 | End: 2024-07-02

## 2024-07-01 RX ORDER — SIMVASTATIN 40 MG
20 TABLET ORAL AT BEDTIME
Refills: 0 | Status: DISCONTINUED | OUTPATIENT
Start: 2024-07-01 | End: 2024-07-02

## 2024-07-01 RX ORDER — INSULIN LISPRO 100 [IU]/ML
INJECTION, SOLUTION SUBCUTANEOUS
Refills: 0 | Status: DISCONTINUED | OUTPATIENT
Start: 2024-07-01 | End: 2024-07-02

## 2024-07-01 RX ORDER — AMOXICILLIN/POTASSIUM CLAV 250-125 MG
1 TABLET ORAL
Refills: 0 | Status: DISCONTINUED | OUTPATIENT
Start: 2024-07-01 | End: 2024-07-01

## 2024-07-01 RX ORDER — SODIUM CHLORIDE 0.9 % (FLUSH) 0.9 %
3 SYRINGE (ML) INJECTION EVERY 8 HOURS
Refills: 0 | Status: DISCONTINUED | OUTPATIENT
Start: 2024-07-01 | End: 2024-07-02

## 2024-07-01 RX ORDER — INSULIN GLARGINE 100 [IU]/ML
9 INJECTION, SOLUTION SUBCUTANEOUS
Refills: 0 | DISCHARGE

## 2024-07-01 RX ORDER — INSULIN LISPRO 100 [IU]/ML
INJECTION, SOLUTION SUBCUTANEOUS AT BEDTIME
Refills: 0 | Status: DISCONTINUED | OUTPATIENT
Start: 2024-07-01 | End: 2024-07-02

## 2024-07-01 RX ORDER — ERYTHROPOIETIN 4000 [IU]/ML
10000 INJECTION, SOLUTION INTRAVENOUS; SUBCUTANEOUS
Refills: 0 | Status: DISCONTINUED | OUTPATIENT
Start: 2024-07-01 | End: 2024-07-02

## 2024-07-01 RX ORDER — HEPARIN SODIUM 50 [USP'U]/ML
5000 INJECTION, SOLUTION INTRAVENOUS EVERY 12 HOURS
Refills: 0 | Status: DISCONTINUED | OUTPATIENT
Start: 2024-07-02 | End: 2024-07-02

## 2024-07-01 RX ORDER — DEXTROSE MONOHYDRATE 100 MG/ML
125 INJECTION, SOLUTION INTRAVENOUS ONCE
Refills: 0 | Status: DISCONTINUED | OUTPATIENT
Start: 2024-07-01 | End: 2024-07-02

## 2024-07-01 RX ORDER — ACETAMINOPHEN 325 MG
2 TABLET ORAL
Refills: 0 | DISCHARGE

## 2024-07-01 RX ORDER — LIPASE/PROTEASE/AMYLASE 4.5-25-20K
1 CAPSULE,DELAYED RELEASE (ENTERIC COATED) ORAL
Refills: 0 | Status: DISCONTINUED | OUTPATIENT
Start: 2024-07-01 | End: 2024-07-02

## 2024-07-01 RX ORDER — DEXTROSE 30 % IN WATER 30 %
15 VIAL (ML) INTRAVENOUS ONCE
Refills: 0 | Status: DISCONTINUED | OUTPATIENT
Start: 2024-07-01 | End: 2024-07-02

## 2024-07-01 RX ORDER — LISINOPRIL 5 MG/1
2.5 TABLET ORAL DAILY
Refills: 0 | Status: DISCONTINUED | OUTPATIENT
Start: 2024-07-02 | End: 2024-07-02

## 2024-07-01 RX ORDER — AMOXICILLIN/POTASSIUM CLAV 250-125 MG
1 TABLET ORAL EVERY 12 HOURS
Refills: 0 | Status: DISCONTINUED | OUTPATIENT
Start: 2024-07-01 | End: 2024-07-02

## 2024-07-01 RX ADMIN — LISINOPRIL 2.5 MILLIGRAM(S): 5 TABLET ORAL at 05:27

## 2024-07-01 RX ADMIN — Medication 1 CAPSULE(S): at 08:22

## 2024-07-01 RX ADMIN — CLINDAMYCIN PHOSPHATE 100 MILLIGRAM(S): 150 INJECTION, SOLUTION INTRAVENOUS at 06:00

## 2024-07-01 RX ADMIN — ERYTHROPOIETIN 10000 UNIT(S): 4000 INJECTION, SOLUTION INTRAVENOUS; SUBCUTANEOUS at 19:15

## 2024-07-01 RX ADMIN — Medication 3 MILLILITER(S): at 15:50

## 2024-07-01 RX ADMIN — Medication 25 MILLIGRAM(S): at 05:28

## 2024-07-01 RX ADMIN — Medication 100 MILLIGRAM(S): at 05:27

## 2024-07-01 RX ADMIN — HEPARIN SODIUM 5000 UNIT(S): 50 INJECTION, SOLUTION INTRAVENOUS at 05:29

## 2024-07-01 RX ADMIN — Medication 1 APPLICATION(S): at 05:28

## 2024-07-01 RX ADMIN — PANTOPRAZOLE SODIUM 40 MILLIGRAM(S): 40 INJECTION, POWDER, FOR SOLUTION INTRAVENOUS at 06:00

## 2024-07-02 ENCOUNTER — TRANSCRIPTION ENCOUNTER (OUTPATIENT)
Age: 84
End: 2024-07-02

## 2024-07-02 VITALS
DIASTOLIC BLOOD PRESSURE: 64 MMHG | RESPIRATION RATE: 18 BRPM | OXYGEN SATURATION: 100 % | HEART RATE: 82 BPM | TEMPERATURE: 98 F | SYSTOLIC BLOOD PRESSURE: 148 MMHG

## 2024-07-02 LAB
ALBUMIN SERPL ELPH-MCNC: 3.1 G/DL — LOW (ref 3.3–5)
ALP SERPL-CCNC: 115 U/L — SIGNIFICANT CHANGE UP (ref 40–120)
ALT FLD-CCNC: 7 U/L — SIGNIFICANT CHANGE UP (ref 4–33)
ANION GAP SERPL CALC-SCNC: 13 MMOL/L — SIGNIFICANT CHANGE UP (ref 7–14)
AST SERPL-CCNC: 11 U/L — SIGNIFICANT CHANGE UP (ref 4–32)
BASOPHILS # BLD AUTO: 0.02 K/UL — SIGNIFICANT CHANGE UP (ref 0–0.2)
BASOPHILS NFR BLD AUTO: 0.4 % — SIGNIFICANT CHANGE UP (ref 0–2)
BILIRUB SERPL-MCNC: 0.3 MG/DL — SIGNIFICANT CHANGE UP (ref 0.2–1.2)
BUN SERPL-MCNC: 11 MG/DL — SIGNIFICANT CHANGE UP (ref 7–23)
CALCIUM SERPL-MCNC: 9 MG/DL — SIGNIFICANT CHANGE UP (ref 8.4–10.5)
CHLORIDE SERPL-SCNC: 98 MMOL/L — SIGNIFICANT CHANGE UP (ref 98–107)
CO2 SERPL-SCNC: 25 MMOL/L — SIGNIFICANT CHANGE UP (ref 22–31)
CREAT SERPL-MCNC: 3.09 MG/DL — HIGH (ref 0.5–1.3)
EGFR: 14 ML/MIN/1.73M2 — LOW
EOSINOPHIL # BLD AUTO: 0.06 K/UL — SIGNIFICANT CHANGE UP (ref 0–0.5)
EOSINOPHIL NFR BLD AUTO: 1.3 % — SIGNIFICANT CHANGE UP (ref 0–6)
GLUCOSE BLDC GLUCOMTR-MCNC: 137 MG/DL — HIGH (ref 70–99)
GLUCOSE SERPL-MCNC: 80 MG/DL — SIGNIFICANT CHANGE UP (ref 70–99)
HBV CORE AB SER-ACNC: SIGNIFICANT CHANGE UP
HBV CORE IGM SER-ACNC: SIGNIFICANT CHANGE UP
HBV SURFACE AB SER-ACNC: 43.3 MIU/ML — SIGNIFICANT CHANGE UP
HBV SURFACE AG SER-ACNC: SIGNIFICANT CHANGE UP
HCT VFR BLD CALC: 31.9 % — LOW (ref 34.5–45)
HCV AB S/CO SERPL IA: 0.08 S/CO — SIGNIFICANT CHANGE UP (ref 0–0.99)
HCV AB SERPL-IMP: SIGNIFICANT CHANGE UP
HGB BLD-MCNC: 9.9 G/DL — LOW (ref 11.5–15.5)
IANC: 2.97 K/UL — SIGNIFICANT CHANGE UP (ref 1.8–7.4)
IMM GRANULOCYTES NFR BLD AUTO: 0.2 % — SIGNIFICANT CHANGE UP (ref 0–0.9)
LYMPHOCYTES # BLD AUTO: 0.99 K/UL — LOW (ref 1–3.3)
LYMPHOCYTES # BLD AUTO: 21.8 % — SIGNIFICANT CHANGE UP (ref 13–44)
MAGNESIUM SERPL-MCNC: 2 MG/DL — SIGNIFICANT CHANGE UP (ref 1.6–2.6)
MCHC RBC-ENTMCNC: 26.6 PG — LOW (ref 27–34)
MCHC RBC-ENTMCNC: 31 GM/DL — LOW (ref 32–36)
MCV RBC AUTO: 85.8 FL — SIGNIFICANT CHANGE UP (ref 80–100)
MONOCYTES # BLD AUTO: 0.5 K/UL — SIGNIFICANT CHANGE UP (ref 0–0.9)
MONOCYTES NFR BLD AUTO: 11 % — SIGNIFICANT CHANGE UP (ref 2–14)
MRSA PCR RESULT.: SIGNIFICANT CHANGE UP
NEUTROPHILS # BLD AUTO: 2.97 K/UL — SIGNIFICANT CHANGE UP (ref 1.8–7.4)
NEUTROPHILS NFR BLD AUTO: 65.3 % — SIGNIFICANT CHANGE UP (ref 43–77)
NRBC # BLD: 0 /100 WBCS — SIGNIFICANT CHANGE UP (ref 0–0)
NRBC # FLD: 0 K/UL — SIGNIFICANT CHANGE UP (ref 0–0)
PHOSPHATE SERPL-MCNC: 3 MG/DL — SIGNIFICANT CHANGE UP (ref 2.5–4.5)
PLATELET # BLD AUTO: 183 K/UL — SIGNIFICANT CHANGE UP (ref 150–400)
POTASSIUM SERPL-MCNC: 3.9 MMOL/L — SIGNIFICANT CHANGE UP (ref 3.5–5.3)
POTASSIUM SERPL-SCNC: 3.9 MMOL/L — SIGNIFICANT CHANGE UP (ref 3.5–5.3)
PROT SERPL-MCNC: 5.6 G/DL — LOW (ref 6–8.3)
RBC # BLD: 3.72 M/UL — LOW (ref 3.8–5.2)
RBC # FLD: 14.6 % — HIGH (ref 10.3–14.5)
S AUREUS DNA NOSE QL NAA+PROBE: SIGNIFICANT CHANGE UP
SODIUM SERPL-SCNC: 136 MMOL/L — SIGNIFICANT CHANGE UP (ref 135–145)
WBC # BLD: 4.55 K/UL — SIGNIFICANT CHANGE UP (ref 3.8–10.5)
WBC # FLD AUTO: 4.55 K/UL — SIGNIFICANT CHANGE UP (ref 3.8–10.5)

## 2024-07-02 PROCEDURE — 99232 SBSQ HOSP IP/OBS MODERATE 35: CPT

## 2024-07-02 RX ORDER — AMOXICILLIN/POTASSIUM CLAV 250-125 MG
1 TABLET ORAL
Qty: 6 | Refills: 0
Start: 2024-07-02 | End: 2024-07-04

## 2024-07-02 RX ORDER — LISINOPRIL 5 MG/1
1 TABLET ORAL
Qty: 30 | Refills: 0
Start: 2024-07-02 | End: 2024-07-31

## 2024-07-02 RX ORDER — FOLIC ACID
1 POWDER (GRAM) MISCELLANEOUS
Qty: 0 | Refills: 0 | DISCHARGE
Start: 2024-07-02

## 2024-07-02 RX ORDER — FERROUS SULFATE 325(65) MG
1 TABLET ORAL
Qty: 0 | Refills: 0 | DISCHARGE
Start: 2024-07-02

## 2024-07-02 RX ORDER — METOPROLOL TARTRATE 50 MG
1 TABLET ORAL
Qty: 60 | Refills: 0
Start: 2024-07-02 | End: 2024-07-31

## 2024-07-02 RX ORDER — BIOTIN/FOLIC AC/VIT BCOMP,C/ZN 3MG-0.8MG
1 TABLET ORAL
Refills: 0 | DISCHARGE

## 2024-07-02 RX ADMIN — Medication 1 CAPSULE(S): at 09:57

## 2024-07-02 RX ADMIN — LISINOPRIL 2.5 MILLIGRAM(S): 5 TABLET ORAL at 05:58

## 2024-07-02 RX ADMIN — PANTOPRAZOLE SODIUM 40 MILLIGRAM(S): 40 INJECTION, POWDER, FOR SOLUTION INTRAVENOUS at 05:58

## 2024-07-02 RX ADMIN — HEPARIN SODIUM 5000 UNIT(S): 50 INJECTION, SOLUTION INTRAVENOUS at 05:58

## 2024-07-02 RX ADMIN — Medication 1 CAPSULE(S): at 00:07

## 2024-07-02 RX ADMIN — Medication 3 MILLILITER(S): at 12:59

## 2024-07-02 RX ADMIN — Medication 1 TABLET(S): at 11:13

## 2024-07-02 RX ADMIN — Medication 20 MILLIGRAM(S): at 00:08

## 2024-07-02 RX ADMIN — Medication 3 MILLILITER(S): at 06:58

## 2024-07-02 RX ADMIN — Medication 25 MILLIGRAM(S): at 17:25

## 2024-07-02 RX ADMIN — Medication 1 TABLET(S): at 17:26

## 2024-07-02 RX ADMIN — Medication 1 CAPSULE(S): at 12:50

## 2024-07-02 RX ADMIN — Medication 1 MILLIGRAM(S): at 11:14

## 2024-07-02 RX ADMIN — INSULIN GLARGINE 3 UNIT(S): 100 INJECTION, SOLUTION SUBCUTANEOUS at 00:08

## 2024-07-02 RX ADMIN — Medication 1 APPLICATION(S): at 11:20

## 2024-07-02 RX ADMIN — Medication 325 MILLIGRAM(S): at 11:14

## 2024-07-02 RX ADMIN — Medication 25 MILLIGRAM(S): at 05:58

## 2024-07-03 LAB
CULTURE RESULTS: SIGNIFICANT CHANGE UP
CULTURE RESULTS: SIGNIFICANT CHANGE UP
SPECIMEN SOURCE: SIGNIFICANT CHANGE UP
SPECIMEN SOURCE: SIGNIFICANT CHANGE UP

## 2024-09-05 NOTE — ED PROVIDER NOTE - CONSTITUTIONAL APPEARANCE HYGIENE, MLM
----- Message from Amisha Onofre sent at 9/5/2024  3:10 PM CDT -----  Regarding: sports pt  Patient called 9/5/24 requesting appt for shoulder injections. She was last seen 7/22/24 I explained to pt that she can not have another injection until October. Please call pt cause she does not understand. She would like another injection that Dr Williamson told her she can have. Please call pt to discuss further and let Front office know. Thanks  
Advised pt that I will speak with providers regarding her next visit. Pt verbally understood.   
well appearing

## 2024-09-19 ENCOUNTER — APPOINTMENT (OUTPATIENT)
Dept: PEDIATRIC MEDICAL GENETICS | Facility: TELEHEALTH | Age: 84
End: 2024-09-19
Payer: MEDICARE

## 2024-09-19 ENCOUNTER — APPOINTMENT (OUTPATIENT)
Age: 84
End: 2024-09-19

## 2024-09-19 PROCEDURE — 99215 OFFICE O/P EST HI 40 MIN: CPT | Mod: 95

## 2024-09-25 NOTE — REASON FOR VISIT
[Family Member] : family member [FreeTextEntry3] : Salena is a 84 yo female referred by Dr. 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    Her daughter, Tisha, was present for the appointment. Mar Holguin MS, VANNESSA was present at the appointment.

## 2024-09-25 NOTE — PLAN
[TextEntry] : - Obtain genetic testing report  - Salena's children should have their Ferritin levels evaluated. If they are high, then we recommend seeing them in clinic for follow up genetic testing.

## 2024-09-25 NOTE — HISTORY OF PRESENT ILLNESS
[FreeTextEntry1] : Anthony is a 84 yo female with a history of dysphagia. As a part of the GI work up for weight loss and dysphagia, Dr. Wall did blood work and Anthony's daughter reported that the blood test came back positive for hemochromatosis. Dr. Wall referred anthony to Dr. Shelby and per Dr. Shelby's note Anthony was found to be homozygous for variants in the HFE gene (H63D) associated with hereditary hemochromatosis. No report available.   Dr. Shelby's office is faxing over the pathology report from Dr. Pimentel that has the HFE genetic testing. The report will be uploaded to Anthony's chart.

## 2025-01-23 ENCOUNTER — INPATIENT (INPATIENT)
Facility: HOSPITAL | Age: 85
LOS: 5 days | Discharge: HOME | DRG: 204 | End: 2025-01-29
Attending: INTERNAL MEDICINE | Admitting: INTERNAL MEDICINE
Payer: COMMERCIAL

## 2025-01-23 VITALS
SYSTOLIC BLOOD PRESSURE: 93 MMHG | WEIGHT: 139.99 LBS | RESPIRATION RATE: 20 BRPM | TEMPERATURE: 97 F | OXYGEN SATURATION: 97 % | HEIGHT: 59 IN | HEART RATE: 70 BPM | DIASTOLIC BLOOD PRESSURE: 56 MMHG

## 2025-01-23 DIAGNOSIS — J10.1 INFLUENZA DUE TO OTHER IDENTIFIED INFLUENZA VIRUS WITH OTHER RESPIRATORY MANIFESTATIONS: ICD-10-CM

## 2025-01-23 DIAGNOSIS — I10 ESSENTIAL (PRIMARY) HYPERTENSION: ICD-10-CM

## 2025-01-23 DIAGNOSIS — Z98.890 OTHER SPECIFIED POSTPROCEDURAL STATES: Chronic | ICD-10-CM

## 2025-01-23 DIAGNOSIS — N18.6 END STAGE RENAL DISEASE: ICD-10-CM

## 2025-01-23 DIAGNOSIS — E11.9 TYPE 2 DIABETES MELLITUS WITHOUT COMPLICATIONS: ICD-10-CM

## 2025-01-23 DIAGNOSIS — E78.5 HYPERLIPIDEMIA, UNSPECIFIED: ICD-10-CM

## 2025-01-23 DIAGNOSIS — Z29.9 ENCOUNTER FOR PROPHYLACTIC MEASURES, UNSPECIFIED: ICD-10-CM

## 2025-01-23 DIAGNOSIS — Z98.41 CATARACT EXTRACTION STATUS, RIGHT EYE: Chronic | ICD-10-CM

## 2025-01-23 DIAGNOSIS — I50.9 HEART FAILURE, UNSPECIFIED: ICD-10-CM

## 2025-01-23 DIAGNOSIS — R06.02 SHORTNESS OF BREATH: ICD-10-CM

## 2025-01-23 DIAGNOSIS — J44.1 CHRONIC OBSTRUCTIVE PULMONARY DISEASE WITH (ACUTE) EXACERBATION: ICD-10-CM

## 2025-01-23 DIAGNOSIS — Z96.652 PRESENCE OF LEFT ARTIFICIAL KNEE JOINT: Chronic | ICD-10-CM

## 2025-01-23 PROBLEM — M19.90 UNSPECIFIED OSTEOARTHRITIS, UNSPECIFIED SITE: Chronic | Status: ACTIVE | Noted: 2024-07-01

## 2025-01-23 PROBLEM — E83.119 HEMOCHROMATOSIS, UNSPECIFIED: Chronic | Status: ACTIVE | Noted: 2024-07-01

## 2025-01-23 PROBLEM — I47.20 VENTRICULAR TACHYCARDIA, UNSPECIFIED: Chronic | Status: ACTIVE | Noted: 2024-07-01

## 2025-01-23 LAB
ALBUMIN SERPL ELPH-MCNC: 4 G/DL — SIGNIFICANT CHANGE UP (ref 3.5–5)
ALP SERPL-CCNC: 112 U/L — SIGNIFICANT CHANGE UP (ref 40–120)
ALT FLD-CCNC: 42 U/L DA — SIGNIFICANT CHANGE UP (ref 10–60)
ANION GAP SERPL CALC-SCNC: 18 MMOL/L — HIGH (ref 5–17)
APTT BLD: 32.1 SEC — SIGNIFICANT CHANGE UP (ref 24.5–35.6)
AST SERPL-CCNC: 76 U/L — HIGH (ref 10–40)
BASE EXCESS BLDV CALC-SCNC: 0.1 MMOL/L — SIGNIFICANT CHANGE UP
BASE EXCESS BLDV CALC-SCNC: 5.9 MMOL/L — SIGNIFICANT CHANGE UP
BASOPHILS # BLD AUTO: 0.01 K/UL — SIGNIFICANT CHANGE UP (ref 0–0.2)
BASOPHILS NFR BLD AUTO: 0.2 % — SIGNIFICANT CHANGE UP (ref 0–2)
BILIRUB SERPL-MCNC: 0.9 MG/DL — SIGNIFICANT CHANGE UP (ref 0.2–1.2)
BUN SERPL-MCNC: 25 MG/DL — HIGH (ref 7–18)
CA-I SERPL-SCNC: SIGNIFICANT CHANGE UP MMOL/L (ref 1.15–1.33)
CALCIUM SERPL-MCNC: 8.5 MG/DL — SIGNIFICANT CHANGE UP (ref 8.4–10.5)
CHLORIDE SERPL-SCNC: 93 MMOL/L — LOW (ref 96–108)
CO2 SERPL-SCNC: 25 MMOL/L — SIGNIFICANT CHANGE UP (ref 22–31)
CREAT SERPL-MCNC: 5.69 MG/DL — HIGH (ref 0.5–1.3)
EGFR: 7 ML/MIN/1.73M2 — LOW
EOSINOPHIL # BLD AUTO: 0.01 K/UL — SIGNIFICANT CHANGE UP (ref 0–0.5)
EOSINOPHIL NFR BLD AUTO: 0.2 % — SIGNIFICANT CHANGE UP (ref 0–6)
FLUAV AG NPH QL: DETECTED
FLUBV AG NPH QL: SIGNIFICANT CHANGE UP
GAS PNL BLDA: SIGNIFICANT CHANGE UP
GAS PNL BLDV: 135 MMOL/L — LOW (ref 136–145)
GAS PNL BLDV: SIGNIFICANT CHANGE UP
GLUCOSE BLDC GLUCOMTR-MCNC: 265 MG/DL — HIGH (ref 70–99)
GLUCOSE SERPL-MCNC: 237 MG/DL — HIGH (ref 70–99)
HCO3 BLDV-SCNC: 19 MMOL/L — LOW (ref 22–29)
HCO3 BLDV-SCNC: 30 MMOL/L — HIGH (ref 22–29)
HCT VFR BLD CALC: 36.7 % — SIGNIFICANT CHANGE UP (ref 34.5–45)
HGB BLD-MCNC: 12.3 G/DL — SIGNIFICANT CHANGE UP (ref 11.5–15.5)
IMM GRANULOCYTES NFR BLD AUTO: 0.8 % — SIGNIFICANT CHANGE UP (ref 0–0.9)
INR BLD: 0.98 RATIO — SIGNIFICANT CHANGE UP (ref 0.85–1.16)
LACTATE BLDV-MCNC: 3.6 MMOL/L — HIGH (ref 0.5–2)
LYMPHOCYTES # BLD AUTO: 0.93 K/UL — LOW (ref 1–3.3)
LYMPHOCYTES # BLD AUTO: 17.8 % — SIGNIFICANT CHANGE UP (ref 13–44)
MCHC RBC-ENTMCNC: 30.5 PG — SIGNIFICANT CHANGE UP (ref 27–34)
MCHC RBC-ENTMCNC: 33.5 G/DL — SIGNIFICANT CHANGE UP (ref 32–36)
MCV RBC AUTO: 91.1 FL — SIGNIFICANT CHANGE UP (ref 80–100)
MONOCYTES # BLD AUTO: 0.46 K/UL — SIGNIFICANT CHANGE UP (ref 0–0.9)
MONOCYTES NFR BLD AUTO: 8.8 % — SIGNIFICANT CHANGE UP (ref 2–14)
NEUTROPHILS # BLD AUTO: 3.78 K/UL — SIGNIFICANT CHANGE UP (ref 1.8–7.4)
NEUTROPHILS NFR BLD AUTO: 72.2 % — SIGNIFICANT CHANGE UP (ref 43–77)
NRBC # BLD: 0 /100 WBCS — SIGNIFICANT CHANGE UP (ref 0–0)
NRBC BLD-RTO: 0 /100 WBCS — SIGNIFICANT CHANGE UP (ref 0–0)
NT-PROBNP SERPL-SCNC: HIGH PG/ML (ref 0–450)
PCO2 BLDV: 18 MMHG — LOW (ref 39–42)
PCO2 BLDV: 39 MMHG — SIGNIFICANT CHANGE UP (ref 39–42)
PH BLDV: 7.49 — HIGH (ref 7.32–7.43)
PH BLDV: 7.64 — HIGH (ref 7.32–7.43)
PLATELET # BLD AUTO: 152 K/UL — SIGNIFICANT CHANGE UP (ref 150–400)
PO2 BLDV: 21 MMHG — SIGNIFICANT CHANGE UP
PO2 BLDV: 32 MMHG — SIGNIFICANT CHANGE UP
POTASSIUM BLDV-SCNC: 2.6 MMOL/L — CRITICAL LOW (ref 3.5–5.1)
POTASSIUM SERPL-MCNC: 3.3 MMOL/L — LOW (ref 3.5–5.3)
POTASSIUM SERPL-SCNC: 3.3 MMOL/L — LOW (ref 3.5–5.3)
PROT SERPL-MCNC: 7.6 G/DL — SIGNIFICANT CHANGE UP (ref 6–8.3)
PROTHROM AB SERPL-ACNC: 11.4 SEC — SIGNIFICANT CHANGE UP (ref 9.9–13.4)
RBC # BLD: 4.03 M/UL — SIGNIFICANT CHANGE UP (ref 3.8–5.2)
RBC # FLD: 13 % — SIGNIFICANT CHANGE UP (ref 10.3–14.5)
RSV RNA NPH QL NAA+NON-PROBE: SIGNIFICANT CHANGE UP
SAO2 % BLDV: 44 % — SIGNIFICANT CHANGE UP
SAO2 % BLDV: 46.8 % — SIGNIFICANT CHANGE UP
SARS-COV-2 RNA SPEC QL NAA+PROBE: SIGNIFICANT CHANGE UP
SODIUM SERPL-SCNC: 136 MMOL/L — SIGNIFICANT CHANGE UP (ref 135–145)
TROPONIN I, HIGH SENSITIVITY RESULT: 49.2 NG/L — SIGNIFICANT CHANGE UP
WBC # BLD: 5.23 K/UL — SIGNIFICANT CHANGE UP (ref 3.8–10.5)
WBC # FLD AUTO: 5.23 K/UL — SIGNIFICANT CHANGE UP (ref 3.8–10.5)

## 2025-01-23 PROCEDURE — 99285 EMERGENCY DEPT VISIT HI MDM: CPT

## 2025-01-23 PROCEDURE — 93010 ELECTROCARDIOGRAM REPORT: CPT | Mod: 76

## 2025-01-23 PROCEDURE — 71045 X-RAY EXAM CHEST 1 VIEW: CPT | Mod: 26

## 2025-01-23 RX ORDER — PANTOPRAZOLE 20 MG/1
40 TABLET, DELAYED RELEASE ORAL
Refills: 0 | Status: DISCONTINUED | OUTPATIENT
Start: 2025-01-23 | End: 2025-01-29

## 2025-01-23 RX ORDER — OSELTAMIVIR PHOSPHATE 75 MG/1
30 CAPSULE ORAL
Refills: 0 | Status: DISCONTINUED | OUTPATIENT
Start: 2025-01-23 | End: 2025-01-23

## 2025-01-23 RX ORDER — IPRATROPIUM BROMIDE AND ALBUTEROL SULFATE .5; 2.5 MG/3ML; MG/3ML
3 SOLUTION RESPIRATORY (INHALATION) ONCE
Refills: 0 | Status: COMPLETED | OUTPATIENT
Start: 2025-01-23 | End: 2025-01-23

## 2025-01-23 RX ORDER — ALBUTEROL 90 MCG
2 AEROSOL REFILL (GRAM) INHALATION EVERY 6 HOURS
Refills: 0 | Status: DISCONTINUED | OUTPATIENT
Start: 2025-01-23 | End: 2025-01-29

## 2025-01-23 RX ORDER — FLUTICASONE PROPIONATE AND SALMETEROL 113; 14 UG/1; UG/1
1 POWDER, METERED RESPIRATORY (INHALATION)
Refills: 0 | Status: DISCONTINUED | OUTPATIENT
Start: 2025-01-23 | End: 2025-01-29

## 2025-01-23 RX ORDER — INSULIN GLARGINE-YFGN 100 [IU]/ML
6 INJECTION, SOLUTION SUBCUTANEOUS AT BEDTIME
Refills: 0 | Status: DISCONTINUED | OUTPATIENT
Start: 2025-01-23 | End: 2025-01-25

## 2025-01-23 RX ORDER — AZITHROMYCIN DIHYDRATE 500 MG/1
500 TABLET, FILM COATED ORAL EVERY 24 HOURS
Refills: 0 | Status: DISCONTINUED | OUTPATIENT
Start: 2025-01-23 | End: 2025-01-29

## 2025-01-23 RX ORDER — ACETAMINOPHEN 160 MG/5ML
650 SUSPENSION ORAL EVERY 6 HOURS
Refills: 0 | Status: DISCONTINUED | OUTPATIENT
Start: 2025-01-23 | End: 2025-01-29

## 2025-01-23 RX ORDER — METHYLPREDNISOLONE ACETATE 40 MG/ML
40 VIAL (ML) INJECTION DAILY
Refills: 0 | Status: DISCONTINUED | OUTPATIENT
Start: 2025-01-24 | End: 2025-01-28

## 2025-01-23 RX ORDER — ATORVASTATIN CALCIUM 80 MG/1
10 TABLET, FILM COATED ORAL AT BEDTIME
Refills: 0 | Status: DISCONTINUED | OUTPATIENT
Start: 2025-01-23 | End: 2025-01-29

## 2025-01-23 RX ORDER — POTASSIUM CHLORIDE 750 MG/1
10 TABLET, EXTENDED RELEASE ORAL ONCE
Refills: 0 | Status: COMPLETED | OUTPATIENT
Start: 2025-01-23 | End: 2025-01-23

## 2025-01-23 RX ORDER — OSELTAMIVIR PHOSPHATE 75 MG/1
30 CAPSULE ORAL
Refills: 0 | Status: COMPLETED | OUTPATIENT
Start: 2025-01-23 | End: 2025-01-28

## 2025-01-23 RX ORDER — IPRATROPIUM BROMIDE AND ALBUTEROL SULFATE .5; 2.5 MG/3ML; MG/3ML
3 SOLUTION RESPIRATORY (INHALATION) EVERY 6 HOURS
Refills: 0 | Status: DISCONTINUED | OUTPATIENT
Start: 2025-01-23 | End: 2025-01-29

## 2025-01-23 RX ORDER — LOSARTAN POTASSIUM 100 MG
25 TABLET ORAL DAILY
Refills: 0 | Status: DISCONTINUED | OUTPATIENT
Start: 2025-01-24 | End: 2025-01-29

## 2025-01-23 RX ORDER — LIPASE/PROTEASE/AMYLASE 4K-25K-20K
1 CAPSULE,DELAYED RELEASE (ENTERIC COATED) ORAL
Refills: 0 | Status: DISCONTINUED | OUTPATIENT
Start: 2025-01-23 | End: 2025-01-29

## 2025-01-23 RX ORDER — CALCIUM ACETATE 667 MG/1
667 CAPSULE ORAL
Refills: 0 | Status: DISCONTINUED | OUTPATIENT
Start: 2025-01-23 | End: 2025-01-29

## 2025-01-23 RX ORDER — LOSARTAN POTASSIUM 100 MG
1 TABLET ORAL
Refills: 0 | DISCHARGE

## 2025-01-23 RX ORDER — EZETIMIBE 10 MG
10 TABLET ORAL DAILY
Refills: 0 | Status: DISCONTINUED | OUTPATIENT
Start: 2025-01-23 | End: 2025-01-29

## 2025-01-23 RX ORDER — METHYLPREDNISOLONE ACETATE 40 MG/ML
125 VIAL (ML) INJECTION ONCE
Refills: 0 | Status: COMPLETED | OUTPATIENT
Start: 2025-01-23 | End: 2025-01-23

## 2025-01-23 RX ORDER — CALCIUM ACETATE 667 MG/1
1 CAPSULE ORAL
Refills: 2 | DISCHARGE

## 2025-01-23 RX ORDER — HEPARIN SODIUM,PORCINE 10000/ML
5000 VIAL (ML) INJECTION EVERY 12 HOURS
Refills: 0 | Status: DISCONTINUED | OUTPATIENT
Start: 2025-01-23 | End: 2025-01-29

## 2025-01-23 RX ORDER — METOPROLOL SUCCINATE 25 MG
0 TABLET, EXTENDED RELEASE 24 HR ORAL
Qty: 0 | Refills: 0 | DISCHARGE

## 2025-01-23 RX ORDER — METOPROLOL SUCCINATE 25 MG
25 TABLET, EXTENDED RELEASE 24 HR ORAL
Refills: 0 | Status: DISCONTINUED | OUTPATIENT
Start: 2025-01-24 | End: 2025-01-29

## 2025-01-23 RX ORDER — INSULIN LISPRO 100/ML
VIAL (ML) SUBCUTANEOUS
Refills: 0 | Status: DISCONTINUED | OUTPATIENT
Start: 2025-01-23 | End: 2025-01-25

## 2025-01-23 RX ORDER — CEFTRIAXONE 250 MG/1
1000 INJECTION, POWDER, FOR SOLUTION INTRAMUSCULAR; INTRAVENOUS EVERY 24 HOURS
Refills: 0 | Status: COMPLETED | OUTPATIENT
Start: 2025-01-23 | End: 2025-01-27

## 2025-01-23 RX ORDER — BACTERIOSTATIC SODIUM CHLORIDE 0.9 %
100 VIAL (ML) INJECTION ONCE
Refills: 0 | Status: COMPLETED | OUTPATIENT
Start: 2025-01-23 | End: 2025-01-23

## 2025-01-23 RX ORDER — INSULIN LISPRO 100/ML
VIAL (ML) SUBCUTANEOUS AT BEDTIME
Refills: 0 | Status: DISCONTINUED | OUTPATIENT
Start: 2025-01-23 | End: 2025-01-25

## 2025-01-23 RX ADMIN — IPRATROPIUM BROMIDE AND ALBUTEROL SULFATE 3 MILLILITER(S): .5; 2.5 SOLUTION RESPIRATORY (INHALATION) at 22:29

## 2025-01-23 RX ADMIN — FLUTICASONE PROPIONATE AND SALMETEROL 1 DOSE(S): 113; 14 POWDER, METERED RESPIRATORY (INHALATION) at 22:30

## 2025-01-23 RX ADMIN — IPRATROPIUM BROMIDE AND ALBUTEROL SULFATE 3 MILLILITER(S): .5; 2.5 SOLUTION RESPIRATORY (INHALATION) at 18:05

## 2025-01-23 RX ADMIN — Medication 200 MILLILITER(S): at 13:40

## 2025-01-23 RX ADMIN — Medication 1: at 22:26

## 2025-01-23 RX ADMIN — Medication 125 MILLIGRAM(S): at 18:08

## 2025-01-23 RX ADMIN — POTASSIUM CHLORIDE 100 MILLIEQUIVALENT(S): 750 TABLET, EXTENDED RELEASE ORAL at 22:48

## 2025-01-23 RX ADMIN — Medication 1 CAPSULE(S): at 23:47

## 2025-01-23 RX ADMIN — ATORVASTATIN CALCIUM 10 MILLIGRAM(S): 80 TABLET, FILM COATED ORAL at 22:40

## 2025-01-23 RX ADMIN — INSULIN GLARGINE-YFGN 6 UNIT(S): 100 INJECTION, SOLUTION SUBCUTANEOUS at 22:00

## 2025-01-23 NOTE — H&P ADULT - PROBLEM SELECTOR PLAN 5
h/o DM on repaglenide and trujeo 9U (tues, thur, sun) 11U (Mon, Wed, Fri, Sat   - hold oral dm meds  -f/u A1c  -c/w lantus 6U qhs + low sliding scale insulin  -Adjust insulin as indicated  -FS ACHS h/o DM on repaglinide and Toujeo 9U (tues, thur, sun) 11U (Mon, Wed, Fri, Sat   - hold oral dm meds  -f/u A1c  -c/w lantus 6U qhs + low sliding scale insulin  -Adjust insulin as indicated  -FS ACHS

## 2025-01-23 NOTE — ED PROVIDER NOTE - NS ED MD TWO NIGHTS YN
From: Toy Ocampo  To: Vanessa Abdi  Sent: 8/21/2023 11:36 PM CDT  Subject: B-3 speech evaluation (Vianey)    Hello,    I was curious if you knew how long it takes to get a phone call from B-3 speech evaluation (Vianey). Our appointment with Dr. Abdi was 8/11 and we haven't been contacted. Thank you.    Chely Ocampo    Yes

## 2025-01-23 NOTE — ED ADULT NURSE REASSESSMENT NOTE - NS ED NURSE REASSESS COMMENT FT1
Covering RN VPS: Informed by Tele room Pt was noted to have heart block on cardiac monitor. MD Jones made aware. Repeat EKG performed and given to MD Jones. Pt is A&O x 3, denies pain/discomfort. Sinus rhythm  noted on cardiac monitor with HR 71. No distress noted.

## 2025-01-23 NOTE — H&P ADULT - PROBLEM SELECTOR PLAN 7
h/o HLD on atorvastatin/ezetimibe   -c/w home med  -f/u lipid profile  -calculate ASCVD risk  -DASH diet

## 2025-01-23 NOTE — H&P ADULT - PROBLEM SELECTOR PLAN 2
as above   start tamiflu 30mg every other day (ESRD dose adjustment) as above   start tamiflu 30mg every other day (ESRD dose adjustment) x 5 days

## 2025-01-23 NOTE — H&P ADULT - NSHPREVIEWOFSYSTEMS_GEN_ALL_CORE
REVIEW OF SYSTEMS:    CONSTITUTIONAL: +weakness, no fevers or chills  EYES/ENT: No visual changes;  No vertigo or throat pain   NECK: No pain or stiffness  RESPIRATORY: +dry cough, no wheezing, no hemoptysis; +++shortness of breath  CARDIOVASCULAR: No chest pain or palpitations  GASTROINTESTINAL: No abdominal or epigastric pain. +nausea, no vomiting, or hematemesis; +diarrhea, no constipation. No melena or hematochezia.  GENITOURINARY: No dysuria, frequency or hematuria  NEUROLOGICAL: No numbness or weakness  SKIN: No itching, burning, rashes, or lesions

## 2025-01-23 NOTE — H&P ADULT - PROBLEM SELECTOR PLAN 6
h/o HTN  -c/w home meds   -monitor BP  -adjust antihypertensive meds as appropriate h/o HTN on losartan and metoprolol  -c/w home meds   -monitor BP  -adjust antihypertensive meds as appropriate

## 2025-01-23 NOTE — ED PROVIDER NOTE - CLINICAL SUMMARY MEDICAL DECISION MAKING FREE TEXT BOX
patient presenting shortness of breath preceded by cough will obtain labs chest x-ray assess fluid status assess for pneumonia ED observation reassess

## 2025-01-23 NOTE — ED ADULT TRIAGE NOTE - BMI (KG/M2)
Patient Education:   Diagnosis: Colon cancer  Regimen: FOLFOX    ECO- no physical strenuous activity, but ambulatory and able to carry out light or sedentary work    Patient received general instructions regarding infusion room.    Patient confirms that he has received written information on diagnosis. Yes  Patient confirms that he  has received  a copy of Chemotherapy Consent and verbalizes understanding of treatment plan. Yes    If patient is receiving oral chemotherapy, adherence was discussed including time, dose, and route of chemotherapy. Proper handling of chemotherapy at home was also discussed.    Reviewed side effects including: nausea and vomiting, diarrhea and constipation, mucositis, fluids and dehydration, appetite and taste changes, sexuality, neuropathy, mood changes, skin and nail changes, myelosuppression, sensitivity reactions; symptoms and management, fatigue, muscle, joint, bone pain, fever, antiemetics, bruising or bleeding, drug specific infusion reactions and increased sensitivity to sun  Discussed access devices: Port    New prescriptions were given and reviewed. (See Medications)  Current medications were reviewed and verified. (See Medications)    Teaching Methods:   Body, Mind, Spirit booklet - Yes   Diagnosis specific booklet - Yes  Chemo specific drug sheets: VIA ONCOLOGY - Yes  Side effect sheets - Yes  MICHELLE Chemotherapy/Cancer SOS video - Yes  MICHELLE Drug videos  - Yes  Proper handling of chemotherapy at home - Yes  Phone numbers for contacting MD/RN during clinic hours and MD post clinic hours for emergent issues - Yes    Barriers to Learning: None  Learning Style: written, verbal, video, demonstration  Learner Outcomes:  Patient and/or caregiver verbalize understanding of information given    Next appointment scheduled: 3/19/2018  Patient instructed to call the office with any questions or concerns.   28.3

## 2025-01-23 NOTE — H&P ADULT - NSICDXPASTMEDICALHX_GEN_ALL_CORE_FT
PAST MEDICAL HISTORY:  Cataract     CHF (congestive heart failure)     DM (diabetes mellitus)     ESRD (end stage renal disease) on dialysis     Gout     Hemochromatosis     HLD (hyperlipidemia)     HTN (hypertension)     OA (osteoarthritis)     Vitamin D deficiency     VT (ventricular tachycardia)

## 2025-01-23 NOTE — H&P ADULT - NSHPPHYSICALEXAM_GEN_ALL_CORE
LOS:     VITALS:   T(C): 37.1 (01-23-25 @ 16:14), Max: 37.1 (01-23-25 @ 16:14)  HR: 98 (01-23-25 @ 16:14) (70 - 98)  BP: 127/81 (01-23-25 @ 16:14) (93/56 - 127/81)  RR: 18 (01-23-25 @ 16:14) (18 - 22)  SpO2: 97% (01-23-25 @ 16:14) (97% - 100%)    GENERAL: NAD, lying in bed comfortably  HEAD:  Atraumatic, Normocephalic  EYES: EOMI, PERRLA, conjunctiva and sclera clear  ENT: Moist mucous membranes  NECK: Supple, No JVD  CHEST/LUNG: Clear to auscultation bilaterally; No rales, rhonchi, wheezing, or rubs. Unlabored respirations  HEART: Regular rate and rhythm; No murmurs, rubs, or gallops  ABDOMEN: BSx4; Soft, nontender, nondistended  EXTREMITIES:  2+ Peripheral Pulses, brisk capillary refill. No clubbing, cyanosis, or edema  NERVOUS SYSTEM:  A&Ox3, no focal deficits   SKIN: No rashes or lesions LOS:     VITALS:   T(C): 37.1 (01-23-25 @ 16:14), Max: 37.1 (01-23-25 @ 16:14)  HR: 98 (01-23-25 @ 16:14) (70 - 98)  BP: 127/81 (01-23-25 @ 16:14) (93/56 - 127/81)  RR: 18 (01-23-25 @ 16:14) (18 - 22)  SpO2: 97% (01-23-25 @ 16:14) (97% - 100%)    GENERAL: elderly, [place], moderate distress due to dyspnea, lying in bed comfortably  HEAD:  Atraumatic, Normocephalic  EYES: EOMI, PERRLA, conjunctiva and sclera clear  ENT: dry mucous membranes  NECK: Supple, No JVD  CHEST/LUNG: difuse wheezing bilaterally; No rales, rhonchi, or rubs. Tachypneic, increased work of breathing  HEART: Regular rate and rhythm; No murmurs, rubs, or gallops  ABDOMEN: BSx4; Soft, nontender, nondistended  EXTREMITIES:  2+ Peripheral Pulses, brisk capillary refill. No clubbing, cyanosis, or edema  NERVOUS SYSTEM:  A&Ox3, no focal deficits   SKIN: No rashes or lesions

## 2025-01-23 NOTE — H&P ADULT - NSICDXPASTSURGICALHX_GEN_ALL_CORE_FT
PAST SURGICAL HISTORY:  S/P arteriovenous (AV) fistula creation     S/P bilateral cataract extraction     S/P TKR (total knee replacement), left

## 2025-01-23 NOTE — ED PROVIDER NOTE - OBJECTIVE STATEMENT
84-year-old presenting with cough and shortness of breath cough for approximately 5 days shortness breath study yesterday patient end-stage renal on dialysis with dialyzed yesterday endorses chest heaviness without pain denies any nausea vomiting calf pain swelling travel sick contact 84-year-old presenting with cough and shortness of breath cough for approximately 5 days shortness breath study yesterday patient end-stage renal on dialysis with dialyzed yesterday. denies any nausea vomiting calf pain swelling travel sick contact

## 2025-01-23 NOTE — H&P ADULT - HISTORY OF PRESENT ILLNESS
84y F with PMH of Gout, HTN (hypertension), HLD, DM, OA, ESRD on HD (MWF), Hemochromatosis. and CHF presenting with shortness of breath.     In the ED:  T(F): , Max: 98.7 (16:14); HR:  (70 - 98); BP:  (93/56 - 127/81); RR:  (18 - 22); SpO2:  (97% - 100%)  WBC:5.23, Hb.3, PLT:152  Na:136, K:3.3, Cl:93, HCO3:25, BUN:25, sCr:5.69, Lactate: --  AST:76, ALT:42, ALP:112, Tbili:0.9, Lipase:--   Troponin:49.2, p-BNP:72520  s/p albuterol/ipratropium for Nebulization. 3 milliLiter(s); methylPREDNISolone sodium succinate Injectable 125 milliGRAM(s); sodium chloride 0.9% Bolus 100 milliLiter(s) 84y F with PMH of Gout, HTN (hypertension), HLD, DM, OA, ESRD on HD (MWF), Hemochromatosis. and CHF presenting with shortness of breath. Patient accompanied by granddaughter at bedside providing additional collateral. Patient reports a few days of feeling generally unwell with weakness and shortness of breath. Family noticed the patient developing noticeable increased work of breathing and was concerned for possible pneumonia prompting them to bring patient to ED. Patient denies fevers or chills at home. Patient says she experienced some nausea this morning when attempting to take some of her medications, leading to difficulty swallowing capsules, but denies vomiting. Patient has history of dysphagia and was hospitalized in 2024 with aspiration pneumonia. Patient says she has been cutting food into small pieces to avoid aspiration. Patient had one episode diarrhea prior to arrival. Patient denies abdominal pain or chest pain but notes chest discomfort with coughing. The cough is described as dry without productive sputum. Patient denies history of prior diagnosis of COPD or asthma. Patient is a former smoker quitting 30 to 40 years ago and reports  exposure. Patient had usual dialysis yesterday and granddaughter states that staff mentioned she seemed to be retaining more fluid yesterday than usual. Patient currently complaining of severe dyspnea.     In the ED:  T(F): , Max: 98.7 (16:14); HR:  (70 - 98); BP:  (93/56 - 127/81); RR:  (18 - 22); SpO2:  (97% - 100%)  WBC:5.23, Hb.3, PLT:152  Na:136, K:3.3, Cl:93, HCO3:25, BUN:25, sCr:5.69  AST:76, ALT:42, ALP:112, Tbili:0.9, Lipase:--   Troponin:49.2, p-BNP:52791  s/p albuterol/ipratropium for Nebulization. 3 milliLiter(s); methylPREDNISolone sodium succinate Injectable 125 milliGRAM(s); sodium chloride 0.9% Bolus 100 milliLiter(s)

## 2025-01-23 NOTE — H&P ADULT - PROBLEM SELECTOR PLAN 3
ESRD on HD (MWF) last dialysis on Wed 1/22  -Nephro consulted: Dr. Elise  -HD as per nephro  -phosphate binders per nephro  -Renal diet ESRD on HD (MWF) via L AVF  - last dialysis on Wed 1/22  -Nephro consulted: Dr. Elise  -HD as per nephro  -phosphate binders per nephro  -Renal diet  -follows with Dr. Michael Valdes at [facility], [address]

## 2025-01-23 NOTE — ED ADULT NURSE NOTE - OBJECTIVE STATEMENT
Patient is BIB grandson c/o shortness of breath x yesterday. Dialysis patient, M,W,F. last treatment yesterday. left AV fistula noted and MD valenzuela bedside. Patient  and family educated on fall prevention and risk for injuries. Patient and family verbalized understanding. Fall buddle activate and delegated to PCA ilan.

## 2025-01-23 NOTE — ED ADULT NURSE NOTE - NSFALLHARMRISKINTERV_ED_ALL_ED

## 2025-01-23 NOTE — H&P ADULT - ASSESSMENT
84y F with PMH of Gout, HTN (hypertension), HLD, DM, OA, ESRD on HD (MWF), Hemochromatosis. and CHF presenting with shortness of breath. Admitted for suspected acute exacerbation of COPD 2/2 Influenza A infection vs. superimposed bacterial PNA possibly 2/2 aspiration. Admitted to telemetry for monitoring of possible heart block seen on telemetry in ED.  84y F with PMH of Gout, HTN, HLD, DM, OA, ESRD on HD (MWF), Hemochromatosis. and CHF presenting with shortness of breath. Admitted for suspected acute exacerbation of COPD 2/2 Influenza A infection vs. superimposed bacterial PNA possibly 2/2 aspiration. Admitted to telemetry for monitoring of possible heart block seen on telemetry in ED.

## 2025-01-23 NOTE — H&P ADULT - NSHPSOCIALHISTORY_GEN_ALL_CORE
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

## 2025-01-23 NOTE — ED PROVIDER NOTE - PROGRESS NOTE DETAILS
Patient Trope negative otherwise noted runs of heart block per war room otherwise EKG on arrival and repeat EKG normal sinus risk and benefit of outpatient versus admission discussed with patient will admit for cardiac monitoring

## 2025-01-23 NOTE — H&P ADULT - PROBLEM SELECTOR PLAN 1
p/w shortness of breath, increased work of breathing and diffuse wheezing on exam  -denies known history of COPD however suspect underlying obstructive lung disease based on presentation and history. (e.g lung hyperinflation, former smoker, 9/11 occupational exposure)  -RVP FluA positive  -CXR: COPD. Bibasilar infiltrates left greater than right at this time  -s/p duoneb x1 and soulmedrol 125mg IV STAT in ED with improvement in symptoms  -c/w solumedrol 40mg IV qd, Duonebs q6 ATC, Advair BID, and albuterol HFA PRN   -start ceftriaxone and azithromycin for suspected superimposed CAP coverage  -f/u urine strep/legionella, mycoplasma IgM, Procalcitonin   -speech and swallow eval in s/o history of dysphagia and aspiration PNA  -minced and moist diet for now  -Pulm Consult in AM

## 2025-01-23 NOTE — H&P ADULT - PROBLEM SELECTOR PLAN 4
hx of CHF on Losartan and lopressor for GDMT  -last TTE (5/2024): mildly decreased EF 48%, Grade 1 diastolic dysfunction, LVH  -CXR with out signs of pulmonary vascular congestion  -continue with home GDMT with parameters  -monitor for fluid overload/pulm edema hx of CHF on Losartan and lopressor for GDMT  -last TTE (5/2024): mildly decreased EF 48%, Grade 1 diastolic dysfunction, LVH  -CXR with out signs of pulmonary vascular congestion  -continue with home GDMT with parameters  -monitor for fluid overload/pulm edema  -EKG shows NSR, LVH, prolonged QT  -per ED provider note "runs of heart block per war room"--unable to review in ED  -history of VTach per J discharge summary  -Admit to telemetry  -c/w scheduled dialysis for fluid removal

## 2025-01-24 DIAGNOSIS — J18.9 PNEUMONIA, UNSPECIFIED ORGANISM: ICD-10-CM

## 2025-01-24 LAB
A1C WITH ESTIMATED AVERAGE GLUCOSE RESULT: 6.8 % — HIGH (ref 4–5.6)
ALBUMIN SERPL ELPH-MCNC: 3.3 G/DL — LOW (ref 3.5–5)
ALP SERPL-CCNC: 97 U/L — SIGNIFICANT CHANGE UP (ref 40–120)
ALT FLD-CCNC: 34 U/L DA — SIGNIFICANT CHANGE UP (ref 10–60)
ANION GAP SERPL CALC-SCNC: 23 MMOL/L — HIGH (ref 5–17)
ANION GAP SERPL CALC-SCNC: 25 MMOL/L — HIGH (ref 5–17)
ANISOCYTOSIS BLD QL: SLIGHT — SIGNIFICANT CHANGE UP
AST SERPL-CCNC: 52 U/L — HIGH (ref 10–40)
BASOPHILS # BLD AUTO: 0 K/UL — SIGNIFICANT CHANGE UP (ref 0–0.2)
BASOPHILS NFR BLD AUTO: 0 % — SIGNIFICANT CHANGE UP (ref 0–2)
BILIRUB SERPL-MCNC: 0.6 MG/DL — SIGNIFICANT CHANGE UP (ref 0.2–1.2)
BUN SERPL-MCNC: 40 MG/DL — HIGH (ref 7–18)
BUN SERPL-MCNC: 44 MG/DL — HIGH (ref 7–18)
CALCIUM SERPL-MCNC: 7.5 MG/DL — LOW (ref 8.4–10.5)
CALCIUM SERPL-MCNC: 7.5 MG/DL — LOW (ref 8.4–10.5)
CHLORIDE SERPL-SCNC: 91 MMOL/L — LOW (ref 96–108)
CHLORIDE SERPL-SCNC: 91 MMOL/L — LOW (ref 96–108)
CHOLEST SERPL-MCNC: 134 MG/DL — SIGNIFICANT CHANGE UP
CO2 SERPL-SCNC: 19 MMOL/L — LOW (ref 22–31)
CO2 SERPL-SCNC: 20 MMOL/L — LOW (ref 22–31)
CREAT SERPL-MCNC: 7.35 MG/DL — HIGH (ref 0.5–1.3)
CREAT SERPL-MCNC: 7.67 MG/DL — HIGH (ref 0.5–1.3)
EGFR: 5 ML/MIN/1.73M2 — LOW
EGFR: 5 ML/MIN/1.73M2 — LOW
ELLIPTOCYTES BLD QL SMEAR: SLIGHT — SIGNIFICANT CHANGE UP
EOSINOPHIL # BLD AUTO: 0 K/UL — SIGNIFICANT CHANGE UP (ref 0–0.5)
EOSINOPHIL NFR BLD AUTO: 0 % — SIGNIFICANT CHANGE UP (ref 0–6)
ESTIMATED AVERAGE GLUCOSE: 148 MG/DL — HIGH (ref 68–114)
GLUCOSE BLDC GLUCOMTR-MCNC: 201 MG/DL — HIGH (ref 70–99)
GLUCOSE BLDC GLUCOMTR-MCNC: 412 MG/DL — HIGH (ref 70–99)
GLUCOSE BLDC GLUCOMTR-MCNC: 432 MG/DL — HIGH (ref 70–99)
GLUCOSE BLDC GLUCOMTR-MCNC: 437 MG/DL — HIGH (ref 70–99)
GLUCOSE BLDC GLUCOMTR-MCNC: 451 MG/DL — CRITICAL HIGH (ref 70–99)
GLUCOSE SERPL-MCNC: 438 MG/DL — HIGH (ref 70–99)
GLUCOSE SERPL-MCNC: 471 MG/DL — CRITICAL HIGH (ref 70–99)
HBV SURFACE AB SER-ACNC: REACTIVE — SIGNIFICANT CHANGE UP
HBV SURFACE AG SER-ACNC: SIGNIFICANT CHANGE UP
HCT VFR BLD CALC: 30.8 % — LOW (ref 34.5–45)
HDLC SERPL-MCNC: 43 MG/DL — LOW
HGB BLD-MCNC: 10.1 G/DL — LOW (ref 11.5–15.5)
LIPID PNL WITH DIRECT LDL SERPL: 49 MG/DL — SIGNIFICANT CHANGE UP
LYMPHOCYTES # BLD AUTO: 0.3 K/UL — LOW (ref 1–3.3)
LYMPHOCYTES # BLD AUTO: 6 % — LOW (ref 13–44)
MAGNESIUM SERPL-MCNC: 2.5 MG/DL — SIGNIFICANT CHANGE UP (ref 1.6–2.6)
MANUAL SMEAR VERIFICATION: SIGNIFICANT CHANGE UP
MCHC RBC-ENTMCNC: 30.2 PG — SIGNIFICANT CHANGE UP (ref 27–34)
MCHC RBC-ENTMCNC: 32.8 G/DL — SIGNIFICANT CHANGE UP (ref 32–36)
MCV RBC AUTO: 92.2 FL — SIGNIFICANT CHANGE UP (ref 80–100)
MONOCYTES # BLD AUTO: 0 K/UL — SIGNIFICANT CHANGE UP (ref 0–0.9)
MONOCYTES NFR BLD AUTO: 0 % — LOW (ref 2–14)
NEUTROPHILS # BLD AUTO: 4.57 K/UL — SIGNIFICANT CHANGE UP (ref 1.8–7.4)
NEUTROPHILS NFR BLD AUTO: 89 % — HIGH (ref 43–77)
NEUTS BAND # BLD: 3 % — SIGNIFICANT CHANGE UP (ref 0–8)
NEUTS BAND NFR BLD: 3 % — SIGNIFICANT CHANGE UP (ref 0–8)
NON HDL CHOLESTEROL: 91 MG/DL — SIGNIFICANT CHANGE UP
NRBC # BLD: 0 /100 WBCS — SIGNIFICANT CHANGE UP (ref 0–0)
NRBC BLD-RTO: 0 /100 WBCS — SIGNIFICANT CHANGE UP (ref 0–0)
OVALOCYTES BLD QL SMEAR: SLIGHT — SIGNIFICANT CHANGE UP
PHOSPHATE SERPL-MCNC: 3.5 MG/DL — SIGNIFICANT CHANGE UP (ref 2.5–4.5)
PLAT MORPH BLD: NORMAL — SIGNIFICANT CHANGE UP
PLATELET # BLD AUTO: 130 K/UL — LOW (ref 150–400)
PLATELET COUNT - ESTIMATE: ABNORMAL
POTASSIUM SERPL-MCNC: 2.5 MMOL/L — CRITICAL LOW (ref 3.5–5.3)
POTASSIUM SERPL-MCNC: 3.4 MMOL/L — LOW (ref 3.5–5.3)
POTASSIUM SERPL-SCNC: 2.5 MMOL/L — CRITICAL LOW (ref 3.5–5.3)
POTASSIUM SERPL-SCNC: 3.4 MMOL/L — LOW (ref 3.5–5.3)
PROT SERPL-MCNC: 6.4 G/DL — SIGNIFICANT CHANGE UP (ref 6–8.3)
RBC # BLD: 3.34 M/UL — LOW (ref 3.8–5.2)
RBC # FLD: 13.2 % — SIGNIFICANT CHANGE UP (ref 10.3–14.5)
RBC BLD AUTO: ABNORMAL
SODIUM SERPL-SCNC: 134 MMOL/L — LOW (ref 135–145)
SODIUM SERPL-SCNC: 135 MMOL/L — SIGNIFICANT CHANGE UP (ref 135–145)
STOMATOCYTES BLD QL SMEAR: SLIGHT — SIGNIFICANT CHANGE UP
TRIGL SERPL-MCNC: 272 MG/DL — HIGH
VARIANT LYMPHS # BLD: 2 % — SIGNIFICANT CHANGE UP (ref 0–6)
VARIANT LYMPHS NFR BLD MANUAL: 2 % — SIGNIFICANT CHANGE UP (ref 0–6)
WBC # BLD: 4.97 K/UL — SIGNIFICANT CHANGE UP (ref 3.8–10.5)
WBC # FLD AUTO: 4.97 K/UL — SIGNIFICANT CHANGE UP (ref 3.8–10.5)

## 2025-01-24 RX ORDER — ACETAMINOPHEN, DIPHENHYDRAMINE HCL, PHENYLEPHRINE HCL 325; 25; 5 MG/1; MG/1; MG/1
3 TABLET ORAL AT BEDTIME
Refills: 0 | Status: DISCONTINUED | OUTPATIENT
Start: 2025-01-24 | End: 2025-01-29

## 2025-01-24 RX ORDER — INSULIN LISPRO 100/ML
10 VIAL (ML) SUBCUTANEOUS ONCE
Refills: 0 | Status: COMPLETED | OUTPATIENT
Start: 2025-01-24 | End: 2025-01-24

## 2025-01-24 RX ORDER — ALPRAZOLAM 2 MG
0.25 TABLET ORAL ONCE
Refills: 0 | Status: DISCONTINUED | OUTPATIENT
Start: 2025-01-24 | End: 2025-01-24

## 2025-01-24 RX ORDER — METHYLPREDNISOLONE ACETATE 40 MG/ML
40 VIAL (ML) INJECTION ONCE
Refills: 0 | Status: COMPLETED | OUTPATIENT
Start: 2025-01-24 | End: 2025-01-24

## 2025-01-24 RX ADMIN — Medication 6: at 12:04

## 2025-01-24 RX ADMIN — IPRATROPIUM BROMIDE AND ALBUTEROL SULFATE 3 MILLILITER(S): .5; 2.5 SOLUTION RESPIRATORY (INHALATION) at 08:54

## 2025-01-24 RX ADMIN — IPRATROPIUM BROMIDE AND ALBUTEROL SULFATE 3 MILLILITER(S): .5; 2.5 SOLUTION RESPIRATORY (INHALATION) at 20:54

## 2025-01-24 RX ADMIN — Medication 10 MILLIGRAM(S): at 12:08

## 2025-01-24 RX ADMIN — Medication 0.25 MILLIGRAM(S): at 22:16

## 2025-01-24 RX ADMIN — Medication 5000 UNIT(S): at 06:20

## 2025-01-24 RX ADMIN — Medication 40 MILLIGRAM(S): at 22:16

## 2025-01-24 RX ADMIN — Medication 25 MILLIGRAM(S): at 06:21

## 2025-01-24 RX ADMIN — Medication 1 CAPSULE(S): at 12:08

## 2025-01-24 RX ADMIN — Medication 25 MILLIGRAM(S): at 06:51

## 2025-01-24 RX ADMIN — OSELTAMIVIR PHOSPHATE 30 MILLIGRAM(S): 75 CAPSULE ORAL at 12:08

## 2025-01-24 RX ADMIN — FLUTICASONE PROPIONATE AND SALMETEROL 1 DOSE(S): 113; 14 POWDER, METERED RESPIRATORY (INHALATION) at 22:32

## 2025-01-24 RX ADMIN — ATORVASTATIN CALCIUM 10 MILLIGRAM(S): 80 TABLET, FILM COATED ORAL at 22:17

## 2025-01-24 RX ADMIN — Medication 10 UNIT(S): at 08:39

## 2025-01-24 RX ADMIN — FLUTICASONE PROPIONATE AND SALMETEROL 1 DOSE(S): 113; 14 POWDER, METERED RESPIRATORY (INHALATION) at 12:08

## 2025-01-24 RX ADMIN — CEFTRIAXONE 100 MILLIGRAM(S): 250 INJECTION, POWDER, FOR SOLUTION INTRAMUSCULAR; INTRAVENOUS at 22:18

## 2025-01-24 RX ADMIN — CALCIUM ACETATE 667 MILLIGRAM(S): 667 CAPSULE ORAL at 09:11

## 2025-01-24 RX ADMIN — ACETAMINOPHEN, DIPHENHYDRAMINE HCL, PHENYLEPHRINE HCL 3 MILLIGRAM(S): 325; 25; 5 TABLET ORAL at 22:16

## 2025-01-24 RX ADMIN — AZITHROMYCIN DIHYDRATE 255 MILLIGRAM(S): 500 TABLET, FILM COATED ORAL at 22:18

## 2025-01-24 RX ADMIN — IPRATROPIUM BROMIDE AND ALBUTEROL SULFATE 3 MILLILITER(S): .5; 2.5 SOLUTION RESPIRATORY (INHALATION) at 03:00

## 2025-01-24 RX ADMIN — PANTOPRAZOLE 40 MILLIGRAM(S): 20 TABLET, DELAYED RELEASE ORAL at 06:21

## 2025-01-24 RX ADMIN — Medication 40 MILLIGRAM(S): at 06:20

## 2025-01-24 NOTE — PATIENT PROFILE ADULT - FALL HARM RISK - RISK INTERVENTIONS

## 2025-01-24 NOTE — PROGRESS NOTE ADULT - SUBJECTIVE AND OBJECTIVE BOX
INTERVAL HPI/OVERNIGHT EVENTS:  Patient seen,c/o some sob,no acute issues  VITAL SIGNS:  T(F): 97.9 (01-24-25 @ 08:18)  HR: 69 (01-24-25 @ 08:18)  BP: 105/64 (01-24-25 @ 08:18)  RR: 18 (01-24-25 @ 08:18)  SpO2: 100% (01-24-25 @ 08:18)  Wt(kg): --    PHYSICAL EXAM:  awake  Constitutional:  Eyes:  ENMT:perrla  Neck:  Respiratory:few rales,expirat wheezing  Cardiovascular:s1s2,m-none  Gastrointestinal:soft,bs pos  Extremities:  Vascular:  Neurological:no focal deficit  Musculoskeletal:    MEDICATIONS  (STANDING):  albuterol/ipratropium for Nebulization 3 milliLiter(s) Nebulizer every 6 hours  atorvastatin 10 milliGRAM(s) Oral at bedtime  azithromycin  IVPB 500 milliGRAM(s) IV Intermittent every 24 hours  calcium acetate 667 milliGRAM(s) Oral three times a day with meals  cefTRIAXone   IVPB 1000 milliGRAM(s) IV Intermittent every 24 hours  ezetimibe 10 milliGRAM(s) Oral daily  fluticasone propionate/ salmeterol 100-50 MICROgram(s) Diskus 1 Dose(s) Inhalation two times a day  heparin   Injectable 5000 Unit(s) SubCutaneous every 12 hours  insulin glargine Injectable (LANTUS) 6 Unit(s) SubCutaneous at bedtime  insulin lispro (ADMELOG) corrective regimen sliding scale   SubCutaneous three times a day before meals  insulin lispro (ADMELOG) corrective regimen sliding scale   SubCutaneous at bedtime  insulin lispro Injectable (ADMELOG). 10 Unit(s) SubCutaneous once  losartan 25 milliGRAM(s) Oral daily  methylPREDNISolone sodium succinate Injectable 40 milliGRAM(s) IV Push daily  metoprolol tartrate 25 milliGRAM(s) Oral two times a day  oseltamivir 30 milliGRAM(s) Oral <User Schedule>  pancrelipase  (CREON 36,000 Lipase Units) 1 Capsule(s) Oral four times a day with meals  pantoprazole    Tablet 40 milliGRAM(s) Oral before breakfast    MEDICATIONS  (PRN):  acetaminophen     Tablet .. 650 milliGRAM(s) Oral every 6 hours PRN Temp greater or equal to 38C (100.4F), Mild Pain (1 - 3)  albuterol    90 MICROgram(s) HFA Inhaler 2 Puff(s) Inhalation every 6 hours PRN Shortness of Breath and/or Wheezing      Allergies    shellfish (Anaphylaxis)  latex (Unknown)  natural rubber (Unknown)  aspirin (Unknown)  ibuprofen (Unknown)  Mushrooms (Anaphylaxis)    Intolerances        LABS:                        10.1   4.97  )-----------( 130      ( 24 Jan 2025 05:00 )             30.8     01-24    134[L]  |  91[L]  |  44[H]  ----------------------------<  471[HH]  3.4[L]   |  20[L]  |  7.67[H]    Ca    7.5[L]      24 Jan 2025 05:00  Phos  3.5     01-24  Mg     2.5     01-24    TPro  6.4  /  Alb  3.3[L]  /  TBili  0.6  /  DBili  x   /  AST  52[H]  /  ALT  34  /  AlkPhos  97  01-24    PT/INR - ( 23 Jan 2025 09:50 )   PT: 11.4 sec;   INR: 0.98 ratio         PTT - ( 23 Jan 2025 09:50 )  PTT:32.1 sec  Urinalysis Basic - ( 24 Jan 2025 05:00 )    Color: x / Appearance: x / SG: x / pH: x  Gluc: 471 mg/dL / Ketone: x  / Bili: x / Urobili: x   Blood: x / Protein: x / Nitrite: x   Leuk Esterase: x / RBC: x / WBC x   Sq Epi: x / Non Sq Epi: x / Bacteria: x        RADIOLOGY & ADDITIONAL TESTS:      · Assessment	  84y F with PMH of Gout, HTN, HLD, DM, OA, ESRD on HD (MWF), Hemochromatosis. and CHF presenting with shortness of breath. Admitted for suspected acute exacerbation of COPD 2/2 Influenza A infection vs. superimposed bacterial PNA possibly 2/2 aspiration. Admitted to telemetry for monitoring of possible heart block seen on telemetry in ED.        Problem/Plan - 1:  ·  Problem: COPD with acute exacerbation.   ·  Plan: p/w shortness of breath, increased work of breathing and diffuse wheezing on exam  -denies known history of COPD however suspect underlying obstructive lung disease based on presentation and history. (e.g lung hyperinflation, former smoker, 9/11 occupational exposure)  -RVP FluA positive  -s/p duoneb x1 and soulmedrol 125mg IV STAT in ED with improvement in symptoms  -c/w solumedrol 40mg IV qd, Duonebs q6 ATC, Advair BID, and albuterol HFA PRN   -start ceftriaxone and azithromycin for suspected superimposed CAP coverage  -f/u urine strep/legionella, mycoplasma IgM, Procalcitonin   -speech and swallow eval in s/o history of dysphagia and aspiration PNA  -minced and moist diet for now  -Pulm Consult  -Dr Hernandez notified     Problem/Plan - 2:  ·  Problem: Influenza A.   ·  Plan: as above   start tamiflu 30mg every other day (ESRD dose adjustment) x 5 days.     Problem/Plan - 3:  ·  Problem: ESRD (end stage renal disease) on dialysis.   ·  Plan: ESRD on HD (MWF) via L AVF  - last dialysis on Wed 1/22  -Nephro consulted: Dr. Elise  -HD as per nephro  -phosphate binders per nephro  -Renal diet  -follows with Dr. Michael Valdes at [facility], [address]     Problem/Plan - 4:  ·  Problem: CHF (congestive heart failure).   ·  Plan: hx of CHF on Losartan and lopressor for GDMT  -last TTE (5/2024): mildly decreased EF 48%, Grade 1 diastolic dysfunction, LVH  -CXR with out signs of pulmonary vascular congestion  -continue with home GDMT with parameters  -monitor for fluid overload/pulm edema  -EKG shows NSR, LVH, prolonged QT  -per ED provider note "runs of heart block per war room"--unable to review in ED  -history of VTach per [facility] discharge summary  -Admit to telemetry  -c/w scheduled dialysis for fluid removal.     Problem/Plan - 5:  ·  Problem: DM (diabetes mellitus).   ·  Plan: h/o DM on repaglinide and Toujeo 9U (tues, thur, sun) 11U (Mon, Wed, Fri, Sat   - hold oral dm meds  -f/u A1c  -c/w lantus 6U qhs + low sliding scale insulin  -Adjust insulin as indicated  -FS ACHS.     Problem/Plan - 6:  ·  Problem: HTN (hypertension).   ·  Plan: h/o HTN on losartan and metoprolol  -c/w home meds   -monitor BP  -adjust antihypertensive meds as appropriate.     Problem/Plan - 7:  ·  Problem: HLD (hyperlipidemia).   ·  Plan: h/o HLD on atorvastatin/ezetimibe   -c/w home med  -f/u lipid profile  -calculate ASCVD risk  -DASH diet.     Problem/Plan - 8:  ·  Problem: Prophylactic measure.   ·  Plan: DVT: Heparin q12.       Wound care to be performed twice daily. OK to bathe with wound care. Wash wounds with warm, soapy water and a wash cloth.  Dress wound to right lower extremity with Xeroform and secure in place with Kerlix. Monitor for signs of infection including fever, chills, redness, swelling, increased pain or foul smelling drainage. Please call [phone] to make a follow up appointment within 1 week with Dr. Pride or Dr. Capellan. Clinic is located at [address] [place] [address] on Tuesdays (2-4pm) or Thursdays (9am-1pm).

## 2025-01-24 NOTE — CONSULT NOTE ADULT - PROBLEM SELECTOR RECOMMENDATION 9
Droplets precautions  Analgesics PRN  Robitussin 10 cc po TID PRN  Tamiflu 30 mgs po daily x 5 days.  CT Chest w/o contrast DVT PPX.
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
## 2025-01-24 NOTE — CONSULT NOTE ADULT - SUBJECTIVE AND OBJECTIVE BOX
PULMONARY CONSULT NOTE      ANA ROSA ECHEVERRIA  MRN-[id number]      HISTORY OF PRESENT ILLNESS:  84y F with PMH of Gout, HTN (hypertension), HLD, DM, OA, ESRD on HD (MWF), Hemochromatosis. and CHF presenting with shortness of breath. Patient accompanied by granddaughter at bedside providing additional collateral. Patient reports a few days of feeling generally unwell with weakness and shortness of breath. Family noticed the patient developing noticeable increased work of breathing and was concerned for possible pneumonia prompting them to bring patient to ED. Patient denies fevers or chills at home. Patient says she experienced some nausea this morning when attempting to take some of her medications, leading to difficulty swallowing capsules, but denies vomiting. Patient has history of dysphagia and was hospitalized in July 2024 with aspiration pneumonia. Patient says she has been cutting food into small pieces to avoid aspiration. Patient had one episode diarrhea prior to arrival. Patient denies abdominal pain or chest pain but notes chest discomfort with coughing. The cough is described as dry without productive sputum. Patient denies history of prior diagnosis of COPD or asthma. Patient is a former smoker quitting 30 to 40 years ago and reports 9/11 exposure. Patient had usual dialysis yesterday and granddaughter states that staff mentioned she seemed to be retaining more fluid yesterday than usual. Patient currently complaining of severe dyspnea.     MEDICATIONS  (STANDING):  albuterol/ipratropium for Nebulization 3 milliLiter(s) Nebulizer every 6 hours  atorvastatin 10 milliGRAM(s) Oral at bedtime  azithromycin  IVPB 500 milliGRAM(s) IV Intermittent every 24 hours  calcium acetate 667 milliGRAM(s) Oral three times a day with meals  cefTRIAXone   IVPB 1000 milliGRAM(s) IV Intermittent every 24 hours  ezetimibe 10 milliGRAM(s) Oral daily  fluticasone propionate/ salmeterol 100-50 MICROgram(s) Diskus 1 Dose(s) Inhalation two times a day  heparin   Injectable 5000 Unit(s) SubCutaneous every 12 hours  insulin glargine Injectable (LANTUS) 6 Unit(s) SubCutaneous at bedtime  insulin lispro (ADMELOG) corrective regimen sliding scale   SubCutaneous three times a day before meals  insulin lispro (ADMELOG) corrective regimen sliding scale   SubCutaneous at bedtime  insulin lispro Injectable (ADMELOG). 10 Unit(s) SubCutaneous once  losartan 25 milliGRAM(s) Oral daily  methylPREDNISolone sodium succinate Injectable 40 milliGRAM(s) IV Push daily  metoprolol tartrate 25 milliGRAM(s) Oral two times a day  oseltamivir 30 milliGRAM(s) Oral <User Schedule>  pancrelipase  (CREON 36,000 Lipase Units) 1 Capsule(s) Oral four times a day with meals  pantoprazole    Tablet 40 milliGRAM(s) Oral before breakfast      MEDICATIONS  (PRN):  acetaminophen     Tablet .. 650 milliGRAM(s) Oral every 6 hours PRN Temp greater or equal to 38C (100.4F), Mild Pain (1 - 3)  albuterol    90 MICROgram(s) HFA Inhaler 2 Puff(s) Inhalation every 6 hours PRN Shortness of Breath and/or Wheezing      Allergies    shellfish (Anaphylaxis)  latex (Unknown)  natural rubber (Unknown)  aspirin (Unknown)  ibuprofen (Unknown)  Mushrooms (Anaphylaxis)    Intolerances        PAST MEDICAL & SURGICAL HISTORY:  Gout      Cataract      Vitamin D deficiency      HTN (hypertension)      HLD (hyperlipidemia)      DM (diabetes mellitus)      OA (osteoarthritis)      ESRD (end stage renal disease) on dialysis      VT (ventricular tachycardia)      Hemochromatosis      CHF (congestive heart failure)      S/P TKR (total knee replacement), left      S/P bilateral cataract extraction      S/P arteriovenous (AV) fistula creation          FAMILY HISTORY:  Family history of cancer        SOCIAL HISTORY  Smoking History:     REVIEW OF SYSTEMS:    CONSTITUTIONAL:  No fevers, chills, sweats    HEENT:  Eyes:  No diplopia or blurred vision. ENT:  No earache, sore throat or runny nose.    CARDIOVASCULAR:  No pressure, squeezing, tightness, or heaviness about the chest; no palpitations.    RESPIRATORY:  Per HPI    GASTROINTESTINAL:  No abdominal pain, nausea, vomiting or diarrhea.    GENITOURINARY:  No dysuria, frequency or urgency.    NEUROLOGIC:  No paresthesias, fasciculations, seizures or weakness.    PSYCHIATRIC:  No disorder of thought or mood.    Vital Signs Last 24 Hrs  T(C): 36.6 (24 Jan 2025 08:18), Max: 37.1 (23 Jan 2025 16:14)  T(F): 97.9 (24 Jan 2025 08:18), Max: 98.7 (23 Jan 2025 16:14)  HR: 69 (24 Jan 2025 08:18) (69 - 98)  BP: 105/64 (24 Jan 2025 08:18) (103/64 - 139/83)  BP(mean): 78 (24 Jan 2025 08:18) (78 - 99)  RR: 18 (24 Jan 2025 08:18) (16 - 20)  SpO2: 100% (24 Jan 2025 08:18) (97% - 100%)    Parameters below as of 24 Jan 2025 08:18  Patient On (Oxygen Delivery Method): nasal cannula  O2 Flow (L/min): 2    I&O's Detail      PHYSICAL EXAMINATION:    GENERAL: The patient is a well-developed, well-nourished _____in no apparent distress.     HEENT: Head is normocephalic and atraumatic. Extraocular muscles are intact. Mucous membranes are moist.     NECK: Supple.     LUNGS: Clear to auscultation without wheezing, rales, or rhonchi. Respirations unlabored    HEART: Regular rate and rhythm without murmur.    ABDOMEN: Soft, nontender, and nondistended.  No hepatosplenomegaly is noted.    EXTREMITIES: Without any cyanosis, clubbing, rash, lesions or edema.    NEUROLOGIC: Grossly intact.      LABS:                        10.1   4.97  )-----------( 130      ( 24 Jan 2025 05:00 )             30.8     01-24    134[L]  |  91[L]  |  44[H]  ----------------------------<  471[HH]  3.4[L]   |  20[L]  |  7.67[H]    Ca    7.5[L]      24 Jan 2025 05:00  Phos  3.5     01-24  Mg     2.5     01-24    TPro  6.4  /  Alb  3.3[L]  /  TBili  0.6  /  DBili  x   /  AST  52[H]  /  ALT  34  /  AlkPhos  97  01-24    PT/INR - ( 23 Jan 2025 09:50 )   PT: 11.4 sec;   INR: 0.98 ratio         PTT - ( 23 Jan 2025 09:50 )  PTT:32.1 sec  Urinalysis Basic - ( 24 Jan 2025 05:00 )    Color: x / Appearance: x / SG: x / pH: x  Gluc: 471 mg/dL / Ketone: x  / Bili: x / Urobili: x   Blood: x / Protein: x / Nitrite: x   Leuk Esterase: x / RBC: x / WBC x   Sq Epi: x / Non Sq Epi: x / Bacteria: x      ABG - ( 23 Jan 2025 21:06 )  pH, Arterial: 7.48  pH, Blood: x     /  pCO2: 24    /  pO2: 81    / HCO3: 18    / Base Excess: -4.2  /  SaO2: 97                MICROBIOLOGY:    RADIOLOGY & ADDITIONAL STUDIES:    CXR:  < from: Xray Chest 1 View- PORTABLE-Urgent (Xray Chest 1 View- PORTABLE-Urgent .) (01.23.25 @ 11:23) >  IMPRESSION: COPD. Bibasilar infiltrates left greater than right at this   time.      < end of copied text >    Ct scan chest;    ekg;    echo: PULMONARY CONSULT NOTE      ANA ROSA ECHEVERRIA  MRN-[id number]      HISTORY OF PRESENT ILLNESS:  84y F with PMH of Gout, HTN (hypertension), HLD, DM, OA, ESRD on HD (MWF), Hemochromatosis. and CHF presenting with shortness of breath. Patient accompanied by granddaughter at bedside providing additional collateral. Patient reports a few days of feeling generally unwell with weakness and shortness of breath. Family noticed the patient developing noticeable increased work of breathing and was concerned for possible pneumonia prompting them to bring patient to ED. Patient denies fevers or chills at home. Patient says she experienced some nausea this morning when attempting to take some of her medications, leading to difficulty swallowing capsules, but denies vomiting. Patient has history of dysphagia and was hospitalized in July 2024 with aspiration pneumonia. Patient says she has been cutting food into small pieces to avoid aspiration. Patient had one episode diarrhea prior to arrival. Patient denies abdominal pain or chest pain but notes chest discomfort with coughing. The cough is described as dry without productive sputum. Patient denies history of prior diagnosis of COPD or asthma. Patient is a former smoker quitting 30 to 40 years ago and reports 9/11 exposure. Patient had usual dialysis yesterday and granddaughter states that staff mentioned she seemed to be retaining more fluid yesterday than usual. Patient currently complaining of severe dyspnea.     MEDICATIONS  (STANDING):  albuterol/ipratropium for Nebulization 3 milliLiter(s) Nebulizer every 6 hours  atorvastatin 10 milliGRAM(s) Oral at bedtime  azithromycin  IVPB 500 milliGRAM(s) IV Intermittent every 24 hours  calcium acetate 667 milliGRAM(s) Oral three times a day with meals  cefTRIAXone   IVPB 1000 milliGRAM(s) IV Intermittent every 24 hours  ezetimibe 10 milliGRAM(s) Oral daily  fluticasone propionate/ salmeterol 100-50 MICROgram(s) Diskus 1 Dose(s) Inhalation two times a day  heparin   Injectable 5000 Unit(s) SubCutaneous every 12 hours  insulin glargine Injectable (LANTUS) 6 Unit(s) SubCutaneous at bedtime  insulin lispro (ADMELOG) corrective regimen sliding scale   SubCutaneous three times a day before meals  insulin lispro (ADMELOG) corrective regimen sliding scale   SubCutaneous at bedtime  insulin lispro Injectable (ADMELOG). 10 Unit(s) SubCutaneous once  losartan 25 milliGRAM(s) Oral daily  methylPREDNISolone sodium succinate Injectable 40 milliGRAM(s) IV Push daily  metoprolol tartrate 25 milliGRAM(s) Oral two times a day  oseltamivir 30 milliGRAM(s) Oral <User Schedule>  pancrelipase  (CREON 36,000 Lipase Units) 1 Capsule(s) Oral four times a day with meals  pantoprazole    Tablet 40 milliGRAM(s) Oral before breakfast      MEDICATIONS  (PRN):  acetaminophen     Tablet .. 650 milliGRAM(s) Oral every 6 hours PRN Temp greater or equal to 38C (100.4F), Mild Pain (1 - 3)  albuterol    90 MICROgram(s) HFA Inhaler 2 Puff(s) Inhalation every 6 hours PRN Shortness of Breath and/or Wheezing      Allergies    shellfish (Anaphylaxis)  latex (Unknown)  natural rubber (Unknown)  aspirin (Unknown)  ibuprofen (Unknown)  Mushrooms (Anaphylaxis)    Intolerances        PAST MEDICAL & SURGICAL HISTORY:  Gout      Cataract      Vitamin D deficiency      HTN (hypertension)      HLD (hyperlipidemia)      DM (diabetes mellitus)      OA (osteoarthritis)      ESRD (end stage renal disease) on dialysis      VT (ventricular tachycardia)      Hemochromatosis      CHF (congestive heart failure)      S/P TKR (total knee replacement), left      S/P bilateral cataract extraction      S/P arteriovenous (AV) fistula creation          FAMILY HISTORY:  Family history of cancer        SOCIAL HISTORY  Smoking History:     REVIEW OF SYSTEMS:    CONSTITUTIONAL:  No fevers, chills, sweats    HEENT:  Eyes:  No diplopia or blurred vision. ENT:  No earache, sore throat or runny nose.    CARDIOVASCULAR:  No pressure, squeezing, tightness, or heaviness about the chest; no palpitations.    RESPIRATORY:  Per HPI    GASTROINTESTINAL:  No abdominal pain, nausea, vomiting or diarrhea.    GENITOURINARY:  No dysuria, frequency or urgency.    NEUROLOGIC:  No paresthesias, fasciculations, seizures or weakness.    PSYCHIATRIC:  No disorder of thought or mood.    Vital Signs Last 24 Hrs  T(C): 36.6 (24 Jan 2025 08:18), Max: 37.1 (23 Jan 2025 16:14)  T(F): 97.9 (24 Jan 2025 08:18), Max: 98.7 (23 Jan 2025 16:14)  HR: 69 (24 Jan 2025 08:18) (69 - 98)  BP: 105/64 (24 Jan 2025 08:18) (103/64 - 139/83)  BP(mean): 78 (24 Jan 2025 08:18) (78 - 99)  RR: 18 (24 Jan 2025 08:18) (16 - 20)  SpO2: 100% (24 Jan 2025 08:18) (97% - 100%)    Parameters below as of 24 Jan 2025 08:18  Patient On (Oxygen Delivery Method): nasal cannula  O2 Flow (L/min): 2    I&O's Detail      PHYSICAL EXAMINATION:    GENERAL: The patient is a well-developed, well-nourished _____in no apparent distress.     HEENT: Head is normocephalic and atraumatic. Extraocular muscles are intact. Mucous membranes are moist.     NECK: Supple.     LUNGS: B/L Rhonchi    HEART: Regular rate and rhythm without murmur.    ABDOMEN: Soft, nontender, and nondistended.  No hepatosplenomegaly is noted.    EXTREMITIES: Without any cyanosis, clubbing, rash, lesions or edema.    NEUROLOGIC: Grossly intact.      LABS:                        10.1   4.97  )-----------( 130      ( 24 Jan 2025 05:00 )             30.8     01-24    134[L]  |  91[L]  |  44[H]  ----------------------------<  471[HH]  3.4[L]   |  20[L]  |  7.67[H]    Ca    7.5[L]      24 Jan 2025 05:00  Phos  3.5     01-24  Mg     2.5     01-24    TPro  6.4  /  Alb  3.3[L]  /  TBili  0.6  /  DBili  x   /  AST  52[H]  /  ALT  34  /  AlkPhos  97  01-24    PT/INR - ( 23 Jan 2025 09:50 )   PT: 11.4 sec;   INR: 0.98 ratio         PTT - ( 23 Jan 2025 09:50 )  PTT:32.1 sec  Urinalysis Basic - ( 24 Jan 2025 05:00 )    Color: x / Appearance: x / SG: x / pH: x  Gluc: 471 mg/dL / Ketone: x  / Bili: x / Urobili: x   Blood: x / Protein: x / Nitrite: x   Leuk Esterase: x / RBC: x / WBC x   Sq Epi: x / Non Sq Epi: x / Bacteria: x      ABG - ( 23 Jan 2025 21:06 )  pH, Arterial: 7.48  pH, Blood: x     /  pCO2: 24    /  pO2: 81    / HCO3: 18    / Base Excess: -4.2  /  SaO2: 97                MICROBIOLOGY:    RADIOLOGY & ADDITIONAL STUDIES:    CXR:  < from: Xray Chest 1 View- PORTABLE-Urgent (Xray Chest 1 View- PORTABLE-Urgent .) (01.23.25 @ 11:23) >  IMPRESSION: COPD. Bibasilar infiltrates left greater than right at this   time.      < end of copied text >    Ct scan chest;    ekg;    echo:

## 2025-01-24 NOTE — PROGRESS NOTE ADULT - PROBLEM SELECTOR PLAN 1
-RVP FluA positive  -c/w solumedrol 40mg IV qd, Duonebs q6 ATC, Advair BID, and albuterol HFA PRN   -cont ceftriaxone and azithromycin for suspected superimposed CAP coverage  -f/u urine strep/legionella, mycoplasma IgM, Procalcitonin   -speech and swallow eval in s/o history of dysphagia and aspiration PNA  -minced and moist diet for now  -Humberto mclaughlin

## 2025-01-24 NOTE — CONSULT NOTE ADULT - PROBLEM SELECTOR RECOMMENDATION 2
Panculture  Bronchodilators  Duonebs 1 unit Q6H PRN,  Advair 2 puffs BID  PFTs as OP. Bronchodilators  Steroids  Oxygen supp  Monitor oxygen sat  Advair 250/50 mcgs 2 puffs BID  PFTs as OP.

## 2025-01-24 NOTE — PROGRESS NOTE ADULT - SUBJECTIVE AND OBJECTIVE BOX
No acute events overnight.  Feels much better, breathing much better.  Coughing but no sputum, not requiring oxygen.  Family at bedside.    Allergies    shellfish (Anaphylaxis)  latex (Unknown)  natural rubber (Unknown)  aspirin (Unknown)  ibuprofen (Unknown)  Mushrooms (Anaphylaxis)    Intolerances    Vital Signs Last 24 Hrs  T(C): 36.8 (24 Jan 2025 11:23), Max: 37.1 (23 Jan 2025 16:14)  T(F): 98.3 (24 Jan 2025 11:23), Max: 98.7 (23 Jan 2025 16:14)  HR: 75 (24 Jan 2025 11:23) (69 - 98)  BP: 103/54 (24 Jan 2025 11:23) (103/54 - 139/83)  BP(mean): 69 (24 Jan 2025 11:23) (69 - 99)  RR: 20 (24 Jan 2025 11:23) (16 - 20)  SpO2: 97% (24 Jan 2025 11:23) (97% - 100%)    Parameters below as of 24 Jan 2025 11:23  Patient On (Oxygen Delivery Method): nasal cannula  O2 Flow (L/min): 2      MedsMEDICATIONS  (STANDING):  albuterol/ipratropium for Nebulization 3 milliLiter(s) Nebulizer every 6 hours  atorvastatin 10 milliGRAM(s) Oral at bedtime  azithromycin  IVPB 500 milliGRAM(s) IV Intermittent every 24 hours  calcium acetate 667 milliGRAM(s) Oral three times a day with meals  cefTRIAXone   IVPB 1000 milliGRAM(s) IV Intermittent every 24 hours  ezetimibe 10 milliGRAM(s) Oral daily  fluticasone propionate/ salmeterol 100-50 MICROgram(s) Diskus 1 Dose(s) Inhalation two times a day  heparin   Injectable 5000 Unit(s) SubCutaneous every 12 hours  insulin glargine Injectable (LANTUS) 6 Unit(s) SubCutaneous at bedtime  insulin lispro (ADMELOG) corrective regimen sliding scale   SubCutaneous three times a day before meals  insulin lispro (ADMELOG) corrective regimen sliding scale   SubCutaneous at bedtime  insulin lispro Injectable (ADMELOG). 10 Unit(s) SubCutaneous once  losartan 25 milliGRAM(s) Oral daily  methylPREDNISolone sodium succinate Injectable 40 milliGRAM(s) IV Push daily  metoprolol tartrate 25 milliGRAM(s) Oral two times a day  oseltamivir 30 milliGRAM(s) Oral <User Schedule>  pancrelipase  (CREON 36,000 Lipase Units) 1 Capsule(s) Oral four times a day with meals  pantoprazole    Tablet 40 milliGRAM(s) Oral before breakfast    MEDICATIONS  (PRN):  acetaminophen     Tablet .. 650 milliGRAM(s) Oral every 6 hours PRN Temp greater or equal to 38C (100.4F), Mild Pain (1 - 3)  albuterol    90 MICROgram(s) HFA Inhaler 2 Puff(s) Inhalation every 6 hours PRN Shortness of Breath and/or Wheezing      PHYSICAL EXAM:  GENERAL: NAD, well-groomed, well-developed  NEURO: AAOx3, ERIKA b/l,  LUNG: bibasilar crackles, no wheezing/rhonchi  HEART: S1, S2, no S3 or S4. Regular rate and rhythm. No murmurs, gallops, or rubs. No JVD  ABDOMEN: Bowel sounds present, abd Soft, Nontender, Nondistended  EXTREMITIES:  2+ Peripheral Pulses b/l, No clubbing, cyanosis, or edema  SKIN: No rashes or lesions    Consultant(s) Notes Reviewed:  [x ] YES  [ ] NO  Care Discussed with Consultants/Other Providers [ x] YES  [ ] NO    LABS:                        10.1   4.97  )-----------( 130      ( 24 Jan 2025 05:00 )             30.8     01-24    134[L]  |  91[L]  |  44[H]  ----------------------------<  471[HH]  3.4[L]   |  20[L]  |  7.67[H]    Ca    7.5[L]      24 Jan 2025 05:00  Phos  3.5     01-24  Mg     2.5     01-24    TPro  6.4  /  Alb  3.3[L]  /  TBili  0.6  /  DBili  x   /  AST  52[H]  /  ALT  34  /  AlkPhos  97  01-24    PT/INR - ( 23 Jan 2025 09:50 )   PT: 11.4 sec;   INR: 0.98 ratio         PTT - ( 23 Jan 2025 09:50 )  PTT:32.1 sec    RADIOLOGY & ADDITIONAL TESTS:    EKG:

## 2025-01-24 NOTE — PATIENT PROFILE ADULT - DO YOU NEED ADDITIONAL SERVICES TO MANAGE ANY OF THESE MEDICAL CONDITIONS AT HOME?
Jaskaran Damon!  I did the review I needed to do to prescribe these safely, and then sent your medications in.  I'll see you next month!  Best,  Dr. Kelley Ramos MD / [facility]      
no

## 2025-01-24 NOTE — PROGRESS NOTE ADULT - PROBLEM SELECTOR PLAN 3
ESRD on HD (MWF) via L AVF  - last dialysis on Wed 1/22  -Nephro consulted: Dr. Pandey  -HD likely this afternoon per Nephro  -phosphate binders per nephro  -Renal diet

## 2025-01-24 NOTE — CONSULT NOTE ADULT - ASSESSMENT
Patient is a 84y Female whom presented to the hospital with cough and shortness of breath. She has H/O ESRD on  dialysis at [facility]. H/O smoking, no diagnosis of COPD. In ED found with diffuse wheezing and influenza A. CXR with some bibasilar infiltrates. Feels better with solumedrol and nebs. On Tamiflu as well. renal consulted for ESRD     # ESRD. resume HD today with UF as tolerated.  # COPD exercebation- improved with steroids and nebs. cont   # influenza A- On Tamiflu dosing for  end stage renal disease   # hyperglycemia sec to steroids- coverage with insulin  # anemia of CKD- epogen on HD

## 2025-01-25 LAB
ANION GAP SERPL CALC-SCNC: 22 MMOL/L — HIGH (ref 5–17)
BUN SERPL-MCNC: 27 MG/DL — HIGH (ref 7–18)
CALCIUM SERPL-MCNC: 7.9 MG/DL — LOW (ref 8.4–10.5)
CHLORIDE SERPL-SCNC: 93 MMOL/L — LOW (ref 96–108)
CO2 SERPL-SCNC: 20 MMOL/L — LOW (ref 22–31)
CREAT SERPL-MCNC: 4.23 MG/DL — HIGH (ref 0.5–1.3)
EGFR: 10 ML/MIN/1.73M2 — LOW
GLUCOSE BLDC GLUCOMTR-MCNC: 211 MG/DL — HIGH (ref 70–99)
GLUCOSE BLDC GLUCOMTR-MCNC: 233 MG/DL — HIGH (ref 70–99)
GLUCOSE BLDC GLUCOMTR-MCNC: 247 MG/DL — HIGH (ref 70–99)
GLUCOSE BLDC GLUCOMTR-MCNC: 298 MG/DL — HIGH (ref 70–99)
GLUCOSE BLDC GLUCOMTR-MCNC: 413 MG/DL — HIGH (ref 70–99)
GLUCOSE BLDC GLUCOMTR-MCNC: 414 MG/DL — HIGH (ref 70–99)
GLUCOSE SERPL-MCNC: 412 MG/DL — HIGH (ref 70–99)
HCT VFR BLD CALC: 29.5 % — LOW (ref 34.5–45)
HGB BLD-MCNC: 9.8 G/DL — LOW (ref 11.5–15.5)
MCHC RBC-ENTMCNC: 30.4 PG — SIGNIFICANT CHANGE UP (ref 27–34)
MCHC RBC-ENTMCNC: 33.2 G/DL — SIGNIFICANT CHANGE UP (ref 32–36)
MCV RBC AUTO: 91.6 FL — SIGNIFICANT CHANGE UP (ref 80–100)
NRBC # BLD: 0 /100 WBCS — SIGNIFICANT CHANGE UP (ref 0–0)
NRBC BLD-RTO: 0 /100 WBCS — SIGNIFICANT CHANGE UP (ref 0–0)
PLATELET # BLD AUTO: 151 K/UL — SIGNIFICANT CHANGE UP (ref 150–400)
POTASSIUM SERPL-MCNC: 3.3 MMOL/L — LOW (ref 3.5–5.3)
POTASSIUM SERPL-SCNC: 3.3 MMOL/L — LOW (ref 3.5–5.3)
RBC # BLD: 3.22 M/UL — LOW (ref 3.8–5.2)
RBC # FLD: 13.2 % — SIGNIFICANT CHANGE UP (ref 10.3–14.5)
SODIUM SERPL-SCNC: 135 MMOL/L — SIGNIFICANT CHANGE UP (ref 135–145)
WBC # BLD: 14.49 K/UL — HIGH (ref 3.8–10.5)
WBC # FLD AUTO: 14.49 K/UL — HIGH (ref 3.8–10.5)

## 2025-01-25 RX ORDER — INSULIN LISPRO 100/ML
VIAL (ML) SUBCUTANEOUS
Refills: 0 | Status: DISCONTINUED | OUTPATIENT
Start: 2025-01-25 | End: 2025-01-27

## 2025-01-25 RX ORDER — INSULIN LISPRO 100/ML
5 VIAL (ML) SUBCUTANEOUS ONCE
Refills: 0 | Status: COMPLETED | OUTPATIENT
Start: 2025-01-25 | End: 2025-01-25

## 2025-01-25 RX ORDER — INSULIN LISPRO 100/ML
VIAL (ML) SUBCUTANEOUS AT BEDTIME
Refills: 0 | Status: DISCONTINUED | OUTPATIENT
Start: 2025-01-25 | End: 2025-01-29

## 2025-01-25 RX ORDER — INSULIN LISPRO 100/ML
7 VIAL (ML) SUBCUTANEOUS
Refills: 0 | Status: DISCONTINUED | OUTPATIENT
Start: 2025-01-25 | End: 2025-01-26

## 2025-01-25 RX ORDER — INSULIN GLARGINE-YFGN 100 [IU]/ML
10 INJECTION, SOLUTION SUBCUTANEOUS AT BEDTIME
Refills: 0 | Status: DISCONTINUED | OUTPATIENT
Start: 2025-01-25 | End: 2025-01-25

## 2025-01-25 RX ORDER — INSULIN GLARGINE-YFGN 100 [IU]/ML
6 INJECTION, SOLUTION SUBCUTANEOUS ONCE
Refills: 0 | Status: COMPLETED | OUTPATIENT
Start: 2025-01-25 | End: 2025-01-25

## 2025-01-25 RX ORDER — INSULIN GLARGINE-YFGN 100 [IU]/ML
4 INJECTION, SOLUTION SUBCUTANEOUS ONCE
Refills: 0 | Status: COMPLETED | OUTPATIENT
Start: 2025-01-25 | End: 2025-01-25

## 2025-01-25 RX ORDER — INSULIN GLARGINE-YFGN 100 [IU]/ML
16 INJECTION, SOLUTION SUBCUTANEOUS AT BEDTIME
Refills: 0 | Status: DISCONTINUED | OUTPATIENT
Start: 2025-01-25 | End: 2025-01-26

## 2025-01-25 RX ADMIN — Medication 7 UNIT(S): at 17:00

## 2025-01-25 RX ADMIN — CALCIUM ACETATE 667 MILLIGRAM(S): 667 CAPSULE ORAL at 12:44

## 2025-01-25 RX ADMIN — CALCIUM ACETATE 667 MILLIGRAM(S): 667 CAPSULE ORAL at 08:47

## 2025-01-25 RX ADMIN — AZITHROMYCIN DIHYDRATE 255 MILLIGRAM(S): 500 TABLET, FILM COATED ORAL at 22:18

## 2025-01-25 RX ADMIN — Medication 5000 UNIT(S): at 17:41

## 2025-01-25 RX ADMIN — CEFTRIAXONE 100 MILLIGRAM(S): 250 INJECTION, POWDER, FOR SOLUTION INTRAMUSCULAR; INTRAVENOUS at 21:22

## 2025-01-25 RX ADMIN — ATORVASTATIN CALCIUM 10 MILLIGRAM(S): 80 TABLET, FILM COATED ORAL at 21:23

## 2025-01-25 RX ADMIN — Medication 5 UNIT(S): at 09:00

## 2025-01-25 RX ADMIN — CALCIUM ACETATE 667 MILLIGRAM(S): 667 CAPSULE ORAL at 17:36

## 2025-01-25 RX ADMIN — Medication 25 MILLIGRAM(S): at 06:30

## 2025-01-25 RX ADMIN — Medication 1 CAPSULE(S): at 12:44

## 2025-01-25 RX ADMIN — FLUTICASONE PROPIONATE AND SALMETEROL 1 DOSE(S): 113; 14 POWDER, METERED RESPIRATORY (INHALATION) at 10:00

## 2025-01-25 RX ADMIN — Medication 5000 UNIT(S): at 06:30

## 2025-01-25 RX ADMIN — FLUTICASONE PROPIONATE AND SALMETEROL 1 DOSE(S): 113; 14 POWDER, METERED RESPIRATORY (INHALATION) at 21:24

## 2025-01-25 RX ADMIN — PANTOPRAZOLE 40 MILLIGRAM(S): 20 TABLET, DELAYED RELEASE ORAL at 06:30

## 2025-01-25 RX ADMIN — INSULIN GLARGINE-YFGN 6 UNIT(S): 100 INJECTION, SOLUTION SUBCUTANEOUS at 17:38

## 2025-01-25 RX ADMIN — ACETAMINOPHEN, DIPHENHYDRAMINE HCL, PHENYLEPHRINE HCL 3 MILLIGRAM(S): 325; 25; 5 TABLET ORAL at 21:23

## 2025-01-25 RX ADMIN — Medication 1 CAPSULE(S): at 17:36

## 2025-01-25 RX ADMIN — INSULIN GLARGINE-YFGN 16 UNIT(S): 100 INJECTION, SOLUTION SUBCUTANEOUS at 22:48

## 2025-01-25 RX ADMIN — Medication 1 CAPSULE(S): at 08:47

## 2025-01-25 RX ADMIN — INSULIN GLARGINE-YFGN 4 UNIT(S): 100 INJECTION, SOLUTION SUBCUTANEOUS at 09:30

## 2025-01-25 RX ADMIN — Medication 40 MILLIGRAM(S): at 06:30

## 2025-01-25 RX ADMIN — Medication 6: at 08:46

## 2025-01-25 RX ADMIN — INSULIN GLARGINE-YFGN 6 UNIT(S): 100 INJECTION, SOLUTION SUBCUTANEOUS at 00:36

## 2025-01-25 RX ADMIN — Medication 10 MILLIGRAM(S): at 12:51

## 2025-01-25 RX ADMIN — Medication 1 CAPSULE(S): at 21:22

## 2025-01-25 RX ADMIN — IPRATROPIUM BROMIDE AND ALBUTEROL SULFATE 3 MILLILITER(S): .5; 2.5 SOLUTION RESPIRATORY (INHALATION) at 10:09

## 2025-01-25 RX ADMIN — Medication 6: at 12:49

## 2025-01-25 NOTE — PROGRESS NOTE ADULT - SUBJECTIVE AND OBJECTIVE BOX
INTERVAL HPI/OVERNIGHT EVENTS:  Patient seen,doing better,less sob  VITAL SIGNS:  T(F): 97.9 (01-25-25 @ 19:09)  HR: 87 (01-25-25 @ 19:09)  BP: 91/51 (01-25-25 @ 19:09)  RR: 18 (01-25-25 @ 19:09)  SpO2: 98% (01-25-25 @ 19:09)  Wt(kg): --    PHYSICAL EXAM:  awake  Constitutional:  Eyes:  ENMT:perrla  Neck:  Respiratory:few rales  Cardiovascular:s12s,m-none  Gastrointestinal:soft,bs pos  Extremities:  Vascular:  Neurological:no focal defiict  Musculoskeletal:    MEDICATIONS  (STANDING):  albuterol/ipratropium for Nebulization 3 milliLiter(s) Nebulizer every 6 hours  atorvastatin 10 milliGRAM(s) Oral at bedtime  azithromycin  IVPB 500 milliGRAM(s) IV Intermittent every 24 hours  calcium acetate 667 milliGRAM(s) Oral three times a day with meals  cefTRIAXone   IVPB 1000 milliGRAM(s) IV Intermittent every 24 hours  ezetimibe 10 milliGRAM(s) Oral daily  fluticasone propionate/ salmeterol 100-50 MICROgram(s) Diskus 1 Dose(s) Inhalation two times a day  heparin   Injectable 5000 Unit(s) SubCutaneous every 12 hours  insulin glargine Injectable (LANTUS) 16 Unit(s) SubCutaneous at bedtime  insulin lispro (ADMELOG) corrective regimen sliding scale   SubCutaneous three times a day before meals  insulin lispro (ADMELOG) corrective regimen sliding scale   SubCutaneous at bedtime  insulin lispro Injectable (ADMELOG) 7 Unit(s) SubCutaneous three times a day before meals  losartan 25 milliGRAM(s) Oral daily  melatonin 3 milliGRAM(s) Oral at bedtime  methylPREDNISolone sodium succinate Injectable 40 milliGRAM(s) IV Push daily  metoprolol tartrate 25 milliGRAM(s) Oral two times a day  oseltamivir 30 milliGRAM(s) Oral <User Schedule>  pancrelipase  (CREON 36,000 Lipase Units) 1 Capsule(s) Oral four times a day with meals  pantoprazole    Tablet 40 milliGRAM(s) Oral before breakfast    MEDICATIONS  (PRN):  acetaminophen     Tablet .. 650 milliGRAM(s) Oral every 6 hours PRN Temp greater or equal to 38C (100.4F), Mild Pain (1 - 3)  albuterol    90 MICROgram(s) HFA Inhaler 2 Puff(s) Inhalation every 6 hours PRN Shortness of Breath and/or Wheezing      Allergies    shellfish (Anaphylaxis)  latex (Unknown)  natural rubber (Unknown)  aspirin (Unknown)  ibuprofen (Unknown)  Mushrooms (Anaphylaxis)    Intolerances        LABS:                        9.8    14.49 )-----------( 151      ( 25 Jan 2025 06:11 )             29.5     01-25    135  |  93[L]  |  27[H]  ----------------------------<  412[H]  3.3[L]   |  20[L]  |  4.23[H]    Ca    7.9[L]      25 Jan 2025 06:11  Phos  3.5     01-24  Mg     2.5     01-24    TPro  6.4  /  Alb  3.3[L]  /  TBili  0.6  /  DBili  x   /  AST  52[H]  /  ALT  34  /  AlkPhos  97  01-24      Urinalysis Basic - ( 25 Jan 2025 06:11 )    Color: x / Appearance: x / SG: x / pH: x  Gluc: 412 mg/dL / Ketone: x  / Bili: x / Urobili: x   Blood: x / Protein: x / Nitrite: x   Leuk Esterase: x / RBC: x / WBC x   Sq Epi: x / Non Sq Epi: x / Bacteria: x        RADIOLOGY & ADDITIONAL TESTS:       Assessment:  · Assessment	  84y F with PMH of Gout, HTN, HLD, DM, OA, ESRD on HD (MWF), Hemochromatosis. and CHF presenting with shortness of breath. Admitted for suspected acute exacerbation of COPD 2/2 Influenza A infection vs. superimposed bacterial PNA possibly 2/2 aspiration. Admitted to telemetry for monitoring of possible heart block seen on telemetry in ED.        Problem/Plan - 1:  ·  Problem: COPD with acute exacerbation.   ·  Plan: p/w shortness of breath, increased work of breathing and diffuse wheezing on exam  -denies known history of COPD however suspect underlying obstructive lung disease based on presentation and history. (e.g lung hyperinflation, former smoker, 9/11 occupational exposure)  -RVP FluA positive  -CXR: COPD. Bibasilar infiltrates left greater than right at this time  -s/p duoneb x1 and soulmedrol 125mg IV STAT in ED with improvement in symptoms  -c/w solumedrol 40mg IV qd, Duonebs q6 ATC, Advair BID, and albuterol HFA PRN   -start ceftriaxone and azithromycin for suspected superimposed CAP coverage  -f/u urine strep/legionella, mycoplasma IgM, Procalcitonin   -speech and swallow eval in s/o history of dysphagia and aspiration PNA  -minced and moist diet for now  -Pulm Consult in AM.     Problem/Plan - 2:  ·  Problem: Influenza A.   ·  Plan: as above   start tamiflu 30mg every other day (ESRD dose adjustment) x 5 days.     Problem/Plan - 3:  ·  Problem: ESRD (end stage renal disease) on dialysis.   ·  Plan: ESRD on HD (MWF) via L AVF  - last dialysis on Wed 1/22  -Nephro consulted: Dr. Elise  -HD as per nephro  -phosphate binders per nephro  -Renal diet  -follows with Dr. Michael Valdes at [facility], [address]     Problem/Plan - 4:  ·  Problem: CHF (congestive heart failure).   ·  Plan: hx of CHF on Losartan and lopressor for GDMT  -last TTE (5/2024): mildly decreased EF 48%, Grade 1 diastolic dysfunction, LVH  -CXR with out signs of pulmonary vascular congestion  -continue with home GDMT with parameters  -monitor for fluid overload/pulm edema  -EKG shows NSR, LVH, prolonged QT  -per ED provider note "runs of heart block per war room"--unable to review in ED  -history of VTach per [facility] discharge summary  -Admit to telemetry  -c/w scheduled dialysis for fluid removal.     Problem/Plan - 5:  ·  Problem: DM (diabetes mellitus).   ·  Plan: h/o DM on repaglinide and Toujeo 9U (tues, thur, sun) 11U (Mon, Wed, Fri, Sat   - hold oral dm meds  -f/u A1c  -c/w lantus 6U qhs + low sliding scale insulin  -Adjust insulin as indicated  -FS ACHS.     Problem/Plan - 6:  ·  Problem: HTN (hypertension).   ·  Plan: h/o HTN on losartan and metoprolol  -c/w home meds   -monitor BP  -adjust antihypertensive meds as appropriate.     Problem/Plan - 7:  ·  Problem: HLD (hyperlipidemia).   ·  Plan: h/o HLD on atorvastatin/ezetimibe   -c/w home med  -f/u lipid profile  -calculate ASCVD risk  -DASH diet.     Problem/Plan - 8:  ·  Problem: Prophylactic measure.   ·  Plan: DVT: Heparin q12.

## 2025-01-25 NOTE — PROGRESS NOTE ADULT - PROBLEM SELECTOR PLAN 1
Droplets precautions  Analgesics PRN  Robitussin 10 cc po TID PRN  Tamiflu 30 mgs po daily x 5 days.  CT Chest w/o contrast.

## 2025-01-25 NOTE — SWALLOW BEDSIDE ASSESSMENT ADULT - COMMENTS
Awake, alert, and oriented x3, able to answer Y/N questions, make medical wants and needs known, and follow commands. HOB elevated to 90°.

## 2025-01-25 NOTE — CHART NOTE - NSCHARTNOTEFT_GEN_A_CORE
Please see consult later    Increase Lantus to 16 units at bedtime. Give additional lantus units now    Start moderate lispro correctional scale with meals and bedtime    Continue Lispro 7 units before meals    Discussed endocrine plan of care with primary team

## 2025-01-25 NOTE — PROGRESS NOTE ADULT - PROBLEM SELECTOR PLAN 3
panculture  Antibiotics  monitor WBC and Temp  CT Chest w/o contrast. Check pending Cultures  Antibiotics  monitor WBC and Temp  CT Chest w/o contrast.

## 2025-01-25 NOTE — PROGRESS NOTE ADULT - SUBJECTIVE AND OBJECTIVE BOX
[facility] : Progress Note :: [phone], (office [phone]),   Dr Pandey / Dr Mi / Dr Massey / Dr Etienne / Dr Cassandra DA SILVA / Dr Burt / Dr Garber / Dr Naeem lam  _____________________________________________________________________________________________  feels better with no wheezing  feels jittery sec to steroids     shellfish (Anaphylaxis)  latex (Unknown)  natural rubber (Unknown)  aspirin (Unknown)  ibuprofen (Unknown)  Mushrooms (Anaphylaxis)    Hospital Medications:   MEDICATIONS  (STANDING):  albuterol/ipratropium for Nebulization 3 milliLiter(s) Nebulizer every 6 hours  atorvastatin 10 milliGRAM(s) Oral at bedtime  azithromycin  IVPB 500 milliGRAM(s) IV Intermittent every 24 hours  calcium acetate 667 milliGRAM(s) Oral three times a day with meals  cefTRIAXone   IVPB 1000 milliGRAM(s) IV Intermittent every 24 hours  ezetimibe 10 milliGRAM(s) Oral daily  fluticasone propionate/ salmeterol 100-50 MICROgram(s) Diskus 1 Dose(s) Inhalation two times a day  heparin   Injectable 5000 Unit(s) SubCutaneous every 12 hours  insulin glargine Injectable (LANTUS) 6 Unit(s) SubCutaneous at bedtime  insulin lispro (ADMELOG) corrective regimen sliding scale   SubCutaneous three times a day before meals  insulin lispro (ADMELOG) corrective regimen sliding scale   SubCutaneous at bedtime  losartan 25 milliGRAM(s) Oral daily  melatonin 3 milliGRAM(s) Oral at bedtime  methylPREDNISolone sodium succinate Injectable 40 milliGRAM(s) IV Push daily  metoprolol tartrate 25 milliGRAM(s) Oral two times a day  oseltamivir 30 milliGRAM(s) Oral <User Schedule>  pancrelipase  (CREON 36,000 Lipase Units) 1 Capsule(s) Oral four times a day with meals  pantoprazole    Tablet 40 milliGRAM(s) Oral before breakfast        VITALS:  T(F): 98.6 (01-25-25 @ 11:16), Max: 99.3 (01-24-25 @ 23:26)  HR: 86 (01-25-25 @ 11:16)  BP: 92/78 (01-25-25 @ 11:16)  RR: 18 (01-25-25 @ 11:16)  SpO2: 97% (01-25-25 @ 11:16)  Wt(kg): --      PHYSICAL EXAM:  Constitutional: NAD  HEENT: anicteric sclera, oropharynx clear  Neck: No JVD  Respiratory: CTAB, no wheezes, rales or rhonchi  Cardiovascular: S1, S2, RRR  Gastrointestinal: BS+, soft, NT/ND  Extremities:  No peripheral edema  Neurological: A/O x 3, no focal deficits  : No CVA tenderness. No dickson.   Skin: No rashes  Vascular Access: AVF with thrill and bruit    LABS:  01-25    135  |  93[L]  |  27[H]  ----------------------------<  412[H]  3.3[L]   |  20[L]  |  4.23[H]    Ca    7.9[L]      25 Jan 2025 06:11  Phos  3.5     01-24  Mg     2.5     01-24    TPro  6.4  /  Alb  3.3[L]  /  TBili  0.6  /  DBili      /  AST  52[H]  /  ALT  34  /  AlkPhos  97  01-24    Creatinine Trend: 4.23 <--, 7.67 <--, 7.35 <--, 5.69 <--                        9.8    14.49 )-----------( 151      ( 25 Jan 2025 06:11 )             29.5     Urine Studies:  Urinalysis Basic - ( 25 Jan 2025 06:11 )    Color:  / Appearance:  / SG:  / pH:   Gluc: 412 mg/dL / Ketone:   / Bili:  / Urobili:    Blood:  / Protein:  / Nitrite:    Leuk Esterase:  / RBC:  / WBC    Sq Epi:  / Non Sq Epi:  / Bacteria:         RADIOLOGY & ADDITIONAL STUDIES:

## 2025-01-25 NOTE — PROGRESS NOTE ADULT - PROBLEM SELECTOR PLAN 2
Bronchodilators  Steroids  Oxygen supp  Monitor oxygen sat  Advair 250/50 mcgs 2 puffs BID  PFTs as OP. Bronchodilators  Steroids  Oxygen supp  Monitor oxygen sat  Advair 250/50 mcgs 2 puffs BID  Spiriva  PFTs as OP.

## 2025-01-25 NOTE — SWALLOW BEDSIDE ASSESSMENT ADULT - SLP PERTINENT HISTORY OF CURRENT PROBLEM
Patient is a 84y Female whom presented to the hospital with cough and shortness of breath. She has H/O ESRD on  dialysis at [facility] ([place], [place]). H/O smoking, no diagnosis of COPD. In ED found with diffuse wheezing and influenza A. CXR with some bibasilar infiltrates.

## 2025-01-25 NOTE — CHART NOTE - NSCHARTNOTEFT_GEN_A_CORE
Noted with elevated blood glucose levels 300s-400s  Insulin regimen adjust in AM without much change in fingersticks  Endo Dr. Castellon consulted   Reccs: **** Noted with elevated blood glucose levels 300s-400s  Insulin regimen adjust in AM without much change in fingersticks  Endo Dr. Castellon consulted   Reccs:   increase lantus to 16 U  c/w admelog with meals 7U  give 1 x dose of 6 U additional lantus

## 2025-01-25 NOTE — PROGRESS NOTE ADULT - SUBJECTIVE AND OBJECTIVE BOX
Patient is a 84y old  Female who presents with a chief complaint of shortness of breath (24 Jan 2025 12:18)  Patient  awake, alert, laying in bed in NAD.    INTERVAL HPI/OVERNIGHT EVENTS:      VITAL SIGNS:  T(F): 98.1 (01-25-25 @ 07:30)  HR: 70 (01-25-25 @ 07:30)  BP: 91/53 (01-25-25 @ 07:30)  RR: 18 (01-25-25 @ 07:30)  SpO2: 98% (01-25-25 @ 07:30)  Wt(kg): --  I&O's Detail          REVIEW OF SYSTEMS:    CONSTITUTIONAL:  No fevers, chills, sweats    HEENT:  Eyes:  No diplopia or blurred vision. ENT:  No earache, sore throat or runny nose.    CARDIOVASCULAR:  No pressure, squeezing, tightness, or heaviness about the chest; no palpitations.    RESPIRATORY:  Per HPI    GASTROINTESTINAL:  No abdominal pain, nausea, vomiting or diarrhea.    GENITOURINARY:  No dysuria, frequency or urgency.    NEUROLOGIC:  No paresthesias, fasciculations, seizures or weakness.    PSYCHIATRIC:  No disorder of thought or mood.      PHYSICAL EXAM:    Constitutional: Well developed and nourished  Eyes:Perrla  ENMT: normal  Neck:supple  Respiratory: Rhonchi B/L  Cardiovascular: S1 S2 regular  Gastrointestinal: Soft, Non tender  Extremities: No edema  Vascular:normal  Neurological:Awake, alert,Ox3  Musculoskeletal:Normal      MEDICATIONS  (STANDING):  albuterol/ipratropium for Nebulization 3 milliLiter(s) Nebulizer every 6 hours  atorvastatin 10 milliGRAM(s) Oral at bedtime  azithromycin  IVPB 500 milliGRAM(s) IV Intermittent every 24 hours  calcium acetate 667 milliGRAM(s) Oral three times a day with meals  cefTRIAXone   IVPB 1000 milliGRAM(s) IV Intermittent every 24 hours  ezetimibe 10 milliGRAM(s) Oral daily  fluticasone propionate/ salmeterol 100-50 MICROgram(s) Diskus 1 Dose(s) Inhalation two times a day  heparin   Injectable 5000 Unit(s) SubCutaneous every 12 hours  insulin glargine Injectable (LANTUS) 6 Unit(s) SubCutaneous at bedtime  insulin lispro (ADMELOG) corrective regimen sliding scale   SubCutaneous three times a day before meals  insulin lispro (ADMELOG) corrective regimen sliding scale   SubCutaneous at bedtime  losartan 25 milliGRAM(s) Oral daily  melatonin 3 milliGRAM(s) Oral at bedtime  methylPREDNISolone sodium succinate Injectable 40 milliGRAM(s) IV Push daily  metoprolol tartrate 25 milliGRAM(s) Oral two times a day  oseltamivir 30 milliGRAM(s) Oral <User Schedule>  pancrelipase  (CREON 36,000 Lipase Units) 1 Capsule(s) Oral four times a day with meals  pantoprazole    Tablet 40 milliGRAM(s) Oral before breakfast    MEDICATIONS  (PRN):  acetaminophen     Tablet .. 650 milliGRAM(s) Oral every 6 hours PRN Temp greater or equal to 38C (100.4F), Mild Pain (1 - 3)  albuterol    90 MICROgram(s) HFA Inhaler 2 Puff(s) Inhalation every 6 hours PRN Shortness of Breath and/or Wheezing      Allergies    shellfish (Anaphylaxis)  latex (Unknown)  natural rubber (Unknown)  aspirin (Unknown)  ibuprofen (Unknown)  Mushrooms (Anaphylaxis)    Intolerances        LABS:                        9.8    14.49 )-----------( 151      ( 25 Jan 2025 06:11 )             29.5     01-25    135  |  93[L]  |  27[H]  ----------------------------<  412[H]  3.3[L]   |  20[L]  |  4.23[H]    Ca    7.9[L]      25 Jan 2025 06:11  Phos  3.5     01-24  Mg     2.5     01-24    TPro  6.4  /  Alb  3.3[L]  /  TBili  0.6  /  DBili  x   /  AST  52[H]  /  ALT  34  /  AlkPhos  97  01-24    PT/INR - ( 23 Jan 2025 09:50 )   PT: 11.4 sec;   INR: 0.98 ratio         PTT - ( 23 Jan 2025 09:50 )  PTT:32.1 sec  Urinalysis Basic - ( 25 Jan 2025 06:11 )    Color: x / Appearance: x / SG: x / pH: x  Gluc: 412 mg/dL / Ketone: x  / Bili: x / Urobili: x   Blood: x / Protein: x / Nitrite: x   Leuk Esterase: x / RBC: x / WBC x   Sq Epi: x / Non Sq Epi: x / Bacteria: x      ABG - ( 23 Jan 2025 21:06 )  pH, Arterial: 7.48  pH, Blood: x     /  pCO2: 24    /  pO2: 81    / HCO3: 18    / Base Excess: -4.2  /  SaO2: 97                    CAPILLARY BLOOD GLUCOSE      POCT Blood Glucose.: 413 mg/dL (25 Jan 2025 08:10)  POCT Blood Glucose.: 233 mg/dL (25 Jan 2025 00:34)  POCT Blood Glucose.: 201 mg/dL (24 Jan 2025 21:27)  POCT Blood Glucose.: 451 mg/dL (24 Jan 2025 11:49)  POCT Blood Glucose.: 432 mg/dL (24 Jan 2025 11:48)        RADIOLOGY & ADDITIONAL TESTS:    CXR:    Ct scan chest:    ekg;    echo: Patient is a 84y old  Female who presents with a chief complaint of shortness of breath (24 Jan 2025 12:18)  Patient  awake, alert, laying in bed in NAD. Feeling better    INTERVAL HPI/OVERNIGHT EVENTS:      VITAL SIGNS:  T(F): 98.1 (01-25-25 @ 07:30)  HR: 70 (01-25-25 @ 07:30)  BP: 91/53 (01-25-25 @ 07:30)  RR: 18 (01-25-25 @ 07:30)  SpO2: 98% (01-25-25 @ 07:30)  Wt(kg): --  I&O's Detail          REVIEW OF SYSTEMS:    CONSTITUTIONAL:  No fevers, chills, sweats    HEENT:  Eyes:  No diplopia or blurred vision. ENT:  No earache, sore throat or runny nose.    CARDIOVASCULAR:  No pressure, squeezing, tightness, or heaviness about the chest; no palpitations.    RESPIRATORY:  Per HPI    GASTROINTESTINAL:  No abdominal pain, nausea, vomiting or diarrhea.    GENITOURINARY:  No dysuria, frequency or urgency.    NEUROLOGIC:  No paresthesias, fasciculations, seizures or weakness.    PSYCHIATRIC:  No disorder of thought or mood.      PHYSICAL EXAM:    Constitutional: Well developed and nourished  Eyes:Perrla  ENMT: normal  Neck:supple  Respiratory: Rhonchi B/L  Cardiovascular: S1 S2 regular  Gastrointestinal: Soft, Non tender  Extremities: No edema  Vascular:normal  Neurological:Awake, alert,Ox3  Musculoskeletal:Normal      MEDICATIONS  (STANDING):  albuterol/ipratropium for Nebulization 3 milliLiter(s) Nebulizer every 6 hours  atorvastatin 10 milliGRAM(s) Oral at bedtime  azithromycin  IVPB 500 milliGRAM(s) IV Intermittent every 24 hours  calcium acetate 667 milliGRAM(s) Oral three times a day with meals  cefTRIAXone   IVPB 1000 milliGRAM(s) IV Intermittent every 24 hours  ezetimibe 10 milliGRAM(s) Oral daily  fluticasone propionate/ salmeterol 100-50 MICROgram(s) Diskus 1 Dose(s) Inhalation two times a day  heparin   Injectable 5000 Unit(s) SubCutaneous every 12 hours  insulin glargine Injectable (LANTUS) 6 Unit(s) SubCutaneous at bedtime  insulin lispro (ADMELOG) corrective regimen sliding scale   SubCutaneous three times a day before meals  insulin lispro (ADMELOG) corrective regimen sliding scale   SubCutaneous at bedtime  losartan 25 milliGRAM(s) Oral daily  melatonin 3 milliGRAM(s) Oral at bedtime  methylPREDNISolone sodium succinate Injectable 40 milliGRAM(s) IV Push daily  metoprolol tartrate 25 milliGRAM(s) Oral two times a day  oseltamivir 30 milliGRAM(s) Oral <User Schedule>  pancrelipase  (CREON 36,000 Lipase Units) 1 Capsule(s) Oral four times a day with meals  pantoprazole    Tablet 40 milliGRAM(s) Oral before breakfast    MEDICATIONS  (PRN):  acetaminophen     Tablet .. 650 milliGRAM(s) Oral every 6 hours PRN Temp greater or equal to 38C (100.4F), Mild Pain (1 - 3)  albuterol    90 MICROgram(s) HFA Inhaler 2 Puff(s) Inhalation every 6 hours PRN Shortness of Breath and/or Wheezing      Allergies    shellfish (Anaphylaxis)  latex (Unknown)  natural rubber (Unknown)  aspirin (Unknown)  ibuprofen (Unknown)  Mushrooms (Anaphylaxis)    Intolerances        LABS:                        9.8    14.49 )-----------( 151      ( 25 Jan 2025 06:11 )             29.5     01-25    135  |  93[L]  |  27[H]  ----------------------------<  412[H]  3.3[L]   |  20[L]  |  4.23[H]    Ca    7.9[L]      25 Jan 2025 06:11  Phos  3.5     01-24  Mg     2.5     01-24    TPro  6.4  /  Alb  3.3[L]  /  TBili  0.6  /  DBili  x   /  AST  52[H]  /  ALT  34  /  AlkPhos  97  01-24    PT/INR - ( 23 Jan 2025 09:50 )   PT: 11.4 sec;   INR: 0.98 ratio         PTT - ( 23 Jan 2025 09:50 )  PTT:32.1 sec  Urinalysis Basic - ( 25 Jan 2025 06:11 )    Color: x / Appearance: x / SG: x / pH: x  Gluc: 412 mg/dL / Ketone: x  / Bili: x / Urobili: x   Blood: x / Protein: x / Nitrite: x   Leuk Esterase: x / RBC: x / WBC x   Sq Epi: x / Non Sq Epi: x / Bacteria: x      ABG - ( 23 Jan 2025 21:06 )  pH, Arterial: 7.48  pH, Blood: x     /  pCO2: 24    /  pO2: 81    / HCO3: 18    / Base Excess: -4.2  /  SaO2: 97                    CAPILLARY BLOOD GLUCOSE      POCT Blood Glucose.: 413 mg/dL (25 Jan 2025 08:10)  POCT Blood Glucose.: 233 mg/dL (25 Jan 2025 00:34)  POCT Blood Glucose.: 201 mg/dL (24 Jan 2025 21:27)  POCT Blood Glucose.: 451 mg/dL (24 Jan 2025 11:49)  POCT Blood Glucose.: 432 mg/dL (24 Jan 2025 11:48)        RADIOLOGY & ADDITIONAL TESTS:    CXR:  < from: Xray Chest 1 View- PORTABLE-Urgent (Xray Chest 1 View- PORTABLE-Urgent .) (01.23.25 @ 11:23) >  IMPRESSION: COPD. Bibasilar infiltrates left greater than right at this   time.      < end of copied text >    Ct scan chest:    ekg;    echo:

## 2025-01-25 NOTE — SWALLOW BEDSIDE ASSESSMENT ADULT - CONSISTENCIES ADMINISTERED
x3 tsp/minced & moist x3 tsp/soft & bite-sized consecutive cup sips/thin liquid regular cookie x3 bites/regular solid

## 2025-01-26 DIAGNOSIS — I27.20 PULMONARY HYPERTENSION, UNSPECIFIED: ICD-10-CM

## 2025-01-26 LAB
ANION GAP SERPL CALC-SCNC: 21 MMOL/L — HIGH (ref 5–17)
BUN SERPL-MCNC: 57 MG/DL — HIGH (ref 7–18)
CALCIUM SERPL-MCNC: 7.6 MG/DL — LOW (ref 8.4–10.5)
CHLORIDE SERPL-SCNC: 86 MMOL/L — LOW (ref 96–108)
CO2 SERPL-SCNC: 20 MMOL/L — LOW (ref 22–31)
CREAT SERPL-MCNC: 6.74 MG/DL — HIGH (ref 0.5–1.3)
EGFR: 6 ML/MIN/1.73M2 — LOW
GLUCOSE BLDC GLUCOMTR-MCNC: 188 MG/DL — HIGH (ref 70–99)
GLUCOSE BLDC GLUCOMTR-MCNC: 246 MG/DL — HIGH (ref 70–99)
GLUCOSE BLDC GLUCOMTR-MCNC: 256 MG/DL — HIGH (ref 70–99)
GLUCOSE BLDC GLUCOMTR-MCNC: 359 MG/DL — HIGH (ref 70–99)
GLUCOSE SERPL-MCNC: 379 MG/DL — HIGH (ref 70–99)
HCT VFR BLD CALC: 28.7 % — LOW (ref 34.5–45)
HGB BLD-MCNC: 10.2 G/DL — LOW (ref 11.5–15.5)
MCHC RBC-ENTMCNC: 30.9 PG — SIGNIFICANT CHANGE UP (ref 27–34)
MCHC RBC-ENTMCNC: 35.5 G/DL — SIGNIFICANT CHANGE UP (ref 32–36)
MCV RBC AUTO: 87 FL — SIGNIFICANT CHANGE UP (ref 80–100)
NRBC # BLD: 0 /100 WBCS — SIGNIFICANT CHANGE UP (ref 0–0)
NRBC BLD-RTO: 0 /100 WBCS — SIGNIFICANT CHANGE UP (ref 0–0)
PLATELET # BLD AUTO: 165 K/UL — SIGNIFICANT CHANGE UP (ref 150–400)
POTASSIUM SERPL-MCNC: 3.3 MMOL/L — LOW (ref 3.5–5.3)
POTASSIUM SERPL-SCNC: 3.3 MMOL/L — LOW (ref 3.5–5.3)
RBC # BLD: 3.3 M/UL — LOW (ref 3.8–5.2)
RBC # FLD: 13.1 % — SIGNIFICANT CHANGE UP (ref 10.3–14.5)
SODIUM SERPL-SCNC: 127 MMOL/L — LOW (ref 135–145)
WBC # BLD: 11.97 K/UL — HIGH (ref 3.8–10.5)
WBC # FLD AUTO: 11.97 K/UL — HIGH (ref 3.8–10.5)

## 2025-01-26 RX ORDER — INSULIN LISPRO 100/ML
12 VIAL (ML) SUBCUTANEOUS
Refills: 0 | Status: DISCONTINUED | OUTPATIENT
Start: 2025-01-26 | End: 2025-01-28

## 2025-01-26 RX ORDER — INSULIN GLARGINE-YFGN 100 [IU]/ML
20 INJECTION, SOLUTION SUBCUTANEOUS AT BEDTIME
Refills: 0 | Status: DISCONTINUED | OUTPATIENT
Start: 2025-01-26 | End: 2025-01-27

## 2025-01-26 RX ORDER — POTASSIUM CHLORIDE 750 MG/1
10 TABLET, EXTENDED RELEASE ORAL ONCE
Refills: 0 | Status: COMPLETED | OUTPATIENT
Start: 2025-01-26 | End: 2025-01-26

## 2025-01-26 RX ADMIN — FLUTICASONE PROPIONATE AND SALMETEROL 1 DOSE(S): 113; 14 POWDER, METERED RESPIRATORY (INHALATION) at 09:01

## 2025-01-26 RX ADMIN — Medication 1 CAPSULE(S): at 21:08

## 2025-01-26 RX ADMIN — PANTOPRAZOLE 40 MILLIGRAM(S): 20 TABLET, DELAYED RELEASE ORAL at 06:17

## 2025-01-26 RX ADMIN — Medication 10 MILLIGRAM(S): at 12:17

## 2025-01-26 RX ADMIN — POTASSIUM CHLORIDE 10 MILLIEQUIVALENT(S): 750 TABLET, EXTENDED RELEASE ORAL at 18:08

## 2025-01-26 RX ADMIN — CALCIUM ACETATE 667 MILLIGRAM(S): 667 CAPSULE ORAL at 17:15

## 2025-01-26 RX ADMIN — Medication 12 UNIT(S): at 17:16

## 2025-01-26 RX ADMIN — CEFTRIAXONE 100 MILLIGRAM(S): 250 INJECTION, POWDER, FOR SOLUTION INTRAMUSCULAR; INTRAVENOUS at 23:03

## 2025-01-26 RX ADMIN — OSELTAMIVIR PHOSPHATE 30 MILLIGRAM(S): 75 CAPSULE ORAL at 06:17

## 2025-01-26 RX ADMIN — Medication 4: at 17:16

## 2025-01-26 RX ADMIN — Medication 7 UNIT(S): at 12:15

## 2025-01-26 RX ADMIN — Medication 40 MILLIGRAM(S): at 06:17

## 2025-01-26 RX ADMIN — Medication 1 CAPSULE(S): at 17:17

## 2025-01-26 RX ADMIN — INSULIN GLARGINE-YFGN 20 UNIT(S): 100 INJECTION, SOLUTION SUBCUTANEOUS at 21:14

## 2025-01-26 RX ADMIN — Medication 5000 UNIT(S): at 17:15

## 2025-01-26 RX ADMIN — Medication 1 CAPSULE(S): at 12:17

## 2025-01-26 RX ADMIN — Medication 5000 UNIT(S): at 06:17

## 2025-01-26 RX ADMIN — Medication 6: at 08:52

## 2025-01-26 RX ADMIN — ACETAMINOPHEN, DIPHENHYDRAMINE HCL, PHENYLEPHRINE HCL 3 MILLIGRAM(S): 325; 25; 5 TABLET ORAL at 21:08

## 2025-01-26 RX ADMIN — Medication 10: at 12:15

## 2025-01-26 RX ADMIN — FLUTICASONE PROPIONATE AND SALMETEROL 1 DOSE(S): 113; 14 POWDER, METERED RESPIRATORY (INHALATION) at 21:14

## 2025-01-26 RX ADMIN — Medication 1 CAPSULE(S): at 08:57

## 2025-01-26 RX ADMIN — ATORVASTATIN CALCIUM 10 MILLIGRAM(S): 80 TABLET, FILM COATED ORAL at 21:08

## 2025-01-26 RX ADMIN — AZITHROMYCIN DIHYDRATE 255 MILLIGRAM(S): 500 TABLET, FILM COATED ORAL at 21:57

## 2025-01-26 RX ADMIN — CALCIUM ACETATE 667 MILLIGRAM(S): 667 CAPSULE ORAL at 08:57

## 2025-01-26 RX ADMIN — Medication 25 MILLIGRAM(S): at 17:26

## 2025-01-26 RX ADMIN — Medication 7 UNIT(S): at 08:52

## 2025-01-26 RX ADMIN — CALCIUM ACETATE 667 MILLIGRAM(S): 667 CAPSULE ORAL at 12:18

## 2025-01-26 NOTE — PROGRESS NOTE ADULT - PROBLEM SELECTOR PLAN 2
Bronchodilators  Steroids  Oxygen supp  Monitor oxygen sat  Advair 250/50 mcgs 2 puffs BID  Spiriva  PFTs as OP.

## 2025-01-26 NOTE — DISCHARGE NOTE PROVIDER - NSDCCPCAREPLAN_GEN_ALL_CORE_FT
PRINCIPAL DISCHARGE DIAGNOSIS  Diagnosis: COPD with acute exacerbation  Assessment and Plan of Treatment: Call your Health Care provider upon arrival home to make a follow up appointment within one week.  Take all inhalers as prescribed by your Health Care Provider.  Take steroids as prescribed by your Health Care Provider.  If your cough increases infrequency and severity and/or you have shortness of breath or increased shortness of breath call your Health Care Provider.  If you develop fever, chills, night sweats, malaise, and/or change in mental status call your Health care Provider.  Nutrition is very important.  Eat small frequent meals.  Increase your activity as tolerated.  Do not stay in bed all day        SECONDARY DISCHARGE DIAGNOSES  Diagnosis: Influenza A  Assessment and Plan of Treatment: You tested positive for influenza A. Influenza (the flu) is an infection caused by the influenza virus. The virus spreads through direct contact with someone who has the flu.   Rest as much as you can to help you recover. Drink liquids as directed to help prevent dehydration. Wash your hands often.   Call your local emergency number (911 in the [place]) if:  You have trouble breathing, and your lips look purple or blue.  You have a seizure.  You were seen by a pulmonologist and started on Tamiflu. Please continue your Tamiflu to complete your 5 day course.   Please avoid sick contacts.  Continue with covering your cough and good hand washing practices.  Maintain adequate nutrition, hydration , rest, and activity as tolerated.   Follow-up with your primary care physician within 1 week. Call for appointment.      Diagnosis: PNA (pneumonia)  Assessment and Plan of Treatment: Pneumonia is a lung infection that can cause a fever, cough, and trouble breathing.  Continue all antibiotics as ordered until complete.  Nutrition is important, eat small frequent meals.  Get lots of rest and drink fluids.  Call your health care provider upon arrival home from hospital and make a follow up appointment for one week.  If your cough worsens, you develop fever greater than 101', you have shaking chills, a fast heartbeat, trouble breathing and/or feel your are breathing much faster than usual, call your healthcare provider.  Make sure you wash your hands frequently.      Diagnosis: DM (diabetes mellitus)  Assessment and Plan of Treatment: Continue to follow with your primary care MD or your endocrinologist. Discuss what the goal hemoglobin A1C level is for you.  Follow a heart healthy diabetic diet. If you check your fingerstick glucose at home, call your MD if it is greater than 250mg/dL on 2 occasions or less than 100mg/dL on 2 occasions. Know signs of low blood sugar, such as: dizziness, shakiness, sweating, confusion, hunger, nervousness- drink 4 ounces apple juice if occurs and call your doctor. Know early signs of high blood sugar, such as: frequent urination, increased thirst, blurry vision, fatigue, headache - call your doctor if this occurs.      Diagnosis: ESRD (end stage renal disease) on dialysis  Assessment and Plan of Treatment: Social work has arranged the resumption of your dialysis in the community.  Please be sure to report to your dialysis center at your appointed time. Avoid taking (NSAIDs) - (ex: Ibuprofen, Advil, Celebrex, Naprosyn)  Avoid taking any nephrotoxic agents (can harm kidneys) - Intravenous contrast for diagnostic testing, combination cold medications.  Have all medications adjusted for your renal function by your Health Care Provider.  Blood pressure control is important.  Take all medication as prescribed.      Diagnosis: HLD (hyperlipidemia)  Assessment and Plan of Treatment: You have a history of hyperlipidemia, which is when you have too much cholesterol in your blood. High amounts of cholesterol in your blood can put you at higher risks for heart attck, strokes and other health problems. Follow up with PCP for treatment goals, continue medication as prescribed, have liver function testing every 3 months as anti lipid medications can cause liver irritation, eat low fat meals, avoid red meat, butter, fried foods and cheese. Get daily exercise.      Diagnosis: HTN (hypertension)  Assessment and Plan of Treatment: You have a history of high blood pressure. High blood pressure is a condition that puts you at risk for heart attack, stroke and kidney disease. Please continue to take your medications as prescribed. You can also help control your blood pressure by maintaining a healthy weight, eating a diet low in fat and rich in fruits and vegetables, reduce the amount of salt in your diet. Also, reduce alcohol and try to include some form of physical activity daily for at least 30 mins. Follow up with your medical doctor to establish long term blood pressure treatment goals.  Notify your doctor if you have any of the following symptoms:   Dizziness, Lightheadedness, Blurry vision, Headache, Chest pain, Shortness of breath       PRINCIPAL DISCHARGE DIAGNOSIS  Diagnosis: COPD with acute exacerbation  Assessment and Plan of Treatment: Call your Health Care provider upon arrival home to make a follow up appointment within one week.  Take all inhalers as prescribed by your Health Care Provider. - ADVAIR 250/50 2 puffs two times daily   If your cough increases infrequency and severity and/or you have shortness of breath or increased shortness of breath call your Health Care Provider.  If you develop fever, chills, night sweats, malaise, and/or change in mental status call your Health care Provider.  Nutrition is very important.  Eat small frequent meals.  Increase your activity as tolerated.  Do not stay in bed all day  follow up with pulmonologist in 1 week from discharge - You were seen by Naun Mei in the Hospital         SECONDARY DISCHARGE DIAGNOSES  Diagnosis: HTN (hypertension)  Assessment and Plan of Treatment: You have a history of high blood pressure. High blood pressure is a condition that puts you at risk for heart attack, stroke and kidney disease. Please continue to take your medications as prescribed. You can also help control your blood pressure by maintaining a healthy weight, eating a diet low in fat and rich in fruits and vegetables, reduce the amount of salt in your diet. Also, reduce alcohol and try to include some form of physical activity daily for at least 30 mins. Follow up with your medical doctor to establish long term blood pressure treatment goals.  Notify your doctor if you have any of the following symptoms:   Dizziness, Lightheadedness, Blurry vision, Headache, Chest pain, Shortness of breath      Diagnosis: HLD (hyperlipidemia)  Assessment and Plan of Treatment: You have a history of hyperlipidemia, which is when you have too much cholesterol in your blood. High amounts of cholesterol in your blood can put you at higher risks for heart attck, strokes and other health problems. Follow up with PCP for treatment goals, continue medication as prescribed, have liver function testing every 3 months as anti lipid medications can cause liver irritation, eat low fat meals, avoid red meat, butter, fried foods and cheese. Get daily exercise.      Diagnosis: Influenza A  Assessment and Plan of Treatment: You tested positive for influenza A. Completed Tamiflu as CDC recommended for Flu    Influenza (the flu) is an infection caused by the influenza virus. The virus spreads through direct contact with someone who has the flu.   Rest as much as you can to help you recover. Drink liquids as directed to help prevent dehydration. Wash your hands often.   Call your local emergency number (911 in the [place]) if:  You have trouble breathing, and your lips look purple or blue.  You have a seizure.  You were seen by a pulmonologist and started on Tamiflu. Please continue your Tamiflu to complete your 5 day course.   Please avoid sick contacts.  Continue with covering your cough and good hand washing practices.  Maintain adequate nutrition, hydration , rest, and activity as tolerated.   Follow-up with your primary care physician within 1 week. Call for appointment.      Diagnosis: ESRD (end stage renal disease) on dialysis  Assessment and Plan of Treatment: Social work has arranged the resumption of your dialysis in the community.  Please be sure to report to your dialysis center at your appointed time.  Lat dialysis - 1/29/25    Avoid taking (NSAIDs) - (ex: Ibuprofen, Advil, Celebrex, Naprosyn)  Avoid taking any nephrotoxic agents (can harm kidneys) - Intravenous contrast for diagnostic testing, combination cold medications.  Have all medications adjusted for your renal function by your Health Care Provider.  Blood pressure control is important.  Take all medication as prescribed.      Diagnosis: DM (diabetes mellitus)  Assessment and Plan of Treatment: Continue to follow with your primary care MD or your endocrinologist. Discuss what the goal hemoglobin A1C level is for you.  Follow a heart healthy diabetic diet. If you check your fingerstick glucose at home, call your MD if it is greater than 250mg/dL on 2 occasions or less than 100mg/dL on 2 occasions. Know signs of low blood sugar, such as: dizziness, shakiness, sweating, confusion, hunger, nervousness- drink 4 ounces apple juice if occurs and call your doctor. Know early signs of high blood sugar, such as: frequent urination, increased thirst, blurry vision, fatigue, headache - call your doctor if this occurs.  CONTINUE SAME DOSE OF TOUJEO AT HOME       Diagnosis: PNA (pneumonia)  Assessment and Plan of Treatment: Pneumonia is a lung infection that can cause a fever, cough, and trouble breathing.  Continue all antibiotics as ordered until complete.  Nutrition is important, eat small frequent meals.  Get lots of rest and drink fluids.  Call your health care provider upon arrival home from hospital and make a follow up appointment for one week.  If your cough worsens, you develop fever greater than 101', you have shaking chills, a fast heartbeat, trouble breathing and/or feel your are breathing much faster than usual, call your healthcare provider.  Make sure you wash your hands frequently.

## 2025-01-26 NOTE — DISCHARGE NOTE PROVIDER - NSDCFUADDAPPT_GEN_ALL_CORE_FT
APPTS ARE READY TO BE MADE: [X] YES    Best Family or Patient Contact (if needed):    Additional Information about above appointments (if needed):    1: Naun Hernandez 1 week   2:   3:     Other comments or requests:    APPTS ARE READY TO BE MADE: [X] YES    Best Family or Patient Contact (if needed):    Additional Information about above appointments (if needed):    1: Naun Hernandez 1 week   2:   3:     Other comments or requests:     Patient informed us they already have secured a follow up appointment which is not visible on [employer].     PCP - Community Provider  Dr. Fritz Wilcox MD  02/06/2025  1st Floor, separate entrance, [address] [facility] [address] [address]    Pulmonology - Community Provider  Dr. Venice Merino MD  02/18/2025  [address] [facility] [address], [place] [address] APPTS ARE READY TO BE MADE: [X] YES    Best Family or Patient Contact (if needed):    Additional Information about above appointments (if needed):    1: Naun Hernandez 1 week   2:   3:     Other comments or requests:     Patient's daughter Janet informed us they already have secured a follow up appointment which is not visible on [employer].     PCP - Community Provider  Dr. Fritz Wilcox MD  02/06/2025  1st Floor, separate entrance, [address] [facility] [address], [place] [address]    Pulmonology - Community Provider  Dr. Venice Merino MD  02/18/2025  [address] [facility] [address], [place] [address]

## 2025-01-26 NOTE — PROGRESS NOTE ADULT - SUBJECTIVE AND OBJECTIVE BOX
INTERVAL HPI/OVERNIGHT EVENTS:  Patient seen,stabla ,mild sob,on RA  VITAL SIGNS:  T(F): 97.7 (01-26-25 @ 04:32)  HR: 85 (01-26-25 @ 04:32)  BP: 92/56 (01-26-25 @ 04:32)  RR: 18 (01-26-25 @ 04:32)  SpO2: 97% (01-26-25 @ 04:32)  Wt(kg): --    PHYSICAL EXAM:  awake  Constitutional:  Eyes:  ENMT:perrla  Neck:  Respiratory:few rales  Cardiovascular:s1s2,m-none  Gastrointestinal:soft,bs pos  Extremities:  Vascular:  Neurological:n o focal deficit  Musculoskeletal:    MEDICATIONS  (STANDING):  albuterol/ipratropium for Nebulization 3 milliLiter(s) Nebulizer every 6 hours  atorvastatin 10 milliGRAM(s) Oral at bedtime  azithromycin  IVPB 500 milliGRAM(s) IV Intermittent every 24 hours  calcium acetate 667 milliGRAM(s) Oral three times a day with meals  cefTRIAXone   IVPB 1000 milliGRAM(s) IV Intermittent every 24 hours  ezetimibe 10 milliGRAM(s) Oral daily  fluticasone propionate/ salmeterol 100-50 MICROgram(s) Diskus 1 Dose(s) Inhalation two times a day  heparin   Injectable 5000 Unit(s) SubCutaneous every 12 hours  insulin glargine Injectable (LANTUS) 16 Unit(s) SubCutaneous at bedtime  insulin lispro (ADMELOG) corrective regimen sliding scale   SubCutaneous three times a day before meals  insulin lispro (ADMELOG) corrective regimen sliding scale   SubCutaneous at bedtime  insulin lispro Injectable (ADMELOG) 7 Unit(s) SubCutaneous three times a day before meals  losartan 25 milliGRAM(s) Oral daily  melatonin 3 milliGRAM(s) Oral at bedtime  methylPREDNISolone sodium succinate Injectable 40 milliGRAM(s) IV Push daily  metoprolol tartrate 25 milliGRAM(s) Oral two times a day  oseltamivir 30 milliGRAM(s) Oral <User Schedule>  pancrelipase  (CREON 36,000 Lipase Units) 1 Capsule(s) Oral four times a day with meals  pantoprazole    Tablet 40 milliGRAM(s) Oral before breakfast    MEDICATIONS  (PRN):  acetaminophen     Tablet .. 650 milliGRAM(s) Oral every 6 hours PRN Temp greater or equal to 38C (100.4F), Mild Pain (1 - 3)  albuterol    90 MICROgram(s) HFA Inhaler 2 Puff(s) Inhalation every 6 hours PRN Shortness of Breath and/or Wheezing      Allergies    shellfish (Anaphylaxis)  latex (Unknown)  natural rubber (Unknown)  aspirin (Unknown)  ibuprofen (Unknown)  Mushrooms (Anaphylaxis)    Intolerances        LABS:                        9.8    14.49 )-----------( 151      ( 25 Jan 2025 06:11 )             29.5     01-25    135  |  93[L]  |  27[H]  ----------------------------<  412[H]  3.3[L]   |  20[L]  |  4.23[H]    Ca    7.9[L]      25 Jan 2025 06:11        Urinalysis Basic - ( 25 Jan 2025 06:11 )    Color: x / Appearance: x / SG: x / pH: x  Gluc: 412 mg/dL / Ketone: x  / Bili: x / Urobili: x   Blood: x / Protein: x / Nitrite: x   Leuk Esterase: x / RBC: x / WBC x   Sq Epi: x / Non Sq Epi: x / Bacteria: x        RADIOLOGY & ADDITIONAL TESTS:       Assessment:  · Assessment	  84y F with PMH of Gout, HTN, HLD, DM, OA, ESRD on HD (MWF), Hemochromatosis. and CHF presenting with shortness of breath. Admitted for suspected acute exacerbation of COPD 2/2 Influenza A infection vs. superimposed bacterial PNA possibly 2/2 aspiration. Admitted to telemetry for monitoring of possible heart block seen on telemetry in ED.        Problem/Plan - 1:  ·  Problem: COPD with acute exacerbation-improved clinically   -RVP FluA positive  -start ceftriaxone and azithromycin for suspected superimposed CAP coverage  -f/u urine strep/legionella, mycoplasma IgM, Procalcitonin   -speech and swallow eval in s/o history of dysphagia and aspiration PNA  -minced and moist diet for now  -Pulm f/up appret  -PT f/up for d/c planning     Problem/Plan - 2:  ·  Problem: Influenza A.   ·  Plan: as above   start tamiflu 30mg every other day (ESRD dose adjustment) x 5 days.     Problem/Plan - 3:  ·  Problem: ESRD (end stage renal disease) on dialysis.   ·  Plan: ESRD on HD (MWF) via L AVF  - last dialysis on Wed 1/22  -Nephro -f/up appret  -HD as per nephro  -phosphate binders per nephro  -Renal diet  -follows with Dr. Michael Valdes at [facility], [address]     Problem/Plan - 4:  ·  Problem: CHF (congestive heart failure).   ·  Plan: hx of CHF on Losartan and lopressor for GDMT  -last TTE (5/2024): mildly decreased EF 48%, Grade 1 diastolic dysfunction, LVH  -CXR with out signs of pulmonary vascular congestion  -Admit to telemetry  -c/w scheduled dialysis for fluid removal.     Problem/Plan - 5:  ·  Problem: DM (diabetes mellitus).   ·  Plan: h/o DM on repaglinide and Toujeo 9U (tues, thur, sun) 11U (Mon, Wed, Fri, Sat   - hold oral dm meds  -f/u A1c  -c/w lantus 6U qhs + low sliding scale insulin  -Adjust insulin as indicated  -FS ACHS.     Problem/Plan - 6:  ·  Problem: HTN (hypertension).   ·  Plan: h/o HTN on losartan and metoprolol  -c/w home meds   -monitor BP  -adjust antihypertensive meds as appropriate.     Problem/Plan - 7:  ·  Problem: HLD (hyperlipidemia).   ·  Plan: h/o HLD on atorvastatin/ezetimibe   -c/w home med  -f/u lipid profile  -calculate ASCVD risk  -DASH diet.     Problem/Plan - 8:  ·  Problem: Prophylactic measure.   ·  Plan: DVT: Heparin q12.

## 2025-01-26 NOTE — PROGRESS NOTE ADULT - SUBJECTIVE AND OBJECTIVE BOX
[facility] : Progress Note :: [phone], (office [phone]),   Dr Pandey / Dr Mi / Dr Massey / Dr Etienne / Dr Cassandra DA SILVA / Dr Burt / Dr Garber / Dr Naeem lam  _____________________________________________________________________________________________  OB better.  no wheezing.    shellfish (Anaphylaxis)  latex (Unknown)  natural rubber (Unknown)  aspirin (Unknown)  ibuprofen (Unknown)  Mushrooms (Anaphylaxis)    Hospital Medications:   MEDICATIONS  (STANDING):  albuterol/ipratropium for Nebulization 3 milliLiter(s) Nebulizer every 6 hours  atorvastatin 10 milliGRAM(s) Oral at bedtime  azithromycin  IVPB 500 milliGRAM(s) IV Intermittent every 24 hours  calcium acetate 667 milliGRAM(s) Oral three times a day with meals  cefTRIAXone   IVPB 1000 milliGRAM(s) IV Intermittent every 24 hours  ezetimibe 10 milliGRAM(s) Oral daily  fluticasone propionate/ salmeterol 100-50 MICROgram(s) Diskus 1 Dose(s) Inhalation two times a day  heparin   Injectable 5000 Unit(s) SubCutaneous every 12 hours  insulin glargine Injectable (LANTUS) 20 Unit(s) SubCutaneous at bedtime  insulin lispro (ADMELOG) corrective regimen sliding scale   SubCutaneous three times a day before meals  insulin lispro (ADMELOG) corrective regimen sliding scale   SubCutaneous at bedtime  insulin lispro Injectable (ADMELOG) 12 Unit(s) SubCutaneous three times a day before meals  losartan 25 milliGRAM(s) Oral daily  melatonin 3 milliGRAM(s) Oral at bedtime  methylPREDNISolone sodium succinate Injectable 40 milliGRAM(s) IV Push daily  metoprolol tartrate 25 milliGRAM(s) Oral two times a day  oseltamivir 30 milliGRAM(s) Oral <User Schedule>  pancrelipase  (CREON 36,000 Lipase Units) 1 Capsule(s) Oral four times a day with meals  pantoprazole    Tablet 40 milliGRAM(s) Oral before breakfast      VITALS:  T(F): 98.1 (01-26-25 @ 16:11), Max: 98.1 (01-26-25 @ 16:11)  HR: 88 (01-26-25 @ 16:11)  BP: 102/62 (01-26-25 @ 16:11)  RR: 18 (01-26-25 @ 16:11)  SpO2: 98% (01-26-25 @ 16:11)  Wt(kg): --    01-25 @ 07:01  -  01-26 @ 07:00  --------------------------------------------------------  IN: 400 mL / OUT: 0 mL / NET: 400 mL        PHYSICAL EXAM:  Constitutional: NAD  HEENT: anicteric sclera, oropharynx clear.  Neck: No JVD  Respiratory: CTAB, no wheezes, rales or rhonchi  Cardiovascular: S1, S2, RRR  Gastrointestinal: BS+, soft, NT/ND  Extremities:  No peripheral edema  Neurological: A/O x 3, no focal deficits  : No CVA tenderness. No dickson.   Skin: No rashes  Vascular Access: AVF with thrill and bruit     LABS:  01-26    127[L]  |  86[L]  |  57[H]  ----------------------------<  379[H]  3.3[L]   |  20[L]  |  6.74[H]    Ca    7.6[L]      26 Jan 2025 11:55      Creatinine Trend: 6.74 <--, 4.23 <--, 7.67 <--, 7.35 <--, 5.69 <--                        10.2   11.97 )-----------( 165      ( 26 Jan 2025 11:55 )             28.7     Urine Studies:  Urinalysis Basic - ( 26 Jan 2025 11:55 )    Color:  / Appearance:  / SG:  / pH:   Gluc: 379 mg/dL / Ketone:   / Bili:  / Urobili:    Blood:  / Protein:  / Nitrite:    Leuk Esterase:  / RBC:  / WBC    Sq Epi:  / Non Sq Epi:  / Bacteria:         RADIOLOGY & ADDITIONAL STUDIES:

## 2025-01-26 NOTE — PROGRESS NOTE ADULT - SUBJECTIVE AND OBJECTIVE BOX
Patient is a 84y old  Female who presents with a chief complaint of SOB (25 Jan 2025 09:10)  Patient  awake, alert, laying in bed in NAD. Feeling better    INTERVAL HPI/OVERNIGHT EVENTS:      VITAL SIGNS:  T(F): 97.7 (01-26-25 @ 08:40)  HR: 87 (01-26-25 @ 08:40)  BP: 120/64 (01-26-25 @ 08:40)  RR: 18 (01-26-25 @ 08:40)  SpO2: 97% (01-26-25 @ 08:40)  Wt(kg): --  I&O's Detail    25 Jan 2025 07:01  -  26 Jan 2025 07:00  --------------------------------------------------------  IN:    IV PiggyBack: 300 mL    Oral Fluid: 100 mL  Total IN: 400 mL    OUT:  Total OUT: 0 mL    Total NET: 400 mL              REVIEW OF SYSTEMS:    CONSTITUTIONAL:  No fevers, chills, sweats    HEENT:  Eyes:  No diplopia or blurred vision. ENT:  No earache, sore throat or runny nose.    CARDIOVASCULAR:  No pressure, squeezing, tightness, or heaviness about the chest; no palpitations.    RESPIRATORY:  Per HPI    GASTROINTESTINAL:  No abdominal pain, nausea, vomiting or diarrhea.    GENITOURINARY:  No dysuria, frequency or urgency.    NEUROLOGIC:  No paresthesias, fasciculations, seizures or weakness.    PSYCHIATRIC:  No disorder of thought or mood.      PHYSICAL EXAM:    Constitutional: Well developed and nourished  Eyes:Perrla  ENMT: normal  Neck:supple  Respiratory: Rhonchi B/L  Cardiovascular: S1 S2 regular  Gastrointestinal: Soft, Non tender  Extremities: No edema  Vascular:normal  Neurological:Awake, alert,Ox3  Musculoskeletal:Normal      MEDICATIONS  (STANDING):  albuterol/ipratropium for Nebulization 3 milliLiter(s) Nebulizer every 6 hours  atorvastatin 10 milliGRAM(s) Oral at bedtime  azithromycin  IVPB 500 milliGRAM(s) IV Intermittent every 24 hours  calcium acetate 667 milliGRAM(s) Oral three times a day with meals  cefTRIAXone   IVPB 1000 milliGRAM(s) IV Intermittent every 24 hours  ezetimibe 10 milliGRAM(s) Oral daily  fluticasone propionate/ salmeterol 100-50 MICROgram(s) Diskus 1 Dose(s) Inhalation two times a day  heparin   Injectable 5000 Unit(s) SubCutaneous every 12 hours  insulin glargine Injectable (LANTUS) 16 Unit(s) SubCutaneous at bedtime  insulin lispro (ADMELOG) corrective regimen sliding scale   SubCutaneous three times a day before meals  insulin lispro (ADMELOG) corrective regimen sliding scale   SubCutaneous at bedtime  insulin lispro Injectable (ADMELOG) 7 Unit(s) SubCutaneous three times a day before meals  losartan 25 milliGRAM(s) Oral daily  melatonin 3 milliGRAM(s) Oral at bedtime  methylPREDNISolone sodium succinate Injectable 40 milliGRAM(s) IV Push daily  metoprolol tartrate 25 milliGRAM(s) Oral two times a day  oseltamivir 30 milliGRAM(s) Oral <User Schedule>  pancrelipase  (CREON 36,000 Lipase Units) 1 Capsule(s) Oral four times a day with meals  pantoprazole    Tablet 40 milliGRAM(s) Oral before breakfast    MEDICATIONS  (PRN):  acetaminophen     Tablet .. 650 milliGRAM(s) Oral every 6 hours PRN Temp greater or equal to 38C (100.4F), Mild Pain (1 - 3)  albuterol    90 MICROgram(s) HFA Inhaler 2 Puff(s) Inhalation every 6 hours PRN Shortness of Breath and/or Wheezing      Allergies    shellfish (Anaphylaxis)  latex (Unknown)  natural rubber (Unknown)  aspirin (Unknown)  ibuprofen (Unknown)  Mushrooms (Anaphylaxis)    Intolerances        LABS:                        9.8    14.49 )-----------( 151      ( 25 Jan 2025 06:11 )             29.5     01-25    135  |  93[L]  |  27[H]  ----------------------------<  412[H]  3.3[L]   |  20[L]  |  4.23[H]    Ca    7.9[L]      25 Jan 2025 06:11        Urinalysis Basic - ( 25 Jan 2025 06:11 )    Color: x / Appearance: x / SG: x / pH: x  Gluc: 412 mg/dL / Ketone: x  / Bili: x / Urobili: x   Blood: x / Protein: x / Nitrite: x   Leuk Esterase: x / RBC: x / WBC x   Sq Epi: x / Non Sq Epi: x / Bacteria: x            CAPILLARY BLOOD GLUCOSE      POCT Blood Glucose.: 256 mg/dL (26 Jan 2025 08:26)  POCT Blood Glucose.: 247 mg/dL (25 Jan 2025 22:46)  POCT Blood Glucose.: 211 mg/dL (25 Jan 2025 21:13)  POCT Blood Glucose.: 298 mg/dL (25 Jan 2025 16:32)  POCT Blood Glucose.: 414 mg/dL (25 Jan 2025 12:47)        RADIOLOGY & ADDITIONAL TESTS:    CXR:    Ct scan chest:    ekg;    echo: Patient is a 84y old  Female who presents with a chief complaint of SOB (25 Jan 2025 09:10)  Patient is awake, alert, laying in bed in NAD. Feeling better despite occasional cough    INTERVAL HPI/OVERNIGHT EVENTS:      VITAL SIGNS:  T(F): 97.7 (01-26-25 @ 08:40)  HR: 87 (01-26-25 @ 08:40)  BP: 120/64 (01-26-25 @ 08:40)  RR: 18 (01-26-25 @ 08:40)  SpO2: 97% (01-26-25 @ 08:40)  Wt(kg): --  I&O's Detail    25 Jan 2025 07:01  -  26 Jan 2025 07:00  --------------------------------------------------------  IN:    IV PiggyBack: 300 mL    Oral Fluid: 100 mL  Total IN: 400 mL    OUT:  Total OUT: 0 mL    Total NET: 400 mL              REVIEW OF SYSTEMS:    CONSTITUTIONAL:  No fevers, chills, sweats    HEENT:  Eyes:  No diplopia or blurred vision. ENT:  No earache, sore throat or runny nose.    CARDIOVASCULAR:  No pressure, squeezing, tightness, or heaviness about the chest; no palpitations.    RESPIRATORY:  Per HPI    GASTROINTESTINAL:  No abdominal pain, nausea, vomiting or diarrhea.    GENITOURINARY:  No dysuria, frequency or urgency.    NEUROLOGIC:  No paresthesias, fasciculations, seizures or weakness.    PSYCHIATRIC:  No disorder of thought or mood.      PHYSICAL EXAM:    Constitutional: Well developed and nourished  Eyes:Perrla  ENMT: normal  Neck:supple  Respiratory: Rhonchi B/L  Cardiovascular: S1 S2 regular  Gastrointestinal: Soft, Non tender  Extremities: No edema  Vascular:normal  Neurological:Awake, alert,Ox3  Musculoskeletal:Normal      MEDICATIONS  (STANDING):  albuterol/ipratropium for Nebulization 3 milliLiter(s) Nebulizer every 6 hours  atorvastatin 10 milliGRAM(s) Oral at bedtime  azithromycin  IVPB 500 milliGRAM(s) IV Intermittent every 24 hours  calcium acetate 667 milliGRAM(s) Oral three times a day with meals  cefTRIAXone   IVPB 1000 milliGRAM(s) IV Intermittent every 24 hours  ezetimibe 10 milliGRAM(s) Oral daily  fluticasone propionate/ salmeterol 100-50 MICROgram(s) Diskus 1 Dose(s) Inhalation two times a day  heparin   Injectable 5000 Unit(s) SubCutaneous every 12 hours  insulin glargine Injectable (LANTUS) 16 Unit(s) SubCutaneous at bedtime  insulin lispro (ADMELOG) corrective regimen sliding scale   SubCutaneous three times a day before meals  insulin lispro (ADMELOG) corrective regimen sliding scale   SubCutaneous at bedtime  insulin lispro Injectable (ADMELOG) 7 Unit(s) SubCutaneous three times a day before meals  losartan 25 milliGRAM(s) Oral daily  melatonin 3 milliGRAM(s) Oral at bedtime  methylPREDNISolone sodium succinate Injectable 40 milliGRAM(s) IV Push daily  metoprolol tartrate 25 milliGRAM(s) Oral two times a day  oseltamivir 30 milliGRAM(s) Oral <User Schedule>  pancrelipase  (CREON 36,000 Lipase Units) 1 Capsule(s) Oral four times a day with meals  pantoprazole    Tablet 40 milliGRAM(s) Oral before breakfast    MEDICATIONS  (PRN):  acetaminophen     Tablet .. 650 milliGRAM(s) Oral every 6 hours PRN Temp greater or equal to 38C (100.4F), Mild Pain (1 - 3)  albuterol    90 MICROgram(s) HFA Inhaler 2 Puff(s) Inhalation every 6 hours PRN Shortness of Breath and/or Wheezing      Allergies    shellfish (Anaphylaxis)  latex (Unknown)  natural rubber (Unknown)  aspirin (Unknown)  ibuprofen (Unknown)  Mushrooms (Anaphylaxis)    Intolerances        LABS:                        9.8    14.49 )-----------( 151      ( 25 Jan 2025 06:11 )             29.5     01-25    135  |  93[L]  |  27[H]  ----------------------------<  412[H]  3.3[L]   |  20[L]  |  4.23[H]    Ca    7.9[L]      25 Jan 2025 06:11        Urinalysis Basic - ( 25 Jan 2025 06:11 )    Color: x / Appearance: x / SG: x / pH: x  Gluc: 412 mg/dL / Ketone: x  / Bili: x / Urobili: x   Blood: x / Protein: x / Nitrite: x   Leuk Esterase: x / RBC: x / WBC x   Sq Epi: x / Non Sq Epi: x / Bacteria: x            CAPILLARY BLOOD GLUCOSE      POCT Blood Glucose.: 256 mg/dL (26 Jan 2025 08:26)  POCT Blood Glucose.: 247 mg/dL (25 Jan 2025 22:46)  POCT Blood Glucose.: 211 mg/dL (25 Jan 2025 21:13)  POCT Blood Glucose.: 298 mg/dL (25 Jan 2025 16:32)  POCT Blood Glucose.: 414 mg/dL (25 Jan 2025 12:47)        RADIOLOGY & ADDITIONAL TESTS:    CXR:  < from: Xray Chest 1 View- PORTABLE-Urgent (Xray Chest 1 View- PORTABLE-Urgent .) (01.23.25 @ 11:23) >  IMPRESSION: COPD. Bibasilar infiltrates left greater than right at this   time.    < end of copied text >    Ct scan chest:    ekg;    echo:< from: TTE W or WO Ultrasound Enhancing Agent (05.28.24 @ 12:28) >  CONCLUSIONS:      1. Left ventricular cavity is normal in size. Left ventricular systolic function is mildly decreased with an ejection fraction of 48 % by Lee's method of disks. Global left ventricular hypokinesis.   2. There is mild (grade 1)left ventricular diastolic dysfunction.   3. Normal right ventricular cavity size, with normal wall thickness, and normal systolic function. Tricuspid annular plane systolic excursion (TAPSE) is 2.1 cm (normal >=1.7 cm). Tricuspid annular tissue Doppler S' is 10.0 cm/s (normal >10 cm/s).   4. Mild mitral regurgitation.   5. Normal left and right atrial size.   6. Mild tricuspid regurgitation.   7. Estimated pulmonary artery systolic pressure is 43 mmHg, consistent with mild pulmonary hypertension.   8. Mild aortic regurgitation.   9. Mild left ventricular hypertrophy.  10. There is calcification of the mitral valve annulus.  11. There is increased LV mass and concentric hypertrophy.  12. Trace pulmonic regurgitation.  13. Trileaflet aortic valve with reduced systolic excursion. There is calcification of the aortic valve leaflets. Mild aortic stenosis.  14. Bilateral pleural effusion noted.  15. No pericardial effusion seen.  16. No prior echocardiogram is available for comparison.      < end of copied text >

## 2025-01-26 NOTE — CHART NOTE - NSCHARTNOTEFT_GEN_A_CORE
Patient is a 84y old  Female who presents with a chief complaint of SOB (25 Jan 2025 09:10) found influenza A positive  Insulin requirement adjustment as per endocrinology Dr Castellon    Vital Signs Last 24 Hrs  T(C): 36.6 (26 Jan 2025 10:48), Max: 36.8 (25 Jan 2025 16:00)  T(F): 97.9 (26 Jan 2025 10:48), Max: 98.2 (25 Jan 2025 16:00)  HR: 90 (26 Jan 2025 10:48) (80 - 90)  BP: 103/56 (26 Jan 2025 10:48) (91/51 - 120/64)  BP(mean): 68 (26 Jan 2025 04:32) (64 - 68)  RR: 18 (26 Jan 2025 10:48) (18 - 18)  SpO2: 96% (26 Jan 2025 10:48) (96% - 99%): room air      PAST MEDICAL & SURGICAL HISTORY:  Gout  Cataract  Vitamin D deficiency  HTN (hypertension)  HLD (hyperlipidemia)  DM (diabetes mellitus)  OA (osteoarthritis)  ESRD (end stage renal disease) on dialysis  VT (ventricular tachycardia)  Hemochromatosis  CHF (congestive heart failure)  S/P TKR (total knee replacement), left  S/P bilateral cataract extraction  S/P arteriovenous (AV) fistula creation      LABS:                        10.2   11.97 )-----------( 165      ( 26 Jan 2025 11:55 )             28.7     01-26    127[L]  |  86[L]  |  57[H]  ----------------------------<  379[H]  3.3[L]   |  20[L]  |  6.74[H]    Ca    7.6[L]      26 Jan 2025 11:55    CAPILLARY BLOOD GLUCOSE  POCT Blood Glucose.: 359 mg/dL (26 Jan 2025 11:36)  POCT Blood Glucose.: 256 mg/dL (26 Jan 2025 08:26)  POCT Blood Glucose.: 247 mg/dL (25 Jan 2025 22:46)  POCT Blood Glucose.: 211 mg/dL (25 Jan 2025 21:13)  POCT Blood Glucose.: 298 mg/dL (25 Jan 2025 16:32)    ASSESSMENT AND PLAN   Patient is a 84y old  Female who presents with a chief complaint of SOB (25 Jan 2025 09:10) found influenza A positive  Insulin requirement adjustment as per endocrinology Dr Castellon  insulin glargine Injectable (LANTUS) 20 Unit(s) SubCutaneous at bedtime  insulin lispro Injectable (ADMELOG) 12 Unit(s) SubCutaneous three times a day before meals  - to monitor FS and blood glucose as per protocol     Care Collaborated Discussed with Consultants/Other Providers [x] YES  [ ] NO

## 2025-01-26 NOTE — DISCHARGE NOTE PROVIDER - HOSPITAL COURSE
84y F with PMH of Gout, HTN (hypertension), HLD, DM, OA, ESRD on HD (MWF), Hemochromatosis. and CHF presenting with shortness of breath.     In the ED:  T(F): , Max: 98.7 (16:14); HR:  (70 - 98); BP:  (93/56 - 127/81); RR:  (18 - 22); SpO2:  (97% - 100%)  WBC:5.23, Hb.3, PLT:152  Na:136, K:3.3, Cl:93, HCO3:25, BUN:25, sCr:5.69  AST:76, ALT:42, ALP:112, Tbili:0.9, Lipase:--   Troponin:49.2, p-BNP:38183  s/p albuterol/ipratropium for Nebulization. 3 milliLiter(s); methylPREDNISolone sodium succinate Injectable 125 milliGRAM(s); sodium chloride 0.9% Bolus 100 milliLiter(s)  CXR IMPRESSION: COPD. Bibasilar infiltrates left greater than right at this time.      Admitted for suspected acute exacerbation of COPD 2/2 Influenza A infection vs. superimposed bacterial PNA possibly 2/2 aspiration. pt. started on abx & pulm consulted.    course ccb heart block seen on telemetry in ED; EKG noted w/ NSR ???????    incomplete --->>     no 84y F with PMH of Gout, HTN (hypertension), HLD, DM, OA, ESRD on HD (MWF), Hemochromatosis. and CHF presenting with shortness of breath. CXR showed COPD and  Bibasilar infiltrates left greater than right at this time.    Admitted for suspected acute exacerbation of COPD 2/2 Influenza A infection vs. superimposed bacterial PNA possibly 2/2 aspiration. pt. started on abx & pulm consulted.    Hospital course complicated due to  heart block seen on telemetry in ED; EKG noted w/ NSR, asymptomatic , continue Losartan and metoprolol.    Pulmonary consulted, started inhalers with supportive measure. Breathing well on RA. Completed tamiflu for Influenza   CT chest confirmed R and L cluster of nodules and recommend to have f/u CT chest without contrast in 3 months  Completed all the tx for Pneumonia, discussed the case with attending, pt is optimized for dc home with home PT.

## 2025-01-26 NOTE — DISCHARGE NOTE PROVIDER - NSDCMRMEDTOKEN_GEN_ALL_CORE_FT
CALCIUM ACET (PHOS BINDER) 667 MG CAPS ORAL: 1 cap(s) orally 3 times a day (with meals) TAKE 1 CAPSULE BY MOUTH AS DIRECTED WITH A MEAL  ezetimibe-simvastatin 10 mg-20 mg oral tablet: 1 tab(s) orally once a day (at bedtime)  LOSARTAN POTASSIUM 25 MG TAB: 1 tab(s) orally once a day  metoprolol tartrate 25 mg oral tablet: 1 tab(s) orally 2 times a day  pantoprazole 40 mg oral delayed release tablet: 1 tab(s) orally once a day  Prolia 60 mg/mL subcutaneous solution: 60 milligram(s) subcutaneous every 6 months  repaglinide 1 mg oral tablet: 1 tab(s) orally 3 times a day (with meals)  Toujeo SoloStar 300 units/mL subcutaneous solution: 9 unit(s) subcutaneous once a day (at bedtime) on Sunday, Tuesday, and Thursday  Toujeo SoloStar 300 units/mL subcutaneous solution: 11 unit(s) subcutaneous once a day (at bedtime) on Monday, Wednesday, Friday and Saturday  Tylenol 325 mg oral tablet: 2 tab(s) orally every 6 hours as needed for  mild pain  Zenpep 40,000 units-126,000 units-168,000 units oral delayed release capsule: 1 cap(s) orally 4 times a day   Advair Diskus 250 mcg-50 mcg inhalation powder: 2 puff(s) inhaled 2 times a day  albuterol 90 mcg/inh inhalation aerosol: 2 puff(s) inhaled every 6 hours as needed for Shortness of Breath and/or Wheezing  CALCIUM ACET (PHOS BINDER) 667 MG CAPS ORAL: 1 cap(s) orally 3 times a day (with meals) TAKE 1 CAPSULE BY MOUTH AS DIRECTED WITH A MEAL  ezetimibe-simvastatin 10 mg-20 mg oral tablet: 1 tab(s) orally once a day (at bedtime)  LOSARTAN POTASSIUM 25 MG TAB: 1 tab(s) orally once a day  metoprolol tartrate 25 mg oral tablet: 1 tab(s) orally 2 times a day  pantoprazole 40 mg oral delayed release tablet: 1 tab(s) orally once a day  Prolia 60 mg/mL subcutaneous solution: 60 milligram(s) subcutaneous every 6 months  repaglinide 1 mg oral tablet: 1 tab(s) orally 3 times a day (with meals)  Toujeo SoloStar 300 units/mL subcutaneous solution: 9 unit(s) subcutaneous once a day  Tylenol 325 mg oral tablet: 2 tab(s) orally every 6 hours as needed for  mild pain  Zenpep 40,000 units-126,000 units-168,000 units oral delayed release capsule: 1 cap(s) orally 4 times a day

## 2025-01-26 NOTE — DISCHARGE NOTE PROVIDER - CARE PROVIDER_API CALL
Brenden Hu  Internal Medicine  [address] [place] [address]  Phone: [phone]  Fax: [phone]  Follow Up Time: 1 week

## 2025-01-27 LAB
ANION GAP SERPL CALC-SCNC: 20 MMOL/L — HIGH (ref 5–17)
BUN SERPL-MCNC: 77 MG/DL — HIGH (ref 7–18)
CALCIUM SERPL-MCNC: 7.3 MG/DL — LOW (ref 8.4–10.5)
CHLORIDE SERPL-SCNC: 84 MMOL/L — LOW (ref 96–108)
CO2 SERPL-SCNC: 19 MMOL/L — LOW (ref 22–31)
CREAT SERPL-MCNC: 7.58 MG/DL — HIGH (ref 0.5–1.3)
EGFR: 5 ML/MIN/1.73M2 — LOW
GLUCOSE BLDC GLUCOMTR-MCNC: 118 MG/DL — HIGH (ref 70–99)
GLUCOSE BLDC GLUCOMTR-MCNC: 155 MG/DL — HIGH (ref 70–99)
GLUCOSE BLDC GLUCOMTR-MCNC: 206 MG/DL — HIGH (ref 70–99)
GLUCOSE BLDC GLUCOMTR-MCNC: 52 MG/DL — CRITICAL LOW (ref 70–99)
GLUCOSE SERPL-MCNC: 364 MG/DL — HIGH (ref 70–99)
HCT VFR BLD CALC: 26.5 % — LOW (ref 34.5–45)
HGB BLD-MCNC: 9.3 G/DL — LOW (ref 11.5–15.5)
MCHC RBC-ENTMCNC: 30.8 PG — SIGNIFICANT CHANGE UP (ref 27–34)
MCHC RBC-ENTMCNC: 35.1 G/DL — SIGNIFICANT CHANGE UP (ref 32–36)
MCV RBC AUTO: 87.7 FL — SIGNIFICANT CHANGE UP (ref 80–100)
NRBC # BLD: 0 /100 WBCS — SIGNIFICANT CHANGE UP (ref 0–0)
NRBC BLD-RTO: 0 /100 WBCS — SIGNIFICANT CHANGE UP (ref 0–0)
PLATELET # BLD AUTO: 148 K/UL — LOW (ref 150–400)
POTASSIUM SERPL-MCNC: 4 MMOL/L — SIGNIFICANT CHANGE UP (ref 3.5–5.3)
POTASSIUM SERPL-SCNC: 4 MMOL/L — SIGNIFICANT CHANGE UP (ref 3.5–5.3)
RBC # BLD: 3.02 M/UL — LOW (ref 3.8–5.2)
RBC # FLD: 12.6 % — SIGNIFICANT CHANGE UP (ref 10.3–14.5)
SODIUM SERPL-SCNC: 123 MMOL/L — LOW (ref 135–145)
WBC # BLD: 8.07 K/UL — SIGNIFICANT CHANGE UP (ref 3.8–10.5)
WBC # FLD AUTO: 8.07 K/UL — SIGNIFICANT CHANGE UP (ref 3.8–10.5)

## 2025-01-27 PROCEDURE — 99223 1ST HOSP IP/OBS HIGH 75: CPT

## 2025-01-27 RX ORDER — EPOETIN ALFA 2000 [IU]/ML
10000 SOLUTION INTRAVENOUS; SUBCUTANEOUS
Refills: 0 | Status: DISCONTINUED | OUTPATIENT
Start: 2025-01-27 | End: 2025-01-29

## 2025-01-27 RX ORDER — INSULIN GLARGINE-YFGN 100 [IU]/ML
22 INJECTION, SOLUTION SUBCUTANEOUS AT BEDTIME
Refills: 0 | Status: DISCONTINUED | OUTPATIENT
Start: 2025-01-27 | End: 2025-01-28

## 2025-01-27 RX ORDER — INSULIN LISPRO 100/ML
VIAL (ML) SUBCUTANEOUS
Refills: 0 | Status: DISCONTINUED | OUTPATIENT
Start: 2025-01-27 | End: 2025-01-29

## 2025-01-27 RX ORDER — ANTISEPTIC SURGICAL SCRUB 0.04 MG/ML
1 SOLUTION TOPICAL
Refills: 0 | Status: DISCONTINUED | OUTPATIENT
Start: 2025-01-27 | End: 2025-01-29

## 2025-01-27 RX ADMIN — CALCIUM ACETATE 667 MILLIGRAM(S): 667 CAPSULE ORAL at 17:07

## 2025-01-27 RX ADMIN — AZITHROMYCIN DIHYDRATE 255 MILLIGRAM(S): 500 TABLET, FILM COATED ORAL at 21:47

## 2025-01-27 RX ADMIN — ATORVASTATIN CALCIUM 10 MILLIGRAM(S): 80 TABLET, FILM COATED ORAL at 21:47

## 2025-01-27 RX ADMIN — Medication 1 CAPSULE(S): at 08:24

## 2025-01-27 RX ADMIN — Medication 1 CAPSULE(S): at 22:50

## 2025-01-27 RX ADMIN — Medication 25 MILLIGRAM(S): at 17:07

## 2025-01-27 RX ADMIN — Medication 4: at 08:22

## 2025-01-27 RX ADMIN — Medication 5000 UNIT(S): at 17:07

## 2025-01-27 RX ADMIN — PANTOPRAZOLE 40 MILLIGRAM(S): 20 TABLET, DELAYED RELEASE ORAL at 05:09

## 2025-01-27 RX ADMIN — CALCIUM ACETATE 667 MILLIGRAM(S): 667 CAPSULE ORAL at 13:13

## 2025-01-27 RX ADMIN — ACETAMINOPHEN, DIPHENHYDRAMINE HCL, PHENYLEPHRINE HCL 3 MILLIGRAM(S): 325; 25; 5 TABLET ORAL at 21:48

## 2025-01-27 RX ADMIN — Medication 12 UNIT(S): at 08:22

## 2025-01-27 RX ADMIN — Medication 10 MILLIGRAM(S): at 13:12

## 2025-01-27 RX ADMIN — Medication 12 UNIT(S): at 17:06

## 2025-01-27 RX ADMIN — Medication 2: at 17:06

## 2025-01-27 RX ADMIN — Medication 25 MILLIGRAM(S): at 05:09

## 2025-01-27 RX ADMIN — Medication 5000 UNIT(S): at 05:09

## 2025-01-27 RX ADMIN — EPOETIN ALFA 10000 UNIT(S): 2000 SOLUTION INTRAVENOUS; SUBCUTANEOUS at 11:30

## 2025-01-27 RX ADMIN — FLUTICASONE PROPIONATE AND SALMETEROL 1 DOSE(S): 113; 14 POWDER, METERED RESPIRATORY (INHALATION) at 13:13

## 2025-01-27 RX ADMIN — FLUTICASONE PROPIONATE AND SALMETEROL 1 DOSE(S): 113; 14 POWDER, METERED RESPIRATORY (INHALATION) at 21:48

## 2025-01-27 RX ADMIN — CEFTRIAXONE 100 MILLIGRAM(S): 250 INJECTION, POWDER, FOR SOLUTION INTRAMUSCULAR; INTRAVENOUS at 21:47

## 2025-01-27 RX ADMIN — CALCIUM ACETATE 667 MILLIGRAM(S): 667 CAPSULE ORAL at 08:24

## 2025-01-27 RX ADMIN — Medication 1 CAPSULE(S): at 13:12

## 2025-01-27 RX ADMIN — Medication 40 MILLIGRAM(S): at 05:09

## 2025-01-27 RX ADMIN — Medication 1 CAPSULE(S): at 17:07

## 2025-01-27 NOTE — PROGRESS NOTE ADULT - PROBLEM SELECTOR PLAN 1
- (+) Inf A   - CXR showed bibasilar infiltrates left greater than right  - Currently on Solumedrol  - C/w Azithromycin & Ceftriaxone   - CT Chest pending-f/u results   - Pulovidio Hernandez consulted.

## 2025-01-27 NOTE — PROGRESS NOTE ADULT - SUBJECTIVE AND OBJECTIVE BOX
Patient is a 84y old  Female who presents with a chief complaint of SOB (25 Jan 2025 09:10)  Patient is awake, alert, laying in bed in NAD. Feeling better despite occasional cough    INTERVAL HPI/OVERNIGHT EVENTS:      VITAL SIGNS:  T(F): 98.4 (01-27-25 @ 07:09)  HR: 66 (01-27-25 @ 07:09)  BP: 104/81 (01-27-25 @ 07:09)  RR: 18 (01-27-25 @ 07:09)  SpO2: 98% (01-27-25 @ 07:09)  Wt(kg): --  I&O's Detail          REVIEW OF SYSTEMS:    CONSTITUTIONAL:  No fevers, chills, sweats    HEENT:  Eyes:  No diplopia or blurred vision. ENT:  No earache, sore throat or runny nose.    CARDIOVASCULAR:  No pressure, squeezing, tightness, or heaviness about the chest; no palpitations.    RESPIRATORY:  Per HPI    GASTROINTESTINAL:  No abdominal pain, nausea, vomiting or diarrhea.    GENITOURINARY:  No dysuria, frequency or urgency.    NEUROLOGIC:  No paresthesias, fasciculations, seizures or weakness.    PSYCHIATRIC:  No disorder of thought or mood.      PHYSICAL EXAM:    Constitutional: Well developed and nourished  Eyes:Perrla  ENMT: normal  Neck:supple  Respiratory: Rhonchi B/L  Cardiovascular: S1 S2 regular  Gastrointestinal: Soft, Non tender  Extremities: No edema  Vascular:normal  Neurological:Awake, alert,Ox3  Musculoskeletal:Normal      MEDICATIONS  (STANDING):  albuterol/ipratropium for Nebulization 3 milliLiter(s) Nebulizer every 6 hours  atorvastatin 10 milliGRAM(s) Oral at bedtime  azithromycin  IVPB 500 milliGRAM(s) IV Intermittent every 24 hours  calcium acetate 667 milliGRAM(s) Oral three times a day with meals  cefTRIAXone   IVPB 1000 milliGRAM(s) IV Intermittent every 24 hours  ezetimibe 10 milliGRAM(s) Oral daily  fluticasone propionate/ salmeterol 100-50 MICROgram(s) Diskus 1 Dose(s) Inhalation two times a day  heparin   Injectable 5000 Unit(s) SubCutaneous every 12 hours  insulin glargine Injectable (LANTUS) 20 Unit(s) SubCutaneous at bedtime  insulin lispro (ADMELOG) corrective regimen sliding scale   SubCutaneous three times a day before meals  insulin lispro (ADMELOG) corrective regimen sliding scale   SubCutaneous at bedtime  insulin lispro Injectable (ADMELOG) 12 Unit(s) SubCutaneous three times a day before meals  losartan 25 milliGRAM(s) Oral daily  melatonin 3 milliGRAM(s) Oral at bedtime  methylPREDNISolone sodium succinate Injectable 40 milliGRAM(s) IV Push daily  metoprolol tartrate 25 milliGRAM(s) Oral two times a day  oseltamivir 30 milliGRAM(s) Oral <User Schedule>  pancrelipase  (CREON 36,000 Lipase Units) 1 Capsule(s) Oral four times a day with meals  pantoprazole    Tablet 40 milliGRAM(s) Oral before breakfast    MEDICATIONS  (PRN):  acetaminophen     Tablet .. 650 milliGRAM(s) Oral every 6 hours PRN Temp greater or equal to 38C (100.4F), Mild Pain (1 - 3)  albuterol    90 MICROgram(s) HFA Inhaler 2 Puff(s) Inhalation every 6 hours PRN Shortness of Breath and/or Wheezing      Allergies    shellfish (Anaphylaxis)  latex (Unknown)  natural rubber (Unknown)  aspirin (Unknown)  ibuprofen (Unknown)  Mushrooms (Anaphylaxis)    Intolerances        LABS:                        10.2   11.97 )-----------( 165      ( 26 Jan 2025 11:55 )             28.7     01-26    127[L]  |  86[L]  |  57[H]  ----------------------------<  379[H]  3.3[L]   |  20[L]  |  6.74[H]    Ca    7.6[L]      26 Jan 2025 11:55        Urinalysis Basic - ( 26 Jan 2025 11:55 )    Color: x / Appearance: x / SG: x / pH: x  Gluc: 379 mg/dL / Ketone: x  / Bili: x / Urobili: x   Blood: x / Protein: x / Nitrite: x   Leuk Esterase: x / RBC: x / WBC x   Sq Epi: x / Non Sq Epi: x / Bacteria: x            CAPILLARY BLOOD GLUCOSE      POCT Blood Glucose.: 206 mg/dL (27 Jan 2025 07:47)  POCT Blood Glucose.: 188 mg/dL (26 Jan 2025 21:12)  POCT Blood Glucose.: 246 mg/dL (26 Jan 2025 16:59)  POCT Blood Glucose.: 359 mg/dL (26 Jan 2025 11:36)        RADIOLOGY & ADDITIONAL TESTS:    CXR:    Ct scan chest:    ekg;    echo: Patient is a 84y old  Female who presents with a chief complaint of SOB (25 Jan 2025 09:10)  Patient is awake, alert, laying in bed in NAD. Feeling better despite occasional cough. Clinically improved    INTERVAL HPI/OVERNIGHT EVENTS:      VITAL SIGNS:  T(F): 98.4 (01-27-25 @ 07:09)  HR: 66 (01-27-25 @ 07:09)  BP: 104/81 (01-27-25 @ 07:09)  RR: 18 (01-27-25 @ 07:09)  SpO2: 98% (01-27-25 @ 07:09)  Wt(kg): --  I&O's Detail          REVIEW OF SYSTEMS:    CONSTITUTIONAL:  No fevers, chills, sweats    HEENT:  Eyes:  No diplopia or blurred vision. ENT:  No earache, sore throat or runny nose.    CARDIOVASCULAR:  No pressure, squeezing, tightness, or heaviness about the chest; no palpitations.    RESPIRATORY:  Per HPI    GASTROINTESTINAL:  No abdominal pain, nausea, vomiting or diarrhea.    GENITOURINARY:  No dysuria, frequency or urgency.    NEUROLOGIC:  No paresthesias, fasciculations, seizures or weakness.    PSYCHIATRIC:  No disorder of thought or mood.      PHYSICAL EXAM:    Constitutional: Well developed and nourished  Eyes:Perrla  ENMT: normal  Neck:supple  Respiratory: Rhonchi B/L  Cardiovascular: S1 S2 regular  Gastrointestinal: Soft, Non tender  Extremities: No edema  Vascular:normal  Neurological:Awake, alert,Ox3  Musculoskeletal:Normal      MEDICATIONS  (STANDING):  albuterol/ipratropium for Nebulization 3 milliLiter(s) Nebulizer every 6 hours  atorvastatin 10 milliGRAM(s) Oral at bedtime  azithromycin  IVPB 500 milliGRAM(s) IV Intermittent every 24 hours  calcium acetate 667 milliGRAM(s) Oral three times a day with meals  cefTRIAXone   IVPB 1000 milliGRAM(s) IV Intermittent every 24 hours  ezetimibe 10 milliGRAM(s) Oral daily  fluticasone propionate/ salmeterol 100-50 MICROgram(s) Diskus 1 Dose(s) Inhalation two times a day  heparin   Injectable 5000 Unit(s) SubCutaneous every 12 hours  insulin glargine Injectable (LANTUS) 20 Unit(s) SubCutaneous at bedtime  insulin lispro (ADMELOG) corrective regimen sliding scale   SubCutaneous three times a day before meals  insulin lispro (ADMELOG) corrective regimen sliding scale   SubCutaneous at bedtime  insulin lispro Injectable (ADMELOG) 12 Unit(s) SubCutaneous three times a day before meals  losartan 25 milliGRAM(s) Oral daily  melatonin 3 milliGRAM(s) Oral at bedtime  methylPREDNISolone sodium succinate Injectable 40 milliGRAM(s) IV Push daily  metoprolol tartrate 25 milliGRAM(s) Oral two times a day  oseltamivir 30 milliGRAM(s) Oral <User Schedule>  pancrelipase  (CREON 36,000 Lipase Units) 1 Capsule(s) Oral four times a day with meals  pantoprazole    Tablet 40 milliGRAM(s) Oral before breakfast    MEDICATIONS  (PRN):  acetaminophen     Tablet .. 650 milliGRAM(s) Oral every 6 hours PRN Temp greater or equal to 38C (100.4F), Mild Pain (1 - 3)  albuterol    90 MICROgram(s) HFA Inhaler 2 Puff(s) Inhalation every 6 hours PRN Shortness of Breath and/or Wheezing      Allergies    shellfish (Anaphylaxis)  latex (Unknown)  natural rubber (Unknown)  aspirin (Unknown)  ibuprofen (Unknown)  Mushrooms (Anaphylaxis)    Intolerances        LABS:                        10.2   11.97 )-----------( 165      ( 26 Jan 2025 11:55 )             28.7     01-26    127[L]  |  86[L]  |  57[H]  ----------------------------<  379[H]  3.3[L]   |  20[L]  |  6.74[H]    Ca    7.6[L]      26 Jan 2025 11:55        Urinalysis Basic - ( 26 Jan 2025 11:55 )    Color: x / Appearance: x / SG: x / pH: x  Gluc: 379 mg/dL / Ketone: x  / Bili: x / Urobili: x   Blood: x / Protein: x / Nitrite: x   Leuk Esterase: x / RBC: x / WBC x   Sq Epi: x / Non Sq Epi: x / Bacteria: x            CAPILLARY BLOOD GLUCOSE      POCT Blood Glucose.: 206 mg/dL (27 Jan 2025 07:47)  POCT Blood Glucose.: 188 mg/dL (26 Jan 2025 21:12)  POCT Blood Glucose.: 246 mg/dL (26 Jan 2025 16:59)  POCT Blood Glucose.: 359 mg/dL (26 Jan 2025 11:36)        RADIOLOGY & ADDITIONAL TESTS:    CXR:  < from: Xray Chest 1 View- PORTABLE-Urgent (Xray Chest 1 View- PORTABLE-Urgent .) (01.23.25 @ 11:23) >  IMPRESSION: COPD. Bibasilar infiltrates left greater than right at this   time.      < end of copied text >    Ct scan chest:    ekg;    echo:

## 2025-01-27 NOTE — PROGRESS NOTE ADULT - SUBJECTIVE AND OBJECTIVE BOX
[facility] : Progress Note :: [phone], (office [phone]),   Dr Pandey / Dr Mi / Dr Massey / Dr Etienne / Dr Cassandra DA SILVA / Dr Burt / Dr Garber / Dr Naeem lam  _____________________________________________________________________________________________  s/p HD earlier today    shellfish (Anaphylaxis)  latex (Unknown)  natural rubber (Unknown)  aspirin (Unknown)  ibuprofen (Unknown)  Mushrooms (Anaphylaxis)    Hospital Medications:   MEDICATIONS  (STANDING):  albuterol/ipratropium for Nebulization 3 milliLiter(s) Nebulizer every 6 hours  atorvastatin 10 milliGRAM(s) Oral at bedtime  azithromycin  IVPB 500 milliGRAM(s) IV Intermittent every 24 hours  calcium acetate 667 milliGRAM(s) Oral three times a day with meals  cefTRIAXone   IVPB 1000 milliGRAM(s) IV Intermittent every 24 hours  chlorhexidine 2% Cloths 1 Application(s) Topical <User Schedule>  epoetin lara-epbx (RETACRIT) Injectable 11599 Unit(s) IV Push <User Schedule>  ezetimibe 10 milliGRAM(s) Oral daily  fluticasone propionate/ salmeterol 100-50 MICROgram(s) Diskus 1 Dose(s) Inhalation two times a day  heparin   Injectable 5000 Unit(s) SubCutaneous every 12 hours  insulin glargine Injectable (LANTUS) 22 Unit(s) SubCutaneous at bedtime  insulin lispro (ADMELOG) corrective regimen sliding scale   SubCutaneous three times a day before meals  insulin lispro (ADMELOG) corrective regimen sliding scale   SubCutaneous at bedtime  insulin lispro Injectable (ADMELOG) 12 Unit(s) SubCutaneous three times a day before meals  losartan 25 milliGRAM(s) Oral daily  melatonin 3 milliGRAM(s) Oral at bedtime  methylPREDNISolone sodium succinate Injectable 40 milliGRAM(s) IV Push daily  metoprolol tartrate 25 milliGRAM(s) Oral two times a day  oseltamivir 30 milliGRAM(s) Oral <User Schedule>  pancrelipase  (CREON 36,000 Lipase Units) 1 Capsule(s) Oral four times a day with meals  pantoprazole    Tablet 40 milliGRAM(s) Oral before breakfast        VITALS:  T(F): 97.1 (01-27-25 @ 13:03), Max: 98.4 (01-27-25 @ 07:09)  HR: 72 (01-27-25 @ 13:03)  BP: 119/70 (01-27-25 @ 13:03)  RR: 17 (01-27-25 @ 13:03)  SpO2: 98% (01-27-25 @ 13:03)  Wt(kg): --    01-27 @ 07:01  -  01-27 @ 15:01  --------------------------------------------------------  IN: 600 mL / OUT: 1600 mL / NET: -1000 mL        PHYSICAL EXAM:  Constitutional: NAD  HEENT: anicteric sclera, oropharynx clear  Neck: No JVD  Respiratory: CTAB, no wheezes+ rales or rhonchi  Cardiovascular: S1, S2, RRR  Gastrointestinal: BS+, soft, NT/ND  Extremities:  No peripheral edema  Neurological: A/O x 3, no focal deficits  : No CVA tenderness. No dickson.   Skin: No rashes  Vascular Access: AVF with thrill and bruit     LABS:  01-27    123[L]  |  84[L]  |  77[H]  ----------------------------<  364[H]  4.0   |  19[L]  |  7.58[H]    Ca    7.3[L]      27 Jan 2025 09:53      Creatinine Trend: 7.58 <--, 6.74 <--, 4.23 <--, 7.67 <--, 7.35 <--, 5.69 <--                        9.3    8.07  )-----------( 148      ( 27 Jan 2025 09:53 )             26.5     Urine Studies:  Urinalysis Basic - ( 27 Jan 2025 09:53 )    Color:  / Appearance:  / SG:  / pH:   Gluc: 364 mg/dL / Ketone:   / Bili:  / Urobili:    Blood:  / Protein:  / Nitrite:    Leuk Esterase:  / RBC:  / WBC    Sq Epi:  / Non Sq Epi:  / Bacteria:         RADIOLOGY & ADDITIONAL STUDIES:

## 2025-01-27 NOTE — CONSULT NOTE ADULT - ATTENDING COMMENTS
I agree with the resident progress note/outlined plan of care. I have the edited the above note as needed. My independent findings and conclusions are documented.    Patient has steroid induced hyperglycemia. Currently on 40 mg IV methylpredinsone  A1C is better controlled oupatient.    Adjust the insulin as above    Diabetes Education:  Extensive education about diabetes, hyperglycemia, hypoglycemia, glucose self-monitoring with glucometer, glucose target ranges, adherence to diabetes medications, diet, physical activity, importance of following up with outpatient endocrinology/ opthalmology and podiatry appointments discussed.   Explained the definition of HBA1C and target range.   Explained the complications of diabetes including stroke, renal failure and blindness  Reviewed hypoglycemic sign/symptoms and necessary precautions.   Discussed the goal fasting and postprandial BS at home  Patient verbalized understanding and agrees with the plan Island Pedicle Flap With Canthal Suspension Text: The defect edges were debeveled with a #15 scalpel blade.  Given the location of the defect, shape of the defect and the proximity to free margins an island pedicle advancement flap was deemed most appropriate.  Using a sterile surgical marker, an appropriate advancement flap was drawn incorporating the defect, outlining the appropriate donor tissue and placing the expected incisions within the relaxed skin tension lines where possible. The area thus outlined was incised deep to adipose tissue with a #15 scalpel blade.  The skin margins were undermined to an appropriate distance in all directions around the primary defect and laterally outward around the island pedicle utilizing iris scissors.  There was minimal undermining beneath the pedicle flap. A suspension suture was placed in the canthal tendon to prevent tension and prevent ectropion.

## 2025-01-27 NOTE — PROGRESS NOTE ADULT - SUBJECTIVE AND OBJECTIVE BOX
NP Note discussed with  primary attending    Patient is a 84y old  Female who presents with a chief complaint of PNA (27 Jan 2025 08:57)      INTERVAL HPI/OVERNIGHT EVENTS: no new complaints    MEDICATIONS  (STANDING):  albuterol/ipratropium for Nebulization 3 milliLiter(s) Nebulizer every 6 hours  atorvastatin 10 milliGRAM(s) Oral at bedtime  azithromycin  IVPB 500 milliGRAM(s) IV Intermittent every 24 hours  calcium acetate 667 milliGRAM(s) Oral three times a day with meals  cefTRIAXone   IVPB 1000 milliGRAM(s) IV Intermittent every 24 hours  chlorhexidine 2% Cloths 1 Application(s) Topical <User Schedule>  epoetin lara-epbx (RETACRIT) Injectable 74897 Unit(s) IV Push <User Schedule>  ezetimibe 10 milliGRAM(s) Oral daily  fluticasone propionate/ salmeterol 100-50 MICROgram(s) Diskus 1 Dose(s) Inhalation two times a day  heparin   Injectable 5000 Unit(s) SubCutaneous every 12 hours  insulin glargine Injectable (LANTUS) 22 Unit(s) SubCutaneous at bedtime  insulin lispro (ADMELOG) corrective regimen sliding scale   SubCutaneous three times a day before meals  insulin lispro (ADMELOG) corrective regimen sliding scale   SubCutaneous at bedtime  insulin lispro Injectable (ADMELOG) 12 Unit(s) SubCutaneous three times a day before meals  losartan 25 milliGRAM(s) Oral daily  melatonin 3 milliGRAM(s) Oral at bedtime  methylPREDNISolone sodium succinate Injectable 40 milliGRAM(s) IV Push daily  metoprolol tartrate 25 milliGRAM(s) Oral two times a day  oseltamivir 30 milliGRAM(s) Oral <User Schedule>  pancrelipase  (CREON 36,000 Lipase Units) 1 Capsule(s) Oral four times a day with meals  pantoprazole    Tablet 40 milliGRAM(s) Oral before breakfast    MEDICATIONS  (PRN):  acetaminophen     Tablet .. 650 milliGRAM(s) Oral every 6 hours PRN Temp greater or equal to 38C (100.4F), Mild Pain (1 - 3)  albuterol    90 MICROgram(s) HFA Inhaler 2 Puff(s) Inhalation every 6 hours PRN Shortness of Breath and/or Wheezing      __________________________________________________  REVIEW OF SYSTEMS:    CONSTITUTIONAL: No fever  EYES: No acute visual disturbances  NECK: No pain or stiffness  RESPIRATORY: No cough; No shortness of breath  CARDIOVASCULAR: No chest pain, no palpitations  GASTROINTESTINAL: No pain. No nausea or vomiting.  No diarrhea   NEUROLOGICAL: No headache or numbness, no tremors  MUSCULOSKELETAL: No joint pain, no muscle pain  GENITOURINARY: No dysuria, no frequency, no hesitancy  PSYCHIATRY: No depression , no anxiety  ALL OTHER  ROS negative        Vital Signs Last 24 Hrs  T(C): 36.8 (27 Jan 2025 15:34), Max: 36.9 (27 Jan 2025 07:09)  T(F): 98.2 (27 Jan 2025 15:34), Max: 98.4 (27 Jan 2025 07:09)  HR: 81 (27 Jan 2025 15:34) (61 - 91)  BP: 131/71 (27 Jan 2025 15:34) (92/51 - 131/71)  BP(mean): --  RR: 17 (27 Jan 2025 13:03) (16 - 18)  SpO2: 98% (27 Jan 2025 13:03) (93% - 98%)    Parameters below as of 27 Jan 2025 13:03  Patient On (Oxygen Delivery Method): room air        ________________________________________________  PHYSICAL EXAM:  GENERAL: NAD  HEENT: Normocephalic;  conjunctivae and sclerae clear; moist mucous membranes   NECK : Supple  CHEST/LUNG: Clear to auscultation bilaterally with good air entry   HEART: S1 S2  regular; no murmurs, gallops or rubs  ABDOMEN: Soft, Nontender, Nondistended; Bowel sounds present x 4 quad  EXTREMITIES: No cyanosis; no edema; no calf tenderness.  (+) L. AVF   SKIN: Warm and dry; no rash  NERVOUS SYSTEM:  Awake and alert; Oriented to place, person and time; no new deficits    _________________________________________________  LABS:                        9.3    8.07  )-----------( 148      ( 27 Jan 2025 09:53 )             26.5     01-27    123[L]  |  84[L]  |  77[H]  ----------------------------<  364[H]  4.0   |  19[L]  |  7.58[H]    Ca    7.3[L]      27 Jan 2025 09:53        Urinalysis Basic - ( 27 Jan 2025 09:53 )    Color: x / Appearance: x / SG: x / pH: x  Gluc: 364 mg/dL / Ketone: x  / Bili: x / Urobili: x   Blood: x / Protein: x / Nitrite: x   Leuk Esterase: x / RBC: x / WBC x   Sq Epi: x / Non Sq Epi: x / Bacteria: x      CAPILLARY BLOOD GLUCOSE      POCT Blood Glucose.: 155 mg/dL (27 Jan 2025 11:33)  POCT Blood Glucose.: 206 mg/dL (27 Jan 2025 07:47)  POCT Blood Glucose.: 188 mg/dL (26 Jan 2025 21:12)        RADIOLOGY & ADDITIONAL TESTS:    Imaging Personally Reviewed:  YES/NO    Consultant(s) Notes Reviewed:   YES/ No    Care Discussed with Consultants :     Plan of care was discussed with patient and /or primary care giver; all questions and concerns were addressed and care was aligned with patient's wishes.

## 2025-01-27 NOTE — CONSULT NOTE ADULT - SUBJECTIVE AND OBJECTIVE BOX
Endocrine Initial Consult Note:    Patient is a 84y old  Female who presents with a chief complaint of SOB (2025 09:10)      HPI:  84y F with PMH of Gout, HTN (hypertension), HLD, DM, OA, ESRD on HD (MWF), Hemochromatosis. and CHF presenting with shortness of breath. Patient accompanied by granddaughter at bedside providing additional collateral. Patient reports a few days of feeling generally unwell with weakness and shortness of breath. Family noticed the patient developing noticeable increased work of breathing and was concerned for possible pneumonia prompting them to bring patient to ED. Patient denies fevers or chills at home. Patient says she experienced some nausea this morning when attempting to take some of her medications, leading to difficulty swallowing capsules, but denies vomiting. Patient has history of dysphagia and was hospitalized in 2024 with aspiration pneumonia. Patient says she has been cutting food into small pieces to avoid aspiration. Patient had one episode diarrhea prior to arrival. Patient denies abdominal pain or chest pain but notes chest discomfort with coughing. The cough is described as dry without productive sputum. Patient denies history of prior diagnosis of COPD or asthma. Patient is a former smoker quitting 30 to 40 years ago and reports  exposure. Patient had usual dialysis yesterday and granddaughter states that staff mentioned she seemed to be retaining more fluid yesterday than usual. Patient currently complaining of severe dyspnea.     In the ED:  T(F): , Max: 98.7 (16:14); HR:  (70 - 98); BP:  (93/56 - 127/81); RR:  (18 - 22); SpO2:  (97% - 100%)  WBC:5.23, Hb.3, PLT:152  Na:136, K:3.3, Cl:93, HCO3:25, BUN:25, sCr:5.69  AST:76, ALT:42, ALP:112, Tbili:0.9, Lipase:--   Troponin:49.2, p-BNP:92810  s/p albuterol/ipratropium for Nebulization. 3 milliLiter(s); methylPREDNISolone sodium succinate Injectable 125 milliGRAM(s); sodium chloride 0.9% Bolus 100 milliLiter(s) (2025 19:01)    84y Female    Diabetes History:  Diagnosis:  Home regimen:  Home fingersticks/ CGM:  Hypoglycemia events:  Retinopathy/most recent opthalmology:  Peripheral Neuropathy:  Nephropathy:  Cardiovascular disease:  Peripheral vascular disease:  Diet:  Recent A1C   PCP  Endocrinologist:    Review of systems:  Constitutional:  Constitutional: No fever, weight loss, fatigue, low energy, generalized weakness, poor appetite  Eyes: No redness, no dryness, no pain, no tearing, no gritty or kira feeling, no bulging  Cardiovascular/ Respiratory: No palpitations,, no chest pain, no shortness of breath, no exercise intolerance, no cough, no leg/ ankle swelling  Gastrointestinal: No trouble swallowing, no heart burn, no abdominal pain, no bloating, no nausea, no vomiting, no constipation, no diarrhea, no frequent bowel movements  Skin: No excessive hair growth, no hair loss, no acne, no excessive sweating, no rash, no easy bruising  Neurological: No headaches, no change in vision, no dizziness/ lightheadedness, no tremors, no numbness/ tingling in feet, no pain/ burning in feet, no trouble with balance, no muscular weakness.   Endocrine: Frequent urination, excessive urination, excessive thirst, symptoms     PAST MEDICAL & SURGICAL HISTORY:  Gout      Cataract      Vitamin D deficiency      HTN (hypertension)      HLD (hyperlipidemia)      DM (diabetes mellitus)      OA (osteoarthritis)      ESRD (end stage renal disease) on dialysis      VT (ventricular tachycardia)      Hemochromatosis      CHF (congestive heart failure)      S/P TKR (total knee replacement), left      S/P bilateral cataract extraction      S/P arteriovenous (AV) fistula creation          FAMILY HISTORY:  Family history of cancer        Social History:  Occupation:  Marital status/Lives with  Exercise:  Tobacco:  Alcohol:  illicit drug abuse:  Health insurance status:    MEDICATIONS  (STANDING):  albuterol/ipratropium for Nebulization 3 milliLiter(s) Nebulizer every 6 hours  atorvastatin 10 milliGRAM(s) Oral at bedtime  azithromycin  IVPB 500 milliGRAM(s) IV Intermittent every 24 hours  calcium acetate 667 milliGRAM(s) Oral three times a day with meals  cefTRIAXone   IVPB 1000 milliGRAM(s) IV Intermittent every 24 hours  ezetimibe 10 milliGRAM(s) Oral daily  fluticasone propionate/ salmeterol 100-50 MICROgram(s) Diskus 1 Dose(s) Inhalation two times a day  heparin   Injectable 5000 Unit(s) SubCutaneous every 12 hours  insulin glargine Injectable (LANTUS) 20 Unit(s) SubCutaneous at bedtime  insulin lispro (ADMELOG) corrective regimen sliding scale   SubCutaneous three times a day before meals  insulin lispro (ADMELOG) corrective regimen sliding scale   SubCutaneous at bedtime  insulin lispro Injectable (ADMELOG) 12 Unit(s) SubCutaneous three times a day before meals  losartan 25 milliGRAM(s) Oral daily  melatonin 3 milliGRAM(s) Oral at bedtime  methylPREDNISolone sodium succinate Injectable 40 milliGRAM(s) IV Push daily  metoprolol tartrate 25 milliGRAM(s) Oral two times a day  oseltamivir 30 milliGRAM(s) Oral <User Schedule>  pancrelipase  (CREON 36,000 Lipase Units) 1 Capsule(s) Oral four times a day with meals  pantoprazole    Tablet 40 milliGRAM(s) Oral before breakfast    MEDICATIONS  (PRN):  acetaminophen     Tablet .. 650 milliGRAM(s) Oral every 6 hours PRN Temp greater or equal to 38C (100.4F), Mild Pain (1 - 3)  albuterol    90 MICROgram(s) HFA Inhaler 2 Puff(s) Inhalation every 6 hours PRN Shortness of Breath and/or Wheezing      Physical Examination  Vital Signs Last 24 Hrs  T(C): 36.9 (2025 07:09), Max: 36.9 (2025 07:09)  T(F): 98.4 (2025 07:09), Max: 98.4 (2025 07:09)  HR: 66 (2025 07:09) (66 - 91)  BP: 104/81 (2025 07:09) (97/58 - 110/73)  BP(mean): --  RR: 18 (2025 07:09) (18 - 18)  SpO2: 98% (2025 07:09) (93% - 98%)    Parameters below as of 2025 07:09  Patient On (Oxygen Delivery Method): room air      Constitutional: No acute distress, ill- appearing, no anxious appearing, hyperkinetic, no diaphoretic  HEENT: Moist mucous membranes  Neck:  No JVD, bruits or thyromegaly, No thyroid nodules palpable, no LAD  Respiratory:  Respiratory effort normal, lungs clear to ausculation, without rales or rhonchi  Cardiovascular:  Regular heart rate, normal S1 and S2 sounds, without murmur, rub or gallop.  Gastrointestinal: Soft, non tender without hepatosplenomegaly and masses, no abdominal obesity  Extremities: Sensation intact to monofilament in feet, no cyanosis, clubbing or edema, positive pedal pulses  Neurological:  Oriented to person, place and time, No gross sensory or motor defects, visual fields intact to confrontation, normal deep tendon reflexes    Labs:                        10.2   11.97 )-----------( 165      ( 2025 11:55 )             28.7     -    127[L]  |  86[L]  |  57[H]  ----------------------------<  379[H]  3.3[L]   |  20[L]  |  6.74[H]    Ca    7.6[L]      2025 11:55            Urinalysis Basic - ( 2025 11:55 )    Color: x / Appearance: x / SG: x / pH: x  Gluc: 379 mg/dL / Ketone: x  / Bili: x / Urobili: x   Blood: x / Protein: x / Nitrite: x   Leuk Esterase: x / RBC: x / WBC x   Sq Epi: x / Non Sq Epi: x / Bacteria: x      CAPILLARY BLOOD GLUCOSE      POCT Blood Glucose.: 206 mg/dL (2025 07:47)  POCT Blood Glucose.: 188 mg/dL (2025 21:12)  POCT Blood Glucose.: 246 mg/dL (2025 16:59)  POCT Blood Glucose.: 359 mg/dL (2025 11:36)      Radiology and diagnostic studies:      Assessment and Plan: Recommendations pending  84y Female   Endocrinology is consulted fro    1) Type 2 diabetes:      Recommendations:  Basal Insulin:   Glargine ( Lantus) units once daily    Nutritional Insulin:   Lispro (Admelog) units with breakfast, Hold if NPO or eating <50% of meals  Lispro ( Admelog) units with lunch, Hold if NPO or eating < 50% of meals  Lispro (Admelog) units with dinner, Hold if NPO or eating < 50% of meals    Correctional Insulin:  Normal Lispro ( Admelog) correctional scale with meals and bedtime    Oral Diabetes Medications:  Non in the hospital     Endocrine Initial Consult Note:      HPI:  84y F with PMH of Gout, HTN (hypertension), HLD, DM, OA, ESRD on HD (MWF), Hemochromatosis. and CHF presenting with shortness of breath. Patient accompanied by granddaughter at bedside providing additional collateral. Patient reports a few days of feeling generally unwell with weakness and shortness of breath. Family noticed the patient developing noticeable increased work of breathing and was concerned for possible pneumonia prompting them to bring patient to ED. Patient denies fevers or chills at home. Patient says she experienced some nausea this morning when attempting to take some of her medications, leading to difficulty swallowing capsules, but denies vomiting. Patient has history of dysphagia and was hospitalized in 2024 with aspiration pneumonia. Patient says she has been cutting food into small pieces to avoid aspiration. Patient had one episode diarrhea prior to arrival. Patient denies abdominal pain or chest pain but notes chest discomfort with coughing. The cough is described as dry without productive sputum. Patient denies history of prior diagnosis of COPD or asthma. Patient is a former smoker quitting 30 to 40 years ago and reports  exposure. Patient had usual dialysis yesterday and granddaughter states that staff mentioned she seemed to be retaining more fluid yesterday than usual. Patient currently complaining of severe dyspnea.     In the ED:  T(F): , Max: 98.7 (16:14); HR:  (70 - 98); BP:  (93/56 - 127/81); RR:  (18 - 22); SpO2:  (97% - 100%)  WBC:5.23, Hb.3, PLT:152  Na:136, K:3.3, Cl:93, HCO3:25, BUN:25, sCr:5.69  AST:76, ALT:42, ALP:112, Tbili:0.9, Lipase:--   Troponin:49.2, p-BNP:73599  s/p albuterol/ipratropium for Nebulization. 3 milliLiter(s); methylPREDNISolone sodium succinate Injectable 125 milliGRAM(s); sodium chloride 0.9% Bolus 100 milliLiter(s) (2025 19:01)    Endocrinology consulted for DM management.    Diabetes History: Patient has been diabetic for over 25 years.  Diagnosis: DMT2, Insulin for 15 years  Home regimen: Toujeo 11 units once a day on days of dialysis and 9 on non-dialysis days, Prandin 1 mg 3x a day  Home fingersticks/ CGM: 120-130 fasting  Hypoglycemia events: No  Peripheral Neuropathy: No  Nephropathy: No  Cardiovascular disease: HTN, HLD  Peripheral vascular disease: No, palpable pedal pulses  Recent A1C: 6.8  Endocrinologist: Dr. Serrano    Review of systems:  Constitutional:  Constitutional: No fever, weight loss, fatigue, low energy, generalized weakness, poor appetite  Eyes: No redness, no dryness, no pain, no tearing, no gritty or kira feeling, no bulging  Cardiovascular/ Respiratory: No palpitations,, no chest pain, no shortness of breath, no exercise intolerance, no cough, no leg/ ankle swelling  Gastrointestinal: No trouble swallowing, no heart burn, no abdominal pain, no bloating, no nausea, no vomiting, no constipation, no diarrhea, no frequent bowel movements  Skin: No excessive hair growth, no hair loss, no acne, no excessive sweating, no rash, no easy bruising  Neurological: No headaches, no change in vision, no dizziness/ lightheadedness, no tremors, no numbness/ tingling in feet, no pain/ burning in feet, no trouble with balance, no muscular weakness.   Endocrine: Frequent urination, excessive urination, excessive thirst, symptoms     Past medical & surgical history:  Gout  Cataract  Vitamin D deficiency  HTN (hypertension)  HLD (hyperlipidemia)  DM (diabetes mellitus)  OA (osteoarthritis)  ESRD (end stage renal disease) on dialysis  VT (ventricular tachycardia)  Hemochromatosis  CHF (congestive heart failure)  S/P TKR (total knee replacement), left  S/P bilateral cataract extraction  S/P arteriovenous (AV) fistula creation    Family history:  Family history of cancer    Social History:  Occupation:  Marital status/Lives with  Exercise:  Tobacco:  Alcohol:  illicit drug abuse:  Health insurance status:    Medications (standing):  albuterol/ipratropium for Nebulization 3 milliLiter(s) Nebulizer every 6 hours  atorvastatin 10 milliGRAM(s) Oral at bedtime  azithromycin  IVPB 500 milliGRAM(s) IV Intermittent every 24 hours  calcium acetate 667 milliGRAM(s) Oral three times a day with meals  cefTRIAXone   IVPB 1000 milliGRAM(s) IV Intermittent every 24 hours  ezetimibe 10 milliGRAM(s) Oral daily  fluticasone propionate/ salmeterol 100-50 MICROgram(s) Diskus 1 Dose(s) Inhalation two times a day  heparin   Injectable 5000 Unit(s) SubCutaneous every 12 hours  insulin glargine Injectable (LANTUS) 20 Unit(s) SubCutaneous at bedtime  insulin lispro (ADMELOG) corrective regimen sliding scale   SubCutaneous three times a day before meals  insulin lispro (ADMELOG) corrective regimen sliding scale   SubCutaneous at bedtime  insulin lispro Injectable (ADMELOG) 12 Unit(s) SubCutaneous three times a day before meals  losartan 25 milliGRAM(s) Oral daily  melatonin 3 milliGRAM(s) Oral at bedtime  methylPREDNISolone sodium succinate Injectable 40 milliGRAM(s) IV Push daily  metoprolol tartrate 25 milliGRAM(s) Oral two times a day  oseltamivir 30 milliGRAM(s) Oral <User Schedule>  pancrelipase  (CREON 36,000 Lipase Units) 1 Capsule(s) Oral four times a day with meals  pantoprazole    Tablet 40 milliGRAM(s) Oral before breakfast    Medications (PRN):  acetaminophen     Tablet .. 650 milliGRAM(s) Oral every 6 hours PRN Temp greater or equal to 38C (100.4F), Mild Pain (1 - 3)  albuterol    90 MICROgram(s) HFA Inhaler 2 Puff(s) Inhalation every 6 hours PRN Shortness of Breath and/or Wheezing    Physical Examination  Vital Signs Last 24 Hrs  T(C): 36.9 (2025 07:09), Max: 36.9 (2025 07:09)  T(F): 98.4 (2025 07:09), Max: 98.4 (2025 07:09)  HR: 66 (2025 07:09) (66 - 91)  BP: 104/81 (2025 07:09) (97/58 - 110/73)  BP(mean): --  RR: 18 (2025 07:09) (18 - 18)  SpO2: 98% (2025 07:09) (93% - 98%)    Parameters below as of 2025 07:09  Patient On (Oxygen Delivery Method): room air    Constitutional: No acute distress, ill- appearing, no anxious appearing, hyperkinetic, no diaphoretic  HEENT: Moist mucous membranes  Neck:  No JVD, bruits or thyromegaly, No thyroid nodules palpable, no LAD  Respiratory:  Respiratory effort normal, lungs clear to ausculation, without rales or rhonchi  Cardiovascular:  Regular heart rate, normal S1 and S2 sounds, without murmur, rub or gallop.  Gastrointestinal: Soft, non tender without hepatosplenomegaly and masses, no abdominal obesity  Extremities: Sensation intact to monofilament in feet, no cyanosis, clubbing or edema, positive pedal pulses  Neurological:  Oriented to person, place and time, No gross sensory or motor defects, visual fields intact to confrontation, normal deep tendon reflexes    Labs:                        10.2   11.97 )-----------( 165      ( 2025 11:55 )             28.7         127[L]  |  86[L]  |  57[H]  ----------------------------<  379[H]  3.3[L]   |  20[L]  |  6.74[H]    Ca    7.6[L]      2025 11:55    Urinalysis Basic - ( 2025 11:55 )    Color: x / Appearance: x / SG: x / pH: x  Gluc: 379 mg/dL / Ketone: x  / Bili: x / Urobili: x   Blood: x / Protein: x / Nitrite: x   Leuk Esterase: x / RBC: x / WBC x   Sq Epi: x / Non Sq Epi: x / Bacteria: x    Capillary Blood Glucose:  POCT Blood Glucose.: 206 mg/dL (2025 07:47)  POCT Blood Glucose.: 188 mg/dL (2025 21:12)  POCT Blood Glucose.: 246 mg/dL (2025 16:59)  POCT Blood Glucose.: 359 mg/dL (2025 11:36)  A1C with Estimated Average Glucose Result: 6.8      Assessment and Plan:   84y F with PMH of Gout, HTN, HLD, DM, OA, ESRD on HD (MWF), Hemochromatosis. and CHF presenting with shortness of breath. Admitted for suspected acute exacerbation of COPD 2/2 Influenza A infection vs. superimposed bacterial PNA possibly 2/2 aspiration. Admitted to telemetry for monitoring of possible heart block seen on telemetry in ED.  Endocrinology is consulted for DM and hyperglycemia. Patient is currently on steroids which are increasing her blood glucose levels.    1) Type 2 diabetes:    Recommendations:  Basal Insulin: increase Glargine ( Lantus) to 22 units once daily    Nutritional Insulin:  Lispro (Admelog) 12 units withe meals 3x a day, Hold if NPO or eating <50% of meals    Correctional Insulin:  Change to low Lispro ( Admelog) correctional scale with meals and bedtime    Discharge recommendations:  -Continue with Toujeo current dosing   -IF patient is leaving with oral steroids, then increase Prandin to 2 mg. If not being discharged with steroids then keep 1 mg Prandin with meals   Endocrine Initial Consult Note:      HPI:  84y F with PMH of Gout, HTN (hypertension), HLD, DM, OA, ESRD on HD (MWF), Hemochromatosis. and CHF presenting with shortness of breath. Patient accompanied by granddaughter at bedside providing additional collateral. Patient reports a few days of feeling generally unwell with weakness and shortness of breath. Family noticed the patient developing noticeable increased work of breathing and was concerned for possible pneumonia prompting them to bring patient to ED. Patient denies fevers or chills at home. Patient says she experienced some nausea this morning when attempting to take some of her medications, leading to difficulty swallowing capsules, but denies vomiting. Patient has history of dysphagia and was hospitalized in 2024 with aspiration pneumonia. Patient says she has been cutting food into small pieces to avoid aspiration. Patient had one episode diarrhea prior to arrival. Patient denies abdominal pain or chest pain but notes chest discomfort with coughing. The cough is described as dry without productive sputum. Patient denies history of prior diagnosis of COPD or asthma. Patient is a former smoker quitting 30 to 40 years ago and reports  exposure. Patient had usual dialysis yesterday and granddaughter states that staff mentioned she seemed to be retaining more fluid yesterday than usual. Patient currently complaining of severe dyspnea.     In the ED:  T(F): , Max: 98.7 (16:14); HR:  (70 - 98); BP:  (93/56 - 127/81); RR:  (18 - 22); SpO2:  (97% - 100%)  WBC:5.23, Hb.3, PLT:152  Na:136, K:3.3, Cl:93, HCO3:25, BUN:25, sCr:5.69  AST:76, ALT:42, ALP:112, Tbili:0.9, Lipase:--   Troponin:49.2, p-BNP:88517  s/p albuterol/ipratropium for Nebulization. 3 milliLiter(s); methylPREDNISolone sodium succinate Injectable 125 milliGRAM(s); sodium chloride 0.9% Bolus 100 milliLiter(s) (2025 19:01)    Endocrinology consulted for Type 2 DM management.    Diabetes History: Patient has been diabetic for over 25 years.  Diagnosis: DMT2, Insulin for 15 years  Home regimen: Toujeo 11 units once a day on days of dialysis and 9 units on non-dialysis days, Prandin 1 mg 3x a day  Home fingersticks/ CGM: 120-130 fasting  Hypoglycemia events: Denies  Peripheral Neuropathy: No  Nephropathy: No  Cardiovascular disease: HTN, HLD  Peripheral vascular disease: No, palpable pedal pulses  Recent A1C: 6.8  Endocrinologist: Dr. Serrano    Review of systems:  Constitutional: No fever, weight loss, fatigue, low energy, generalized weakness, poor appetite  Cardiovascular/ Respiratory: No palpitations,, no chest pain, no shortness of breath, no exercise intolerance, no cough, no leg/ ankle swelling  Gastrointestinal: No abdominal pain, no bloating, no nausea, no vomiting, no constipation, no diarrhea, no frequent bowel movements  Neurological: No headaches, no change in vision, no dizziness/ lightheadedness, yes b/l tremors in both hands, no numbness/ tingling in feet, no pain/ burning in feet   Endocrine: No Frequent urination, excessive urination, excessive thirst, symptoms     Past medical & surgical history:  Gout  Cataract  Vitamin D deficiency  HTN (hypertension)  HLD (hyperlipidemia)  T2DM (diabetes mellitus)  OA (osteoarthritis)  ESRD (end stage renal disease) on dialysis  VT (ventricular tachycardia)  Hemochromatosis  CHF (congestive heart failure)  S/P TKR (total knee replacement), left  S/P bilateral cataract extraction  S/P arteriovenous (AV) fistula creation    Family history:  Family history of cancer    Social History:  Tobacco: Denies  Alcohol: Denies  illicit drug abuse: Denies    Medications (standing):  albuterol/ipratropium for Nebulization 3 milliLiter(s) Nebulizer every 6 hours  atorvastatin 10 milliGRAM(s) Oral at bedtime  azithromycin  IVPB 500 milliGRAM(s) IV Intermittent every 24 hours  calcium acetate 667 milliGRAM(s) Oral three times a day with meals  cefTRIAXone   IVPB 1000 milliGRAM(s) IV Intermittent every 24 hours  ezetimibe 10 milliGRAM(s) Oral daily  fluticasone propionate/ salmeterol 100-50 MICROgram(s) Diskus 1 Dose(s) Inhalation two times a day  heparin   Injectable 5000 Unit(s) SubCutaneous every 12 hours  insulin glargine Injectable (LANTUS) 20 Unit(s) SubCutaneous at bedtime  insulin lispro (ADMELOG) corrective regimen sliding scale   SubCutaneous three times a day before meals  insulin lispro (ADMELOG) corrective regimen sliding scale   SubCutaneous at bedtime  insulin lispro Injectable (ADMELOG) 12 Unit(s) SubCutaneous three times a day before meals  losartan 25 milliGRAM(s) Oral daily  melatonin 3 milliGRAM(s) Oral at bedtime  methylPREDNISolone sodium succinate Injectable 40 milliGRAM(s) IV Push daily  metoprolol tartrate 25 milliGRAM(s) Oral two times a day  oseltamivir 30 milliGRAM(s) Oral <User Schedule>  pancrelipase  (CREON 36,000 Lipase Units) 1 Capsule(s) Oral four times a day with meals  pantoprazole    Tablet 40 milliGRAM(s) Oral before breakfast    Medications (PRN):  acetaminophen     Tablet .. 650 milliGRAM(s) Oral every 6 hours PRN Temp greater or equal to 38C (100.4F), Mild Pain (1 - 3)  albuterol    90 MICROgram(s) HFA Inhaler 2 Puff(s) Inhalation every 6 hours PRN Shortness of Breath and/or Wheezing    Physical Examination  Vital Signs Last 24 Hrs  T(C): 36.9 (2025 07:09), Max: 36.9 (2025 07:09)  T(F): 98.4 (2025 07:09), Max: 98.4 (2025 07:09)  HR: 66 (2025 07:09) (66 - 91)  BP: 104/81 (2025 07:09) (97/58 - 110/73)  BP(mean): --  RR: 18 (2025 07:09) (18 - 18)  SpO2: 98% (2025 07:09) (93% - 98%)    Constitutional: No acute distress, yes ill- appearing, b/l tremors in both hands  HEENT: Moist mucous membranes  Neck:  No JVD, bruits or thyromegaly, No thyroid nodules palpable, no LAD  Respiratory:  Respiratory effort normal, lungs clear to ausculation, without rales or rhonchi  Cardiovascular:  Regular heart rate, normal S1 and S2 sounds, without murmur, rub or gallop.  Gastrointestinal: Soft, non tender without hepatosplenomegaly and masses, no abdominal obesity  Extremities: Sensation intact in feet, no cyanosis, clubbing or edema, positive pedal pulses  Neurological:  Oriented to person, place and time    Labs:                      10.2   11.97 )-----------( 165      ( 2025 11:55 )             28.7       127[L]  |  86[L]  |  57[H]  ----------------------------<  379[H]  3.3[L]   |  20[L]  |  6.74[H]  Ca    7.6[L]      2025 11:55    Capillary Blood Glucose:  POCT Blood Glucose.: 206 mg/dL (2025 07:47)  POCT Blood Glucose.: 188 mg/dL (2025 21:12)  POCT Blood Glucose.: 246 mg/dL (2025 16:59)  POCT Blood Glucose.: 359 mg/dL (2025 11:36)  A1C with Estimated Average Glucose Result: 6.8    Assessment and Plan:   84y F with PMH of Gout, HTN, HLD, DM, OA, ESRD on HD ([facility]), Hemochromatosis. and CHF presenting with shortness of breath. Admitted for suspected acute exacerbation of COPD 2/2 Influenza A infection vs. superimposed bacterial PNA possibly 2/2 aspiration. Admitted to telemetry for monitoring of possible heart block seen on telemetry in ED.  Endocrinology is consulted for DM and hyperglycemia. Patient is currently on steroids which are increasing her blood glucose levels.    1) Type 2 diabetes:  2) Steroid induced hyperglycemia    Recommendations:  Basal Insulin: increase Glargine ( Lantus) to 22 units once daily    Nutritional Insulin:  Lispro (Admelog) 12 units withe meals 3x a day, Hold if NPO or eating <50% of meals    Correctional Insulin:  Change to low Lispro ( Admelog) correctional scale with meals and bedtime    Discharge recommendations:  -Continue with Toujeo current dosing   -IF patient is leaving with oral steroids, then increase Prandin to 2 mg. If not being discharged with steroids then keep 1 mg Prandin with meals

## 2025-01-28 LAB
ALBUMIN SERPL ELPH-MCNC: 3.3 G/DL — LOW (ref 3.5–5)
ALP SERPL-CCNC: 75 U/L — SIGNIFICANT CHANGE UP (ref 40–120)
ALT FLD-CCNC: 48 U/L DA — SIGNIFICANT CHANGE UP (ref 10–60)
ANION GAP SERPL CALC-SCNC: 12 MMOL/L — SIGNIFICANT CHANGE UP (ref 5–17)
AST SERPL-CCNC: 47 U/L — HIGH (ref 10–40)
BILIRUB SERPL-MCNC: 0.4 MG/DL — SIGNIFICANT CHANGE UP (ref 0.2–1.2)
BUN SERPL-MCNC: 39 MG/DL — HIGH (ref 7–18)
CALCIUM SERPL-MCNC: 8.1 MG/DL — LOW (ref 8.4–10.5)
CHLORIDE SERPL-SCNC: 94 MMOL/L — LOW (ref 96–108)
CO2 SERPL-SCNC: 27 MMOL/L — SIGNIFICANT CHANGE UP (ref 22–31)
CREAT SERPL-MCNC: 4.28 MG/DL — HIGH (ref 0.5–1.3)
EGFR: 10 ML/MIN/1.73M2 — LOW
FERRITIN SERPL-MCNC: 1418 NG/ML — HIGH (ref 13–330)
GLUCOSE BLDC GLUCOMTR-MCNC: 141 MG/DL — HIGH (ref 70–99)
GLUCOSE BLDC GLUCOMTR-MCNC: 191 MG/DL — HIGH (ref 70–99)
GLUCOSE BLDC GLUCOMTR-MCNC: 202 MG/DL — HIGH (ref 70–99)
GLUCOSE BLDC GLUCOMTR-MCNC: 210 MG/DL — HIGH (ref 70–99)
GLUCOSE SERPL-MCNC: 121 MG/DL — HIGH (ref 70–99)
HCT VFR BLD CALC: 27 % — LOW (ref 34.5–45)
HGB BLD-MCNC: 9.6 G/DL — LOW (ref 11.5–15.5)
IRON SATN MFR SERPL: 161 UG/DL — HIGH (ref 40–150)
IRON SATN MFR SERPL: 94 % — HIGH (ref 15–50)
MCHC RBC-ENTMCNC: 31.6 PG — SIGNIFICANT CHANGE UP (ref 27–34)
MCHC RBC-ENTMCNC: 35.6 G/DL — SIGNIFICANT CHANGE UP (ref 32–36)
MCV RBC AUTO: 88.8 FL — SIGNIFICANT CHANGE UP (ref 80–100)
NRBC # BLD: 0 /100 WBCS — SIGNIFICANT CHANGE UP (ref 0–0)
NRBC BLD-RTO: 0 /100 WBCS — SIGNIFICANT CHANGE UP (ref 0–0)
PLATELET # BLD AUTO: 156 K/UL — SIGNIFICANT CHANGE UP (ref 150–400)
POTASSIUM SERPL-MCNC: 3.5 MMOL/L — SIGNIFICANT CHANGE UP (ref 3.5–5.3)
POTASSIUM SERPL-SCNC: 3.5 MMOL/L — SIGNIFICANT CHANGE UP (ref 3.5–5.3)
PROT SERPL-MCNC: 5.7 G/DL — LOW (ref 6–8.3)
RBC # BLD: 3.04 M/UL — LOW (ref 3.8–5.2)
RBC # FLD: 12.6 % — SIGNIFICANT CHANGE UP (ref 10.3–14.5)
SODIUM SERPL-SCNC: 133 MMOL/L — LOW (ref 135–145)
TIBC SERPL-MCNC: 172 UG/DL — LOW (ref 250–450)
UIBC SERPL-MCNC: 11 UG/DL — LOW (ref 110–370)
WBC # BLD: 7.91 K/UL — SIGNIFICANT CHANGE UP (ref 3.8–10.5)
WBC # FLD AUTO: 7.91 K/UL — SIGNIFICANT CHANGE UP (ref 3.8–10.5)

## 2025-01-28 PROCEDURE — 71250 CT THORAX DX C-: CPT | Mod: 26

## 2025-01-28 PROCEDURE — 99232 SBSQ HOSP IP/OBS MODERATE 35: CPT

## 2025-01-28 RX ORDER — INSULIN LISPRO 100/ML
4 VIAL (ML) SUBCUTANEOUS
Refills: 0 | Status: DISCONTINUED | OUTPATIENT
Start: 2025-01-28 | End: 2025-01-29

## 2025-01-28 RX ORDER — INSULIN GLARGINE-YFGN 100 [IU]/ML
15 INJECTION, SOLUTION SUBCUTANEOUS AT BEDTIME
Refills: 0 | Status: DISCONTINUED | OUTPATIENT
Start: 2025-01-28 | End: 2025-01-29

## 2025-01-28 RX ADMIN — Medication 1 CAPSULE(S): at 17:47

## 2025-01-28 RX ADMIN — CALCIUM ACETATE 667 MILLIGRAM(S): 667 CAPSULE ORAL at 11:46

## 2025-01-28 RX ADMIN — AZITHROMYCIN DIHYDRATE 255 MILLIGRAM(S): 500 TABLET, FILM COATED ORAL at 21:56

## 2025-01-28 RX ADMIN — Medication 4 UNIT(S): at 11:52

## 2025-01-28 RX ADMIN — ATORVASTATIN CALCIUM 10 MILLIGRAM(S): 80 TABLET, FILM COATED ORAL at 21:54

## 2025-01-28 RX ADMIN — Medication 12 UNIT(S): at 08:18

## 2025-01-28 RX ADMIN — Medication 5000 UNIT(S): at 17:47

## 2025-01-28 RX ADMIN — Medication 10 MILLIGRAM(S): at 11:46

## 2025-01-28 RX ADMIN — CALCIUM ACETATE 667 MILLIGRAM(S): 667 CAPSULE ORAL at 08:18

## 2025-01-28 RX ADMIN — Medication 25 MILLIGRAM(S): at 06:40

## 2025-01-28 RX ADMIN — INSULIN GLARGINE-YFGN 15 UNIT(S): 100 INJECTION, SOLUTION SUBCUTANEOUS at 21:55

## 2025-01-28 RX ADMIN — PANTOPRAZOLE 40 MILLIGRAM(S): 20 TABLET, DELAYED RELEASE ORAL at 06:40

## 2025-01-28 RX ADMIN — CALCIUM ACETATE 667 MILLIGRAM(S): 667 CAPSULE ORAL at 17:47

## 2025-01-28 RX ADMIN — Medication 5000 UNIT(S): at 06:39

## 2025-01-28 RX ADMIN — Medication 1 CAPSULE(S): at 21:54

## 2025-01-28 RX ADMIN — Medication 25 MILLIGRAM(S): at 17:47

## 2025-01-28 RX ADMIN — Medication 1: at 11:50

## 2025-01-28 RX ADMIN — Medication 4 UNIT(S): at 17:48

## 2025-01-28 RX ADMIN — ACETAMINOPHEN, DIPHENHYDRAMINE HCL, PHENYLEPHRINE HCL 3 MILLIGRAM(S): 325; 25; 5 TABLET ORAL at 21:54

## 2025-01-28 RX ADMIN — Medication 1 CAPSULE(S): at 11:48

## 2025-01-28 RX ADMIN — Medication 2: at 17:48

## 2025-01-28 RX ADMIN — OSELTAMIVIR PHOSPHATE 30 MILLIGRAM(S): 75 CAPSULE ORAL at 06:40

## 2025-01-28 RX ADMIN — FLUTICASONE PROPIONATE AND SALMETEROL 1 DOSE(S): 113; 14 POWDER, METERED RESPIRATORY (INHALATION) at 21:54

## 2025-01-28 RX ADMIN — FLUTICASONE PROPIONATE AND SALMETEROL 1 DOSE(S): 113; 14 POWDER, METERED RESPIRATORY (INHALATION) at 11:58

## 2025-01-28 RX ADMIN — Medication 40 MILLIGRAM(S): at 06:39

## 2025-01-28 NOTE — PROGRESS NOTE ADULT - SUBJECTIVE AND OBJECTIVE BOX
INTERVAL HPI/OVERNIGHT EVENTS:  Patient seen,comfortable in the bad,no acute issues  VITAL SIGNS:  T(F): 97.2 (01-28-25 @ 05:19)  HR: 72 (01-28-25 @ 05:19)  BP: 122/69 (01-28-25 @ 05:19)  RR: 17 (01-28-25 @ 05:19)  SpO2: 96% (01-28-25 @ 05:19)  Wt(kg): --    PHYSICAL EXAM:  awake,alert  Constitutional:  Eyes:  ENMT:perrla  Neck:  Respiratory:few rales  Cardiovascular:s1s2,m-none  Gastrointestinal:soft,bs pos  Extremities:  Vascular:  Neurological:no focal deficit  Musculoskeletal:    MEDICATIONS  (STANDING):  albuterol/ipratropium for Nebulization 3 milliLiter(s) Nebulizer every 6 hours  atorvastatin 10 milliGRAM(s) Oral at bedtime  azithromycin  IVPB 500 milliGRAM(s) IV Intermittent every 24 hours  calcium acetate 667 milliGRAM(s) Oral three times a day with meals  chlorhexidine 2% Cloths 1 Application(s) Topical <User Schedule>  epoetin lara-epbx (RETACRIT) Injectable 08626 Unit(s) IV Push <User Schedule>  ezetimibe 10 milliGRAM(s) Oral daily  fluticasone propionate/ salmeterol 100-50 MICROgram(s) Diskus 1 Dose(s) Inhalation two times a day  heparin   Injectable 5000 Unit(s) SubCutaneous every 12 hours  insulin glargine Injectable (LANTUS) 22 Unit(s) SubCutaneous at bedtime  insulin lispro (ADMELOG) corrective regimen sliding scale   SubCutaneous three times a day before meals  insulin lispro (ADMELOG) corrective regimen sliding scale   SubCutaneous at bedtime  insulin lispro Injectable (ADMELOG) 12 Unit(s) SubCutaneous three times a day before meals  losartan 25 milliGRAM(s) Oral daily  melatonin 3 milliGRAM(s) Oral at bedtime  methylPREDNISolone sodium succinate Injectable 40 milliGRAM(s) IV Push daily  metoprolol tartrate 25 milliGRAM(s) Oral two times a day  pancrelipase  (CREON 36,000 Lipase Units) 1 Capsule(s) Oral four times a day with meals  pantoprazole    Tablet 40 milliGRAM(s) Oral before breakfast    MEDICATIONS  (PRN):  acetaminophen     Tablet .. 650 milliGRAM(s) Oral every 6 hours PRN Temp greater or equal to 38C (100.4F), Mild Pain (1 - 3)  albuterol    90 MICROgram(s) HFA Inhaler 2 Puff(s) Inhalation every 6 hours PRN Shortness of Breath and/or Wheezing      Allergies    shellfish (Anaphylaxis)  latex (Unknown)  natural rubber (Unknown)  aspirin (Unknown)  ibuprofen (Unknown)  Mushrooms (Anaphylaxis)    Intolerances        LABS:                        9.6    7.91  )-----------( 156      ( 28 Jan 2025 06:14 )             27.0     01-27    123[L]  |  84[L]  |  77[H]  ----------------------------<  364[H]  4.0   |  19[L]  |  7.58[H]    Ca    7.3[L]      27 Jan 2025 09:53        Urinalysis Basic - ( 27 Jan 2025 09:53 )    Color: x / Appearance: x / SG: x / pH: x  Gluc: 364 mg/dL / Ketone: x  / Bili: x / Urobili: x   Blood: x / Protein: x / Nitrite: x   Leuk Esterase: x / RBC: x / WBC x   Sq Epi: x / Non Sq Epi: x / Bacteria: x        RADIOLOGY & ADDITIONAL TESTS:      Assessment and Plan:   · Assessment	  84 year old, F, with PMH of Gout, HTN, HLD, DM, OA, ESRD on HD (MWF), Hemochromatosis. and CHF.  Presents with shortness of breath.  Admitted for Acute exacerbation of COPD 2/2 Influenza A and suspected superimposed bacterial PNA.         Problem/Plan - 1:  ·  Problem: COPD with acute exacerbation.   ·  Plan: - (+) Inf A   - CXR showed bibasilar infiltrates left greater than right  - Currently on Solumedrol  - C/w Azithromycin & Ceftriaxone   - CT Chest pending-f/u results   - Pulovidio Hernandez consulted.  -d/c planning     Problem/Plan - 2:  ·  Problem: Influenza A.   ·  Plan: - C/w Tamiflu   - Rest above.     Problem/Plan - 3:  ·  Problem: ESRD (end stage renal disease) on dialysis.   ·  Plan: H/o ESRD on HD (M/W/F)   - Last HD today, 1/27  - Nephro-Dr. Pandey.     Problem/Plan - 4:  ·  Problem: CHF (congestive heart failure).   ·  Plan: - C/w Losartan.     Problem/Plan - 5:  ·  Problem: HTN (hypertension).   ·  Plan: - See above.     Problem/Plan - 6:  ·  Problem: DM (diabetes mellitus).   ·  Plan: - A1c=6.8  - C/w Lantus, premeal and HSS   - Endo-Dr. Castellon consulted for steroid induced hyperglycemia-F/u rec's.     Problem/Plan - 7:  ·  Problem: HLD (hyperlipidemia).   ·  Plan: - C/w Atorvastatin & Ezetimibe.     Problem/Plan - 8:  ·  Problem: Prophylactic measure.   ·  Plan: - C/w Heparin   - C/w Protonix.    d/w patient and staff plan of care .    FOR FURTHER COVERAGE TILL 02/04 PLEASE CALL DR RED

## 2025-01-28 NOTE — PROGRESS NOTE ADULT - SUBJECTIVE AND OBJECTIVE BOX
Subjective:  Chart Notes, Work list Manager, and fingersticks reviewed. Patient was seen bedside resting comfortably. No acute overnight events noted. Patient states they are overall improved in condition from when they were first admitted. Patient has good appetite, normal bowel movements, normal urination frequency. Patient denies any constitutional symptoms such as F/C/N/V/SOB.     Review of Systems  Constitutional: No fever  Eyes: No blurry vision  Neuro: No tremors  HEENT: No pain  Cardiovascular: No chest pain, palpitations  Respiratory: No SOB, no cough  GI: No nausea, vomiting, abdominal pain  : No dysuria  Skin: no rash  Psych: no depression  Endocrine: no polyuria, polydipsia  Hem/lymph: no swelling  Osteoporosis: no fractures    All other systems reviewed and negative    Medications (standing):  albuterol/ipratropium for Nebulization 3 milliLiter(s) Nebulizer every 6 hours  atorvastatin 10 milliGRAM(s) Oral at bedtime  azithromycin  IVPB 500 milliGRAM(s) IV Intermittent every 24 hours  calcium acetate 667 milliGRAM(s) Oral three times a day with meals  chlorhexidine 2% Cloths 1 Application(s) Topical <User Schedule>  epoetin lara-epbx (RETACRIT) Injectable 34517 Unit(s) IV Push <User Schedule>  ezetimibe 10 milliGRAM(s) Oral daily  fluticasone propionate/ salmeterol 100-50 MICROgram(s) Diskus 1 Dose(s) Inhalation two times a day  heparin   Injectable 5000 Unit(s) SubCutaneous every 12 hours  insulin glargine Injectable (LANTUS) 22 Unit(s) SubCutaneous at bedtime  insulin lispro (ADMELOG) corrective regimen sliding scale   SubCutaneous three times a day before meals  insulin lispro (ADMELOG) corrective regimen sliding scale   SubCutaneous at bedtime  insulin lispro Injectable (ADMELOG) 4 Unit(s) SubCutaneous three times a day before meals  losartan 25 milliGRAM(s) Oral daily  melatonin 3 milliGRAM(s) Oral at bedtime  metoprolol tartrate 25 milliGRAM(s) Oral two times a day  pancrelipase  (CREON 36,000 Lipase Units) 1 Capsule(s) Oral four times a day with meals  pantoprazole    Tablet 40 milliGRAM(s) Oral before breakfast    Medications (PRN):  acetaminophen     Tablet .. 650 milliGRAM(s) Oral every 6 hours PRN Temp greater or equal to 38C (100.4F), Mild Pain (1 - 3)  albuterol    90 MICROgram(s) HFA Inhaler 2 Puff(s) Inhalation every 6 hours PRN Shortness of Breath and/or Wheezing    Physical Exam  VITALS: T(C): 36.7 (01-28-25 @ 12:38)  T(F): 98 (01-28-25 @ 12:38), Max: 98.3 (01-28-25 @ 01:30)  HR: 83 (01-28-25 @ 13:21) (71 - 96)  BP: 129/72 (01-28-25 @ 13:21) (113/65 - 131/71)  RR:  (17 - 18)  SpO2:  (95% - 96%)  Wt(kg): --  GENERAL: NAD,   HEENT:  Atraumatic, Normocephalic, drymucous membranes  THYROID: Normal size, no palpable nodules  RESPIRATORY: Clear to auscultation bilaterally; No rales, rhonchi, wheezing  CARDIOVASCULAR: Regular rate and rhythm; No murmurs; no peripheral edema  GI: Soft, nontender, non distended, +ve abdominal obesity  EXTREMITIES: +ve peripheral pulses, -ve pedal edema  SKIN: Dry, intact, No rashes or lesions  PSYCH: Alert and oriented x 3    Capillary Blood Glucose:    POCT Blood Glucose.: 191 mg/dL (28 Jan 2025 11:26)  POCT Blood Glucose.: 141 mg/dL (28 Jan 2025 08:14)  POCT Blood Glucose.: 118 mg/dL (27 Jan 2025 21:38)  POCT Blood Glucose.: 52 mg/dL (27 Jan 2025 21:34)    01-28    133[L]  |  94[L]  |  39[H]  ----------------------------<  121[H]  3.5   |  27  |  4.28[H]    eGFR: 10[L]    Ca    8.1[L]      01-28    TPro  5.7[L]  /  Alb  3.3[L]  /  TBili  0.4  /  DBili  x   /  AST  47[H]  /  ALT  48  /  AlkPhos  75  01-28      Assessment and Plan:   84y F with PMH of Gout, HTN, HLD, DM, OA, ESRD on HD (MWF), Hemochromatosis. and CHF presenting with shortness of breath. Admitted for suspected acute exacerbation of COPD 2/2 Influenza A infection vs. superimposed bacterial PNA possibly 2/2 aspiration. Admitted to telemetry for monitoring of possible heart block seen on telemetry in ED.  Endocrinology is consulted for DM and hyperglycemia. Patient is currently on steroids which are increasing her blood glucose levels.    1) Type 2 diabetes:  2) Steroid induced hyperglycemia    Recommendations:  Basal Insulin: Decrease Glargine ( Lantus) to 15 units once daily    Nutritional Insulin:  Lispro (Admelog) 4 units with meals 3x a day, Hold if NPO or eating <50% of meals    Correctional Insulin:  Change to low Lispro ( Admelog) correctional scale with meals and bedtime    Discharge recommendations:  -Continue with Toujeo current dosing   -If patient is leaving with oral steroids, then increase Prandin to 2 mg. If not being discharged with steroids then keep 1 mg Prandin with meals           Subjective:  Chart Notes, Work list Manager, and fingersticks reviewed. Patient was seen bedside resting comfortably. No acute overnight events noted. Patient states they are overall improved in condition from when they were first admitted. Patient has good appetite, normal bowel movements, normal urination frequency. Patient denies any constitutional symptoms such as F/C/N/V/SOB.     Review of Systems  Constitutional: No fever  Cardiovascular: No chest pain, palpitations  Respiratory: No SOB, no cough  GI: No nausea, vomiting, abdominal pain  Endocrine: no polyuria, polydipsia    All other systems reviewed and negative    Medications (standing):  albuterol/ipratropium for Nebulization 3 milliLiter(s) Nebulizer every 6 hours  atorvastatin 10 milliGRAM(s) Oral at bedtime  azithromycin  IVPB 500 milliGRAM(s) IV Intermittent every 24 hours  calcium acetate 667 milliGRAM(s) Oral three times a day with meals  chlorhexidine 2% Cloths 1 Application(s) Topical <User Schedule>  epoetin lara-epbx (RETACRIT) Injectable 90899 Unit(s) IV Push <User Schedule>  ezetimibe 10 milliGRAM(s) Oral daily  fluticasone propionate/ salmeterol 100-50 MICROgram(s) Diskus 1 Dose(s) Inhalation two times a day  heparin   Injectable 5000 Unit(s) SubCutaneous every 12 hours  insulin glargine Injectable (LANTUS) 22 Unit(s) SubCutaneous at bedtime  insulin lispro (ADMELOG) corrective regimen sliding scale   SubCutaneous three times a day before meals  insulin lispro (ADMELOG) corrective regimen sliding scale   SubCutaneous at bedtime  insulin lispro Injectable (ADMELOG) 4 Unit(s) SubCutaneous three times a day before meals  losartan 25 milliGRAM(s) Oral daily  melatonin 3 milliGRAM(s) Oral at bedtime  metoprolol tartrate 25 milliGRAM(s) Oral two times a day  pancrelipase  (CREON 36,000 Lipase Units) 1 Capsule(s) Oral four times a day with meals  pantoprazole    Tablet 40 milliGRAM(s) Oral before breakfast    Medications (PRN):  acetaminophen     Tablet .. 650 milliGRAM(s) Oral every 6 hours PRN Temp greater or equal to 38C (100.4F), Mild Pain (1 - 3)  albuterol    90 MICROgram(s) HFA Inhaler 2 Puff(s) Inhalation every 6 hours PRN Shortness of Breath and/or Wheezing    Physical Exam  VITALS: T(C): 36.7 (01-28-25 @ 12:38)  T(F): 98 (01-28-25 @ 12:38), Max: 98.3 (01-28-25 @ 01:30)  HR: 83 (01-28-25 @ 13:21) (71 - 96)  BP: 129/72 (01-28-25 @ 13:21) (113/65 - 131/71)  RR:  (17 - 18)  SpO2:  (95% - 96%)    GENERAL: NAD,   HEENT:Dry mucous membranes  THYROID: Normal size, no palpable nodules  RESPIRATORY: Clear to auscultation bilaterally; No rales, rhonchi, wheezing  CARDIOVASCULAR: Regular rate and rhythm; No murmurs; no peripheral edema  GI: Soft, nontender, non distended, +ve abdominal obesity  EXTREMITIES: +ve peripheral pulses, -ve pedal edema  SKIN: Dry, intact, No rashes or lesions  PSYCH: Alert and oriented x 3    Labs:  Capillary Blood Glucose:  POCT Blood Glucose.: 191 mg/dL (28 Jan 2025 11:26)  POCT Blood Glucose.: 141 mg/dL (28 Jan 2025 08:14)  POCT Blood Glucose.: 118 mg/dL (27 Jan 2025 21:38)  POCT Blood Glucose.: 52 mg/dL (27 Jan 2025 21:34)    01-28  133[L]  |  94[L]  |  39[H]  ----------------------------<  121[H]  3.5   |  27  |  4.28[H]  eGFR: 10[L]  Ca    8.1[L]      01-28  TPro  5.7[L]  /  Alb  3.3[L]  /  TBili  0.4  /  DBili  x   /  AST  47[H]  /  ALT  48  /  AlkPhos  75  01-28      Assessment and Plan:   84y F with PMH of Gout, HTN, HLD, DM, OA, ESRD on HD (MWF), Hemochromatosis. and CHF presenting with shortness of breath. Admitted for suspected acute exacerbation of COPD 2/2 Influenza A infection vs. superimposed bacterial PNA possibly 2/2 aspiration. Admitted to telemetry for monitoring of possible heart block seen on telemetry in ED.  Endocrinology is consulted for DM and hyperglycemia. Patient is currently on steroids which are increasing her blood glucose levels.    1) Type 2 diabetes:  2) Hypoglycemia  3) Steroid induced hyperglycemia ( Resolved)    Steroids have been discontinued today.   Patient developed hypoglycemia yesterday as she received lispro however she did not eat any food. She does not like fish and she is getting fish in her trays    Recommendations:  Basal Insulin: Decrease Glargine ( Lantus) to 15 units once daily    Nutritional Insulin:  Reduce Lispro (Admelog) 4 units with meals 3x a day, Hold if NPO or eating <50% of meals    Correctional Insulin:  Change to low Lispro ( Admelog) correctional scale with meals and bedtime    Discharge recommendations:  -Continue with Toujeo current dosing   -If patient is leaving with oral steroids, then increase Prandin to 2 mg. If not being discharged with steroids then keep 1 mg Prandin with meals

## 2025-01-28 NOTE — CHART NOTE - NSCHARTNOTEFT_GEN_A_CORE
EVENT: Notified by RN, pt with urinary retention. Bladder scan revealed >450 ml of urine in bladder.     HPI:  84 year old, F, with PMH of Gout, HTN, HLD, DM, OA, ESRD on HD (MWF), Hemochromatosis. and CHF.  Presents with shortness of breath.  Admitted for Acute exacerbation of COPD 2/2 Influenza A and suspected superimposed bacterial PNA.      SUBJECTIVE: Pt is A&Ox3, offers no new complaints.     OBJECTIVE:  Vital Signs Last 24 Hrs  T(C): 36.2 (28 Jan 2025 05:19), Max: 36.9 (27 Jan 2025 07:09)  T(F): 97.2 (28 Jan 2025 05:19), Max: 98.4 (27 Jan 2025 07:09)  HR: 72 (28 Jan 2025 05:19) (61 - 81)  BP: 122/69 (28 Jan 2025 05:19) (92/51 - 131/71)  BP(mean): --  RR: 17 (28 Jan 2025 05:19) (16 - 18)  SpO2: 96% (28 Jan 2025 05:19) (95% - 98%)    Parameters below as of 28 Jan 2025 05:19  Patient On (Oxygen Delivery Method): room air    PHYSICAL EXAM:  GENERAL: NAD  HEENT: Normocephalic;  conjunctivae and sclerae clear; moist mucous membranes   NECK : Supple  CHEST/LUNG: Clear to auscultation bilaterally with good air entry   HEART: S1 S2  regular; no murmurs, gallops or rubs  ABDOMEN: Soft, Nontender, Nondistended; Bowel sounds present x 4 quad  EXTREMITIES: No cyanosis; no edema; no calf tenderness.  L AVF (+/+)   SKIN: Warm and dry; no rash  NERVOUS SYSTEM:  Awake and alert; Oriented to place, person and time; no new deficits        LABS:                        9.3    8.07  )-----------( 148      ( 27 Jan 2025 09:53 )             26.5     01-27    123[L]  |  84[L]  |  77[H]  ----------------------------<  364[H]  4.0   |  19[L]  |  7.58[H]    Ca    7.3[L]      27 Jan 2025 09:53      EKG:   IMAGING:    ASSESSMENT/PROBLEM: Urinary retention     PLAN:     -Straight cath x 1  -Repeat bladder scan in 6-8 hours  -After third failed TOV requiring straight cath, consider indwelling Reed catheter   -Continue current/supportive measures    FOLLOW UP / RESULT:     -Results of repeat bladder scan

## 2025-01-28 NOTE — PHYSICAL THERAPY INITIAL EVALUATION ADULT - ADDITIONAL COMMENTS
reports owning SC and RW but rarely uses it, shower chair in bathroom, commode but does not use as well. Pt uses [facility] for appointments and HD (MWF) reports owning SC and RW but rarely uses it, shower chair in bathroom, commode but does not use as well. Pt uses [facility] for appointments and HD (MWF). Reports daughter and granddaughter takes care of all the things she needs.  Pt also reports she will be staying at her daughter's home (apt building with 1 FOS) for a few weeks after hospital d/c.

## 2025-01-28 NOTE — PROGRESS NOTE ADULT - PROBLEM SELECTOR PLAN 1
Droplets precautions  Analgesics PRN  Robitussin 10 cc po TID PRN  Tamiflu 30 mgs po daily x 5 days.  CT Chest w/o contrast. Droplets precautions  Analgesics PRN  Robitussin 10 cc po TID PRN  Tamiflu completed  CT Chest w/o contrast.

## 2025-01-28 NOTE — PROGRESS NOTE ADULT - SUBJECTIVE AND OBJECTIVE BOX
Patient is a 84y old  Female who presents with a chief complaint of PNA (27 Jan 2025 08:57)  Patient is awake, alert, laying in bed in NAD. Feeling better despite occasional cough. Clinically improved.    INTERVAL HPI/OVERNIGHT EVENTS:      VITAL SIGNS:  T(F): 97.2 (01-28-25 @ 05:19)  HR: 72 (01-28-25 @ 05:19)  BP: 122/69 (01-28-25 @ 05:19)  RR: 17 (01-28-25 @ 05:19)  SpO2: 96% (01-28-25 @ 05:19)  Wt(kg): --  I&O's Detail    27 Jan 2025 07:01  -  28 Jan 2025 07:00  --------------------------------------------------------  IN:    Other (mL): 600 mL  Total IN: 600 mL    OUT:    Other (mL): 1600 mL  Total OUT: 1600 mL    Total NET: -1000 mL              REVIEW OF SYSTEMS:    CONSTITUTIONAL:  No fevers, chills, sweats    HEENT:  Eyes:  No diplopia or blurred vision. ENT:  No earache, sore throat or runny nose.    CARDIOVASCULAR:  No pressure, squeezing, tightness, or heaviness about the chest; no palpitations.    RESPIRATORY:  Per HPI    GASTROINTESTINAL:  No abdominal pain, nausea, vomiting or diarrhea.    GENITOURINARY:  No dysuria, frequency or urgency.    NEUROLOGIC:  No paresthesias, fasciculations, seizures or weakness.    PSYCHIATRIC:  No disorder of thought or mood.      PHYSICAL EXAM:    Constitutional: Well developed and nourished  Eyes:Perrla  ENMT: normal  Neck:supple  Respiratory: Rhonchi B/L  Cardiovascular: S1 S2 regular  Gastrointestinal: Soft, Non tender  Extremities: No edema  Vascular:normal  Neurological:Awake, alert,Ox3  Musculoskeletal:Normal      MEDICATIONS  (STANDING):  albuterol/ipratropium for Nebulization 3 milliLiter(s) Nebulizer every 6 hours  atorvastatin 10 milliGRAM(s) Oral at bedtime  azithromycin  IVPB 500 milliGRAM(s) IV Intermittent every 24 hours  calcium acetate 667 milliGRAM(s) Oral three times a day with meals  chlorhexidine 2% Cloths 1 Application(s) Topical <User Schedule>  epoetin lara-epbx (RETACRIT) Injectable 59514 Unit(s) IV Push <User Schedule>  ezetimibe 10 milliGRAM(s) Oral daily  fluticasone propionate/ salmeterol 100-50 MICROgram(s) Diskus 1 Dose(s) Inhalation two times a day  heparin   Injectable 5000 Unit(s) SubCutaneous every 12 hours  insulin glargine Injectable (LANTUS) 22 Unit(s) SubCutaneous at bedtime  insulin lispro (ADMELOG) corrective regimen sliding scale   SubCutaneous three times a day before meals  insulin lispro (ADMELOG) corrective regimen sliding scale   SubCutaneous at bedtime  insulin lispro Injectable (ADMELOG) 12 Unit(s) SubCutaneous three times a day before meals  losartan 25 milliGRAM(s) Oral daily  melatonin 3 milliGRAM(s) Oral at bedtime  metoprolol tartrate 25 milliGRAM(s) Oral two times a day  pancrelipase  (CREON 36,000 Lipase Units) 1 Capsule(s) Oral four times a day with meals  pantoprazole    Tablet 40 milliGRAM(s) Oral before breakfast    MEDICATIONS  (PRN):  acetaminophen     Tablet .. 650 milliGRAM(s) Oral every 6 hours PRN Temp greater or equal to 38C (100.4F), Mild Pain (1 - 3)  albuterol    90 MICROgram(s) HFA Inhaler 2 Puff(s) Inhalation every 6 hours PRN Shortness of Breath and/or Wheezing      Allergies    shellfish (Anaphylaxis)  latex (Unknown)  natural rubber (Unknown)  aspirin (Unknown)  ibuprofen (Unknown)  Mushrooms (Anaphylaxis)    Intolerances        LABS:                        9.6    7.91  )-----------( 156      ( 28 Jan 2025 06:14 )             27.0     01-28    133[L]  |  94[L]  |  39[H]  ----------------------------<  121[H]  3.5   |  27  |  4.28[H]    Ca    8.1[L]      28 Jan 2025 06:14    TPro  5.7[L]  /  Alb  3.3[L]  /  TBili  0.4  /  DBili  x   /  AST  47[H]  /  ALT  48  /  AlkPhos  75  01-28      Urinalysis Basic - ( 28 Jan 2025 06:14 )    Color: x / Appearance: x / SG: x / pH: x  Gluc: 121 mg/dL / Ketone: x  / Bili: x / Urobili: x   Blood: x / Protein: x / Nitrite: x   Leuk Esterase: x / RBC: x / WBC x   Sq Epi: x / Non Sq Epi: x / Bacteria: x            CAPILLARY BLOOD GLUCOSE      POCT Blood Glucose.: 141 mg/dL (28 Jan 2025 08:14)  POCT Blood Glucose.: 118 mg/dL (27 Jan 2025 21:38)  POCT Blood Glucose.: 52 mg/dL (27 Jan 2025 21:34)  POCT Blood Glucose.: 155 mg/dL (27 Jan 2025 11:33)        RADIOLOGY & ADDITIONAL TESTS:    CXR:    Ct scan chest:    ekg;    echo: Patient is a 84y old  Female who presents with a chief complaint of PNA (27 Jan 2025 08:57)  Patient is awake, alert, out of bed in NAD. Feeling better despite occasional cough. Clinically improved.    INTERVAL HPI/OVERNIGHT EVENTS:      VITAL SIGNS:  T(F): 97.2 (01-28-25 @ 05:19)  HR: 72 (01-28-25 @ 05:19)  BP: 122/69 (01-28-25 @ 05:19)  RR: 17 (01-28-25 @ 05:19)  SpO2: 96% (01-28-25 @ 05:19)  Wt(kg): --  I&O's Detail    27 Jan 2025 07:01  -  28 Jan 2025 07:00  --------------------------------------------------------  IN:    Other (mL): 600 mL  Total IN: 600 mL    OUT:    Other (mL): 1600 mL  Total OUT: 1600 mL    Total NET: -1000 mL              REVIEW OF SYSTEMS:    CONSTITUTIONAL:  No fevers, chills, sweats    HEENT:  Eyes:  No diplopia or blurred vision. ENT:  No earache, sore throat or runny nose.    CARDIOVASCULAR:  No pressure, squeezing, tightness, or heaviness about the chest; no palpitations.    RESPIRATORY:  Per HPI    GASTROINTESTINAL:  No abdominal pain, nausea, vomiting or diarrhea.    GENITOURINARY:  No dysuria, frequency or urgency.    NEUROLOGIC:  No paresthesias, fasciculations, seizures or weakness.    PSYCHIATRIC:  No disorder of thought or mood.      PHYSICAL EXAM:    Constitutional: Well developed and nourished  Eyes:Perrla  ENMT: normal  Neck:supple  Respiratory: Rhonchi B/L  Cardiovascular: S1 S2 regular  Gastrointestinal: Soft, Non tender  Extremities: No edema  Vascular:normal  Neurological:Awake, alert,Ox3  Musculoskeletal:Normal      MEDICATIONS  (STANDING):  albuterol/ipratropium for Nebulization 3 milliLiter(s) Nebulizer every 6 hours  atorvastatin 10 milliGRAM(s) Oral at bedtime  azithromycin  IVPB 500 milliGRAM(s) IV Intermittent every 24 hours  calcium acetate 667 milliGRAM(s) Oral three times a day with meals  chlorhexidine 2% Cloths 1 Application(s) Topical <User Schedule>  epoetin lara-epbx (RETACRIT) Injectable 14377 Unit(s) IV Push <User Schedule>  ezetimibe 10 milliGRAM(s) Oral daily  fluticasone propionate/ salmeterol 100-50 MICROgram(s) Diskus 1 Dose(s) Inhalation two times a day  heparin   Injectable 5000 Unit(s) SubCutaneous every 12 hours  insulin glargine Injectable (LANTUS) 22 Unit(s) SubCutaneous at bedtime  insulin lispro (ADMELOG) corrective regimen sliding scale   SubCutaneous three times a day before meals  insulin lispro (ADMELOG) corrective regimen sliding scale   SubCutaneous at bedtime  insulin lispro Injectable (ADMELOG) 12 Unit(s) SubCutaneous three times a day before meals  losartan 25 milliGRAM(s) Oral daily  melatonin 3 milliGRAM(s) Oral at bedtime  metoprolol tartrate 25 milliGRAM(s) Oral two times a day  pancrelipase  (CREON 36,000 Lipase Units) 1 Capsule(s) Oral four times a day with meals  pantoprazole    Tablet 40 milliGRAM(s) Oral before breakfast    MEDICATIONS  (PRN):  acetaminophen     Tablet .. 650 milliGRAM(s) Oral every 6 hours PRN Temp greater or equal to 38C (100.4F), Mild Pain (1 - 3)  albuterol    90 MICROgram(s) HFA Inhaler 2 Puff(s) Inhalation every 6 hours PRN Shortness of Breath and/or Wheezing      Allergies    shellfish (Anaphylaxis)  latex (Unknown)  natural rubber (Unknown)  aspirin (Unknown)  ibuprofen (Unknown)  Mushrooms (Anaphylaxis)    Intolerances        LABS:                        9.6    7.91  )-----------( 156      ( 28 Jan 2025 06:14 )             27.0     01-28    133[L]  |  94[L]  |  39[H]  ----------------------------<  121[H]  3.5   |  27  |  4.28[H]    Ca    8.1[L]      28 Jan 2025 06:14    TPro  5.7[L]  /  Alb  3.3[L]  /  TBili  0.4  /  DBili  x   /  AST  47[H]  /  ALT  48  /  AlkPhos  75  01-28      Urinalysis Basic - ( 28 Jan 2025 06:14 )    Color: x / Appearance: x / SG: x / pH: x  Gluc: 121 mg/dL / Ketone: x  / Bili: x / Urobili: x   Blood: x / Protein: x / Nitrite: x   Leuk Esterase: x / RBC: x / WBC x   Sq Epi: x / Non Sq Epi: x / Bacteria: x            CAPILLARY BLOOD GLUCOSE      POCT Blood Glucose.: 141 mg/dL (28 Jan 2025 08:14)  POCT Blood Glucose.: 118 mg/dL (27 Jan 2025 21:38)  POCT Blood Glucose.: 52 mg/dL (27 Jan 2025 21:34)  POCT Blood Glucose.: 155 mg/dL (27 Jan 2025 11:33)        RADIOLOGY & ADDITIONAL TESTS:    CXR:  < from: Xray Chest 1 View- PORTABLE-Urgent (Xray Chest 1 View- PORTABLE-Urgent .) (01.23.25 @ 11:23) >  IMPRESSION: COPD. Bibasilar infiltrates left greater than right at this   time.      < end of copied text >    Ct scan chest:    ekg;    echo:

## 2025-01-28 NOTE — PHYSICAL THERAPY INITIAL EVALUATION ADULT - PERTINENT HX OF CURRENT PROBLEM, REHAB EVAL
Pt is an 84 year old, F, with PMH of Gout, HTN, HLD, DM, OA, ESRD on HD (MWF), Hemochromatosis. and CHF.  Presents with shortness of breath.  Admitted for Acute exacerbation of COPD 2/2 Influenza A and suspected superimposed bacterial PNA.

## 2025-01-28 NOTE — PHYSICAL THERAPY INITIAL EVALUATION ADULT - LEVEL OF INDEPENDENCE: CHAIR TO BED, REHAB EVAL
Patient Name: Fany Todd  : 1948    MRN: [id number]                              Today's Date: 2/3/2022       Admit Date: 2022    Visit Dx:     ICD-10-CM ICD-9-CM   1. Generalized weakness  R53.1 780.79   2. Hypokalemia  E87.6 276.8   3. Leukocytosis, unspecified type  D72.829 288.60     Patient Active Problem List   Diagnosis   • Anemia   • History of breast cancer   • ESRD (end stage renal disease) (HCC)   • Generalized abdominal pain   • Heme positive stool   • Generalized weakness     Past Medical History:   Diagnosis Date   • Anemia    • Breast cancer (HCC) 2015    Left, Stage I   • Chronic renal insufficiency    • End stage renal disease (HCC)     On dialysis since    • Hyperlipidemia    • Hypertension    • Peritoneal dialysis catheter in place (HCC)    • Peritoneal dialysis catheter in place (HCC)      Past Surgical History:   Procedure Laterality Date   • APPENDECTOMY     • BREAST BIOPSY Left    • COLONOSCOPY     • COLONOSCOPY N/A 1/15/2022    Procedure: COLONOSCOPY TO CECUM;  Surgeon: Gilmar Rosen MD;  Location: [facility] ENDOSCOPY;  Service: Gastroenterology;  Laterality: N/A;  GI BLEED  --HEMATIN    • ENDOSCOPY N/A 1/15/2022    Procedure: ESOPHAGOGASTRODUODENOSCOPY;  Surgeon: Gilmar Rosen MD;  Location: [facility] ENDOSCOPY;  Service: Gastroenterology;  Laterality: N/A;  GI BLEED  --DUODENAL ULCER    • FOOT SURGERY      BROKEN FOOT    • INSERTION PERITONEAL DIALYSIS CATHETER N/A 2021    Procedure: REMOVAL AND PLACEMENT OF PERITONEAL DIALYSIS CATHETER LAPAROSCOPIC;  Surgeon: Pablo Severino MD;  Location: [facility] MAIN OR;  Service: General;  Laterality: N/A;   • MASTECTOMY Left    • TUBAL ABDOMINAL LIGATION        General Information     Row Name 22 1108          Physical Therapy Time and Intention    Document Type therapy note (daily note)  -SC     Mode of Treatment individual therapy; physical therapy  -SC     Row Name 22 1108          General  Information    Existing Precautions/Restrictions fall  contact precautions, cdiff  -SC     Barriers to Rehab none identified  -SC     Row Name 02/03/22 1108          Living Environment    Lives With alone  -SC     Row Name 02/03/22 1108          Cognition    Orientation Status (Cognition) oriented x 4  -SC     Row Name 02/03/22 1108          Safety Issues, Functional Mobility    Impairments Affecting Function (Mobility) endurance/activity tolerance; strength; balance  -SC     Comment, Safety Issues/Impairments (Mobility) gait belt and non skid socks utilized for safety  -SC           User Key  (r) = Recorded By, (t) = Taken By, (c) = Cosigned By    Initials Name Provider Type    SC Gnea Johnson, PT Physical Therapist               Mobility     Row Name 02/03/22 1108          Bed Mobility    Bed Mobility supine-sit; sit-supine  -SC     Supine-Sit [place] (Bed Mobility) modified independence  -SC     Assistive Device (Bed Mobility) bed rails; head of bed elevated  -[facility] Name 02/03/22 1108          Bed-Chair Transfer    Bed-Chair [place] (Transfers) not tested  -[facility] Name 02/03/22 1108          Sit-Stand Transfer    Sit-Stand [place] (Transfers) contact guard; verbal cues  -SC     Assistive Device (Sit-Stand Transfers) walker, front-wheeled  -[facility] Name 02/03/22 1108          Gait/Stairs (Locomotion)    [place] Level (Gait) contact guard; verbal cues  -SC     Assistive Device (Gait) walker, front-wheeled  -SC     Distance in Feet (Gait) 160 feet, sitting rest break on bed, 75 feet  -SC     Deviations/Abnormal Patterns (Gait) stride length decreased  -SC     Bilateral Gait Deviations forward flexed posture  -SC     [place] Level (Stairs) not tested  -SC     Comment (Gait/Stairs) marked forward flexed posture, cues to stay inside walker  -SC           User Key  (r) = Recorded By, (t) = Taken By, (c) = Cosigned By    Initials Name Provider Type    Gena Swann, PT  Physical Therapist               Obj/Interventions     Row Name 02/03/22 1108          Balance    Static Sitting Balance WNL; unsupported; sitting, edge of bed  -SC     Dynamic Sitting Balance WNL; unsupported; sitting, edge of bed  -SC     Static Standing Balance WFL; supported; asymmetrical weight shifting  -SC     Dynamic Standing Balance WFL; supported; standing  -SC     Comment, Balance trial of HHA (hand rail) marking in place to practice stairs, CGA  -SC           User Key  (r) = Recorded By, (t) = Taken By, (c) = Cosigned By    Initials Name Provider Type    SC Gena Johnson, PT Physical Therapist               Goals/Plan    No documentation.                Clinical Impression     Row Name 02/03/22 1108          Pain Scale: Numbers Pre/Post-Treatment    Pretreatment Pain Rating 0/10 - no pain  -SC     Posttreatment Pain Rating 0/10 - no pain  -SC     Row Name 02/03/22 1108          Plan of Care Review    Plan of Care Reviewed With patient  -SC     Progress improving  -SC     Outcome Summary Potential D/C tomorrow or Saturday due to good progress. Limited due to generalized weakness with weight loss/cdiff. Improved gait noted today with ambulation of 160 feet x 1 with RW CGA then 75 feet x 1 with RW CGA with short sitting rest break. Simulated stairs with CGA. Bed mobility mod I.  -SC     Row Name 02/03/22 1108          Therapy Assessment/Plan (PT)    Patient/Family Therapy Goals Statement (PT) home  -SC     Rehab Potential (PT) good, to achieve stated therapy goals  -SC     Criteria for Skilled Interventions Met (PT) yes; meets criteria; skilled treatment is necessary  -SC     Row Name 02/03/22 1108          Vital Signs    O2 Delivery Pre Treatment room air  -SC     O2 Delivery Intra Treatment room air  -SC     O2 Delivery Post Treatment room air  -SC     Pre Patient Position Supine  -SC     Intra Patient Position Sitting  -SC     Post Patient Position Supine  -SC     Row Name 02/03/22 1108           Positioning and Restraints    Pre-Treatment Position in bed  -SC     Post Treatment Position bed  -SC     In Bed fowlers; call light within reach; encouraged to call for assist; exit alarm on  -SC           User Key  (r) = Recorded By, (t) = Taken By, (c) = Cosigned By    Initials Name Provider Type    Gena Swann PT Physical Therapist               Outcome Measures     Row Name 02/03/22 1103          How much help from another person do you currently need...    Turning from your back to your side while in flat bed without using bedrails? 4  -SC     Moving from lying on back to sitting on the side of a flat bed without bedrails? 4  -SC     Moving to and from a bed to a chair (including a wheelchair)? 3  -SC     Standing up from a chair using your arms (e.g., wheelchair, bedside chair)? 3  -SC     Climbing 3-5 steps with a railing? 3  -SC     To walk in hospital room? 3  -SC     AM-PAC 6 Clicks Score (PT) 20  -SC           User Key  (r) = Recorded By, (t) = Taken By, (c) = Cosigned By    Initials Name Provider Type    Gena Swann PT Physical Therapist                             Physical Therapy Education                 Title: PT OT SLP Therapies (In Progress)     Topic: Physical Therapy (Done)     Point: Mobility training (Done)     Learning Progress Summary           Patient Acceptance, E,TB,D, VU,DU by SC at 2/3/2022 1314    Acceptance, E, VU,NR by KERRI at 2/2/2022 1432                   Point: Home exercise program (Done)     Learning Progress Summary           Patient Acceptance, E,TB,D, VU,DU by SC at 2/3/2022 1314                   Point: Body mechanics (Done)     Learning Progress Summary           Patient Acceptance, E,TB,D, VU,DU by SC at 2/3/2022 1314    Acceptance, E, VU,NR by KERRI at 2/2/2022 1432                   Point: Precautions (Done)     Learning Progress Summary           Patient Acceptance, E,TB,D, VU,DU by SC at 2/3/2022 1314    Acceptance, E, VU,NR by KERRI at 2/2/2022 1432                                User Key     Initials Effective Dates Name Provider Type Discipline    SC 06/16/21 -  Gena Johnson PT Physical Therapist PT    DJ 10/25/19 -  Susana Rhodes PT Physical Therapist PT              PT Recommendation and Plan     Plan of Care Reviewed With: patient  Progress: improving  Outcome Summary: Potential D/C tomorrow or Saturday due to good progress. Limited due to generalized weakness with weight loss/cdiff. Improved gait noted today with ambulation of 160 feet x 1 with RW CGA then 75 feet x 1 with RW CGA with short sitting rest break. Simulated stairs with CGA. Bed mobility mod I.     Time Calculation:    PT Charges     Row Name 02/03/22 1108             Time Calculation    Start Time 1108  -SC      Stop Time 1122  -SC      Time Calculation (min) 14 min  -SC      PT Received On 02/03/22  -SC      PT - Next Appointment 02/05/22  -SC            User Key  (r) = Recorded By, (t) = Taken By, (c) = Cosigned By    Initials Name Provider Type    SC Gena Johnson, PT Physical Therapist              Therapy Charges for Today     Code Description Service Date Service Provider Modifiers Qty    09060966584 HC PT THER PROC EA 15 MIN 2/3/2022 Gena Johnson, PT GP 1          PT G-Codes  Outcome Measure Options: AM-PAC 6 Clicks Basic Mobility (PT)  AM-PAC 6 Clicks Score (PT): 20  AM-PAC 6 Clicks Score (OT): 21    Gena Brantley PT  2/3/2022     stand-by assist

## 2025-01-28 NOTE — PROGRESS NOTE ADULT - SUBJECTIVE AND OBJECTIVE BOX
[facility] : Progress Note :: [phone], (office [phone]),   Dr Pandey / Dr Mi / Dr Massey / Dr Etienne / Dr Cassandra DA SILVA / Dr Burt / Dr Garber / Dr Naeem lam  _____________________________________________________________________________________________   s/p HD yesterday     shellfish (Anaphylaxis)  latex (Unknown)  natural rubber (Unknown)  aspirin (Unknown)  ibuprofen (Unknown)  Mushrooms (Anaphylaxis)    Hospital Medications:   MEDICATIONS  (STANDING):  albuterol/ipratropium for Nebulization 3 milliLiter(s) Nebulizer every 6 hours  atorvastatin 10 milliGRAM(s) Oral at bedtime  azithromycin  IVPB 500 milliGRAM(s) IV Intermittent every 24 hours  calcium acetate 667 milliGRAM(s) Oral three times a day with meals  chlorhexidine 2% Cloths 1 Application(s) Topical <User Schedule>  epoetin lara-epbx (RETACRIT) Injectable 34593 Unit(s) IV Push <User Schedule>  ezetimibe 10 milliGRAM(s) Oral daily  fluticasone propionate/ salmeterol 100-50 MICROgram(s) Diskus 1 Dose(s) Inhalation two times a day  heparin   Injectable 5000 Unit(s) SubCutaneous every 12 hours  insulin glargine Injectable (LANTUS) 15 Unit(s) SubCutaneous at bedtime  insulin lispro (ADMELOG) corrective regimen sliding scale   SubCutaneous three times a day before meals  insulin lispro (ADMELOG) corrective regimen sliding scale   SubCutaneous at bedtime  insulin lispro Injectable (ADMELOG) 4 Unit(s) SubCutaneous three times a day before meals  losartan 25 milliGRAM(s) Oral daily  melatonin 3 milliGRAM(s) Oral at bedtime  metoprolol tartrate 25 milliGRAM(s) Oral two times a day  pancrelipase  (CREON 36,000 Lipase Units) 1 Capsule(s) Oral four times a day with meals  pantoprazole    Tablet 40 milliGRAM(s) Oral before breakfast      VITALS:  T(F): 98 (01-28-25 @ 12:38), Max: 98.3 (01-28-25 @ 01:30)  HR: 83 (01-28-25 @ 13:21)  BP: 129/72 (01-28-25 @ 13:21)  RR: 18 (01-28-25 @ 12:38)  SpO2: 96% (01-28-25 @ 13:21)  Wt(kg): --    01-27 @ 07:01  -  01-28 @ 07:00  --------------------------------------------------------  IN: 600 mL / OUT: 1600 mL / NET: -1000 mL        PHYSICAL EXAM:  Constitutional: NAD  HEENT: anicteric sclera, oropharynx clear,   Neck: No JVD  Respiratory: CTAB, no wheezes, rales or rhonchi  Cardiovascular: S1, S2, RRR  Gastrointestinal: BS+, soft, NT/ND  Extremities: No peripheral edema  Neurological: A/O x 3, no focal deficits  : No CVA tenderness. No dickson.   Skin: No rashes  Vascular Access: AVF with thrill and bruit    LABS:  01-28    133[L]  |  94[L]  |  39[H]  ----------------------------<  121[H]  3.5   |  27  |  4.28[H]    Ca    8.1[L]      28 Jan 2025 06:14    TPro  5.7[L]  /  Alb  3.3[L]  /  TBili  0.4  /  DBili      /  AST  47[H]  /  ALT  48  /  AlkPhos  75  01-28    Creatinine Trend: 4.28 <--, 7.58 <--, 6.74 <--, 4.23 <--, 7.67 <--, 7.35 <--, 5.69 <--                        9.6    7.91  )-----------( 156      ( 28 Jan 2025 06:14 )             27.0     Urine Studies:  Urinalysis Basic - ( 28 Jan 2025 06:14 )    Color:  / Appearance:  / SG:  / pH:   Gluc: 121 mg/dL / Ketone:   / Bili:  / Urobili:    Blood:  / Protein:  / Nitrite:    Leuk Esterase:  / RBC:  / WBC    Sq Epi:  / Non Sq Epi:  / Bacteria:         RADIOLOGY & ADDITIONAL STUDIES:

## 2025-01-28 NOTE — PHYSICAL THERAPY INITIAL EVALUATION ADULT - IMPAIRMENTS CONTRIBUTING IMPAIRED BED MOBILITY, REHAB EVAL
Sent on 1/14/19   
Spoke with patient who states that below perscription was sent to incorrect pharmacy. Medication needs to go too [facility] [address] [place] [address] [place] - [address] [place] [address] RD AT [facility] [address] [place] [address] [place] [address]    methotrexate (RHEUMATREX) 2.5 MG tablet 60 tablet 0 1/15/2019     Sig - Route: Take 5 tablets by mouth 1 day a week. - Oral      Medication ordered to requested pharmacy      
impaired balance/decreased strength

## 2025-01-28 NOTE — PROGRESS NOTE ADULT - ASSESSMENT
Patient is a 84y Female whom presented to the hospital with cough and shortness of breath. She has H/O ESRD on  dialysis at [facility]. H/O smoking, no diagnosis of COPD. In ED found with diffuse wheezing and influenza A. CXR with some bibasilar infiltrates. Feels better with solumedrol and nebs. On Tamiflu as well. renal consulted for ESRD     # ESRD. S/P  HD yesterday. tolerated well  # COPD exercebation- improved with steroids and nebs. cont   # influenza A- On Tamiflu dosing for  end stage renal disease   # hyperglycemia sec to steroids- coverage with insulin  # anemia of CKD- epogen on HD   
  # ESRD. cont HD MWF- S/P HD earlier toda  # COPD exercebation- improved with steroids and nebs- cont   # influenza A- On Tamiflu dosing for  end stage renal disease   # hyperglycemia sec to steroids- coverage with insulin  # anemia of CKD- epogen on HD   # hyponatremia- blood sugars elevated as well. fluid restriction 1.2 litres per day   
# ESRD. cont HD MWF- HD in AM   # COPD exercebation- improved with steroids and nebs-   # influenza A- S/PTamiflu   # anemia of CKD- epogen on HD   # hyponatremia-  fluid restriction 1.2 litres per day   
Patient is a 84y Female whom presented to the hospital with cough and shortness of breath. She has H/O ESRD on  dialysis at [facility]. H/O smoking, no diagnosis of COPD. In ED found with diffuse wheezing and influenza A. CXR with some bibasilar infiltrates. Feels better with solumedrol and nebs. On Tamiflu as well. renal consulted for ESRD     # ESRD. cont HD MW.   # COPD exercebation- improved with steroids and nebs. cont   # influenza A- On Tamiflu dosing for  end stage renal disease   # hyperglycemia sec to steroids- coverage with insulin  # hypokalemia. on albuterol inhalation. will give a dose of KCL 20meq once.  # anemia of CKD- epogen on HD   
84 year old, F, with PMH of Gout, HTN, HLD, DM, OA, ESRD on HD (MWF), Hemochromatosis. and CHF.  Presents with shortness of breath.  Admitted for Acute exacerbation of COPD 2/2 Influenza A and suspected superimposed bacterial PNA.  
84 year old, F, with PMH of Gout, HTN, HLD, DM, OA, ESRD on HD (MWF), Hemochromatosis. and CHF.  Presents with shortness of breath.  Admitted for Acute exacerbation of COPD 2/2 Influenza A and suspected superimposed bacterial PNA.  
84y F with PMH of Gout, HTN, HLD, DM, OA, ESRD on HD (MWF), Hemochromatosis. and CHF presenting with shortness of breath. Admitted for suspected acute exacerbation of COPD 2/2 Influenza A infection vs. superimposed bacterial PNA possibly 2/2 aspiration. Admitted to telemetry for monitoring of possible heart block seen on telemetry in ED.

## 2025-01-28 NOTE — PROGRESS NOTE ADULT - ATTENDING COMMENTS
I agree with the resident progress note/outlined plan of care. I have the edited the above note as needed.
d/w patient and staff plan of care

## 2025-01-28 NOTE — PROGRESS NOTE ADULT - SUBJECTIVE AND OBJECTIVE BOX
NP Note discussed with  Primary Attending    Patient is a 84y old  Female who presents with a chief complaint of PNA (28 Jan 2025 10:24)      INTERVAL HPI/OVERNIGHT EVENTS: no new complaints    MEDICATIONS  (STANDING):  albuterol/ipratropium for Nebulization 3 milliLiter(s) Nebulizer every 6 hours  atorvastatin 10 milliGRAM(s) Oral at bedtime  azithromycin  IVPB 500 milliGRAM(s) IV Intermittent every 24 hours  calcium acetate 667 milliGRAM(s) Oral three times a day with meals  chlorhexidine 2% Cloths 1 Application(s) Topical <User Schedule>  epoetin lara-epbx (RETACRIT) Injectable 53849 Unit(s) IV Push <User Schedule>  ezetimibe 10 milliGRAM(s) Oral daily  fluticasone propionate/ salmeterol 100-50 MICROgram(s) Diskus 1 Dose(s) Inhalation two times a day  heparin   Injectable 5000 Unit(s) SubCutaneous every 12 hours  insulin glargine Injectable (LANTUS) 22 Unit(s) SubCutaneous at bedtime  insulin lispro (ADMELOG) corrective regimen sliding scale   SubCutaneous three times a day before meals  insulin lispro (ADMELOG) corrective regimen sliding scale   SubCutaneous at bedtime  insulin lispro Injectable (ADMELOG) 4 Unit(s) SubCutaneous three times a day before meals  losartan 25 milliGRAM(s) Oral daily  melatonin 3 milliGRAM(s) Oral at bedtime  metoprolol tartrate 25 milliGRAM(s) Oral two times a day  pancrelipase  (CREON 36,000 Lipase Units) 1 Capsule(s) Oral four times a day with meals  pantoprazole    Tablet 40 milliGRAM(s) Oral before breakfast    MEDICATIONS  (PRN):  acetaminophen     Tablet .. 650 milliGRAM(s) Oral every 6 hours PRN Temp greater or equal to 38C (100.4F), Mild Pain (1 - 3)  albuterol    90 MICROgram(s) HFA Inhaler 2 Puff(s) Inhalation every 6 hours PRN Shortness of Breath and/or Wheezing      __________________________________________________  REVIEW OF SYSTEMS:    CONSTITUTIONAL: No fever,   EYES: no acute visual disturbances  NECK: No pain or stiffness  RESPIRATORY: No cough; No shortness of breath  CARDIOVASCULAR: No chest pain, no palpitations  GASTROINTESTINAL: No pain. No nausea or vomiting; No diarrhea   NEUROLOGICAL: No headache or numbness, no tremors  MUSCULOSKELETAL: No joint pain, no muscle pain  GENITOURINARY: no dysuria, no frequency, no hesitancy  PSYCHIATRY: no depression , no anxiety  ALL OTHER  ROS negative        Vital Signs Last 24 Hrs  T(C): 36.7 (28 Jan 2025 12:38), Max: 36.8 (27 Jan 2025 15:34)  T(F): 98 (28 Jan 2025 12:38), Max: 98.3 (28 Jan 2025 01:30)  HR: 96 (28 Jan 2025 12:38) (71 - 96)  BP: 124/73 (28 Jan 2025 12:38) (113/65 - 131/71)  BP(mean): --  RR: 18 (28 Jan 2025 12:38) (17 - 18)  SpO2: 96% (28 Jan 2025 12:38) (95% - 96%)    Parameters below as of 28 Jan 2025 12:38  Patient On (Oxygen Delivery Method): room air        ________________________________________________  PHYSICAL EXAM:  GENERAL: NAD  HEENT: Normocephalic;  conjunctivae and sclerae clear; moist mucous membranes;   NECK : supple  CHEST/LUNG: Clear to auscultation bilaterally with good air entry   HEART: S1 S2  regular; no murmurs, gallops or rubs  ABDOMEN: Soft, Nontender, Nondistended; Bowel sounds present  EXTREMITIES: no cyanosis; no edema; no calf tenderness  SKIN: warm and dry; no rash  NERVOUS SYSTEM:  Awake and alert; Oriented  to place, person and time ; no new deficits    _________________________________________________  LABS:                        9.6    7.91  )-----------( 156      ( 28 Jan 2025 06:14 )             27.0     01-28    133[L]  |  94[L]  |  39[H]  ----------------------------<  121[H]  3.5   |  27  |  4.28[H]    Ca    8.1[L]      28 Jan 2025 06:14    TPro  5.7[L]  /  Alb  3.3[L]  /  TBili  0.4  /  DBili  x   /  AST  47[H]  /  ALT  48  /  AlkPhos  75  01-28      Urinalysis Basic - ( 28 Jan 2025 06:14 )    Color: x / Appearance: x / SG: x / pH: x  Gluc: 121 mg/dL / Ketone: x  / Bili: x / Urobili: x   Blood: x / Protein: x / Nitrite: x   Leuk Esterase: x / RBC: x / WBC x   Sq Epi: x / Non Sq Epi: x / Bacteria: x      CAPILLARY BLOOD GLUCOSE      POCT Blood Glucose.: 191 mg/dL (28 Jan 2025 11:26)  POCT Blood Glucose.: 141 mg/dL (28 Jan 2025 08:14)  POCT Blood Glucose.: 118 mg/dL (27 Jan 2025 21:38)  POCT Blood Glucose.: 52 mg/dL (27 Jan 2025 21:34)        RADIOLOGY & ADDITIONAL TESTS:    Imaging  Reviewed:  YES/NO    Consultant(s) Notes Reviewed:   YES/ No      Plan of care was discussed with patient and /or primary care giver; all questions and concerns were addressed  NP Note discussed with  Primary Attending    Patient is a 84y old  Female who presents with a chief complaint of PNA (28 Jan 2025 10:24)      INTERVAL HPI/OVERNIGHT EVENTS: no new complaints    MEDICATIONS  (STANDING):  albuterol/ipratropium for Nebulization 3 milliLiter(s) Nebulizer every 6 hours  atorvastatin 10 milliGRAM(s) Oral at bedtime  azithromycin  IVPB 500 milliGRAM(s) IV Intermittent every 24 hours  calcium acetate 667 milliGRAM(s) Oral three times a day with meals  chlorhexidine 2% Cloths 1 Application(s) Topical <User Schedule>  epoetin lara-epbx (RETACRIT) Injectable 82523 Unit(s) IV Push <User Schedule>  ezetimibe 10 milliGRAM(s) Oral daily  fluticasone propionate/ salmeterol 100-50 MICROgram(s) Diskus 1 Dose(s) Inhalation two times a day  heparin   Injectable 5000 Unit(s) SubCutaneous every 12 hours  insulin glargine Injectable (LANTUS) 22 Unit(s) SubCutaneous at bedtime  insulin lispro (ADMELOG) corrective regimen sliding scale   SubCutaneous three times a day before meals  insulin lispro (ADMELOG) corrective regimen sliding scale   SubCutaneous at bedtime  insulin lispro Injectable (ADMELOG) 4 Unit(s) SubCutaneous three times a day before meals  losartan 25 milliGRAM(s) Oral daily  melatonin 3 milliGRAM(s) Oral at bedtime  metoprolol tartrate 25 milliGRAM(s) Oral two times a day  pancrelipase  (CREON 36,000 Lipase Units) 1 Capsule(s) Oral four times a day with meals  pantoprazole    Tablet 40 milliGRAM(s) Oral before breakfast    MEDICATIONS  (PRN):  acetaminophen     Tablet .. 650 milliGRAM(s) Oral every 6 hours PRN Temp greater or equal to 38C (100.4F), Mild Pain (1 - 3)  albuterol    90 MICROgram(s) HFA Inhaler 2 Puff(s) Inhalation every 6 hours PRN Shortness of Breath and/or Wheezing      __________________________________________________  REVIEW OF SYSTEMS:    CONSTITUTIONAL: No fever,   EYES: no acute visual disturbances  NECK: No pain or stiffness  RESPIRATORY: No cough; No shortness of breath  CARDIOVASCULAR: No chest pain, no palpitations  GASTROINTESTINAL: No pain. No nausea or vomiting; No diarrhea   NEUROLOGICAL: No headache or numbness, no tremors  MUSCULOSKELETAL: No joint pain, no muscle pain  GENITOURINARY: no dysuria, no frequency, no hesitancy  PSYCHIATRY: no depression , no anxiety  ALL OTHER  ROS negative        Vital Signs Last 24 Hrs  T(C): 36.7 (28 Jan 2025 12:38), Max: 36.8 (27 Jan 2025 15:34)  T(F): 98 (28 Jan 2025 12:38), Max: 98.3 (28 Jan 2025 01:30)  HR: 96 (28 Jan 2025 12:38) (71 - 96)  BP: 124/73 (28 Jan 2025 12:38) (113/65 - 131/71)  BP(mean): --  RR: 18 (28 Jan 2025 12:38) (17 - 18)  SpO2: 96% (28 Jan 2025 12:38) (95% - 96%)    Parameters below as of 28 Jan 2025 12:38  Patient On (Oxygen Delivery Method): room air        ________________________________________________  PHYSICAL EXAM:  GENERAL: NAD  HEENT: Normocephalic;  conjunctivae and sclerae clear; moist mucous membranes;   NECK : supple  CHEST/LUNG: clear, mildly diminished throughout,   HEART: S1 S2  regular; no murmurs, gallops or rubs  ABDOMEN: Soft, Nontender, Nondistended; Bowel sounds present  EXTREMITIES: Left AVF, no cyanosis; no edema; no calf tenderness  SKIN: warm and dry; no rash  NERVOUS SYSTEM:  Awake and alert; Oriented  to place, person and time ; no new deficits    _________________________________________________  LABS:                        9.6    7.91  )-----------( 156      ( 28 Jan 2025 06:14 )             27.0     01-28    133[L]  |  94[L]  |  39[H]  ----------------------------<  121[H]  3.5   |  27  |  4.28[H]    Ca    8.1[L]      28 Jan 2025 06:14    TPro  5.7[L]  /  Alb  3.3[L]  /  TBili  0.4  /  DBili  x   /  AST  47[H]  /  ALT  48  /  AlkPhos  75  01-28      Urinalysis Basic - ( 28 Jan 2025 06:14 )    Color: x / Appearance: x / SG: x / pH: x  Gluc: 121 mg/dL / Ketone: x  / Bili: x / Urobili: x   Blood: x / Protein: x / Nitrite: x   Leuk Esterase: x / RBC: x / WBC x   Sq Epi: x / Non Sq Epi: x / Bacteria: x      CAPILLARY BLOOD GLUCOSE      POCT Blood Glucose.: 191 mg/dL (28 Jan 2025 11:26)  POCT Blood Glucose.: 141 mg/dL (28 Jan 2025 08:14)  POCT Blood Glucose.: 118 mg/dL (27 Jan 2025 21:38)  POCT Blood Glucose.: 52 mg/dL (27 Jan 2025 21:34)        RADIOLOGY & ADDITIONAL TESTS:  < from: Xray Chest 1 View- PORTABLE-Urgent (Xray Chest 1 View- PORTABLE-Urgent .) (01.23.25 @ 11:23) >    ACC: [id number] EXAM:  XR CHEST PORTABLE URGENT 1V   ORDERED BY: KAMARI MEDELLIN     PROCEDURE DATE:  01/23/2025          INTERPRETATION:  AP semierect chest on January 23, 2025 at 11:03 AM.   Patient is short of breath.    Heart magnified by technique. COPD hyperexpansion of the lungs is noted.    On June 28, 2024 there were bilateral effusions and scattered small   infiltrates in the mid lower lung fields.    On present examination there is an atelectatic infiltrate off the right   lower hilum and a retrocardiac infiltrate.    IMPRESSION: COPD. Bibasilar infiltrates left greater than right at this   time.    --- End of Report ---    < end of copied text >    Imaging  Reviewed:  YES    Consultant(s) Notes Reviewed:   YES      Plan of care was discussed with patient and /or primary care giver; all questions and concerns were addressed

## 2025-01-28 NOTE — PHYSICAL THERAPY INITIAL EVALUATION ADULT - GENERAL OBSERVATIONS, REHAB EVAL
L arm AVF Pt received sitting in chair, NAD, alert and able to make needs known, [place], L arm AVF C/D/I, salin lock intact; cooperative to PT evaluation

## 2025-01-28 NOTE — PROGRESS NOTE ADULT - PROBLEM SELECTOR PLAN 1
- (+) Inf A   - CXR showed bibasilar infiltrates left greater than right  completed Solumedrol and Tamiflu  - C/w Azithromycin 1/28 total 5 days  completed Ceftriaxone   - CT Chest pending-f/u results   - Pulovidio Hernandez consulted.

## 2025-01-29 ENCOUNTER — TRANSCRIPTION ENCOUNTER (OUTPATIENT)
Age: 85
End: 2025-01-29

## 2025-01-29 VITALS
OXYGEN SATURATION: 96 % | RESPIRATION RATE: 18 BRPM | TEMPERATURE: 98 F | DIASTOLIC BLOOD PRESSURE: 76 MMHG | HEART RATE: 86 BPM | SYSTOLIC BLOOD PRESSURE: 115 MMHG

## 2025-01-29 LAB
ALBUMIN SERPL ELPH-MCNC: 3.1 G/DL — LOW (ref 3.5–5)
ALP SERPL-CCNC: 75 U/L — SIGNIFICANT CHANGE UP (ref 40–120)
ALT FLD-CCNC: 43 U/L DA — SIGNIFICANT CHANGE UP (ref 10–60)
ANION GAP SERPL CALC-SCNC: 14 MMOL/L — SIGNIFICANT CHANGE UP (ref 5–17)
ANION GAP SERPL CALC-SCNC: 14 MMOL/L — SIGNIFICANT CHANGE UP (ref 5–17)
AST SERPL-CCNC: 34 U/L — SIGNIFICANT CHANGE UP (ref 10–40)
BILIRUB SERPL-MCNC: 0.5 MG/DL — SIGNIFICANT CHANGE UP (ref 0.2–1.2)
BUN SERPL-MCNC: 49 MG/DL — HIGH (ref 7–18)
BUN SERPL-MCNC: 51 MG/DL — HIGH (ref 7–18)
CALCIUM SERPL-MCNC: 7.5 MG/DL — LOW (ref 8.4–10.5)
CALCIUM SERPL-MCNC: 7.7 MG/DL — LOW (ref 8.4–10.5)
CHLORIDE SERPL-SCNC: 92 MMOL/L — LOW (ref 96–108)
CHLORIDE SERPL-SCNC: 93 MMOL/L — LOW (ref 96–108)
CO2 SERPL-SCNC: 25 MMOL/L — SIGNIFICANT CHANGE UP (ref 22–31)
CO2 SERPL-SCNC: 25 MMOL/L — SIGNIFICANT CHANGE UP (ref 22–31)
CREAT SERPL-MCNC: 5.5 MG/DL — HIGH (ref 0.5–1.3)
CREAT SERPL-MCNC: 5.57 MG/DL — HIGH (ref 0.5–1.3)
EGFR: 7 ML/MIN/1.73M2 — LOW
EGFR: 7 ML/MIN/1.73M2 — LOW
GLUCOSE BLDC GLUCOMTR-MCNC: 102 MG/DL — HIGH (ref 70–99)
GLUCOSE BLDC GLUCOMTR-MCNC: 122 MG/DL — HIGH (ref 70–99)
GLUCOSE SERPL-MCNC: 115 MG/DL — HIGH (ref 70–99)
GLUCOSE SERPL-MCNC: 134 MG/DL — HIGH (ref 70–99)
HCT VFR BLD CALC: 24.5 % — LOW (ref 34.5–45)
HGB BLD-MCNC: 8.7 G/DL — LOW (ref 11.5–15.5)
MCHC RBC-ENTMCNC: 30.5 PG — SIGNIFICANT CHANGE UP (ref 27–34)
MCHC RBC-ENTMCNC: 35.5 G/DL — SIGNIFICANT CHANGE UP (ref 32–36)
MCV RBC AUTO: 86 FL — SIGNIFICANT CHANGE UP (ref 80–100)
NRBC # BLD: 0 /100 WBCS — SIGNIFICANT CHANGE UP (ref 0–0)
NRBC BLD-RTO: 0 /100 WBCS — SIGNIFICANT CHANGE UP (ref 0–0)
PLATELET # BLD AUTO: 161 K/UL — SIGNIFICANT CHANGE UP (ref 150–400)
POTASSIUM SERPL-MCNC: 3.5 MMOL/L — SIGNIFICANT CHANGE UP (ref 3.5–5.3)
POTASSIUM SERPL-MCNC: 3.7 MMOL/L — SIGNIFICANT CHANGE UP (ref 3.5–5.3)
POTASSIUM SERPL-SCNC: 3.5 MMOL/L — SIGNIFICANT CHANGE UP (ref 3.5–5.3)
POTASSIUM SERPL-SCNC: 3.7 MMOL/L — SIGNIFICANT CHANGE UP (ref 3.5–5.3)
PROT SERPL-MCNC: 5.6 G/DL — LOW (ref 6–8.3)
RBC # BLD: 2.85 M/UL — LOW (ref 3.8–5.2)
RBC # FLD: 13 % — SIGNIFICANT CHANGE UP (ref 10.3–14.5)
SODIUM SERPL-SCNC: 131 MMOL/L — LOW (ref 135–145)
SODIUM SERPL-SCNC: 132 MMOL/L — LOW (ref 135–145)
WBC # BLD: 7.64 K/UL — SIGNIFICANT CHANGE UP (ref 3.8–10.5)
WBC # FLD AUTO: 7.64 K/UL — SIGNIFICANT CHANGE UP (ref 3.8–10.5)

## 2025-01-29 PROCEDURE — 92610 EVALUATE SWALLOWING FUNCTION: CPT

## 2025-01-29 PROCEDURE — 80061 LIPID PANEL: CPT

## 2025-01-29 PROCEDURE — 84484 ASSAY OF TROPONIN QUANT: CPT

## 2025-01-29 PROCEDURE — 83540 ASSAY OF IRON: CPT

## 2025-01-29 PROCEDURE — 85730 THROMBOPLASTIN TIME PARTIAL: CPT

## 2025-01-29 PROCEDURE — 80048 BASIC METABOLIC PNL TOTAL CA: CPT

## 2025-01-29 PROCEDURE — 99231 SBSQ HOSP IP/OBS SF/LOW 25: CPT

## 2025-01-29 PROCEDURE — 82962 GLUCOSE BLOOD TEST: CPT

## 2025-01-29 PROCEDURE — 87637 SARSCOV2&INF A&B&RSV AMP PRB: CPT

## 2025-01-29 PROCEDURE — 83735 ASSAY OF MAGNESIUM: CPT

## 2025-01-29 PROCEDURE — 87340 HEPATITIS B SURFACE AG IA: CPT

## 2025-01-29 PROCEDURE — 71250 CT THORAX DX C-: CPT | Mod: MC

## 2025-01-29 PROCEDURE — 85610 PROTHROMBIN TIME: CPT

## 2025-01-29 PROCEDURE — 82330 ASSAY OF CALCIUM: CPT

## 2025-01-29 PROCEDURE — 96375 TX/PRO/DX INJ NEW DRUG ADDON: CPT

## 2025-01-29 PROCEDURE — 83880 ASSAY OF NATRIURETIC PEPTIDE: CPT

## 2025-01-29 PROCEDURE — 83605 ASSAY OF LACTIC ACID: CPT

## 2025-01-29 PROCEDURE — 71045 X-RAY EXAM CHEST 1 VIEW: CPT

## 2025-01-29 PROCEDURE — 97162 PT EVAL MOD COMPLEX 30 MIN: CPT

## 2025-01-29 PROCEDURE — 0241U: CPT

## 2025-01-29 PROCEDURE — 99285 EMERGENCY DEPT VISIT HI MDM: CPT

## 2025-01-29 PROCEDURE — 84295 ASSAY OF SERUM SODIUM: CPT

## 2025-01-29 PROCEDURE — 83550 IRON BINDING TEST: CPT

## 2025-01-29 PROCEDURE — 82803 BLOOD GASES ANY COMBINATION: CPT

## 2025-01-29 PROCEDURE — 83036 HEMOGLOBIN GLYCOSYLATED A1C: CPT

## 2025-01-29 PROCEDURE — 84132 ASSAY OF SERUM POTASSIUM: CPT

## 2025-01-29 PROCEDURE — 84100 ASSAY OF PHOSPHORUS: CPT

## 2025-01-29 PROCEDURE — 85025 COMPLETE CBC W/AUTO DIFF WBC: CPT

## 2025-01-29 PROCEDURE — 85027 COMPLETE CBC AUTOMATED: CPT

## 2025-01-29 PROCEDURE — 86706 HEP B SURFACE ANTIBODY: CPT

## 2025-01-29 PROCEDURE — 96374 THER/PROPH/DIAG INJ IV PUSH: CPT

## 2025-01-29 PROCEDURE — 80053 COMPREHEN METABOLIC PANEL: CPT

## 2025-01-29 PROCEDURE — 94640 AIRWAY INHALATION TREATMENT: CPT

## 2025-01-29 PROCEDURE — 99261: CPT

## 2025-01-29 PROCEDURE — 36415 COLL VENOUS BLD VENIPUNCTURE: CPT

## 2025-01-29 PROCEDURE — 93005 ELECTROCARDIOGRAM TRACING: CPT

## 2025-01-29 PROCEDURE — 82728 ASSAY OF FERRITIN: CPT

## 2025-01-29 RX ORDER — INSULIN GLARGINE-YFGN 100 [IU]/ML
15 INJECTION, SOLUTION SUBCUTANEOUS
Qty: 1 | Refills: 0
Start: 2025-01-29 | End: 2025-02-27

## 2025-01-29 RX ORDER — INSULIN LISPRO 100/ML
4 VIAL (ML) SUBCUTANEOUS
Qty: 1 | Refills: 0
Start: 2025-01-29 | End: 2025-02-27

## 2025-01-29 RX ORDER — ALBUTEROL 90 MCG
2 AEROSOL REFILL (GRAM) INHALATION
Qty: 2 | Refills: 0
Start: 2025-01-29 | End: 2025-02-27

## 2025-01-29 RX ORDER — INSULIN GLARGINE-YFGN 100 [IU]/ML
9 INJECTION, SOLUTION SUBCUTANEOUS
Qty: 1 | Refills: 0
Start: 2025-01-29 | End: 2025-02-27

## 2025-01-29 RX ORDER — FLUTICASONE PROPIONATE AND SALMETEROL 113; 14 UG/1; UG/1
2 POWDER, METERED RESPIRATORY (INHALATION)
Qty: 2 | Refills: 0
Start: 2025-01-29 | End: 2025-02-27

## 2025-01-29 RX ADMIN — CALCIUM ACETATE 667 MILLIGRAM(S): 667 CAPSULE ORAL at 12:15

## 2025-01-29 RX ADMIN — ANTISEPTIC SURGICAL SCRUB 1 APPLICATION(S): 0.04 SOLUTION TOPICAL at 05:17

## 2025-01-29 RX ADMIN — PANTOPRAZOLE 40 MILLIGRAM(S): 20 TABLET, DELAYED RELEASE ORAL at 05:15

## 2025-01-29 RX ADMIN — Medication 25 MILLIGRAM(S): at 05:16

## 2025-01-29 RX ADMIN — EPOETIN ALFA 10000 UNIT(S): 2000 SOLUTION INTRAVENOUS; SUBCUTANEOUS at 09:27

## 2025-01-29 RX ADMIN — Medication 4 UNIT(S): at 08:22

## 2025-01-29 RX ADMIN — Medication 1 CAPSULE(S): at 12:15

## 2025-01-29 RX ADMIN — CALCIUM ACETATE 667 MILLIGRAM(S): 667 CAPSULE ORAL at 08:24

## 2025-01-29 RX ADMIN — Medication 10 MILLIGRAM(S): at 12:15

## 2025-01-29 RX ADMIN — Medication 5000 UNIT(S): at 05:17

## 2025-01-29 RX ADMIN — Medication 1 CAPSULE(S): at 08:24

## 2025-01-29 NOTE — PROGRESS NOTE ADULT - PROBLEM SELECTOR PROBLEM 2
COPD with acute exacerbation
Influenza A
COPD with acute exacerbation
COPD with acute exacerbation

## 2025-01-29 NOTE — DISCHARGE NOTE NURSING/CASE MANAGEMENT/SOCIAL WORK - PATIENT PORTAL LINK FT
You can access the FollowMyHealth Patient Portal offered by [facility] by registering at the following website: [email or web address]. By joining [facility]’s FollowMyHealth portal, you will also be able to view your health information using other applications (apps) compatible with our system.

## 2025-01-29 NOTE — PROGRESS NOTE ADULT - PROBLEM SELECTOR PLAN 8
Cont with meds   Cardio follow up.
DVT: Heparin q12
Cont with meds   Cardio follow up.
- C/w Heparin   - C/w Protonix
- C/w Heparin   - C/w Protonix
Cont with meds   Cardio follow up.

## 2025-01-29 NOTE — PROGRESS NOTE ADULT - PROBLEM SELECTOR PLAN 5
Lipid panel   Statin.
h/o DM on repaglinide and Toujeo 9U (tues, thur, sun) 11U (Mon, Wed, Fri, Sat   - hold oral dm meds  -c/w lantus 6U qhs + low sliding scale insulin  -Gave 10u admelog this AM at 8AM, if remains hyperglycemic, will increase insulin regimen  -FS ACHS
- See above
- See above
Lipid panel   Statin.

## 2025-01-29 NOTE — PROGRESS NOTE ADULT - PROBLEM SELECTOR PLAN 6
Cont HD  monitor BMP  Renal follow up.
- A1c=6.8  - C/w Lantus 22units decreased to 15 units daily for nocturnal hypoglycemia  continue Admelog 4 units premeal and HSS   - Endo-Dr. Castellon consulted
- A1c=6.8  - C/w [place] be and HSS   - Endo-Dr. Castellon consulted for steroid induced hyperglycemia-F/u rec's
h/o HTN on losartan and metoprolol  -c/w home meds   -monitor BP  -adjust antihypertensive meds as appropriate
Cont HD  monitor BMP  Renal follow up.
Cont HD  monitor BMP  Renal follow up.

## 2025-01-29 NOTE — PROGRESS NOTE ADULT - PROBLEM SELECTOR PROBLEM 3
ESRD (end stage renal disease) on dialysis
PNA (pneumonia)
PNA (pneumonia)
ESRD (end stage renal disease) on dialysis
PNA (pneumonia)
ESRD (end stage renal disease) on dialysis
PNA (pneumonia)
PNA (pneumonia)

## 2025-01-29 NOTE — PROGRESS NOTE ADULT - SUBJECTIVE AND OBJECTIVE BOX
Subjective:  Chart Notes, Work list Manager, and fingersticks reviewed. Patient was seen bedside resting comfortably. No acute overnight events noted. Patient states they are overall improved in condition from when they were first admitted. Patient has good appetite, normal bowel movements, normal urination frequency. Patient denies any constitutional symptoms such as F/C/N/V/SOB.     Review of Systems  Constitutional: No fever  Eyes: No blurry vision  Neuro: No tremors  HEENT: No pain  Cardiovascular: No chest pain, palpitations  Respiratory: No SOB, no cough  GI: No nausea, vomiting, abdominal pain  : No dysuria  Skin: no rash  Psych: no depression  Endocrine: no polyuria, polydipsia  Hem/lymph: no swelling  Osteoporosis: no fractures    All other systems reviewed and negative    Medications (standing):  albuterol/ipratropium for Nebulization 3 milliLiter(s) Nebulizer every 6 hours  atorvastatin 10 milliGRAM(s) Oral at bedtime  calcium acetate 667 milliGRAM(s) Oral three times a day with meals  chlorhexidine 2% Cloths 1 Application(s) Topical <User Schedule>  epoetin lara-epbx (RETACRIT) Injectable 36075 Unit(s) IV Push <User Schedule>  ezetimibe 10 milliGRAM(s) Oral daily  fluticasone propionate/ salmeterol 100-50 MICROgram(s) Diskus 1 Dose(s) Inhalation two times a day  heparin   Injectable 5000 Unit(s) SubCutaneous every 12 hours  insulin glargine Injectable (LANTUS) 15 Unit(s) SubCutaneous at bedtime  insulin lispro (ADMELOG) corrective regimen sliding scale   SubCutaneous three times a day before meals  insulin lispro (ADMELOG) corrective regimen sliding scale   SubCutaneous at bedtime  insulin lispro Injectable (ADMELOG) 4 Unit(s) SubCutaneous three times a day before meals  losartan 25 milliGRAM(s) Oral daily  melatonin 3 milliGRAM(s) Oral at bedtime  metoprolol tartrate 25 milliGRAM(s) Oral two times a day  pancrelipase  (CREON 36,000 Lipase Units) 1 Capsule(s) Oral four times a day with meals  pantoprazole    Tablet 40 milliGRAM(s) Oral before breakfast    Medications (PRN):  acetaminophen     Tablet .. 650 milliGRAM(s) Oral every 6 hours PRN Temp greater or equal to 38C (100.4F), Mild Pain (1 - 3)  albuterol    90 MICROgram(s) HFA Inhaler 2 Puff(s) Inhalation every 6 hours PRN Shortness of Breath and/or Wheezing      Physical Exam  VITALS: T(C): 36.6 (01-29-25 @ 13:22)  T(F): 97.8 (01-29-25 @ 13:22), Max: 98.4 (01-28-25 @ 20:30)  HR: 86 (01-29-25 @ 13:22) (63 - 86)  BP: 115/76 (01-29-25 @ 13:22) (115/76 - 134/69)  RR:  (18 - 19)  SpO2:  (96% - 100%)  Wt(kg): --  GENERAL: NAD,   HEENT:  Atraumatic, Normocephalic, drymucous membranes  THYROID: Normal size, no palpable nodules  RESPIRATORY: Clear to auscultation bilaterally; No rales, rhonchi, wheezing  CARDIOVASCULAR: Regular rate and rhythm; No murmurs; no peripheral edema  GI: Soft, nontender, non distended, +ve abdominal obesity  EXTREMITIES: +ve peripheral pulses, -ve pedal edema  SKIN: Dry, intact, No rashes or lesions  PSYCH: Alert and oriented x 3    Capillary Blood Glucose:  POCT Blood Glucose.: 102 mg/dL (29 Jan 2025 11:31)  POCT Blood Glucose.: 122 mg/dL (29 Jan 2025 08:03)  POCT Blood Glucose.: 210 mg/dL (28 Jan 2025 21:13)  POCT Blood Glucose.: 202 mg/dL (28 Jan 2025 17:08)    01-29    131[L]  |  92[L]  |  51[H]  ----------------------------<  134[H]  3.7   |  25  |  5.57[H]    eGFR: 7[L]    Ca    7.5[L]      01-29    TPro  5.6[L]  /  Alb  3.1[L]  /  TBili  0.5  /  DBili  x   /  AST  34  /  ALT  43  /  AlkPhos  75  01-29    Assessment and Plan:   84y F with PMH of Gout, HTN, HLD, DM, OA, ESRD on HD (MWF), Hemochromatosis. and CHF presenting with shortness of breath. Admitted for suspected acute exacerbation of COPD 2/2 Influenza A infection vs. superimposed bacterial PNA possibly 2/2 aspiration. Admitted to telemetry for monitoring of possible heart block seen on telemetry in ED.  Endocrinology is consulted for DM and hyperglycemia. Patient is currently on steroids which are increasing her blood glucose levels.    1) Type 2 diabetes:  2) Hypoglycemia  3) Steroid induced hyperglycemia ( Resolved)    Steroids have been discontinued today.   Patient developed hypoglycemia yesterday as she received lispro however she did not eat any food. She does not like fish and she is getting fish in her trays    Recommendations:  Basal Insulin: Glargine ( Lantus) to 15 units once daily    Nutritional Insulin:  Lispro (Admelog) 4 units with meals 3x a day, Hold if NPO or eating <50% of meals    Correctional Insulin:  low Lispro ( Admelog) correctional scale with meals and bedtime    Discharge recommendations:  -Continue with Toujeo current dosing   -If patient is leaving with oral steroids, then increase Prandin to 2 mg. If not being discharged with steroids then keep 1 mg Prandin with meals

## 2025-01-29 NOTE — PROGRESS NOTE ADULT - PROBLEM SELECTOR PLAN 10
Oxygen supp  Bronchodilators  CCB

## 2025-01-29 NOTE — PROGRESS NOTE ADULT - PROBLEM SELECTOR PROBLEM 5
HLD (hyperlipidemia)
HLD (hyperlipidemia)
HTN (hypertension)
DM (diabetes mellitus)
HTN (hypertension)
HLD (hyperlipidemia)

## 2025-01-29 NOTE — PROGRESS NOTE ADULT - PROBLEM SELECTOR PROBLEM 8
CHF (congestive heart failure)
Prophylactic measure
CHF (congestive heart failure)
CHF (congestive heart failure)

## 2025-01-29 NOTE — PROGRESS NOTE ADULT - TIME BILLING
minutes spent the time noted on the day of this patient encounter preparing for, reviewing records/charts/labs,  coordination of care with primary team, providing and documenting the above E/M service.    I have discussed the patient's plan of care with primary team and resident.  I agree with the resident progress note/outlined plan of care. I have edited the note as necessary.

## 2025-01-29 NOTE — PROGRESS NOTE ADULT - SUBJECTIVE AND OBJECTIVE BOX
Patient is a 84y old  Female who presents with a chief complaint of PNA (28 Jan 2025 10:24)  Awake, alert, comfortable in bed in NAD. Having HD    INTERVAL HPI/OVERNIGHT EVENTS:      VITAL SIGNS:  T(F): 97.1 (01-29-25 @ 08:47)  HR: 63 (01-29-25 @ 08:47)  BP: 129/60 (01-29-25 @ 08:47)  RR: 19 (01-29-25 @ 08:47)  SpO2: 100% (01-29-25 @ 08:47)  Wt(kg): --  I&O's Detail          REVIEW OF SYSTEMS:    CONSTITUTIONAL:  No fevers, chills, sweats    HEENT:  Eyes:  No diplopia or blurred vision. ENT:  No earache, sore throat or runny nose.    CARDIOVASCULAR:  No pressure, squeezing, tightness, or heaviness about the chest; no palpitations.    RESPIRATORY:  Per HPI    GASTROINTESTINAL:  No abdominal pain, nausea, vomiting or diarrhea.    GENITOURINARY:  No dysuria, frequency or urgency.    NEUROLOGIC:  No paresthesias, fasciculations, seizures or weakness.    PSYCHIATRIC:  No disorder of thought or mood.      PHYSICAL EXAM:    Constitutional: Well developed and nourished  Eyes:Perrla  ENMT: normal  Neck:supple  Respiratory: good air entry  Cardiovascular: S1 S2 regular  Gastrointestinal: Soft, Non tender  Extremities: No edema  Vascular:normal  Neurological:Awake, alert,Ox3  Musculoskeletal:Normal      MEDICATIONS  (STANDING):  albuterol/ipratropium for Nebulization 3 milliLiter(s) Nebulizer every 6 hours  atorvastatin 10 milliGRAM(s) Oral at bedtime  calcium acetate 667 milliGRAM(s) Oral three times a day with meals  chlorhexidine 2% Cloths 1 Application(s) Topical <User Schedule>  epoetin lara-epbx (RETACRIT) Injectable 98959 Unit(s) IV Push <User Schedule>  ezetimibe 10 milliGRAM(s) Oral daily  fluticasone propionate/ salmeterol 100-50 MICROgram(s) Diskus 1 Dose(s) Inhalation two times a day  heparin   Injectable 5000 Unit(s) SubCutaneous every 12 hours  insulin glargine Injectable (LANTUS) 15 Unit(s) SubCutaneous at bedtime  insulin lispro (ADMELOG) corrective regimen sliding scale   SubCutaneous three times a day before meals  insulin lispro (ADMELOG) corrective regimen sliding scale   SubCutaneous at bedtime  insulin lispro Injectable (ADMELOG) 4 Unit(s) SubCutaneous three times a day before meals  losartan 25 milliGRAM(s) Oral daily  melatonin 3 milliGRAM(s) Oral at bedtime  metoprolol tartrate 25 milliGRAM(s) Oral two times a day  pancrelipase  (CREON 36,000 Lipase Units) 1 Capsule(s) Oral four times a day with meals  pantoprazole    Tablet 40 milliGRAM(s) Oral before breakfast    MEDICATIONS  (PRN):  acetaminophen     Tablet .. 650 milliGRAM(s) Oral every 6 hours PRN Temp greater or equal to 38C (100.4F), Mild Pain (1 - 3)  albuterol    90 MICROgram(s) HFA Inhaler 2 Puff(s) Inhalation every 6 hours PRN Shortness of Breath and/or Wheezing      Allergies    shellfish (Anaphylaxis)  latex (Unknown)  natural rubber (Unknown)  aspirin (Unknown)  ibuprofen (Unknown)  Mushrooms (Anaphylaxis)    Intolerances        LABS:                        8.7    7.64  )-----------( 161      ( 29 Jan 2025 06:29 )             24.5     01-29    131[L]  |  92[L]  |  51[H]  ----------------------------<  134[H]  3.7   |  25  |  5.57[H]    Ca    7.5[L]      29 Jan 2025 08:30    TPro  5.6[L]  /  Alb  3.1[L]  /  TBili  0.5  /  DBili  x   /  AST  34  /  ALT  43  /  AlkPhos  75  01-29      Urinalysis Basic - ( 29 Jan 2025 08:30 )    Color: x / Appearance: x / SG: x / pH: x  Gluc: 134 mg/dL / Ketone: x  / Bili: x / Urobili: x   Blood: x / Protein: x / Nitrite: x   Leuk Esterase: x / RBC: x / WBC x   Sq Epi: x / Non Sq Epi: x / Bacteria: x            CAPILLARY BLOOD GLUCOSE      POCT Blood Glucose.: 122 mg/dL (29 Jan 2025 08:03)  POCT Blood Glucose.: 210 mg/dL (28 Jan 2025 21:13)  POCT Blood Glucose.: 202 mg/dL (28 Jan 2025 17:08)  POCT Blood Glucose.: 191 mg/dL (28 Jan 2025 11:26)        RADIOLOGY & ADDITIONAL TESTS:  < from: CT Chest No Cont (01.28.25 @ 13:43) >  IMPRESSION:    Opacities within the right middle and left lower lobes. Exact etiology is   unclear. Follow-up CT scan of the chest is recommended in 3 months to   ensure resolution.      < end of copied text >    CXR:    Ct scan chest:    ekg;    echo:

## 2025-01-29 NOTE — DISCHARGE NOTE NURSING/CASE MANAGEMENT/SOCIAL WORK - FINANCIAL ASSISTANCE
[facility] provides services at a reduced cost to those who are determined to be eligible through [facility]’s financial assistance program. Information regarding [facility]’s financial assistance program can be found by going to [email or web address] or by calling [phone].

## 2025-01-29 NOTE — PROGRESS NOTE ADULT - PROBLEM SELECTOR PROBLEM 1
Influenza A
Influenza A
COPD with acute exacerbation
Influenza A
Influenza A
COPD with acute exacerbation
COPD with acute exacerbation
Influenza A

## 2025-01-29 NOTE — PROGRESS NOTE ADULT - PROBLEM SELECTOR PLAN 7
Accucheck with reg insulin  coverage  HbA1C.
- C/w Atorvastatin & Ezetimibe
- C/w Atorvastatin & Ezetimibe
Accucheck with reg insulin  coverage  HbA1C.
h/o HLD on atorvastatin/ezetimibe   -c/w home med  -f/u lipid profile  -calculate ASCVD risk  -DASH diet
Accucheck with reg insulin  coverage  HbA1C.

## 2025-01-29 NOTE — PROGRESS NOTE ADULT - PROBLEM SELECTOR PROBLEM 7
DM (diabetes mellitus)
HLD (hyperlipidemia)
DM (diabetes mellitus)
DM (diabetes mellitus)
HLD (hyperlipidemia)
DM (diabetes mellitus)
DM (diabetes mellitus)
HLD (hyperlipidemia)

## 2025-01-29 NOTE — PROGRESS NOTE ADULT - PROVIDER SPECIALTY LIST ADULT
Endocrinology
Internal Medicine
Nephrology
Nephrology
Pulmonology
Endocrinology
Internal Medicine
Internal Medicine
Nephrology
Nephrology
Pulmonology
Internal Medicine
Internal Medicine
Pulmonology

## 2025-01-29 NOTE — PROGRESS NOTE ADULT - PROBLEM SELECTOR PROBLEM 4
CHF (congestive heart failure)
CHF (congestive heart failure)
HTN (hypertension)
CHF (congestive heart failure)
HTN (hypertension)

## 2025-01-29 NOTE — PROGRESS NOTE ADULT - PROBLEM SELECTOR PROBLEM 6
ESRD (end stage renal disease) on dialysis
DM (diabetes mellitus)
HTN (hypertension)
DM (diabetes mellitus)
ESRD (end stage renal disease) on dialysis

## 2025-01-29 NOTE — PROGRESS NOTE ADULT - PROBLEM SELECTOR PLAN 1
Droplets precautions  Analgesics PRN  Robitussin 10 cc po TID PRN  Tamiflu completed  CT Chest w/o contrast noted.

## 2025-01-29 NOTE — DISCHARGE NOTE NURSING/CASE MANAGEMENT/SOCIAL WORK - NSDCFUADDAPPT_GEN_ALL_CORE_FT
APPTS ARE READY TO BE MADE: [X] YES    Best Family or Patient Contact (if needed):    Additional Information about above appointments (if needed):    1: Naun Hernandez 1 week   2:   3:     Other comments or requests:

## 2025-01-29 NOTE — PROGRESS NOTE ADULT - PROBLEM SELECTOR PLAN 4
- C/w Losartan
monitor BP  cont meds.
- C/w Losartan and metoprolol
telemetry to monitor for arrhythmias (hx of VT)

## 2025-02-06 NOTE — ED ADULT TRIAGE NOTE - AS PAIN REST
LVM regarding OV message Please call back @ [phone]    0 (no pain/absence of nonverbal indicators of pain)

## 2025-04-28 NOTE — PROGRESS NOTE ADULT - SUBJECTIVE AND OBJECTIVE BOX
[Alert] : alert [Oriented to Person] : oriented to person [Oriented to Place] : oriented to place [Oriented to Time] : oriented to time [2+] : left 2+ [de-identified] : ambulating without assistance  [de-identified] : cranial nerves intact  [FreeTextEntry1] : B/L LE warm, well perfused no wounds  PGY-1 Progress Note discussed with attending    PAGER #: [[phone]] TILL 5:00 PM  PLEASE CONTACT ON CALL TEAM:  - On Call Team (Please refer to Jose Raul) FROM 5:00 PM - 8:30PM  - Nightfloat Team FROM 8:30 -7:30 AM    CHIEF COMPLAINT & BRIEF HOSPITAL COURSE:      INTERVAL HPI/OVERNIGHT EVENTS: No acute events , sodium stable       REVIEW OF SYSTEMS:  CONSTITUTIONAL: No fever, weight loss, or fatigue  RESPIRATORY: No cough, wheezing, chills or hemoptysis; No shortness of breath  CARDIOVASCULAR: No chest pain, palpitations, dizziness, or leg swelling  GASTROINTESTINAL: No abdominal pain. No nausea, vomiting, or hematemesis; No diarrhea or constipation. No melena or hematochezia.  GENITOURINARY: No dysuria or hematuria, urinary frequency  NEUROLOGICAL: No headaches, memory loss, loss of strength, numbness, or tremors  SKIN: No itching, burning, rashes, or lesions     MEDICATIONS  (STANDING):  albuterol/ipratropium for Nebulization 3 milliLiter(s) Nebulizer every 6 hours  chlorhexidine 2% Cloths 1 Application(s) Topical <User Schedule>  epoetin lara (PROCRIT) Injectable 46000 Unit(s) IV Push <User Schedule>  heparin   Injectable 5000 Unit(s) SubCutaneous every 12 hours  insulin glargine Injectable (LANTUS) 3 Unit(s) SubCutaneous at bedtime  insulin lispro (ADMELOG) corrective regimen sliding scale   SubCutaneous three times a day before meals  insulin lispro (ADMELOG) corrective regimen sliding scale   SubCutaneous at bedtime  pancrelipase  (CREON 36,000 Lipase Units) 1 Capsule(s) Oral four times a day with meals  pantoprazole    Tablet 40 milliGRAM(s) Oral before breakfast  simvastatin 20 milliGRAM(s) Oral at bedtime  sodium chloride 3%  Inhalation 4 milliLiter(s) Inhalation every 12 hours    MEDICATIONS  (PRN):  acetaminophen     Tablet .. 650 milliGRAM(s) Oral every 6 hours PRN Temp greater or equal to 38C (100.4F), Mild Pain (1 - 3)  aluminum hydroxide/magnesium hydroxide/simethicone Suspension 30 milliLiter(s) Oral every 4 hours PRN Dyspepsia  benzocaine/menthol Lozenge 1 Lozenge Oral three times a day PRN Sore Throat  melatonin 3 milliGRAM(s) Oral at bedtime PRN Insomnia  ondansetron Injectable 4 milliGRAM(s) IV Push every 8 hours PRN Nausea and/or Vomiting      Vital Signs Last 24 Hrs  T(C): 37.5 (14 May 2024 11:41), Max: 37.5 (14 May 2024 11:41)  T(F): 99.5 (14 May 2024 11:41), Max: 99.5 (14 May 2024 11:41)  HR: 96 (14 May 2024 11:41) (96 - 113)  BP: 126/68 (14 May 2024 11:41) (109/63 - 132/74)  BP(mean): 78 (14 May 2024 04:37) (78 - 78)  RR: 18 (14 May 2024 11:41) (18 - 18)  SpO2: 95% (14 May 2024 11:41) (94% - 95%)    Parameters below as of 14 May 2024 11:41  Patient On (Oxygen Delivery Method): room air        PHYSICAL EXAMINATION:  GENERAL: NAD, well built  HEAD:  Atraumatic, Normocephalic  EYES:  conjunctiva and sclera clear  NECK: Supple, No JVD, Normal thyroid  CHEST/LUNG: Clear to auscultation. Clear to percussion bilaterally; No rales, rhonchi, wheezing, or rubs  HEART: Regular rate and rhythm; No murmurs, rubs, or gallops  ABDOMEN: Soft, Nontender, Nondistended; Bowel sounds present, no pain or masses on palpation  NERVOUS SYSTEM:  Alert & Oriented X3  : voiding well  EXTREMITIES:  2+ Peripheral Pulses, No clubbing, cyanosis, or edema  SKIN: warm dry                          7.9    7.26  )-----------( 314      ( 14 May 2024 07:20 )             24.3     05-14    132<L>  |  97  |  18  ----------------------------<  150<H>  3.6   |  27  |  3.81<H>    Ca    7.9<L>      14 May 2024 07:20  Phos  3.3     05-14  Mg     2.0     05-14    TPro  5.3<L>  /  Alb  1.9<L>  /  TBili  0.7  /  DBili  x   /  AST  95<H>  /  ALT  101<H>  /  AlkPhos  205<H>  05-14    LIVER FUNCTIONS - ( 14 May 2024 07:20 )  Alb: 1.9 g/dL / Pro: 5.3 g/dL / ALK PHOS: 205 U/L / ALT: 101 U/L DA / AST: 95 U/L / GGT: x                   I&O's Summary          CAPILLARY BLOOD GLUCOSE      RADIOLOGY & ADDITIONAL TESTS:

## 2025-08-07 NOTE — ED PROVIDER NOTE - CLINICAL SUMMARY MEDICAL DECISION MAKING FREE TEXT BOX
----- Message from Tyrell Marlow sent at 8/7/2025  7:28 AM EDT -----  She can resume her xarelto at this time. I would not stop the iron supplements as of yet. Recommend follow up in 1 month and will recheck iron storage at that time while she is on a blood thinner to see if she her blood work is holding steady.     Capsule endoscopy is not a bad idea as your source to the bleed has not been found  
Pt is aware  
Patient on dialysis but is still making urine. Patient with right flank pain and hematuria. Will check CT abdomen and pelvis, rule out stone, do labs, urinalysis, urine culture and reassess. Patient declines pain meds at this time.

## 2025-09-02 ENCOUNTER — APPOINTMENT (OUTPATIENT)
Dept: CT IMAGING | Facility: CLINIC | Age: 85
End: 2025-09-02
Payer: MEDICARE

## 2025-09-02 ENCOUNTER — APPOINTMENT (OUTPATIENT)
Dept: MRI IMAGING | Facility: CLINIC | Age: 85
End: 2025-09-02
Payer: MEDICARE

## 2025-09-02 PROCEDURE — A9585: CPT

## 2025-09-02 PROCEDURE — 71250 CT THORAX DX C-: CPT

## 2025-09-02 PROCEDURE — 74183 MRI ABD W/O CNTR FLWD CNTR: CPT

## 2025-09-02 PROCEDURE — A9585: CPT | Mod: JW

## (undated) DEVICE — DRSG CURITY GAUZE SPONGE 4 X 4" 12-PLY NON-STERILE

## (undated) DEVICE — BIOPSY FORCEP RADIAL JAW 4 STANDARD WITH NEEDLE

## (undated) DEVICE — BIOPSY FORCEP COLD DISP

## (undated) DEVICE — CONTAINER FORMALIN 10% 20ML

## (undated) DEVICE — BITE BLOCK ADULT 20 X 27MM (GREEN)

## (undated) DEVICE — SYR LUER LOK 3CC

## (undated) DEVICE — DRSG BANDAID 0.75X3"

## (undated) DEVICE — SNARE CAPTIVATOR RND COLD STIFF 2.4X10MM 240CM

## (undated) DEVICE — CATH IV SAFE BC 22G X 1" (BLUE)

## (undated) DEVICE — SOL INJ NS 0.9% 500ML 1-PORT

## (undated) DEVICE — PACK IV START WITH CHG

## (undated) DEVICE — VENODYNE/SCD SLEEVE CALF MEDIUM

## (undated) DEVICE — WARMING BLANKET FULL ADULT

## (undated) DEVICE — TUBING SUCTION NONCONDUCTIVE 6MM X 12FT

## (undated) DEVICE — DENTURE CUP PINK

## (undated) DEVICE — ANESTHESIA CIRCUIT ADULT HMEF

## (undated) DEVICE — FORCEP RADIAL JAW 4 W NDL 2.2MM 2.8MM 240CM ORANGE DISP

## (undated) DEVICE — LUBRICATING JELLY HR ONE SHOT 3G

## (undated) DEVICE — LABELS BLANK W PEN

## (undated) DEVICE — SALIVA EJECTOR (BLUE)

## (undated) DEVICE — TUBING IV SET GRAVITY 1Y 78" MACRO

## (undated) DEVICE — UNDERPAD LINEN SAVER 17 X 24"

## (undated) DEVICE — BASIN EMESIS 10IN GRADUATED MAUVE

## (undated) DEVICE — ELCTR ECG CONDUCTIVE ADHESIVE

## (undated) DEVICE — NDL ACUJET FLEX INJ 25G 5MMX230CM

## (undated) DEVICE — ELCTR GROUNDING PAD ADULT COVIDIEN

## (undated) DEVICE — LIFTER SYR EVERLIFT AGENT SUBMUCOSAL 5ML

## (undated) DEVICE — DRSG 2X2

## (undated) DEVICE — SOL IRR POUR NS 0.9% 500ML

## (undated) DEVICE — TUBING IV SET GRAVITY 3Y 100" MACRO

## (undated) DEVICE — GOWN LG

## (undated) DEVICE — NDL HYPO SAFE 22G X 1" (BLACK)

## (undated) DEVICE — TUBING MEDI-VAC W MAXIGRIP CONNECTORS 1/4"X6'